# Patient Record
Sex: FEMALE | Race: WHITE | NOT HISPANIC OR LATINO | Employment: OTHER | URBAN - METROPOLITAN AREA
[De-identification: names, ages, dates, MRNs, and addresses within clinical notes are randomized per-mention and may not be internally consistent; named-entity substitution may affect disease eponyms.]

---

## 2017-03-21 ENCOUNTER — ALLSCRIPTS OFFICE VISIT (OUTPATIENT)
Dept: OTHER | Facility: OTHER | Age: 82
End: 2017-03-21

## 2017-06-21 ENCOUNTER — ALLSCRIPTS OFFICE VISIT (OUTPATIENT)
Dept: OTHER | Facility: OTHER | Age: 82
End: 2017-06-21

## 2017-09-19 ENCOUNTER — ALLSCRIPTS OFFICE VISIT (OUTPATIENT)
Dept: OTHER | Facility: OTHER | Age: 82
End: 2017-09-19

## 2018-01-14 VITALS
BODY MASS INDEX: 21.68 KG/M2 | DIASTOLIC BLOOD PRESSURE: 70 MMHG | HEIGHT: 65 IN | WEIGHT: 130.13 LBS | SYSTOLIC BLOOD PRESSURE: 128 MMHG

## 2018-01-14 VITALS
SYSTOLIC BLOOD PRESSURE: 122 MMHG | DIASTOLIC BLOOD PRESSURE: 80 MMHG | TEMPERATURE: 97.2 F | BODY MASS INDEX: 21.37 KG/M2 | HEIGHT: 65 IN | WEIGHT: 128.25 LBS

## 2018-01-14 VITALS
SYSTOLIC BLOOD PRESSURE: 124 MMHG | WEIGHT: 128 LBS | BODY MASS INDEX: 21.33 KG/M2 | DIASTOLIC BLOOD PRESSURE: 70 MMHG | HEIGHT: 65 IN | TEMPERATURE: 97 F

## 2018-01-16 NOTE — PROGRESS NOTES
Assessment    1  Benign essential hypertension (401 1) (I10)   2  History of PAF (paroxysmal atrial fibrillation) (427 31) (I48 0)    Discussion/Summary    The patient has premature atrial contractions  She doing quite well her blood pressures but been better controlled we'll see her back in 2 months  Chief Complaint  Reg chk up htn      History of Present Illness  Is an 80-year-old female being followed for high blood pressure  Active Problems    1  Benign essential hypertension (401 1) (I10)   2  Contusion of left forearm, initial encounter (923 10) (S50 12XA)   3  Status post open reduction with internal fixation of fracture (V45 89,V15 51)   (Z96 7,Z87 81)   4  Syncope (780 2) (R55)   5  Tachycardia (785 0) (R00 0)   6  Wrist fracture (814 00) (S62 109A)    Past Medical History    1  History of cataract (V12 49) (Z86 69)   2  History of hypertension (V12 59) (Z86 79)   3  History of PAF (paroxysmal atrial fibrillation) (427 31) (I48 0)   4  Urinary tract infection (599 0) (N39 0)   5  History of Wrist fracture (814 00) (S62 109A)    Surgical History    1  History of Axillary Lymphadenectomy   2  History of Cataract Surgery   3  History of Tonsillectomy   4  History of Wrist Surgery    Family History    1  No pertinent family history    Social History    · Denied: History of Alcohol use   · Denied: History of Drug use   · Never a smoker    Current Meds   1  Daily Multiple Vitamins Oral Tablet; Therapy: (Kathi Barker) to Recorded   2  Lisinopril-Hydrochlorothiazide 10-12 5 MG Oral Tablet; Take 1 tablet twice daily; Therapy: [id number] to (Last BL:[id number])  Requested for: 64TIW3209 Ordered   3  Metoprolol Succinate ER 25 MG Oral Tablet Extended Release 24 Hour; Take 1 tablet   daily; Therapy: [id number] to (Jaison Reyez)  Requested for: 69WLT0059; Last   Rx:[id number] Ordered    Allergies    1   Penicillins    Vitals  Vital Signs [Data Includes: Current Encounter]    Recorded: [id number] 01: 17WU   Systolic 319, RUE, Sitting   Diastolic 78, RUE, Sitting   Height 5 ft 5 in   Weight 116 lb    BMI Calculated 19 3   BSA Calculated 1 57     Physical Exam    Constitutional   General appearance: No acute distress, well appearing and well nourished  Eyes   Conjunctiva and lids: No swelling, erythema or discharge  Pupils and irises: Equal, round and reactive to light  Ears, Nose, Mouth, and Throat   External inspection of ears and nose: Normal     Otoscopic examination: Tympanic membranes translucent with normal light reflex  Canals patent without erythema  Nasal mucosa, septum, and turbinates: Normal without edema or erythema  Oropharynx: Normal with no erythema, edema, exudate or lesions  Pulmonary   Respiratory effort: No increased work of breathing or signs of respiratory distress  Auscultation of lungs: Clear to auscultation  Cardiovascular   Palpation of heart: Normal PMI, no thrills  Auscultation of heart: Abnormal   Regular  Examination of extremities for edema and/or varicosities: Normal     Carotid pulses: Normal     Abdomen   Abdomen: Non-tender, no masses  Liver and spleen: No hepatomegaly or splenomegaly  Lymphatic   Palpation of lymph nodes in neck: No lymphadenopathy  Musculoskeletal   Gait and station: Normal     Digits and nails: Normal without clubbing or cyanosis  Inspection/palpation of joints, bones, and muscles: Normal     Skin   Skin and subcutaneous tissue: Normal without rashes or lesions  Neurologic   Cranial nerves: Cranial nerves 2-12 intact  Reflexes: 2+ and symmetric  Sensation: No sensory loss      Psychiatric   Orientation to person, place, and time: Normal     Mood and affect: Normal          Signatures   Electronically signed by : Dar Boswell DO; Feb  3 2016  1:32PM EST                       (Author)

## 2018-03-14 RX ORDER — METOPROLOL SUCCINATE 25 MG/1
1 TABLET, EXTENDED RELEASE ORAL DAILY
COMMUNITY
Start: 2015-12-30 | End: 2018-04-04 | Stop reason: SDUPTHER

## 2018-03-14 RX ORDER — LISINOPRIL AND HYDROCHLOROTHIAZIDE 12.5; 1 MG/1; MG/1
1 TABLET ORAL 2 TIMES DAILY
COMMUNITY
Start: 2015-12-30 | End: 2018-04-17 | Stop reason: SDUPTHER

## 2018-03-19 ENCOUNTER — OFFICE VISIT (OUTPATIENT)
Dept: FAMILY MEDICINE CLINIC | Facility: CLINIC | Age: 83
End: 2018-03-19
Payer: MEDICARE

## 2018-03-19 VITALS
WEIGHT: 134 LBS | DIASTOLIC BLOOD PRESSURE: 78 MMHG | SYSTOLIC BLOOD PRESSURE: 136 MMHG | HEIGHT: 65 IN | BODY MASS INDEX: 22.33 KG/M2 | TEMPERATURE: 98.4 F

## 2018-03-19 DIAGNOSIS — I10 BENIGN ESSENTIAL HYPERTENSION: Primary | ICD-10-CM

## 2018-03-19 PROCEDURE — 99213 OFFICE O/P EST LOW 20 MIN: CPT | Performed by: FAMILY MEDICINE

## 2018-03-19 NOTE — PROGRESS NOTES
Assessment/Plan:    Patient doing very well overall  Patient will continue with current regimen as blood pressure is controlled  Medications will be sent in automatically  To consider labs in the future  No problem-specific Assessment & Plan notes found for this encounter  Diagnoses and all orders for this visit:    Benign essential hypertension    Other orders  -     DAILY MULTIPLE VITAMINS/IRON PO; Take by mouth  -     lisinopril-hydrochlorothiazide (PRINZIDE,ZESTORETIC) 10-12 5 MG per tablet; Take 1 tablet by mouth 2 (two) times a day  -     metoprolol succinate (TOPROL-XL) 25 mg 24 hr tablet; Take 1 tablet by mouth daily          Subjective:      Patient ID: Jose Kat is a 80 y o  female  Patient is here to follow-up on hypertension  Patient doing very well at this time  No chest pain or shortness of breath or headache or blurred vision  No edema  No change in urination or defecation  All other review systems negative  The following portions of the patient's history were reviewed and updated as appropriate: allergies, current medications, past family history, past medical history, past social history, past surgical history and problem list     Review of Systems   Constitutional: Negative  HENT: Negative  Eyes: Negative  Respiratory: Negative  Cardiovascular: Negative  Gastrointestinal: Negative  Endocrine: Negative  Genitourinary: Negative  Musculoskeletal: Negative  Skin: Negative  Allergic/Immunologic: Negative  Neurological: Negative  Hematological: Negative  Psychiatric/Behavioral: Negative  Objective:      /78   Temp 98 4 °F (36 9 °C)   Ht 5' 5" (1 651 m)   Wt 60 8 kg (134 lb)   Breastfeeding? No   BMI 22 30 kg/m²          Physical Exam   Constitutional: She is oriented to person, place, and time  She appears well-developed and well-nourished  No distress  HENT:   Head: Normocephalic     Right Ear: External ear normal    Left Ear: External ear normal    Mouth/Throat: Oropharynx is clear and moist  No oropharyngeal exudate  Eyes: EOM are normal  Pupils are equal, round, and reactive to light  Right eye exhibits no discharge  Left eye exhibits no discharge  No scleral icterus  Neck: Normal range of motion  Neck supple  No thyromegaly present  Cardiovascular: Normal rate, regular rhythm, normal heart sounds and intact distal pulses  Exam reveals no gallop and no friction rub  No murmur heard  Pulmonary/Chest: Effort normal and breath sounds normal  No respiratory distress  She has no wheezes  She has no rales  She exhibits no tenderness  Abdominal: Soft  Bowel sounds are normal  She exhibits no distension  There is no tenderness  There is no rebound and no guarding  Musculoskeletal: Normal range of motion  She exhibits no edema or tenderness  Lymphadenopathy:     She has no cervical adenopathy  Neurological: She is oriented to person, place, and time  No cranial nerve deficit  She exhibits normal muscle tone  Coordination normal    Skin: Skin is warm and dry  No rash noted  She is not diaphoretic  No erythema  No pallor  Psychiatric: She has a normal mood and affect  Her behavior is normal  Judgment and thought content normal    Nursing note and vitals reviewed

## 2018-04-04 DIAGNOSIS — I15.9 SECONDARY HYPERTENSION: Primary | ICD-10-CM

## 2018-04-04 RX ORDER — METOPROLOL SUCCINATE 25 MG/1
25 TABLET, EXTENDED RELEASE ORAL DAILY
Qty: 90 TABLET | Refills: 1 | OUTPATIENT
Start: 2018-04-04 | End: 2018-09-26 | Stop reason: SDUPTHER

## 2018-04-04 NOTE — TELEPHONE ENCOUNTER
[facility] # [id number] faxed today a Prescription refill Request for Cleve [place]  Refill request is for Metoprolol Succ  mg Tab

## 2018-04-17 DIAGNOSIS — I10 ESSENTIAL HYPERTENSION: Primary | ICD-10-CM

## 2018-04-17 RX ORDER — LISINOPRIL AND HYDROCHLOROTHIAZIDE 12.5; 1 MG/1; MG/1
TABLET ORAL
Qty: 180 TABLET | Refills: 0 | Status: SHIPPED | OUTPATIENT
Start: 2018-04-17 | End: 2018-07-16 | Stop reason: SDUPTHER

## 2018-04-17 NOTE — TELEPHONE ENCOUNTER
Called pharmacy to see if they received RX and they did, also called patient and left a VM that RX is at the pharmacy

## 2018-07-16 DIAGNOSIS — I10 ESSENTIAL HYPERTENSION: ICD-10-CM

## 2018-07-16 RX ORDER — LISINOPRIL AND HYDROCHLOROTHIAZIDE 12.5; 1 MG/1; MG/1
1 TABLET ORAL 2 TIMES DAILY
Qty: 180 TABLET | Refills: 0 | Status: SHIPPED | OUTPATIENT
Start: 2018-07-16 | End: 2018-10-12 | Stop reason: SDUPTHER

## 2018-09-19 ENCOUNTER — OFFICE VISIT (OUTPATIENT)
Dept: FAMILY MEDICINE CLINIC | Facility: CLINIC | Age: 83
End: 2018-09-19
Payer: MEDICARE

## 2018-09-19 VITALS
BODY MASS INDEX: 22.33 KG/M2 | SYSTOLIC BLOOD PRESSURE: 142 MMHG | WEIGHT: 134 LBS | HEIGHT: 65 IN | DIASTOLIC BLOOD PRESSURE: 58 MMHG

## 2018-09-19 DIAGNOSIS — Z00.00 MEDICARE ANNUAL WELLNESS VISIT, SUBSEQUENT: ICD-10-CM

## 2018-09-19 DIAGNOSIS — I10 BENIGN ESSENTIAL HYPERTENSION: Primary | ICD-10-CM

## 2018-09-19 PROCEDURE — G0439 PPPS, SUBSEQ VISIT: HCPCS | Performed by: FAMILY MEDICINE

## 2018-09-19 PROCEDURE — 99213 OFFICE O/P EST LOW 20 MIN: CPT | Performed by: FAMILY MEDICINE

## 2018-09-19 NOTE — PROGRESS NOTES
Assessment/Plan:   patient have  Blood pressure rechecked at follow-up visit  Continue with current regimen  Refills will be given as needed  Follow-up in 6 months  Patient does not wish that flu shot at this time  Diagnoses and all orders for this visit:    Benign essential hypertension    Medicare annual wellness visit, subsequent          Subjective:      Patient ID: Yimi Iyer is a 80 y o  female  Patient here to follow-up on hypertension  Patient doing well this time  No chest pain or shortness of breath or significant change with urination or defecation  No edema  No headaches  All review systems negative        The following portions of the patient's history were reviewed and updated as appropriate: allergies, current medications, past family history, past medical history, past social history, past surgical history and problem list     Review of Systems   Constitutional: Negative  HENT: Negative  Eyes: Negative  Respiratory: Negative  Cardiovascular: Negative  Gastrointestinal: Negative  Endocrine: Negative  Genitourinary: Negative  Musculoskeletal: Negative  Skin: Negative  Allergic/Immunologic: Negative  Neurological: Negative  Hematological: Negative  Psychiatric/Behavioral: Negative  Objective:      /58 (BP Location: Right arm, Patient Position: Sitting, Cuff Size: Adult)   Ht 5' 5" (1 651 m)   Wt 60 8 kg (134 lb)   BMI 22 30 kg/m²          Physical Exam   Constitutional: She is oriented to person, place, and time  She appears well-developed and well-nourished  No distress  HENT:   Head: Normocephalic  Right Ear: External ear normal    Left Ear: External ear normal    Mouth/Throat: Oropharynx is clear and moist  No oropharyngeal exudate  Eyes: EOM are normal  Pupils are equal, round, and reactive to light  Right eye exhibits no discharge  Left eye exhibits no discharge  No scleral icterus     Neck: Normal range of motion  Neck supple  No thyromegaly present  Cardiovascular: Normal rate, regular rhythm, normal heart sounds and intact distal pulses  Exam reveals no gallop and no friction rub  No murmur heard  Pulmonary/Chest: Effort normal and breath sounds normal  No respiratory distress  She has no wheezes  She has no rales  She exhibits no tenderness  Abdominal: Soft  Bowel sounds are normal  She exhibits no distension  There is no tenderness  There is no rebound and no guarding  Musculoskeletal: Normal range of motion  She exhibits no edema or tenderness  Lymphadenopathy:     She has no cervical adenopathy  Neurological: She is oriented to person, place, and time  No cranial nerve deficit  She exhibits normal muscle tone  Coordination normal    Skin: Skin is warm and dry  No rash noted  She is not diaphoretic  No erythema  No pallor  Psychiatric: She has a normal mood and affect  Her behavior is normal  Judgment and thought content normal    Nursing note and vitals reviewed

## 2018-09-26 DIAGNOSIS — I15.9 SECONDARY HYPERTENSION: ICD-10-CM

## 2018-09-26 RX ORDER — METOPROLOL SUCCINATE 25 MG/1
TABLET, EXTENDED RELEASE ORAL
Qty: 90 TABLET | Refills: 1 | Status: SHIPPED | OUTPATIENT
Start: 2018-09-26 | End: 2019-03-29 | Stop reason: SDUPTHER

## 2018-10-12 DIAGNOSIS — I10 ESSENTIAL HYPERTENSION: ICD-10-CM

## 2018-10-12 RX ORDER — LISINOPRIL AND HYDROCHLOROTHIAZIDE 12.5; 1 MG/1; MG/1
1 TABLET ORAL 2 TIMES DAILY
Qty: 180 TABLET | Refills: 0 | Status: SHIPPED | OUTPATIENT
Start: 2018-10-12 | End: 2019-01-08 | Stop reason: SDUPTHER

## 2019-01-08 DIAGNOSIS — I10 ESSENTIAL HYPERTENSION: ICD-10-CM

## 2019-01-08 RX ORDER — LISINOPRIL AND HYDROCHLOROTHIAZIDE 12.5; 1 MG/1; MG/1
1 TABLET ORAL 2 TIMES DAILY
Qty: 180 TABLET | Refills: 0 | Status: SHIPPED | OUTPATIENT
Start: 2019-01-08 | End: 2019-03-29 | Stop reason: SDUPTHER

## 2019-03-29 ENCOUNTER — OFFICE VISIT (OUTPATIENT)
Dept: FAMILY MEDICINE CLINIC | Facility: CLINIC | Age: 84
End: 2019-03-29
Payer: MEDICARE

## 2019-03-29 VITALS
SYSTOLIC BLOOD PRESSURE: 124 MMHG | DIASTOLIC BLOOD PRESSURE: 80 MMHG | TEMPERATURE: 98.4 F | WEIGHT: 132 LBS | HEIGHT: 65 IN | BODY MASS INDEX: 21.99 KG/M2

## 2019-03-29 DIAGNOSIS — I15.9 SECONDARY HYPERTENSION: ICD-10-CM

## 2019-03-29 DIAGNOSIS — I10 ESSENTIAL HYPERTENSION: ICD-10-CM

## 2019-03-29 DIAGNOSIS — I10 BENIGN ESSENTIAL HYPERTENSION: Primary | ICD-10-CM

## 2019-03-29 PROCEDURE — 99213 OFFICE O/P EST LOW 20 MIN: CPT | Performed by: FAMILY MEDICINE

## 2019-03-29 RX ORDER — METOPROLOL SUCCINATE 25 MG/1
25 TABLET, EXTENDED RELEASE ORAL DAILY
Qty: 90 TABLET | Refills: 1 | Status: SHIPPED | OUTPATIENT
Start: 2019-03-29 | End: 2019-10-01 | Stop reason: SDUPTHER

## 2019-03-29 RX ORDER — LISINOPRIL AND HYDROCHLOROTHIAZIDE 12.5; 1 MG/1; MG/1
1 TABLET ORAL 2 TIMES DAILY
Qty: 180 TABLET | Refills: 1 | Status: SHIPPED | OUTPATIENT
Start: 2019-03-29 | End: 2019-07-09

## 2019-03-29 NOTE — PROGRESS NOTES
Assessment/Plan:  Patient doing well overall continue with current regimen  Refills given  Follow-up in 6 months     Diagnoses and all orders for this visit:    Benign essential hypertension    Essential hypertension  -     lisinopril-hydrochlorothiazide (PRINZIDE,ZESTORETIC) 10-12 5 MG per tablet; Take 1 tablet by mouth 2 (two) times a day    Secondary hypertension  -     metoprolol succinate (TOPROL-XL) 25 mg 24 hr tablet; Take 1 tablet (25 mg total) by mouth daily          Subjective:      Patient ID: Ana Phan is a 80 y o  female  Patient follow-up on hypertension  No chest pain or shortness of breath  No headache  No problems with urination or defecation  No edema  No abdominal pain  Patient is very active  The appetite unchanged  The following portions of the patient's history were reviewed and updated as appropriate: allergies, current medications, past family history, past medical history, past social history, past surgical history and problem list     Review of Systems   Constitutional: Negative  HENT: Negative  Eyes: Negative  Respiratory: Negative  Cardiovascular: Negative  Gastrointestinal: Negative  Endocrine: Negative  Genitourinary: Negative  Musculoskeletal: Negative  Skin: Negative  Allergic/Immunologic: Negative  Neurological: Negative  Hematological: Negative  Psychiatric/Behavioral: Negative  Objective:      /80 (BP Location: Right arm, Patient Position: Sitting, Cuff Size: Adult)   Temp 98 4 °F (36 9 °C) (Tympanic)   Ht 5' 5" (1 651 m)   Wt 59 9 kg (132 lb)   BMI 21 97 kg/m²          Physical Exam   Constitutional: She is oriented to person, place, and time  She appears well-developed and well-nourished  No distress  HENT:   Head: Normocephalic  Right Ear: External ear normal    Left Ear: External ear normal    Mouth/Throat: Oropharynx is clear and moist  No oropharyngeal exudate     Eyes: Pupils are equal, round, and reactive to light  EOM are normal  Right eye exhibits no discharge  Left eye exhibits no discharge  No scleral icterus  Neck: Normal range of motion  Neck supple  No thyromegaly present  Cardiovascular: Normal rate, regular rhythm, normal heart sounds and intact distal pulses  Exam reveals no gallop and no friction rub  No murmur heard  Pulmonary/Chest: Effort normal and breath sounds normal  No respiratory distress  She has no wheezes  She has no rales  She exhibits no tenderness  Abdominal: Soft  Bowel sounds are normal  She exhibits no distension  There is no tenderness  There is no rebound and no guarding  Musculoskeletal: Normal range of motion  She exhibits no edema or tenderness  Lymphadenopathy:     She has no cervical adenopathy  Neurological: She is oriented to person, place, and time  No cranial nerve deficit  She exhibits normal muscle tone  Coordination normal    Skin: Skin is warm and dry  No rash noted  She is not diaphoretic  No erythema  No pallor  Psychiatric: She has a normal mood and affect  Her behavior is normal  Judgment and thought content normal    Nursing note and vitals reviewed

## 2019-07-09 DIAGNOSIS — I15.9 SECONDARY HYPERTENSION: Primary | ICD-10-CM

## 2019-07-09 RX ORDER — LOSARTAN POTASSIUM AND HYDROCHLOROTHIAZIDE 12.5; 5 MG/1; MG/1
1 TABLET ORAL DAILY
Qty: 90 TABLET | Refills: 3 | OUTPATIENT
Start: 2019-07-09

## 2019-07-09 RX ORDER — HYDROCHLOROTHIAZIDE 12.5 MG/1
12.5 TABLET ORAL DAILY
Qty: 90 TABLET | Refills: 1 | Status: SHIPPED | OUTPATIENT
Start: 2019-07-09 | End: 2020-01-02

## 2019-07-09 RX ORDER — LISINOPRIL 10 MG/1
10 TABLET ORAL DAILY
Qty: 90 TABLET | Refills: 1 | Status: SHIPPED | OUTPATIENT
Start: 2019-07-09 | End: 2020-01-03

## 2019-07-09 NOTE — TELEPHONE ENCOUNTER
Right for lisinopril 10 mg daily number 90 with 1 refill along with hydrochlorothiazide 12 5 mg daily number 90 with 1 refill

## 2019-07-09 NOTE — TELEPHONE ENCOUNTER
Pt was taking lisinopril hydrchlorothyazide  Was told by pharmacy that they no longer make this medication and the pharmacy called multiple other places with no luck  She was told she needs to get a new one  She was given other substitutes in past with no good results  Not sure how you want to proceed  Please address   Only has two pills left

## 2019-09-27 NOTE — PROGRESS NOTES
Assessment and Plan:     Problem List Items Addressed This Visit     None           Preventive health issues were discussed with patient, and age appropriate screening tests were ordered as noted in patient's After Visit Summary  Personalized health advice and appropriate referrals for health education or preventive services given if needed, as noted in patient's After Visit Summary  History of Present Illness:     Patient presents for Welcome to Medicare visit       Patient Care Team:  Marsha Ulloa DO as PCP - General Ermelinda Chakraborty DO     Review of Systems:     Review of Systems   Problem List:     Patient Active Problem List   Diagnosis    Benign essential hypertension      Past Medical and Surgical History:     Past Medical History:   Diagnosis Date    Cataract     LastAssessed:9/8/15    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    PAF (paroxysmal atrial fibrillation) (HCC)     Last Assessed:2/3/16    Wrist fracture     Resolved:9/8/15     Past Surgical History:   Procedure Laterality Date    CATARACT EXTRACTION      Last Assessed:9/8/15    LYMPHADENECTOMY      Last Assessed:9/8/15    TONSILLECTOMY      Last Assessed:9/8/15    WRIST SURGERY      Last Assessed:9/8/15      Family History:     Family History   Problem Relation Age of Onset    No Known Problems Family       Social History:     Social History     Socioeconomic History    Marital status: /Civil Union     Spouse name: Not on file    Number of children: Not on file    Years of education: Not on file    Highest education level: Not on file   Occupational History    Not on file   Social Needs    Financial resource strain: Not on file    Food insecurity:     Worry: Not on file     Inability: Not on file    Transportation needs:     Medical: Not on file     Non-medical: Not on file   Tobacco Use    Smoking status: Never Smoker    Smokeless tobacco: Never Used   Substance and Sexual Activity    Alcohol use: No    Drug use: No    Sexual activity: Not on file   Lifestyle    Physical activity:     Days per week: Not on file     Minutes per session: Not on file    Stress: Not on file   Relationships    Social connections:     Talks on phone: Not on file     Gets together: Not on file     Attends Sabianism service: Not on file     Active member of club or organization: Not on file     Attends meetings of clubs or organizations: Not on file     Relationship status: Not on file    Intimate partner violence:     Fear of current or ex partner: Not on file     Emotionally abused: Not on file     Physically abused: Not on file     Forced sexual activity: Not on file   Other Topics Concern    Not on file   Social History Narrative    Not on file      Medications and Allergies:     Current Outpatient Medications   Medication Sig Dispense Refill    DAILY MULTIPLE VITAMINS/IRON PO Take by mouth      hydrochlorothiazide (HYDRODIURIL) 12 5 mg tablet Take 1 tablet (12 5 mg total) by mouth daily 90 tablet 1    lisinopril (ZESTRIL) 10 mg tablet Take 1 tablet (10 mg total) by mouth daily 90 tablet 1    metoprolol succinate (TOPROL-XL) 25 mg 24 hr tablet Take 1 tablet (25 mg total) by mouth daily 90 tablet 1     No current facility-administered medications for this visit  Allergies   Allergen Reactions    Penicillins       Immunizations:     Immunization History   Administered Date(s) Administered    TD (adult) Preservative Free 01/06/2016      Health Maintenance: There are no preventive care reminders to display for this patient  Topic Date Due    Pneumococcal Vaccine: 65+ Years (1 of 2 - PCV13) 12/03/1999    DTaP,Tdap,and Td Vaccines (1 - Tdap) 01/07/2016    INFLUENZA VACCINE  07/01/2019      Medicare Screening Tests and Risk Assessments:     Yancy Zhao is here for her Subsequent Wellness visit  Last Medicare Wellness visit information reviewed, patient interviewed and updates made to the record today        Health Risk Assessment:   Patient rates overall health as good  Patient feels that their physical health rating is Same  Eyesight was rated as Same  Hearing was rated as Same  Patient feels that their emotional and mental health rating is Same  Pain experienced in the last 7 days has been None  Patient states that she has experienced weight loss or gain in last 6 months  Depression Screening:   PHQ-2 Score: 0      Fall Risk Screening: In the past year, patient has experienced: no history of falling in past year      Urinary Incontinence Screening:   Patient has not leaked urine accidently in the last six months  Home Safety:  Patient does not have trouble with stairs inside or outside of their home  Patient has working smoke alarms and has working carbon monoxide detector  Home safety hazards include: none  Nutrition:   Current diet is Regular  Medications:   Patient is currently taking over-the-counter supplements  OTC medications include: see medication list  Patient is able to manage medications  Activities of Daily Living (ADLs)/Instrumental Activities of Daily Living (IADLs):   Walk and transfer into and out of bed and chair?: Yes  Dress and groom yourself?: Yes    Bathe or shower yourself?: Yes    Do your laundry/housekeeping?: Yes  Manage your money, pay your bills and track your expenses?: Yes  Make your own meals?: Yes    Do your own shopping?: Yes    Previous Hospitalizations:   Any hospitalizations or ED visits within the last 12 months?: No      Advance Care Planning:   Living will: Yes    Durable POA for healthcare:  Yes    Advanced directive: Yes      PREVENTIVE SCREENINGS      Cardiovascular Screening:    General: Risks and Benefits Discussed and Patient Declines      Diabetes Screening:     General: Risks and Benefits Discussed and Patient Declines      Colorectal Cancer Screening:     General: Risks and Benefits Discussed, Patient Declines and Screening Not Indicated      Breast Cancer Screening:     General: Risks and Benefits Discussed, Patient Declines and Screening Not Indicated      Cervical Cancer Screening:    General: Screening Not Indicated      Osteoporosis Screening:    General: Risks and Benefits Discussed and Patient Declines      Abdominal Aortic Aneurysm (AAA) Screening:        General: Screening Not Indicated      Lung Cancer Screening:     General: Screening Not Indicated      Hepatitis C Screening:    General: Patient Declines and Risks and Benefits Discussed    Other Counseling Topics:   Calcium and Vitamin D Intake  No exam data present     Physical Exam:     There were no vitals taken for this visit      Physical Exam    Liberty Good, DO

## 2019-09-30 ENCOUNTER — OFFICE VISIT (OUTPATIENT)
Dept: FAMILY MEDICINE CLINIC | Facility: CLINIC | Age: 84
End: 2019-09-30
Payer: MEDICARE

## 2019-09-30 VITALS
DIASTOLIC BLOOD PRESSURE: 72 MMHG | BODY MASS INDEX: 21.33 KG/M2 | HEIGHT: 65 IN | WEIGHT: 128 LBS | SYSTOLIC BLOOD PRESSURE: 142 MMHG | HEART RATE: 64 BPM | OXYGEN SATURATION: 96 %

## 2019-09-30 DIAGNOSIS — Z00.00 MEDICARE ANNUAL WELLNESS VISIT, SUBSEQUENT: ICD-10-CM

## 2019-09-30 DIAGNOSIS — I10 BENIGN ESSENTIAL HYPERTENSION: Primary | ICD-10-CM

## 2019-09-30 PROCEDURE — G0439 PPPS, SUBSEQ VISIT: HCPCS | Performed by: FAMILY MEDICINE

## 2019-09-30 PROCEDURE — 99213 OFFICE O/P EST LOW 20 MIN: CPT | Performed by: FAMILY MEDICINE

## 2019-09-30 NOTE — PATIENT INSTRUCTIONS
Medicare Preventive Visit Patient Instructions  Thank you for completing your Welcome to Medicare Visit or Medicare Annual Wellness Visit today  Your next wellness visit will be due in one year (9/30/2020)  The screening/preventive services that you may require over the next 5-10 years are detailed below  Some tests may not apply to you based off risk factors and/or age  Screening tests ordered at today's visit but not completed yet may show as past due  Also, please note that scanned in results may not display below  Preventive Screenings:  Service Recommendations Previous Testing/Comments   Colorectal Cancer Screening  * Colonoscopy    * Fecal Occult Blood Test (FOBT)/Fecal Immunochemical Test (FIT)  * Fecal DNA/Cologuard Test  * Flexible Sigmoidoscopy Age: 54-65 years old   Colonoscopy: every 10 years (may be performed more frequently if at higher risk)  OR  FOBT/FIT: every 1 year  OR  Cologuard: every 3 years  OR  Sigmoidoscopy: every 5 years  Screening may be recommended earlier than age 48 if at higher risk for colorectal cancer  Also, an individualized decision between you and your healthcare provider will decide whether screening between the ages of 74-80 would be appropriate  Colonoscopy: Not on file  FOBT/FIT: Not on file  Cologuard: Not on file  Sigmoidoscopy: Not on file    Risks and Benefits Discussed  Patient Declines     Breast Cancer Screening Age: 36 years old  Frequency: every 1-2 years  Not required if history of left and right mastectomy Mammogram: Not on file    Risks and Benefits Discussed  Patient Declines   Cervical Cancer Screening Between the ages of 21-29, pap smear recommended once every 3 years  Between the ages of 33-67, can perform pap smear with HPV co-testing every 5 years     Recommendations may differ for women with a history of total hysterectomy, cervical cancer, or abnormal pap smears in past  Pap Smear: Not on file    Screening Not Indicated   Hepatitis C Screening Once for adults born between [birth year] and 1965  More frequently in patients at high risk for Hepatitis C Hep C Antibody: Not on file    Patient Declines  Risks and Benefits Discussed   Diabetes Screening 1-2 times per year if you're at risk for diabetes or have pre-diabetes Fasting glucose: No results in last 5 years   A1C: No results in last 5 years    Risks and Benefits Discussed  Patient Declines   Cholesterol Screening Once every 5 years if you don't have a lipid disorder  May order more often based on risk factors  Lipid panel: Not on file    Risks and Benefits Discussed  Patient Declines     Other Preventive Screenings Covered by Medicare:  1  Abdominal Aortic Aneurysm (AAA) Screening: covered once if your at risk  You're considered to be at risk if you have a family history of AAA  2  Lung Cancer Screening: covers low dose CT scan once per year if you meet all of the following conditions: (1) Age 50-69; (2) No signs or symptoms of lung cancer; (3) Current smoker or have quit smoking within the last 15 years; (4) You have a tobacco smoking history of at least 30 pack years (packs per day multiplied by number of years you smoked); (5) You get a written order from a healthcare provider  3  Glaucoma Screening: covered annually if you're considered high risk: (1) You have diabetes OR (2) Family history of glaucoma OR (3)  aged 48 and older OR (3)  American aged 72 and older  3  Osteoporosis Screening: covered every 2 years if you meet one of the following conditions: (1) You're estrogen deficient and at risk for osteoporosis based off medical history and other findings; (2) Have a vertebral abnormality; (3) On glucocorticoid therapy for more than 3 months; (4) Have primary hyperparathyroidism; (5) On osteoporosis medications and need to assess response to drug therapy  · Last bone density test (DXA Scan): Not on file  5  HIV Screening: covered annually if you're between the age of 12-76   Also covered annually if you are younger than 13 and older than 72 with risk factors for HIV infection  For pregnant patients, it is covered up to 3 times per pregnancy  Immunizations:  Immunization Recommendations   Influenza Vaccine Annual influenza vaccination during flu season is recommended for all persons aged >= 6 months who do not have contraindications   Pneumococcal Vaccine (Prevnar and Pneumovax)  * Prevnar = PCV13  * Pneumovax = PPSV23   Adults 25-60 years old: 1-3 doses may be recommended based on certain risk factors  Adults 72 years old: Prevnar (PCV13) vaccine recommended followed by Pneumovax (PPSV23) vaccine  If already received PPSV23 since turning 65, then PCV13 recommended at least one year after PPSV23 dose  Hepatitis B Vaccine 3 dose series if at intermediate or high risk (ex: diabetes, end stage renal disease, liver disease)   Tetanus (Td) Vaccine - COST NOT COVERED BY MEDICARE PART B Following completion of primary series, a booster dose should be given every 10 years to maintain immunity against tetanus  Td may also be given as tetanus wound prophylaxis  Tdap Vaccine - COST NOT COVERED BY MEDICARE PART B Recommended at least once for all adults  For pregnant patients, recommended with each pregnancy  Shingles Vaccine (Shingrix) - COST NOT COVERED BY MEDICARE PART B  2 shot series recommended in those aged 48 and above     Health Maintenance Due:  There are no preventive care reminders to display for this patient  Immunizations Due:      Topic Date Due    Pneumococcal Vaccine: 65+ Years (1 of 2 - PCV13) 12/03/1999    DTaP,Tdap,and Td Vaccines (1 - Tdap) 01/07/2016    INFLUENZA VACCINE  07/01/2019     Advance Directives   What are advance directives? Advance directives are legal documents that state your wishes and plans for medical care  These plans are made ahead of time in case you lose your ability to make decisions for yourself   Advance directives can apply to any medical decision, such as the treatments you want, and if you want to donate organs  What are the types of advance directives? There are many types of advance directives, and each state has rules about how to use them  You may choose a combination of any of the following:  · Living will: This is a written record of the treatment you want  You can also choose which treatments you do not want, which to limit, and which to stop at a certain time  This includes surgery, medicine, IV fluid, and tube feedings  · Durable power of  for healthcare [facility]): This is a written record that states who you want to make healthcare choices for you when you are unable to make them for yourself  This person, called a proxy, is usually a family member or a friend  You may choose more than 1 proxy  · Do not resuscitate (DNR) order:  A DNR order is used in case your heart stops beating or you stop breathing  It is a request not to have certain forms of treatment, such as CPR  A DNR order may be included in other types of advance directives  · Medical directive: This covers the care that you want if you are in a coma, near death, or unable to make decisions for yourself  You can list the treatments you want for each condition  Treatment may include pain medicine, surgery, blood transfusions, dialysis, IV or tube feedings, and a ventilator (breathing machine)  · Values history: This document has questions about your views, beliefs, and how you feel and think about life  This information can help others choose the care that you would choose  Why are advance directives important? An advance directive helps you control your care  Although spoken wishes may be used, it is better to have your wishes written down  Spoken wishes can be misunderstood, or not followed  Treatments may be given even if you do not want them  An advance directive may make it easier for your family to make difficult choices about your care        © Copyright [employer] 2018 Information is for [employer] use only and may not be sold, redistributed or otherwise used for commercial purposes   All illustrations and images included in CareNotes® are the copyrighted property of A D A M , Inc  or [facility] Wilfredo Joyner

## 2019-09-30 NOTE — PROGRESS NOTES
Assessment/Plan:  Patient refusing laboratory studies as well as vaccines  Guidance given overall  Refills will be given when needed for blood pressure       Diagnoses and all orders for this visit:    Benign essential hypertension    Medicare annual wellness visit, subsequent            Subjective:        Patient ID: Anna Hernandez is a 80 y o  female  Patient follow-up on hypertension  Patient feels well overall without any complaints  No headache or visual disturbance  No chest pain shortness of breath problems urinating or defecating  No edema or skin related issues  All other review systems negative  Patient does not wish to have any vaccines  The following portions of the patient's history were reviewed and updated as appropriate: allergies, current medications, past family history, past medical history, past social history, past surgical history and problem list       Review of Systems   Constitutional: Negative  HENT: Negative  Eyes: Negative  Respiratory: Negative  Cardiovascular: Negative  Gastrointestinal: Negative  Endocrine: Negative  Genitourinary: Negative  Musculoskeletal: Negative  Skin: Negative  Allergic/Immunologic: Negative  Neurological: Negative  Hematological: Negative  Psychiatric/Behavioral: Negative  Objective:    Depression Screening Follow-up Plan: Patient's depression screening was positive with a PHQ-2 score of 0  Their PHQ-9 score was   Clinically patient does not have depression  No treatment is required  /72 (BP Location: Right arm, Patient Position: Standing, Cuff Size: Standard)   Pulse 64   Ht 5' 5" (1 651 m)   Wt 58 1 kg (128 lb)   SpO2 96%   BMI 21 30 kg/m²          Physical Exam   Constitutional: She appears well-developed and well-nourished  No distress  HENT:   Head: Normocephalic     Right Ear: External ear normal    Left Ear: External ear normal    Mouth/Throat: Oropharynx is clear and moist  No oropharyngeal exudate  Eyes: Pupils are equal, round, and reactive to light  EOM are normal  Right eye exhibits no discharge  Left eye exhibits no discharge  No scleral icterus  Neck: Normal range of motion  Neck supple  No thyromegaly present  Cardiovascular: Normal rate, regular rhythm, normal heart sounds and intact distal pulses  Exam reveals no gallop and no friction rub  No murmur heard  Pulmonary/Chest: Effort normal and breath sounds normal  No respiratory distress  She has no wheezes  She has no rales  She exhibits no tenderness  Abdominal: Soft  Bowel sounds are normal  She exhibits no distension  There is no tenderness  There is no rebound and no guarding  Musculoskeletal: Normal range of motion  She exhibits no edema or tenderness  Lymphadenopathy:     She has no cervical adenopathy  Neurological: She is alert  No cranial nerve deficit  She exhibits normal muscle tone  Coordination normal    Skin: Skin is warm and dry  No rash noted  She is not diaphoretic  No erythema  No pallor  Psychiatric: She has a normal mood and affect  Her behavior is normal  Judgment and thought content normal    Nursing note and vitals reviewed

## 2019-10-01 DIAGNOSIS — I15.9 SECONDARY HYPERTENSION: ICD-10-CM

## 2019-10-01 RX ORDER — METOPROLOL SUCCINATE 25 MG/1
25 TABLET, EXTENDED RELEASE ORAL DAILY
Qty: 90 TABLET | Refills: 1 | Status: SHIPPED | OUTPATIENT
Start: 2019-10-01 | End: 2020-03-27

## 2020-01-01 DIAGNOSIS — I15.9 SECONDARY HYPERTENSION: ICD-10-CM

## 2020-01-02 RX ORDER — HYDROCHLOROTHIAZIDE 12.5 MG/1
TABLET ORAL
Qty: 90 TABLET | Refills: 0 | Status: SHIPPED | OUTPATIENT
Start: 2020-01-02 | End: 2020-03-31 | Stop reason: SDUPTHER

## 2020-01-03 DIAGNOSIS — I15.9 SECONDARY HYPERTENSION: ICD-10-CM

## 2020-01-03 RX ORDER — LISINOPRIL 10 MG/1
TABLET ORAL
Qty: 90 TABLET | Refills: 0 | Status: SHIPPED | OUTPATIENT
Start: 2020-01-03 | End: 2020-03-31 | Stop reason: SDUPTHER

## 2020-03-27 DIAGNOSIS — I15.9 SECONDARY HYPERTENSION: ICD-10-CM

## 2020-03-27 RX ORDER — METOPROLOL SUCCINATE 25 MG/1
TABLET, EXTENDED RELEASE ORAL
Qty: 90 TABLET | Refills: 0 | Status: SHIPPED | OUTPATIENT
Start: 2020-03-27 | End: 2020-06-26 | Stop reason: SDUPTHER

## 2020-03-31 DIAGNOSIS — I15.9 SECONDARY HYPERTENSION: ICD-10-CM

## 2020-03-31 RX ORDER — HYDROCHLOROTHIAZIDE 12.5 MG/1
12.5 TABLET ORAL DAILY
Qty: 90 TABLET | Refills: 0 | Status: SHIPPED | OUTPATIENT
Start: 2020-03-31 | End: 2020-06-29 | Stop reason: SDUPTHER

## 2020-03-31 RX ORDER — LISINOPRIL 10 MG/1
10 TABLET ORAL DAILY
Qty: 90 TABLET | Refills: 0 | Status: SHIPPED | OUTPATIENT
Start: 2020-03-31 | End: 2020-05-05

## 2020-05-05 ENCOUNTER — OFFICE VISIT (OUTPATIENT)
Dept: FAMILY MEDICINE CLINIC | Facility: CLINIC | Age: 85
End: 2020-05-05
Payer: MEDICARE

## 2020-05-05 VITALS
WEIGHT: 125 LBS | BODY MASS INDEX: 20.83 KG/M2 | SYSTOLIC BLOOD PRESSURE: 146 MMHG | TEMPERATURE: 97.3 F | HEIGHT: 65 IN | DIASTOLIC BLOOD PRESSURE: 90 MMHG

## 2020-05-05 DIAGNOSIS — I10 BENIGN ESSENTIAL HYPERTENSION: Primary | ICD-10-CM

## 2020-05-05 PROCEDURE — 3077F SYST BP >= 140 MM HG: CPT | Performed by: FAMILY MEDICINE

## 2020-05-05 PROCEDURE — 99213 OFFICE O/P EST LOW 20 MIN: CPT | Performed by: FAMILY MEDICINE

## 2020-05-05 PROCEDURE — 1160F RVW MEDS BY RX/DR IN RCRD: CPT | Performed by: FAMILY MEDICINE

## 2020-05-05 PROCEDURE — 3008F BODY MASS INDEX DOCD: CPT | Performed by: FAMILY MEDICINE

## 2020-05-05 PROCEDURE — 1036F TOBACCO NON-USER: CPT | Performed by: FAMILY MEDICINE

## 2020-05-05 PROCEDURE — 3080F DIAST BP >= 90 MM HG: CPT | Performed by: FAMILY MEDICINE

## 2020-06-26 DIAGNOSIS — I15.9 SECONDARY HYPERTENSION: ICD-10-CM

## 2020-06-26 RX ORDER — METOPROLOL SUCCINATE 25 MG/1
25 TABLET, EXTENDED RELEASE ORAL DAILY
Qty: 90 TABLET | Refills: 0 | Status: SHIPPED | OUTPATIENT
Start: 2020-06-26 | End: 2020-06-29 | Stop reason: SDUPTHER

## 2020-06-29 DIAGNOSIS — I15.9 SECONDARY HYPERTENSION: ICD-10-CM

## 2020-06-29 RX ORDER — HYDROCHLOROTHIAZIDE 12.5 MG/1
12.5 TABLET ORAL DAILY
Qty: 90 TABLET | Refills: 0 | Status: SHIPPED | OUTPATIENT
Start: 2020-06-29 | End: 2020-09-25 | Stop reason: SDUPTHER

## 2020-06-29 RX ORDER — METOPROLOL SUCCINATE 25 MG/1
25 TABLET, EXTENDED RELEASE ORAL DAILY
Qty: 90 TABLET | Refills: 0 | Status: SHIPPED | OUTPATIENT
Start: 2020-06-29 | End: 2020-11-04 | Stop reason: SDUPTHER

## 2020-06-29 RX ORDER — METOPROLOL SUCCINATE 25 MG/1
25 TABLET, EXTENDED RELEASE ORAL DAILY
Qty: 90 TABLET | Refills: 0 | Status: CANCELLED | OUTPATIENT
Start: 2020-06-29

## 2020-09-25 DIAGNOSIS — I15.9 SECONDARY HYPERTENSION: ICD-10-CM

## 2020-09-25 RX ORDER — HYDROCHLOROTHIAZIDE 12.5 MG/1
12.5 TABLET ORAL DAILY
Qty: 90 TABLET | Refills: 0 | Status: SHIPPED | OUTPATIENT
Start: 2020-09-25 | End: 2020-11-04 | Stop reason: SDUPTHER

## 2020-11-04 ENCOUNTER — OFFICE VISIT (OUTPATIENT)
Dept: FAMILY MEDICINE CLINIC | Facility: CLINIC | Age: 85
End: 2020-11-04
Payer: MEDICARE

## 2020-11-04 VITALS
DIASTOLIC BLOOD PRESSURE: 84 MMHG | HEIGHT: 65 IN | WEIGHT: 126 LBS | SYSTOLIC BLOOD PRESSURE: 136 MMHG | TEMPERATURE: 97.5 F | BODY MASS INDEX: 20.99 KG/M2

## 2020-11-04 DIAGNOSIS — Z00.00 MEDICARE ANNUAL WELLNESS VISIT, SUBSEQUENT: ICD-10-CM

## 2020-11-04 DIAGNOSIS — I15.9 SECONDARY HYPERTENSION: ICD-10-CM

## 2020-11-04 DIAGNOSIS — I10 BENIGN ESSENTIAL HYPERTENSION: Primary | ICD-10-CM

## 2020-11-04 PROCEDURE — 1123F ACP DISCUSS/DSCN MKR DOCD: CPT | Performed by: FAMILY MEDICINE

## 2020-11-04 PROCEDURE — 99213 OFFICE O/P EST LOW 20 MIN: CPT | Performed by: FAMILY MEDICINE

## 2020-11-04 PROCEDURE — G0438 PPPS, INITIAL VISIT: HCPCS | Performed by: FAMILY MEDICINE

## 2020-11-04 RX ORDER — METOPROLOL SUCCINATE 25 MG/1
25 TABLET, EXTENDED RELEASE ORAL DAILY
Qty: 90 TABLET | Refills: 1 | Status: SHIPPED | OUTPATIENT
Start: 2020-11-04 | End: 2021-05-04 | Stop reason: SDUPTHER

## 2020-11-04 RX ORDER — HYDROCHLOROTHIAZIDE 12.5 MG/1
12.5 TABLET ORAL DAILY
Qty: 90 TABLET | Refills: 1 | Status: SHIPPED | OUTPATIENT
Start: 2020-11-04 | End: 2021-05-04 | Stop reason: SDUPTHER

## 2021-05-04 ENCOUNTER — OFFICE VISIT (OUTPATIENT)
Dept: FAMILY MEDICINE CLINIC | Facility: CLINIC | Age: 86
End: 2021-05-04
Payer: MEDICARE

## 2021-05-04 VITALS
SYSTOLIC BLOOD PRESSURE: 130 MMHG | HEIGHT: 65 IN | DIASTOLIC BLOOD PRESSURE: 90 MMHG | BODY MASS INDEX: 20.83 KG/M2 | WEIGHT: 125 LBS

## 2021-05-04 DIAGNOSIS — I15.9 SECONDARY HYPERTENSION: ICD-10-CM

## 2021-05-04 DIAGNOSIS — I10 BENIGN ESSENTIAL HYPERTENSION: Primary | ICD-10-CM

## 2021-05-04 DIAGNOSIS — E44.1 MILD PROTEIN-CALORIE MALNUTRITION (HCC): ICD-10-CM

## 2021-05-04 PROCEDURE — 99213 OFFICE O/P EST LOW 20 MIN: CPT | Performed by: FAMILY MEDICINE

## 2021-05-04 RX ORDER — HYDROCHLOROTHIAZIDE 12.5 MG/1
12.5 TABLET ORAL DAILY
Qty: 90 TABLET | Refills: 1 | Status: SHIPPED | OUTPATIENT
Start: 2021-05-04 | End: 2021-12-14 | Stop reason: SDUPTHER

## 2021-05-04 RX ORDER — METOPROLOL SUCCINATE 25 MG/1
25 TABLET, EXTENDED RELEASE ORAL DAILY
Qty: 90 TABLET | Refills: 1 | Status: SHIPPED | OUTPATIENT
Start: 2021-05-04 | End: 2021-12-14 | Stop reason: SDUPTHER

## 2021-05-04 NOTE — PROGRESS NOTES
Assessment/Plan:  Patient's blood pressure stable this time continue with current regimen  Refills given  Follow-up in 6 months  Diagnoses and all orders for this visit:    Benign essential hypertension    Secondary hypertension  -     hydrochlorothiazide (HYDRODIURIL) 12 5 mg tablet; Take 1 tablet (12 5 mg total) by mouth daily  -     metoprolol succinate (TOPROL-XL) 25 mg 24 hr tablet; Take 1 tablet (25 mg total) by mouth daily    Mild protein-calorie malnutrition (HCC)            Subjective:        Patient ID: Josefina Marte is a 80 y o  female  Patient follow-up on hypertension  Patient feeling well  No problems with urination or defecation or chest pain or shortness of breath  Patient is gardening  All review systems negative  The following portions of the patient's history were reviewed and updated as appropriate: allergies, current medications, past family history, past medical history, past social history, past surgical history and problem list       Review of Systems   Constitutional: Negative  HENT: Negative  Eyes: Negative  Respiratory: Negative  Cardiovascular: Negative  Gastrointestinal: Negative  Endocrine: Negative  Genitourinary: Negative  Musculoskeletal: Negative  Skin: Negative  Allergic/Immunologic: Negative  Neurological: Negative  Hematological: Negative  Psychiatric/Behavioral: Negative  Objective:        Depression Screening and Follow-up Plan: Clincally patient does not have depression  No treatment is required  /90 (BP Location: Right arm, Patient Position: Sitting, Cuff Size: Standard)   Ht 5' 5" (1 651 m)   Wt 56 7 kg (125 lb)   BMI 20 80 kg/m²          Physical Exam  Vitals signs and nursing note reviewed  Constitutional:       General: She is not in acute distress  Appearance: Normal appearance  She is not ill-appearing, toxic-appearing or diaphoretic     HENT:      Head: Normocephalic and atraumatic  Right Ear: Tympanic membrane, ear canal and external ear normal  There is no impacted cerumen  Left Ear: Tympanic membrane, ear canal and external ear normal  There is no impacted cerumen  Nose: Nose normal  No congestion or rhinorrhea  Mouth/Throat:      Mouth: Mucous membranes are moist       Pharynx: No oropharyngeal exudate or posterior oropharyngeal erythema  Eyes:      General: No scleral icterus  Right eye: No discharge  Left eye: No discharge  Extraocular Movements: Extraocular movements intact  Conjunctiva/sclera: Conjunctivae normal       Pupils: Pupils are equal, round, and reactive to light  Neck:      Musculoskeletal: Normal range of motion and neck supple  No neck rigidity or muscular tenderness  Vascular: No carotid bruit  Cardiovascular:      Rate and Rhythm: Normal rate and regular rhythm  Pulses: Normal pulses  Heart sounds: Normal heart sounds  No murmur  No friction rub  No gallop  Pulmonary:      Effort: Pulmonary effort is normal  No respiratory distress  Breath sounds: Normal breath sounds  No stridor  No wheezing, rhonchi or rales  Chest:      Chest wall: No tenderness  Musculoskeletal: Normal range of motion  General: No swelling, tenderness, deformity or signs of injury  Right lower leg: No edema  Left lower leg: No edema  Lymphadenopathy:      Cervical: No cervical adenopathy  Skin:     General: Skin is warm and dry  Capillary Refill: Capillary refill takes less than 2 seconds  Coloration: Skin is not jaundiced  Findings: No bruising, erythema, lesion or rash  Neurological:      Mental Status: She is alert and oriented to person, place, and time  Mental status is at baseline  Cranial Nerves: No cranial nerve deficit  Sensory: No sensory deficit  Motor: No weakness        Coordination: Coordination normal       Gait: Gait normal  Psychiatric:         Mood and Affect: Mood normal          Behavior: Behavior normal          Thought Content:  Thought content normal          Judgment: Judgment normal

## 2021-10-10 ENCOUNTER — HOSPITAL ENCOUNTER (EMERGENCY)
Facility: HOSPITAL | Age: 86
Discharge: HOME | End: 2021-10-10
Attending: EMERGENCY MEDICINE
Payer: MEDICARE

## 2021-10-10 ENCOUNTER — TELEPHONE (OUTPATIENT)
Dept: OTHER | Facility: HOSPITAL | Age: 86
End: 2021-10-10

## 2021-10-10 ENCOUNTER — APPOINTMENT (EMERGENCY)
Dept: CT IMAGING | Facility: HOSPITAL | Age: 86
End: 2021-10-10
Payer: MEDICARE

## 2021-10-10 VITALS
TEMPERATURE: 98.4 F | SYSTOLIC BLOOD PRESSURE: 187 MMHG | DIASTOLIC BLOOD PRESSURE: 81 MMHG | HEART RATE: 77 BPM | OXYGEN SATURATION: 97 % | RESPIRATION RATE: 18 BRPM

## 2021-10-10 DIAGNOSIS — K44.9 HIATAL HERNIA: ICD-10-CM

## 2021-10-10 DIAGNOSIS — N20.1 URETEROLITHIASIS: Primary | ICD-10-CM

## 2021-10-10 DIAGNOSIS — N13.30 HYDRONEPHROSIS: ICD-10-CM

## 2021-10-10 DIAGNOSIS — R10.9 RIGHT FLANK PAIN: ICD-10-CM

## 2021-10-10 LAB
ALBUMIN SERPL BCP-MCNC: 4.2 G/DL (ref 3.5–5)
ALP SERPL-CCNC: 83 U/L (ref 46–116)
ALT SERPL W P-5'-P-CCNC: 33 U/L (ref 12–78)
ANION GAP SERPL CALCULATED.3IONS-SCNC: 11 MMOL/L (ref 4–13)
AST SERPL W P-5'-P-CCNC: 31 U/L (ref 5–45)
BACTERIA UR QL AUTO: ABNORMAL /HPF
BASOPHILS # BLD AUTO: 0.03 THOUSANDS/ΜL (ref 0–0.1)
BASOPHILS NFR BLD AUTO: 0 % (ref 0–1)
BILIRUB SERPL-MCNC: 0.76 MG/DL (ref 0.2–1)
BILIRUB UR QL STRIP: NEGATIVE
BILIRUB UR QL STRIP: NEGATIVE
BUN SERPL-MCNC: 23 MG/DL (ref 5–25)
CALCIUM SERPL-MCNC: 9.8 MG/DL (ref 8.3–10.1)
CHLORIDE SERPL-SCNC: 97 MMOL/L (ref 100–108)
CLARITY UR: CLEAR
CLARITY UR: CLEAR
CO2 SERPL-SCNC: 27 MMOL/L (ref 21–32)
COLOR UR: ABNORMAL
COLOR UR: YELLOW
CREAT SERPL-MCNC: 1.32 MG/DL (ref 0.6–1.3)
EOSINOPHIL # BLD AUTO: 0 THOUSAND/ΜL (ref 0–0.61)
EOSINOPHIL NFR BLD AUTO: 0 % (ref 0–6)
ERYTHROCYTE [DISTWIDTH] IN BLOOD BY AUTOMATED COUNT: 14.4 % (ref 11.6–15.1)
GFR SERPL CREATININE-BSD FRML MDRD: 37 ML/MIN/1.73SQ M
GLUCOSE SERPL-MCNC: 153 MG/DL (ref 65–140)
GLUCOSE UR STRIP-MCNC: NEGATIVE MG/DL
GLUCOSE UR STRIP-MCNC: NEGATIVE MG/DL
HCT VFR BLD AUTO: 39.8 % (ref 34.8–46.1)
HGB BLD-MCNC: 13.2 G/DL (ref 11.5–15.4)
HGB UR QL STRIP.AUTO: ABNORMAL
HGB UR QL STRIP.AUTO: ABNORMAL
IMM GRANULOCYTES # BLD AUTO: 0.08 THOUSAND/UL (ref 0–0.2)
IMM GRANULOCYTES NFR BLD AUTO: 1 % (ref 0–2)
KETONES UR STRIP-MCNC: NEGATIVE MG/DL
KETONES UR STRIP-MCNC: NEGATIVE MG/DL
LACTATE SERPL-SCNC: 1.6 MMOL/L (ref 0.5–2)
LACTATE SERPL-SCNC: 2.6 MMOL/L (ref 0.5–2)
LEUKOCYTE ESTERASE UR QL STRIP: NEGATIVE
LEUKOCYTE ESTERASE UR QL STRIP: NEGATIVE
LIPASE SERPL-CCNC: 110 U/L (ref 73–393)
LYMPHOCYTES # BLD AUTO: 0.26 THOUSANDS/ΜL (ref 0.6–4.47)
LYMPHOCYTES NFR BLD AUTO: 2 % (ref 14–44)
MCH RBC QN AUTO: 30.3 PG (ref 26.8–34.3)
MCHC RBC AUTO-ENTMCNC: 33.2 G/DL (ref 31.4–37.4)
MCV RBC AUTO: 92 FL (ref 82–98)
MONOCYTES # BLD AUTO: 0.88 THOUSAND/ΜL (ref 0.17–1.22)
MONOCYTES NFR BLD AUTO: 5 % (ref 4–12)
NEUTROPHILS # BLD AUTO: 16.29 THOUSANDS/ΜL (ref 1.85–7.62)
NEUTS SEG NFR BLD AUTO: 92 % (ref 43–75)
NITRITE UR QL STRIP: NEGATIVE
NITRITE UR QL STRIP: NEGATIVE
NON-SQ EPI CELLS URNS QL MICRO: ABNORMAL /HPF
NRBC BLD AUTO-RTO: 0 /100 WBCS
PH UR STRIP.AUTO: 6.5 [PH]
PH UR STRIP.AUTO: 7 [PH] (ref 4.5–8)
PLATELET # BLD AUTO: 167 THOUSANDS/UL (ref 149–390)
PMV BLD AUTO: 11.2 FL (ref 8.9–12.7)
POTASSIUM SERPL-SCNC: 3.6 MMOL/L (ref 3.5–5.3)
PROT SERPL-MCNC: 7.9 G/DL (ref 6.4–8.2)
PROT UR STRIP-MCNC: NEGATIVE MG/DL
PROT UR STRIP-MCNC: NEGATIVE MG/DL
RBC # BLD AUTO: 4.35 MILLION/UL (ref 3.81–5.12)
RBC #/AREA URNS AUTO: ABNORMAL /HPF
SODIUM SERPL-SCNC: 135 MMOL/L (ref 136–145)
SP GR UR STRIP.AUTO: 1.01 (ref 1–1.03)
SP GR UR STRIP.AUTO: 1.01 (ref 1–1.03)
UROBILINOGEN UR QL STRIP.AUTO: 0.2 E.U./DL
UROBILINOGEN UR QL STRIP.AUTO: 0.2 E.U./DL
WBC # BLD AUTO: 17.54 THOUSAND/UL (ref 4.31–10.16)
WBC #/AREA URNS AUTO: ABNORMAL /HPF

## 2021-10-10 PROCEDURE — 74177 CT ABD & PELVIS W/CONTRAST: CPT

## 2021-10-10 PROCEDURE — 96361 HYDRATE IV INFUSION ADD-ON: CPT

## 2021-10-10 PROCEDURE — 87077 CULTURE AEROBIC IDENTIFY: CPT | Performed by: EMERGENCY MEDICINE

## 2021-10-10 PROCEDURE — 99284 EMERGENCY DEPT VISIT MOD MDM: CPT

## 2021-10-10 PROCEDURE — 87186 SC STD MICRODIL/AGAR DIL: CPT | Performed by: EMERGENCY MEDICINE

## 2021-10-10 PROCEDURE — 83690 ASSAY OF LIPASE: CPT | Performed by: EMERGENCY MEDICINE

## 2021-10-10 PROCEDURE — 85025 COMPLETE CBC W/AUTO DIFF WBC: CPT | Performed by: EMERGENCY MEDICINE

## 2021-10-10 PROCEDURE — 36415 COLL VENOUS BLD VENIPUNCTURE: CPT | Performed by: EMERGENCY MEDICINE

## 2021-10-10 PROCEDURE — 83605 ASSAY OF LACTIC ACID: CPT | Performed by: EMERGENCY MEDICINE

## 2021-10-10 PROCEDURE — 96375 TX/PRO/DX INJ NEW DRUG ADDON: CPT

## 2021-10-10 PROCEDURE — 87040 BLOOD CULTURE FOR BACTERIA: CPT | Performed by: EMERGENCY MEDICINE

## 2021-10-10 PROCEDURE — 99285 EMERGENCY DEPT VISIT HI MDM: CPT | Performed by: EMERGENCY MEDICINE

## 2021-10-10 PROCEDURE — 81001 URINALYSIS AUTO W/SCOPE: CPT | Performed by: EMERGENCY MEDICINE

## 2021-10-10 PROCEDURE — 93005 ELECTROCARDIOGRAM TRACING: CPT

## 2021-10-10 PROCEDURE — 96365 THER/PROPH/DIAG IV INF INIT: CPT

## 2021-10-10 PROCEDURE — 80053 COMPREHEN METABOLIC PANEL: CPT | Performed by: EMERGENCY MEDICINE

## 2021-10-10 RX ORDER — FAMOTIDINE 20 MG/1
20 TABLET, FILM COATED ORAL ONCE
Status: COMPLETED | OUTPATIENT
Start: 2021-10-10 | End: 2021-10-10

## 2021-10-10 RX ORDER — KETOROLAC TROMETHAMINE 30 MG/ML
15 INJECTION, SOLUTION INTRAMUSCULAR; INTRAVENOUS ONCE
Status: COMPLETED | OUTPATIENT
Start: 2021-10-10 | End: 2021-10-10

## 2021-10-10 RX ORDER — TAMSULOSIN HYDROCHLORIDE 0.4 MG/1
0.4 CAPSULE ORAL
Qty: 21 CAPSULE | Refills: 0 | Status: SHIPPED | OUTPATIENT
Start: 2021-10-10 | End: 2021-11-18

## 2021-10-10 RX ORDER — TAMSULOSIN HYDROCHLORIDE 0.4 MG/1
0.4 CAPSULE ORAL ONCE
Status: COMPLETED | OUTPATIENT
Start: 2021-10-10 | End: 2021-10-10

## 2021-10-10 RX ORDER — SUCRALFATE 1 G/1
1 TABLET ORAL ONCE
Status: COMPLETED | OUTPATIENT
Start: 2021-10-10 | End: 2021-10-10

## 2021-10-10 RX ADMIN — TAMSULOSIN HYDROCHLORIDE 0.4 MG: 0.4 CAPSULE ORAL at 22:04

## 2021-10-10 RX ADMIN — FAMOTIDINE 20 MG: 20 TABLET ORAL at 16:40

## 2021-10-10 RX ADMIN — IOHEXOL 100 ML: 350 INJECTION, SOLUTION INTRAVENOUS at 18:10

## 2021-10-10 RX ADMIN — SODIUM CHLORIDE 500 ML: 0.9 INJECTION, SOLUTION INTRAVENOUS at 20:15

## 2021-10-10 RX ADMIN — SUCRALFATE 1 G: 1 TABLET ORAL at 16:40

## 2021-10-10 RX ADMIN — SODIUM CHLORIDE 1000 ML: 0.9 INJECTION, SOLUTION INTRAVENOUS at 17:50

## 2021-10-10 RX ADMIN — KETOROLAC TROMETHAMINE 15 MG: 30 INJECTION, SOLUTION INTRAMUSCULAR at 17:21

## 2021-10-10 RX ADMIN — CEFTRIAXONE SODIUM 1000 MG: 10 INJECTION, POWDER, FOR SOLUTION INTRAVENOUS at 18:40

## 2021-10-11 LAB
ATRIAL RATE: 93 BPM
P AXIS: 85 DEGREES
PR INTERVAL: 256 MS
QRS AXIS: 7 DEGREES
QRSD INTERVAL: 96 MS
QT INTERVAL: 340 MS
QTC INTERVAL: 422 MS
T WAVE AXIS: 73 DEGREES
VENTRICULAR RATE: 93 BPM

## 2021-10-11 PROCEDURE — 93010 ELECTROCARDIOGRAM REPORT: CPT | Performed by: INTERNAL MEDICINE

## 2021-10-13 ENCOUNTER — OFFICE VISIT (OUTPATIENT)
Dept: UROLOGY | Facility: MEDICAL CENTER | Age: 86
End: 2021-10-13
Payer: MEDICARE

## 2021-10-13 ENCOUNTER — HOSPITAL ENCOUNTER (OUTPATIENT)
Dept: ULTRASOUND IMAGING | Facility: HOSPITAL | Age: 86
Discharge: HOME | DRG: 660 | End: 2021-10-13
Payer: MEDICARE

## 2021-10-13 VITALS
BODY MASS INDEX: 20.83 KG/M2 | SYSTOLIC BLOOD PRESSURE: 142 MMHG | DIASTOLIC BLOOD PRESSURE: 68 MMHG | HEIGHT: 65 IN | WEIGHT: 125 LBS

## 2021-10-13 DIAGNOSIS — N13.30 HYDRONEPHROSIS: ICD-10-CM

## 2021-10-13 DIAGNOSIS — N20.1 URETEROLITHIASIS: ICD-10-CM

## 2021-10-13 DIAGNOSIS — N20.1 URETERAL STONE: ICD-10-CM

## 2021-10-13 DIAGNOSIS — N20.1 URETERAL STONE: Primary | ICD-10-CM

## 2021-10-13 LAB
BACTERIA BLD CULT: ABNORMAL
BACTERIA BLD CULT: ABNORMAL
GRAM STN SPEC: ABNORMAL
GRAM STN SPEC: ABNORMAL

## 2021-10-13 PROCEDURE — 99204 OFFICE O/P NEW MOD 45 MIN: CPT | Performed by: PHYSICIAN ASSISTANT

## 2021-10-13 PROCEDURE — 76770 US EXAM ABDO BACK WALL COMP: CPT

## 2021-10-15 ENCOUNTER — HOSPITAL ENCOUNTER (INPATIENT)
Facility: HOSPITAL | Age: 86
LOS: 3 days | Discharge: HOME | DRG: 660 | End: 2021-10-18
Attending: EMERGENCY MEDICINE | Admitting: INTERNAL MEDICINE
Payer: MEDICARE

## 2021-10-15 DIAGNOSIS — R78.81 BACTEREMIA: ICD-10-CM

## 2021-10-15 DIAGNOSIS — N39.0 UTI (URINARY TRACT INFECTION): Primary | ICD-10-CM

## 2021-10-15 DIAGNOSIS — R78.81 POSITIVE BLOOD CULTURES: ICD-10-CM

## 2021-10-15 DIAGNOSIS — E87.1 HYPONATREMIA: ICD-10-CM

## 2021-10-15 DIAGNOSIS — N20.1 URETERAL STONE: ICD-10-CM

## 2021-10-15 LAB
ANION GAP SERPL CALCULATED.3IONS-SCNC: 9 MMOL/L (ref 4–13)
BACTERIA UR QL AUTO: ABNORMAL /HPF
BASOPHILS # BLD AUTO: 0.03 THOUSANDS/ΜL (ref 0–0.1)
BASOPHILS NFR BLD AUTO: 0 % (ref 0–1)
BILIRUB UR QL STRIP: NEGATIVE
BUN SERPL-MCNC: 23 MG/DL (ref 5–25)
CALCIUM SERPL-MCNC: 9.4 MG/DL (ref 8.3–10.1)
CHLORIDE SERPL-SCNC: 95 MMOL/L (ref 100–108)
CLARITY UR: CLEAR
CO2 SERPL-SCNC: 28 MMOL/L (ref 21–32)
COLOR UR: YELLOW
CREAT SERPL-MCNC: 1.06 MG/DL (ref 0.6–1.3)
EOSINOPHIL # BLD AUTO: 0.18 THOUSAND/ΜL (ref 0–0.61)
EOSINOPHIL NFR BLD AUTO: 2 % (ref 0–6)
ERYTHROCYTE [DISTWIDTH] IN BLOOD BY AUTOMATED COUNT: 14.6 % (ref 11.6–15.1)
GFR SERPL CREATININE-BSD FRML MDRD: 48 ML/MIN/1.73SQ M
GLUCOSE SERPL-MCNC: 94 MG/DL (ref 65–140)
GLUCOSE UR STRIP-MCNC: NEGATIVE MG/DL
HCT VFR BLD AUTO: 32.6 % (ref 34.8–46.1)
HGB BLD-MCNC: 10.9 G/DL (ref 11.5–15.4)
HGB UR QL STRIP.AUTO: ABNORMAL
IMM GRANULOCYTES # BLD AUTO: 0.05 THOUSAND/UL (ref 0–0.2)
IMM GRANULOCYTES NFR BLD AUTO: 1 % (ref 0–2)
KETONES UR STRIP-MCNC: NEGATIVE MG/DL
LEUKOCYTE ESTERASE UR QL STRIP: ABNORMAL
LYMPHOCYTES # BLD AUTO: 1.31 THOUSANDS/ΜL (ref 0.6–4.47)
LYMPHOCYTES NFR BLD AUTO: 13 % (ref 14–44)
MCH RBC QN AUTO: 29.9 PG (ref 26.8–34.3)
MCHC RBC AUTO-ENTMCNC: 33.4 G/DL (ref 31.4–37.4)
MCV RBC AUTO: 89 FL (ref 82–98)
MONOCYTES # BLD AUTO: 1.17 THOUSAND/ΜL (ref 0.17–1.22)
MONOCYTES NFR BLD AUTO: 12 % (ref 4–12)
NEUTROPHILS # BLD AUTO: 7.26 THOUSANDS/ΜL (ref 1.85–7.62)
NEUTS SEG NFR BLD AUTO: 72 % (ref 43–75)
NITRITE UR QL STRIP: NEGATIVE
NON-SQ EPI CELLS URNS QL MICRO: ABNORMAL /HPF
NRBC BLD AUTO-RTO: 0 /100 WBCS
OTHER STN SPEC: ABNORMAL
PH UR STRIP.AUTO: 6.5 [PH] (ref 4.5–8)
PLATELET # BLD AUTO: 173 THOUSANDS/UL (ref 149–390)
PMV BLD AUTO: 11.4 FL (ref 8.9–12.7)
POTASSIUM SERPL-SCNC: 3.8 MMOL/L (ref 3.5–5.3)
PROT UR STRIP-MCNC: NEGATIVE MG/DL
RBC # BLD AUTO: 3.65 MILLION/UL (ref 3.81–5.12)
RBC #/AREA URNS AUTO: ABNORMAL /HPF
SODIUM SERPL-SCNC: 132 MMOL/L (ref 136–145)
SP GR UR STRIP.AUTO: 1.01 (ref 1–1.03)
UROBILINOGEN UR QL STRIP.AUTO: 0.2 E.U./DL
WBC # BLD AUTO: 10 THOUSAND/UL (ref 4.31–10.16)
WBC #/AREA URNS AUTO: ABNORMAL /HPF

## 2021-10-15 PROCEDURE — 85025 COMPLETE CBC W/AUTO DIFF WBC: CPT | Performed by: PHYSICIAN ASSISTANT

## 2021-10-15 PROCEDURE — 81001 URINALYSIS AUTO W/SCOPE: CPT

## 2021-10-15 PROCEDURE — 99284 EMERGENCY DEPT VISIT MOD MDM: CPT

## 2021-10-15 PROCEDURE — 87040 BLOOD CULTURE FOR BACTERIA: CPT | Performed by: PHYSICIAN ASSISTANT

## 2021-10-15 PROCEDURE — 99285 EMERGENCY DEPT VISIT HI MDM: CPT | Performed by: PHYSICIAN ASSISTANT

## 2021-10-15 PROCEDURE — 99222 1ST HOSP IP/OBS MODERATE 55: CPT | Performed by: INTERNAL MEDICINE

## 2021-10-15 PROCEDURE — 80048 BASIC METABOLIC PNL TOTAL CA: CPT | Performed by: PHYSICIAN ASSISTANT

## 2021-10-15 PROCEDURE — 87086 URINE CULTURE/COLONY COUNT: CPT

## 2021-10-15 PROCEDURE — 36415 COLL VENOUS BLD VENIPUNCTURE: CPT | Performed by: PHYSICIAN ASSISTANT

## 2021-10-15 PROCEDURE — 84145 PROCALCITONIN (PCT): CPT | Performed by: PHYSICIAN ASSISTANT

## 2021-10-15 RX ORDER — METOPROLOL SUCCINATE 25 MG/1
25 TABLET, EXTENDED RELEASE ORAL DAILY
Status: DISCONTINUED | OUTPATIENT
Start: 2021-10-16 | End: 2021-10-18 | Stop reason: HOSPADM

## 2021-10-15 RX ORDER — HYDROCHLOROTHIAZIDE 12.5 MG/1
12.5 TABLET ORAL 2 TIMES DAILY
Status: DISCONTINUED | OUTPATIENT
Start: 2021-10-15 | End: 2021-10-17

## 2021-10-15 RX ORDER — MAGNESIUM HYDROXIDE/ALUMINUM HYDROXICE/SIMETHICONE 120; 1200; 1200 MG/30ML; MG/30ML; MG/30ML
30 SUSPENSION ORAL EVERY 6 HOURS PRN
Status: DISCONTINUED | OUTPATIENT
Start: 2021-10-15 | End: 2021-10-18 | Stop reason: HOSPADM

## 2021-10-15 RX ORDER — TAMSULOSIN HYDROCHLORIDE 0.4 MG/1
0.4 CAPSULE ORAL
Status: DISCONTINUED | OUTPATIENT
Start: 2021-10-16 | End: 2021-10-18 | Stop reason: HOSPADM

## 2021-10-15 RX ORDER — ONDANSETRON 2 MG/ML
4 INJECTION INTRAMUSCULAR; INTRAVENOUS EVERY 6 HOURS PRN
Status: DISCONTINUED | OUTPATIENT
Start: 2021-10-15 | End: 2021-10-18 | Stop reason: HOSPADM

## 2021-10-15 RX ORDER — ACETAMINOPHEN 325 MG/1
650 TABLET ORAL EVERY 6 HOURS PRN
Status: DISCONTINUED | OUTPATIENT
Start: 2021-10-15 | End: 2021-10-18 | Stop reason: HOSPADM

## 2021-10-15 RX ADMIN — AZTREONAM 2000 MG: 2 INJECTION, POWDER, LYOPHILIZED, FOR SOLUTION INTRAMUSCULAR; INTRAVENOUS at 22:22

## 2021-10-15 RX ADMIN — HYDROCHLOROTHIAZIDE 12.5 MG: 12.5 TABLET ORAL at 23:49

## 2021-10-16 ENCOUNTER — APPOINTMENT (INPATIENT)
Dept: CT IMAGING | Facility: HOSPITAL | Age: 86
DRG: 660 | End: 2021-10-16
Payer: MEDICARE

## 2021-10-16 LAB
ANION GAP SERPL CALCULATED.3IONS-SCNC: 5 MMOL/L (ref 4–13)
BASOPHILS # BLD AUTO: 0.03 THOUSANDS/ΜL (ref 0–0.1)
BASOPHILS NFR BLD AUTO: 0 % (ref 0–1)
BUN SERPL-MCNC: 18 MG/DL (ref 5–25)
CALCIUM SERPL-MCNC: 9.1 MG/DL (ref 8.3–10.1)
CHLORIDE SERPL-SCNC: 102 MMOL/L (ref 100–108)
CO2 SERPL-SCNC: 29 MMOL/L (ref 21–32)
CREAT SERPL-MCNC: 1.1 MG/DL (ref 0.6–1.3)
EOSINOPHIL # BLD AUTO: 0.17 THOUSAND/ΜL (ref 0–0.61)
EOSINOPHIL NFR BLD AUTO: 2 % (ref 0–6)
ERYTHROCYTE [DISTWIDTH] IN BLOOD BY AUTOMATED COUNT: 14.6 % (ref 11.6–15.1)
GFR SERPL CREATININE-BSD FRML MDRD: 46 ML/MIN/1.73SQ M
GLUCOSE SERPL-MCNC: 103 MG/DL (ref 65–140)
HCT VFR BLD AUTO: 29 % (ref 34.8–46.1)
HGB BLD-MCNC: 9.7 G/DL (ref 11.5–15.4)
IMM GRANULOCYTES # BLD AUTO: 0.03 THOUSAND/UL (ref 0–0.2)
IMM GRANULOCYTES NFR BLD AUTO: 0 % (ref 0–2)
LYMPHOCYTES # BLD AUTO: 1.09 THOUSANDS/ΜL (ref 0.6–4.47)
LYMPHOCYTES NFR BLD AUTO: 13 % (ref 14–44)
MCH RBC QN AUTO: 29 PG (ref 26.8–34.3)
MCHC RBC AUTO-ENTMCNC: 33.4 G/DL (ref 31.4–37.4)
MCV RBC AUTO: 87 FL (ref 82–98)
MONOCYTES # BLD AUTO: 1.08 THOUSAND/ΜL (ref 0.17–1.22)
MONOCYTES NFR BLD AUTO: 12 % (ref 4–12)
NEUTROPHILS # BLD AUTO: 6.35 THOUSANDS/ΜL (ref 1.85–7.62)
NEUTS SEG NFR BLD AUTO: 73 % (ref 43–75)
NRBC BLD AUTO-RTO: 0 /100 WBCS
PLATELET # BLD AUTO: 153 THOUSANDS/UL (ref 149–390)
PMV BLD AUTO: 10.7 FL (ref 8.9–12.7)
POTASSIUM SERPL-SCNC: 3.5 MMOL/L (ref 3.5–5.3)
PROCALCITONIN SERPL-MCNC: 0.28 NG/ML
RBC # BLD AUTO: 3.34 MILLION/UL (ref 3.81–5.12)
SODIUM SERPL-SCNC: 136 MMOL/L (ref 136–145)
WBC # BLD AUTO: 8.75 THOUSAND/UL (ref 4.31–10.16)

## 2021-10-16 PROCEDURE — 85025 COMPLETE CBC W/AUTO DIFF WBC: CPT | Performed by: PHYSICIAN ASSISTANT

## 2021-10-16 PROCEDURE — G1004 CDSM NDSC: HCPCS

## 2021-10-16 PROCEDURE — 74176 CT ABD & PELVIS W/O CONTRAST: CPT

## 2021-10-16 PROCEDURE — 99222 1ST HOSP IP/OBS MODERATE 55: CPT | Performed by: NURSE PRACTITIONER

## 2021-10-16 PROCEDURE — 80048 BASIC METABOLIC PNL TOTAL CA: CPT | Performed by: PHYSICIAN ASSISTANT

## 2021-10-16 PROCEDURE — 36415 COLL VENOUS BLD VENIPUNCTURE: CPT | Performed by: PHYSICIAN ASSISTANT

## 2021-10-16 PROCEDURE — 99233 SBSQ HOSP IP/OBS HIGH 50: CPT | Performed by: INTERNAL MEDICINE

## 2021-10-16 RX ORDER — CHLORAL HYDRATE 500 MG
1000 CAPSULE ORAL DAILY
COMMUNITY

## 2021-10-16 RX ORDER — MAGNESIUM HYDROXIDE/ALUMINUM HYDROXICE/SIMETHICONE 120; 1200; 1200 MG/30ML; MG/30ML; MG/30ML
30 SUSPENSION ORAL EVERY 6 HOURS PRN
Status: CANCELLED | OUTPATIENT
Start: 2021-10-16

## 2021-10-16 RX ORDER — SODIUM CHLORIDE, SODIUM LACTATE, POTASSIUM CHLORIDE, CALCIUM CHLORIDE 600; 310; 30; 20 MG/100ML; MG/100ML; MG/100ML; MG/100ML
75 INJECTION, SOLUTION INTRAVENOUS CONTINUOUS
Status: CANCELLED | OUTPATIENT
Start: 2021-10-16

## 2021-10-16 RX ORDER — CIPROFLOXACIN 2 MG/ML
400 INJECTION, SOLUTION INTRAVENOUS EVERY 24 HOURS
Status: DISCONTINUED | OUTPATIENT
Start: 2021-10-16 | End: 2021-10-18 | Stop reason: HOSPADM

## 2021-10-16 RX ORDER — SACCHAROMYCES BOULARDII 250 MG
250 CAPSULE ORAL 2 TIMES DAILY
Status: DISCONTINUED | OUTPATIENT
Start: 2021-10-16 | End: 2021-10-18 | Stop reason: HOSPADM

## 2021-10-16 RX ORDER — ONDANSETRON 2 MG/ML
4 INJECTION INTRAMUSCULAR; INTRAVENOUS EVERY 6 HOURS PRN
Status: CANCELLED | OUTPATIENT
Start: 2021-10-16

## 2021-10-16 RX ORDER — ACETAMINOPHEN 325 MG/1
650 TABLET ORAL EVERY 6 HOURS PRN
Status: CANCELLED | OUTPATIENT
Start: 2021-10-16

## 2021-10-16 RX ORDER — HYDROCHLOROTHIAZIDE 12.5 MG/1
12.5 TABLET ORAL 2 TIMES DAILY
Status: CANCELLED | OUTPATIENT
Start: 2021-10-16

## 2021-10-16 RX ORDER — SODIUM CHLORIDE, SODIUM LACTATE, POTASSIUM CHLORIDE, CALCIUM CHLORIDE 600; 310; 30; 20 MG/100ML; MG/100ML; MG/100ML; MG/100ML
75 INJECTION, SOLUTION INTRAVENOUS CONTINUOUS
Status: DISCONTINUED | OUTPATIENT
Start: 2021-10-16 | End: 2021-10-18 | Stop reason: HOSPADM

## 2021-10-16 RX ORDER — CIPROFLOXACIN 2 MG/ML
400 INJECTION, SOLUTION INTRAVENOUS EVERY 24 HOURS
Status: CANCELLED | OUTPATIENT
Start: 2021-10-17 | End: 2021-10-26

## 2021-10-16 RX ORDER — METOPROLOL SUCCINATE 25 MG/1
25 TABLET, EXTENDED RELEASE ORAL DAILY
Status: CANCELLED | OUTPATIENT
Start: 2021-10-17

## 2021-10-16 RX ORDER — HYDRALAZINE HYDROCHLORIDE 20 MG/ML
10 INJECTION INTRAMUSCULAR; INTRAVENOUS EVERY 6 HOURS PRN
Status: DISCONTINUED | OUTPATIENT
Start: 2021-10-16 | End: 2021-10-18 | Stop reason: HOSPADM

## 2021-10-16 RX ORDER — HYDRALAZINE HYDROCHLORIDE 20 MG/ML
10 INJECTION INTRAMUSCULAR; INTRAVENOUS EVERY 6 HOURS PRN
Status: CANCELLED | OUTPATIENT
Start: 2021-10-16

## 2021-10-16 RX ORDER — TAMSULOSIN HYDROCHLORIDE 0.4 MG/1
0.4 CAPSULE ORAL
Status: CANCELLED | OUTPATIENT
Start: 2021-10-16

## 2021-10-16 RX ORDER — SACCHAROMYCES BOULARDII 250 MG
250 CAPSULE ORAL 2 TIMES DAILY
Status: CANCELLED | OUTPATIENT
Start: 2021-10-16

## 2021-10-16 RX ADMIN — Medication 250 MG: at 22:01

## 2021-10-16 RX ADMIN — HYDRALAZINE HYDROCHLORIDE 10 MG: 20 INJECTION INTRAMUSCULAR; INTRAVENOUS at 09:22

## 2021-10-16 RX ADMIN — METOPROLOL SUCCINATE 25 MG: 25 TABLET, EXTENDED RELEASE ORAL at 08:00

## 2021-10-16 RX ADMIN — SODIUM CHLORIDE, SODIUM LACTATE, POTASSIUM CHLORIDE, AND CALCIUM CHLORIDE 75 ML/HR: .6; .31; .03; .02 INJECTION, SOLUTION INTRAVENOUS at 10:34

## 2021-10-16 RX ADMIN — HYDROCHLOROTHIAZIDE 12.5 MG: 12.5 TABLET ORAL at 22:00

## 2021-10-16 RX ADMIN — CIPROFLOXACIN 400 MG: 2 INJECTION, SOLUTION INTRAVENOUS at 10:34

## 2021-10-16 RX ADMIN — AZTREONAM 2000 MG: 2 INJECTION, POWDER, LYOPHILIZED, FOR SOLUTION INTRAMUSCULAR; INTRAVENOUS at 06:30

## 2021-10-16 RX ADMIN — TAMSULOSIN HYDROCHLORIDE 0.4 MG: 0.4 CAPSULE ORAL at 16:38

## 2021-10-16 RX ADMIN — HYDROCHLOROTHIAZIDE 12.5 MG: 12.5 TABLET ORAL at 08:00

## 2021-10-17 ENCOUNTER — APPOINTMENT (INPATIENT)
Dept: RADIOLOGY | Facility: HOSPITAL | Age: 86
DRG: 660 | End: 2021-10-17
Payer: MEDICARE

## 2021-10-17 ENCOUNTER — ANESTHESIA EVENT (INPATIENT)
Dept: PERIOP | Facility: HOSPITAL | Age: 86
DRG: 660 | End: 2021-10-17
Payer: MEDICARE

## 2021-10-17 ENCOUNTER — ANESTHESIA (INPATIENT)
Dept: PERIOP | Facility: HOSPITAL | Age: 86
DRG: 660 | End: 2021-10-17
Payer: MEDICARE

## 2021-10-17 LAB
ANION GAP SERPL CALCULATED.3IONS-SCNC: 7 MMOL/L (ref 4–13)
BACTERIA UR CULT: NORMAL
BUN SERPL-MCNC: 18 MG/DL (ref 5–25)
CALCIUM SERPL-MCNC: 9.4 MG/DL (ref 8.3–10.1)
CHLORIDE SERPL-SCNC: 101 MMOL/L (ref 100–108)
CO2 SERPL-SCNC: 29 MMOL/L (ref 21–32)
CREAT SERPL-MCNC: 1.14 MG/DL (ref 0.6–1.3)
ERYTHROCYTE [DISTWIDTH] IN BLOOD BY AUTOMATED COUNT: 14.7 % (ref 11.6–15.1)
GFR SERPL CREATININE-BSD FRML MDRD: 44 ML/MIN/1.73SQ M
GLUCOSE SERPL-MCNC: 88 MG/DL (ref 65–140)
HCT VFR BLD AUTO: 30 % (ref 34.8–46.1)
HGB BLD-MCNC: 10 G/DL (ref 11.5–15.4)
MCH RBC QN AUTO: 30 PG (ref 26.8–34.3)
MCHC RBC AUTO-ENTMCNC: 33.3 G/DL (ref 31.4–37.4)
MCV RBC AUTO: 90 FL (ref 82–98)
PLATELET # BLD AUTO: 176 THOUSANDS/UL (ref 149–390)
PMV BLD AUTO: 10.6 FL (ref 8.9–12.7)
POTASSIUM SERPL-SCNC: 3.7 MMOL/L (ref 3.5–5.3)
PROCALCITONIN SERPL-MCNC: 0.08 NG/ML
RBC # BLD AUTO: 3.33 MILLION/UL (ref 3.81–5.12)
SODIUM SERPL-SCNC: 137 MMOL/L (ref 136–145)
WBC # BLD AUTO: 10.64 THOUSAND/UL (ref 4.31–10.16)

## 2021-10-17 PROCEDURE — 0T768DZ DILATION OF RIGHT URETER WITH INTRALUMINAL DEVICE, VIA NATURAL OR ARTIFICIAL OPENING ENDOSCOPIC: ICD-10-PCS | Performed by: UROLOGY

## 2021-10-17 PROCEDURE — 85027 COMPLETE CBC AUTOMATED: CPT | Performed by: INTERNAL MEDICINE

## 2021-10-17 PROCEDURE — BT1D1ZZ FLUOROSCOPY OF RIGHT KIDNEY, URETER AND BLADDER USING LOW OSMOLAR CONTRAST: ICD-10-PCS | Performed by: RADIOLOGY

## 2021-10-17 PROCEDURE — 80048 BASIC METABOLIC PNL TOTAL CA: CPT | Performed by: INTERNAL MEDICINE

## 2021-10-17 PROCEDURE — 74420 UROGRAPHY RTRGR +-KUB: CPT

## 2021-10-17 PROCEDURE — 84145 PROCALCITONIN (PCT): CPT | Performed by: PHYSICIAN ASSISTANT

## 2021-10-17 PROCEDURE — 52332 CYSTOSCOPY AND TREATMENT: CPT | Performed by: UROLOGY

## 2021-10-17 PROCEDURE — C2617 STENT, NON-COR, TEM W/O DEL: HCPCS | Performed by: UROLOGY

## 2021-10-17 PROCEDURE — 99232 SBSQ HOSP IP/OBS MODERATE 35: CPT | Performed by: INTERNAL MEDICINE

## 2021-10-17 DEVICE — VARIABLE LENGTH INJECTION STENT SET
Type: IMPLANTABLE DEVICE | Site: URETER | Status: FUNCTIONAL
Brand: CONTOUR VL™ INJECTION STENT SET

## 2021-10-17 RX ORDER — MAGNESIUM HYDROXIDE 1200 MG/15ML
LIQUID ORAL AS NEEDED
Status: DISCONTINUED | OUTPATIENT
Start: 2021-10-17 | End: 2021-10-17 | Stop reason: HOSPADM

## 2021-10-17 RX ORDER — LIDOCAINE HYDROCHLORIDE 20 MG/ML
INJECTION, SOLUTION EPIDURAL; INFILTRATION; INTRACAUDAL; PERINEURAL AS NEEDED
Status: DISCONTINUED | OUTPATIENT
Start: 2021-10-17 | End: 2021-10-17

## 2021-10-17 RX ORDER — SODIUM CHLORIDE 9 MG/ML
125 INJECTION, SOLUTION INTRAVENOUS CONTINUOUS
Status: CANCELLED | OUTPATIENT
Start: 2021-10-17

## 2021-10-17 RX ORDER — PROPOFOL 10 MG/ML
INJECTION, EMULSION INTRAVENOUS AS NEEDED
Status: DISCONTINUED | OUTPATIENT
Start: 2021-10-17 | End: 2021-10-17

## 2021-10-17 RX ORDER — ONDANSETRON 2 MG/ML
INJECTION INTRAMUSCULAR; INTRAVENOUS AS NEEDED
Status: DISCONTINUED | OUTPATIENT
Start: 2021-10-17 | End: 2021-10-17

## 2021-10-17 RX ORDER — MEPERIDINE HYDROCHLORIDE 25 MG/ML
12.5 INJECTION INTRAMUSCULAR; INTRAVENOUS; SUBCUTANEOUS ONCE AS NEEDED
Status: DISCONTINUED | OUTPATIENT
Start: 2021-10-17 | End: 2021-10-17 | Stop reason: HOSPADM

## 2021-10-17 RX ORDER — FENTANYL CITRATE 50 UG/ML
INJECTION, SOLUTION INTRAMUSCULAR; INTRAVENOUS AS NEEDED
Status: DISCONTINUED | OUTPATIENT
Start: 2021-10-17 | End: 2021-10-17

## 2021-10-17 RX ORDER — FENTANYL CITRATE/PF 50 MCG/ML
50 SYRINGE (ML) INJECTION
Status: DISCONTINUED | OUTPATIENT
Start: 2021-10-17 | End: 2021-10-17 | Stop reason: HOSPADM

## 2021-10-17 RX ORDER — HYDROMORPHONE HCL/PF 1 MG/ML
0.5 SYRINGE (ML) INJECTION
Status: DISCONTINUED | OUTPATIENT
Start: 2021-10-17 | End: 2021-10-17 | Stop reason: HOSPADM

## 2021-10-17 RX ORDER — ONDANSETRON 2 MG/ML
4 INJECTION INTRAMUSCULAR; INTRAVENOUS ONCE AS NEEDED
Status: DISCONTINUED | OUTPATIENT
Start: 2021-10-17 | End: 2021-10-17 | Stop reason: HOSPADM

## 2021-10-17 RX ORDER — CEFAZOLIN SODIUM 1 G/50ML
SOLUTION INTRAVENOUS AS NEEDED
Status: DISCONTINUED | OUTPATIENT
Start: 2021-10-17 | End: 2021-10-17

## 2021-10-17 RX ORDER — PROMETHAZINE HYDROCHLORIDE 25 MG/ML
6.25 INJECTION, SOLUTION INTRAMUSCULAR; INTRAVENOUS ONCE AS NEEDED
Status: DISCONTINUED | OUTPATIENT
Start: 2021-10-17 | End: 2021-10-17 | Stop reason: HOSPADM

## 2021-10-17 RX ORDER — DEXAMETHASONE SODIUM PHOSPHATE 4 MG/ML
INJECTION, SOLUTION INTRA-ARTICULAR; INTRALESIONAL; INTRAMUSCULAR; INTRAVENOUS; SOFT TISSUE AS NEEDED
Status: DISCONTINUED | OUTPATIENT
Start: 2021-10-17 | End: 2021-10-17

## 2021-10-17 RX ADMIN — Medication 250 MG: at 18:10

## 2021-10-17 RX ADMIN — CIPROFLOXACIN 400 MG: 2 INJECTION, SOLUTION INTRAVENOUS at 11:19

## 2021-10-17 RX ADMIN — IOHEXOL 3.5 ML: 240 INJECTION, SOLUTION INTRATHECAL; INTRAVASCULAR; INTRAVENOUS; ORAL at 16:45

## 2021-10-17 RX ADMIN — Medication 250 MG: at 08:50

## 2021-10-17 RX ADMIN — ENOXAPARIN SODIUM 40 MG: 40 INJECTION SUBCUTANEOUS at 08:50

## 2021-10-17 RX ADMIN — CEFAZOLIN SODIUM 1000 MG: 1 SOLUTION INTRAVENOUS at 16:23

## 2021-10-17 RX ADMIN — METOPROLOL SUCCINATE 25 MG: 25 TABLET, EXTENDED RELEASE ORAL at 08:49

## 2021-10-17 RX ADMIN — ONDANSETRON 4 MG: 2 INJECTION INTRAMUSCULAR; INTRAVENOUS at 16:29

## 2021-10-17 RX ADMIN — FENTANYL CITRATE 100 MCG: 50 INJECTION INTRAMUSCULAR; INTRAVENOUS at 16:23

## 2021-10-17 RX ADMIN — LIDOCAINE HYDROCHLORIDE 100 MG: 20 INJECTION, SOLUTION EPIDURAL; INFILTRATION; INTRACAUDAL; PERINEURAL at 16:29

## 2021-10-17 RX ADMIN — DEXAMETHASONE SODIUM PHOSPHATE 4 MG: 4 INJECTION INTRA-ARTICULAR; INTRALESIONAL; INTRAMUSCULAR; INTRAVENOUS; SOFT TISSUE at 16:29

## 2021-10-17 RX ADMIN — PROPOFOL 100 MG: 10 INJECTION, EMULSION INTRAVENOUS at 16:29

## 2021-10-17 RX ADMIN — HYDROCHLOROTHIAZIDE 12.5 MG: 12.5 TABLET ORAL at 08:49

## 2021-10-17 RX ADMIN — SODIUM CHLORIDE, SODIUM LACTATE, POTASSIUM CHLORIDE, AND CALCIUM CHLORIDE 75 ML/HR: .6; .31; .03; .02 INJECTION, SOLUTION INTRAVENOUS at 20:52

## 2021-10-18 ENCOUNTER — TRANSITIONAL CARE MANAGEMENT (OUTPATIENT)
Dept: FAMILY MEDICINE CLINIC | Facility: CLINIC | Age: 86
End: 2021-10-18

## 2021-10-18 ENCOUNTER — TELEPHONE (OUTPATIENT)
Dept: UROLOGY | Facility: MEDICAL CENTER | Age: 86
End: 2021-10-18

## 2021-10-18 VITALS
SYSTOLIC BLOOD PRESSURE: 136 MMHG | DIASTOLIC BLOOD PRESSURE: 52 MMHG | OXYGEN SATURATION: 97 % | HEART RATE: 83 BPM | RESPIRATION RATE: 20 BRPM | WEIGHT: 130.51 LBS | BODY MASS INDEX: 21.72 KG/M2 | TEMPERATURE: 98.1 F

## 2021-10-18 LAB
ERYTHROCYTE [DISTWIDTH] IN BLOOD BY AUTOMATED COUNT: 14.8 % (ref 11.6–15.1)
HCT VFR BLD AUTO: 34.8 % (ref 34.8–46.1)
HGB BLD-MCNC: 11.6 G/DL (ref 11.5–15.4)
MCH RBC QN AUTO: 30.4 PG (ref 26.8–34.3)
MCHC RBC AUTO-ENTMCNC: 33.3 G/DL (ref 31.4–37.4)
MCV RBC AUTO: 91 FL (ref 82–98)
PLATELET # BLD AUTO: 239 THOUSANDS/UL (ref 149–390)
PMV BLD AUTO: 10.6 FL (ref 8.9–12.7)
RBC # BLD AUTO: 3.82 MILLION/UL (ref 3.81–5.12)
WBC # BLD AUTO: 12.95 THOUSAND/UL (ref 4.31–10.16)

## 2021-10-18 PROCEDURE — 97163 PT EVAL HIGH COMPLEX 45 MIN: CPT

## 2021-10-18 PROCEDURE — 85027 COMPLETE CBC AUTOMATED: CPT | Performed by: UROLOGY

## 2021-10-18 PROCEDURE — 97166 OT EVAL MOD COMPLEX 45 MIN: CPT

## 2021-10-18 PROCEDURE — 99239 HOSP IP/OBS DSCHRG MGMT >30: CPT | Performed by: INTERNAL MEDICINE

## 2021-10-18 RX ORDER — CIPROFLOXACIN 250 MG/1
250 TABLET, FILM COATED ORAL EVERY 12 HOURS SCHEDULED
Qty: 24 TABLET | Refills: 0 | Status: SHIPPED | OUTPATIENT
Start: 2021-10-18 | End: 2021-10-30

## 2021-10-18 RX ADMIN — METOPROLOL SUCCINATE 25 MG: 25 TABLET, EXTENDED RELEASE ORAL at 08:51

## 2021-10-18 RX ADMIN — ENOXAPARIN SODIUM 40 MG: 40 INJECTION SUBCUTANEOUS at 08:51

## 2021-10-18 RX ADMIN — Medication 250 MG: at 08:51

## 2021-10-18 RX ADMIN — CIPROFLOXACIN 400 MG: 2 INJECTION, SOLUTION INTRAVENOUS at 10:15

## 2021-10-19 PROBLEM — K22.9 ESOPHAGUS DISORDER: Status: ACTIVE | Noted: 2021-10-19

## 2021-10-20 LAB
BACTERIA BLD CULT: NORMAL
BACTERIA BLD CULT: NORMAL

## 2021-10-21 ENCOUNTER — OFFICE VISIT (OUTPATIENT)
Dept: FAMILY MEDICINE CLINIC | Facility: CLINIC | Age: 86
End: 2021-10-21
Payer: MEDICARE

## 2021-10-21 VITALS
BODY MASS INDEX: 20.66 KG/M2 | SYSTOLIC BLOOD PRESSURE: 140 MMHG | WEIGHT: 124 LBS | DIASTOLIC BLOOD PRESSURE: 74 MMHG | HEIGHT: 65 IN | HEART RATE: 95 BPM | RESPIRATION RATE: 18 BRPM | OXYGEN SATURATION: 98 % | TEMPERATURE: 96.5 F

## 2021-10-21 DIAGNOSIS — N20.1 URETERAL STONE: ICD-10-CM

## 2021-10-21 DIAGNOSIS — I10 BENIGN ESSENTIAL HYPERTENSION: ICD-10-CM

## 2021-10-21 DIAGNOSIS — N10 ACUTE PYELONEPHRITIS: ICD-10-CM

## 2021-10-21 DIAGNOSIS — K22.9 ESOPHAGUS DISORDER: ICD-10-CM

## 2021-10-21 DIAGNOSIS — R78.81 BACTEREMIA: Primary | ICD-10-CM

## 2021-10-21 PROCEDURE — 99496 TRANSJ CARE MGMT HIGH F2F 7D: CPT | Performed by: FAMILY MEDICINE

## 2021-11-03 ENCOUNTER — HOSPITAL ENCOUNTER (OUTPATIENT)
Dept: ULTRASOUND IMAGING | Facility: MEDICAL CENTER | Age: 86
Discharge: HOME | End: 2021-11-03
Payer: MEDICARE

## 2021-11-03 DIAGNOSIS — N20.1 URETEROLITHIASIS: ICD-10-CM

## 2021-11-03 PROCEDURE — 76770 US EXAM ABDO BACK WALL COMP: CPT

## 2021-11-12 ENCOUNTER — OFFICE VISIT (OUTPATIENT)
Dept: UROLOGY | Facility: MEDICAL CENTER | Age: 86
End: 2021-11-12
Payer: MEDICARE

## 2021-11-12 VITALS
SYSTOLIC BLOOD PRESSURE: 120 MMHG | HEIGHT: 65 IN | BODY MASS INDEX: 19.83 KG/M2 | HEART RATE: 98 BPM | DIASTOLIC BLOOD PRESSURE: 70 MMHG | WEIGHT: 119 LBS

## 2021-11-12 DIAGNOSIS — N20.0 NEPHROLITHIASIS: Primary | ICD-10-CM

## 2021-11-12 DIAGNOSIS — N20.1 URETERAL STONE: ICD-10-CM

## 2021-11-12 PROCEDURE — 87086 URINE CULTURE/COLONY COUNT: CPT | Performed by: NURSE PRACTITIONER

## 2021-11-12 PROCEDURE — 99214 OFFICE O/P EST MOD 30 MIN: CPT | Performed by: NURSE PRACTITIONER

## 2021-11-14 LAB — BACTERIA UR CULT: NORMAL

## 2021-11-17 ENCOUNTER — APPOINTMENT (OUTPATIENT)
Dept: PREADMISSION TESTING | Facility: HOSPITAL | Age: 86
End: 2021-11-17
Payer: MEDICARE

## 2021-11-19 ENCOUNTER — TELEPHONE (OUTPATIENT)
Dept: FAMILY MEDICINE CLINIC | Facility: CLINIC | Age: 86
End: 2021-11-19

## 2021-11-22 ENCOUNTER — CONSULT (OUTPATIENT)
Dept: FAMILY MEDICINE CLINIC | Facility: CLINIC | Age: 86
End: 2021-11-22
Payer: MEDICARE

## 2021-11-22 ENCOUNTER — ANESTHESIA EVENT (OUTPATIENT)
Dept: PERIOP | Facility: HOSPITAL | Age: 86
End: 2021-11-22
Payer: MEDICARE

## 2021-11-22 VITALS
OXYGEN SATURATION: 99 % | BODY MASS INDEX: 20.16 KG/M2 | HEART RATE: 84 BPM | TEMPERATURE: 96.8 F | DIASTOLIC BLOOD PRESSURE: 80 MMHG | WEIGHT: 121 LBS | SYSTOLIC BLOOD PRESSURE: 120 MMHG | HEIGHT: 65 IN

## 2021-11-22 DIAGNOSIS — I10 BENIGN ESSENTIAL HYPERTENSION: ICD-10-CM

## 2021-11-22 DIAGNOSIS — K22.9 ESOPHAGUS DISORDER: ICD-10-CM

## 2021-11-22 DIAGNOSIS — N20.1 URETERAL STONE: ICD-10-CM

## 2021-11-22 DIAGNOSIS — Z01.818 PREOP EXAMINATION: Primary | ICD-10-CM

## 2021-11-22 PROCEDURE — NC001 PR NO CHARGE: Performed by: UROLOGY

## 2021-11-22 PROCEDURE — 99214 OFFICE O/P EST MOD 30 MIN: CPT | Performed by: FAMILY MEDICINE

## 2021-11-24 ENCOUNTER — HOSPITAL ENCOUNTER (OUTPATIENT)
Facility: HOSPITAL | Age: 86
Setting detail: OUTPATIENT SURGERY
Discharge: HOME | End: 2021-11-24
Attending: UROLOGY | Admitting: UROLOGY
Payer: MEDICARE

## 2021-11-24 ENCOUNTER — ANESTHESIA (OUTPATIENT)
Dept: PERIOP | Facility: HOSPITAL | Age: 86
End: 2021-11-24
Payer: MEDICARE

## 2021-11-24 ENCOUNTER — APPOINTMENT (OUTPATIENT)
Dept: RADIOLOGY | Facility: HOSPITAL | Age: 86
End: 2021-11-24
Payer: MEDICARE

## 2021-11-24 VITALS
BODY MASS INDEX: 19.65 KG/M2 | TEMPERATURE: 98 F | HEART RATE: 72 BPM | WEIGHT: 117.95 LBS | SYSTOLIC BLOOD PRESSURE: 185 MMHG | OXYGEN SATURATION: 97 % | HEIGHT: 65 IN | DIASTOLIC BLOOD PRESSURE: 79 MMHG | RESPIRATION RATE: 18 BRPM

## 2021-11-24 DIAGNOSIS — N20.0 RIGHT KIDNEY STONE: ICD-10-CM

## 2021-11-24 DIAGNOSIS — N20.1 RIGHT URETERAL STONE: ICD-10-CM

## 2021-11-24 PROCEDURE — C1769 GUIDE WIRE: HCPCS | Performed by: UROLOGY

## 2021-11-24 PROCEDURE — 52356 CYSTO/URETERO W/LITHOTRIPSY: CPT | Performed by: UROLOGY

## 2021-11-24 PROCEDURE — C2617 STENT, NON-COR, TEM W/O DEL: HCPCS | Performed by: UROLOGY

## 2021-11-24 PROCEDURE — 76000 FLUOROSCOPY <1 HR PHYS/QHP: CPT

## 2021-11-24 PROCEDURE — 87086 URINE CULTURE/COLONY COUNT: CPT | Performed by: UROLOGY

## 2021-11-24 PROCEDURE — 99024 POSTOP FOLLOW-UP VISIT: CPT | Performed by: UROLOGY

## 2021-11-24 PROCEDURE — C1758 CATHETER, URETERAL: HCPCS | Performed by: UROLOGY

## 2021-11-24 RX ORDER — LIDOCAINE HYDROCHLORIDE 20 MG/ML
INJECTION, SOLUTION EPIDURAL; INFILTRATION; INTRACAUDAL; PERINEURAL AS NEEDED
Status: DISCONTINUED | OUTPATIENT
Start: 2021-11-24 | End: 2021-11-24

## 2021-11-24 RX ORDER — EPHEDRINE SULFATE 50 MG/ML
INJECTION INTRAVENOUS AS NEEDED
Status: DISCONTINUED | OUTPATIENT
Start: 2021-11-24 | End: 2021-11-24

## 2021-11-24 RX ORDER — PROPOFOL 10 MG/ML
INJECTION, EMULSION INTRAVENOUS AS NEEDED
Status: DISCONTINUED | OUTPATIENT
Start: 2021-11-24 | End: 2021-11-24

## 2021-11-24 RX ORDER — ONDANSETRON 2 MG/ML
INJECTION INTRAMUSCULAR; INTRAVENOUS AS NEEDED
Status: DISCONTINUED | OUTPATIENT
Start: 2021-11-24 | End: 2021-11-24

## 2021-11-24 RX ORDER — ONDANSETRON 2 MG/ML
4 INJECTION INTRAMUSCULAR; INTRAVENOUS ONCE AS NEEDED
Status: DISCONTINUED | OUTPATIENT
Start: 2021-11-24 | End: 2021-11-24 | Stop reason: HOSPADM

## 2021-11-24 RX ORDER — FENTANYL CITRATE 50 UG/ML
INJECTION, SOLUTION INTRAMUSCULAR; INTRAVENOUS AS NEEDED
Status: DISCONTINUED | OUTPATIENT
Start: 2021-11-24 | End: 2021-11-24

## 2021-11-24 RX ORDER — FENTANYL CITRATE/PF 50 MCG/ML
25 SYRINGE (ML) INJECTION
Status: DISCONTINUED | OUTPATIENT
Start: 2021-11-24 | End: 2021-11-24 | Stop reason: HOSPADM

## 2021-11-24 RX ORDER — HYDROCODONE BITARTRATE AND ACETAMINOPHEN 5; 325 MG/1; MG/1
1-2 TABLET ORAL EVERY 6 HOURS PRN
Qty: 20 TABLET | Refills: 0 | Status: SHIPPED | OUTPATIENT
Start: 2021-11-24 | End: 2021-12-04

## 2021-11-24 RX ORDER — LEVOFLOXACIN 5 MG/ML
500 INJECTION, SOLUTION INTRAVENOUS ONCE
Status: COMPLETED | OUTPATIENT
Start: 2021-11-24 | End: 2021-11-24

## 2021-11-24 RX ORDER — SODIUM CHLORIDE 9 MG/ML
125 INJECTION, SOLUTION INTRAVENOUS CONTINUOUS
Status: DISCONTINUED | OUTPATIENT
Start: 2021-11-24 | End: 2021-11-24 | Stop reason: HOSPADM

## 2021-11-24 RX ORDER — NITROFURANTOIN 25; 75 MG/1; MG/1
100 CAPSULE ORAL DAILY
Qty: 7 CAPSULE | Refills: 0 | Status: SHIPPED | OUTPATIENT
Start: 2021-11-24 | End: 2021-12-14

## 2021-11-24 RX ADMIN — PROPOFOL 120 MG: 10 INJECTION, EMULSION INTRAVENOUS at 09:28

## 2021-11-24 RX ADMIN — FENTANYL CITRATE 25 MCG: 50 INJECTION INTRAMUSCULAR; INTRAVENOUS at 10:11

## 2021-11-24 RX ADMIN — SODIUM CHLORIDE 125 ML/HR: 0.9 INJECTION, SOLUTION INTRAVENOUS at 07:48

## 2021-11-24 RX ADMIN — SODIUM CHLORIDE 125 ML/HR: 0.9 INJECTION, SOLUTION INTRAVENOUS at 10:41

## 2021-11-24 RX ADMIN — ONDANSETRON 4 MG: 2 INJECTION INTRAMUSCULAR; INTRAVENOUS at 09:58

## 2021-11-24 RX ADMIN — IOHEXOL 10 ML: 240 INJECTION, SOLUTION INTRATHECAL; INTRAVASCULAR; INTRAVENOUS; ORAL at 09:50

## 2021-11-24 RX ADMIN — LEVOFLOXACIN 500 MG: 5 INJECTION, SOLUTION INTRAVENOUS at 08:21

## 2021-11-24 RX ADMIN — FENTANYL CITRATE 25 MCG: 50 INJECTION INTRAMUSCULAR; INTRAVENOUS at 10:10

## 2021-11-24 RX ADMIN — EPHEDRINE SULFATE 10 MG: 50 INJECTION, SOLUTION INTRAVENOUS at 09:50

## 2021-11-24 RX ADMIN — FENTANYL CITRATE 25 MCG: 50 INJECTION INTRAMUSCULAR; INTRAVENOUS at 09:41

## 2021-11-24 RX ADMIN — LIDOCAINE HYDROCHLORIDE 60 MG: 20 INJECTION, SOLUTION EPIDURAL; INFILTRATION; INTRACAUDAL; PERINEURAL at 09:28

## 2021-11-26 LAB — BACTERIA UR CULT: NORMAL

## 2021-11-30 ENCOUNTER — TELEPHONE (OUTPATIENT)
Dept: UROLOGY | Facility: MEDICAL CENTER | Age: 86
End: 2021-11-30

## 2021-11-30 DIAGNOSIS — N20.0 RIGHT KIDNEY STONE: Primary | ICD-10-CM

## 2021-12-01 ENCOUNTER — PROCEDURE VISIT (OUTPATIENT)
Dept: UROLOGY | Facility: MEDICAL CENTER | Age: 86
End: 2021-12-01

## 2021-12-01 VITALS — BODY MASS INDEX: 19.83 KG/M2 | HEIGHT: 65 IN | WEIGHT: 119 LBS

## 2021-12-01 DIAGNOSIS — N20.1 RIGHT URETERAL STONE: Primary | ICD-10-CM

## 2021-12-01 PROCEDURE — 99024 POSTOP FOLLOW-UP VISIT: CPT

## 2021-12-01 RX ORDER — DIPHENOXYLATE HYDROCHLORIDE AND ATROPINE SULFATE 2.5; .025 MG/1; MG/1
1 TABLET ORAL DAILY
COMMUNITY

## 2021-12-14 ENCOUNTER — OFFICE VISIT (OUTPATIENT)
Dept: FAMILY MEDICINE CLINIC | Facility: CLINIC | Age: 86
End: 2021-12-14
Payer: MEDICARE

## 2021-12-14 VITALS
HEIGHT: 65 IN | SYSTOLIC BLOOD PRESSURE: 160 MMHG | DIASTOLIC BLOOD PRESSURE: 70 MMHG | OXYGEN SATURATION: 99 % | BODY MASS INDEX: 20.16 KG/M2 | HEART RATE: 90 BPM | WEIGHT: 121 LBS | TEMPERATURE: 96.9 F

## 2021-12-14 DIAGNOSIS — N18.30 STAGE 3 CHRONIC KIDNEY DISEASE, UNSPECIFIED WHETHER STAGE 3A OR 3B CKD (HCC): ICD-10-CM

## 2021-12-14 DIAGNOSIS — I15.9 SECONDARY HYPERTENSION: Primary | ICD-10-CM

## 2021-12-14 DIAGNOSIS — Z00.00 MEDICARE ANNUAL WELLNESS VISIT, SUBSEQUENT: ICD-10-CM

## 2021-12-14 PROCEDURE — G0439 PPPS, SUBSEQ VISIT: HCPCS | Performed by: FAMILY MEDICINE

## 2021-12-14 PROCEDURE — 99213 OFFICE O/P EST LOW 20 MIN: CPT | Performed by: FAMILY MEDICINE

## 2021-12-14 RX ORDER — METOPROLOL SUCCINATE 25 MG/1
25 TABLET, EXTENDED RELEASE ORAL DAILY
Qty: 90 TABLET | Refills: 1 | Status: SHIPPED | OUTPATIENT
Start: 2021-12-14 | End: 2021-12-20

## 2021-12-14 RX ORDER — HYDROCHLOROTHIAZIDE 12.5 MG/1
12.5 TABLET ORAL DAILY
Qty: 90 TABLET | Refills: 1 | Status: SHIPPED | OUTPATIENT
Start: 2021-12-14 | End: 2021-12-20

## 2021-12-20 DIAGNOSIS — I15.9 SECONDARY HYPERTENSION: ICD-10-CM

## 2021-12-20 RX ORDER — HYDROCHLOROTHIAZIDE 12.5 MG/1
TABLET ORAL
Qty: 90 TABLET | Refills: 1 | Status: SHIPPED | OUTPATIENT
Start: 2021-12-20 | End: 2022-05-17 | Stop reason: SDUPTHER

## 2021-12-20 RX ORDER — METOPROLOL SUCCINATE 25 MG/1
TABLET, EXTENDED RELEASE ORAL
Qty: 90 TABLET | Refills: 1 | Status: SHIPPED | OUTPATIENT
Start: 2021-12-20 | End: 2022-01-12 | Stop reason: SDUPTHER

## 2022-01-12 ENCOUNTER — OFFICE VISIT (OUTPATIENT)
Dept: FAMILY MEDICINE CLINIC | Facility: CLINIC | Age: 87
End: 2022-01-12
Payer: MEDICARE

## 2022-01-12 DIAGNOSIS — I48.0 PAF (PAROXYSMAL ATRIAL FIBRILLATION) (HCC): ICD-10-CM

## 2022-01-12 DIAGNOSIS — I15.9 SECONDARY HYPERTENSION: Primary | ICD-10-CM

## 2022-01-12 DIAGNOSIS — E44.1 MILD PROTEIN-CALORIE MALNUTRITION (HCC): ICD-10-CM

## 2022-01-12 DIAGNOSIS — K21.00 GASTROESOPHAGEAL REFLUX DISEASE WITH ESOPHAGITIS WITHOUT HEMORRHAGE: ICD-10-CM

## 2022-01-12 DIAGNOSIS — R05.9 COUGH: ICD-10-CM

## 2022-01-12 DIAGNOSIS — N18.30 STAGE 3 CHRONIC KIDNEY DISEASE, UNSPECIFIED WHETHER STAGE 3A OR 3B CKD (HCC): ICD-10-CM

## 2022-01-12 PROCEDURE — 99214 OFFICE O/P EST MOD 30 MIN: CPT | Performed by: FAMILY MEDICINE

## 2022-01-12 PROCEDURE — U0005 INFEC AGEN DETEC AMPLI PROBE: HCPCS | Performed by: FAMILY MEDICINE

## 2022-01-12 PROCEDURE — U0003 INFECTIOUS AGENT DETECTION BY NUCLEIC ACID (DNA OR RNA); SEVERE ACUTE RESPIRATORY SYNDROME CORONAVIRUS 2 (SARS-COV-2) (CORONAVIRUS DISEASE [COVID-19]), AMPLIFIED PROBE TECHNIQUE, MAKING USE OF HIGH THROUGHPUT TECHNOLOGIES AS DESCRIBED BY CMS-2020-01-R: HCPCS | Performed by: FAMILY MEDICINE

## 2022-01-12 RX ORDER — METOPROLOL SUCCINATE 50 MG/1
50 TABLET, EXTENDED RELEASE ORAL DAILY
Qty: 90 TABLET | Refills: 1 | Status: SHIPPED | OUTPATIENT
Start: 2022-01-12 | End: 2022-02-08 | Stop reason: SDUPTHER

## 2022-01-12 RX ORDER — OMEPRAZOLE 20 MG/1
20 CAPSULE, DELAYED RELEASE ORAL
Qty: 30 CAPSULE | Refills: 5 | Status: SHIPPED | OUTPATIENT
Start: 2022-01-12 | End: 2022-05-17

## 2022-01-12 NOTE — PROGRESS NOTES
Assessment/Plan:  Patient have metoprolol increased 50 mg for elevated blood pressure  Blood pressures at home have been elevated  Patient will rest and increase fluids and will have COVID testing done at this time  Patient use omeprazole as needed for GERD  Follow-up in 2-4 weeks       Diagnoses and all orders for this visit:    Secondary hypertension  -     metoprolol succinate (TOPROL-XL) 50 mg 24 hr tablet; Take 1 tablet (50 mg total) by mouth daily    Mild protein-calorie malnutrition (HCC)    PAF (paroxysmal atrial fibrillation) (HCC)    Stage 3 chronic kidney disease, unspecified whether stage 3a or 3b CKD (HCC)    Gastroesophageal reflux disease with esophagitis without hemorrhage  -     omeprazole (PriLOSEC) 20 mg delayed release capsule; Take 1 capsule (20 mg total) by mouth daily before breakfast            Subjective:        Patient ID: Josefina Marte is a 80 y o  female  Patient is here with fatigue sore throat cough over the past week or so  No direct exposure to COVID-19  Normal smell and taste noted by the patient  No vomiting or diarrhea  Patient with some GERD symptoms  No abdominal pain  No rhinorrhea  Patient more fatigued overall  Patient use Mucinex and ibuprofen  Patient concerned about elevated blood pressure recently  The following portions of the patient's history were reviewed and updated as appropriate: allergies, current medications, past family history, past medical history, past social history, past surgical history and problem list       Review of Systems   Constitutional: Positive for fatigue  Negative for fever  HENT: Positive for sore throat  Eyes: Negative  Respiratory: Positive for cough  Cardiovascular: Negative  Gastrointestinal: Negative  Endocrine: Negative  Genitourinary: Negative  Musculoskeletal: Negative  Skin: Negative  Allergic/Immunologic: Negative  Neurological: Negative  Hematological: Negative  Psychiatric/Behavioral: Negative  Objective:        Depression Screening and Follow-up Plan: Clincally patient does not have depression  No treatment is required  There were no vitals taken for this visit  Physical Exam  Vitals and nursing note reviewed  Constitutional:       General: She is not in acute distress  Appearance: Normal appearance  She is not ill-appearing, toxic-appearing or diaphoretic  HENT:      Head: Normocephalic and atraumatic  Right Ear: Tympanic membrane, ear canal and external ear normal  There is no impacted cerumen  Left Ear: Tympanic membrane, ear canal and external ear normal  There is no impacted cerumen  Nose: Nose normal  No congestion or rhinorrhea  Mouth/Throat:      Mouth: Mucous membranes are moist       Pharynx: Oropharyngeal exudate present  No posterior oropharyngeal erythema  Eyes:      General: No scleral icterus  Right eye: No discharge  Left eye: No discharge  Extraocular Movements: Extraocular movements intact  Conjunctiva/sclera: Conjunctivae normal       Pupils: Pupils are equal, round, and reactive to light  Neck:      Vascular: No carotid bruit  Cardiovascular:      Rate and Rhythm: Normal rate and regular rhythm  Pulses: Normal pulses  Heart sounds: Normal heart sounds  No murmur heard  No friction rub  No gallop  Pulmonary:      Effort: Pulmonary effort is normal  No respiratory distress  Breath sounds: Normal breath sounds  No stridor  No wheezing, rhonchi or rales  Chest:      Chest wall: No tenderness  Musculoskeletal:         General: No swelling, tenderness, deformity or signs of injury  Normal range of motion  Cervical back: Normal range of motion and neck supple  No rigidity  No muscular tenderness  Right lower leg: No edema  Left lower leg: No edema  Lymphadenopathy:      Cervical: No cervical adenopathy     Skin:     General: Skin is warm and dry  Capillary Refill: Capillary refill takes less than 2 seconds  Coloration: Skin is not jaundiced  Findings: No bruising, erythema, lesion or rash  Neurological:      Mental Status: She is alert and oriented to person, place, and time  Mental status is at baseline  Cranial Nerves: No cranial nerve deficit  Sensory: No sensory deficit  Motor: No weakness  Coordination: Coordination normal       Gait: Gait normal    Psychiatric:         Mood and Affect: Mood normal          Behavior: Behavior normal          Thought Content:  Thought content normal          Judgment: Judgment normal

## 2022-01-13 LAB — SARS-COV-2 RNA RESP QL NAA+PROBE: POSITIVE

## 2022-01-14 ENCOUNTER — TELEMEDICINE (OUTPATIENT)
Dept: FAMILY MEDICINE CLINIC | Facility: CLINIC | Age: 87
End: 2022-01-14
Payer: MEDICARE

## 2022-01-14 DIAGNOSIS — U07.1 COVID-19: Primary | ICD-10-CM

## 2022-01-14 PROCEDURE — 99213 OFFICE O/P EST LOW 20 MIN: CPT | Performed by: FAMILY MEDICINE

## 2022-01-14 NOTE — PROGRESS NOTES
COVID-19 Outpatient Progress Note    Assessment/Plan:    Problem List Items Addressed This Visit     None      Visit Diagnoses     COVID-19    -  Primary         Disposition:     I recommended continued isolation until at least 24 hours have passed since recovery defined as resolution of fever without the use of fever-reducing medications AND improvement in COVID symptoms AND 10 days have passed since onset of symptoms (or 10 days have passed since date of first positive viral diagnostic test for asymptomatic patients)  Patient is at increased risk of progressing towards severe COVID-19 due to the following high risk criteria:   - Older age (age >= 72years old)  - Chronic kidney disease  - Cardiovascular disease    Patient meets criteria for Sotrovimab infusion  They were counseled in regards to risks, benefits, and side effects of this infusion  Sotrovimab is an investigational medicine used to treat mild-to-moderate symptoms of COVID-19 in adults and children (15years of age and older weighing at least 80 pounds (40 kg)) with positive results of direct SARS-CoV-2 viral testing, and who are at high risk of progression to severe COVID-19, including hospitalization or death  Sotrovimab is investigational because it is still being studied  There is limited information about the safety and effectiveness of using sotrovimab to treat people with mild-to-moderate COVID-19  The FDA has authorized the emergency use of sotrovimab for the treatment of COVID-19 under an Emergency Use Authorization (EUA)  How will I receive sotrovimab?    - You will receive 1 dose of sotrovimab  - Sotrovimab will be given through a vein (intravenous or IV infusion) over 30 minutes    Possible side effects of sotrovimab: Allergic reactions can happen during and after infusion with sotrovimab      Possible reactions include: fever, chills, nausea, headache, shortness of breath, low or high blood pressure, rapid or slow heart rate, chest discomfort or pain, weakness, confusion, feeling tired, wheezing, swelling of your lips, face, or throat, rash including hives, itching, muscle aches, dizziness, and sweating  These reactions may be severe or life threatening  Worsening symptoms after treatment: You may experience new or worsening symptoms after infusion, including fever, difficulty breathing, rapid or slow heart rate, tiredness, weakness or confusion  If these occur, contact your healthcare provider or seek immediate medical attention as some of these events have required hospitalization  It is unknown if these events are related to treatment or are due to the progression of COVID19  The side effects of getting any medicine by vein may include brief pain, bleeding, bruising of the skin, soreness, swelling, and possible infection at the infusion site  These are not all the possible side effects of sotrovimab  Not a lot of people have been given sotrovimab  Serious and unexpected side effects may happen  Sotrovimab are still being studied so it is possible that all of the risks are not known at this time  It is possible that sotrovimab could interfere with your body's own ability to fight off a future infection of SARS-CoV-2  Similarly, sotrovimab may reduce your bodys immune response to a vaccine for SARS-CoV-2  Specific studies have not been conducted to address these possible risks  Talk to your healthcare provider if you have any questions  Emergency Use Authorization:    The [facility] FDA has made sotrovimab available under an emergency access mechanism called an EUA  The EUA is supported by a [place] of Health and Human Service (HHS) declaration that circumstances exist to justify the emergency use of drugs and biological products during the COVID-19 pandemic  Sotrovimab have not undergone the same type of review as an FDA-approved or cleared product   The FDA may issue an EUA when certain criteria are met, which includes that there are no adequate, approved, and available alternatives  In addition, the FDA decision is based on the totality of scientific evidence available showing that it is reasonable to believe that the product meets certain criteria for safety, performance, and labeling and may be effective in treatment of patients during the COVID-19 pandemic  All of these criteria must be met to allow for the product to be used in the treatment of patients during the COVID-19 pandemic  The EUA for sotrovimab together is in effect for the duration of the COVID-19 declaration justifying emergency use of these products, unless terminated or revoked (after which the product may no longer be used)  What if I am pregnant or breastfeeding? There is no experience treating pregnant women or breastfeeding mothers with sotrovimab  For a mother and unborn baby, the benefit of receiving sotrovimab may be greater than the risk from the treatment  If you are pregnant or breastfeeding, discuss your options and specific situation with your healthcare provider  Regarding COVID-19 Vaccination:    Currently there is no data or safety or efficacy of COVID-19 vaccination in persons who received monoclonal antibodies as part of COVID-19 treatment  Treatment should be deferred for at least 90 days to avoid interference of the treatment with vaccine-induced immune responses (this is based on estimated half-life of therapies and evidence suggesting reinfection is uncommon within 90 days of initial infection)  Full fact sheet document for patients can be found at: [employer] com cy    The patient consents to proceed with sotrovimab infusion  I have spent 23 minutes directly with the patient  Greater than 50% of this time was spent in counseling/coordination of care regarding: instructions for management and patient and family education        Encounter provider Arben Cantu DO    Provider located at [id number] [facility]  [address] [facility] [address]    Recent Visits  Date Type Provider Dept   01/12/22 Office Visit Brigid Mcconnell DO [address] [facility] recent visits within past 7 days and meeting all other requirements  Today's Visits  Date Type Provider Dept   01/14/22 Telemedicine Brigid DO Jayesh [address] [facility] today's visits and meeting all other requirements  Future Appointments  No visits were found meeting these conditions  Showing future appointments within next 150 days and meeting all other requirements     This virtual check-in was done via [employer] [address] and patient was informed that this is a secure, HIPAA-compliant platform  She agrees to proceed  Patient agrees to participate in a virtual check in via telephone or video visit instead of presenting to the office to address urgent/immediate medical needs  Patient is aware this is a billable service  After connecting through [facility], the patient was identified by name and date of birth  Philippe Liriano was informed that this was a telemedicine visit and that the exam was being conducted confidentially over secure lines  My office door was closed  No one else was in the room  Harris Purvis acknowledged consent and understanding of privacy and security of the telemedicine visit  I informed the patient that I have reviewed her record in Epic and presented the opportunity for her to ask any questions regarding the visit today  The patient agreed to participate  Verification of patient location:  Patient is located in the following state in which I hold an active license: [place]    Subjective:   Philippe Liriano is a 80 y o  female who has been screened for COVID-19  Symptom change since last report: improving  Patient's symptoms include fatigue and sore throat   Patient denies fever, chills, malaise, congestion, rhinorrhea, anosmia, loss of taste, cough, shortness of breath, chest tightness, abdominal pain, nausea, vomiting, diarrhea, myalgias and headaches  - Date of symptom onset: 1/5/2022  - Date of positive COVID-19 test: 1/12/2022  Type of test: PCR  COVID-19 vaccination status: Not vaccinated    Cheyenne Concepcion has been staying home and has isolated themselves in her home  She is taking care to not share personal items and is cleaning all surfaces that are touched often, like counters, tabletops, and doorknobs using household cleaning sprays or wipes  She is wearing a mask when she leaves her room       Lab Results   Component Value Date    SARSCOV2 Positive (A) 01/12/2022     Past Medical History:   Diagnosis Date    Cataract     LastAssessed:9/8/15    GERD (gastroesophageal reflux disease)     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    Hypertension     PAF (paroxysmal atrial fibrillation) (HCC)     Last Assessed:2/3/16    Wrist fracture     Resolved:9/8/15     Past Surgical History:   Procedure Laterality Date    CATARACT EXTRACTION      Last Assessed:9/8/15    FL RETROGRADE PYELOGRAM  10/17/2021    LYMPHADENECTOMY      Last Assessed:9/8/15    OK CYSTO/URETERO W/LITHOTRIPSY &INDWELL STENT INSRT Right 11/24/2021    Procedure: CYSTOSCOPY URETEROSCOPY WITH LITHOTRIPSY HOLMIUM LASER, RETROGRADE PYELOGRAM AND INSERTION STENT URETERAL;  Surgeon: Loreto Murphy MD;  Location: [facility];  Service: Urology    OK CYSTOURETHROSCOPY,URETER CATHETER Right 10/17/2021    Procedure: CYSTOSCOPY RETROGRADE PYELOGRAM WITH INSERTION STENT URETERAL;  Surgeon: Tereza Garcia MD;  Location: [facility];  Service: Urology    TONSILLECTOMY      Last Assessed:9/8/15    WRIST SURGERY      Last Assessed:9/8/15     Current Outpatient Medications   Medication Sig Dispense Refill    DAILY MULTIPLE VITAMINS/IRON PO Take 1 tablet by mouth daily        hydrochlorothiazide (HYDRODIURIL) 12 5 mg tablet take 1 tablet by mouth once daily 90 tablet 1    metoprolol succinate (TOPROL-XL) 50 mg 24 hr tablet Take 1 tablet (50 mg total) by mouth daily 90 tablet 1    multivitamin (THERAGRAN) TABS Take 1 tablet by mouth daily      Omega-3 Fatty Acids (fish oil) 1,000 mg Take 1,000 mg by mouth daily      omeprazole (PriLOSEC) 20 mg delayed release capsule Take 1 capsule (20 mg total) by mouth daily before breakfast 30 capsule 5     No current facility-administered medications for this visit  Allergies   Allergen Reactions    Lactose - Food Allergy GI Intolerance    Penicillins Other (See Comments)     Unsure of reaction        Review of Systems   Constitutional: Positive for fatigue  Negative for chills and fever  HENT: Positive for sore throat  Negative for congestion and rhinorrhea  Eyes: Negative  Respiratory: Negative  Negative for cough, chest tightness and shortness of breath  Cardiovascular: Negative  Gastrointestinal: Negative  Negative for abdominal pain, diarrhea, nausea and vomiting  Endocrine: Negative  Genitourinary: Negative  Musculoskeletal: Negative  Negative for myalgias  Skin: Negative  Allergic/Immunologic: Negative  Neurological: Negative  Negative for headaches  Hematological: Negative  Psychiatric/Behavioral: Negative  Objective:    Vitals:       Physical Exam  Nursing note reviewed  Neurological:      Mental Status: She is alert  Psychiatric:         Mood and Affect: Mood normal          Behavior: Behavior normal          Thought Content: Thought content normal          VIRTUAL VISIT DISCLAIMER    Sandra Purvis verbally agrees to participate in [employer]  Pt is aware that [employer] could be limited without vital signs or the ability to perform a full hands-on physical exam  Sandra Purvis understands she or the provider may request at any time to terminate the video visit and request the patient to seek care or treatment in person

## 2022-02-07 ENCOUNTER — OFFICE VISIT (OUTPATIENT)
Dept: UROLOGY | Facility: MEDICAL CENTER | Age: 87
End: 2022-02-07
Payer: MEDICARE

## 2022-02-07 VITALS — BODY MASS INDEX: 20.04 KG/M2 | HEART RATE: 85 BPM | WEIGHT: 117.4 LBS | HEIGHT: 64 IN

## 2022-02-07 DIAGNOSIS — N20.1 RIGHT URETERAL STONE: Primary | ICD-10-CM

## 2022-02-07 DIAGNOSIS — N20.0 NEPHROLITHIASIS: ICD-10-CM

## 2022-02-07 LAB
SL AMB  POCT GLUCOSE, UA: ABNORMAL
SL AMB LEUKOCYTE ESTERASE,UA: ABNORMAL
SL AMB POCT BILIRUBIN,UA: ABNORMAL
SL AMB POCT BLOOD,UA: ABNORMAL
SL AMB POCT CLARITY,UA: CLEAR
SL AMB POCT COLOR,UA: YELLOW
SL AMB POCT KETONES,UA: ABNORMAL
SL AMB POCT NITRITE,UA: ABNORMAL
SL AMB POCT PH,UA: 7
SL AMB POCT SPECIFIC GRAVITY,UA: 1.01
SL AMB POCT URINE PROTEIN: ABNORMAL
SL AMB POCT UROBILINOGEN: 0.2

## 2022-02-07 PROCEDURE — 81003 URINALYSIS AUTO W/O SCOPE: CPT | Performed by: PHYSICIAN ASSISTANT

## 2022-02-07 PROCEDURE — 99214 OFFICE O/P EST MOD 30 MIN: CPT | Performed by: PHYSICIAN ASSISTANT

## 2022-02-07 NOTE — PROGRESS NOTES
2/7/2022      Chief Complaint   Patient presents with    Follow-up     nephrolithiasis/ureteral stone       Assessment and Plan    80 y o  female managed by Dr Valentin Daniels     1  Nephrolithiasis   · Urine today: trace blood  · Stone risk profile deferred  · Routine 3 month postop imaging deferred  · Dietary handouts administered to patient today  · ED parameters reviewed  · Follow up in 1 year with US  History of Present Illness  Jean Carlos Rashid is a 80 y o  female here for evaluation of nephrolithiasis  Patient first presented to the office on 10/13/21 for ED follow up of millimeter right proximal ureteral stone noted on CT from ED visit on 10/10/2021  So evidence of cast type stone occupying a large portion of the left upper pole major calycx was also evident  Patient thought she may have successfully passed her stone on her own  Ultrasound was ordered for further evaluation  She presented to the ED that on 10/15/2021 pain and UTI like symptoms  CT confirmed that the ureteral stone remained in place  She underwent cystoscopy and right ureteral stent insertion on 10/15/2021  He later underwent cystoscopy, ureteroscopy with lithotripsy holmium laser, retrograde pyelogram and right ureteral stent exchange on 11/24/2021  Valentin Daniels was unable to visualize the staghorn stone was able to fragment her ureteral stone that had migrated to the renal pelvis  She was to follow-up today to review results of stone risk profile  Patient states she did not have testing performed as it made her uncomfortable to keep a urinal of urine in her Fridge with her food  Discussing routine postop imaging of KUB and ultrasound 3 months following the procedure, patient defers at this time  She states she has been doing well  She has modified her diet and increased daily water intake  He denies any urinary symptoms  She denies any fevers or chills  She denies any gross hematuria    She verbalizes understanding and is agreeable to plan above  Review of Systems   Constitutional: Negative for chills and fever  HENT: Negative  Respiratory: Negative  Cardiovascular: Negative  Gastrointestinal: Negative for abdominal pain, nausea and vomiting  Genitourinary: Negative for difficulty urinating, dysuria, flank pain, frequency, hematuria and urgency  Musculoskeletal: Negative  Skin: Negative  Neurological: Negative  Vitals  Vitals:    02/07/22 1112   Weight: 53 3 kg (117 lb 6 4 oz)   Height: 5' 4" (1 626 m)       Physical Exam  Vitals reviewed  Constitutional:       General: She is not in acute distress  Appearance: Normal appearance  She is normal weight  She is not ill-appearing, toxic-appearing or diaphoretic  HENT:      Head: Normocephalic and atraumatic  Eyes:      Conjunctiva/sclera: Conjunctivae normal    Pulmonary:      Effort: Pulmonary effort is normal  No respiratory distress  Abdominal:      General: There is no distension  Palpations: Abdomen is soft  Tenderness: There is no abdominal tenderness  There is no right CVA tenderness, left CVA tenderness, guarding or rebound  Musculoskeletal:         General: Normal range of motion  Cervical back: Normal range of motion  Skin:     General: Skin is warm and dry  Neurological:      General: No focal deficit present  Mental Status: She is alert and oriented to person, place, and time  Psychiatric:         Mood and Affect: Mood normal          Behavior: Behavior normal          Thought Content:  Thought content normal          Judgment: Judgment normal        Past History  Past Medical History:   Diagnosis Date    Cataract     LastAssessed:9/8/15    GERD (gastroesophageal reflux disease)     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    Hypertension     PAF (paroxysmal atrial fibrillation) ([facility])     Last Assessed:2/3/16    Wrist fracture     Resolved:9/8/15     Social History     Socioeconomic History    Marital status: /Civil Union     Spouse name: Not on file    Number of children: Not on file    Years of education: Not on file    Highest education level: Not on file   Occupational History    Occupation: retired   Tobacco Use    Smoking status: Never Smoker    Smokeless tobacco: Never Used   Vaping Use    Vaping Use: Never used   Substance and Sexual Activity    Alcohol use: No    Drug use: No    Sexual activity: Not Currently     Partners: Male   Other Topics Concern    Not on file   Social History Narrative    Not on file     Social Determinants of Health     Financial Resource Strain: Not on file   Food Insecurity: Not on file   Transportation Needs: Not on file   Physical Activity: Not on file   Stress: Not on file   Social Connections: Not on file   Intimate Partner Violence: Not on file   Housing Stability: Not on file     Social History     Tobacco Use   Smoking Status Never Smoker   Smokeless Tobacco Never Used     Family History   Problem Relation Age of Onset    No Known Problems Family        The following portions of the patient's history were reviewed and updated as appropriate: allergies, current medications, past medical history, past social history, past surgical history and problem list     Results  No results found for this or any previous visit (from the past 1 hour(s))  ]  No results found for: PSA  Lab Results   Component Value Date    GLUCOSE 104 09/02/2015    CALCIUM 9 4 10/17/2021     (L) 09/02/2015    K 3 7 10/17/2021    CO2 29 10/17/2021     10/17/2021    BUN 18 10/17/2021    CREATININE 1 14 10/17/2021     Lab Results   Component Value Date    WBC 12 95 (H) 10/18/2021    HGB 11 6 10/18/2021    HCT 34 8 10/18/2021    MCV 91 10/18/2021     10/18/2021     Moises Denson PA-C

## 2022-02-08 ENCOUNTER — OFFICE VISIT (OUTPATIENT)
Dept: FAMILY MEDICINE CLINIC | Facility: CLINIC | Age: 87
End: 2022-02-08
Payer: MEDICARE

## 2022-02-08 VITALS
HEART RATE: 72 BPM | BODY MASS INDEX: 19.97 KG/M2 | OXYGEN SATURATION: 98 % | DIASTOLIC BLOOD PRESSURE: 70 MMHG | HEIGHT: 64 IN | SYSTOLIC BLOOD PRESSURE: 120 MMHG | WEIGHT: 117 LBS | TEMPERATURE: 96.6 F

## 2022-02-08 DIAGNOSIS — K21.00 GASTROESOPHAGEAL REFLUX DISEASE WITH ESOPHAGITIS WITHOUT HEMORRHAGE: ICD-10-CM

## 2022-02-08 DIAGNOSIS — I48.0 PAF (PAROXYSMAL ATRIAL FIBRILLATION) (HCC): ICD-10-CM

## 2022-02-08 DIAGNOSIS — I15.9 SECONDARY HYPERTENSION: ICD-10-CM

## 2022-02-08 DIAGNOSIS — U07.1 COVID-19: ICD-10-CM

## 2022-02-08 DIAGNOSIS — I10 BENIGN ESSENTIAL HYPERTENSION: Primary | ICD-10-CM

## 2022-02-08 DIAGNOSIS — I48.0 PAF (PAROXYSMAL ATRIAL FIBRILLATION) (HCC): Primary | ICD-10-CM

## 2022-02-08 PROCEDURE — 99214 OFFICE O/P EST MOD 30 MIN: CPT | Performed by: FAMILY MEDICINE

## 2022-02-08 RX ORDER — METOPROLOL SUCCINATE 50 MG/1
50 TABLET, EXTENDED RELEASE ORAL DAILY
Qty: 90 TABLET | Refills: 1 | Status: SHIPPED | OUTPATIENT
Start: 2022-02-08 | End: 2022-05-17 | Stop reason: SDUPTHER

## 2022-02-08 NOTE — PROGRESS NOTES
Assessment/Plan:  Glucose 88  GFR 44  sodium potassium normal   Blood pressure stable this time  Continue with metoprolol 50 mg daily  Refills given at this time  GERD stable at this time  Continue with omeprazole as directed  Refills will be given when needed  Patient improving from COVID-19 and fatigue has lifted  Had discussion with patient regarding AFib  Patient does not wish to start anticoagulation  Patient understands increased chads Vasc score which was discussed with her at this time  Patient does not wish to go for Holter monitor or be referred to Cardiology at this time  Patient will consider these options and get back to us  Follow-up in 3 months or as needed       Diagnoses and all orders for this visit:    Benign essential hypertension    Secondary hypertension  -     metoprolol succinate (TOPROL-XL) 50 mg 24 hr tablet; Take 1 tablet (50 mg total) by mouth daily    PAF (paroxysmal atrial fibrillation) (HCC)    Gastroesophageal reflux disease with esophagitis without hemorrhage    COVID-19            Subjective:        Patient ID: Ann Marie Winn is a 80 y o  female  Patient follow-up on hypertension, paroxysmal AFib, GERD, COVID-19  Patient's fatigue has improved significantly  No new chest pain shortness breath palpitations headache blurred vision abdominal pain or problems urinating or defecating  Reflux has improved with medication  Overall patient feels improved        The following portions of the patient's history were reviewed and updated as appropriate: allergies, current medications, past family history, past medical history, past social history, past surgical history and problem list       Review of Systems   Constitutional: Negative  HENT: Negative  Eyes: Negative  Respiratory: Negative  Cardiovascular: Negative  Gastrointestinal: Negative  Endocrine: Negative  Genitourinary: Negative  Musculoskeletal: Negative  Skin: Negative  Allergic/Immunologic: Negative  Neurological: Negative  Hematological: Negative  Psychiatric/Behavioral: Negative  Objective:        Depression Screening and Follow-up Plan: Clincally patient does not have depression  No treatment is required  /70   Pulse 72   Temp (!) 96 6 °F (35 9 °C)   Ht 5' 4" (1 626 m)   Wt 53 1 kg (117 lb)   SpO2 98%   BMI 20 08 kg/m²          Physical Exam  Vitals and nursing note reviewed  Constitutional:       General: She is not in acute distress  Appearance: Normal appearance  She is not ill-appearing, toxic-appearing or diaphoretic  HENT:      Head: Normocephalic and atraumatic  Right Ear: Tympanic membrane, ear canal and external ear normal  There is no impacted cerumen  Left Ear: Tympanic membrane, ear canal and external ear normal  There is no impacted cerumen  Nose: Nose normal  No congestion or rhinorrhea  Eyes:      General: No scleral icterus  Right eye: No discharge  Left eye: No discharge  Extraocular Movements: Extraocular movements intact  Conjunctiva/sclera: Conjunctivae normal       Pupils: Pupils are equal, round, and reactive to light  Neck:      Vascular: No carotid bruit  Cardiovascular:      Rate and Rhythm: Normal rate  Rhythm irregular  Pulses: Normal pulses  Heart sounds: Normal heart sounds  No murmur heard  No friction rub  No gallop  Pulmonary:      Effort: Pulmonary effort is normal  No respiratory distress  Breath sounds: Normal breath sounds  No stridor  No wheezing, rhonchi or rales  Chest:      Chest wall: No tenderness  Musculoskeletal:         General: No swelling, tenderness, deformity or signs of injury  Normal range of motion  Cervical back: Normal range of motion and neck supple  No rigidity  No muscular tenderness  Right lower leg: No edema  Left lower leg: No edema     Lymphadenopathy:      Cervical: No cervical adenopathy  Skin:     General: Skin is warm and dry  Capillary Refill: Capillary refill takes less than 2 seconds  Coloration: Skin is not jaundiced  Findings: No bruising, erythema, lesion or rash  Neurological:      Mental Status: She is alert and oriented to person, place, and time  Mental status is at baseline  Cranial Nerves: No cranial nerve deficit  Sensory: No sensory deficit  Motor: No weakness  Coordination: Coordination normal       Gait: Gait normal    Psychiatric:         Mood and Affect: Mood normal          Behavior: Behavior normal          Thought Content:  Thought content normal          Judgment: Judgment normal

## 2022-02-10 ENCOUNTER — TELEPHONE (OUTPATIENT)
Dept: FAMILY MEDICINE CLINIC | Facility: CLINIC | Age: 87
End: 2022-02-10

## 2022-02-10 NOTE — TELEPHONE ENCOUNTER
Patient called back and states the xarelto will be covered please review and advise the dose that can be sent to pharmacy

## 2022-02-10 NOTE — TELEPHONE ENCOUNTER
Call patient's     He is to call insurance to see if Xarelto will be covered at a lesser cost or similar product

## 2022-02-10 NOTE — TELEPHONE ENCOUNTER
PATIENTS  STOPPED IN HE WENT TO  THE ELIQUIS YESTERDAY AND PAID $300 FOR THE RX PATIENTS  WOULD LIKE TO KNOW IF THERE IS ANYTHING THAT IS LESS EXPENSIVE THAT HIS WIFE COULD GO ON   PLEASE ADVISE Results sent through [employer]

## 2022-02-11 NOTE — TELEPHONE ENCOUNTER
Spoke to Ron Menezes, explained that he should call 1 week before she finishes her Eliquis    We will then start the Xarelto

## 2022-02-11 NOTE — TELEPHONE ENCOUNTER
Call   Confirm whether patient is on Eliquis  If so, complete Eliquis for the month  We will then switch to Xarelto  Patient to call us a few days prior to end of prescription of Eliquis    If patient is not on Eliquis will call in Xarelto today

## 2022-04-26 ENCOUNTER — OFFICE VISIT (OUTPATIENT)
Dept: FAMILY MEDICINE CLINIC | Facility: CLINIC | Age: 87
End: 2022-04-26
Payer: MEDICARE

## 2022-04-26 VITALS
DIASTOLIC BLOOD PRESSURE: 70 MMHG | HEIGHT: 64 IN | OXYGEN SATURATION: 100 % | TEMPERATURE: 96.2 F | WEIGHT: 117.6 LBS | HEART RATE: 74 BPM | RESPIRATION RATE: 16 BRPM | BODY MASS INDEX: 20.08 KG/M2 | SYSTOLIC BLOOD PRESSURE: 160 MMHG

## 2022-04-26 DIAGNOSIS — L24.89 IRRITANT CONTACT DERMATITIS DUE TO OTHER AGENTS: Primary | ICD-10-CM

## 2022-04-26 PROCEDURE — 99213 OFFICE O/P EST LOW 20 MIN: CPT | Performed by: FAMILY MEDICINE

## 2022-04-26 RX ORDER — MOMETASONE FUROATE 1 MG/G
CREAM TOPICAL DAILY
Qty: 45 G | Refills: 0 | Status: SHIPPED | OUTPATIENT
Start: 2022-04-26 | End: 2022-05-17

## 2022-04-26 NOTE — PROGRESS NOTES
Assessment/Plan: patient use meds on cream as directed  Patient use Benadryl as directed  Other guidance given this time  Follow-up as needed  Diagnoses and all orders for this visit:    Irritant contact dermatitis due to other agents  -     mometasone (ELOCON) 0 1 % cream; Apply topically daily            Subjective:        Patient ID: Angely Campbell is a 80 y o  female  Patient is here with rash over the past 3 days to the right forearm  No new topical agents being used at this time  Patient does notice pruritus  No pain noted  No other rash noted elsewhere on the body  No new foods other than at [place] time  Patient did use cortisone cream         The following portions of the patient's history were reviewed and updated as appropriate: allergies, current medications, past family history, past medical history, past social history, past surgical history and problem list       Review of Systems   Constitutional: Negative  HENT: Negative  Eyes: Negative  Respiratory: Negative  Cardiovascular: Negative  Gastrointestinal: Negative  Endocrine: Negative  Genitourinary: Negative  Musculoskeletal: Negative  Skin: Positive for rash  Allergic/Immunologic: Negative  Neurological: Negative  Hematological: Negative  Psychiatric/Behavioral: Negative  Objective:        Depression Screening and Follow-up Plan: Clincally patient does not have depression  No treatment is required  /70   Pulse 74   Temp (!) 96 2 °F (35 7 °C) (Temporal)   Resp 16   Ht 5' 4" (1 626 m)   Wt 53 3 kg (117 lb 9 6 oz)   SpO2 100%   BMI 20 19 kg/m²          Physical Exam  Vitals and nursing note reviewed  Skin:     Findings: Rash present  No erythema  Comments: Erythematous rash on right forearm  Psychiatric:         Mood and Affect: Mood normal          Behavior: Behavior normal          Thought Content:  Thought content normal

## 2022-05-17 ENCOUNTER — OFFICE VISIT (OUTPATIENT)
Dept: FAMILY MEDICINE CLINIC | Facility: CLINIC | Age: 87
End: 2022-05-17
Payer: MEDICARE

## 2022-05-17 VITALS
WEIGHT: 116 LBS | RESPIRATION RATE: 18 BRPM | TEMPERATURE: 97.6 F | HEIGHT: 64 IN | BODY MASS INDEX: 19.81 KG/M2 | OXYGEN SATURATION: 97 % | SYSTOLIC BLOOD PRESSURE: 164 MMHG | HEART RATE: 73 BPM | DIASTOLIC BLOOD PRESSURE: 80 MMHG

## 2022-05-17 DIAGNOSIS — I48.0 PAF (PAROXYSMAL ATRIAL FIBRILLATION) (HCC): ICD-10-CM

## 2022-05-17 DIAGNOSIS — I10 BENIGN ESSENTIAL HYPERTENSION: Primary | ICD-10-CM

## 2022-05-17 DIAGNOSIS — N18.30 STAGE 3 CHRONIC KIDNEY DISEASE, UNSPECIFIED WHETHER STAGE 3A OR 3B CKD (HCC): ICD-10-CM

## 2022-05-17 DIAGNOSIS — I15.9 SECONDARY HYPERTENSION: ICD-10-CM

## 2022-05-17 PROCEDURE — 99214 OFFICE O/P EST MOD 30 MIN: CPT | Performed by: FAMILY MEDICINE

## 2022-05-17 RX ORDER — METOPROLOL SUCCINATE 50 MG/1
50 TABLET, EXTENDED RELEASE ORAL DAILY
Qty: 90 TABLET | Refills: 1 | Status: SHIPPED | OUTPATIENT
Start: 2022-05-17 | End: 2022-07-08 | Stop reason: SDUPTHER

## 2022-05-17 RX ORDER — HYDROCHLOROTHIAZIDE 12.5 MG/1
12.5 TABLET ORAL DAILY
Qty: 90 TABLET | Refills: 1 | Status: SHIPPED | OUTPATIENT
Start: 2022-05-17

## 2022-05-17 NOTE — PROGRESS NOTES
Assessment/Plan:  BMP CBC urine culture reviewed from 2021  Patient given refills for metoprolol at this time  Patient will have low-salt diet and have blood pressure rechecked at follow-up visit  Patient will continue with current regimen for Afib  Patient will continue current regimen for GERD symptoms if reoccurs  Patient chronic kidney disease stage 3 stable at the present time  Follow-up in 6 months or as needed  Diagnoses and all orders for this visit:    Benign essential hypertension    Secondary hypertension  -     metoprolol succinate (TOPROL-XL) 50 mg 24 hr tablet; Take 1 tablet (50 mg total) by mouth in the morning   -     hydrochlorothiazide (HYDRODIURIL) 12 5 mg tablet; Take 1 tablet (12 5 mg total) by mouth in the morning  PAF (paroxysmal atrial fibrillation) (HCC)    Stage 3 chronic kidney disease, unspecified whether stage 3a or 3b CKD (HCC)            Subjective:        Patient ID: Jenifer Haider is a 80 y o  female  Patient follow-up on hypertension as well as Afib  Patient also with history of chronic kidney disease stage 3  Patient also with history of GERD  Patient without GERD symptoms  Patient off omeprazole  Patient with some palpitations but no chest pain or shortness of breath  No change urination defecation  The following portions of the patient's history were reviewed and updated as appropriate: allergies, current medications, past family history, past medical history, past social history, past surgical history and problem list       Review of Systems   Constitutional: Negative  HENT: Negative  Eyes: Negative  Respiratory: Negative  Cardiovascular: Positive for palpitations  Gastrointestinal: Negative  Endocrine: Negative  Genitourinary: Negative  Musculoskeletal: Negative  Skin: Negative  Allergic/Immunologic: Negative  Neurological: Negative  Hematological: Negative      Psychiatric/Behavioral: Positive for sleep disturbance  Objective:        Depression Screening and Follow-up Plan: Clincally patient does not have depression  No treatment is required  /80 (BP Location: Right arm, Patient Position: Sitting, Cuff Size: Adult)   Pulse 73   Temp 97 6 °F (36 4 °C) (Tympanic)   Resp 18   Ht 5' 4" (1 626 m)   Wt 52 6 kg (116 lb)   SpO2 97%   BMI 19 91 kg/m²          Physical Exam  Vitals and nursing note reviewed  Constitutional:       General: She is not in acute distress  Appearance: Normal appearance  She is not ill-appearing, toxic-appearing or diaphoretic  HENT:      Head: Normocephalic and atraumatic  Right Ear: Tympanic membrane, ear canal and external ear normal  There is no impacted cerumen  Left Ear: Tympanic membrane, ear canal and external ear normal  There is no impacted cerumen  Nose: Nose normal  No congestion or rhinorrhea  Mouth/Throat:      Mouth: Mucous membranes are moist       Pharynx: No oropharyngeal exudate or posterior oropharyngeal erythema  Eyes:      General: No scleral icterus  Right eye: No discharge  Left eye: No discharge  Extraocular Movements: Extraocular movements intact  Conjunctiva/sclera: Conjunctivae normal       Pupils: Pupils are equal, round, and reactive to light  Neck:      Vascular: No carotid bruit  Cardiovascular:      Rate and Rhythm: Normal rate and regular rhythm  Pulses: Normal pulses  Heart sounds: Normal heart sounds  No murmur heard  No friction rub  No gallop  Pulmonary:      Effort: Pulmonary effort is normal  No respiratory distress  Breath sounds: Normal breath sounds  No stridor  No wheezing, rhonchi or rales  Chest:      Chest wall: No tenderness  Musculoskeletal:         General: No swelling, tenderness, deformity or signs of injury  Normal range of motion  Cervical back: Normal range of motion and neck supple  No rigidity  No muscular tenderness  Right lower leg: No edema  Left lower leg: No edema  Lymphadenopathy:      Cervical: No cervical adenopathy  Skin:     General: Skin is warm and dry  Capillary Refill: Capillary refill takes less than 2 seconds  Coloration: Skin is not jaundiced  Findings: No bruising, erythema, lesion or rash  Neurological:      Mental Status: She is alert and oriented to person, place, and time  Mental status is at baseline  Cranial Nerves: No cranial nerve deficit  Sensory: No sensory deficit  Motor: No weakness  Coordination: Coordination normal       Gait: Gait normal    Psychiatric:         Mood and Affect: Mood normal          Behavior: Behavior normal          Thought Content:  Thought content normal          Judgment: Judgment normal

## 2022-07-08 ENCOUNTER — NURSE TRIAGE (OUTPATIENT)
Dept: OTHER | Facility: OTHER | Age: 87
End: 2022-07-08

## 2022-07-08 DIAGNOSIS — I15.9 SECONDARY HYPERTENSION: ICD-10-CM

## 2022-07-08 RX ORDER — METOPROLOL SUCCINATE 50 MG/1
50 TABLET, EXTENDED RELEASE ORAL DAILY
Qty: 7 TABLET | Refills: 0 | Status: SHIPPED | OUTPATIENT
Start: 2022-07-08 | End: 2022-07-11 | Stop reason: SDUPTHER

## 2022-07-08 NOTE — TELEPHONE ENCOUNTER
Reason for Disposition   [1] Caller requesting a prescription renewal (no refills left), no triage required, AND [2] triager able to renew prescription per department policy    Answer Assessment - Initial Assessment Questions  1  NAME of MEDICATION: "What medicine are you calling about?"      metorprolol 50 mg PO daily    2  QUESTION: "What is your question?" (e g , medication refill, side effect)      All out of     3  PRESCRIBING HCP: "Who prescribed it?" Reason: if prescribed by specialist, call should be referred to that group  Dr Benson Blanco    4  SYMPTOMS: "Do you have any symptoms?"      Denies    5   SEVERITY: If symptoms are present, ask "Are they mild, moderate or severe?"     N/A    Protocols used: MEDICATION QUESTION CALL-ADULT-

## 2022-07-08 NOTE — TELEPHONE ENCOUNTER
Regarding: metoprolol refill  ----- Message from Maral Askew RN sent at 7/8/2022  5:58 PM EDT -----  "My wife will be out of her Metoprolol and we would like to know if a temporary refill can be sent for her?"

## 2022-07-11 DIAGNOSIS — I15.9 SECONDARY HYPERTENSION: ICD-10-CM

## 2022-07-11 RX ORDER — METOPROLOL SUCCINATE 50 MG/1
50 TABLET, EXTENDED RELEASE ORAL DAILY
Qty: 30 TABLET | Refills: 3 | Status: SHIPPED | OUTPATIENT
Start: 2022-07-11 | End: 2022-07-18

## 2022-10-12 PROBLEM — N39.0 UTI (URINARY TRACT INFECTION): Status: RESOLVED | Noted: 2021-10-15 | Resolved: 2022-10-12

## 2022-11-11 DIAGNOSIS — I15.9 SECONDARY HYPERTENSION: ICD-10-CM

## 2022-11-11 DIAGNOSIS — U07.1 COVID: Primary | ICD-10-CM

## 2022-11-11 RX ORDER — NIRMATRELVIR AND RITONAVIR 150-100 MG
2 KIT ORAL 2 TIMES DAILY
Qty: 20 TABLET | Refills: 0 | Status: SHIPPED | OUTPATIENT
Start: 2022-11-11 | End: 2022-11-17

## 2022-11-11 RX ORDER — METOPROLOL SUCCINATE 50 MG/1
50 TABLET, EXTENDED RELEASE ORAL DAILY
Qty: 30 TABLET | Refills: 0 | Status: SHIPPED | OUTPATIENT
Start: 2022-11-11 | End: 2022-11-17 | Stop reason: SDUPTHER

## 2022-11-17 ENCOUNTER — OFFICE VISIT (OUTPATIENT)
Dept: FAMILY MEDICINE CLINIC | Facility: CLINIC | Age: 87
End: 2022-11-17

## 2022-11-17 VITALS
DIASTOLIC BLOOD PRESSURE: 80 MMHG | WEIGHT: 118 LBS | TEMPERATURE: 96.2 F | HEIGHT: 64 IN | HEART RATE: 75 BPM | OXYGEN SATURATION: 99 % | BODY MASS INDEX: 20.14 KG/M2 | SYSTOLIC BLOOD PRESSURE: 160 MMHG

## 2022-11-17 DIAGNOSIS — I48.0 PAF (PAROXYSMAL ATRIAL FIBRILLATION) (HCC): ICD-10-CM

## 2022-11-17 DIAGNOSIS — I15.9 SECONDARY HYPERTENSION: ICD-10-CM

## 2022-11-17 DIAGNOSIS — I10 BENIGN ESSENTIAL HYPERTENSION: Primary | ICD-10-CM

## 2022-11-17 RX ORDER — METOPROLOL SUCCINATE 100 MG/1
100 TABLET, EXTENDED RELEASE ORAL DAILY
Qty: 90 TABLET | Refills: 1 | Status: SHIPPED | OUTPATIENT
Start: 2022-11-17

## 2022-11-17 RX ORDER — HYDROCHLOROTHIAZIDE 12.5 MG/1
12.5 TABLET ORAL DAILY
Qty: 90 TABLET | Refills: 1 | Status: SHIPPED | OUTPATIENT
Start: 2022-11-17

## 2022-11-17 NOTE — PROGRESS NOTES
Assessment/Plan:  Labs and records reviewed  CMP CBC urine culture reviewed from 2021  Patient have metoprolol increased 100 mg daily  Patient will continue with hydrochlorothiazide  Recheck in 5-6 months  Patient does not want flu shot  Paroxysmal AFib stable this time continue with current treatment other increasing metoprolol  Follow-up in 5-6 months       Diagnoses and all orders for this visit:    Benign essential hypertension    PAF (paroxysmal atrial fibrillation) (HCC)    Secondary hypertension  -     metoprolol succinate (TOPROL-XL) 100 mg 24 hr tablet; Take 1 tablet (100 mg total) by mouth daily  -     hydrochlorothiazide (HYDRODIURIL) 12 5 mg tablet; Take 1 tablet (12 5 mg total) by mouth daily            Subjective:        Patient ID: Jessica Iglesias is a 80 y o  female  Patient here follow-up on hypertension  Patient feeling well overall  No chest pain or shortness of breath or change urination or defecation  The following portions of the patient's history were reviewed and updated as appropriate: allergies, current medications, past family history, past medical history, past social history, past surgical history and problem list       Review of Systems   Constitutional: Negative  HENT: Negative  Eyes: Negative  Respiratory: Negative  Cardiovascular: Negative  Gastrointestinal: Negative  Endocrine: Negative  Genitourinary: Negative  Musculoskeletal: Negative  Skin: Negative  Allergic/Immunologic: Negative  Neurological: Negative  Hematological: Negative  Psychiatric/Behavioral: Negative  Objective:        Depression Screening and Follow-up Plan: Clincally patient does not have depression  No treatment is required  /80   Pulse 75   Temp (!) 96 2 °F (35 7 °C)   Ht 5' 4" (1 626 m)   Wt 53 5 kg (118 lb)   SpO2 99%   BMI 20 25 kg/m²          Physical Exam  Vitals and nursing note reviewed     Constitutional: General: She is not in acute distress  Appearance: Normal appearance  She is not ill-appearing, toxic-appearing or diaphoretic  HENT:      Head: Normocephalic and atraumatic  Right Ear: Tympanic membrane, ear canal and external ear normal  There is no impacted cerumen  Left Ear: Tympanic membrane, ear canal and external ear normal  There is no impacted cerumen  Nose: Nose normal  No congestion or rhinorrhea  Mouth/Throat:      Mouth: Mucous membranes are moist       Pharynx: No oropharyngeal exudate or posterior oropharyngeal erythema  Eyes:      General: No scleral icterus  Right eye: No discharge  Left eye: No discharge  Extraocular Movements: Extraocular movements intact  Conjunctiva/sclera: Conjunctivae normal       Pupils: Pupils are equal, round, and reactive to light  Neck:      Vascular: No carotid bruit  Cardiovascular:      Rate and Rhythm: Normal rate and regular rhythm  Pulses: Normal pulses  Heart sounds: Normal heart sounds  No murmur heard  No friction rub  No gallop  Comments: Occasional ectopy  Pulmonary:      Effort: Pulmonary effort is normal  No respiratory distress  Breath sounds: Normal breath sounds  No stridor  No wheezing, rhonchi or rales  Chest:      Chest wall: No tenderness  Musculoskeletal:         General: No swelling, tenderness, deformity or signs of injury  Cervical back: Normal range of motion and neck supple  No rigidity  No muscular tenderness  Right lower leg: No edema  Left lower leg: No edema  Lymphadenopathy:      Cervical: No cervical adenopathy  Skin:     General: Skin is warm and dry  Capillary Refill: Capillary refill takes less than 2 seconds  Coloration: Skin is not jaundiced  Findings: No bruising, erythema, lesion or rash  Neurological:      Mental Status: She is alert and oriented to person, place, and time  Mental status is at baseline  Cranial Nerves: No cranial nerve deficit  Sensory: No sensory deficit  Motor: No weakness  Coordination: Coordination normal       Gait: Gait normal    Psychiatric:         Mood and Affect: Mood normal          Behavior: Behavior normal          Thought Content:  Thought content normal          Judgment: Judgment normal

## 2023-02-28 DIAGNOSIS — I15.9 SECONDARY HYPERTENSION: ICD-10-CM

## 2023-02-28 RX ORDER — HYDROCHLOROTHIAZIDE 12.5 MG/1
12.5 TABLET ORAL DAILY
Qty: 90 TABLET | Refills: 0 | Status: SHIPPED | OUTPATIENT
Start: 2023-02-28

## 2023-05-18 ENCOUNTER — OFFICE VISIT (OUTPATIENT)
Dept: FAMILY MEDICINE CLINIC | Facility: CLINIC | Age: 88
End: 2023-05-18

## 2023-05-18 VITALS
HEART RATE: 81 BPM | TEMPERATURE: 98.4 F | SYSTOLIC BLOOD PRESSURE: 140 MMHG | HEIGHT: 64 IN | OXYGEN SATURATION: 98 % | BODY MASS INDEX: 20.76 KG/M2 | DIASTOLIC BLOOD PRESSURE: 72 MMHG | WEIGHT: 121.6 LBS

## 2023-05-18 DIAGNOSIS — Z00.00 MEDICARE ANNUAL WELLNESS VISIT, SUBSEQUENT: ICD-10-CM

## 2023-05-18 DIAGNOSIS — I48.0 PAF (PAROXYSMAL ATRIAL FIBRILLATION) (HCC): ICD-10-CM

## 2023-05-18 DIAGNOSIS — E44.1 MILD PROTEIN-CALORIE MALNUTRITION (HCC): ICD-10-CM

## 2023-05-18 DIAGNOSIS — I15.9 SECONDARY HYPERTENSION: Primary | ICD-10-CM

## 2023-05-18 DIAGNOSIS — Z11.59 NEED FOR HEPATITIS C SCREENING TEST: ICD-10-CM

## 2023-05-18 DIAGNOSIS — N18.30 STAGE 3 CHRONIC KIDNEY DISEASE, UNSPECIFIED WHETHER STAGE 3A OR 3B CKD (HCC): ICD-10-CM

## 2023-05-18 DIAGNOSIS — Z13.6 SCREENING FOR CARDIOVASCULAR CONDITION: ICD-10-CM

## 2023-05-18 RX ORDER — METOPROLOL SUCCINATE 50 MG/1
50 TABLET, EXTENDED RELEASE ORAL DAILY
Qty: 90 TABLET | Refills: 1 | Status: SHIPPED | OUTPATIENT
Start: 2023-05-18

## 2023-05-18 RX ORDER — HYDROCHLOROTHIAZIDE 12.5 MG/1
12.5 TABLET ORAL DAILY
Qty: 90 TABLET | Refills: 1 | Status: SHIPPED | OUTPATIENT
Start: 2023-05-18

## 2023-05-18 NOTE — PATIENT INSTRUCTIONS
Medicare Preventive Visit Patient Instructions  Thank you for completing your Welcome to Medicare Visit or Medicare Annual Wellness Visit today  Your next wellness visit will be due in one year (5/18/2024)  The screening/preventive services that you may require over the next 5-10 years are detailed below  Some tests may not apply to you based off risk factors and/or age  Screening tests ordered at today's visit but not completed yet may show as past due  Also, please note that scanned in results may not display below  Preventive Screenings:  Service Recommendations Previous Testing/Comments   Colorectal Cancer Screening  * Colonoscopy    * Fecal Occult Blood Test (FOBT)/Fecal Immunochemical Test (FIT)  * Fecal DNA/Cologuard Test  * Flexible Sigmoidoscopy Age: 39-70 years old   Colonoscopy: every 10 years (may be performed more frequently if at higher risk)  OR  FOBT/FIT: every 1 year  OR  Cologuard: every 3 years  OR  Sigmoidoscopy: every 5 years  Screening may be recommended earlier than age 39 if at higher risk for colorectal cancer  Also, an individualized decision between you and your healthcare provider will decide whether screening between the ages of 74-80 would be appropriate  Colonoscopy: Not on file  FOBT/FIT: Not on file  Cologuard: Not on file  Sigmoidoscopy: Not on file    Screening Not Indicated     Breast Cancer Screening Age: 36 years old  Frequency: every 1-2 years  Not required if history of left and right mastectomy Mammogram: Not on file        Cervical Cancer Screening Between the ages of 21-29, pap smear recommended once every 3 years  Between the ages of 33-67, can perform pap smear with HPV co-testing every 5 years     Recommendations may differ for women with a history of total hysterectomy, cervical cancer, or abnormal pap smears in past  Pap Smear: Not on file    Screening Not Indicated   Hepatitis C Screening Once for adults born between 1945 and 1965  More frequently in patients at high risk for Hepatitis C Hep C Antibody: Not on file        Diabetes Screening 1-2 times per year if you're at risk for diabetes or have pre-diabetes Fasting glucose: No results in last 5 years (No results in last 5 years)  A1C: No results in last 5 years (No results in last 5 years)      Cholesterol Screening Once every 5 years if you don't have a lipid disorder  May order more often based on risk factors  Lipid panel: Not on file          Other Preventive Screenings Covered by Medicare:  1  Abdominal Aortic Aneurysm (AAA) Screening: covered once if your at risk  You're considered to be at risk if you have a family history of AAA  2  Lung Cancer Screening: covers low dose CT scan once per year if you meet all of the following conditions: (1) Age 50-69; (2) No signs or symptoms of lung cancer; (3) Current smoker or have quit smoking within the last 15 years; (4) You have a tobacco smoking history of at least 20 pack years (packs per day multiplied by number of years you smoked); (5) You get a written order from a healthcare provider  3  Glaucoma Screening: covered annually if you're considered high risk: (1) You have diabetes OR (2) Family history of glaucoma OR (3)  aged 48 and older OR (3)  American aged 72 and older  3  Osteoporosis Screening: covered every 2 years if you meet one of the following conditions: (1) You're estrogen deficient and at risk for osteoporosis based off medical history and other findings; (2) Have a vertebral abnormality; (3) On glucocorticoid therapy for more than 3 months; (4) Have primary hyperparathyroidism; (5) On osteoporosis medications and need to assess response to drug therapy  · Last bone density test (DXA Scan): Not on file  5  HIV Screening: covered annually if you're between the age of 12-76  Also covered annually if you are younger than 13 and older than 72 with risk factors for HIV infection   For pregnant patients, it is covered up to 3 times per pregnancy  Immunizations:  Immunization Recommendations   Influenza Vaccine Annual influenza vaccination during flu season is recommended for all persons aged >= 6 months who do not have contraindications   Pneumococcal Vaccine   * Pneumococcal conjugate vaccine = PCV13 (Prevnar 13), PCV15 (Vaxneuvance), PCV20 (Prevnar 20)  * Pneumococcal polysaccharide vaccine = PPSV23 (Pneumovax) Adults 25-60 years old: 1-3 doses may be recommended based on certain risk factors  Adults 72 years old: 1-2 doses may be recommended based off what pneumonia vaccine you previously received   Hepatitis B Vaccine 3 dose series if at intermediate or high risk (ex: diabetes, end stage renal disease, liver disease)   Tetanus (Td) Vaccine - COST NOT COVERED BY MEDICARE PART B Following completion of primary series, a booster dose should be given every 10 years to maintain immunity against tetanus  Td may also be given as tetanus wound prophylaxis  Tdap Vaccine - COST NOT COVERED BY MEDICARE PART B Recommended at least once for all adults  For pregnant patients, recommended with each pregnancy  Shingles Vaccine (Shingrix) - COST NOT COVERED BY MEDICARE PART B  2 shot series recommended in those aged 48 and above     Health Maintenance Due:  There are no preventive care reminders to display for this patient  Immunizations Due:      Topic Date Due   • COVID-19 Vaccine (1) Never done   • Pneumococcal Vaccine: 65+ Years (1 - PCV) Never done     Advance Directives   What are advance directives? Advance directives are legal documents that state your wishes and plans for medical care  These plans are made ahead of time in case you lose your ability to make decisions for yourself  Advance directives can apply to any medical decision, such as the treatments you want, and if you want to donate organs  What are the types of advance directives? There are many types of advance directives, and each state has rules about how to use them   You may choose a combination of any of the following:  · Living will: This is a written record of the treatment you want  You can also choose which treatments you do not want, which to limit, and which to stop at a certain time  This includes surgery, medicine, IV fluid, and tube feedings  · Durable power of  for healthcare [facility]): This is a written record that states who you want to make healthcare choices for you when you are unable to make them for yourself  This person, called a proxy, is usually a family member or a friend  You may choose more than 1 proxy  · Do not resuscitate (DNR) order:  A DNR order is used in case your heart stops beating or you stop breathing  It is a request not to have certain forms of treatment, such as CPR  A DNR order may be included in other types of advance directives  · Medical directive: This covers the care that you want if you are in a coma, near death, or unable to make decisions for yourself  You can list the treatments you want for each condition  Treatment may include pain medicine, surgery, blood transfusions, dialysis, IV or tube feedings, and a ventilator (breathing machine)  · Values history: This document has questions about your views, beliefs, and how you feel and think about life  This information can help others choose the care that you would choose  Why are advance directives important? An advance directive helps you control your care  Although spoken wishes may be used, it is better to have your wishes written down  Spoken wishes can be misunderstood, or not followed  Treatments may be given even if you do not want them  An advance directive may make it easier for your family to make difficult choices about your care  © Copyright [employer] 2018 Information is for End User's use only and may not be sold, redistributed or otherwise used for commercial purposes   All illustrations and images included in CareNotes® are the copyrighted property of [employer]  or [address] [place] [address]

## 2023-05-18 NOTE — PROGRESS NOTES
Assessment and Plan:     Problem List Items Addressed This Visit        Cardiovascular and Mediastinum    PAF (paroxysmal atrial fibrillation) (HCC)    Relevant Medications    metoprolol succinate (TOPROL-XL) 50 mg 24 hr tablet    Other Relevant Orders    CBC and differential    Comprehensive metabolic panel    TSH, 3rd generation with Free T4 reflex       Genitourinary    Stage 3 chronic kidney disease, unspecified whether stage 3a or 3b CKD (HCC)    Relevant Medications    hydrochlorothiazide (HYDRODIURIL) 12 5 mg tablet    Other Relevant Orders    CBC and differential    Comprehensive metabolic panel    TSH, 3rd generation with Free T4 reflex       Other    Mild protein-calorie malnutrition ([address] )   Other Visit Diagnoses     Secondary hypertension    -  Primary    Relevant Medications    metoprolol succinate (TOPROL-XL) 50 mg 24 hr tablet    hydrochlorothiazide (HYDRODIURIL) 12 5 mg tablet    Other Relevant Orders    CBC and differential    Comprehensive metabolic panel    TSH, 3rd generation with Free T4 reflex    Medicare annual wellness visit, subsequent        Screening for cardiovascular condition        Relevant Orders    Lipid panel    Need for hepatitis C screening test        Relevant Orders    Hepatitis C antibody          Depression Screening and Follow-up Plan: Patient was screened for depression during today's encounter  They screened negative with a PHQ-2 score of 0  Preventive health issues were discussed with patient, and age appropriate screening tests were ordered as noted in patient's After Visit Summary  Personalized health advice and appropriate referrals for health education or preventive services given if needed, as noted in patient's After Visit Summary  History of Present Illness:     Patient presents for a Medicare Wellness Visit    Patient here to follow-up on hypertension and A-fib  Patient also here for Medicare wellness exam   Some heart palpitations intermittently    No chest pain  No dizziness noted  No problems with urination or defecation  No swelling on lower extremities  Patient Care Team:  Phillip Miramontes DO as PCP - Faina Link DO     Review of Systems:     Review of Systems   Constitutional: Negative  HENT: Negative  Eyes: Negative  Respiratory: Negative  Cardiovascular: Positive for palpitations  Gastrointestinal: Negative  Endocrine: Negative  Genitourinary: Negative  Musculoskeletal: Negative  Skin: Negative  Allergic/Immunologic: Negative  Neurological: Negative  Hematological: Negative  Psychiatric/Behavioral: Negative           Problem List:     Patient Active Problem List   Diagnosis   • Benign essential hypertension   • Mild protein-calorie malnutrition ([facility] [address] )   • Ureteral stone   • Bacteremia   • Esophagus disorder   • Preop examination   • PAF (paroxysmal atrial fibrillation) ([facility] [address] )   • Stage 3 chronic kidney disease, unspecified whether stage 3a or 3b CKD ([facility] [address] )   • GERD (gastroesophageal reflux disease)   • COVID-19   • Irritant contact dermatitis due to other agents      Past Medical and Surgical History:     Past Medical History:   Diagnosis Date   • Arthritis    • Cataract     LastAssessed:9/8/15   • GERD (gastroesophageal reflux disease)    • 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   • Hypertension    • PAF (paroxysmal atrial fibrillation) ([facility] [address] )     Last Assessed:2/3/16   • Wrist fracture     Resolved:9/8/15     Past Surgical History:   Procedure Laterality Date   • CATARACT EXTRACTION      Last Assessed:9/8/15   • FL RETROGRADE PYELOGRAM  10/17/2021   • LYMPHADENECTOMY      Last Assessed:9/8/15   • SC CYSTO BLADDER W/URETERAL CATHETERIZATION Right 10/17/2021    Procedure: CYSTOSCOPY RETROGRADE PYELOGRAM WITH INSERTION STENT URETERAL;  Surgeon: Ju Parra MD;  Location[facility];  Service: Urology   • SC CYSTO/URETERO W/LITHOTRIPSY &INDWELL STENT INSRT Right 11/24/2021    Procedure: CYSTOSCOPY URETEROSCOPY WITH LITHOTRIPSY HOLMIUM LASER, RETROGRADE PYELOGRAM AND INSERTION STENT URETERAL;  Surgeon: Nisa Shaw MD;  [facility];  Service: Urology   • TONSILLECTOMY      Last Assessed:9/8/15   • WRIST SURGERY      Last Assessed:9/8/15      Family History:     Family History   Problem Relation Age of Onset   • No Known Problems Family       Social History:     Social History     Socioeconomic History   • Marital status: /Civil Union     Spouse name: None   • Number of children: None   • Years of education: None   • Highest education level: None   Occupational History   • Occupation: retired   Tobacco Use   • Smoking status: Never   • Smokeless tobacco: Never   Vaping Use   • Vaping Use: Never used   Substance and Sexual Activity   • Alcohol use: No   • Drug use: Never   • Sexual activity: Not Currently     Partners: Male   Other Topics Concern   • None   Social History Narrative   • None     Social Determinants of Health     Financial Resource Strain: Not on file   Food Insecurity: Not on file   Transportation Needs: Not on file   Physical Activity: Not on file   Stress: Not on file   Social Connections: Not on file   Intimate Partner Violence: Not on file   Housing Stability: Not on file      Medications and Allergies:     Current Outpatient Medications   Medication Sig Dispense Refill   • DAILY MULTIPLE VITAMINS/IRON PO Take 1 tablet by mouth daily       • hydrochlorothiazide (HYDRODIURIL) 12 5 mg tablet Take 1 tablet (12 5 mg total) by mouth daily 90 tablet 1   • metoprolol succinate (TOPROL-XL) 50 mg 24 hr tablet Take 1 tablet (50 mg total) by mouth daily 90 tablet 1   • multivitamin (THERAGRAN) TABS Take 1 tablet by mouth daily     • apixaban (Eliquis) 2 5 mg Take 1 tablet (2 5 mg total) by mouth 2 (two) times a day 60 tablet 5     No current facility-administered medications for this visit       Allergies   Allergen Reactions   • Lactose - Food Allergy GI Intolerance   • Penicillins Other (See Comments)     Unsure of reaction       Immunizations:     Immunization History   Administered Date(s) Administered   • TD (adult) Preservative Free 01/06/2016      Health Maintenance: There are no preventive care reminders to display for this patient  Topic Date Due   • COVID-19 Vaccine (1) Never done   • Pneumococcal Vaccine: 65+ Years (1 - PCV) Never done      Medicare Screening Tests and Risk Assessments:     Theodore Guerrero is here for her Subsequent Wellness visit  Health Risk Assessment:   Patient rates overall health as good  Patient feels that their physical health rating is slightly better  Patient is satisfied with their life  Eyesight was rated as same  Hearing was rated as same  Patient feels that their emotional and mental health rating is same  Patients states they are never, rarely angry  Patient states they are sometimes unusually tired/fatigued  Pain experienced in the last 7 days has been none  Depression Screening:   PHQ-2 Score: 0      Fall Risk Screening: In the past year, patient has experienced: no history of falling in past year      Urinary Incontinence Screening:   Patient has not leaked urine accidently in the last six months  Home Safety:  Patient does not have trouble with stairs inside or outside of their home  Patient has no working smoke alarms and has no working carbon monoxide detector  Home safety hazards include: none  Nutrition:   Current diet is Regular  Medications:   Patient is currently taking over-the-counter supplements  OTC medications include: see medication list  Patient is able to manage medications  Activities of Daily Living (ADLs)/Instrumental Activities of Daily Living (IADLs):   Walk and transfer into and out of bed and chair?: Yes  Dress and groom yourself?: Yes    Bathe or shower yourself?: Yes    Feed yourself?  Yes  Do your laundry/housekeeping?: Yes  Manage your money, pay your bills and track your expenses?: Yes  Make your own meals?: Yes    Do your own shopping?: Yes    Previous Hospitalizations:   Any hospitalizations or ED visits within the last 12 months?: No      Advance Care Planning:   Living will: Yes    Durable POA for healthcare: Yes    Advanced directive: Yes      Cognitive Screening:   Provider or family/friend/caregiver concerned regarding cognition?: No    PREVENTIVE SCREENINGS      Cardiovascular Screening:    General: Risks and Benefits Discussed    Due for: Lipid Panel      Diabetes Screening:     General: Risks and Benefits Discussed    Due for: Blood Glucose      Colorectal Cancer Screening:     General: Screening Not Indicated and Risks and Benefits Discussed      Breast Cancer Screening:     General: Risks and Benefits Discussed and Screening Not Indicated      Cervical Cancer Screening:    General: Screening Not Indicated and Risks and Benefits Discussed      Osteoporosis Screening:    General: Risks and Benefits Discussed and Patient Declines      Abdominal Aortic Aneurysm (AAA) Screening:        General: Risks and Benefits Discussed and Screening Not Indicated      Lung Cancer Screening:     General: Screening Not Indicated and Risks and Benefits Discussed      Hepatitis C Screening:    General: Risks and Benefits Discussed    Hep C Screening Accepted: Yes      Screening, Brief Intervention, and Referral to Treatment (SBIRT)    Screening  Typical number of drinks in a day: 0    Single Item Drug Screening:  How often have you used an illegal drug (including marijuana) or a prescription medication for non-medical reasons in the past year? never    Single Item Drug Screen Score: 0  Interpretation: Negative screen for possible drug use disorder    Other Counseling Topics:   Regular weightbearing exercise and calcium and vitamin D intake  No results found       Physical Exam:     /72 (BP Location: Right arm, Patient Position: Sitting, Cuff Size: Standard)   Pulse 81   Temp 98 4 °F (36 9 °C) "(Temporal)   Ht 5' 4\" (1 626 m)   Wt 55 2 kg (121 lb 9 6 oz)   SpO2 98%   BMI 20 87 kg/m²     Physical Exam  Vitals and nursing note reviewed  Constitutional:       General: She is not in acute distress  Appearance: Normal appearance  She is well-developed  She is not ill-appearing, toxic-appearing or diaphoretic  HENT:      Head: Normocephalic and atraumatic  Right Ear: Tympanic membrane, ear canal and external ear normal  There is no impacted cerumen  Left Ear: Tympanic membrane, ear canal and external ear normal  There is no impacted cerumen  Nose: Nose normal  No congestion or rhinorrhea  Mouth/Throat:      Pharynx: No oropharyngeal exudate or posterior oropharyngeal erythema  Eyes:      General: No scleral icterus  Right eye: No discharge  Left eye: No discharge  Conjunctiva/sclera: Conjunctivae normal       Pupils: Pupils are equal, round, and reactive to light  Neck:      Thyroid: No thyromegaly  Vascular: No carotid bruit or JVD  Trachea: No tracheal deviation  Cardiovascular:      Rate and Rhythm: Normal rate and regular rhythm  Heart sounds: Normal heart sounds  No murmur heard  No friction rub  No gallop  Pulmonary:      Effort: Pulmonary effort is normal  No respiratory distress  Breath sounds: Normal breath sounds  No stridor  No wheezing or rales  Chest:      Chest wall: No tenderness  Musculoskeletal:         General: No tenderness or deformity  Normal range of motion  Cervical back: Normal range of motion and neck supple  Lymphadenopathy:      Cervical: No cervical adenopathy  Skin:     General: Skin is warm and dry  Capillary Refill: Capillary refill takes less than 2 seconds  Coloration: Skin is not jaundiced or pale  Findings: No bruising, erythema, lesion or rash  Neurological:      Mental Status: She is alert and oriented to person, place, and time        Cranial Nerves: No cranial " nerve deficit  Sensory: No sensory deficit  Motor: No weakness or abnormal muscle tone  Coordination: Coordination normal       Gait: Gait normal       Deep Tendon Reflexes: Reflexes normal    Psychiatric:         Behavior: Behavior normal          Thought Content:  Thought content normal          Judgment: Judgment normal           Oliver Burks DO

## 2023-07-10 ENCOUNTER — OFFICE VISIT (OUTPATIENT)
Dept: FAMILY MEDICINE CLINIC | Facility: CLINIC | Age: 88
End: 2023-07-10
Payer: MEDICARE

## 2023-07-10 VITALS
BODY MASS INDEX: 19.19 KG/M2 | HEIGHT: 66 IN | DIASTOLIC BLOOD PRESSURE: 78 MMHG | OXYGEN SATURATION: 99 % | SYSTOLIC BLOOD PRESSURE: 130 MMHG | TEMPERATURE: 98.2 F | WEIGHT: 119.4 LBS | HEART RATE: 72 BPM

## 2023-07-10 DIAGNOSIS — C44.90 SKIN CANCER: Primary | ICD-10-CM

## 2023-07-10 PROCEDURE — 99213 OFFICE O/P EST LOW 20 MIN: CPT | Performed by: FAMILY MEDICINE

## 2023-07-10 NOTE — PROGRESS NOTES
Assessment/Plan: Patient will be referred to dermatology for probable skin cancer on left scalp and left forearm/elbow. Other guidance given at this time. Patient will follow-up in November per routine. Diagnoses and all orders for this visit:    Skin cancer  -     Ambulatory Referral to Dermatology; Future            Subjective:        Patient ID: Myra Berger is a 80 y.o. female. Patient is here with mass on left elbow over the past few months. Patient also with mass on left scalp over the past few months to maybe a year        The following portions of the patient's history were reviewed and updated as appropriate: allergies, current medications, past family history, past medical history, past social history, past surgical history and problem list.      Review of Systems   Constitutional: Negative. HENT: Negative. Eyes: Negative. Respiratory: Negative. Cardiovascular: Negative. Gastrointestinal: Negative. Endocrine: Negative. Genitourinary: Negative. Skin: Positive for color change. Allergic/Immunologic: Negative. Neurological: Negative. Hematological: Negative. Psychiatric/Behavioral: Negative. Objective:               /78 (BP Location: Right arm, Patient Position: Sitting, Cuff Size: Standard)   Pulse 72   Temp 98.2 °F (36.8 °C) (Tympanic)   Ht 5' 6" (1.676 m)   Wt 54.2 kg (119 lb 6.4 oz)   SpO2 99%   BMI 19.27 kg/m²          Physical Exam  Vitals and nursing note reviewed. Skin:     Comments: Irregular dark 2 cm raised irregular bordered lesion left elbow.   Second lesion raised irregular lesion on left scalp measuring 2+ centimeters

## 2023-07-25 ENCOUNTER — DOCUMENTATION (OUTPATIENT)
Dept: HEMATOLOGY ONCOLOGY | Facility: CLINIC | Age: 88
End: 2023-07-25

## 2023-07-25 PROBLEM — L98.9 ARM SKIN LESION, LEFT: Status: ACTIVE | Noted: 2023-07-25

## 2023-07-25 PROBLEM — L98.9 SCALP LESION: Status: ACTIVE | Noted: 2023-07-25

## 2023-07-25 NOTE — PROGRESS NOTES
Intake received, chart reviewed for need of external records. Pathology requested:   From [facility] via fax [phone], phone [phone] requested STAT.     Routed to consulting providers team.

## 2023-07-26 ENCOUNTER — CONSULT (OUTPATIENT)
Dept: SURGICAL ONCOLOGY | Facility: CLINIC | Age: 88
End: 2023-07-26
Payer: MEDICARE

## 2023-07-26 VITALS
RESPIRATION RATE: 16 BRPM | TEMPERATURE: 98.9 F | HEART RATE: 83 BPM | OXYGEN SATURATION: 99 % | WEIGHT: 117 LBS | BODY MASS INDEX: 18.8 KG/M2 | HEIGHT: 66 IN | SYSTOLIC BLOOD PRESSURE: 150 MMHG | DIASTOLIC BLOOD PRESSURE: 81 MMHG

## 2023-07-26 DIAGNOSIS — L98.9 SCALP LESION: ICD-10-CM

## 2023-07-26 DIAGNOSIS — L98.9 ARM SKIN LESION, LEFT: Primary | ICD-10-CM

## 2023-07-26 PROCEDURE — 99203 OFFICE O/P NEW LOW 30 MIN: CPT | Performed by: STUDENT IN AN ORGANIZED HEALTH CARE EDUCATION/TRAINING PROGRAM

## 2023-07-26 NOTE — PROGRESS NOTES
Surgical Oncology Consultation    [address] [facility] SURGICAL ONCOLOGY Tyree Laguna  [address] [facility],[address] [place] [id number] [phone]    Patient:  Tianna Robbins  1934  [id number]    Primary Care provider:  Adelso Seaman DO  [id number] [facility]    Referring provider:  No referring provider defined for this encounter. Diagnoses and all orders for this visit:    Arm skin lesion, left    Scalp lesion        Chief Complaint   Patient presents with   • New Patient Visit       No follow-ups on file. Oncology History    No history exists. History of Present Illness  : This is an 15-year-old female seen here today after dermatologic biopsy with referral to surgical oncology. The patient states that she has a left arm lesion that has been present for a couple months and has rapidly grown in size, as well as a left scalp lesion which has been present for about a year and has grown much more slowly. Both have a similar appearance with significant raised tissue and a very dark, almost black appearance. The patient states she has no history of melanoma or nonmelanoma to skin cancer. She has no significant history of sun exposure and and has not seen dermatology in the past for any skin issues. She feels quite well and is an ECOG of 0. She has no systemic symptoms of weight loss, headache, bone pain, lumps or bumps of any other region. Unfortunately, the dermatology notes and pathology are not yet available to me. Review of Systems  Complete ROS Surg Onc:   Constitutional: The patient denies new or recent history of general fatigue, no recent weight loss, no change in appetite. Eyes: No complaints of visual problems, no scleral icterus. ENT: No complaints of ear pain, no hoarseness, no difficulty swallowing,  no tinnitus and no new masses in head, oral cavity, or neck.    Cardiovascular: No complaints of chest pain, no palpitations, no ankle edema. Respiratory: No complaints of shortness of breath, no cough. Gastrointestinal: No complaints of jaundice, no bloody stools, no pale stools. Genitourinary: No complaints of dysuria, no hematuria, no nocturia, no frequent urination, no urethral discharge. Musculoskeletal: No complaints of weakness, paralysis, joint stiffness or arthralgias. Integumentary: No complaints of rash, no new lesions. Neurological: No complaints of convulsions, no seizures, no dizziness. Hematologic/Lymphatic: No complaints of easy bruising. Endocrine:  No hot or cold intolerance. No polydipsia, polyphagia, or polyuria. Allergy/immunology:  No environmental allergies. No food allergies. Not immunocompromised.       Patient Active Problem List   Diagnosis   • Benign essential hypertension   • Mild protein-calorie malnutrition (HCC)   • Ureteral stone   • Bacteremia   • Esophagus disorder   • Preop examination   • PAF (paroxysmal atrial fibrillation) (HCC)   • Stage 3 chronic kidney disease, unspecified whether stage 3a or 3b CKD (HCC)   • GERD (gastroesophageal reflux disease)   • COVID-19   • Irritant contact dermatitis due to other agents   • Skin cancer   • Scalp lesion   • Arm skin lesion, left     Past Medical History:   Diagnosis Date   • Arthritis    • Cataract     LastAssessed:9/8/15   • GERD (gastroesophageal reflux disease)    • 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   • Hypertension    • PAF (paroxysmal atrial fibrillation) (HCC)     Last Assessed:2/3/16   • Wrist fracture     Resolved:9/8/15     Past Surgical History:   Procedure Laterality Date   • CATARACT EXTRACTION      Last Assessed:9/8/15   • FL RETROGRADE PYELOGRAM  10/17/2021   • LYMPHADENECTOMY      Last Assessed:9/8/15   • PA CYSTO BLADDER W/URETERAL CATHETERIZATION Right 10/17/2021    Procedure: CYSTOSCOPY RETROGRADE PYELOGRAM WITH INSERTION STENT URETERAL;  Surgeon: Bridget Crawford MD;  [facility];  Service: Urology   • PA CYSTO/URETERO W/LITHOTRIPSY &INDWELL STENT INSRT Right 11/24/2021    Procedure: CYSTOSCOPY URETEROSCOPY WITH LITHOTRIPSY HOLMIUM LASER, RETROGRADE PYELOGRAM AND INSERTION STENT URETERAL;  Surgeon: Latrice Gross MD;  Location: [facility] OR;  Service: Urology   • TONSILLECTOMY      Last Assessed:9/8/15   • WRIST SURGERY      Last Assessed:9/8/15     Family History   Problem Relation Age of Onset   • No Known Problems Family      Social History     Socioeconomic History   • Marital status: /Civil Union     Spouse name: Not on file   • Number of children: Not on file   • Years of education: Not on file   • Highest education level: Not on file   Occupational History   • Occupation: retired   Tobacco Use   • Smoking status: Never   • Smokeless tobacco: Never   Vaping Use   • Vaping Use: Never used   Substance and Sexual Activity   • Alcohol use: No   • Drug use: Never   • Sexual activity: Not Currently     Partners: Male   Other Topics Concern   • Not on file   Social History Narrative   • Not on file     Social Determinants of Health     Financial Resource Strain: Not on file   Food Insecurity: Not on file   Transportation Needs: Not on file   Physical Activity: Not on file   Stress: Not on file   Social Connections: Not on file   Intimate Partner Violence: Not on file   Housing Stability: Not on file       Current Outpatient Medications:   •  apixaban (Eliquis) 2.5 mg, Take 1 tablet (2.5 mg total) by mouth 2 (two) times a day, Disp: 60 tablet, Rfl: 5  •  DAILY MULTIPLE VITAMINS/IRON PO, Take 1 tablet by mouth daily  , Disp: , Rfl:   •  hydrochlorothiazide (HYDRODIURIL) 12.5 mg tablet, Take 1 tablet (12.5 mg total) by mouth daily, Disp: 90 tablet, Rfl: 1  •  metoprolol succinate (TOPROL-XL) 50 mg 24 hr tablet, Take 1 tablet (50 mg total) by mouth daily, Disp: 90 tablet, Rfl: 1  •  multivitamin (THERAGRAN) TABS, Take 1 tablet by mouth daily, Disp: , Rfl:   Allergies   Allergen Reactions   • Lactose - Food Allergy GI Intolerance   • Penicillins Other (See Comments)     Unsure of reaction        Vitals:    07/26/23 1401   BP: 150/81   Pulse: 83   Resp: 16   Temp: 98.9 °F (37.2 °C)   SpO2: 99%       Physical Exam   General: Appears well, appears stated age  Skin: Warm, anicteric. LFT ant forearm with 2 cm large pigmented lesion; similar appearance of LFT parietal lesion  HEENT: Normocephalic, atraumatic; sclera aniceteric, mucous membranes moist; cervical nodes without adenopathy  Cardiopulmonary: RRR, Easy WOB, no BLE edema  Abd: Flat and soft, nontender, no masses appreciated, no hepatosplenomegaly  MSK: Symmetric, no cyanosis, no overt weakness  Lymphatic: No cervical, axillary or inguinal lymphadenopathy  Neuro: Affect appropriate, no gross motor abnormalities      Pathology:  unavailable    Labs: Reviewed in EPIC    Imaging  No results found. Discussion/Summary: This is an 45-year-old female with a left forearm and left parietal scalp lesion of unknown etiology. Unfortunately, the dermatology notes and pathology is not available to me at this time. The patient and her  are uncertain of the messaging from their dermatology office as they are uncertain of what her diagnosis is at this time. Based on the appearance of the lesions, I suspect a clinically node-negative skin cancer in both regions. We briefly discussed wide local excision with sentinel lymph node biopsy. The patient will return we briefly discussed wide local excision with sentinel lymph node biopsy. The patient will return in 1 to 2 weeks time once records have been received. I will call them if additional work-up is needed before their next visit. All questions were answered today.

## 2023-07-28 ENCOUNTER — TELEPHONE (OUTPATIENT)
Dept: HEMATOLOGY ONCOLOGY | Facility: CLINIC | Age: 88
End: 2023-07-28

## 2023-07-28 ENCOUNTER — DOCUMENTATION (OUTPATIENT)
Dept: HEMATOLOGY ONCOLOGY | Facility: CLINIC | Age: 88
End: 2023-07-28

## 2023-07-28 NOTE — TELEPHONE ENCOUNTER
Patient Call    Who are you speaking with? Child    If it is not the patient, are they listed on an active communication consent form? Yes   What is the reason for this call? Dari White wanted to make sure she is on the medical communication consent form. Confirmed she is   Does this require a call back? N/A   If a call back is required, please list best call back number n/a   If a call back is required, advise that a message will be forwarded to their care team and someone will return their call as soon as possible. Did you relay this information to the patient?  N/A

## 2023-07-28 NOTE — PROGRESS NOTES
Outside Pathology/Images records received from: [facility]     Records sent to pathology attention Sandra Elliott and Charlee Queen.  Note routed to team.

## 2023-07-31 ENCOUNTER — TELEPHONE (OUTPATIENT)
Dept: HEMATOLOGY ONCOLOGY | Facility: CLINIC | Age: 88
End: 2023-07-31

## 2023-07-31 NOTE — TELEPHONE ENCOUNTER
Patient Call    Who are you speaking with? Child    If it is not the patient, are they listed on an active communication consent form? Yes   What is the reason for this call? Called to confirm receipt of pathology slides from [facility]. Slide were received 7/28/23   Does this require a call back? NA   If a call back is required, please list best call back number NA   If a call back is required, advise that a message will be forwarded to their care team and someone will return their call as soon as possible. Did you relay this information to the patient?  NA

## 2023-08-01 ENCOUNTER — LAB REQUISITION (OUTPATIENT)
Dept: LAB | Facility: HOSPITAL | Age: 88
End: 2023-08-01
Payer: MEDICARE

## 2023-08-01 DIAGNOSIS — C44.41 BASAL CELL CARCINOMA OF SKIN OF SCALP AND NECK: ICD-10-CM

## 2023-08-01 DIAGNOSIS — C43.62 MALIGNANT MELANOMA OF LEFT UPPER LIMB, INCLUDING SHOULDER (HCC): ICD-10-CM

## 2023-08-01 PROBLEM — C44.90 SKIN CANCER: Status: RESOLVED | Noted: 2023-07-10 | Resolved: 2023-08-01

## 2023-08-01 PROCEDURE — 88321 CONSLTJ&REPRT SLD PREP ELSWR: CPT | Performed by: STUDENT IN AN ORGANIZED HEALTH CARE EDUCATION/TRAINING PROGRAM

## 2023-08-02 ENCOUNTER — OFFICE VISIT (OUTPATIENT)
Dept: SURGICAL ONCOLOGY | Facility: CLINIC | Age: 88
End: 2023-08-02
Payer: MEDICARE

## 2023-08-02 VITALS
WEIGHT: 116 LBS | BODY MASS INDEX: 18.64 KG/M2 | HEIGHT: 66 IN | DIASTOLIC BLOOD PRESSURE: 80 MMHG | HEART RATE: 84 BPM | TEMPERATURE: 97.8 F | SYSTOLIC BLOOD PRESSURE: 158 MMHG | OXYGEN SATURATION: 99 %

## 2023-08-02 DIAGNOSIS — C43.62 MALIGNANT MELANOMA OF ARM, LEFT (HCC): ICD-10-CM

## 2023-08-02 DIAGNOSIS — C44.41 BASAL CELL CARCINOMA (BCC) OF SCALP: Primary | ICD-10-CM

## 2023-08-02 PROCEDURE — 99215 OFFICE O/P EST HI 40 MIN: CPT | Performed by: STUDENT IN AN ORGANIZED HEALTH CARE EDUCATION/TRAINING PROGRAM

## 2023-08-02 RX ORDER — CEFAZOLIN SODIUM 1 G/50ML
1000 SOLUTION INTRAVENOUS ONCE
OUTPATIENT
Start: 2023-08-02 | End: 2023-08-02

## 2023-08-02 NOTE — LETTER
August 2, 2023     Luz Theodore MD  [address] [facility] [address]    Patient: Deborah Oglesby   YOB: 1934   Date of Visit: 8/2/2023       Dear Dr. Sara Lewis: Thank you for referring Jessica Geiger to me for evaluation. Below are my notes for this consultation. If you have questions, please do not hesitate to call me. I look forward to following your patient along with you. Sincerely,        Arnold Baires MD        CC: No Recipients    Arnold Baires MD  8/2/2023  2:32 PM  Sign when Signing Visit  Surgical Oncology Consultation F/U    [facility] Marlowe Dakins [address] [place] [address] [phone]    Patient:  Deborah Oglesby  1934  [id number]    Primary Care provider:  Mary Campbell DO  [address]    Referring provider:  No referring provider defined for this encounter. Diagnoses and all orders for this visit:    Basal cell carcinoma (BCC) of scalp  -     Ambulatory referral to Plastic Surgery; Future    Malignant melanoma of arm, left ([address])  -     NM pet ct tumor imaging whole body; Future  -     MRI brain w wo contrast; Future  -     Ambulatory referral to Plastic Surgery; Future  -     Ambulatory referral to Hematology / Oncology; Future        Chief Complaint   Patient presents with   • Follow-up       No follow-ups on file. Oncology History   Basal cell carcinoma (BCC) of scalp   7/17/2023 Biopsy    A. Skin, scalp, biopsy:     BASAL CELL CARCINOMA (NODULAR TYPE); transected     8/1/2023 Initial Diagnosis    Basal cell carcinoma (BCC) of scalp     Malignant melanoma of arm, left ([address])   7/17/2023 Biopsy    B. Skin, left elbow, biopsy:     MELANOMA (thickness: at least 4.3 mm); transected   Ulceration cannot be determined   3 Mitoses      8/1/2023 Initial Diagnosis    Malignant melanoma of arm, left (HCC)         History of Present Illness  :    This is an 45-year-old female seen here today after dermatologic biopsy with referral to surgical oncology. The patient states that she has a left arm lesion that has been present for a couple months and has rapidly grown in size, as well as a left scalp lesion which has been present for about a year and has grown much more slowly. Both have a similar appearance with significant raised tissue and a very dark, almost black appearance. The patient states she has no history of melanoma or nonmelanoma to skin cancer. She has no significant history of sun exposure and and has not seen dermatology in the past for any skin issues. She feels quite well and is an ECOG of 0. She has no systemic symptoms of weight loss, headache, bone pain, lumps or bumps of any other region. Unfortunately, the dermatology notes and pathology are not yet available to me. 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 Nodular BCC of left scalp. No clinically evident nodes. No new sx    Review of Systems  Complete ROS Surg Onc:   Constitutional: The patient denies new or recent history of general fatigue, no recent weight loss, no change in appetite. Eyes: No complaints of visual problems, no scleral icterus. ENT: No complaints of ear pain, no hoarseness, no difficulty swallowing,  no tinnitus and no new masses in head, oral cavity, or neck. Cardiovascular: No complaints of chest pain, no palpitations, no ankle edema. Respiratory: No complaints of shortness of breath, no cough. Gastrointestinal: No complaints of jaundice, no bloody stools, no pale stools. Genitourinary: No complaints of dysuria, no hematuria, no nocturia, no frequent urination, no urethral discharge. Musculoskeletal: No complaints of weakness, paralysis, joint stiffness or arthralgias. Integumentary: No complaints of rash, no new lesions. Neurological: No complaints of convulsions, no seizures, no dizziness.    Hematologic/Lymphatic: No complaints of easy bruising. Endocrine:  No hot or cold intolerance. No polydipsia, polyphagia, or polyuria. Allergy/immunology:  No environmental allergies. No food allergies. Not immunocompromised.       Patient Active Problem List   Diagnosis   • Benign essential hypertension   • Mild protein-calorie malnutrition (HCC)   • Ureteral stone   • Bacteremia   • Esophagus disorder   • Preop examination   • PAF (paroxysmal atrial fibrillation) (HCC)   • Stage 3 chronic kidney disease, unspecified whether stage 3a or 3b CKD (HCC)   • GERD (gastroesophageal reflux disease)   • COVID-19   • Irritant contact dermatitis due to other agents   • Scalp lesion   • Arm skin lesion, left   • Basal cell carcinoma (BCC) of scalp   • Malignant melanoma of arm, left (HCC)     Past Medical History:   Diagnosis Date   • Arthritis    • Cataract     LastAssessed:9/8/15   • GERD (gastroesophageal reflux disease)    • 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   • Hypertension    • PAF (paroxysmal atrial fibrillation) (HCC)     Last Assessed:2/3/16   • Wrist fracture     Resolved:9/8/15     Past Surgical History:   Procedure Laterality Date   • CATARACT EXTRACTION      Last Assessed:9/8/15   • FL RETROGRADE PYELOGRAM  10/17/2021   • LYMPHADENECTOMY      Last Assessed:9/8/15   • [place] CYSTO BLADDER W/URETERAL CATHETERIZATION Right 10/17/2021    Procedure: CYSTOSCOPY RETROGRADE PYELOGRAM WITH INSERTION STENT URETERAL;  Surgeon: Charity Bonner MD;  [facility];  Service: Urology   • WV CYSTO/URETERO W/LITHOTRIPSY &INDWELL STENT INSRT Right 11/24/2021    Procedure: CYSTOSCOPY URETEROSCOPY WITH LITHOTRIPSY HOLMIUM LASER, RETROGRADE PYELOGRAM AND INSERTION STENT URETERAL;  Surgeon: Alexx Monet MD;  [facility];  Service: Urology   • TONSILLECTOMY      Last Assessed:9/8/15   • WRIST SURGERY      Last Assessed:9/8/15     Family History   Problem Relation Age of Onset   • No Known Problems Family      Social History     Socioeconomic History   • Marital status: /Civil Union     Spouse name: Not on file   • Number of children: Not on file   • Years of education: Not on file   • Highest education level: Not on file   Occupational History   • Occupation: retired   Tobacco Use   • Smoking status: Never   • Smokeless tobacco: Never   Vaping Use   • Vaping Use: Never used   Substance and Sexual Activity   • Alcohol use: No   • Drug use: Never   • Sexual activity: Not Currently     Partners: Male   Other Topics Concern   • Not on file   Social History Narrative   • Not on file     Social Determinants of Health     Financial Resource Strain: Not on file   Food Insecurity: Not on file   Transportation Needs: Not on file   Physical Activity: Not on file   Stress: Not on file   Social Connections: Not on file   Intimate Partner Violence: Not on file   Housing Stability: Not on file       Current Outpatient Medications:   •  apixaban (Eliquis) 2.5 mg, Take 1 tablet (2.5 mg total) by mouth 2 (two) times a day, Disp: 60 tablet, Rfl: 5  •  DAILY MULTIPLE VITAMINS/IRON PO, Take 1 tablet by mouth daily  , Disp: , Rfl:   •  hydrochlorothiazide (HYDRODIURIL) 12.5 mg tablet, Take 1 tablet (12.5 mg total) by mouth daily, Disp: 90 tablet, Rfl: 1  •  metoprolol succinate (TOPROL-XL) 50 mg 24 hr tablet, Take 1 tablet (50 mg total) by mouth daily, Disp: 90 tablet, Rfl: 1  •  multivitamin (THERAGRAN) TABS, Take 1 tablet by mouth daily, Disp: , Rfl:   Allergies   Allergen Reactions   • Lactose - Food Allergy GI Intolerance   • Penicillins Other (See Comments)     Unsure of reaction        Vitals:    08/02/23 1333   BP: 158/80   Pulse: 84   Temp: 97.8 °F (36.6 °C)   SpO2: 99%       Physical Exam   General: Appears well, appears stated age  Skin: Warm, anicteric.  LFT ant forearm with 2 cm large pigmented lesion; similar appearance of LFT parietal lesion  HEENT: Normocephalic, atraumatic; sclera aniceteric, mucous membranes moist; cervical nodes without adenopathy  Cardiopulmonary: RRR, Easy WOB, no BLE edema  Abd: Flat and soft, nontender, no masses appreciated, no hepatosplenomegaly  MSK: Symmetric, no cyanosis, no overt weakness  Lymphatic: No cervical, axillary or inguinal lymphadenopathy  Neuro: Affect appropriate, no gross motor abnormalities      Pathology:  T4 with positive deep margin of lft arm  BCC lft scalp    Labs: Reviewed in EPIC    Imaging  No results found. I personally reviewed and interpreted the dermatology consultation, pathology, past history. Discussion/Summary: This is an 60-year-old female with T4a LUE dorothea (positive deep margin) and BCC of left scalp. Discussed diagnosis of BCC and discussed that she will need a WLE for this with negative margins and plastics closure. Also discussed diagnosis of melanoma and explained that the patient has a thick melanoma for which a wide local excision with plastics closure and sentinel lymph node biopsy is recommended. However, given the depth, she will need PET and MR staging. If these are negative, will proceed with WLE and SLN; if positive will obtain tissue confirmation of amilcar or distant met and then proceed with WLE alone for palliation (lesion is large and erosive). Will refer to med on today as well given that she already meets criteria for systemic therapy. Risks, benefits, alternatives and prognosis discussed in full to include bleeding, infection, wound healing complications, need for re-excision, seroma, and pt expresses understanding and endorsement. We also discussed the more global risks of MI, PE, DVT, stroke, death. The patient is at above average risk for these complications given her age. We will obtain PCP clearance as well as an [facility] evaluation. Will proceed with WLE + SLN bx.

## 2023-08-02 NOTE — PROGRESS NOTES
Surgical Oncology Consultation F/U    [facility] CANCER [facility] SURGICAL ONCOLOGY [address]  [address] [facility],[address] [place] [address]    Patient:  Mandy Rosenberg  1934  [id number]    Primary Care provider:  Liza Kay DO  [address]    Referring provider:  No referring provider defined for this encounter. Diagnoses and all orders for this visit:    Basal cell carcinoma (BCC) of scalp  -     Ambulatory referral to Plastic Surgery; Future    Malignant melanoma of arm, left ([address])  -     NM pet ct tumor imaging whole body; Future  -     MRI brain w wo contrast; Future  -     Ambulatory referral to Plastic Surgery; Future  -     Ambulatory referral to Hematology / Oncology; Future        Chief Complaint   Patient presents with   • Follow-up       No follow-ups on file. Oncology History   Basal cell carcinoma (BCC) of scalp   7/17/2023 Biopsy    A. Skin, scalp, biopsy:     BASAL CELL CARCINOMA (NODULAR TYPE); transected     8/1/2023 Initial Diagnosis    Basal cell carcinoma (BCC) of scalp     Malignant melanoma of arm, left ([address])   7/17/2023 Biopsy    B. Skin, left elbow, biopsy:     MELANOMA (thickness: at least 4.3 mm); transected   Ulceration cannot be determined   3 Mitoses      8/1/2023 Initial Diagnosis    Malignant melanoma of arm, left (HCC)         History of Present Illness  : This is an 66-year-old female seen here today after dermatologic biopsy with referral to surgical oncology. The patient states that she has a left arm lesion that has been present for a couple months and has rapidly grown in size, as well as a left scalp lesion which has been present for about a year and has grown much more slowly. Both have a similar appearance with significant raised tissue and a very dark, almost black appearance. The patient states she has no history of melanoma or nonmelanoma to skin cancer.   She has no significant history of sun exposure and and has not seen dermatology in the past for any skin issues. She feels quite well and is an ECOG of 0. She has no systemic symptoms of weight loss, headache, bone pain, lumps or bumps of any other region. Unfortunately, the dermatology notes and pathology are not yet available to me. 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 Nodular BCC of left scalp. No clinically evident nodes. No new sx    Review of Systems  Complete ROS Surg Onc:   Constitutional: The patient denies new or recent history of general fatigue, no recent weight loss, no change in appetite. Eyes: No complaints of visual problems, no scleral icterus. ENT: No complaints of ear pain, no hoarseness, no difficulty swallowing,  no tinnitus and no new masses in head, oral cavity, or neck. Cardiovascular: No complaints of chest pain, no palpitations, no ankle edema. Respiratory: No complaints of shortness of breath, no cough. Gastrointestinal: No complaints of jaundice, no bloody stools, no pale stools. Genitourinary: No complaints of dysuria, no hematuria, no nocturia, no frequent urination, no urethral discharge. Musculoskeletal: No complaints of weakness, paralysis, joint stiffness or arthralgias. Integumentary: No complaints of rash, no new lesions. Neurological: No complaints of convulsions, no seizures, no dizziness. Hematologic/Lymphatic: No complaints of easy bruising. Endocrine:  No hot or cold intolerance. No polydipsia, polyphagia, or polyuria. Allergy/immunology:  No environmental allergies. No food allergies. Not immunocompromised.       Patient Active Problem List   Diagnosis   • Benign essential hypertension   • Mild protein-calorie malnutrition (HCC)   • Ureteral stone   • Bacteremia   • Esophagus disorder   • Preop examination   • PAF (paroxysmal atrial fibrillation) (HCC)   • Stage 3 chronic kidney disease, unspecified whether stage 3a or 3b CKD ([address]   • GERD (gastroesophageal reflux disease)   • COVID-19   • Irritant contact dermatitis due to other agents   • Scalp lesion   • Arm skin lesion, left   • Basal cell carcinoma (BCC) of scalp   • Malignant melanoma of arm, left (HCC)     Past Medical History:   Diagnosis Date   • Arthritis    • Cataract     LastAssessed:9/8/15   • GERD (gastroesophageal reflux disease)    • 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   • Hypertension    • PAF (paroxysmal atrial fibrillation) (HCC)     Last Assessed:2/3/16   • Wrist fracture     Resolved:9/8/15     Past Surgical History:   Procedure Laterality Date   • CATARACT EXTRACTION      Last Assessed:9/8/15   • FL RETROGRADE PYELOGRAM  10/17/2021   • LYMPHADENECTOMY      Last Assessed:9/8/15   • [place] CYSTO BLADDER W/URETERAL CATHETERIZATION Right 10/17/2021    Procedure: CYSTOSCOPY RETROGRADE PYELOGRAM WITH INSERTION STENT URETERAL;  Surgeon: Karl Opitz, MD;  [facility];  Service: Urology   • LA CYSTO/URETERO W/LITHOTRIPSY &INDWELL STENT INSRT Right 11/24/2021    Procedure: CYSTOSCOPY URETEROSCOPY WITH LITHOTRIPSY HOLMIUM LASER, RETROGRADE PYELOGRAM AND INSERTION STENT URETERAL;  Surgeon: Duncan Dyson MD;  [facility];  Service: Urology   • TONSILLECTOMY      Last Assessed:9/8/15   • WRIST SURGERY      Last Assessed:9/8/15     Family History   Problem Relation Age of Onset   • No Known Problems Family      Social History     Socioeconomic History   • Marital status: /Civil Union     Spouse name: Not on file   • Number of children: Not on file   • Years of education: Not on file   • Highest education level: Not on file   Occupational History   • Occupation: retired   Tobacco Use   • Smoking status: Never   • Smokeless tobacco: Never   Vaping Use   • Vaping Use: Never used   Substance and Sexual Activity   • Alcohol use: No   • Drug use: Never   • Sexual activity: Not Currently     Partners: Male   Other Topics Concern   • Not on file   Social History Narrative   • Not on file Social Determinants of Health     Financial Resource Strain: Not on file   Food Insecurity: Not on file   Transportation Needs: Not on file   Physical Activity: Not on file   Stress: Not on file   Social Connections: Not on file   Intimate Partner Violence: Not on file   Housing Stability: Not on file       Current Outpatient Medications:   •  apixaban (Eliquis) 2.5 mg, Take 1 tablet (2.5 mg total) by mouth 2 (two) times a day, Disp: 60 tablet, Rfl: 5  •  DAILY MULTIPLE VITAMINS/IRON PO, Take 1 tablet by mouth daily  , Disp: , Rfl:   •  hydrochlorothiazide (HYDRODIURIL) 12.5 mg tablet, Take 1 tablet (12.5 mg total) by mouth daily, Disp: 90 tablet, Rfl: 1  •  metoprolol succinate (TOPROL-XL) 50 mg 24 hr tablet, Take 1 tablet (50 mg total) by mouth daily, Disp: 90 tablet, Rfl: 1  •  multivitamin (THERAGRAN) TABS, Take 1 tablet by mouth daily, Disp: , Rfl:   Allergies   Allergen Reactions   • Lactose - Food Allergy GI Intolerance   • Penicillins Other (See Comments)     Unsure of reaction        Vitals:    08/02/23 1333   BP: 158/80   Pulse: 84   Temp: 97.8 °F (36.6 °C)   SpO2: 99%       Physical Exam   General: Appears well, appears stated age  Skin: Warm, anicteric. LFT ant forearm with 2 cm large pigmented lesion; similar appearance of LFT parietal lesion  HEENT: Normocephalic, atraumatic; sclera aniceteric, mucous membranes moist; cervical nodes without adenopathy  Cardiopulmonary: RRR, Easy WOB, no BLE edema  Abd: Flat and soft, nontender, no masses appreciated, no hepatosplenomegaly  MSK: Symmetric, no cyanosis, no overt weakness  Lymphatic: No cervical, axillary or inguinal lymphadenopathy  Neuro: Affect appropriate, no gross motor abnormalities      Pathology:  T4 with positive deep margin of lft arm  BCC lft scalp    Labs: Reviewed in EPIC    Imaging  No results found. I personally reviewed and interpreted the dermatology consultation, pathology, past history. Discussion/Summary:    This is an 80-year-old female with T4a LUE dorothea (positive deep margin) and BCC of left scalp. Discussed diagnosis of BCC and discussed that she will need a WLE for this with negative margins and plastics closure. Also discussed diagnosis of melanoma and explained that the patient has a thick melanoma for which a wide local excision with plastics closure and sentinel lymph node biopsy is recommended. However, given the depth, she will need PET and MR staging. If these are negative, will proceed with WLE and SLN; if positive will obtain tissue confirmation of amilcar or distant met and then proceed with WLE alone for palliation (lesion is large and erosive). Will refer to med on today as well given that she already meets criteria for systemic therapy. Risks, benefits, alternatives and prognosis discussed in full to include bleeding, infection, wound healing complications, need for re-excision, seroma, and pt expresses understanding and endorsement. We also discussed the more global risks of MI, PE, DVT, stroke, death. The patient is at above average risk for these complications given her age. We will obtain PCP clearance as well as an [facility] evaluation. Will proceed with WLE + SLN bx.

## 2023-08-03 ENCOUNTER — TELEPHONE (OUTPATIENT)
Dept: HEMATOLOGY ONCOLOGY | Facility: CLINIC | Age: 88
End: 2023-08-03

## 2023-08-03 NOTE — TELEPHONE ENCOUNTER
Patient Call    Who are you speaking with? Patient and Spouse    If it is not the patient, are they listed on an active communication consent form? Yes   What is the reason for this call? Patient has decided she would like to cancel everything, cancel the surgery and all the tests. Please contact the patient to discuss this further. Does this require a call back? Yes   If a call back is required, please list best call back number [phone]   If a call back is required, advise that a message will be forwarded to their care team and someone will return their call as soon as possible. Did you relay this information to the patient?  Yes

## 2023-08-03 NOTE — TELEPHONE ENCOUNTER
Called patient and spoke to patients spouse - Abdiel Batista. He stated he wants to cancel all tests and surgeries that were scheduled on behalf of [address] [place] [address]. They think it "will be too much for her". I asked if they were going to be followed somewhere different or continue with observation and Abdiel Batista stated no not at this time. I then proceeded to ask if it would be beneficial to have another appointment with the provider to discuss and again Abdiel Batista stated "not at this time'. Abdiel Batista was very thankful and appreciative of all the care received but wanted to cancel everything further. Appointments and surgery were already cancelled prior to the call. All questions answered at this time. No further needs.

## 2023-08-03 NOTE — TELEPHONE ENCOUNTER
Patient Call    Who are you speaking with? Patient and Spouse    If it is not the patient, are they listed on an active communication consent form? N/A   What is the reason for this call? Zayra Nolan would like to cancel all tests and the surgery for the patient. Zayra Nolan is still waiting for a call back from Dr. Verónica West nurse about this. Zayra Nolan was transferred to Central scheduling to cancel the patient's tests. Does this require a call back? N/A   If a call back is required, please list best call back number N/A   If a call back is required, advise that a message will be forwarded to their care team and someone will return their call as soon as possible. Did you relay this information to the patient?  N/A

## 2023-08-04 ENCOUNTER — PATIENT OUTREACH (OUTPATIENT)
Dept: HEMATOLOGY ONCOLOGY | Facility: CLINIC | Age: 88
End: 2023-08-04

## 2023-08-04 NOTE — PROGRESS NOTES
MSW received referral for pt from Dr. Sandra Hernandez. Outreach was made this day and the pt was open and receptive of this call. The pt shared that has decided to forgo the surgery option that was presented to her by Dr. Sandra Hernandez. Pt expressed feeling content with her decision and having no further follow up. MSW asked about any other needs at this time and the pt reports that she and her  are fine. MSW encouraged her to call should their needs change. Pt expressed appreciation for the call and support.

## 2023-08-08 ENCOUNTER — TELEPHONE (OUTPATIENT)
Dept: HEMATOLOGY ONCOLOGY | Facility: CLINIC | Age: 88
End: 2023-08-08

## 2023-08-08 NOTE — TELEPHONE ENCOUNTER
Patient Call    Who are you speaking with? Child    If it is not the patient, are they listed on an active communication consent form? Yes   What is the reason for this call? Dorethia Landau calling about her mother advising her mother wants nothing to do with any treatment when it comes to her diagnosis. If there are any questions to please call her. Does this require a call back? N/A   If a call back is required, please list best call back number [phone]   If a call back is required, advise that a message will be forwarded to their care team and someone will return their call as soon as possible. Did you relay this information to the patient?  Yes

## 2023-08-08 NOTE — TELEPHONE ENCOUNTER
I called Yris Bingham in response to a referral that was received for patient to establish care with Medical Oncology. Outreach was made to schedule a consultation. .    Patient declined scheduling. The referral has been closed.

## 2023-11-13 ENCOUNTER — RA CDI HCC (OUTPATIENT)
Dept: OTHER | Facility: HOSPITAL | Age: 88
End: 2023-11-13

## 2023-11-15 ENCOUNTER — VBI (OUTPATIENT)
Dept: ADMINISTRATIVE | Facility: OTHER | Age: 88
End: 2023-11-15

## 2023-11-15 NOTE — TELEPHONE ENCOUNTER
11/15/23 2:52 PM    Patient contacted (spoke with patient) to bring Advance Directive, POLST, or Living Will document to next scheduled pcp visit. Thank you.   Jessica Horner  [facility]

## 2023-11-15 NOTE — TELEPHONE ENCOUNTER
11/15/23 2:40 PM    Patient contacted (spoke with patient) to bring Advance Directive, POLST, or Living Will document to next scheduled pcp visit. Thank you.   Jessica Horner  [facility]

## 2023-11-20 ENCOUNTER — OFFICE VISIT (OUTPATIENT)
Dept: FAMILY MEDICINE CLINIC | Facility: CLINIC | Age: 88
End: 2023-11-20
Payer: MEDICARE

## 2023-11-20 VITALS
OXYGEN SATURATION: 98 % | HEIGHT: 66 IN | BODY MASS INDEX: 18.35 KG/M2 | TEMPERATURE: 98 F | WEIGHT: 114.2 LBS | DIASTOLIC BLOOD PRESSURE: 80 MMHG | HEART RATE: 70 BPM | SYSTOLIC BLOOD PRESSURE: 178 MMHG

## 2023-11-20 DIAGNOSIS — C43.62 MALIGNANT MELANOMA OF ARM, LEFT (HCC): ICD-10-CM

## 2023-11-20 DIAGNOSIS — I10 BENIGN ESSENTIAL HYPERTENSION: Primary | ICD-10-CM

## 2023-11-20 DIAGNOSIS — I48.0 PAF (PAROXYSMAL ATRIAL FIBRILLATION) (HCC): ICD-10-CM

## 2023-11-20 DIAGNOSIS — I15.9 SECONDARY HYPERTENSION: ICD-10-CM

## 2023-11-20 PROCEDURE — 99214 OFFICE O/P EST MOD 30 MIN: CPT | Performed by: FAMILY MEDICINE

## 2023-11-20 RX ORDER — HYDROCHLOROTHIAZIDE 12.5 MG/1
12.5 TABLET ORAL DAILY
Qty: 90 TABLET | Refills: 1 | Status: SHIPPED | OUTPATIENT
Start: 2023-11-20

## 2023-11-20 RX ORDER — METOPROLOL SUCCINATE 50 MG/1
50 TABLET, EXTENDED RELEASE ORAL DAILY
Qty: 90 TABLET | Refills: 1 | Status: SHIPPED | OUTPATIENT
Start: 2023-11-20

## 2023-11-20 NOTE — PROGRESS NOTES
Assessment/Plan: Patient states having very busy morning which she believes has affected her blood pressure. Patient wishes to keep medications the same in this regard. Low-salt diet recommended. Patient will continue to exercise and walk. Patient with A-fib in normal sinus rhythm at this time. Melanoma left arm which is increasing in size. Patient understands the risks of not doing surgery which will include spread of cancer and possible death related to this. Patient does not wish to pursue surgery at this time. Patient will follow-up in 6 months per routine. Patient does not wish to have flu shot at this time. Diagnoses and all orders for this visit:    Benign essential hypertension    PAF (paroxysmal atrial fibrillation) (HCC)    Malignant melanoma of arm, left (HCC)    Secondary hypertension  -     hydrochlorothiazide (HYDRODIURIL) 12.5 mg tablet; Take 1 tablet (12.5 mg total) by mouth daily  -     metoprolol succinate (TOPROL-XL) 50 mg 24 hr tablet; Take 1 tablet (50 mg total) by mouth daily            Subjective:        Patient ID: Fabian Lunsford is a 80 y.o. female. Patient is here to follow-up on hypertension as well as A-fib and melanoma left arm. Patient did not have surgery on melanoma left arm. Patient without any chest pain or shortness of breath or problems urinating or defecating. No significant dizziness or lightheadedness. The following portions of the patient's history were reviewed and updated as appropriate: allergies, current medications, past family history, past medical history, past social history, past surgical history and problem list.      Review of Systems   Constitutional: Negative. HENT: Negative. Eyes: Negative. Respiratory: Negative. Cardiovascular: Negative. Gastrointestinal: Negative. Endocrine: Negative. Genitourinary: Negative. Musculoskeletal: Negative. Skin:  Positive for color change.    Allergic/Immunologic: Negative. Neurological: Negative. Hematological: Negative. Psychiatric/Behavioral: Negative. Objective:      BMI Counseling: Body mass index is 18.43 kg/m². The BMI is below normal. Patient advised to gain weight. Rationale for BMI follow-up plan is due to patient being underweight. Depression Screening and Follow-up Plan: Clincally patient does not have depression. No treatment is required. BP (!) 178/80 (BP Location: Right arm, Patient Position: Sitting, Cuff Size: Standard)   Pulse 70   Temp 98 °F (36.7 °C) (Tympanic)   Ht 5' 6" (1.676 m)   Wt 51.8 kg (114 lb 3.2 oz)   SpO2 98%   BMI 18.43 kg/m²          Physical Exam  Vitals and nursing note reviewed. Constitutional:       General: She is not in acute distress. Appearance: Normal appearance. She is not ill-appearing, toxic-appearing or diaphoretic. HENT:      Head: Normocephalic and atraumatic. Right Ear: Tympanic membrane, ear canal and external ear normal. There is no impacted cerumen. Left Ear: Tympanic membrane, ear canal and external ear normal. There is no impacted cerumen. Nose: Nose normal. No congestion or rhinorrhea. Mouth/Throat:      Mouth: Mucous membranes are moist.      Pharynx: No oropharyngeal exudate or posterior oropharyngeal erythema. Eyes:      General: No scleral icterus. Right eye: No discharge. Left eye: No discharge. Neck:      Vascular: No carotid bruit. Cardiovascular:      Rate and Rhythm: Normal rate and regular rhythm. Pulses: Normal pulses. Heart sounds: Normal heart sounds. No murmur heard. No friction rub. No gallop. Pulmonary:      Effort: Pulmonary effort is normal. No respiratory distress. Breath sounds: Normal breath sounds. No stridor. No wheezing, rhonchi or rales. Chest:      Chest wall: No tenderness. Musculoskeletal:         General: No swelling, tenderness, deformity or signs of injury.  Normal range of motion. Cervical back: Normal range of motion and neck supple. No rigidity. No muscular tenderness. Right lower leg: No edema. Left lower leg: No edema. Lymphadenopathy:      Cervical: No cervical adenopathy. Skin:     General: Skin is warm and dry. Capillary Refill: Capillary refill takes less than 2 seconds. Coloration: Skin is not jaundiced. Findings: Lesion present. No bruising, erythema or rash. Neurological:      Mental Status: She is alert and oriented to person, place, and time. Mental status is at baseline. Cranial Nerves: No cranial nerve deficit. Sensory: No sensory deficit. Motor: No weakness. Coordination: Coordination normal.      Gait: Gait normal.   Psychiatric:         Mood and Affect: Mood normal.         Behavior: Behavior normal.         Thought Content:  Thought content normal.         Judgment: Judgment normal.

## 2024-05-15 ENCOUNTER — VBI (OUTPATIENT)
Dept: ADMINISTRATIVE | Facility: OTHER | Age: 89
End: 2024-05-15

## 2024-05-15 NOTE — TELEPHONE ENCOUNTER
05/15/24 9:48 AM    Patient contacted (left message) to bring Advance Directive, POLST, or Living Will document to next scheduled pcp visit.    Thank you.  Valeria Dyson  [facility]

## 2024-05-17 ENCOUNTER — RA CDI HCC (OUTPATIENT)
Dept: OTHER | Facility: HOSPITAL | Age: 89
End: 2024-05-17

## 2024-05-20 ENCOUNTER — OFFICE VISIT (OUTPATIENT)
Dept: FAMILY MEDICINE CLINIC | Facility: CLINIC | Age: 89
End: 2024-05-20
Payer: MEDICARE

## 2024-05-20 VITALS
HEART RATE: 72 BPM | BODY MASS INDEX: 18.06 KG/M2 | SYSTOLIC BLOOD PRESSURE: 128 MMHG | HEIGHT: 66 IN | TEMPERATURE: 98.5 F | DIASTOLIC BLOOD PRESSURE: 74 MMHG | WEIGHT: 112.4 LBS | OXYGEN SATURATION: 94 %

## 2024-05-20 DIAGNOSIS — I48.0 PAF (PAROXYSMAL ATRIAL FIBRILLATION) (HCC): ICD-10-CM

## 2024-05-20 DIAGNOSIS — N18.30 STAGE 3 CHRONIC KIDNEY DISEASE, UNSPECIFIED WHETHER STAGE 3A OR 3B CKD (HCC): ICD-10-CM

## 2024-05-20 DIAGNOSIS — I15.9 SECONDARY HYPERTENSION: ICD-10-CM

## 2024-05-20 DIAGNOSIS — Z00.00 MEDICARE ANNUAL WELLNESS VISIT, SUBSEQUENT: ICD-10-CM

## 2024-05-20 DIAGNOSIS — C43.62 MALIGNANT MELANOMA OF ARM, LEFT (HCC): ICD-10-CM

## 2024-05-20 DIAGNOSIS — E44.1 MILD PROTEIN-CALORIE MALNUTRITION (HCC): ICD-10-CM

## 2024-05-20 DIAGNOSIS — I10 BENIGN ESSENTIAL HYPERTENSION: Primary | ICD-10-CM

## 2024-05-20 PROCEDURE — 99213 OFFICE O/P EST LOW 20 MIN: CPT | Performed by: FAMILY MEDICINE

## 2024-05-20 PROCEDURE — G0439 PPPS, SUBSEQ VISIT: HCPCS | Performed by: FAMILY MEDICINE

## 2024-05-20 RX ORDER — HYDROCHLOROTHIAZIDE 12.5 MG/1
12.5 TABLET ORAL DAILY
Qty: 90 TABLET | Refills: 1 | Status: SHIPPED | OUTPATIENT
Start: 2024-05-20

## 2024-05-20 RX ORDER — METOPROLOL SUCCINATE 50 MG/1
50 TABLET, EXTENDED RELEASE ORAL DAILY
Qty: 90 TABLET | Refills: 1 | Status: SHIPPED | OUTPATIENT
Start: 2024-05-20

## 2024-05-20 NOTE — PATIENT INSTRUCTIONS
Medicare Preventive Visit Patient Instructions  Thank you for completing your Welcome to Medicare Visit or Medicare Annual Wellness Visit today. Your next wellness visit will be due in one year (5/21/2025).  The screening/preventive services that you may require over the next 5-10 years are detailed below. Some tests may not apply to you based off risk factors and/or age. Screening tests ordered at today's visit but not completed yet may show as past due. Also, please note that scanned in results may not display below.  Preventive Screenings:  Service Recommendations Previous Testing/Comments   Colorectal Cancer Screening  * Colonoscopy    * Fecal Occult Blood Test (FOBT)/Fecal Immunochemical Test (FIT)  * Fecal DNA/Cologuard Test  * Flexible Sigmoidoscopy Age: 45-75 years old   Colonoscopy: every 10 years (may be performed more frequently if at higher risk)  OR  FOBT/FIT: every 1 year  OR  Cologuard: every 3 years  OR  Sigmoidoscopy: every 5 years  Screening may be recommended earlier than age 45 if at higher risk for colorectal cancer. Also, an individualized decision between you and your healthcare provider will decide whether screening between the ages of 76-85 would be appropriate. Colonoscopy: Not on file  FOBT/FIT: Not on file  Cologuard: Not on file  Sigmoidoscopy: Not on file    Screening Not Indicated     Breast Cancer Screening Age: 40+ years old  Frequency: every 1-2 years  Not required if history of left and right mastectomy Mammogram: Not on file        Cervical Cancer Screening Between the ages of 21-29, pap smear recommended once every 3 years.   Between the ages of 30-65, can perform pap smear with HPV co-testing every 5 years.   Recommendations may differ for women with a history of total hysterectomy, cervical cancer, or abnormal pap smears in past. Pap Smear: Not on file    Screening Not Indicated   Hepatitis C Screening Once for adults born between 1945 and 1965  More frequently in patients at  high risk for Hepatitis C Hep C Antibody: Not on file        Diabetes Screening 1-2 times per year if you're at risk for diabetes or have pre-diabetes Fasting glucose: No results in last 5 years (No results in last 5 years)  A1C: No results in last 5 years (No results in last 5 years)      Cholesterol Screening Once every 5 years if you don't have a lipid disorder. May order more often based on risk factors. Lipid panel: Not on file          Other Preventive Screenings Covered by Medicare:  Abdominal Aortic Aneurysm (AAA) Screening: covered once if your at risk. You're considered to be at risk if you have a family history of AAA.  Lung Cancer Screening: covers low dose CT scan once per year if you meet all of the following conditions: (1) Age 55-77; (2) No signs or symptoms of lung cancer; (3) Current smoker or have quit smoking within the last 15 years; (4) You have a tobacco smoking history of at least 20 pack years (packs per day multiplied by number of years you smoked); (5) You get a written order from a healthcare provider.  Glaucoma Screening: covered annually if you're considered high risk: (1) You have diabetes OR (2) Family history of glaucoma OR (3)  aged 50 and older OR (4)  American aged 65 and older  Osteoporosis Screening: covered every 2 years if you meet one of the following conditions: (1) You're estrogen deficient and at risk for osteoporosis based off medical history and other findings; (2) Have a vertebral abnormality; (3) On glucocorticoid therapy for more than 3 months; (4) Have primary hyperparathyroidism; (5) On osteoporosis medications and need to assess response to drug therapy.   Last bone density test (DXA Scan): Not on file.  HIV Screening: covered annually if you're between the age of 15-65. Also covered annually if you are younger than 15 and older than 65 with risk factors for HIV infection. For pregnant patients, it is covered up to 3 times per  pregnancy.    Immunizations:  Immunization Recommendations   Influenza Vaccine Annual influenza vaccination during flu season is recommended for all persons aged >= 6 months who do not have contraindications   Pneumococcal Vaccine   * Pneumococcal conjugate vaccine = PCV13 (Prevnar 13), PCV15 (Vaxneuvance), PCV20 (Prevnar 20)  * Pneumococcal polysaccharide vaccine = PPSV23 (Pneumovax) Adults 19-65 yo with certain risk factors or if 65+ yo  If never received any pneumonia vaccine: recommend Prevnar 20 (PCV20)  Give PCV20 if previously received 1 dose of PCV13 or PPSV23   Hepatitis B Vaccine 3 dose series if at intermediate or high risk (ex: diabetes, end stage renal disease, liver disease)   Respiratory syncytial virus (RSV) Vaccine - COVERED BY MEDICARE PART D  * RSVPreF3 (Arexvy) CDC recommends that adults 60 years of age and older may receive a single dose of RSV vaccine using shared clinical decision-making (SCDM)   Tetanus (Td) Vaccine - COST NOT COVERED BY MEDICARE PART B Following completion of primary series, a booster dose should be given every 10 years to maintain immunity against tetanus. Td may also be given as tetanus wound prophylaxis.   Tdap Vaccine - COST NOT COVERED BY MEDICARE PART B Recommended at least once for all adults. For pregnant patients, recommended with each pregnancy.   Shingles Vaccine (Shingrix) - COST NOT COVERED BY MEDICARE PART B  2 shot series recommended in those 19 years and older who have or will have weakened immune systems or those 50 years and older     Health Maintenance Due:  There are no preventive care reminders to display for this patient.  Immunizations Due:      Topic Date Due   • Pneumococcal Vaccine: 65+ Years (1 of 1 - PCV) Never done   • COVID-19 Vaccine (1 - 2023-24 season) Never done     Advance Directives   What are advance directives?  Advance directives are legal documents that state your wishes and plans for medical care. These plans are made ahead of time in  case you lose your ability to make decisions for yourself. Advance directives can apply to any medical decision, such as the treatments you want, and if you want to donate organs.   What are the types of advance directives?  There are many types of advance directives, and each state has rules about how to use them. You may choose a combination of any of the following:  Living will:  This is a written record of the treatment you want. You can also choose which treatments you do not want, which to limit, and which to stop at a certain time. This includes surgery, medicine, IV fluid, and tube feedings.   Durable power of  for healthcare (DPAHC):  This is a written record that states who you want to make healthcare choices for you when you are unable to make them for yourself. This person, called a proxy, is usually a family member or a friend. You may choose more than 1 proxy.  Do not resuscitate (DNR) order:  A DNR order is used in case your heart stops beating or you stop breathing. It is a request not to have certain forms of treatment, such as CPR. A DNR order may be included in other types of advance directives.  Medical directive:  This covers the care that you want if you are in a coma, near death, or unable to make decisions for yourself. You can list the treatments you want for each condition. Treatment may include pain medicine, surgery, blood transfusions, dialysis, IV or tube feedings, and a ventilator (breathing machine).  Values history:  This document has questions about your views, beliefs, and how you feel and think about life. This information can help others choose the care that you would choose.  Why are advance directives important?  An advance directive helps you control your care. Although spoken wishes may be used, it is better to have your wishes written down. Spoken wishes can be misunderstood, or not followed. Treatments may be given even if you do not want them. An advance directive  may make it easier for your family to make difficult choices about your care.   Underweight  Underweight is defined as having a body mass index (BMI) of less than 18.5 kg/m2   Anorexia  is a loss of appetite, decreased food intake, or both. Your appetite naturally decreases as you get older. You also get full faster than you used to. This occurs because your body needs less energy. Other body changes can also lead to a decreased appetite. Even though some appetite loss is normal, you still need to get enough calories and nutrients to keep you healthy. You can start to lose too much weight if you do not eat as much food as your body needs. Unwanted weight loss can cause health problems, or worsen health problems you already have. You can also become dehydrated if you do not drink enough liquid.  How to eat healthy and get enough nutrients:   Choose healthy foods.  Eat a variety of fruits, vegetables, whole grains, low-fat dairy foods, lean meats, and other protein foods. Limit foods high in fat, sugar, and salt. Limit or avoid alcohol as directed. Work with a dietitian to help you plan your meals if you need to follow a special diet. A dietitian can also teach you how to modify foods if you have trouble chewing or swallowing.   Snack on healthy foods between meals  if you only eat a small amount during meals. Snacks provide extra healthy nutrients and calories between meals. Examples include fruit, cheese, and whole grain crackers.   Drink liquids as directed  to avoid dehydration. Drink liquids between meals if they cause you to get full too quickly during meals. Ask how much liquid to drink each day and which liquids are best for you.   Use herbs, spices, and flavor enhancers to add flavor to foods.  Avoid using herbs and spice blends that also contain sodium. Ask your healthcare provider or dietitian about flavor enhancers. Flavor enhancers with ham, natural law, and roast beef flavors can also be sprinkled on  food to add flavor.   Share meals with others as often as you can.  Eating with others may help you to eat better during meal time. Ask family members, neighbors, or friends to join you for lunch. There are also senior centers where you can meet people, and share meals with them.   Ask family and friends for help  with shopping or preparing foods. Ask for a ride to the grocery store, if needed.       © Copyright [employer] 2018 Information is for End User's use only and may not be sold, redistributed or otherwise used for commercial purposes. All illustrations and images included in CareNotes® are the copyrighted property of [employer] or [employer]

## 2024-05-20 NOTE — PROGRESS NOTES
Ambulatory Visit  Name: Sandra Purvis      : 12/3/1934      MRN: [id number]  Encounter Provider: Berhane Page DO  Encounter Date: 2024   Encounter department: [facility]    Assessment & Plan   1. Benign essential hypertension  2. Secondary hypertension  -     hydroCHLOROthiazide 12.5 mg tablet; Take 1 tablet (12.5 mg total) by mouth daily  -     metoprolol succinate (TOPROL-XL) 50 mg 24 hr tablet; Take 1 tablet (50 mg total) by mouth daily  3. Medicare annual wellness visit, subsequent  4. Mild protein-calorie malnutrition (HCC)  5. PAF (paroxysmal atrial fibrillation) (HCC)  6. Malignant melanoma of arm, left (HCC)  7. Stage 3 chronic kidney disease, unspecified whether stage 3a or 3b CKD (HCC)      Depression Screening and Follow-up Plan: Patient was screened for depression during today's encounter. They screened negative with a PHQ-2 score of 0.      Preventive health issues were discussed with patient, and age appropriate screening tests were ordered as noted in patient's After Visit Summary. Personalized health advice and appropriate referrals for health education or preventive services given if needed, as noted in patient's After Visit Summary.    History of Present Illness     Patient is here for wellness exam and hypertension.  Patient in normal state of health.       Patient Care Team:  Cj Page DO as PCP - General  DO Elena Moise MD (Surgical Oncology)    Review of Systems   Constitutional: Negative.    HENT: Negative.     Eyes: Negative.    Respiratory: Negative.     Cardiovascular: Negative.    Gastrointestinal: Negative.    Endocrine: Negative.    Genitourinary: Negative.    Musculoskeletal: Negative.    Skin: Negative.    Allergic/Immunologic: Negative.    Neurological: Negative.    Hematological: Negative.    Psychiatric/Behavioral: Negative.       Medical History Reviewed by provider this encounter:  Tobacco  Allergies   Meds  Problems  Med Hx  Surg Hx  Fam Hx       Annual Wellness Visit Questionnaire   Sandra is here for her Subsequent Wellness visit.     Health Risk Assessment:   Patient rates overall health as good. Patient feels that their physical health rating is slightly better. Patient is satisfied with their life. Eyesight was rated as same. Hearing was rated as same. Patient feels that their emotional and mental health rating is much better. Patients states they are never, rarely angry. Patient states they are sometimes unusually tired/fatigued. Pain experienced in the last 7 days has been none. Patient states that she has experienced no weight loss or gain in last 6 months.     Depression Screening:   PHQ-2 Score: 0      Fall Risk Screening:   In the past year, patient has experienced: no history of falling in past year      Urinary Incontinence Screening:   Patient has not leaked urine accidently in the last six months.     Home Safety:  Patient does not have trouble with stairs inside or outside of their home. Patient has no working smoke alarms and has no working carbon monoxide detector. Home safety hazards include: none.     Nutrition:   Current diet is Regular.     Medications:   Patient is currently taking over-the-counter supplements. OTC medications include: see medication list. Patient is able to manage medications.     Activities of Daily Living (ADLs)/Instrumental Activities of Daily Living (IADLs):   Walk and transfer into and out of bed and chair?: Yes  Dress and groom yourself?: Yes    Bathe or shower yourself?: Yes    Feed yourself? Yes  Do your laundry/housekeeping?: Yes  Manage your money, pay your bills and track your expenses?: No  Make your own meals?: Yes    Do your own shopping?: Yes    Previous Hospitalizations:   Any hospitalizations or ED visits within the last 12 months?: No      Advance Care Planning:   Living will: Yes    Durable POA for healthcare: Yes    Advanced directive: Yes       Cognitive Screening:   Provider or family/friend/caregiver concerned regarding cognition?: No    PREVENTIVE SCREENINGS      Cardiovascular Screening:    General: Risks and Benefits Discussed and Screening Current      Diabetes Screening:     General: Risks and Benefits Discussed and Screening Current      Colorectal Cancer Screening:     General: Screening Not Indicated and Risks and Benefits Discussed      Breast Cancer Screening:     General: Risks and Benefits Discussed and Screening Not Indicated      Cervical Cancer Screening:    General: Screening Not Indicated and Risks and Benefits Discussed      Osteoporosis Screening:    General: Risks and Benefits Discussed and Patient Declines      Abdominal Aortic Aneurysm (AAA) Screening:        General: Screening Not Indicated and Risks and Benefits Discussed      Lung Cancer Screening:     General: Screening Not Indicated and Risks and Benefits Discussed      Hepatitis C Screening:    General: Risks and Benefits Discussed and Patient Declines    Other Counseling Topics:   Regular weightbearing exercise and calcium and vitamin D intake.     Social Determinants of Health     Food Insecurity: No Food Insecurity (5/20/2024)    Hunger Vital Sign     Worried About Running Out of Food in the Last Year: Never true     Ran Out of Food in the Last Year: Never true   Transportation Needs: No Transportation Needs (5/20/2024)    PRAPARE - Transportation     Lack of Transportation (Medical): No     Lack of Transportation (Non-Medical): No   Housing Stability: Low Risk  (5/20/2024)    Housing Stability Vital Sign     Unable to Pay for Housing in the Last Year: No     Number of Times Moved in the Last Year: 1     Homeless in the Last Year: No   Utilities: Not At Risk (5/20/2024)    [facility] Utilities     Threatened with loss of utilities: No     No results found.    Objective     /74 (BP Location: Left arm, Patient Position: Sitting, Cuff Size: Standard)   Pulse 72   Temp  "98.5 °F (36.9 °C) (Temporal)   Ht 5' 6\" (1.676 m)   Wt 51 kg (112 lb 6.4 oz)   SpO2 94%   BMI 18.14 kg/m²     Physical Exam  Vitals and nursing note reviewed.   Constitutional:       General: She is not in acute distress.     Appearance: She is well-developed. She is not diaphoretic.   HENT:      Head: Normocephalic and atraumatic.      Right Ear: Tympanic membrane, ear canal and external ear normal.      Left Ear: Tympanic membrane, ear canal and external ear normal.      Nose: Nose normal.      Mouth/Throat:      Pharynx: No oropharyngeal exudate.   Eyes:      General:         Right eye: No discharge.         Left eye: No discharge.   Neck:      Thyroid: No thyromegaly.      Vascular: No carotid bruit.      Trachea: No tracheal deviation.   Cardiovascular:      Rate and Rhythm: Normal rate and regular rhythm.      Heart sounds: Normal heart sounds. No murmur heard.     No gallop.   Pulmonary:      Effort: Pulmonary effort is normal. No respiratory distress.      Breath sounds: Normal breath sounds. No stridor. No wheezing or rales.   Chest:      Chest wall: No tenderness.   Musculoskeletal:         General: No tenderness or deformity. Normal range of motion.      Cervical back: Normal range of motion and neck supple.   Lymphadenopathy:      Cervical: No cervical adenopathy.   Skin:     General: Skin is warm and dry.      Capillary Refill: Capillary refill takes less than 2 seconds.   Neurological:      Mental Status: She is alert and oriented to person, place, and time. Mental status is at baseline.      Cranial Nerves: No cranial nerve deficit.      Motor: No abnormal muscle tone.      Coordination: Coordination normal.      Gait: Gait normal.      Deep Tendon Reflexes: Reflexes normal.   Psychiatric:         Mood and Affect: Mood normal.         Behavior: Behavior normal.         Thought Content: Thought content normal.         Judgment: Judgment normal.       Administrative Statements           "

## 2024-09-04 ENCOUNTER — TELEPHONE (OUTPATIENT)
Age: 89
End: 2024-09-04

## 2024-09-04 NOTE — TELEPHONE ENCOUNTER
Pts daughter wanted to know if she can mail out the HIPAA and POA form to our office, called office and was advised that they have to bring the papers into the office. FYI

## 2024-11-13 DIAGNOSIS — I15.9 SECONDARY HYPERTENSION: ICD-10-CM

## 2024-11-13 RX ORDER — METOPROLOL SUCCINATE 50 MG/1
50 TABLET, EXTENDED RELEASE ORAL DAILY
Qty: 30 TABLET | Refills: 0 | Status: SHIPPED | OUTPATIENT
Start: 2024-11-13 | End: 2024-11-21 | Stop reason: SDUPTHER

## 2024-11-13 RX ORDER — HYDROCHLOROTHIAZIDE 12.5 MG/1
12.5 TABLET ORAL DAILY
Qty: 30 TABLET | Refills: 0 | Status: SHIPPED | OUTPATIENT
Start: 2024-11-13 | End: 2024-11-21 | Stop reason: SDUPTHER

## 2024-11-14 NOTE — PROGRESS NOTES
Cryotherapy    What is it?  Use of a very cold liquid, such as liquid nitrogen, to freeze and destroy abnormal skin cells that need to be removed    What should I expect?  Tenderness and redness  A small blister that might grow and fill with dark purple blood. There may be crusting.  More than one treatment may be needed if the lesions do not go away.    How do I care for the treated area?  Gently wash the area with your hands when bathing.  Use a thin layer of Vaseline to help with healing. You may use a Band-Aid.   The area should heal within 7-10 days and may leave behind a pink or lighter color.   Do not use an antibiotic or Neosporin ointment.   You may take acetaminophen (Tylenol) for pain.     Call your Doctor if you have:  Severe pain  Signs of infection (warmth, redness, cloudy yellow drainage, and or a bad smell)  Questions or concerns    Who should I call with questions?      [facility] [phone]      For urgent needs outside of business hours call the [facility] at [phone] and ask for the dermatology resident on call     Wound Care After a Biopsy    What is a skin biopsy?  A skin biopsy allows the doctor to examine a very small piece of tissue under the microscope to determine the diagnosis and the best treatment for the skin condition. A local anesthetic (numbing medicine) is injected with a very small needle into the skin area to be tested. A small piece of skin is taken from the area. Sometimes a suture (stitch) is used.     What are the risks of a skin biopsy?  I will experience scar, bleeding, swelling, pain, crusting and redness. I may experience incomplete removal or recurrence. Risks of this procedure are excessive bleeding, bruising, infection, nerve damage, numbness, thick (hypertrophic or keloidal) scar and non-diagnostic biopsy.    How should I care for my wound for the first 24 hours?  Keep the wound dry and covered for 24 hours  If it bleeds,  Assessment and Plan:  Problem List Items Addressed This Visit     None        Health Maintenance Due   Topic Date Due    Depression Screening PHQ  1934    Fall Risk  12/03/1999    Urinary Incontinence Screening  12/03/1999    INFLUENZA VACCINE  09/01/2018         HPI:  Patient Active Problem List   Diagnosis    Benign essential hypertension     Past Medical History:   Diagnosis Date    Cataract     LastAssessed:9/8/15    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    PAF (paroxysmal atrial fibrillation) (HCC)     Last Assessed:2/3/16    Wrist fracture     Resolved:9/8/15     Past Surgical History:   Procedure Laterality Date    CATARACT EXTRACTION      Last Assessed:9/8/15    LYMPHADENECTOMY      Last Assessed:9/8/15    TONSILLECTOMY      Last Assessed:9/8/15    WRIST SURGERY      Last Assessed:9/8/15     Family History   Problem Relation Age of Onset    No Known Problems Family      History   Smoking Status    Never Smoker   Smokeless Tobacco    Never Used     History   Alcohol Use No      History   Drug Use No         Current Outpatient Prescriptions   Medication Sig Dispense Refill    DAILY MULTIPLE VITAMINS/IRON PO Take by mouth      lisinopril-hydrochlorothiazide (PRINZIDE,ZESTORETIC) 10-12 5 MG per tablet Take 1 tablet by mouth 2 (two) times a day 180 tablet 0    metoprolol succinate (TOPROL-XL) 25 mg 24 hr tablet Take 1 tablet (25 mg total) by mouth daily 90 tablet 1     No current facility-administered medications for this visit  Allergies   Allergen Reactions    Penicillins      Immunization History   Administered Date(s) Administered    TD (adult) Preservative Free 01/06/2016       Patient Care Team:  Darrell Branham DO as PCP - General Dania Moreno DO    Medicare Screening Tests and Risk Assessments:  Yancy Zhao is here for her Subsequent Wellness visit  Last Medicare Wellness visit information reviewed, patient interviewed and updates made to the record today       Health Risk Assessment:  Patient rates overall health as good  Patient feels that their physical health rating is Same  Eyesight was rated as Same  Hearing was rated as Same  Patient feels that their emotional and mental health rating is Same  Pain experienced by patient in the last 7 days has been None  Patient states that she has experienced weight loss or gain in last 6 months  Emotional/Mental Health:  Patient has been feeling nervous/anxious  PHQ-9 Depression Screening:    Frequency of the following problems over the past two weeks:      1  Little interest or pleasure in doing things: 0 - not at all      2  Feeling down, depressed, or hopeless: 0 - not at all  PHQ-2 Score: 0          Broken Bones/Falls: Fall Risk Assessment:    In the past year, patient has experienced: No history of falling in past year          Bladder/Bowel:  Patient has not leaked urine accidently in the last six months  Patient reports no loss of bowel control  Immunizations:  Patient has not had a flu vaccination within the last year  Patient has received a pneumonia shot  Patient has not received a shingles shot  Patient has received tetanus/diphtheria shot  Home Safety:  Patient does not have trouble with stairs inside or outside of their home  Patient currently reports that there are no safety hazards present in home, working smoke alarms, no working carbon monoxide detectors  Preventative Screenings:   No breast cancer screening performed, no colon cancer screen completed, no cholesterol screen completed, no glaucoma eye exam completed    Nutrition:  Current diet: Regular with servings of the following:    Medications:  Patient is currently taking over-the-counter supplements  Patient is able to manage medications  Lifestyle Choices:  Patient reports no tobacco use  Patient has not smoked or used tobacco in the past   Patient reports no alcohol use  Patient drives a vehicle  Patient wears seat belt  hold direct pressure on the area for 15 minutes. If bleeding does not stop, call us or go to the emergency room  Avoid strenuous exercise the first 1-2 days or as your doctor instructs you    How should I care for the wound after 24 hours?  After 24 hours, remove the bandage  You may bathe or shower as normal  If you had a scalp biopsy, you can shampoo as usual and can use shower water to clean the biopsy site daily  Clean the wound once a day with gentle soap and water  Do not scrub, be gentle  Apply white petroleum/Vaseline after cleaning the wound with a cotton swab or a clean finger, and keep the site covered with a Bandaid /bandage. Bandages are not necessary with a scalp biopsy  If you are unable to cover the site with a Bandaid /bandage, re-apply ointment 2-3 times a day to keep the site moist. Moisture will help with healing  Avoid strenuous activity for first 1-2 days  Avoid lakes, rivers, pools, and oceans until the stitches are removed or the site is healed    How do I clean my wound?  Wash hands thoroughly with soap or use hand  before all wound care  Clean the wound with gentle soap and water  Apply white petroleum/Vaseline  to wound after it is clean  Replace the Bandaid /bandage to keep the wound covered for the first few days or as instructed by your doctor  If you had a scalp biopsy, warm shower water to the area on a daily basis should suffice    What should I use to clean my wound?   Cotton-tipped applicators (Qtips )  White petroleum jelly (Vaseline or Aquaphor ). Use a clean new container and use Q-tips to apply.  Bandaids  as needed  Gentle soap     How should I care for my wound long term?  Do not get your wound dirty  Keep up with wound care for one week or until the area is healed.  A small scab will form and fall off by itself when the area is completely healed. The area will be red and will become pink in color as it heals. Sun protection is very important for how your scar will  Current level of exercise of physical activity described by patient as: walk , cardio   Activities of Daily Living:  Can get out of bed by his or her self, able to dress self, able to make own meals, able to do own shopping, able to bathe self, can do own laundry/housekeeping, can manage own money, pay bills and track expenses    Previous Hospitalizations:  No hospitalization or ED visit in past 12 months        Advanced Directives:  Patient has decided on a power of   Patient has spoken to designated power of   Patient has completed advanced directive  Preventative Screening/Counseling:      Cardiovascular:      General: Risks and Benefits Discussed and Patient Declines      Counseling: Healthy Diet          Diabetes:      General: Risks and Benefits Discussed and Patient Declines      Counseling: Healthy Diet          Colorectal Cancer:      General: Risks and Benefits Discussed and Patient Declines          Breast Cancer:      General: Risks and Benefits Discussed and Patient Declines          Cervical Cancer:      General: Risks and Benefits Discussed and Patient Declines          Osteoporosis:      General: Risks and Benefits Discussed      Counseling: Calcium and Vitamin D Intake          AAA:      General: Screening Not Indicated          Glaucoma:      General: Risks and Benefits Discussed and Screening Current          HIV:      General: Risks and Benefits Discussed and Screening Not Indicated          Hepatitis C:      General: Patient Declines and Risks and Benefits Discussed        Advanced Directives:   Patient has living will for healthcare, has durable POA for healthcare, patient has an advanced directive       Immunizations:      Influenza: Risks & Benefits Discussed and Patient Declines      Pneumococcal: Risks & Benefits Discussed and Patient Declines      Shingrix: Risks & Benefits Discussed and Patient Declines      Zostavax: Risks & Benefits Discussed and Patient turn out. Sunscreen with an SPF 30 or greater is recommended once the area is healed.  You should have some soreness but it should be mild and slowly go away over several days. Talk to your doctor about using tylenol for pain,    When should I call my doctor?  If you have increased:   Pain or swelling  Pus or drainage (clear or slightly yellow drainage is ok)  Temperature over 100F  Spreading redness or warmth around wound    When will I hear about my results?  The biopsy results can take up to 2 weeks to come back.  Your results will automatically release to [employer] before your provider has even reviewed them.  The clinic will call you with the results, send you a [employer] message, or have you schedule a follow-up clinic or phone time to discuss the results.  Contact our clinics if you do not hear from us in 2 weeks.    Who should I call with questions?  [facility]: [phone]  For urgent needs outside of business hours call the [facility] at [phone] and ask for the dermatology resident on call     Proper skin care from [facility]:    -Eliminate harsh soaps as they strip the natural oils from the skin, often resulting in dry itchy skin ( i.e. Dial, Zest, Swiss [place])  -Use mild soaps such as Cetaphil or Dove Sensitive Skin in the shower. You do not need to use soap on arms, legs, and trunk every time you shower unless visibly soiled.   -Avoid hot or cold showers.  -After showering, lightly dry off and apply moisturizing within 2-3 minutes. This will help trap moisture in the skin.   -Aggressive use of a moisturizer at least 1-2 times a day to the entire body (including -Vanicream, Cetaphil, Aquaphor or Cerave) and moisturize hands after every washing.  -We recommend using moisturizers that come in a tub that needs to be scooped out, not a pump. This has more of an oil base. It will hold moisture in your skin much better than a water base moisturizer. The above  Declines      TD: Td Vaccine UTD and Risks & Benefits Discussed      Other Preventative Counseling (Non-Medicare): Fall Prevention and Nutrition Counseling          No exam data present    Physical Exam:  Review of Systems   Gastrointestinal: Negative for bowel incontinence  Psychiatric/Behavioral: The patient is not nervous/anxious  There were no vitals filed for this visit  There is no height or weight on file to calculate BMI  Physical Exam          Assessment and Plan:    Problem List Items Addressed This Visit     Benign essential hypertension - Primary      Other Visit Diagnoses     Medicare annual wellness visit, subsequent            There are no preventive care reminders to display for this patient  HPI:  Nicol Brown is a 80 y o  female here for her Subsequent Wellness Visit      Patient Active Problem List   Diagnosis    Benign essential hypertension     Past Medical History:   Diagnosis Date    Cataract     LastAssessed:9/8/15    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    PAF (paroxysmal atrial fibrillation) (HCC)     Last Assessed:2/3/16    Wrist fracture     Resolved:9/8/15     Past Surgical History:   Procedure Laterality Date    CATARACT EXTRACTION      Last Assessed:9/8/15    LYMPHADENECTOMY      Last Assessed:9/8/15    TONSILLECTOMY      Last Assessed:9/8/15    WRIST SURGERY      Last Assessed:9/8/15     Family History   Problem Relation Age of Onset    No Known Problems Family      History   Smoking Status    Never Smoker   Smokeless Tobacco    Never Used     History   Alcohol Use No      History   Drug Use No       Current Outpatient Prescriptions   Medication Sig Dispense Refill    DAILY MULTIPLE VITAMINS/IRON PO Take by mouth      lisinopril-hydrochlorothiazide (PRINZIDE,ZESTORETIC) 10-12 5 MG per tablet Take 1 tablet by mouth 2 (two) times a day 180 tablet 0    metoprolol succinate (TOPROL-XL) 25 mg 24 hr tablet Take 1 tablet (25 mg total) by mouth recommended are non-pore clogging.      Wear a sunscreen with at least SPF 30 on your face, ears, neck and V of the chest daily. Wear sunscreen on other areas of the body if those areas are exposed to the sun throughout the day. Sunscreens can contain physical and/or chemical blockers. Physical blockers are less likely to clog pores, these include zinc oxide and titanium dioxide. Reapply every two hour and after swimming.     Sunscreen examples: https://www.ewg.org/sunscreen/    UV radiation  UVA radiation remains constant throughout the day and throughout the year. It is a longer wavelength than UVB and therefore penetrates deeper into the skin leading to immediate and delayed tanning, photoaging, and skin cancer. 70-80% of UVA and UVB radiation occurs between the hours of 10am-2pm.  UVB radiation  UVB radiation causes the most harmful effects and is more significant during the summer months. However, snow and ice can reflect UVB radiation leading to skin damage during the winter months as well. UVB radiation is responsible for tanning, burning, inflammation, delayed erythema (pinkness), pigmentation (brown spots), and skin cancer.     I recommend self monthly full body exams and yearly full body exams with a dermatology provider. If you develop a new or changing lesion please follow up for examination. Most skin cancers are pink and scaly or pink and pearly. However, we do see blue/brown/black skin cancers.  Consider the ABCDEs of melanoma when giving yourself your monthly full body exam ( don't forget the groin, buttocks, feet, toes, etc). A-asymmetry, B-borders, C-color, D-diameter, E-elevation or evolving. If you see any of these changes please follow up in clinic. If you cannot see your back I recommend purchasing a hand held mirror to use with a larger wall mirror.       Checking for Skin Cancer  You can find cancer early by checking your skin each month. There are 3 kinds of skin cancer. They are melanoma,  basal cell carcinoma, and squamous cell carcinoma. Doing monthly skin checks is the best way to find new marks or skin changes. Follow the instructions below for checking your skin.   The ABCDEs of checking moles for melanoma   Check your moles or growths for signs of melanoma using ABCDE:   Asymmetry: the sides of the mole or growth don t match  Border: the edges are ragged, notched, or blurred  Color: the color within the mole or growth varies  Diameter: the mole or growth is larger than 6 mm (size of a pencil eraser)  Evolving: the size, shape, or color of the mole or growth is changing (evolving is not shown in the images below)    Checking for other types of skin cancer  Basal cell carcinoma or squamous cell carcinoma have symptoms such as:     A spot or mole that looks different from all other marks on your skin  Changes in how an area feels, such as itching, tenderness, or pain  Changes in the skin's surface, such as oozing, bleeding, or scaliness  A sore that does not heal  New swelling or redness beyond the border of a mole    Who s at risk?  Anyone can get skin cancer. But you are at greater risk if you have:   Fair skin, light-colored hair, or light-colored eyes  Many moles or abnormal moles on your skin  A history of sunburns from sunlight or tanning beds  A family history of skin cancer  A history of exposure to radiation or chemicals  A weakened immune system  If you have had skin cancer in the past, you are at risk for recurring skin cancer.   How to check your skin  Do your monthly skin checkups in front of a full-length mirror. Check all parts of your body, including your:   Head (ears, face, neck, and scalp)  Torso (front, back, and sides)  Arms (tops, undersides, upper, and lower armpits)  Hands (palms, backs, and fingers, including under the nails)  Buttocks and genitals  Legs (front, back, and sides)  Feet (tops, soles, toes, including under the nails, and between toes)  If you have a lot of  daily 90 tablet 1     No current facility-administered medications for this visit        Allergies   Allergen Reactions    Penicillins      Immunization History   Administered Date(s) Administered    TD (adult) Preservative Free 01/06/2016       Patient Care Team:  Lilly Reed DO as PCP - General Elicia Show, DO Medicare Screening Tests and Risk Assessments:  Medicare Annual Wellness Visit moles, take digital photos of them each month. Make sure to take photos both up close and from a distance. These can help you see if any moles change over time.   Most skin changes are not cancer. But if you see any changes in your skin, call your doctor right away. Only he or she can diagnose a problem. If you have skin cancer, seeing your doctor can be the first step toward getting the treatment that could save your life.   [employer] last reviewed this educational content on 4/1/2019 2000-2020 [employer]. [address] [place] [id number]. All rights reserved. This information is not intended as a substitute for professional medical care. Always follow your healthcare professional's instructions.       When should I call my doctor?  If you are worsening or not improving, please, contact us or seek urgent care as noted below.     Who should I call with questions (adults)?    [facility] [phone]  For urgent needs outside of business hours call the [facility] at [phone] and ask for the dermatology resident on call to be paged  If this is a medical emergency and you are unable to reach an ER, Call [phone]      If you need a prescription refill, please contact your pharmacy. Refills are approved or denied by our Physicians during normal business hours, Monday through Fridays  Per office policy, refills will not be granted if you have not been seen within the past year (or sooner depending on your child's condition)    Wound Care After a Biopsy    What is a skin biopsy?  A skin biopsy allows the doctor to examine a very small piece of tissue under the microscope to determine the diagnosis and the best treatment for the skin condition. A local anesthetic (numbing medicine) is injected with a very small needle into the skin area to be tested. A small piece of skin is taken from the area. Sometimes a suture (stitch) is used.     What are the risks of a  skin biopsy?  I will experience scar, bleeding, swelling, pain, crusting and redness. I may experience incomplete removal or recurrence. Risks of this procedure are excessive bleeding, bruising, infection, nerve damage, numbness, thick (hypertrophic or keloidal) scar and non-diagnostic biopsy.    How should I care for my wound for the first 24 hours?  Keep the wound dry and covered for 24 hours  If it bleeds, hold direct pressure on the area for 15 minutes. If bleeding does not stop, call us or go to the emergency room  Avoid strenuous exercise the first 1-2 days or as your doctor instructs you    How should I care for the wound after 24 hours?  After 24 hours, remove the bandage  You may bathe or shower as normal  If you had a scalp biopsy, you can shampoo as usual and can use shower water to clean the biopsy site daily  Clean the wound once a day with gentle soap and water  Do not scrub, be gentle  Apply white petroleum/Vaseline after cleaning the wound with a cotton swab or a clean finger, and keep the site covered with a Bandaid /bandage. Bandages are not necessary with a scalp biopsy  If you are unable to cover the site with a Bandaid /bandage, re-apply ointment 2-3 times a day to keep the site moist. Moisture will help with healing  Avoid strenuous activity for first 1-2 days  Avoid lakes, rivers, pools, and oceans until the stitches are removed or the site is healed    How do I clean my wound?  Wash hands thoroughly with soap or use hand  before all wound care  Clean the wound with gentle soap and water  Apply white petroleum/Vaseline  to wound after it is clean  Replace the Bandaid /bandage to keep the wound covered for the first few days or as instructed by your doctor  If you had a scalp biopsy, warm shower water to the area on a daily basis should suffice    What should I use to clean my wound?   Cotton-tipped applicators (Qtips )  White petroleum jelly (Vaseline ). Use a clean new container and  use Q-tips to apply.  Bandaids  as needed  Gentle soap     How should I care for my wound long term?  Do not get your wound dirty  Keep up with wound care for one week or until the area is healed.  A small scab will form and fall off by itself when the area is completely healed. The area will be red and will become pink in color as it heals. Sun protection is very important for how your scar will turn out. Sunscreen with an SPF 30 or greater is recommended once the area is healed.  You should have some soreness but it should be mild and slowly go away over several days. Talk to your doctor about using tylenol for pain,    When should I call my doctor?  If you have increased:   Pain or swelling  Pus or drainage (clear or slightly yellow drainage is ok)  Temperature over 100F  Spreading redness or warmth around wound    When will I hear about my results?  The biopsy results can take 2 weeks to come back.  Your results will automatically release to [employer] before your provider has even reviewed them.  The clinic will call you with the results, send you a [employer] message, or have you schedule a follow-up clinic or phone time to discuss the results.  Contact our clinics if you do not hear from us in 2 weeks.    Who should I call with questions?  [facility]: [phone]  [facility]: [phone]  For urgent needs outside of business hours call the [facility] at [phone] and ask for the dermatology resident on call

## 2024-11-21 ENCOUNTER — OFFICE VISIT (OUTPATIENT)
Age: 89
End: 2024-11-21
Payer: MEDICARE

## 2024-11-21 VITALS
HEIGHT: 66 IN | BODY MASS INDEX: 18.64 KG/M2 | SYSTOLIC BLOOD PRESSURE: 134 MMHG | HEART RATE: 79 BPM | WEIGHT: 116 LBS | TEMPERATURE: 98.1 F | DIASTOLIC BLOOD PRESSURE: 82 MMHG | OXYGEN SATURATION: 97 %

## 2024-11-21 DIAGNOSIS — N18.30 STAGE 3 CHRONIC KIDNEY DISEASE, UNSPECIFIED WHETHER STAGE 3A OR 3B CKD (HCC): ICD-10-CM

## 2024-11-21 DIAGNOSIS — I48.0 PAF (PAROXYSMAL ATRIAL FIBRILLATION) (HCC): ICD-10-CM

## 2024-11-21 DIAGNOSIS — I15.9 SECONDARY HYPERTENSION: ICD-10-CM

## 2024-11-21 DIAGNOSIS — F51.01 PRIMARY INSOMNIA: ICD-10-CM

## 2024-11-21 DIAGNOSIS — C43.62 MALIGNANT MELANOMA OF ARM, LEFT (HCC): ICD-10-CM

## 2024-11-21 DIAGNOSIS — I10 BENIGN ESSENTIAL HYPERTENSION: Primary | ICD-10-CM

## 2024-11-21 PROCEDURE — 99214 OFFICE O/P EST MOD 30 MIN: CPT | Performed by: FAMILY MEDICINE

## 2024-11-21 PROCEDURE — G2211 COMPLEX E/M VISIT ADD ON: HCPCS | Performed by: FAMILY MEDICINE

## 2024-11-21 RX ORDER — HYDROCHLOROTHIAZIDE 12.5 MG/1
12.5 TABLET ORAL DAILY
Qty: 90 TABLET | Refills: 1 | Status: SHIPPED | OUTPATIENT
Start: 2024-11-21

## 2024-11-21 RX ORDER — METOPROLOL SUCCINATE 50 MG/1
50 TABLET, EXTENDED RELEASE ORAL DAILY
Qty: 90 TABLET | Refills: 1 | Status: SHIPPED | OUTPATIENT
Start: 2024-11-21

## 2024-11-21 NOTE — PROGRESS NOTES
Assessment/Plan: Laboratory studies reviewed.  Patient will continue with current regimen for hypertension.  Patient will be referred to dermatology regarding malignant melanoma left arm.  Patient will slowly increase melatonin for insomnia.       Diagnoses and all orders for this visit:    Benign essential hypertension    PAF (paroxysmal atrial fibrillation) (HCC)    Malignant melanoma of arm, left (HCC)  -     Ambulatory Referral to Dermatology; Future    Stage 3 chronic kidney disease, unspecified whether stage 3a or 3b CKD (HCC)    Primary insomnia    Secondary hypertension  -     metoprolol succinate (TOPROL-XL) 50 mg 24 hr tablet; Take 1 tablet (50 mg total) by mouth daily  -     hydroCHLOROthiazide 12.5 mg tablet; Take 1 tablet (12.5 mg total) by mouth daily            Subjective:        Patient ID: Sandra Purvis is a 89 y.o. female.      Patient is here to follow-up on hypertension as well as insomnia with history of melanoma left arm.  Patient with some constipation but does use Metamucil as needed.  Urination unchanged.  No chest pain or shortness of breath.    Hypertension          The following portions of the patient's history were reviewed and updated as appropriate: allergies, current medications, past family history, past medical history, past social history, past surgical history and problem list.      Review of Systems   Constitutional: Negative.    HENT: Negative.     Eyes: Negative.    Respiratory: Negative.     Cardiovascular: Negative.    Gastrointestinal:  Positive for constipation.   Endocrine: Negative.    Genitourinary: Negative.    Musculoskeletal: Negative.    Skin:  Positive for color change.   Allergic/Immunologic: Negative.    Neurological: Negative.    Hematological: Negative.    Psychiatric/Behavioral: Negative.             Objective:               /82 (BP Location: Right arm, Patient Position: Sitting, Cuff Size: Standard)   Pulse 79   Temp 98.1 °F (36.7 °C) (Temporal)   " Ht 5' 6\" (1.676 m)   Wt 52.6 kg (116 lb)   SpO2 97%   BMI 18.72 kg/m²          Physical Exam  Vitals and nursing note reviewed.   Constitutional:       General: She is not in acute distress.     Appearance: Normal appearance. She is not ill-appearing, toxic-appearing or diaphoretic.   HENT:      Head: Normocephalic and atraumatic.      Right Ear: Tympanic membrane, ear canal and external ear normal. There is no impacted cerumen.      Left Ear: Tympanic membrane, ear canal and external ear normal. There is no impacted cerumen.      Nose: Nose normal. No congestion or rhinorrhea.      Mouth/Throat:      Mouth: Mucous membranes are moist.      Pharynx: No oropharyngeal exudate or posterior oropharyngeal erythema.   Eyes:      General: No scleral icterus.        Right eye: No discharge.         Left eye: No discharge.   Neck:      Vascular: No carotid bruit.   Cardiovascular:      Rate and Rhythm: Normal rate and regular rhythm.      Pulses: Normal pulses.      Heart sounds: Murmur heard.      No friction rub. No gallop.   Pulmonary:      Effort: Pulmonary effort is normal. No respiratory distress.      Breath sounds: Normal breath sounds. No stridor. No wheezing, rhonchi or rales.   Chest:      Chest wall: No tenderness.   Musculoskeletal:         General: No swelling, tenderness, deformity or signs of injury. Normal range of motion.      Cervical back: Normal range of motion and neck supple. No rigidity. No muscular tenderness.      Right lower leg: No edema.      Left lower leg: No edema.   Lymphadenopathy:      Cervical: No cervical adenopathy.   Skin:     General: Skin is warm and dry.      Capillary Refill: Capillary refill takes less than 2 seconds.      Coloration: Skin is not jaundiced.      Findings: Lesion present. No bruising, erythema or rash.      Comments: Large malignant melanoma left arm elbow region   Neurological:      Mental Status: She is alert and oriented to person, place, and time. Mental " status is at baseline.      Cranial Nerves: No cranial nerve deficit.      Sensory: No sensory deficit.      Motor: No weakness.      Coordination: Coordination normal.      Gait: Gait normal.   Psychiatric:         Mood and Affect: Mood normal.         Behavior: Behavior normal.         Thought Content: Thought content normal.         Judgment: Judgment normal.

## 2024-11-22 ENCOUNTER — TELEPHONE (OUTPATIENT)
Age: 89
End: 2024-11-22

## 2024-11-22 NOTE — TELEPHONE ENCOUNTER
Pt's daughter Courtney calling to schedule her mother for appt with derm    Pt has had a malig dorothea on her left elbow area for a few years but is afraid of doctors and would not schedule with derm until now. Courtney says it is black in color, looks like a mushroom and is growing consistently. Lesion opens up and bleeds. Pt is willing to see derm now and would like it removed.    Pt has been seen by hem oncology and her PCP Dr Page for this lesion. Dr Page has placed routine referral to derm. No media/image that I could find in chart. There is path report from [facility] in media 7/31    Per Theodora PA to schedule in [facility] 12/12

## 2024-12-12 ENCOUNTER — OFFICE VISIT (OUTPATIENT)
Dept: DERMATOLOGY | Facility: CLINIC | Age: 89
End: 2024-12-12
Payer: MEDICARE

## 2024-12-12 VITALS — WEIGHT: 115.6 LBS | TEMPERATURE: 98.3 F | BODY MASS INDEX: 18.58 KG/M2 | HEIGHT: 66 IN

## 2024-12-12 DIAGNOSIS — C44.41 BASAL CELL CARCINOMA (BCC) OF SCALP: Primary | ICD-10-CM

## 2024-12-12 DIAGNOSIS — C43.62 MALIGNANT MELANOMA OF ARM, LEFT (HCC): ICD-10-CM

## 2024-12-12 PROCEDURE — 99204 OFFICE O/P NEW MOD 45 MIN: CPT | Performed by: DERMATOLOGY

## 2024-12-12 NOTE — Clinical Note
Aimee Briseno, we saw patient for her melanoma and she would like palliative treatment since it is large and bleeding. We encouraged her to think about and pursue further imaging and treatment however as of now her primary goal is simply removal. Thank you!

## 2024-12-12 NOTE — LETTER
"December 12, 2024     Elena Briseno MD  [address] [address]    Patient: Sandra Purvis   YOB: 1934   Date of Visit: 12/12/2024       Dear Dr. Briseno:     Sandra Purvis to me for evaluation. Below are my notes for this consultation. Patient is know to you from over a year ago.    The patient and her daughter consulted with us and were most interested in palliative removal of the large arm melanoma.  They were hesitant re extensive surgery.  I was hopefull that you could do a limited excision of arm.  I asked her to consider options with Dr. Mcelroy .  Thank you.         Sincerely,        Larry Amaya MD        CC: No Recipients    Courtney Pritchard MD  12/12/2024 10:52 AM  Attested  [facility] Note     Patient Name: Sandra Purvis  Encounter Date: 12/12/2024     Have you been cared for by a [facility]ologist in the last 3 years and, if so, which description applies to you?    NO.   I am considered a \"new\" patient and must complete all patient intake questions. I am FEMALE/of child-bearing potential.    REVIEW OF SYSTEMS:  Have you recently had or currently have any of the following? Recent fever or chills? No  Any non-healing wound? YES, Melanoma on the left elbow   Are you pregnant or planning to become pregnant? No  Are you currently or planning to be nursing or breast feeding? No   PAST MEDICAL HISTORY:  Have you personally ever had or currently have any of the following?  If \"YES,\" then please provide more detail. Skin cancer (such as Melanoma, Basal Cell Carcinoma, Squamous Cell Carcinoma?  No  Tuberculosis, HIV/AIDS, Hepatitis B or C: No  Radiation Treatment No   HISTORY OF IMMUNOSUPPRESSION:   Do you have a history of any of the following:  Systemic Immunosuppression such as Diabetes, Biologic or Immunotherapy, Chemotherapy, Organ Transplantation, Bone Marrow Transplantation or Prednsione?  No    Answering \"YES\" requires the addition of " "the dotphrase \"IMMUNOSUPPRESSED\" as the first diagnosis of the patient's visit.   FAMILY HISTORY:  Any \"first degree relatives\" (parent, brother, sister, or child) with the following?    Skin Cancer, Pancreatic or Other Cancer? No   PATIENT EXPERIENCE:    Do you want the Dermatologist to perform a COMPLETE skin exam today including a clinical examination under the \"bra and underwear\" areas?  NO  If necessary, do we have your permission to call and leave a detailed message on your Preferred Phone number that includes your specific medical information?  Yes      Allergies   Allergen Reactions   • Lactose - Food Allergy GI Intolerance   • Penicillins Other (See Comments)     Unsure of reaction       Current Outpatient Medications:   •  DAILY MULTIPLE VITAMINS/IRON PO, Take 1 tablet by mouth daily  , Disp: , Rfl:   •  hydroCHLOROthiazide 12.5 mg tablet, Take 1 tablet (12.5 mg total) by mouth daily, Disp: 90 tablet, Rfl: 1  •  metoprolol succinate (TOPROL-XL) 50 mg 24 hr tablet, Take 1 tablet (50 mg total) by mouth daily, Disp: 90 tablet, Rfl: 1  •  multivitamin (THERAGRAN) TABS, Take 1 tablet by mouth daily, Disp: , Rfl:           Whom besides the patient is providing clinical information about today's encounter?   NO ADDITIONAL HISTORIAN (patient alone provided history)    Physical Exam and Assessment/Plan by Diagnosis:      MELANOMA    Physical Exam:  Anatomic Location Affected:  left elbow [id number]   Morphological Description:  ulcerated exophytic black tumor  Pertinent Positives:  Pertinent Negatives:    Additional History of Present Condition:  patient had biopsy performed on 7/17/2023, he was seen by Elena Briseno MD. Patient refused removal of the area because she was scared of the treatment options. Patient is now in office because she wants the melanoma removed from her left elbow as it bleeds and is bumped often due to size and location    Assessment and Plan:  Based on a thorough discussion of this " condition and the management approach to it (including a comprehensive discussion of the known risks, side effects and potential benefits of treatment), the patient (family) agrees to implement the following specific plan:  Discussed that surgical excision of area without further imaging, workup, staging, would be for palliative intent only, patient understands   Asked to consider speaking with Dr. Annabelle guy [facility] onc for further treatment options for curative intent such as immunotherapy  Encouraged to reconsider imaging  Message sent to Elena Briseno MD for palliative excision of that area.            Melanoma is a potentially serious type of skin cancer, in which there is uncontrolled growth of melanocytes (pigment cells). Melanoma is sometimes called malignant melanoma.    Normal melanocytes are found in the basal layer of the epidermis (the outer layer of skin). Melanocytes produce a protein called melanin, which protects skin cells by absorbing ultraviolet (UV) radiation. Melanocytes are found in equal numbers in black and white skin, but melanocytes in black skin produce much more melanin. People with dark brown or black skin are very much less likely to be damaged by UV radiation than those with white skin.    Non-cancerous growth of melanocytes results in moles (properly called benign melanocytic naevi) and freckles (ephelides and lentigines). The cancerous growth of melanocytes results in melanoma. Melanoma is described as:  In situ, if a tumor is confined to the epidermis  Invasive, if a tumor has spread into the dermis  Metastatic, if a tumor has spread to other tissues.      BASAL CELL CARCINOMA    Physical Exam:  Anatomic Location Affected:  scalp  Morphological Description: ulcerated exophytic crusted tumor  Pertinent Positives:  Pertinent Negatives:  Prior biopsy: yes;  If YES, prior accession or case #: [id number]     Assessment and Plan:  Based on a thorough discussion of this condition  and the management approach to it (including a comprehensive discussion of the known risks, side effects and potential benefits of treatment), the patient (family) agrees to implement the following specific plan:    Disscussed treatment options for area, patient does not wish to have any treatment on the area at this time  Patient is not seeking treatment of this lesion at this time as there are many other life events going on    What is basal cell carcinoma?  Basal cell carcinoma (BCC) is a common, locally invasive, keratinocytic, or non-melanoma, skin cancer. It is also known as rodent ulcer and basalioma. Patients with BCC often develop multiple primary tumours over time.    Who gets basal cell carcinoma?  Risk factors for BCC include:  Age and sex: BCCs are particularly prevalent in elderly males. However, they also affect females and younger adults   Previous BCC or other form of skin cancer (squamous cell carcinoma, melanoma)   Sun damage (photoaging, actinic keratoses)   Repeated prior episodes of sunburn   Fair skin, blue eyes and blond or red hair--note; BCC can also affect darker skin types   Previous cutaneous injury, thermal burn, disease (eg cutaneous lupus, sebaceous naevus)   Inherited syndromes: BCC is a particular problem for families with basal cell naevus syndrome (Gorlin syndrome), Muomi-Fcxqé-Vnvblbue syndrome, Rombo syndrome, Oley syndrome and xeroderma pigmentosum   Other risk factors include ionising radiation, exposure to arsenic, and immune suppression due to disease or medicines    What causes basal cell carcinoma?  The cause of BCC is multifactorial.  Most often, there are DNA mutations in the patched (PTCH) tumour suppressor gene, part of hedgehog signalling pathway   These may be triggered by exposure to ultraviolet radiation   Various spontaneous and inherited gene defects predispose to BCC    What are the clinical features of basal cell carcinoma?  BCC is a locally invasive skin  tumour. The main characteristics are:  Slowly growing plaque or nodule   Skin coloured, pink or pigmented   Varies in size from a few millimetres to several centimetres in diameter   Spontaneous bleeding or ulceration  BCC is very rarely a threat to life. A tiny proportion of BCCs grow rapidly, invade deeply, and/or metastasise to local lymph nodes.    Types of basal cell carcinoma  There are several distinct clinical types of BCC, and over 20 histological growth patterns of BCC.  Nodular BCC  Most common type of facial BCC   Shiny or pearly nodule with a smooth surface   May have central depression or ulceration, so its edges appear rolled   Blood vessels cross its surface   Cystic variant is soft, with jelly-like contents   Micronodular, microcystic and infiltrative types are potentially aggressive subtypes   Also known as nodulocystic carcinoma  Superficial BCC  Most common type in younger adults   Most common type on upper trunk and shoulders   Slightly scaly, irregular plaque   Thin, translucent rolled border   Multiple microerosions  Morphoeaform BCC  Usually found in mid-facial sites   Waxy, scar-like plaque with indistinct borders   Wide and deep subclinical extension   May infiltrate cutaneous nerves (perineural spread)   Also known as morpheic, morphoeiform or sclerosing BCC  Basosquamous carcinoma  Mixed basal cell carcinoma (BCC) and squamous cell carcinoma (SCC)   Infiltrative growth pattern   Potentially more aggressive than other forms of BCC   Also known as basisquamous carcinoma and mixed basal-squamous cell carcinoma       Complications of basal cell carcinoma    Recurrent BCC  Recurrence of BCC after initial treatment is not uncommon. Characteristics of recurrent BCC often include:  Incomplete excision or narrow margins at primary excision   Morphoeic, micronodular, and infiltrative subtypes   Location on head and neck    Advanced BCC  Advanced BCCs are large, often neglected tumours.  They may be  several centimetres in diameter   They may be deeply infiltrating into tissues below the skin   They are difficult or impossible to treat surgically    Metastatic BCC  Very rare   Primary tumour is often large, neglected or recurrent, located on head and neck, with aggressive subtype   May have had multiple prior treatments   May arise in site exposed to ionising radiation   Can be fatal    How is basal cell carcinoma diagnosed?  BCC is diagnosed clinically by the presence of a slowly enlarging skin lesion with typical appearance. The diagnosis and  by a diagnostic biopsy or following excision.  Some typical superficial BCCs on trunk and limbs are clinically diagnosed and have non-surgical treatment without histology.    What is the treatment for primary basal cell carcinoma?  The treatment for a BCC depends on its type, size and location, the number to be treated, patient factors, and the preference or expertise of the doctor. Most BCCs are treated surgically. Long-term follow-up is recommended to check for new lesions and recurrence; the latter may be unnecessary if histology has reported wide clear margins.    Excision biopsy  Excision means the lesion is cut out and the skin stitched up.  Most appropriate treatment for nodular, infiltrative and morphoeic BCCs   Should include 3 to 5 mm margin of normal skin around the tumour   Very large lesions may require flap or skin graft to repair the defect   Pathologist will report deep and lateral margins   Further surgery is recommended for lesions that are incompletely excised    Mohs micrographically controlled excision  Mohs micrographically controlled surgery involves examining carefully marked excised tissue under the microscope, layer by layer, to ensure complete excision.  Very high cure rates achieved by trained Mohs surgeons   Used in high-risk areas of the face around eyes, lips and nose   Suitable for ill-defined, morphoeic, infiltrative and recurrent  subtypes   Large defects are repaired by flap or skin graft    Superficial skin surgery  Superficial skin surgery comprises shave, curettage, and electrocautery. It is a rapid technique using local anaesthesia and does not require sutures.  Suitable for small, well-defined nodular or superficial BCCs   Lesions are usually located on trunk or limbs   Wound is left open to heal by secondary intention   Moist wound dressings lead to healing within a few weeks   Eventual scar quality variable    Cryotherapy  Cryotherapy is the treatment of a superficial skin lesion by freezing it, usually with liquid nitrogen.  Suitable for small superficial BCCs on covered areas of trunk and limbs   Best avoided for BCCs on head and neck, and distal to knees   Double freeze-thaw technique   Results in a blister that crusts over and heals within several weeks.   Leaves permanent white joce    Photodynamic therapy  Photodynamic therapy (PDT) refers to a technique in which BCC is treated with a photosensitising chemical, and exposed to light several hours later.  Topical photosensitisers include aminolevulinic acid lotion and methyl aminolevulinate cream   Suitable for low-risk small, superficial BCCs   Best avoided if tumour in site at high risk of recurrence   Results in inflammatory reaction, maximal 3-4 days after procedure   Treatment repeated 7 days after initial treatment   Excellent cosmetic results    Imiquimod cream  Imiquimod is an immune response modifier.  Best used for superficial BCCs less than 2 cm diameter   Applied three to five times each week, for 6-16 weeks   Results in a variable inflammatory reaction, maximal at three weeks   Minimal scarring is usual    Fluorouracil cream  5-Fluorouracil cream is a topical cytotoxic agent.  Used to treat small superficial basal cell carcinomas   Requires prolonged course, eg twice daily for 6-12 weeks   Causes inflammatory reaction   Has high recurrence  rates    Radiotherapy  Radiotherapy or X-ray treatment can be used to treat primary BCCs or as adjunctive treatment if margins are incomplete.  Mainly used if surgery is not suitable   Best avoided in young patients and in genetic conditions predisposing to skin cancer   Best cosmetic results achieved using multiple fractions   Typically, patient attends once-weekly for several weeks   Causes inflammatory reaction followed by scar   Risk of radiodermatitis, late recurrence, and new tumours    What is the treatment for advanced or metastatic basal cell carcinoma?  Locally advanced primary, recurrent or metastatic BCC requires multidisciplinary consultation. Often a combination of treatments is used.  Surgery   Radiotherapy   Targeted therapy  Targeted therapy refers to the hedgehog signalling pathway inhibitors, vismodegib and sonidegib. These drugs have some important risks and side effects.    How can basal cell carcinoma be prevented?  The most important way to prevent BCC is to avoid sunburn. This is especially important in childhood and early life. Fair skinned individuals and those with a personal or family history of BCC should protect their skin from sun exposure daily, year-round and lifelong.  Stay indoors or under the shade in the middle of the day   Wear covering clothing   Apply high protection factor SPF50+ broad-spectrum sunscreens generously to exposed skin if outdoors   Avoid indoor tanning (sun beds, solaria)  Oral nicotinamide (vitamin B3) in a dose of 500 mg twice daily may reduce the number and severity of BCCs.    What is the outlook for basal cell carcinoma?  Most BCCs are cured by treatment. Cure is most likely if treatment is undertaken when the lesion is small.  About 50% of people with BCC develop a second one within 3 years of the first. They are also at increased risk of other skin cancers, especially melanoma. Regular self-skin examinations and long-term annual skin checks by an  experienced health professional are recommended.    Treatment options for this lesion were reviewed and discussed.   It was elected to proceed with    Treatment was scheduled  no           Scribe Attestation      I,:  Marybel Welsh MA am acting as a scribe while in the presence of the attending physician.:       I,:  Courtney Pritchard MD personally performed the services described in this documentation    as scribed in my presence.:            Attestation signed by Larry Amaya MD at 12/12/2024  4:12 PM:  Attending Attestation:    Attestation for DIRECT Supervision on Date of Visit:  12/12/24    I interviewed, took the history and examined the patient.  I discussed the case with the Resident and reviewed the Resident’s note , prescribed medications, and orders placed.  I supervised the Resident, and I agree with the Resident management plan as it was presented to me.  I was present in the clinic and examined the patient and supervised any indicated procedures or examinations.    We had an extensive discussion of treatment options.  She notes that her major concern is palliation of arm lesion in preventing bleeding.  She expressed that she did not pursue treatment as recommended in past due to concerns re extent of surgery and side effects of treatment.  I did express that immunotherapy has been used extensively in individuals with advanced age often with good tolerance and benefit.  She was not bothered by the large scalp lesion.     Larry Amaya MD 12/12/24

## 2024-12-12 NOTE — PROGRESS NOTES
"[facility] Dermatology Clinic Note     Patient Name: Sandra Purvis  Encounter Date: 12/12/2024     Have you been cared for by a [facility] Dermatologist in the last 3 years and, if so, which description applies to you?    NO.   I am considered a \"new\" patient and must complete all patient intake questions. I am FEMALE/of child-bearing potential.    REVIEW OF SYSTEMS:  Have you recently had or currently have any of the following? Recent fever or chills? No  Any non-healing wound? YES, Melanoma on the left elbow   Are you pregnant or planning to become pregnant? No  Are you currently or planning to be nursing or breast feeding? No   PAST MEDICAL HISTORY:  Have you personally ever had or currently have any of the following?  If \"YES,\" then please provide more detail. Skin cancer (such as Melanoma, Basal Cell Carcinoma, Squamous Cell Carcinoma?  No  Tuberculosis, HIV/AIDS, Hepatitis B or C: No  Radiation Treatment No   HISTORY OF IMMUNOSUPPRESSION:   Do you have a history of any of the following:  Systemic Immunosuppression such as Diabetes, Biologic or Immunotherapy, Chemotherapy, Organ Transplantation, Bone Marrow Transplantation or Prednsione?  No    Answering \"YES\" requires the addition of the dotphrase \"IMMUNOSUPPRESSED\" as the first diagnosis of the patient's visit.   FAMILY HISTORY:  Any \"first degree relatives\" (parent, brother, sister, or child) with the following?    Skin Cancer, Pancreatic or Other Cancer? No   PATIENT EXPERIENCE:    Do you want the Dermatologist to perform a COMPLETE skin exam today including a clinical examination under the \"bra and underwear\" areas?  NO  If necessary, do we have your permission to call and leave a detailed message on your Preferred Phone number that includes your specific medical information?  Yes      Allergies   Allergen Reactions    Lactose - Food Allergy GI Intolerance    Penicillins Other (See Comments)     Unsure of reaction       Current Outpatient Medications: "     DAILY MULTIPLE VITAMINS/IRON PO, Take 1 tablet by mouth daily  , Disp: , Rfl:     hydroCHLOROthiazide 12.5 mg tablet, Take 1 tablet (12.5 mg total) by mouth daily, Disp: 90 tablet, Rfl: 1    metoprolol succinate (TOPROL-XL) 50 mg 24 hr tablet, Take 1 tablet (50 mg total) by mouth daily, Disp: 90 tablet, Rfl: 1    multivitamin (THERAGRAN) TABS, Take 1 tablet by mouth daily, Disp: , Rfl:           Whom besides the patient is providing clinical information about today's encounter?   NO ADDITIONAL HISTORIAN (patient alone provided history)    Physical Exam and Assessment/Plan by Diagnosis:      MELANOMA    Physical Exam:  Anatomic Location Affected:  left elbow [id number]   Morphological Description:  ulcerated exophytic black tumor  Pertinent Positives:  Pertinent Negatives:    Additional History of Present Condition:  patient had biopsy performed on 7/17/2023, he was seen by Elena Briseno MD. Patient refused removal of the area because she was scared of the treatment options. Patient is now in office because she wants the melanoma removed from her left elbow as it bleeds and is bumped often due to size and location    Assessment and Plan:  Based on a thorough discussion of this condition and the management approach to it (including a comprehensive discussion of the known risks, side effects and potential benefits of treatment), the patient (family) agrees to implement the following specific plan:  Discussed that surgical excision of area without further imaging, workup, staging, would be for palliative intent only, patient understands   Asked to consider speaking with Dr. Annabelle Mcelroy w med onc for further treatment options for curative intent such as immunotherapy  Encouraged to reconsider imaging  Message sent to Elena Briseno MD for palliative excision of that area.            Melanoma is a potentially serious type of skin cancer, in which there is uncontrolled growth of melanocytes (pigment cells).  Melanoma is sometimes called malignant melanoma.    Normal melanocytes are found in the basal layer of the epidermis (the outer layer of skin). Melanocytes produce a protein called melanin, which protects skin cells by absorbing ultraviolet (UV) radiation. Melanocytes are found in equal numbers in black and white skin, but melanocytes in black skin produce much more melanin. People with dark brown or black skin are very much less likely to be damaged by UV radiation than those with white skin.    Non-cancerous growth of melanocytes results in moles (properly called benign melanocytic naevi) and freckles (ephelides and lentigines). The cancerous growth of melanocytes results in melanoma. Melanoma is described as:  In situ, if a tumor is confined to the epidermis  Invasive, if a tumor has spread into the dermis  Metastatic, if a tumor has spread to other tissues.      BASAL CELL CARCINOMA    Physical Exam:  Anatomic Location Affected:  scalp  Morphological Description: ulcerated exophytic crusted tumor  Pertinent Positives:  Pertinent Negatives:  Prior biopsy: yes;  If YES, prior accession or case #: [id number]     Assessment and Plan:  Based on a thorough discussion of this condition and the management approach to it (including a comprehensive discussion of the known risks, side effects and potential benefits of treatment), the patient (family) agrees to implement the following specific plan:    Disscussed treatment options for area, patient does not wish to have any treatment on the area at this time  Patient is not seeking treatment of this lesion at this time as there are many other life events going on    What is basal cell carcinoma?  Basal cell carcinoma (BCC) is a common, locally invasive, keratinocytic, or non-melanoma, skin cancer. It is also known as rodent ulcer and basalioma. Patients with BCC often develop multiple primary tumours over time.    Who gets basal cell carcinoma?  Risk factors for BCC  include:  Age and sex: BCCs are particularly prevalent in elderly males. However, they also affect females and younger adults   Previous BCC or other form of skin cancer (squamous cell carcinoma, melanoma)   Sun damage (photoaging, actinic keratoses)   Repeated prior episodes of sunburn   Fair skin, blue eyes and blond or red hair--note; BCC can also affect darker skin types   Previous cutaneous injury, thermal burn, disease (eg cutaneous lupus, sebaceous naevus)   Inherited syndromes: BCC is a particular problem for families with basal cell naevus syndrome (Gorlin syndrome), Maoio-Rugdé-Jdnpvxoj syndrome, Rombo syndrome, Oley syndrome and xeroderma pigmentosum   Other risk factors include ionising radiation, exposure to arsenic, and immune suppression due to disease or medicines    What causes basal cell carcinoma?  The cause of BCC is multifactorial.  Most often, there are DNA mutations in the patched (PTCH) tumour suppressor gene, part of hedgehog signalling pathway   These may be triggered by exposure to ultraviolet radiation   Various spontaneous and inherited gene defects predispose to BCC    What are the clinical features of basal cell carcinoma?  BCC is a locally invasive skin tumour. The main characteristics are:  Slowly growing plaque or nodule   Skin coloured, pink or pigmented   Varies in size from a few millimetres to several centimetres in diameter   Spontaneous bleeding or ulceration  BCC is very rarely a threat to life. A tiny proportion of BCCs grow rapidly, invade deeply, and/or metastasise to local lymph nodes.    Types of basal cell carcinoma  There are several distinct clinical types of BCC, and over 20 histological growth patterns of BCC.  Nodular BCC  Most common type of facial BCC   Shiny or pearly nodule with a smooth surface   May have central depression or ulceration, so its edges appear rolled   Blood vessels cross its surface   Cystic variant is soft, with jelly-like contents    Micronodular, microcystic and infiltrative types are potentially aggressive subtypes   Also known as nodulocystic carcinoma  Superficial BCC  Most common type in younger adults   Most common type on upper trunk and shoulders   Slightly scaly, irregular plaque   Thin, translucent rolled border   Multiple microerosions  Morphoeaform BCC  Usually found in mid-facial sites   Waxy, scar-like plaque with indistinct borders   Wide and deep subclinical extension   May infiltrate cutaneous nerves (perineural spread)   Also known as morpheic, morphoeiform or sclerosing BCC  Basosquamous carcinoma  Mixed basal cell carcinoma (BCC) and squamous cell carcinoma (SCC)   Infiltrative growth pattern   Potentially more aggressive than other forms of BCC   Also known as basisquamous carcinoma and mixed basal-squamous cell carcinoma       Complications of basal cell carcinoma    Recurrent BCC  Recurrence of BCC after initial treatment is not uncommon. Characteristics of recurrent BCC often include:  Incomplete excision or narrow margins at primary excision   Morphoeic, micronodular, and infiltrative subtypes   Location on head and neck    Advanced BCC  Advanced BCCs are large, often neglected tumours.  They may be several centimetres in diameter   They may be deeply infiltrating into tissues below the skin   They are difficult or impossible to treat surgically    Metastatic BCC  Very rare   Primary tumour is often large, neglected or recurrent, located on head and neck, with aggressive subtype   May have had multiple prior treatments   May arise in site exposed to ionising radiation   Can be fatal    How is basal cell carcinoma diagnosed?  BCC is diagnosed clinically by the presence of a slowly enlarging skin lesion with typical appearance. The diagnosis and  by a diagnostic biopsy or following excision.  Some typical superficial BCCs on trunk and limbs are clinically diagnosed and have non-surgical treatment without  histology.    What is the treatment for primary basal cell carcinoma?  The treatment for a BCC depends on its type, size and location, the number to be treated, patient factors, and the preference or expertise of the doctor. Most BCCs are treated surgically. Long-term follow-up is recommended to check for new lesions and recurrence; the latter may be unnecessary if histology has reported wide clear margins.    Excision biopsy  Excision means the lesion is cut out and the skin stitched up.  Most appropriate treatment for nodular, infiltrative and morphoeic BCCs   Should include 3 to 5 mm margin of normal skin around the tumour   Very large lesions may require flap or skin graft to repair the defect   Pathologist will report deep and lateral margins   Further surgery is recommended for lesions that are incompletely excised    Mohs micrographically controlled excision  Mohs micrographically controlled surgery involves examining carefully marked excised tissue under the microscope, layer by layer, to ensure complete excision.  Very high cure rates achieved by trained Mohs surgeons   Used in high-risk areas of the face around eyes, lips and nose   Suitable for ill-defined, morphoeic, infiltrative and recurrent subtypes   Large defects are repaired by flap or skin graft    Superficial skin surgery  Superficial skin surgery comprises shave, curettage, and electrocautery. It is a rapid technique using local anaesthesia and does not require sutures.  Suitable for small, well-defined nodular or superficial BCCs   Lesions are usually located on trunk or limbs   Wound is left open to heal by secondary intention   Moist wound dressings lead to healing within a few weeks   Eventual scar quality variable    Cryotherapy  Cryotherapy is the treatment of a superficial skin lesion by freezing it, usually with liquid nitrogen.  Suitable for small superficial BCCs on covered areas of trunk and limbs   Best avoided for BCCs on head and  neck, and distal to knees   Double freeze-thaw technique   Results in a blister that crusts over and heals within several weeks.   Leaves permanent white joce    Photodynamic therapy  Photodynamic therapy (PDT) refers to a technique in which BCC is treated with a photosensitising chemical, and exposed to light several hours later.  Topical photosensitisers include aminolevulinic acid lotion and methyl aminolevulinate cream   Suitable for low-risk small, superficial BCCs   Best avoided if tumour in site at high risk of recurrence   Results in inflammatory reaction, maximal 3-4 days after procedure   Treatment repeated 7 days after initial treatment   Excellent cosmetic results    Imiquimod cream  Imiquimod is an immune response modifier.  Best used for superficial BCCs less than 2 cm diameter   Applied three to five times each week, for 6-16 weeks   Results in a variable inflammatory reaction, maximal at three weeks   Minimal scarring is usual    Fluorouracil cream  5-Fluorouracil cream is a topical cytotoxic agent.  Used to treat small superficial basal cell carcinomas   Requires prolonged course, eg twice daily for 6-12 weeks   Causes inflammatory reaction   Has high recurrence rates    Radiotherapy  Radiotherapy or X-ray treatment can be used to treat primary BCCs or as adjunctive treatment if margins are incomplete.  Mainly used if surgery is not suitable   Best avoided in young patients and in genetic conditions predisposing to skin cancer   Best cosmetic results achieved using multiple fractions   Typically, patient attends once-weekly for several weeks   Causes inflammatory reaction followed by scar   Risk of radiodermatitis, late recurrence, and new tumours    What is the treatment for advanced or metastatic basal cell carcinoma?  Locally advanced primary, recurrent or metastatic BCC requires multidisciplinary consultation. Often a combination of treatments is used.  Surgery   Radiotherapy   Targeted  therapy  Targeted therapy refers to the hedgehog signalling pathway inhibitors, vismodegib and sonidegib. These drugs have some important risks and side effects.    How can basal cell carcinoma be prevented?  The most important way to prevent BCC is to avoid sunburn. This is especially important in childhood and early life. Fair skinned individuals and those with a personal or family history of BCC should protect their skin from sun exposure daily, year-round and lifelong.  Stay indoors or under the shade in the middle of the day   Wear covering clothing   Apply high protection factor SPF50+ broad-spectrum sunscreens generously to exposed skin if outdoors   Avoid indoor tanning (sun beds, solaria)  Oral nicotinamide (vitamin B3) in a dose of 500 mg twice daily may reduce the number and severity of BCCs.    What is the outlook for basal cell carcinoma?  Most BCCs are cured by treatment. Cure is most likely if treatment is undertaken when the lesion is small.  About 50% of people with BCC develop a second one within 3 years of the first. They are also at increased risk of other skin cancers, especially melanoma. Regular self-skin examinations and long-term annual skin checks by an experienced health professional are recommended.    Treatment options for this lesion were reviewed and discussed.   It was elected to proceed with    Treatment was scheduled  no           Scribe Attestation      I,:  Marybel Welsh MA am acting as a scribe while in the presence of the attending physician.:       I,:  Courtney Pritchard MD personally performed the services described in this documentation    as scribed in my presence.:

## 2024-12-12 NOTE — PATIENT INSTRUCTIONS
MELANOMA    Assessment and Plan:  Based on a thorough discussion of this condition and the management approach to it (including a comprehensive discussion of the known risks, side effects and potential benefits of treatment), the patient (family) agrees to implement the following specific plan:  Discussed treatment options   Asked to consider speaking with   Will be seen again by Elena Briseno MD for excision of that area.      Melanoma is a potentially serious type of skin cancer, in which there is uncontrolled growth of melanocytes (pigment cells). Melanoma is sometimes called malignant melanoma.    Normal melanocytes are found in the basal layer of the epidermis (the outer layer of skin). Melanocytes produce a protein called melanin, which protects skin cells by absorbing ultraviolet (UV) radiation. Melanocytes are found in equal numbers in black and white skin, but melanocytes in black skin produce much more melanin. People with dark brown or black skin are very much less likely to be damaged by UV radiation than those with white skin.    Non-cancerous growth of melanocytes results in moles (properly called benign melanocytic naevi) and freckles (ephelides and lentigines). The cancerous growth of melanocytes results in melanoma. Melanoma is described as:  In situ, if a tumor is confined to the epidermis  Invasive, if a tumor has spread into the dermis  Metastatic, if a tumor has spread to other tissues.    What causes melanoma?  Melanoma is thought to begin as an uncontrolled proliferation of melanocytic stem cells that have undergone a genetic transformation.  Superficial forms of melanoma spread out within the epidermis (in situ). A pathologist may report this as the radial or horizontal growth phase.    Further genetic changes promote a tumour to invade through the basement membrane into the surrounding dermis when it becomes an invasive melanoma.  Nodular melanoma has a vertical growth phase, which is  potentially more dangerous than the horizontal growth phase. It may arise within a previously healthy dermis or within the invasive portion of a pre-existing more superficial kind of melanoma.

## 2024-12-13 ENCOUNTER — TELEPHONE (OUTPATIENT)
Dept: SURGICAL ONCOLOGY | Facility: CLINIC | Age: 89
End: 2024-12-13

## 2024-12-13 NOTE — TELEPHONE ENCOUNTER
Called patient, spoke to daughter and scheduled a 30 minute appointment for a 2 year follow up with Surgical Oncology. Patient is scheduled on 1/27/25 with Dr. Briseno.

## 2024-12-16 ENCOUNTER — TELEPHONE (OUTPATIENT)
Dept: SURGICAL ONCOLOGY | Facility: CLINIC | Age: 89
End: 2024-12-16

## 2024-12-16 NOTE — TELEPHONE ENCOUNTER
Called patient spoke to daughter and confirmed an earlier appointment for Sandra to be seen with Dr. Briseno, appointment will be scheduled for Thursday 12/19/24 in [place] at 2:15PM.

## 2024-12-18 ENCOUNTER — DOCUMENTATION (OUTPATIENT)
Dept: HEMATOLOGY ONCOLOGY | Facility: CLINIC | Age: 89
End: 2024-12-18

## 2024-12-18 ENCOUNTER — PATIENT OUTREACH (OUTPATIENT)
Dept: HEMATOLOGY ONCOLOGY | Facility: CLINIC | Age: 89
End: 2024-12-18

## 2024-12-18 NOTE — PROGRESS NOTES
All records needed are in patients chart. No records retrieval needed at this time.     Referral received/ Chart reviewed for work up completed for referral to surgical oncology for palliative treatment of melanoma of the left elbow/arm    Imaging completed:    [] PET/CT   [] MRI   [] CT   [] US   [] Mammo   [] Bone scan   [x] N/A    Pathology completed:    Date:7/17/2023   Location:[facility]-> Lab Path Referral at [facility] completed 8/1/2023   []N/A    Additional records needed:    [] Genomic report   [] Genetic testing results   [] Office Note   [] Procedure/ Operative note   [] Lab results   [x] N/A      [] Radiation Oncology records retrieval needed (PN to route to rad/onc clerical pool once scheduled)  Date:  Location:

## 2024-12-18 NOTE — PROGRESS NOTES
"Initial outreach. Spoke to patient's daughter, Courtney. Introduced myself and explained the role of an Oncology Nurse Navigator to assist with coordination of cancer care, preparation for upcoming appointment, be a point of contact prior to oncology consult, provide support and connect them with available resources. Discussed surgical oncology referral placed by Dr Pritchard for palliative treatment for melanoma of the left elbow. Explained I will be their main contact until they are established with their team and a treatment plan is established.     Courtney lives in [place] and has been staying with her parents intermittently for the last few months. She states her father is currently receiving palliative radiation therapy for bleeding related to neuroendocrine pancreatic cancer. Courtney states she noticed a \"black spot\" on her mother's left arm approximately 3 1/2 years ago. She states the patient doesn't like going to doctors and would say that the spot comes and goes. She has a brother who also helps out when needed.       Introduced Sally, and explained she will be their patient navigator, who will reach out after the surgical oncology visit to introduce themselves. The PN will provide support, make sure they have a good understanding of the plan and schedule and to connect with additional resources if/when needed.     Reviewed upcoming appointments including date, time and location.  Assessed for barriers of care. My direct contact information provided. Courtney is aware that she can call me should she need any further assistance. Patient was appreciative of call.      Cutaneous Symptoms:   Wounds Yes, describe: scalp - oozing, L elbow - intermittent bleeding, using gauze/ace bandage  Fatigue Yes    Prior history of skin cancer: yes  BCC scalp  Melanoma L elbow      Dermatology Provider: [facility]  PCP: Cj Page,   Insurance:  Medicare [employer], [employer]  "

## 2024-12-19 ENCOUNTER — OFFICE VISIT (OUTPATIENT)
Dept: SURGICAL ONCOLOGY | Facility: CLINIC | Age: 89
End: 2024-12-19

## 2024-12-19 VITALS
HEIGHT: 66 IN | WEIGHT: 116 LBS | TEMPERATURE: 97.9 F | SYSTOLIC BLOOD PRESSURE: 112 MMHG | RESPIRATION RATE: 16 BRPM | DIASTOLIC BLOOD PRESSURE: 74 MMHG | BODY MASS INDEX: 18.64 KG/M2 | OXYGEN SATURATION: 98 % | HEART RATE: 68 BPM

## 2024-12-19 DIAGNOSIS — C43.62 MALIGNANT MELANOMA OF ARM, LEFT (HCC): Primary | ICD-10-CM

## 2024-12-19 PROCEDURE — 88305 TISSUE EXAM BY PATHOLOGIST: CPT | Performed by: PATHOLOGY

## 2024-12-19 NOTE — PROGRESS NOTES
Skin excision    Date/Time: 12/19/2024 2:30 PM    Performed by: Elena Briseno MD  Authorized by: Elena Briseno MD  Universal Protocol:  Consent: Verbal consent obtained. Written consent obtained.  Risks and benefits: risks, benefits and alternatives were discussed  Consent given by: patient    Procedure Details - Skin Excision:     Number of Lesions:  1  Lesion 1:     Body area:  Upper extremity    Upper extremity location:  L lower arm    Malignancy: malignant lesion      Skin cancer type: melanoma   Curative intent: this procedure was not performed with curative intent   Original Breslow Thickness: 4.3 mm  Clinical margin width: other     The clinical margin width was impacted by cosmetic or anatomic concerns   Clinical margin width: 5 mm   Depth of excision: full-thickness skin/subcutaneous tissue down to fascia (melanoma)          Final defect size (mm):  75    Repair type:  Linear closure    Closure complexity: complex    Lesion 6:      Patient had a growing and bleeding melanoma of the left forearm.  She desired removal for palliation.  The skin was prepped.  Local was infused.  A 5 mm margin surrounding the base of the melanoma was marked.  The skin was incised sharply and with electrocautery was removed to the premuscular fascia.  Skin flaps were raised medially and laterally.  Interrupted 4-0 Vicryl was placed.  4-0 Monocryl was used to close the epidermis.  Skin glue was then placed.  The patient tolerated the procedure well.

## 2024-12-24 PROCEDURE — 88305 TISSUE EXAM BY PATHOLOGIST: CPT | Performed by: PATHOLOGY

## 2024-12-26 ENCOUNTER — TELEPHONE (OUTPATIENT)
Age: 89
End: 2024-12-26

## 2024-12-26 ENCOUNTER — OFFICE VISIT (OUTPATIENT)
Age: 89
End: 2024-12-26
Payer: MEDICARE

## 2024-12-26 VITALS
BODY MASS INDEX: 18.92 KG/M2 | WEIGHT: 117.2 LBS | HEART RATE: 81 BPM | TEMPERATURE: 99.6 F | DIASTOLIC BLOOD PRESSURE: 80 MMHG | SYSTOLIC BLOOD PRESSURE: 144 MMHG | OXYGEN SATURATION: 98 %

## 2024-12-26 DIAGNOSIS — L03.114 CELLULITIS OF LEFT UPPER EXTREMITY: Primary | ICD-10-CM

## 2024-12-26 PROCEDURE — G2211 COMPLEX E/M VISIT ADD ON: HCPCS | Performed by: FAMILY MEDICINE

## 2024-12-26 PROCEDURE — 99214 OFFICE O/P EST MOD 30 MIN: CPT | Performed by: FAMILY MEDICINE

## 2024-12-26 RX ORDER — SULFAMETHOXAZOLE AND TRIMETHOPRIM 800; 160 MG/1; MG/1
1 TABLET ORAL 2 TIMES DAILY
Qty: 14 TABLET | Refills: 0 | Status: SHIPPED | OUTPATIENT
Start: 2024-12-26 | End: 2025-01-02

## 2024-12-26 NOTE — TELEPHONE ENCOUNTER
Received a message with attached pictures from patient's daughter, Courtney...    Dr. Briseno,  This is Sandra's daughter, Courtney.  You removed a melanoma growth from my mother's arm on Dec 19.  She saw a primary care doctor, as the arm was swollen, red, and the incision was oozing.  Dr. Soni said that it might be MRCA and put her on Bactrum.  He said that I should make an appointment for her to see you next week.  Neither of us can find how to do that on [facility].  I will try to call for an appointment, but wanted to send a message as well.  Thank you,  Courtney BRONSON     Received a phone call from patient's daughter, Courtney.  Unable to find an available slot with Dr. Briseno.  Please call Courtney to schedule an appointment.  Courtney can be reached at # [phone].

## 2024-12-26 NOTE — PROGRESS NOTES
Name: Sandra Purvis      : 12/3/1934      MRN: [id number]  Encounter Provider: Ashok Soni DO  Encounter Date: 2024   Encounter department: [facility] PRIMARY CARE  :  Assessment & Plan  Cellulitis of left upper extremity    Orders:  •  sulfamethoxazole-trimethoprim (BACTRIM DS) 800-160 mg per tablet; Take 1 tablet by mouth 2 (two) times a day for 7 days  I will try to send a picture to her surgeon and try to get her seen in the near future.  I am afraid this might be MRSA.    We did dress the room with Neosporin and a sterile dressing.  Home instructions given.         History of Present Illness     Patient presents with:  surgery: L arm recent surgery to remove a tumor, elbow area.  Elbow swollen, red and leaking fluid    Surgery was preformed by Elena Briseno MD  Surgical Oncology 2024.        Review of Systems   Constitutional: Negative.    Respiratory: Negative.     Cardiovascular: Negative.    Skin:  Positive for wound.        Warm tender wound as described in HPI.       Objective   /80 (BP Location: Left arm, Patient Position: Sitting, Cuff Size: Standard)   Pulse 81   Temp 99.6 °F (37.6 °C)   Wt 53.2 kg (117 lb 3.2 oz)   SpO2 98%   BMI 18.92 kg/m²      Physical Exam  Vitals and nursing note reviewed.   Constitutional:       Appearance: She is well-developed.   HENT:      Head: Normocephalic and atraumatic.   Eyes:      Pupils: Pupils are equal, round, and reactive to light.   Cardiovascular:      Rate and Rhythm: Normal rate.   Pulmonary:      Effort: Pulmonary effort is normal.   Abdominal:      Palpations: Abdomen is soft.   Musculoskeletal:         General: Normal range of motion.      Cervical back: Normal range of motion and neck supple.   Skin:     General: Skin is warm.      Comments: Tender erythematous left elbow around incision.    Neurological:      Mental Status: She is alert and oriented to person, place, and time.   Psychiatric:          Behavior: Behavior normal.

## 2024-12-30 ENCOUNTER — OFFICE VISIT (OUTPATIENT)
Dept: SURGICAL ONCOLOGY | Facility: CLINIC | Age: 89
End: 2024-12-30
Payer: MEDICARE

## 2024-12-30 VITALS
HEIGHT: 66 IN | TEMPERATURE: 97.9 F | SYSTOLIC BLOOD PRESSURE: 126 MMHG | HEART RATE: 76 BPM | DIASTOLIC BLOOD PRESSURE: 80 MMHG | OXYGEN SATURATION: 98 % | WEIGHT: 116 LBS | BODY MASS INDEX: 18.64 KG/M2

## 2024-12-30 DIAGNOSIS — T81.41XD INFECTION OF SUPERFICIAL INCISIONAL SURGICAL SITE AFTER PROCEDURE, SUBSEQUENT ENCOUNTER: Primary | ICD-10-CM

## 2024-12-30 DIAGNOSIS — C43.62 MALIGNANT MELANOMA OF ARM, LEFT (HCC): ICD-10-CM

## 2024-12-30 PROCEDURE — 87106 FUNGI IDENTIFICATION YEAST: CPT

## 2024-12-30 PROCEDURE — 87077 CULTURE AEROBIC IDENTIFY: CPT

## 2024-12-30 PROCEDURE — 87070 CULTURE OTHR SPECIMN AEROBIC: CPT

## 2024-12-30 PROCEDURE — 87205 SMEAR GRAM STAIN: CPT

## 2024-12-30 PROCEDURE — 99212 OFFICE O/P EST SF 10 MIN: CPT

## 2024-12-30 PROCEDURE — 87186 SC STD MICRODIL/AGAR DIL: CPT

## 2024-12-30 RX ORDER — DOXYCYCLINE 100 MG/1
100 TABLET ORAL 2 TIMES DAILY
Qty: 14 TABLET | Refills: 0 | Status: SHIPPED | OUTPATIENT
Start: 2024-12-30 | End: 2025-01-12

## 2024-12-30 NOTE — ASSESSMENT & PLAN NOTE
There appears to be infection present at the surgical site, with mild incisional dehiscence.  I have reinforced the skin with steri-strips.  Culture was obtained today.  She has been instructed to discontinue bactrim and drink plenty of water secondary to her history of kidney disease and current urinary symptoms. Will start her on doxycycline instead. Advised patient to change dressing daily after showering.  F/u in 4 days with Dr Edwards.

## 2024-12-30 NOTE — LETTER
2024     Elena Briseno MD  [address]  [address]  [place] [address]    Patient: Sandra Purvis   YOB: 1934   Date of Visit: 2024       Dear Dr. Briseno:    Thank you for referring Sandra Purvis to me for evaluation. Below are my notes for this consultation.    If you have questions, please do not hesitate to call me. I look forward to following your patient along with you.         Sincerely,        LUIS Ayon        CC: No Recipients    LUIS Ayon  2024  2:58 PM  Sign when Signing Visit  Name: Sandra Purvis      : 12/3/1934      MRN: [id number]  Encounter Provider: LUIS Ayon  Encounter Date: 2024   Encounter department: [facility] [place]  :  Assessment & Plan  Infection of superficial incisional surgical site after procedure, subsequent encounter    Orders:  •  doxycycline (ADOXA) 100 MG tablet; Take 1 tablet (100 mg total) by mouth 2 (two) times a day for 7 days  •  Wound culture and Gram stain    Malignant melanoma of arm, left (HCC)  There appears to be infection present at the surgical site, with mild incisional dehiscence.  I have reinforced the skin with steri-strips.  Culture was obtained today.  She has been instructed to discontinue bactrim and drink plenty of water secondary to her history of kidney disease and current urinary symptoms. Will start her on doxycycline instead. Advised patient to change dressing daily after showering.  F/u in 4 days with Dr Edwards.              History of Present Illness  Chief Complaint   Patient presents with   • Follow-up     No follow-ups on file.    Pertinent Medical History  This is a 89 y/o female who presents to the office today with concern for post-op wound infection.  She underwent palliative excision of a large melanoma 11 days ago.  She reports that it seemed to be healing well at first, but then she developing  swelling, redness and oozing. Four days ago she presented to her PCP for evaluation, at which time she was started on Bactrim.  She reports that the redness seems to be better but her arm is still very swollen.  She also reports that the steri-strips came off and the incision is still oozing.  The patient reports that she has noticed she is urinating very frequently all of a sudden as well.  She does have a history of kidney disease and is on HCTZ, but her current renal status is unknown.         Oncology History  Oncology History   Basal cell carcinoma (BCC) of scalp   7/17/2023 Biopsy    A. Skin, scalp, biopsy:     BASAL CELL CARCINOMA (NODULAR TYPE); transected     8/1/2023 Initial Diagnosis    Basal cell carcinoma (BCC) of scalp     Malignant melanoma of arm, left (HCC)   7/17/2023 Biopsy    B. Skin, left elbow, biopsy:     MELANOMA (thickness: at least 4.3 mm); transected   Ulceration cannot be determined   3 Mitoses      8/1/2023 Initial Diagnosis    Malignant melanoma of arm, left (HCC)     11/29/2023 -  Cancer Staged    Staging form: Melanoma of the Skin, AJCC 8th Edition  - Pathologic: Stage Unknown (pT4, pNX, cM0) - Signed by Elena Briseno MD on 11/29/2023            Review of Systems A complete review of systems is negative other than that noted above in the HPI.         Current Outpatient Medications   Medication Sig Dispense Refill   • doxycycline (ADOXA) 100 MG tablet Take 1 tablet (100 mg total) by mouth 2 (two) times a day for 7 days 14 tablet 0   • hydroCHLOROthiazide 12.5 mg tablet Take 1 tablet (12.5 mg total) by mouth daily 90 tablet 1   • metoprolol succinate (TOPROL-XL) 50 mg 24 hr tablet Take 1 tablet (50 mg total) by mouth daily 90 tablet 1   • DAILY MULTIPLE VITAMINS/IRON PO Take 1 tablet by mouth daily   (Patient not taking: Reported on 12/30/2024)     • multivitamin (THERAGRAN) TABS Take 1 tablet by mouth daily (Patient not taking: Reported on 12/26/2024)       No current  "facility-administered medications for this visit.      Objective  /80   Pulse 76   Temp 97.9 °F (36.6 °C)   Ht 5' 6\" (1.676 m)   Wt 52.6 kg (116 lb)   SpO2 98%   BMI 18.72 kg/m²     ECOG    Physical Exam  Vitals reviewed.   Constitutional:       General: She is not in acute distress.     Appearance: Normal appearance. She is not ill-appearing or toxic-appearing.   HENT:      Head: Normocephalic and atraumatic.   Eyes:      General: No scleral icterus.  Cardiovascular:      Rate and Rhythm: Normal rate.   Pulmonary:      Effort: Pulmonary effort is normal.   Musculoskeletal:      Cervical back: Normal range of motion and neck supple.   Skin:     General: Skin is warm and dry.      Findings: Erythema present.      Comments: Left arm is edematous with mild erythema.  Surgical incision is mildly dehisced with drainage and foul odor present.  Culture obtained. Steri-strips and DSD applied.   Neurological:      Mental Status: She is alert and oriented to person, place, and time.   Psychiatric:         Mood and Affect: Mood normal.         Behavior: Behavior normal.         Thought Content: Thought content normal.         Judgment: Judgment normal.           Media Information        "

## 2024-12-30 NOTE — PROGRESS NOTES
Name: Sandra Purvis      : 12/3/1934      MRN: [id number]  Encounter Provider: LUIS Ayon  Encounter Date: 2024   Encounter department: [facility] [place]  :  Assessment & Plan  Infection of superficial incisional surgical site after procedure, subsequent encounter    Orders:  •  doxycycline (ADOXA) 100 MG tablet; Take 1 tablet (100 mg total) by mouth 2 (two) times a day for 7 days  •  Wound culture and Gram stain    Malignant melanoma of arm, left (HCC)  There appears to be infection present at the surgical site, with mild incisional dehiscence.  I have reinforced the skin with steri-strips.  Culture was obtained today.  She has been instructed to discontinue bactrim and drink plenty of water secondary to her history of kidney disease and current urinary symptoms. Will start her on doxycycline instead. Advised patient to change dressing daily after showering.  F/u in 4 days with Dr Edwards.              History of Present Illness   Chief Complaint   Patient presents with   • Follow-up     No follow-ups on file.    Pertinent Medical History   This is a 89 y/o female who presents to the office today with concern for post-op wound infection.  She underwent palliative excision of a large melanoma 11 days ago.  She reports that it seemed to be healing well at first, but then she developing swelling, redness and oozing. Four days ago she presented to her PCP for evaluation, at which time she was started on Bactrim.  She reports that the redness seems to be better but her arm is still very swollen.  She also reports that the steri-strips came off and the incision is still oozing.  The patient reports that she has noticed she is urinating very frequently all of a sudden as well.  She does have a history of kidney disease and is on HCTZ, but her current renal status is unknown.         Oncology History   Oncology History   Basal cell carcinoma (BCC) of  "scalp   7/17/2023 Biopsy    A. Skin, scalp, biopsy:     BASAL CELL CARCINOMA (NODULAR TYPE); transected     8/1/2023 Initial Diagnosis    Basal cell carcinoma (BCC) of scalp     Malignant melanoma of arm, left (HCC)   7/17/2023 Biopsy    B. Skin, left elbow, biopsy:     MELANOMA (thickness: at least 4.3 mm); transected   Ulceration cannot be determined   3 Mitoses      8/1/2023 Initial Diagnosis    Malignant melanoma of arm, left (HCC)     11/29/2023 -  Cancer Staged    Staging form: Melanoma of the Skin, AJCC 8th Edition  - Pathologic: Stage Unknown (pT4, pNX, cM0) - Signed by Elena Briseno MD on 11/29/2023            Review of Systems A complete review of systems is negative other than that noted above in the HPI.         Current Outpatient Medications   Medication Sig Dispense Refill   • doxycycline (ADOXA) 100 MG tablet Take 1 tablet (100 mg total) by mouth 2 (two) times a day for 7 days 14 tablet 0   • hydroCHLOROthiazide 12.5 mg tablet Take 1 tablet (12.5 mg total) by mouth daily 90 tablet 1   • metoprolol succinate (TOPROL-XL) 50 mg 24 hr tablet Take 1 tablet (50 mg total) by mouth daily 90 tablet 1   • DAILY MULTIPLE VITAMINS/IRON PO Take 1 tablet by mouth daily   (Patient not taking: Reported on 12/30/2024)     • multivitamin (THERAGRAN) TABS Take 1 tablet by mouth daily (Patient not taking: Reported on 12/26/2024)       No current facility-administered medications for this visit.      Objective   /80   Pulse 76   Temp 97.9 °F (36.6 °C)   Ht 5' 6\" (1.676 m)   Wt 52.6 kg (116 lb)   SpO2 98%   BMI 18.72 kg/m²     ECOG    Physical Exam  Vitals reviewed.   Constitutional:       General: She is not in acute distress.     Appearance: Normal appearance. She is not ill-appearing or toxic-appearing.   HENT:      Head: Normocephalic and atraumatic.   Eyes:      General: No scleral icterus.  Cardiovascular:      Rate and Rhythm: Normal rate.   Pulmonary:      Effort: Pulmonary effort is normal. "   Musculoskeletal:      Cervical back: Normal range of motion and neck supple.   Skin:     General: Skin is warm and dry.      Findings: Erythema present.      Comments: Left arm is edematous with mild erythema.  Surgical incision is mildly dehisced with drainage and foul odor present.  Culture obtained. Steri-strips and DSD applied.   Neurological:      Mental Status: She is alert and oriented to person, place, and time.   Psychiatric:         Mood and Affect: Mood normal.         Behavior: Behavior normal.         Thought Content: Thought content normal.         Judgment: Judgment normal.           Media Information

## 2025-01-01 LAB
BACTERIA WND AEROBE CULT: ABNORMAL
GRAM STN SPEC: ABNORMAL

## 2025-01-03 ENCOUNTER — PATIENT OUTREACH (OUTPATIENT)
Dept: HEMATOLOGY ONCOLOGY | Facility: CLINIC | Age: OVER 89
End: 2025-01-03

## 2025-01-03 ENCOUNTER — TELEPHONE (OUTPATIENT)
Age: OVER 89
End: 2025-01-03

## 2025-01-03 NOTE — TELEPHONE ENCOUNTER
LISA for patient's daughter, letting her know that the appt was cancelled and asking for an update on patient's condition. [facility] number given. Patient scheduled to see Dr. Briseno on 1/8.

## 2025-01-03 NOTE — TELEPHONE ENCOUNTER
calling.    Would like to CANCEL wife's appmt today and consolidate with Wednedsay appmt.    Difficulty travelling.   has UTI

## 2025-01-03 NOTE — PROGRESS NOTES
I reached out and spoke with JASPAL now that consults have been completed with the oncology teams to review for any barriers to care and offer supportive services as needed. Distress Thermometer completed at this time. Patient scored 2/10. Based on responses to DT, no indication for referral to SW needed at this time. . I reviewed and updated the members assigned to the care team in [facility].   She knows the members of the care team as well as how and when to contact them with any needs.   She verbalizes managing the schedules well.   She is currently unable to drive but is supported by family or friends and denies transportation needs.    She denies any uncontrolled symptoms. Discussed role of Palliative Care in symptom and side effect management. Declined referral at this time.  She states that she is eating and drinking as per usual with no unintentional weight loss.     Patient does not smoke.   She states she is well supported by family and friends.  Community support groups discussed including the [facility]. Patient declined information at this time.   She feels she has adequate insurance coverage and denies any financial concerns at this time.     Based on individual needs I will follow up in about 4 weeks. I have provided my direct contact information and welcome them to contact me if needs as discussed above change. She was appreciative for the call.

## 2025-01-04 ENCOUNTER — HOSPITAL ENCOUNTER (EMERGENCY)
Facility: HOSPITAL | Age: OVER 89
DRG: 157 | End: 2025-01-04
Attending: EMERGENCY MEDICINE | Admitting: EMERGENCY MEDICINE
Payer: MEDICARE

## 2025-01-04 ENCOUNTER — APPOINTMENT (EMERGENCY)
Dept: CT IMAGING | Facility: HOSPITAL | Age: OVER 89
DRG: 157 | End: 2025-01-04
Payer: MEDICARE

## 2025-01-04 ENCOUNTER — APPOINTMENT (EMERGENCY)
Dept: RADIOLOGY | Facility: HOSPITAL | Age: OVER 89
DRG: 157 | End: 2025-01-04
Payer: MEDICARE

## 2025-01-04 ENCOUNTER — HOSPITAL ENCOUNTER (INPATIENT)
Facility: HOSPITAL | Age: OVER 89
LOS: 8 days | DRG: 157 | End: 2025-01-12
Attending: SURGERY | Admitting: SURGERY
Payer: MEDICARE

## 2025-01-04 VITALS
RESPIRATION RATE: 24 BRPM | DIASTOLIC BLOOD PRESSURE: 86 MMHG | HEART RATE: 94 BPM | SYSTOLIC BLOOD PRESSURE: 201 MMHG | WEIGHT: 117.73 LBS | TEMPERATURE: 97.7 F | OXYGEN SATURATION: 97 % | BODY MASS INDEX: 19 KG/M2

## 2025-01-04 DIAGNOSIS — I95.1 ORTHOSTATIC HYPOTENSION: ICD-10-CM

## 2025-01-04 DIAGNOSIS — S06.5XAA SDH (SUBDURAL HEMATOMA) (HCC): ICD-10-CM

## 2025-01-04 DIAGNOSIS — S02.92XA EXTENSIVE FACIAL FRACTURES (HCC): ICD-10-CM

## 2025-01-04 DIAGNOSIS — I60.9 SAH (SUBARACHNOID HEMORRHAGE) (HCC): ICD-10-CM

## 2025-01-04 DIAGNOSIS — R07.89 OTHER CHEST PAIN: ICD-10-CM

## 2025-01-04 DIAGNOSIS — S02.92XA: Primary | ICD-10-CM
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
## 2025-01-04 DIAGNOSIS — R55 SYNCOPE: Primary | ICD-10-CM
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
## 2025-01-04 LAB
ALBUMIN SERPL BCG-MCNC: 3.9 G/DL (ref 3.5–5)
ALP SERPL-CCNC: 63 U/L (ref 34–104)
ALT SERPL W P-5'-P-CCNC: 68 U/L (ref 7–52)
ANION GAP SERPL CALCULATED.3IONS-SCNC: 7 MMOL/L (ref 4–13)
APTT PPP: 25 SECONDS (ref 23–34)
AST SERPL W P-5'-P-CCNC: 94 U/L (ref 13–39)
ATRIAL RATE: 92 BPM
BASOPHILS # BLD AUTO: 0.04 THOUSANDS/ΜL (ref 0–0.1)
BASOPHILS NFR BLD AUTO: 0 % (ref 0–1)
BILIRUB SERPL-MCNC: 0.5 MG/DL (ref 0.2–1)
BUN SERPL-MCNC: 30 MG/DL (ref 5–25)
CALCIUM SERPL-MCNC: 9.4 MG/DL (ref 8.4–10.2)
CARDIAC TROPONIN I PNL SERPL HS: 29 NG/L (ref ?–50)
CHLORIDE SERPL-SCNC: 101 MMOL/L (ref 96–108)
CO2 SERPL-SCNC: 27 MMOL/L (ref 21–32)
CREAT SERPL-MCNC: 1.31 MG/DL (ref 0.6–1.3)
EOSINOPHIL # BLD AUTO: 0.01 THOUSAND/ΜL (ref 0–0.61)
EOSINOPHIL NFR BLD AUTO: 0 % (ref 0–6)
ERYTHROCYTE [DISTWIDTH] IN BLOOD BY AUTOMATED COUNT: 15.1 % (ref 11.6–15.1)
GFR SERPL CREATININE-BSD FRML MDRD: 35 ML/MIN/1.73SQ M
GLUCOSE SERPL-MCNC: 125 MG/DL (ref 65–140)
GLUCOSE SERPL-MCNC: 150 MG/DL (ref 65–140)
HCT VFR BLD AUTO: 33 % (ref 34.8–46.1)
HGB BLD-MCNC: 10.6 G/DL (ref 11.5–15.4)
IMM GRANULOCYTES # BLD AUTO: 0.07 THOUSAND/UL (ref 0–0.2)
IMM GRANULOCYTES NFR BLD AUTO: 1 % (ref 0–2)
INR PPP: 1.12 (ref 0.85–1.19)
LYMPHOCYTES # BLD AUTO: 0.76 THOUSANDS/ΜL (ref 0.6–4.47)
LYMPHOCYTES NFR BLD AUTO: 5 % (ref 14–44)
MAGNESIUM SERPL-MCNC: 2 MG/DL (ref 1.9–2.7)
MCH RBC QN AUTO: 28.5 PG (ref 26.8–34.3)
MCHC RBC AUTO-ENTMCNC: 32.1 G/DL (ref 31.4–37.4)
MCV RBC AUTO: 89 FL (ref 82–98)
MONOCYTES # BLD AUTO: 0.67 THOUSAND/ΜL (ref 0.17–1.22)
MONOCYTES NFR BLD AUTO: 5 % (ref 4–12)
NEUTROPHILS # BLD AUTO: 12.83 THOUSANDS/ΜL (ref 1.85–7.62)
NEUTS SEG NFR BLD AUTO: 89 % (ref 43–75)
NRBC BLD AUTO-RTO: 0 /100 WBCS
P AXIS: 248 DEGREES
PLATELET # BLD AUTO: 135 THOUSANDS/UL (ref 149–390)
PMV BLD AUTO: 11.6 FL (ref 8.9–12.7)
POTASSIUM SERPL-SCNC: 3.9 MMOL/L (ref 3.5–5.3)
PR INTERVAL: 264 MS
PROT SERPL-MCNC: 6.7 G/DL (ref 6.4–8.4)
PROTHROMBIN TIME: 14.6 SECONDS (ref 12.3–15)
QRS AXIS: -79 DEGREES
QRSD INTERVAL: 88 MS
QT INTERVAL: 326 MS
QTC INTERVAL: 403 MS
RBC # BLD AUTO: 3.72 MILLION/UL (ref 3.81–5.12)
SODIUM SERPL-SCNC: 135 MMOL/L (ref 135–147)
T WAVE AXIS: 89 DEGREES
VENTRICULAR RATE: 92 BPM
WBC # BLD AUTO: 14.38 THOUSAND/UL (ref 4.31–10.16)

## 2025-01-04 PROCEDURE — 85730 THROMBOPLASTIN TIME PARTIAL: CPT | Performed by: EMERGENCY MEDICINE

## 2025-01-04 PROCEDURE — 36415 COLL VENOUS BLD VENIPUNCTURE: CPT | Performed by: EMERGENCY MEDICINE

## 2025-01-04 PROCEDURE — 85025 COMPLETE CBC W/AUTO DIFF WBC: CPT | Performed by: EMERGENCY MEDICINE

## 2025-01-04 PROCEDURE — 83735 ASSAY OF MAGNESIUM: CPT | Performed by: EMERGENCY MEDICINE

## 2025-01-04 PROCEDURE — NC001 PR NO CHARGE: Performed by: SURGERY

## 2025-01-04 PROCEDURE — 82948 REAGENT STRIP/BLOOD GLUCOSE: CPT

## 2025-01-04 PROCEDURE — 99291 CRITICAL CARE FIRST HOUR: CPT | Performed by: SURGERY

## 2025-01-04 PROCEDURE — 99291 CRITICAL CARE FIRST HOUR: CPT | Performed by: EMERGENCY MEDICINE

## 2025-01-04 PROCEDURE — 93005 ELECTROCARDIOGRAM TRACING: CPT

## 2025-01-04 PROCEDURE — 72125 CT NECK SPINE W/O DYE: CPT

## 2025-01-04 PROCEDURE — 85610 PROTHROMBIN TIME: CPT | Performed by: EMERGENCY MEDICINE

## 2025-01-04 PROCEDURE — 80053 COMPREHEN METABOLIC PANEL: CPT | Performed by: EMERGENCY MEDICINE

## 2025-01-04 PROCEDURE — 70450 CT HEAD/BRAIN W/O DYE: CPT

## 2025-01-04 PROCEDURE — 71045 X-RAY EXAM CHEST 1 VIEW: CPT

## 2025-01-04 PROCEDURE — 99285 EMERGENCY DEPT VISIT HI MDM: CPT

## 2025-01-04 PROCEDURE — 84484 ASSAY OF TROPONIN QUANT: CPT | Performed by: EMERGENCY MEDICINE

## 2025-01-04 PROCEDURE — 70486 CT MAXILLOFACIAL W/O DYE: CPT

## 2025-01-04 RX ORDER — ACETAMINOPHEN 325 MG/1
975 TABLET ORAL EVERY 8 HOURS SCHEDULED
Status: DISCONTINUED | OUTPATIENT
Start: 2025-01-04 | End: 2025-01-12 | Stop reason: HOSPADM

## 2025-01-04 RX ORDER — CHLORHEXIDINE GLUCONATE ORAL RINSE 1.2 MG/ML
15 SOLUTION DENTAL EVERY 12 HOURS SCHEDULED
Status: DISCONTINUED | OUTPATIENT
Start: 2025-01-04 | End: 2025-01-12 | Stop reason: HOSPADM

## 2025-01-04 RX ORDER — OXYCODONE HYDROCHLORIDE 5 MG/1
5 TABLET ORAL EVERY 4 HOURS PRN
Refills: 0 | Status: DISCONTINUED | OUTPATIENT
Start: 2025-01-04 | End: 2025-01-12 | Stop reason: HOSPADM

## 2025-01-04 RX ORDER — ONDANSETRON 2 MG/ML
4 INJECTION INTRAMUSCULAR; INTRAVENOUS EVERY 4 HOURS PRN
Status: DISCONTINUED | OUTPATIENT
Start: 2025-01-04 | End: 2025-01-12 | Stop reason: HOSPADM

## 2025-01-04 RX ORDER — HYDROMORPHONE HCL IN WATER/PF 6 MG/30 ML
0.2 PATIENT CONTROLLED ANALGESIA SYRINGE INTRAVENOUS EVERY 2 HOUR PRN
Status: DISCONTINUED | OUTPATIENT
Start: 2025-01-04 | End: 2025-01-06

## 2025-01-04 RX ORDER — AMOXICILLIN 250 MG
1 CAPSULE ORAL
Status: DISCONTINUED | OUTPATIENT
Start: 2025-01-04 | End: 2025-01-12 | Stop reason: HOSPADM

## 2025-01-04 RX ORDER — NICARDIPINE HYDROCHLORIDE 2.5 MG/ML
INJECTION INTRAVENOUS
Status: COMPLETED
Start: 2025-01-04 | End: 2025-01-04

## 2025-01-04 RX ADMIN — HYDROMORPHONE HYDROCHLORIDE 0.2 MG: 0.2 INJECTION, SOLUTION INTRAMUSCULAR; INTRAVENOUS; SUBCUTANEOUS at 20:36

## 2025-01-04 RX ADMIN — SENNOSIDES AND DOCUSATE SODIUM 1 TABLET: 50; 8.6 TABLET ORAL at 23:13

## 2025-01-04 RX ADMIN — CHLORHEXIDINE GLUCONATE 0.12% ORAL RINSE 15 ML: 1.2 LIQUID ORAL at 20:32

## 2025-01-04 RX ADMIN — ACETAMINOPHEN 975 MG: 325 TABLET, FILM COATED ORAL at 23:13

## 2025-01-04 RX ADMIN — SODIUM CHLORIDE 5 MG/HR: 0.9 INJECTION, SOLUTION INTRAVENOUS at 19:45

## 2025-01-04 NOTE — EMTALA/ACUTE CARE TRANSFER
[facility] EMERGENCY DEPARTMENT  [address] [address]  Dept: [phone]      EMTALA TRANSFER CONSENT    NAME Sandra Purvis                                         12/3/1934                              MRN [id number]    I have been informed of my rights regarding examination, treatment, and transfer   by Dr. Kika Goldstein DO    Benefits: Specialized equipment and/or services available at the receiving facility (Include comment)________________________ (trauma, neurosurgery)    Risks: Potential for delay in receiving treatment, Potential deterioration of medical condition, Loss of IV, Increased discomfort during transfer, Possible worsening of condition or death during transfer      Consent for Transfer:  I acknowledge that my medical condition has been evaluated and explained to me by the emergency department physician or other qualified medical person and/or my attending physician, who has recommended that I be transferred to the service of  Accepting Physician: Dr. Silva at Accepting Facility Name, City & State : [facility]. The above potential benefits of such transfer, the potential risks associated with such transfer, and the probable risks of not being transferred have been explained to me, and I fully understand them.  The doctor has explained that, in my case, the benefits of transfer outweigh the risks.  I agree to be transferred.    I authorize the performance of emergency medical procedures and treatments upon me in both transit and upon arrival at the receiving facility.  Additionally, I authorize the release of any and all medical records to the receiving facility and request they be transported with me, if possible.  I understand that the safest mode of transportation during a medical emergency is an ambulance and that the Hospital advocates the use of this mode of transport. Risks of traveling to the receiving facility by car, including absence of medical control,  life sustaining equipment, such as oxygen, and medical personnel has been explained to me and I fully understand them.    (CORI CORRECT BOX BELOW)  [  ]  I consent to the stated transfer and to be transported by ambulance/helicopter.  [  ]  I consent to the stated transfer, but refuse transportation by ambulance and accept full responsibility for my transportation by car.  I understand the risks of non-ambulance transfers and I exonerate the Hospital and its staff from any deterioration in my condition that results from this refusal.    X___________________________________________    DATE  25  TIME________  Signature of patient or legally responsible individual signing on patient behalf           RELATIONSHIP TO PATIENT_________________________          Provider Certification    NAME Sandra Purvis                                        [facility] 12/3/1934                              MRN [id number]    A medical screening exam was performed on the above named patient.  Based on the examination:    Condition Necessitating Transfer The primary encounter diagnosis was Syncope. Diagnoses of Fall, initial encounter, SDH (subdural hematoma) (HCC), and Extensive facial fractures (HCC) were also pertinent to this visit.    Patient Condition: The patient has been stabilized such that within reasonable medical probability, no material deterioration of the patient condition or the condition of the unborn child(ketty) is likely to result from the transfer    Reason for Transfer: Level of Care needed not available at this facility (trauma, neurosurgery)    Transfer Requirements: Facility [facility]   Space available and qualified personnel available for treatment as acknowledged by    Agreed to accept transfer and to provide appropriate medical treatment as acknowledged by       Dr. Silva  Appropriate medical records of the examination and treatment of the patient are provided at the time of transfer   STAFF INITIAL WHEN COMPLETED  _______  Transfer will be performed by qualified personnel from    and appropriate transfer equipment as required, including the use of necessary and appropriate life support measures.    Provider Certification: I have examined the patient and explained the following risks and benefits of being transferred/refusing transfer to the patient/family:  General risk, such as traffic hazards, adverse weather conditions, rough terrain or turbulence, possible failure of equipment (including vehicle or aircraft), or consequences of actions of persons outside the control of the transport personnel      Based on these reasonable risks and benefits to the patient and/or the unborn child(ketty), and based upon the information available at the time of the patient’s examination, I certify that the medical benefits reasonably to be expected from the provision of appropriate medical treatments at another medical facility outweigh the increasing risks, if any, to the individual’s medical condition, and in the case of labor to the unborn child, from effecting the transfer.    X____________________________________________ DATE 01/04/25        TIME_______      ORIGINAL - SEND TO MEDICAL RECORDS   COPY - SEND WITH PATIENT DURING TRANSFER

## 2025-01-04 NOTE — ED NOTES
Dr. Goldstein aware unable to obtain type and screen at this type despite 3 attempts. Per Dr. Goldstein not necessary to collect at this time.        Mei Crowley RN  01/04/25 [id number]

## 2025-01-04 NOTE — ED PROVIDER NOTES
Time reflects when diagnosis was documented in both MDM as applicable and the Disposition within this note       Time User Action Codes Description Comment    1/4/2025  6:08 PM Angelita Kika Add [R55] Syncope     1/4/2025  6:08 PM Angelita Kika Add [W19.XXXA] Fall, initial encounter     1/4/2025  6:08 PM Angelita Kika Add [S06.5XAA] SDH (subdural hematoma) (HCC)     1/4/2025  6:09 PM Prakash Goldsteinyssa Add [S02.92XA] Extensive facial fractures (HCC)           ED Disposition       ED Disposition   Transfer to Another Facility-In Network    Condition   --    Date/Time   Sat Jan 4, 2025  6:08 PM    Comment   Sandra Purvis should be transferred out to [facility] - Dr. Silva, trauma.               Assessment & Plan       Medical Decision Making  A 89 yo female presents following a syncopal episode with subsequent head/facial trauma.  Will proceed with labs to evaluate for electrolyte abnormality, MAHOGANY and anemia.  Will check EKG for arrhythmia.  Will obtain imaging to rule out traumatic injury.    Amount and/or Complexity of Data Reviewed  Labs: ordered.  Radiology: ordered and independent interpretation performed. Decision-making details documented in ED Course.        ED Course as of 01/04/25 2040   Sat Jan 04, 2025   1748 CT head without contrast  Image independently interpreted by myself - CTH with left SDH and SAH.  CT facial bones with comminuted right sided orbital fractures.    Pt remains neuro intact.  She denies blood thinners.  Will discuss with trauma.   1802 Spoke with Dr. Silva, trauma, reviewing pt's history, presentation and work up.  Will accept in transfer to [facility]       Medications - No data to display    ED Risk Strat Scores                                              History of Present Illness       Chief Complaint   Patient presents with    Head Injury     Patient brought by EMS from home. Patient reports she was seated, stood and began to walk when she developed sudden onset dizziness, syncope with fall  "and head strike. Hematoma to R eye, dried blood to b/l nare, upper lip swelling. Patient complains of facial pain. \"Feeling unwell\". Denies headache, dizziness, paresthesia at present. No thinners       Past Medical History:   Diagnosis Date    Arthritis     Cataract     LastAssessed:9/8/15    GERD (gastroesophageal reflux disease)     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    Hypertension     PAF (paroxysmal atrial fibrillation) (HCC)     Last Assessed:2/3/16    Wrist fracture     Resolved:9/8/15      Past Surgical History:   Procedure Laterality Date    CATARACT EXTRACTION      Last Assessed:9/8/15    FL RETROGRADE PYELOGRAM  10/17/2021    LYMPHADENECTOMY      Last Assessed:9/8/15    DC CYSTO/URETERO W/LITHOTRIPSY &INDWELL STENT INSRT Right 11/24/2021    Procedure: CYSTOSCOPY URETEROSCOPY WITH LITHOTRIPSY HOLMIUM LASER, RETROGRADE PYELOGRAM AND INSERTION STENT URETERAL;  Surgeon: David Sr MD;  [facility]: [facility];  Service: Urology    DC CYSTOURETHROSCOPY W/URETERAL CATHETERIZATION Right 10/17/2021    Procedure: CYSTOSCOPY RETROGRADE PYELOGRAM WITH INSERTION STENT URETERAL;  Surgeon: Benjie Tinoco MD;  Location: [facility];  Service: Urology    TONSILLECTOMY      Last Assessed:9/8/15    WRIST SURGERY      Last Assessed:9/8/15      Family History   Problem Relation Age of Onset    No Known Problems Family       Social History     Tobacco Use    Smoking status: Never    Smokeless tobacco: Never   Vaping Use    Vaping status: Never Used   Substance Use Topics    Alcohol use: No    Drug use: Never      E-Cigarette/Vaping    E-Cigarette Use Never User       E-Cigarette/Vaping Substances    Nicotine No     THC No     CBD No     Flavoring No     Other No     Unknown No       I have reviewed and agree with the history as documented.     A 89 yo female with pmhx of HTN, paroxysmal afib, CKD and malignant melanoma s/p resection; presents following a syncopal episode.   reports pt had just ate lunch when " "she stood from the chair and walked into the next room, when he heard a \"thud\" and found her on the ground unconscious.  Pt was unconsciousness for less than two minutes before regaining consciousness.  Pt does recall the episode, stating she felt lightheaded before losing consciousness and falling.  Currently patient complains of right sided facial pain.  She denies headache, dizziness, chest pain, shortness of breath, abdominal pain, nausea, vomiting, diarrhea, peripheral edema, rashes, paresthesias and focal weakness.      History provided by:  Patient, medical records and spouse      Review of Systems   Neurological:  Positive for syncope and light-headedness.   All other systems reviewed and are negative.      Objective       ED Triage Vitals [01/04/25 1604]   Temperature Pulse Blood Pressure Respirations SpO2 Patient Position - Orthostatic VS   97.7 °F (36.5 °C) 92 (!) 209/83 16 97 % Lying      Temp Source Heart Rate Source BP Location FiO2 (%) Pain Score    Oral Monitor Right arm -- 6      Vitals      Date and Time Temp Pulse SpO2 Resp BP Pain Score FACES Pain Rating User   01/04/25 1830 -- 94 97 % 24 201/86 -- -- GG   01/04/25 1815 -- 96 97 % 20 203/93 -- -- GG   01/04/25 1800 -- 84 -- 18 210/85 -- -- HI   01/04/25 1759 -- -- -- -- 210/85 -- -- GG   01/04/25 1604 97.7 °F (36.5 °C) 92 97 % 16 209/83 6 facial pain -- TMS            Physical Exam  General Appearance: alert and oriented, nad, non toxic appearing  Skin:  Warm, dry, intact.  No cyanosis  HEENT: Normocephalic.  No eye drainage.  Normal hearing.  Moist mucous membranes.  Right periorbital ecchymosis with swelling extending toward the right maxilla.  PERRLA, EOMI.  Dried blood noted in the nares bilaterally, no septal hematoma.  Teeth intact.  Neck: Supple, trachea midline  Cardiac: RRR; no murmurs, rub, gallops.  No pedal edema, 2+ pulses  Pulmonary: lungs CTAB; no wheezes, rales, rhonchi  Gastrointestinal: abdomen soft, nontender, nondistended; no " guarding or rebound tenderness; good bowel sounds, no mass or bruits  Extremities:  Extremities nontender with full ROM.  No deformities.  No calf tenderness, no clubbing  Neuro:  no focal motor or sensory deficits, CN 2-12 grossly intact  Psych:  Normal mood and affect, normal judgement and insight       Results Reviewed       Procedure Component Value Units Date/Time    HS Troponin I 4hr [[id number]]     Lab Status: No result Specimen: Blood     Comprehensive metabolic panel [[id number]]  (Abnormal) Collected: 01/04/25 1743    Lab Status: Final result Specimen: Blood from Arm, Right Updated: 01/04/25 1906     Sodium 135 mmol/L      Potassium 3.9 mmol/L      Chloride 101 mmol/L      CO2 27 mmol/L      ANION GAP 7 mmol/L      BUN 30 mg/dL      Creatinine 1.31 mg/dL      Glucose 125 mg/dL      Calcium 9.4 mg/dL      AST 94 U/L      ALT 68 U/L      Alkaline Phosphatase 63 U/L      Total Protein 6.7 g/dL      Albumin 3.9 g/dL      Total Bilirubin 0.50 mg/dL      eGFR 35 ml/min/1.73sq m     Narrative:      National Kid[employer] Foundation guidelines for Chronic Kidney Disease (CKD):     Stage 1 with normal or high GFR (GFR > 90 mL/min/1.73 square meters)    Stage 2 Mild CKD (GFR = 60-89 mL/min/1.73 square meters)    Stage 3A Moderate CKD (GFR = 45-59 mL/min/1.73 square meters)    Stage 3B Moderate CKD (GFR = 30-44 mL/min/1.73 square meters)    Stage 4 Severe CKD (GFR = 15-29 mL/min/1.73 square meters)    Stage 5 End Stage CKD (GFR <15 mL/min/1.73 square meters)  Note: GFR calculation is accurate only with a steady state creatinine    HS Troponin 0hr (reflex protocol) [[id number]]  (Normal) Collected: 01/04/25 1743    Lab Status: Final result Specimen: Blood from Arm, Right Updated: 01/04/25 1831     hs TnI 0hr 29 ng/L     HS Troponin I 2hr [[id number]]     Lab Status: No result Specimen: Blood     Protime-INR [[id number]]  (Normal) Collected: 01/04/25 1743    Lab Status: Final result Specimen: Blood from Arm, Right  Updated: 01/04/25 1827     Protime 14.6 seconds      INR 1.12    Narrative:      INR Therapeutic Range    Indication                                             INR Range      Atrial Fibrillation                                               2.0-3.0  Hypercoagulable State                                    2.0.2.3  Left Ventricular Asist Device                            2.0-3.0  Mechanical Heart Valve                                  -    Aortic(with afib, MI, embolism, HF, LA enlargement,    and/or coagulopathy)                                     2.0-3.0 (2.5-3.5)     Mitral                                                             2.5-3.5  Prosthetic/Bioprosthetic Heart Valve               2.0-3.0  Venous thromboembolism (VTE: VT, PE        2.0-3.0    APTT [[id number]]  (Normal) Collected: 01/04/25 1743    Lab Status: Final result Specimen: Blood from Arm, Right Updated: 01/04/25 1827     PTT 25 seconds     Magnesium [[id number]]  (Normal) Collected: 01/04/25 1743    Lab Status: Final result Specimen: Blood from Arm, Right Updated: 01/04/25 1823     Magnesium 2.0 mg/dL     CBC and differential [[id number]]  (Abnormal) Collected: 01/04/25 1743    Lab Status: Final result Specimen: Blood from Arm, Right Updated: 01/04/25 1812     WBC 14.38 Thousand/uL      RBC 3.72 Million/uL      Hemoglobin 10.6 g/dL      Hematocrit 33.0 %      MCV 89 fL      MCH 28.5 pg      MCHC 32.1 g/dL      RDW 15.1 %      MPV 11.6 fL      Platelets 135 Thousands/uL      nRBC 0 /100 WBCs      Segmented % 89 %      Immature Grans % 1 %      Lymphocytes % 5 %      Monocytes % 5 %      Eosinophils Relative 0 %      Basophils Relative 0 %      Absolute Neutrophils 12.83 Thousands/µL      Absolute Immature Grans 0.07 Thousand/uL      Absolute Lymphocytes 0.76 Thousands/µL      Absolute Monocytes 0.67 Thousand/µL      Eosinophils Absolute 0.01 Thousand/µL      Basophils Absolute 0.04 Thousands/µL     UA (URINE) with reflex to Scope [[id number]]      Lab Status: No result Specimen: Urine     Fingerstick Glucose (POCT) [[id number]]  (Abnormal) Collected: 01/04/25 1604    Lab Status: Final result Specimen: Blood Updated: 01/04/25 1605     POC Glucose 150 mg/dl             CT head without contrast   Final Interpretation by Jose Tate MD (01/04 1856)      1. Acute subdural hemorrhage along the left cerebral convexity with maximal thickness of 1.9 cm. No midline shift.      2. Acute subarachnoid hemorrhage in the left frontoparietal sulci, left sylvian fissure and suprasellar cistern.      3. Please refer to the separately dictated CT facial bones report for traumatic findings in the face.            I personally discussed this study with YUMI LAL on 1/4/2025 6:56 PM.                        Workstation performed: [id number]         CT cervical spine without contrast   Final Interpretation by Jose Tate MD (01/04 1858)      1. No cervical spine fracture or traumatic malalignment.      2. Partially imaged diffuse gaseous distention of the esophagus. Further evaluation with chest CT is recommended to evaluate the distal esophagus.      I personally discussed this study with YUMI LAL on 1/4/2025 6:56 PM.      Workstation performed: [id number]         CT facial bones without contrast   Final Interpretation by Jose Tate MD (01/04 1857)      1. Comminuted and mildly displaced fractures of the lateral and anterior walls of right maxilla, lateral and inferior walls of right orbit.      2. Right inferior orbital wall fracture appears to involve the inferior orbital foramen with associated small extraconal soft tissue edema and focus of air. No herniation of extraocular muscles.      3. Nondisplaced fractures of zygomatic arch and the temporal bone at the TMJ.      4. Diffuse opacification of right maxillary sinus and right nasal cavity with blood products and air.            I personally discussed this study with YUMI LAL on  1/4/2025 6:56 PM.      Workstation performed: [id number]         XR chest 1 view portable   ED Interpretation by Kika Goldstein DO (01/04 2208)   No acute disease    Image independently interpreted by myself        Final Interpretation by Leana Fuentes MD (01/04 3369)      No acute disease.      Distended air-filled esophagus.      Moderate cardiomegaly.            Workstation performed: [id number]             CriticalCare Time    Date/Time: 1/4/2025 6:10 PM    Performed by: Kika Goldstein DO  Authorized by: Kika Goldstein DO    Critical care provider statement:     Critical care time (minutes):  30    Critical care time was exclusive of:  Separately billable procedures and treating other patients and teaching time    Critical care was necessary to treat or prevent imminent or life-threatening deterioration of the following conditions:  Trauma    Critical care was time spent personally by me on the following activities:  Obtaining history from patient or surrogate, development of treatment plan with patient or surrogate, examination of patient, evaluation of patient's response to treatment, re-evaluation of patient's condition, ordering and review of radiographic studies, ordering and performing treatments and interventions, discussions with consultants, review of old charts and ordering and review of laboratory studies    I assumed direction of critical care for this patient from another provider in my specialty: no        ED Medication and Procedure Management   Prior to Admission Medications   Prescriptions Last Dose Informant Patient Reported? Taking?   DAILY MULTIPLE VITAMINS/IRON PO  Self Yes No   Sig: Take 1 tablet by mouth daily     Patient not taking: Reported on 12/30/2024   doxycycline (ADOXA) 100 MG tablet   No No   Sig: Take 1 tablet (100 mg total) by mouth 2 (two) times a day for 7 days   hydroCHLOROthiazide 12.5 mg tablet   No No   Sig: Take 1 tablet (12.5 mg total) by mouth daily    metoprolol succinate (TOPROL-XL) 50 mg 24 hr tablet   No No   Sig: Take 1 tablet (50 mg total) by mouth daily   multivitamin (THERAGRAN) TABS  Self Yes No   Sig: Take 1 tablet by mouth daily   Patient not taking: Reported on 12/26/2024      Facility-Administered Medications: None     Discharge Medication List as of 1/4/2025  6:59 PM        CONTINUE these medications which have NOT CHANGED    Details   DAILY MULTIPLE VITAMINS/IRON PO Take 1 tablet by mouth daily  , Historical Med      doxycycline (ADOXA) 100 MG tablet Take 1 tablet (100 mg total) by mouth 2 (two) times a day for 7 days, Starting Mon 12/30/2024, Until Mon 1/6/2025, Normal      hydroCHLOROthiazide 12.5 mg tablet Take 1 tablet (12.5 mg total) by mouth daily, Starting u 11/21/2024, Normal      metoprolol succinate (TOPROL-XL) 50 mg 24 hr tablet Take 1 tablet (50 mg total) by mouth daily, Starting u 11/21/2024, Normal      multivitamin (THERAGRAN) TABS Take 1 tablet by mouth daily, Historical Med           No discharge procedures on file.  ED SEPSIS DOCUMENTATION   Time reflects when diagnosis was documented in both MDM as applicable and the Disposition within this note       Time User Action Codes Description Comment    1/4/2025  6:08 PM Kika Goldstein Add [R55] Syncope     1/4/2025  6:08 PM Kika Goldstein Add [W19.XXXA] Fall, initial encounter     1/4/2025  6:08 PM Kika Goldstein Add [S06.5XAA] SDH (subdural hematoma) (HCC)     1/4/2025  6:09 PM Kika Goldstein Add [S02.92XA] Extensive facial fractures (HCC)                  Kika Goldstein DO  01/04/25 2040

## 2025-01-05 ENCOUNTER — APPOINTMENT (INPATIENT)
Dept: RADIOLOGY | Facility: HOSPITAL | Age: OVER 89
DRG: 157 | End: 2025-01-05
Payer: MEDICARE

## 2025-01-05 LAB
ALBUMIN SERPL BCG-MCNC: 3.5 G/DL (ref 3.5–5)
ALP SERPL-CCNC: 58 U/L (ref 34–104)
ALT SERPL W P-5'-P-CCNC: 49 U/L (ref 7–52)
ANION GAP SERPL CALCULATED.3IONS-SCNC: 6 MMOL/L (ref 4–13)
AST SERPL W P-5'-P-CCNC: 42 U/L (ref 13–39)
BASOPHILS # BLD AUTO: 0.04 THOUSANDS/ΜL (ref 0–0.1)
BASOPHILS NFR BLD AUTO: 0 % (ref 0–1)
BILIRUB SERPL-MCNC: 0.77 MG/DL (ref 0.2–1)
BUN SERPL-MCNC: 22 MG/DL (ref 5–25)
CA-I BLD-SCNC: 1.16 MMOL/L (ref 1.12–1.32)
CALCIUM SERPL-MCNC: 9.4 MG/DL (ref 8.4–10.2)
CHLORIDE SERPL-SCNC: 102 MMOL/L (ref 96–108)
CO2 SERPL-SCNC: 29 MMOL/L (ref 21–32)
CREAT SERPL-MCNC: 0.94 MG/DL (ref 0.6–1.3)
EOSINOPHIL # BLD AUTO: 0.01 THOUSAND/ΜL (ref 0–0.61)
EOSINOPHIL NFR BLD AUTO: 0 % (ref 0–6)
ERYTHROCYTE [DISTWIDTH] IN BLOOD BY AUTOMATED COUNT: 14.9 % (ref 11.6–15.1)
GFR SERPL CREATININE-BSD FRML MDRD: 53 ML/MIN/1.73SQ M
GLUCOSE SERPL-MCNC: 101 MG/DL (ref 65–140)
HCT VFR BLD AUTO: 34.3 % (ref 34.8–46.1)
HGB BLD-MCNC: 11.1 G/DL (ref 11.5–15.4)
IMM GRANULOCYTES # BLD AUTO: 0.08 THOUSAND/UL (ref 0–0.2)
IMM GRANULOCYTES NFR BLD AUTO: 1 % (ref 0–2)
LYMPHOCYTES # BLD AUTO: 1.09 THOUSANDS/ΜL (ref 0.6–4.47)
LYMPHOCYTES NFR BLD AUTO: 6 % (ref 14–44)
MAGNESIUM SERPL-MCNC: 1.8 MG/DL (ref 1.9–2.7)
MCH RBC QN AUTO: 28.2 PG (ref 26.8–34.3)
MCHC RBC AUTO-ENTMCNC: 32.4 G/DL (ref 31.4–37.4)
MCV RBC AUTO: 87 FL (ref 82–98)
MONOCYTES # BLD AUTO: 0.79 THOUSAND/ΜL (ref 0.17–1.22)
MONOCYTES NFR BLD AUTO: 5 % (ref 4–12)
NEUTROPHILS # BLD AUTO: 15.49 THOUSANDS/ΜL (ref 1.85–7.62)
NEUTS SEG NFR BLD AUTO: 88 % (ref 43–75)
NRBC BLD AUTO-RTO: 0 /100 WBCS
PHOSPHATE SERPL-MCNC: 3 MG/DL (ref 2.3–4.1)
PLATELET # BLD AUTO: 212 THOUSANDS/UL (ref 149–390)
PMV BLD AUTO: 11.1 FL (ref 8.9–12.7)
POTASSIUM SERPL-SCNC: 3.9 MMOL/L (ref 3.5–5.3)
PROT SERPL-MCNC: 6.2 G/DL (ref 6.4–8.4)
RBC # BLD AUTO: 3.93 MILLION/UL (ref 3.81–5.12)
SODIUM SERPL-SCNC: 137 MMOL/L (ref 135–147)
WBC # BLD AUTO: 17.5 THOUSAND/UL (ref 4.31–10.16)

## 2025-01-05 PROCEDURE — 83735 ASSAY OF MAGNESIUM: CPT | Performed by: NURSE PRACTITIONER

## 2025-01-05 PROCEDURE — 84100 ASSAY OF PHOSPHORUS: CPT | Performed by: NURSE PRACTITIONER

## 2025-01-05 PROCEDURE — NC001 PR NO CHARGE: Performed by: STUDENT IN AN ORGANIZED HEALTH CARE EDUCATION/TRAINING PROGRAM

## 2025-01-05 PROCEDURE — 85025 COMPLETE CBC W/AUTO DIFF WBC: CPT | Performed by: NURSE PRACTITIONER

## 2025-01-05 PROCEDURE — 99222 1ST HOSP IP/OBS MODERATE 55: CPT | Performed by: STUDENT IN AN ORGANIZED HEALTH CARE EDUCATION/TRAINING PROGRAM

## 2025-01-05 PROCEDURE — 99232 SBSQ HOSP IP/OBS MODERATE 35: CPT | Performed by: STUDENT IN AN ORGANIZED HEALTH CARE EDUCATION/TRAINING PROGRAM

## 2025-01-05 PROCEDURE — 80053 COMPREHEN METABOLIC PANEL: CPT | Performed by: NURSE PRACTITIONER

## 2025-01-05 PROCEDURE — 82330 ASSAY OF CALCIUM: CPT | Performed by: NURSE PRACTITIONER

## 2025-01-05 PROCEDURE — 70450 CT HEAD/BRAIN W/O DYE: CPT

## 2025-01-05 RX ORDER — METOPROLOL TARTRATE 25 MG/1
25 TABLET, FILM COATED ORAL EVERY 12 HOURS SCHEDULED
Status: DISCONTINUED | OUTPATIENT
Start: 2025-01-05 | End: 2025-01-07

## 2025-01-05 RX ORDER — POLYETHYLENE GLYCOL 3350 17 G/17G
17 POWDER, FOR SOLUTION ORAL DAILY
Status: DISCONTINUED | OUTPATIENT
Start: 2025-01-05 | End: 2025-01-12 | Stop reason: HOSPADM

## 2025-01-05 RX ORDER — PANTOPRAZOLE SODIUM 40 MG/10ML
40 INJECTION, POWDER, LYOPHILIZED, FOR SOLUTION INTRAVENOUS
Status: DISCONTINUED | OUTPATIENT
Start: 2025-01-05 | End: 2025-01-06

## 2025-01-05 RX ORDER — ENOXAPARIN SODIUM 100 MG/ML
30 INJECTION SUBCUTANEOUS EVERY 12 HOURS SCHEDULED
Status: DISCONTINUED | OUTPATIENT
Start: 2025-01-06 | End: 2025-01-09

## 2025-01-05 RX ORDER — LEVETIRACETAM 500 MG/5ML
500 INJECTION, SOLUTION, CONCENTRATE INTRAVENOUS EVERY 12 HOURS SCHEDULED
Status: DISCONTINUED | OUTPATIENT
Start: 2025-01-05 | End: 2025-01-07

## 2025-01-05 RX ADMIN — SODIUM CHLORIDE 3 G: 9 INJECTION, SOLUTION INTRAVENOUS at 15:13

## 2025-01-05 RX ADMIN — PANTOPRAZOLE SODIUM 40 MG: 40 INJECTION, POWDER, FOR SOLUTION INTRAVENOUS at 10:05

## 2025-01-05 RX ADMIN — LEVETIRACETAM 500 MG: 100 INJECTION, SOLUTION INTRAVENOUS at 22:45

## 2025-01-05 RX ADMIN — LEVETIRACETAM 500 MG: 100 INJECTION, SOLUTION INTRAVENOUS at 10:13

## 2025-01-05 RX ADMIN — POLYETHYLENE GLYCOL 3350 17 G: 17 POWDER, FOR SOLUTION ORAL at 22:46

## 2025-01-05 RX ADMIN — SENNOSIDES AND DOCUSATE SODIUM 1 TABLET: 50; 8.6 TABLET ORAL at 22:46

## 2025-01-05 RX ADMIN — CHLORHEXIDINE GLUCONATE 0.12% ORAL RINSE 15 ML: 1.2 LIQUID ORAL at 09:45

## 2025-01-05 RX ADMIN — SODIUM CHLORIDE 3 G: 9 INJECTION, SOLUTION INTRAVENOUS at 23:14

## 2025-01-05 RX ADMIN — ACETAMINOPHEN 975 MG: 325 TABLET, FILM COATED ORAL at 05:43

## 2025-01-05 RX ADMIN — SODIUM CHLORIDE 3 G: 9 INJECTION, SOLUTION INTRAVENOUS at 10:12

## 2025-01-05 RX ADMIN — ACETAMINOPHEN 975 MG: 325 TABLET, FILM COATED ORAL at 13:12

## 2025-01-05 RX ADMIN — ACETAMINOPHEN 975 MG: 325 TABLET, FILM COATED ORAL at 22:47

## 2025-01-05 RX ADMIN — METOPROLOL TARTRATE 25 MG: 25 TABLET, FILM COATED ORAL at 22:45

## 2025-01-05 RX ADMIN — CHLORHEXIDINE GLUCONATE 0.12% ORAL RINSE 15 ML: 1.2 LIQUID ORAL at 22:45

## 2025-01-05 NOTE — H&P
"ICU Acceptance Note- Critical Care/ICU   Name: Sandra Purvis 89 y.o. female I MRN: [id number]  Unit/Bed#: ICU 09 I Date of Admission: 1/4/2025   Date of Service: 1/5/2025 I Hospital Day: 1       Assessment & Plan   Neuro/Psych:  Diagnoses: Acute left-sided subdural hemorrhage, acute left-sided subarachnoid hemorrhage  1/4 CT head: Acute subdural hemorrhage along the left cerebral convexity with maximal thickness of 1.9 cm, Acute subarachnoid hemorrhage in the left frontoparietal sulci   Upon discussion with patient, patient stating to \"just let me go\" and that she is not interested in any surgeries or procedures  Patient wished to be DNR/DNI   Plan:  Neuro checks Q1H  Analgesia: Multimodal.  Tylenol, Dilaudid, oxy 2.5/5 mg every 4 PRN  Neurosurgery consult currently deferred due to patient wishes for no interventions.  Establish further goals of care with patient/family in AM with possible reinitiation of NSGY consult.    CV:  Diagnoses: Hypertension, paroxysmal atrial fibrillation  Plan:  Patient not on any blood thinners for paroxysmal A fib  Continue nicardipine as needed to achieve target SBP of < 160 mmHg  Restart home hydrochlorothiazide and metoprolol when appropriate  Continuous cardiopulmonary monitoring. Maintain MAP >65.    Pulm:  Diagnoses: No active issues  On room air saturating at 97%  Plan:  Continuous pulse oximetry. Maintain O2 sat >92%.     GI:  Diagnoses: GERD  Last BM: PTA  1/4 CT C-spine: Partially imaged diffuse gaseous distention of the esophagus.   Further imaging currently deferred  Plan:  Bowel regimen: Senokot  GI prophylaxis: Protonix 40 Mg QD    :  Diagnoses: CKD  Creatinine trend: 1.31  Baseline creatinine: 1.1  Plan:  Monitor I/Os.    F/E/N:  Plan:   F: Fluid resuscitation prn.  E: Monitor and replete electrolytes for Mg >2, Phos >3, K >4.  N: NPO    Heme/Onc:  Diagnoses: No active issues  Plan:  VTE chemoprophylaxis held 2/2 brain bleeds, continue to hold    Endo:  Diagnoses: " No active issues  Plan:   Monitor blood glucose.    ID:  Diagnoses: No active issues  Plan:  Monitor fever curve and WBC.    MSK/Skin:  Diagnoses: Right maxilla fractures and right inferior orbital wall fractures  1/4 CT face: Comminuted and displaced fractures of lateral/anterior walls right maxilla, lateral/inferior walls right inferior orbital wall fracture with associated soft tissue edema and focus of air  Plan:  OMFS consult currently deferred due to patient wishes for no interventions.  Establish further goals of care with patient/family in AM with possible reinitiation of NSGY consult.  PT/OT when appropriate. Encourage OOB and ambulation when appropriate. Local wound care prn.    Tox:  Diagnoses: No active issues    LDAs:  Lines - Preipheral IV  Drains - R ureteral drain/stent  Airways - NA    Disposition: Critical Care      History of Present Illness   Sandra Purvis is a 89 y.o. female  with PMHx HTN, paroxysmal afib, CKD, malignant melanoma s/p resection, who presents as a trauma transfer from [facility] for extensive facial fractures and SDH/SAH s/p syncope with collapse. Extensive conversation with patient at bedside regarding results, prognosis, and patient's wishes. Patient does not desire surgery at this time. Confirmed level 3 DNR/DNI status.     Called and spoke with patient's  and daughter on the phone regarding patient's wishes and plan moving forward. All questions answered.    History obtained from the patient.  Review of Systems: See HPI for Review of Systems    Historical Information   Past Medical History:  No date: Arthritis  No date: Cataract      Comment:  LastAssessed:9/8/15  No date: GERD (gastroesophageal reflux disease)  No date: Hypertaurodontism      Comment:  Last Assessed: 9/8/15  No date: Hypertension  No date: PAF (paroxysmal atrial fibrillation) (HCC)      Comment:  Last Assessed:2/3/16  No date: Wrist fracture      Comment:  Resolved:9/8/15 Past Surgical History:  No  date: CATARACT EXTRACTION      Comment:  Last Assessed:9/8/15  10/17/2021: FL RETROGRADE PYELOGRAM  No date: LYMPHADENECTOMY      Comment:  Last Assessed:9/8/15  11/24/2021: GA CYSTO/URETERO W/LITHOTRIPSY &INDWELL STENT INSRT; Right      Comment:  Procedure: CYSTOSCOPY URETEROSCOPY WITH LITHOTRIPSY                HOLMIUM LASER, RETROGRADE PYELOGRAM AND INSERTION STENT                URETERAL;  Surgeon: David Sr MD;  Location[facility];  Service: Urology  10/17/2021: GA CYSTOURETHROSCOPY W/URETERAL CATHETERIZATION; Right      Comment:  Procedure: CYSTOSCOPY RETROGRADE PYELOGRAM WITH                INSERTION STENT URETERAL;  Surgeon: Benjie Tinoco MD;                Location[facility];  Service: Urology  No date: TONSILLECTOMY      Comment:  Last Assessed:9/8/15  No date: WRIST SURGERY      Comment:  Last Assessed:9/8/15   Current Outpatient Medications   Medication Instructions    DAILY MULTIPLE VITAMINS/IRON PO 1 tablet, Daily    doxycycline (ADOXA) 100 mg, Oral, 2 times daily    hydroCHLOROthiazide 12.5 mg, Oral, Daily    metoprolol succinate (TOPROL-XL) 50 mg, Oral, Daily    multivitamin (THERAGRAN) TABS 1 tablet, Daily    Allergies   Allergen Reactions    Lactose - Food Allergy GI Intolerance    Penicillins Other (See Comments)     Unsure of reaction       Social History     Tobacco Use    Smoking status: Never    Smokeless tobacco: Never   Vaping Use    Vaping status: Never Used   Substance Use Topics    Alcohol use: No    Drug use: Never    Family History   Problem Relation Age of Onset    No Known Problems Family           Objective :                   Vitals I/O      Most Recent Min/Max in 24hrs   Temp 98 °F (36.7 °C) Temp  Min: 97.7 °F (36.5 °C)  Max: 98 °F (36.7 °C)   Pulse 88 Pulse  Min: 72  Max: 96   Resp 22 Resp  Min: 13  Max: 24   /54 BP  Min: 115/44  Max: 210/85   O2 Sat 97 % SpO2  Min: 96 %  Max: 98 %      Intake/Output Summary (Last 24 hours) at 1/5/2025 0325  Last  data filed at 1/5/2025 0000  Gross per 24 hour   Intake 135.41 ml   Output 515 ml   Net -379.59 ml       Diet NPO    Invasive Monitoring           Physical Exam   Physical Exam  Eyes:      Extraocular Movements: Extraocular movements intact.      Pupils: Pupils are equal, round, and reactive to light.   Skin:     General: Skin is warm.   HENT:      Head: Normocephalic.      Comments: Right-sided facial swelling with ecchymosis to right eye and eyelid.  Eye is swollen shut but can be opened with effort/assistance.  TTP diffuse right face     Right Ear: Hearing and tympanic membrane normal.      Left Ear: Hearing and tympanic membrane normal.      Mouth/Throat:      Mouth: Mucous membranes are moist.      Comments: Limited ROM with opening mouth  Cardiovascular:      Rate and Rhythm: Normal rate and regular rhythm.      Heart sounds: Normal heart sounds.   Musculoskeletal:         General: Tenderness (cervical spine) present. Normal range of motion.   Abdominal:      Palpations: Abdomen is soft.   Pulmonary:      Effort: Pulmonary effort is normal.   Neurological:      General: No focal deficit present.      Mental Status: She is alert and oriented to person, place and time.      Motor: No motor deficit.          Diagnostic Studies        Lab Results: I have reviewed the following results:     Medications:  Scheduled PRN   acetaminophen, 975 mg, Q8H ELIZABETH  chlorhexidine, 15 mL, Q12H ELIZABETH  pantoprazole, 40 mg, Q24H ELIZABETH  senna-docusate sodium, 1 tablet, HS      HYDROmorphone, 0.2 mg, Q2H PRN  naloxone, 0.04 mg, Q1MIN PRN  ondansetron, 4 mg, Q4H PRN  oxyCODONE, 2.5 mg, Q4H PRN   Or  oxyCODONE, 5 mg, Q4H PRN       Continuous    niCARdipine, 1-15 mg/hr, Last Rate: 2.5 mg/hr (01/04/25 2304)         Labs:   CBC    Recent Labs     01/04/25  1743   WBC 14.38*   HGB 10.6*   HCT 33.0*   *     BMP    Recent Labs     01/04/25  1743   SODIUM 135   K 3.9      CO2 27   AGAP 7   BUN 30*   CREATININE 1.31*   CALCIUM 9.4        Coags    Recent Labs     01/04/25  1743   INR 1.12   PTT 25        Additional Electrolytes  Recent Labs     01/04/25  1743   MG 2.0          Blood Gas    No recent results  No recent results LFTs  Recent Labs     01/04/25  1743   ALT 68*   AST 94*   ALKPHOS 63   ALB 3.9   TBILI 0.50       Infectious  No recent results  Glucose  Recent Labs     01/04/25  1743   GLUC 125

## 2025-01-05 NOTE — PROGRESS NOTES
Progress Note - Trauma   Name: Sandra Purvis 89 y.o. female I MRN: [id number]  Unit/Bed#: ICU 09 I Date of Admission: 1/4/2025   Date of Service: 1/5/2025 I Hospital Day: 1       TRAUMA TRANSFER FROM ICU AND TERTIARY SURVEY NOTE    VTE Prophylaxis:Reason for no pharmacologic prophylaxis plan to start tomorrow in the setting of stable subdural/subarachnoid hemorrhage      Disposition: MedSurg    Code status:  Level 3 - DNAR and DNI    Consultants: IP CONSULT TO ORAL AND MAXILLOFACIAL SURGERY  IP CONSULT TO NEUROSURGERY    History of Present Illness   Mechanism of Injury:Fall  Summary of Diagnosed Injuries: Multiple displaced facial fractures, subarachnoid hemorrhage, subdural hemorrhage    HPI/Last 24 hour events: Patient elected for no neurosurgical intervention, repeat CT head with stable subarachnoid and subdural hemorrhage    Reason for ICU admission: Multicompartmental intracranial hemorrhage    Summary of ICU clinical course: Patient remained stable overnight with no focal neural deficits or change in GCS.  Repeat head CT was stable and patient was transferred to the floor.    Recent or scheduled procedures: Pending OMFS evaluation    Outstanding/pending diagnostics: None    Objective :  Temp:  [97.6 °F (36.4 °C)-98 °F (36.7 °C)] 97.6 °F (36.4 °C)  HR:  [64-96] 74  BP: (102-210)/(39-93) 166/70  Resp:  [13-24] 16  SpO2:  [96 %-99 %] 99 %  O2 Device: None (Room air)    Physical Exam    Rationale for remaining devices: N/A    1. Before the illness or injury that brought you to the Emergency, did you need someone to help you on a regular basis? 0=No   2. Since the illness or injury that brought you to the Emergency, have you needed more help than usual to take care of yourself? 0=No   3. Have you been hospitalized for one or more nights during the past 6 months (excluding a stay in the Emergency Department)? 0=No   4. In general, do you see well? 0=Yes   5. In general, do you have serious problems with your  memory? 0=No   6. Do you take more than three different medications everyday? 0=No   TOTAL   0     Did you order a geriatric consult if the score was 2 or greater?: n/a       Lab Results: I have reviewed the following results:  Imaging Results Review: I personally reviewed the following image studies in PACS and associated radiology reports: CT head. My interpretation of the radiology images/reports is: Repeat head CT with stable subdural hematoma and subarachnoid hemorrhage..  Other Study Results Review: EKG was reviewed.   Chest Xray(s): negative for acute findings   FAST exam(s): N/A   CT Scan(s): positive for acute findings:    1/4 CT Head: Acute subdural hemorrhage along the left cerebral convexity with maximal thickness of 1.9 cm, Acute subarachnoid hemorrhage in the left frontoparietal sulci   1/4 CT C-spine: Partially imaged diffuse gaseous distention of the esophagus.   1/4 CT face: Comminuted and displaced fractures of lateral/anterior walls right maxilla, lateral/inferior walls right inferior orbital wall fracture with associated soft tissue edema and focus of air   Additional Xray(s): N/A     Patient seen and evaluated by Critical Care today and deemed to be appropriate for transfer to Med Surg. Spoke to Theodora Cooper MD from Trauma service regarding transfer. Critical Care can be contacted via SecureChat with any questions or concerns.

## 2025-01-05 NOTE — PROGRESS NOTES
Pastoral Care Progress Note          Chaplaincy Interventions Utilized:     Exploration: Facilitated life review and Facilitated story telling    Relationship Building: Listened empathically    Ritual: Provided prayer      Chaplaincy Outcomes Achieved:  Expressed gratitude and Expressed ultimate hope      Spiritual Coping Strategies Utilized:   Spiritual comfort and Spiritual gratitude

## 2025-01-05 NOTE — ASSESSMENT & PLAN NOTE
Large left sided mixed density subdural collection with associated traumatic subarachnoid and small contusion. Fortunately, secondary to general atrophy there is no significant mass effect. The size of the collection is roughly stable on repeat scan 1/5. The patient remains intact, and is adamant that she would not want further invasive interventions.    Currently given the non compressive nature of the collection and her reassuring exam, no surgical intervention is indicated. There is a chance the collection could expand or the contusions could blossom. Should this result in significant mass effect or exam change, surgical intervention would likely entail craniectomy, possibly necessitating delayed cranioplasty. It does not seem like repeated invasive interventions are in line with her goals of care.    Would be reasonable to continue conservative measures with SBP<160, goal normonatremia. Given the stability of the collection will sign off for now, but will be available as needed.

## 2025-01-05 NOTE — SEPSIS NOTE
Sepsis Note   Sandra Purvis 89 y.o. female MRN: [id number]  Unit/Bed#: ICU 09 Encounter: [id number]       Initial Sepsis Screening       Row Name 01/05/25 0345                Is the patient's history suggestive of a new or worsening infection? No  -RS                  User Key  (r) = Recorded By, (t) = Taken By, (c) = Cosigned By      Initials Name Provider Type    RS LUIS Banerjee Nurse Practitioner                        There is no height or weight on file to calculate BMI.  Wt Readings from Last 1 Encounters:   01/04/25 53.4 kg (117 lb 11.6 oz)        Ideal body weight: 59.3 kg (130 lb 11.7 oz)

## 2025-01-05 NOTE — PROGRESS NOTES
"Progress Note - Trauma   Name: Sandra Purvis 89 y.o. female I MRN: [id number]  Unit/Bed#: ICU 09 I Date of Admission: 1/4/2025   Date of Service: 1/5/2025 I Hospital Day: 1      Assessment & Plan   Neuro/Psych:  Diagnoses: Acute left-sided subdural hemorrhage, acute left-sided subarachnoid hemorrhage  1/4 CT head: Acute subdural hemorrhage along the left cerebral convexity with maximal thickness of 1.9 cm, Acute subarachnoid hemorrhage in the left frontoparietal sulci   Upon discussion with patient, patient stating to \"just let me go\" and that she is not interested in any surgeries or procedures  Patient wished to be DNR/DNI   Plan:  Neuro checks Q1H  Analgesia: Multimodal.  Tylenol, Dilaudid, oxy 2.5/5 mg every 4 PRN  Neurosurgery consult currently deferred due to patient wishes for no interventions.  Establish further goals of care with patient/family in AM with possible reinitiation of NSGY consult.     CV:  Diagnoses: Hypertension, paroxysmal atrial fibrillation  Plan:  Patient not on any blood thinners for paroxysmal A fib  Continue nicardipine as needed to achieve target SBP of < 160 mmHg  Restart home hydrochlorothiazide and metoprolol when appropriate  Continuous cardiopulmonary monitoring. Maintain MAP >65.     Pulm:  Diagnoses: No active issues  On room air saturating at 97%  Plan:  Continuous pulse oximetry. Maintain O2 sat >92%.      GI:  Diagnoses: GERD  Last BM: PTA  1/4 CT C-spine: Partially imaged diffuse gaseous distention of the esophagus.   Further imaging currently deferred  Plan:  Bowel regimen: Senokot  GI prophylaxis: Protonix 40 Mg QD     :  Diagnoses: CKD  Creatinine trend: 1.31  Baseline creatinine: 1.1  Plan:  Monitor I/Os.     F/E/N:  Plan:   F: Fluid resuscitation prn.  E: Monitor and replete electrolytes for Mg >2, Phos >3, K >4.  N: NPO     Heme/Onc:  Diagnoses: No active issues  Plan:  VTE chemoprophylaxis held 2/2 brain bleeds, continue to hold     Endo:  Diagnoses: No active " HPI:       Patient is seen for annual examination evaluation and examination    Chief complaint reason for the visit patient has nocturia and frequency of urination    Also has bilateral knee pain has seen Dr. Clayton Soria, orthopedic surgeon he was injected him. We'll follow-up with him    Patient had colonoscopy with Dr. Nicky Weaver 2/11/13, advised follow-up 10 year. Patient has no family history of colon cancer or polyps    Has urinary frequency, we'll arrange for this patient is see urologist Dr. Marc Wills    Patient continues to take Elavil 10 mg at h.s. This is something that is helped him with sleep in the past.    Review of Systems  Review of Systems   Constitutional: Negative for chills and fever. HENT: Positive for tinnitus ( previously had tinnitus which is now improved). Negative for congestion, ear discharge and ear pain. Eyes: Negative for blurred vision, double vision, pain and redness. Respiratory: Negative for hemoptysis, shortness of breath and stridor. Cardiovascular: Negative for chest pain, claudication, leg swelling and PND. Gastrointestinal: Negative for blood in stool, diarrhea and nausea. Genitourinary: Positive for frequency ( urinary frequency). Negative for dysuria, hematuria and urgency. Musculoskeletal: Negative for falls and joint pain. Skin: Negative for rash. Neurological: Positive for headaches ( history of cluster headaches, now completely gone improved). Negative for dizziness, focal weakness, seizures and loss of consciousness. Psychiatric/Behavioral: Negative for depression, hallucinations, memory loss and suicidal ideas. PE:  VS:  /72 (Site: Left Upper Arm, Position: Sitting, Cuff Size: Large Adult)   Pulse 73   Temp 97.8 °F (36.6 °C) (Tympanic)   Resp 18   Ht 6' 1\" (1.854 m)   Wt 184 lb (83.5 kg)   SpO2 98%   BMI 24.28 kg/m²   Physical Exam   Constitutional: He is oriented to person, place, and time.  He appears well-developed and issues  Plan:   Monitor blood glucose.     ID:  Diagnoses: No active issues  Plan:  Monitor fever curve and WBC.     MSK/Skin:  Diagnoses: Right maxilla fractures and right inferior orbital wall fractures  1/4 CT face: Comminuted and displaced fractures of lateral/anterior walls right maxilla, lateral/inferior walls right inferior orbital wall fracture with associated soft tissue edema and focus of air  Plan:  OMFS consult currently deferred due to patient wishes for no interventions.  Establish further goals of care with patient/family in AM with possible reinitiation of NSGY consult.  PT/OT when appropriate. Encourage OOB and ambulation when appropriate. Local wound care prn.     Tox:  Diagnoses: No active issues     LDAs:  Lines - Preipheral IV  Drains - R ureteral drain/stent  Airways - NA     Disposition: Critical Care    ICU Core Measures     A: Assess, Prevent, and Manage Pain Has pain been assessed? Yes  Need for changes to pain regimen? No   B: Both SAT/SAT  N/A   C: Choice of Sedation RASS Goal: N/A patient not on sedation  Need for changes to sedation or analgesia regimen? No   D: Delirium CAM-ICU: Negative   E: Early Mobility  Plan for early mobility? NA   F: Family Engagement Plan for family engagement today? Yes         Prophylaxis:  VTE Contraindicated secondary to: Brain bleeds   Stress Ulcer  covered bypantoprazole (PROTONIX) injection 40 mg [[id number]]         24 Hour Events :  Extensive conversation with patient at bedside regarding results, prognosis, and patient's wishes. Patient does not desire surgery at this time. Confirmed level 3 DNR/DNI status.    Subjective   Review of Systems: See HPI for Review of Systems    Objective :                   Vitals I/O      Most Recent Min/Max in 24hrs   Temp 98 °F (36.7 °C) Temp  Min: 97.7 °F (36.5 °C)  Max: 98 °F (36.7 °C)   Pulse 86 Pulse  Min: 64  Max: 96   Resp 20 Resp  Min: 13  Max: 24   /58 BP  Min: 106/39  Max: 210/85   O2 Sat 98 % SpO2  Min:  96 %  Max: 98 %      Intake/Output Summary (Last 24 hours) at 1/5/2025 0616  Last data filed at 1/5/2025 0404  Gross per 24 hour   Intake 237.08 ml   Output 1115 ml   Net -877.92 ml       Diet NPO    Invasive Monitoring           Physical Exam   Physical Exam  Eyes:      Extraocular Movements: Extraocular movements intact.      Pupils: Pupils are equal, round, and reactive to light.   HENT:      Head: Normocephalic.      Comments: Right-sided facial swelling with ecchymosis to right eye and eyelid.  Eye is swollen shut but can be opened with effort/assistance.  TTP diffuse right      Right Ear: Hearing normal.      Left Ear: Hearing normal.      Mouth/Throat:      Comments: Limited ROM with opening mouth  Cardiovascular:      Rate and Rhythm: Normal rate and regular rhythm.      Heart sounds: Normal heart sounds.   Musculoskeletal:         General: Tenderness (Cervical spine) present.   Abdominal:      Palpations: Abdomen is soft.   Pulmonary:      Effort: Pulmonary effort is normal.   Neurological:      Mental Status: She is alert and oriented to person, place and time.      Motor: Strength full and intact in all extremities.          Diagnostic Studies        Lab Results: I have reviewed the following results:     Medications:  Scheduled PRN   acetaminophen, 975 mg, Q8H ELIZABETH  chlorhexidine, 15 mL, Q12H ELIZABETH  pantoprazole, 40 mg, Q24H ELIZABETH  senna-docusate sodium, 1 tablet, HS      HYDROmorphone, 0.2 mg, Q2H PRN  naloxone, 0.04 mg, Q1MIN PRN  ondansetron, 4 mg, Q4H PRN  oxyCODONE, 2.5 mg, Q4H PRN   Or  oxyCODONE, 5 mg, Q4H PRN       Continuous    niCARdipine, 1-15 mg/hr, Last Rate: Stopped (01/05/25 0404)         Labs:   CBC    Recent Labs     01/04/25  1743   WBC 14.38*   HGB 10.6*   HCT 33.0*   *     BMP    Recent Labs     01/04/25  1743   SODIUM 135   K 3.9      CO2 27   AGAP 7   BUN 30*   CREATININE 1.31*   CALCIUM 9.4       Coags    Recent Labs     01/04/25  1743   INR 1.12   PTT 25        Additional  Electrolytes  Recent Labs     01/04/25  1743   MG 2.0          Blood Gas    No recent results  No recent results LFTs  Recent Labs     01/04/25  1743   ALT 68*   AST 94*   ALKPHOS 63   ALB 3.9   TBILI 0.50       Infectious  No recent results  Glucose  Recent Labs     01/04/25  1743   GLUC 125

## 2025-01-05 NOTE — PLAN OF CARE
Problem: PAIN - ADULT  Goal: Verbalizes/displays adequate comfort level or baseline comfort level  Description: Interventions:  - Encourage patient to monitor pain and request assistance  - Assess pain using appropriate pain scale  - Administer analgesics based on type and severity of pain and evaluate response  - Implement non-pharmacological measures as appropriate and evaluate response  - Consider cultural and social influences on pain and pain management  - Notify physician/advanced practitioner if interventions unsuccessful or patient reports new pain  Outcome: Progressing     Problem: INFECTION - ADULT  Goal: Absence or prevention of progression during hospitalization  Description: INTERVENTIONS:  - Assess and monitor for signs and symptoms of infection  - Monitor lab/diagnostic results  - Monitor all insertion sites, i.e. indwelling lines, tubes, and drains  - Monitor endotracheal if appropriate and nasal secretions for changes in amount and color  - [place] appropriate cooling/warming therapies per order  - Administer medications as ordered  - Instruct and encourage patient and family to use good hand hygiene technique  - Identify and instruct in appropriate isolation precautions for identified infection/condition  Outcome: Progressing  Goal: Absence of fever/infection during neutropenic period  Description: INTERVENTIONS:  - Monitor WBC    Outcome: Progressing     Problem: SAFETY ADULT  Goal: Patient will remain free of falls  Description: INTERVENTIONS:  - Educate patient/family on patient safety including physical limitations  - Instruct patient to call for assistance with activity   - Consult OT/PT to assist with strengthening/mobility   - Keep Call bell within reach  - Keep bed low and locked with side rails adjusted as appropriate  - Keep care items and personal belongings within reach  - Initiate and maintain comfort rounds  - Make Fall Risk Sign visible to staff  - Offer Toileting every  Hours,  in advance of need  - Initiate/Maintain alarm  - Obtain necessary fall risk management equipment:   - Apply yellow socks and bracelet for high fall risk patients  - Consider moving patient to room near nurses station  Outcome: Progressing  Goal: Maintain or return to baseline ADL function  Description: INTERVENTIONS:  -  Assess patient's ability to carry out ADLs; assess patient's baseline for ADL function and identify physical deficits which impact ability to perform ADLs (bathing, care of mouth/teeth, toileting, grooming, dressing, etc.)  - Assess/evaluate cause of self-care deficits   - Assess range of motion  - Assess patient's mobility; develop plan if impaired  - Assess patient's need for assistive devices and provide as appropriate  - Encourage maximum independence but intervene and supervise when necessary  - Involve family in performance of ADLs  - Assess for home care needs following discharge   - Consider OT consult to assist with ADL evaluation and planning for discharge  - Provide patient education as appropriate  Outcome: Progressing  Goal: Maintains/Returns to pre admission functional level  Description: INTERVENTIONS:  - Perform AM-PAC 6 Click Basic Mobility/ Daily Activity assessment daily.  - Set and communicate daily mobility goal to care team and patient/family/caregiver.   - Collaborate with rehabilitation services on mobility goals if consulted  - Perform Range of Motion 3 times a day.  - Reposition patient every 2 hours.  - Dangle patient 3 times a day  - Stand patient 3 times a day  - Ambulate patient 3 times a day  - Out of bed to chair 3 times a day   - Out of bed for meals 3 times a day  - Out of bed for toileting  - Record patient progress and toleration of activity level   Outcome: Progressing     Problem: DISCHARGE PLANNING  Goal: Discharge to home or other facility with appropriate resources  Description: INTERVENTIONS:  - Identify barriers to discharge w/patient and caregiver  -  Arrange for needed discharge resources and transportation as appropriate  - Identify discharge learning needs (meds, wound care, etc.)  - Arrange for interpretive services to assist at discharge as needed  - Refer to Case Management Department for coordinating discharge planning if the patient needs post-hospital services based on physician/advanced practitioner order or complex needs related to functional status, cognitive ability, or social support system  Outcome: Progressing     Problem: Knowledge Deficit  Goal: Patient/family/caregiver demonstrates understanding of disease process, treatment plan, medications, and discharge instructions  Description: Complete learning assessment and assess knowledge base.  Interventions:  - Provide teaching at level of understanding  - Provide teaching via preferred learning methods  Outcome: Progressing     Problem: Nutrition/Hydration-ADULT  Goal: Nutrient/Hydration intake appropriate for improving, restoring or maintaining nutritional needs  Description: Monitor and assess patient's nutrition/hydration status for malnutrition. Collaborate with interdisciplinary team and initiate plan and interventions as ordered.  Monitor patient's weight and dietary intake as ordered or per policy. Utilize nutrition screening tool and intervene as necessary. Determine patient's food preferences and provide high-protein, high-caloric foods as appropriate.     INTERVENTIONS:  - Monitor oral intake, urinary output, labs, and treatment plans  - Assess nutrition and hydration status and recommend course of action  - Evaluate amount of meals eaten  - Assist patient with eating if necessary   - Allow adequate time for meals  - Recommend/ encourage appropriate diets, oral nutritional supplements, and vitamin/mineral supplements  - Order, calculate, and assess calorie counts as needed  - Recommend, monitor, and adjust tube feedings and TPN/PPN based on assessed needs  - Assess need for intravenous  fluids  - Provide specific nutrition/hydration education as appropriate  - Include patient/family/caregiver in decisions related to nutrition  Outcome: Progressing     Problem: Potential for Falls  Goal: Patient will remain free of falls  Description: INTERVENTIONS:  - Educate patient/family on patient safety including physical limitations  - Instruct patient to call for assistance with activity   - Consult OT/PT to assist with strengthening/mobility   - Keep Call bell within reach  - Keep bed low and locked with side rails adjusted as appropriate  - Keep care items and personal belongings within reach  - Initiate and maintain comfort rounds  - Make Fall Risk Sign visible to staff  - Offer Toileting every  Hours, in advance of need  - Initiate/Maintain alarm  - Obtain necessary fall risk management equipment:   - Apply yellow socks and bracelet for high fall risk patients  - Consider moving patient to room near nurses station  Outcome: Progressing

## 2025-01-05 NOTE — CONSULTS
Consultation - Oral and Maxillofacial Surgery   Name: Sandra Purvis 89 y.o. female I MRN: [id number]  [facility] Date of Admission: 1/4/2025   Date of Service: 1/5/2025 I Hospital Day: 1  Consults  Physician Requesting Evaluation: David Silva MD   Reason for Evaluation / Principal Problem: The patient is a 19-year-old female who sustained a fall with face strike.  Patient sustained head injury with subdural hematoma and facial injury with right ZMC fracture and right orbital floor fracture.    Assessment & Plan  SDH (subdural hematoma) (HCC)    SAH (subarachnoid hemorrhage) (HCC)    Closed extensive facial fractures (HCC)    I have discussed the above management plan in detail with the primary service.     History of Present Illness   Sandra Purvis is a 89 y.o. female who presents after fall injury.  Patient struck her face.  She sustained facial trauma and head injury with subdural hematoma..    Review of Systems   Constitutional:  Positive for fatigue.   HENT:  Positive for facial swelling.    Eyes:  Positive for pain.   Respiratory: Negative.     Cardiovascular: Negative.    Gastrointestinal: Negative.    Endocrine: Negative.    Allergic/Immunologic: Negative.    Psychiatric/Behavioral:  Positive for decreased concentration.        Objective :  Temp:  [97.2 °F (36.2 °C)-98 °F (36.7 °C)] 97.2 °F (36.2 °C)  HR:  [64-96] 82  BP: (102-210)/(39-93) 144/54  Resp:  [13-24] 18  SpO2:  [96 %-99 %] 98 %  O2 Device: None (Room air)    Physical Exam  Vitals reviewed.   Constitutional:       Appearance: Normal appearance.   HENT:      Head:      Comments: Right periorbital ecchymoses and edema     Nose: Nose normal.      Mouth/Throat:      Pharynx: Oropharynx is clear.   Eyes:      Comments: Right orbital subconjunctival heme  Restriction upward gaze OD   Neck:      Trachea: Trachea normal.   Cardiovascular:      Rate and Rhythm: Normal rate.   Pulmonary:      Breath sounds: Normal breath  sounds.   Abdominal:      Palpations: Abdomen is soft.   Musculoskeletal:      Cervical back: Neck supple.   Skin:     Findings: No abrasion.   Neurological:      Mental Status: She is lethargic.      Comments: Patient inconsistent with Cranial Nerve exam        Assessment/plan: The patient is in bed and lethargic.  Patient has some restriction of upward gaze OD but hard to tell at this point whether this is from entrapment or posttraumatic swelling.  I spoke to the patient and the patient's daughter, Courtney.  They both request no surgery at this time.  OMS to follow.  Recommend sinus precautions and antibiotics.    Lab Results: I have reviewed the following results:    Imaging Results Review: I personally reviewed the following image studies in PACS and associated radiology reports: CT facial bones. My interpretation of the radiology images/reports is: Mildly displaced right orbital floor fracture and mildly displaced right ZMC fracture.      Administrative Statements   I have spent a total time of 30 minutes in caring for this patient on the day of the visit/encounter including Diagnostic results, Risks and benefits of tx options, Patient and family education, and spoke to patient's daughter Courtney.

## 2025-01-05 NOTE — PLAN OF CARE
Problem: PAIN - ADULT  Goal: Verbalizes/displays adequate comfort level or baseline comfort level  Description: Interventions:  - Encourage patient to monitor pain and request assistance  - Assess pain using appropriate pain scale  - Administer analgesics based on type and severity of pain and evaluate response  - Implement non-pharmacological measures as appropriate and evaluate response  - Consider cultural and social influences on pain and pain management  - Notify physician/advanced practitioner if interventions unsuccessful or patient reports new pain  Outcome: Progressing     Problem: INFECTION - ADULT  Goal: Absence or prevention of progression during hospitalization  Description: INTERVENTIONS:  - Assess and monitor for signs and symptoms of infection  - Monitor lab/diagnostic results  - Monitor all insertion sites, i.e. indwelling lines, tubes, and drains  - Monitor endotracheal if appropriate and nasal secretions for changes in amount and color  - Alsey appropriate cooling/warming therapies per order  - Administer medications as ordered  - Instruct and encourage patient and family to use good hand hygiene technique  - Identify and instruct in appropriate isolation precautions for identified infection/condition  Outcome: Progressing  Goal: Absence of fever/infection during neutropenic period  Description: INTERVENTIONS:  - Monitor WBC    Outcome: Progressing     Problem: SAFETY ADULT  Goal: Patient will remain free of falls  Description: INTERVENTIONS:  - Educate patient/family on patient safety including physical limitations  - Instruct patient to call for assistance with activity   - Consult OT/PT to assist with strengthening/mobility   - Keep Call bell within reach  - Keep bed low and locked with side rails adjusted as appropriate  - Keep care items and personal belongings within reach  - Initiate and maintain comfort rounds  - Make Fall Risk Sign visible to staff  - Offer Toileting every  Hours, in  advance of need  - Initiate/Maintain alarm  - Obtain necessary fall risk management equipment:   - Apply yellow socks and bracelet for high fall risk patients  - Consider moving patient to room near nurses station  Outcome: Progressing  Goal: Maintain or return to baseline ADL function  Description: INTERVENTIONS:  -  Assess patient's ability to carry out ADLs; assess patient's baseline for ADL function and identify physical deficits which impact ability to perform ADLs (bathing, care of mouth/teeth, toileting, grooming, dressing, etc.)  - Assess/evaluate cause of self-care deficits   - Assess range of motion  - Assess patient's mobility; develop plan if impaired  - Assess patient's need for assistive devices and provide as appropriate  - Encourage maximum independence but intervene and supervise when necessary  - Involve family in performance of ADLs  - Assess for home care needs following discharge   - Consider OT consult to assist with ADL evaluation and planning for discharge  - Provide patient education as appropriate  Outcome: Progressing  Goal: Maintains/Returns to pre admission functional level  Description: INTERVENTIONS:  - Perform AM-PAC 6 Click Basic Mobility/ Daily Activity assessment daily.  - Set and communicate daily mobility goal to care team and patient/family/caregiver.   - Collaborate with rehabilitation services on mobility goals if consulted  - Perform Range of Motion  times a day.  - Reposition patient every  hours.  - Dangle patient  times a day  - Stand patient  times a day  - Ambulate patient  times a day  - Out of bed to chair  times a day   - Out of bed for meals  times a day  - Out of bed for toileting  - Record patient progress and toleration of activity level   Outcome: Progressing     Problem: DISCHARGE PLANNING  Goal: Discharge to home or other facility with appropriate resources  Description: INTERVENTIONS:  - Identify barriers to discharge w/patient and caregiver  - Arrange for  needed discharge resources and transportation as appropriate  - Identify discharge learning needs (meds, wound care, etc.)  - Arrange for interpretive services to assist at discharge as needed  - Refer to Case Management Department for coordinating discharge planning if the patient needs post-hospital services based on physician/advanced practitioner order or complex needs related to functional status, cognitive ability, or social support system  Outcome: Progressing     Problem: Knowledge Deficit  Goal: Patient/family/caregiver demonstrates understanding of disease process, treatment plan, medications, and discharge instructions  Description: Complete learning assessment and assess knowledge base.  Interventions:  - Provide teaching at level of understanding  - Provide teaching via preferred learning methods  Outcome: Progressing     Problem: Nutrition/Hydration-ADULT  Goal: Nutrient/Hydration intake appropriate for improving, restoring or maintaining nutritional needs  Description: Monitor and assess patient's nutrition/hydration status for malnutrition. Collaborate with interdisciplinary team and initiate plan and interventions as ordered.  Monitor patient's weight and dietary intake as ordered or per policy. Utilize nutrition screening tool and intervene as necessary. Determine patient's food preferences and provide high-protein, high-caloric foods as appropriate.     INTERVENTIONS:  - Monitor oral intake, urinary output, labs, and treatment plans  - Assess nutrition and hydration status and recommend course of action  - Evaluate amount of meals eaten  - Assist patient with eating if necessary   - Allow adequate time for meals  - Recommend/ encourage appropriate diets, oral nutritional supplements, and vitamin/mineral supplements  - Order, calculate, and assess calorie counts as needed  - Recommend, monitor, and adjust tube feedings and TPN/PPN based on assessed needs  - Assess need for intravenous fluids  -  Provide specific nutrition/hydration education as appropriate  - Include patient/family/caregiver in decisions related to nutrition  Outcome: Progressing     Problem: Potential for Falls  Goal: Patient will remain free of falls  Description: INTERVENTIONS:  - Educate patient/family on patient safety including physical limitations  - Instruct patient to call for assistance with activity   - Consult OT/PT to assist with strengthening/mobility   - Keep Call bell within reach  - Keep bed low and locked with side rails adjusted as appropriate  - Keep care items and personal belongings within reach  - Initiate and maintain comfort rounds  - Make Fall Risk Sign visible to staff  - Offer Toileting every  Hours, in advance of need  - Initiate/Maintain alarm  - Obtain necessary fall risk management equipment  - Apply yellow socks and bracelet for high fall risk patients  - Consider moving patient to room near nurses station  Outcome: Progressing     Problem: Prexisting or High Potential for Compromised Skin Integrity  Goal: Skin integrity is maintained or improved  Description: INTERVENTIONS:  - Identify patients at risk for skin breakdown  - Assess and monitor skin integrity  - Assess and monitor nutrition and hydration status  - Monitor labs   - Assess for incontinence   - Turn and reposition patient  - Assist with mobility/ambulation  - Relieve pressure over bony prominences  - Avoid friction and shearing  - Provide appropriate hygiene as needed including keeping skin clean and dry  - Evaluate need for skin moisturizer/barrier cream  - Collaborate with interdisciplinary team   - Patient/family teaching  - Consider wound care consult   Outcome: Progressing

## 2025-01-05 NOTE — CONSULTS
Consultation - Neurosurgery   Name: Sandra Purvis 89 y.o. female I MRN: [id number]  Unit/Bed#: ICU 09 I Date of Admission: 1/4/2025   Date of Service: 1/5/2025 I Hospital Day: 1   Consults  Physician Requesting Evaluation: David Silva MD     Assessment & Plan  SDH (subdural hematoma) (HCC)  Large left sided mixed density subdural collection with associated traumatic subarachnoid and small contusion. Fortunately, secondary to general atrophy there is no significant mass effect. The size of the collection is roughly stable on repeat scan 1/5. The patient remains intact, and is adamant that she would not want further invasive interventions.    Currently given the non compressive nature of the collection and her reassuring exam, no surgical intervention is indicated. There is a chance the collection could expand or the contusions could blossom. Should this result in significant mass effect or exam change, surgical intervention would likely entail craniectomy, possibly necessitating delayed cranioplasty. It does not seem like repeated invasive interventions are in line with her goals of care.    Would be reasonable to continue conservative measures with SBP<160, goal normonatremia. Given the stability of the collection will sign off for now, but will be available as needed.   SAH (subarachnoid hemorrhage) (HCC)  As above - no significant mass effect. No surgical intervention currently indicated.   Closed extensive facial fractures (HCC)  OMFS consulted      History of Present Illness   HPI: Sandra Purvis is a 89 y.o. year old female who presents with a left sided mixed density subdural hematoma after a fall with a head strike. She had a syncopal episode yesterday and initially presented to [facility] where a large subdural collection was noted on head CT. In initial conversations with the patient she did express that she did not desire further surgical interventions, and was made DNR/DNI. She was transferred  for further conservative measures and has fortunately remained neurologically intact. She did re-iterate her desire for no further invasive interventions in my conversation with her this morning.     Review of Systems  I have reviewed the patient's PMH, PSH, Social History, Family History, Meds, and Allergies    Objective :  Temp:  [97.6 °F (36.4 °C)-98 °F (36.7 °C)] 97.6 °F (36.4 °C)  HR:  [64-96] 70  BP: (102-210)/(39-93) 108/52  Resp:  [13-24] 18  SpO2:  [96 %-99 %] 98 %  O2 Device: None (Room air)    Physical Exam Neurological Exam  Awakens to voice  Facial swelling  Oriented, appropriate  5/5 throughout    Lab Results: I have reviewed the following results:  Recent Labs     01/04/25  1743 01/05/25  0542 01/05/25  0749   WBC 14.38* 17.50*  --    HGB 10.6* 11.1*  --    HCT 33.0* 34.3*  --    * 212  --    SODIUM 135  --  137   K 3.9  --  3.9     --  102   CO2 27  --  29   BUN 30*  --  22   CREATININE 1.31*  --  0.94   GLUC 125  --  101   CAIONIZED  --  1.16  --    MG 2.0  --  1.8*   PHOS  --   --  3.0   AST 94*  --  42*   ALT 68*  --  49   ALB 3.9  --  3.5   TBILI 0.50  --  0.77   ALKPHOS 63  --  58   PTT 25  --   --    INR 1.12  --   --    HSTNI0 29  --   --        Imaging Results Review: I personally reviewed the following image studies/reports in PACS and discussed pertinent findings with Radiology: CT head. My interpretation of the radiology images/reports is: See assessment and plan.  Other Study Results Review: Other studies reviewed include: CBC, BMP, PT/INR    VTE Pharmacologic Prophylaxis: Reason for no pharmacologic prophylaxis hold given hemorrhage

## 2025-01-05 NOTE — H&P
H&P - Trauma   Name: Sandra Purvis 89 y.o. female I MRN: [id number]  Unit/Bed#: ICU 09 I Date of Admission: 1/4/2025   Date of Service: 1/4/2025 I Hospital Day: 0       Trauma Alert: Evaluation; trauma team notified at 1947 via text   Model of Arrival: Ambulance    Trauma Team: Attending Dr. Silva and Residents Dr. Sue; [facility] team  Consultants:     Neurosurgery: routine consult; Epic consult order placed;      Oral Maxillofacial: routine consult; Epic consult order placed;     History of Present Illness   Chief Complaint: Facial Pain, Headache  Mechanism: Fall (Syncope)    Sandra Purvis is a 89 y.o. female  with PMHx HTN, paroxysmal afib, CKD, malignant melanoma s/p resection, who presents as a trauma transfer from [facility] for extensive facial fractures and SDH/SAH s/p syncope with collapse. Extensive conversation with patient at bedside regarding results, prognosis, and patient's wishes. Patient does not desire surgery at this time. Confirmed level 3 DNR/DNI status.    Called and spoke with patient's  and daughter on the phone regarding patient's wishes and plan moving forward. All questions answered.    Review of Systems   Constitutional: Negative.    HENT: Negative.     Respiratory: Negative.     Cardiovascular: Negative.    Gastrointestinal: Negative.    Genitourinary: Negative.    Musculoskeletal:  Positive for back pain and neck pain.   Skin:  Positive for color change and wound.   Neurological:  Positive for syncope and headaches.     I have reviewed the patient's PMH, PSH, Social History, Family History, Meds, and Allergies  Immunization History   Administered Date(s) Administered    TD (adult) Preservative Free 01/06/2016     Last Tetanus: Deferred    1. Before the illness or injury that brought you to the Emergency, did you need someone to help you on a regular basis? 0=No   2. Since the illness or injury that brought you to the Emergency, have you needed more help than usual to take care of  yourself? 1=Yes   3. Have you been hospitalized for one or more nights during the past 6 months (excluding a stay in the Emergency Department)? 0=No   4. In general, do you see well? 0=Yes   5. In general, do you have serious problems with your memory? 0=No   6. Do you take more than three different medications everyday? 1=Yes   TOTAL   2     Did you order a geriatric consult if the score was 2 or greater?: Per Crit Care team.     Objective :  Temp:  [97.7 °F (36.5 °C)-98 °F (36.7 °C)] 98 °F (36.7 °C)  HR:  [84-96] 92  BP: (122-210)/(52-93) 138/56  Resp:  [16-24] 20  SpO2:  [97 %-98 %] 98 %  O2 Device: None (Room air)    Initial Vitals:   Temperature: 98 °F (36.7 °C) (01/04/25 1919)  Pulse: 94 (01/04/25 1919)  Respirations: 19 (01/04/25 1930)  Blood Pressure: 160/52 (01/04/25 1919)    Primary Survey:   Airway:        Status: patent;        Pre-hospital Interventions: none        Hospital Interventions: none  Breathing:        Pre-hospital Interventions: none       Effort: normal       Right breath sounds: normal       Left breath sounds: normal  Circulation:        Rhythm: regular       Rate: regular   Right Pulses Left Pulses    R radial: 2+  R femoral: 2+  R pedal: 2+  R carotid: 2+   L radial: 2+  L femoral: 2+  L pedal: 2+  L carotid: 2+     Disability:        GCS: Eye: 4; Verbal: 5 Motor: 6 Total: 15       Right Pupil: round;  reactive         Left Pupil:  round;  reactive      R Motor Strength L Motor Strength    R : 5/5  R dorsiflex: 5/5  R plantarflex: 5/5 L : 5/5  L dorsiflex: 5/5  L plantarflex: 5/5        Sensory:  No sensory deficit  Exposure:       Completed: Yes      Secondary Survey:  Physical Exam  Constitutional:       General: She is in acute distress.   HENT:      Head: Normocephalic.      Comments: Right sided facial swelling with ecchymosis to right eye and eyelid. Eye is swollen shut but can be opened with effort/assistance. +TTP diffuse right face.     Right Ear: Tympanic membrane, ear  canal and external ear normal.      Left Ear: Tympanic membrane, ear canal and external ear normal.      Mouth/Throat:      Mouth: Mucous membranes are moist.      Pharynx: Oropharynx is clear.      Comments: Limited ROM with opening mouth  Eyes:      Extraocular Movements: Extraocular movements intact.      Pupils: Pupils are equal, round, and reactive to light.   Cardiovascular:      Rate and Rhythm: Normal rate and regular rhythm.      Pulses: Normal pulses.   Pulmonary:      Effort: Pulmonary effort is normal. No respiratory distress.   Abdominal:      General: Abdomen is flat.      Palpations: Abdomen is soft.      Tenderness: There is no abdominal tenderness.   Musculoskeletal:         General: No deformity. Normal range of motion.      Cervical back: Tenderness present.   Skin:     General: Skin is warm and dry.      Capillary Refill: Capillary refill takes less than 2 seconds.   Neurological:      General: No focal deficit present.      Mental Status: She is alert and oriented to person, place, and time. Mental status is at baseline.      Sensory: No sensory deficit.      Motor: No weakness.             Lab Results: I have reviewed the following results:  Recent Labs     01/04/25  1743   WBC 14.38*   HGB 10.6*   HCT 33.0*   *   SODIUM 135   K 3.9      CO2 27   BUN 30*   CREATININE 1.31*   GLUC 125   MG 2.0   AST 94*   ALT 68*   ALB 3.9   TBILI 0.50   ALKPHOS 63   PTT 25   INR 1.12   HSTNI0 29       Imaging Results: I have personally reviewed pertinent images saved in PACS. CT scan findings (and other pertinent positive findings on images) were discussed with radiology. My interpretation of the images/reports are as follows:  Chest Xray(s): negative for acute findings   FAST exam(s): N/A   CT Scan(s): positive for acute findings: Large left SDH, Left SAH ; Extensive right sided facial fractures   Additional Xray(s): N/A     Other Studies: Other Study Results Review: No additional pertinent  studies reviewed.

## 2025-01-06 ENCOUNTER — APPOINTMENT (INPATIENT)
Dept: NON INVASIVE DIAGNOSTICS | Facility: HOSPITAL | Age: OVER 89
DRG: 157 | End: 2025-01-06
Payer: MEDICARE
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
## 2025-01-06 LAB
ANION GAP SERPL CALCULATED.3IONS-SCNC: 8 MMOL/L (ref 4–13)
AORTIC ROOT: 2.5 CM
AORTIC VALVE MEAN VELOCITY: 13.3 M/S
ASCENDING AORTA: 2.9 CM
ATRIAL RATE: 92 BPM
AV AREA BY CONTINUOUS VTI: 1.5 CM2
AV AREA PEAK VELOCITY: 1.5 CM2
AV LVOT MEAN GRADIENT: 2 MMHG
AV LVOT PEAK GRADIENT: 3 MMHG
AV MEAN PRESS GRAD SYS DOP V1V2: 8 MMHG
AV ORIFICE AREA US: 1.52 CM2
AV PEAK GRADIENT: 14 MMHG
AV VELOCITY RATIO: 0.46
AV VMAX SYS DOP: 1.9 M/S
BASOPHILS # BLD AUTO: 0.05 THOUSANDS/ΜL (ref 0–0.1)
BASOPHILS NFR BLD AUTO: 0 % (ref 0–1)
BSA FOR ECHO PROCEDURE: 1.47 M2
BUN SERPL-MCNC: 34 MG/DL (ref 5–25)
CALCIUM SERPL-MCNC: 9.2 MG/DL (ref 8.4–10.2)
CHLORIDE SERPL-SCNC: 104 MMOL/L (ref 96–108)
CO2 SERPL-SCNC: 27 MMOL/L (ref 21–32)
CREAT SERPL-MCNC: 1.11 MG/DL (ref 0.6–1.3)
DOP CALC AO VTI: 44.63 CM
DOP CALC LVOT AREA: 3.14 CM2
DOP CALC LVOT CARDIAC INDEX: 2.88 L/MIN/M2
DOP CALC LVOT CARDIAC OUTPUT: 4.23 L/MIN
DOP CALC LVOT DIAMETER: 2 CM
DOP CALC LVOT PEAK VEL VTI: 21.67 CM
DOP CALC LVOT PEAK VEL: 0.88 M/S
DOP CALC LVOT STROKE INDEX: 46.3 ML/M2
DOP CALC LVOT STROKE VOLUME: 68.04
E WAVE DECELERATION TIME: 233 MS
E/A RATIO: 1.41
EOSINOPHIL # BLD AUTO: 0.02 THOUSAND/ΜL (ref 0–0.61)
EOSINOPHIL NFR BLD AUTO: 0 % (ref 0–6)
ERYTHROCYTE [DISTWIDTH] IN BLOOD BY AUTOMATED COUNT: 15.2 % (ref 11.6–15.1)
FRACTIONAL SHORTENING: 37 (ref 28–44)
GFR SERPL CREATININE-BSD FRML MDRD: 43 ML/MIN/1.73SQ M
GLUCOSE SERPL-MCNC: 105 MG/DL (ref 65–140)
HCT VFR BLD AUTO: 30.9 % (ref 34.8–46.1)
HGB BLD-MCNC: 10 G/DL (ref 11.5–15.4)
IMM GRANULOCYTES # BLD AUTO: 0.05 THOUSAND/UL (ref 0–0.2)
IMM GRANULOCYTES NFR BLD AUTO: 0 % (ref 0–2)
INTERVENTRICULAR SEPTUM IN DIASTOLE (PARASTERNAL SHORT AXIS VIEW): 1.1 CM
INTERVENTRICULAR SEPTUM: 1.1 CM (ref 0.6–1.1)
IVC: 2 MM
LAAS-AP2: 16.3 CM2
LAAS-AP4: 16.7 CM2
LEFT ATRIUM SIZE: 2.9 CM
LEFT ATRIUM VOLUME (MOD BIPLANE): 34 ML
LEFT ATRIUM VOLUME INDEX (MOD BIPLANE): 23.1 ML/M2
LEFT INTERNAL DIMENSION IN SYSTOLE: 2.4 CM (ref 2.1–4)
LEFT VENTRICULAR INTERNAL DIMENSION IN DIASTOLE: 3.8 CM (ref 3.5–6)
LEFT VENTRICULAR POSTERIOR WALL IN END DIASTOLE: 1.1 CM
LEFT VENTRICULAR STROKE VOLUME: 44 ML
LV EF US.2D.A4C+ESTIMATED: 65 %
LVSV (TEICH): 44 ML
LYMPHOCYTES # BLD AUTO: 1 THOUSANDS/ΜL (ref 0.6–4.47)
LYMPHOCYTES NFR BLD AUTO: 6 % (ref 14–44)
MAGNESIUM SERPL-MCNC: 2 MG/DL (ref 1.9–2.7)
MCH RBC QN AUTO: 28.8 PG (ref 26.8–34.3)
MCHC RBC AUTO-ENTMCNC: 32.4 G/DL (ref 31.4–37.4)
MCV RBC AUTO: 89 FL (ref 82–98)
MONOCYTES # BLD AUTO: 0.92 THOUSAND/ΜL (ref 0.17–1.22)
MONOCYTES NFR BLD AUTO: 6 % (ref 4–12)
MV E'TISSUE VEL-SEP: 6 CM/S
MV PEAK A VEL: 0.73 M/S
MV PEAK E VEL: 103 CM/S
MV STENOSIS PRESSURE HALF TIME: 68 MS
MV VALVE AREA P 1/2 METHOD: 3.24
NEUTROPHILS # BLD AUTO: 13.82 THOUSANDS/ΜL (ref 1.85–7.62)
NEUTS SEG NFR BLD AUTO: 88 % (ref 43–75)
NRBC BLD AUTO-RTO: 0 /100 WBCS
P AXIS: 248 DEGREES
PHOSPHATE SERPL-MCNC: 2.7 MG/DL (ref 2.3–4.1)
PLATELET # BLD AUTO: 191 THOUSANDS/UL (ref 149–390)
PMV BLD AUTO: 11.1 FL (ref 8.9–12.7)
POTASSIUM SERPL-SCNC: 3.5 MMOL/L (ref 3.5–5.3)
PR INTERVAL: 264 MS
QRS AXIS: -79 DEGREES
QRSD INTERVAL: 88 MS
QT INTERVAL: 326 MS
QTC INTERVAL: 403 MS
RA PRESSURE ESTIMATED: 10 MMHG
RBC # BLD AUTO: 3.47 MILLION/UL (ref 3.81–5.12)
RIGHT ATRIAL 2D VOLUME: 29 ML
RIGHT ATRIUM AREA SYSTOLE A4C: 15 CM2
RIGHT VENTRICLE ID DIMENSION: 2.4 CM
RV PSP: 50 MMHG
SL CV LEFT ATRIUM LENGTH A2C: 6.1 CM
SL CV LV EF: 70
SL CV PED ECHO LEFT VENTRICLE DIASTOLIC VOLUME (MOD BIPLANE) 2D: 63 ML
SL CV PED ECHO LEFT VENTRICLE SYSTOLIC VOLUME (MOD BIPLANE) 2D: 19 ML
SODIUM SERPL-SCNC: 139 MMOL/L (ref 135–147)
T WAVE AXIS: 89 DEGREES
TR MAX PG: 40 MMHG
TR PEAK VELOCITY: 3.2 M/S
TRICUSPID ANNULAR PLANE SYSTOLIC EXCURSION: 1.3 CM
TRICUSPID VALVE PEAK REGURGITATION VELOCITY: 3.16 M/S
VENTRICULAR RATE: 92 BPM
WBC # BLD AUTO: 15.86 THOUSAND/UL (ref 4.31–10.16)

## 2025-01-06 PROCEDURE — 99232 SBSQ HOSP IP/OBS MODERATE 35: CPT | Performed by: STUDENT IN AN ORGANIZED HEALTH CARE EDUCATION/TRAINING PROGRAM

## 2025-01-06 PROCEDURE — 93306 TTE W/DOPPLER COMPLETE: CPT | Performed by: INTERNAL MEDICINE

## 2025-01-06 PROCEDURE — 93306 TTE W/DOPPLER COMPLETE: CPT

## 2025-01-06 PROCEDURE — 85025 COMPLETE CBC W/AUTO DIFF WBC: CPT

## 2025-01-06 PROCEDURE — 97163 PT EVAL HIGH COMPLEX 45 MIN: CPT

## 2025-01-06 PROCEDURE — 97167 OT EVAL HIGH COMPLEX 60 MIN: CPT

## 2025-01-06 PROCEDURE — 83735 ASSAY OF MAGNESIUM: CPT

## 2025-01-06 PROCEDURE — 93010 ELECTROCARDIOGRAM REPORT: CPT | Performed by: INTERNAL MEDICINE

## 2025-01-06 PROCEDURE — 92610 EVALUATE SWALLOWING FUNCTION: CPT

## 2025-01-06 PROCEDURE — 80048 BASIC METABOLIC PNL TOTAL CA: CPT

## 2025-01-06 PROCEDURE — 84100 ASSAY OF PHOSPHORUS: CPT

## 2025-01-06 RX ORDER — HYDROMORPHONE HCL IN WATER/PF 6 MG/30 ML
0.2 PATIENT CONTROLLED ANALGESIA SYRINGE INTRAVENOUS EVERY 2 HOUR PRN
Status: DISCONTINUED | OUTPATIENT
Start: 2025-01-06 | End: 2025-01-08

## 2025-01-06 RX ORDER — PANTOPRAZOLE SODIUM 40 MG/1
40 TABLET, DELAYED RELEASE ORAL
Status: DISCONTINUED | OUTPATIENT
Start: 2025-01-07 | End: 2025-01-07

## 2025-01-06 RX ADMIN — SODIUM CHLORIDE 3 G: 9 INJECTION, SOLUTION INTRAVENOUS at 22:28

## 2025-01-06 RX ADMIN — LEVETIRACETAM 500 MG: 100 INJECTION, SOLUTION INTRAVENOUS at 22:28

## 2025-01-06 RX ADMIN — METOPROLOL TARTRATE 25 MG: 25 TABLET, FILM COATED ORAL at 22:28

## 2025-01-06 RX ADMIN — ACETAMINOPHEN 975 MG: 325 TABLET, FILM COATED ORAL at 05:43

## 2025-01-06 RX ADMIN — ENOXAPARIN SODIUM 30 MG: 30 INJECTION SUBCUTANEOUS at 09:19

## 2025-01-06 RX ADMIN — SODIUM CHLORIDE 3 G: 9 INJECTION, SOLUTION INTRAVENOUS at 04:19

## 2025-01-06 RX ADMIN — SENNOSIDES AND DOCUSATE SODIUM 1 TABLET: 50; 8.6 TABLET ORAL at 22:31

## 2025-01-06 RX ADMIN — PANTOPRAZOLE SODIUM 40 MG: 40 INJECTION, POWDER, FOR SOLUTION INTRAVENOUS at 09:40

## 2025-01-06 RX ADMIN — METOPROLOL TARTRATE 25 MG: 25 TABLET, FILM COATED ORAL at 09:12

## 2025-01-06 RX ADMIN — ACETAMINOPHEN 975 MG: 325 TABLET, FILM COATED ORAL at 15:33

## 2025-01-06 RX ADMIN — ENOXAPARIN SODIUM 30 MG: 30 INJECTION SUBCUTANEOUS at 22:28

## 2025-01-06 RX ADMIN — LEVETIRACETAM 500 MG: 100 INJECTION, SOLUTION INTRAVENOUS at 09:11

## 2025-01-06 RX ADMIN — SODIUM CHLORIDE 3 G: 9 INJECTION, SOLUTION INTRAVENOUS at 15:25

## 2025-01-06 RX ADMIN — CHLORHEXIDINE GLUCONATE 0.12% ORAL RINSE 15 ML: 1.2 LIQUID ORAL at 09:12

## 2025-01-06 RX ADMIN — ACETAMINOPHEN 975 MG: 325 TABLET, FILM COATED ORAL at 22:31

## 2025-01-06 RX ADMIN — SODIUM CHLORIDE 3 G: 9 INJECTION, SOLUTION INTRAVENOUS at 09:12

## 2025-01-06 RX ADMIN — POLYETHYLENE GLYCOL 3350 17 G: 17 POWDER, FOR SOLUTION ORAL at 09:11

## 2025-01-06 NOTE — ASSESSMENT & PLAN NOTE
"Described as \"fainting\"--no history or associated symptoms  Troponin 29  EKG showed SR with left axis deviation and anterior/inferior infarcts  Orthostatics pending  Echocardiogram pending  Telemetry pending  "

## 2025-01-06 NOTE — SPEECH THERAPY NOTE
Speech-Language Pathology Bedside Swallow Evaluation      Patient Name: Sandra Purvis    Today's Date: 1/6/2025     Problem List  Principal Problem:    SDH (subdural hematoma) ([facility])  Active Problems:    SAH (subarachnoid hemorrhage) ([facility])    Closed extensive facial fractures ([facility])    Fall      Past Medical History  Past Medical History:   Diagnosis Date    Arthritis     Cataract     LastAssessed:9/8/15    GERD (gastroesophageal reflux disease)     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    Hypertension     PAF (paroxysmal atrial fibrillation) ([facility])     Last Assessed:2/3/16    Wrist fracture     Resolved:9/8/15       Past Surgical History  Past Surgical History:   Procedure Laterality Date    CATARACT EXTRACTION      Last Assessed:9/8/15    FL RETROGRADE PYELOGRAM  10/17/2021    LYMPHADENECTOMY      Last Assessed:9/8/15    IA CYSTO/URETERO W/LITHOTRIPSY &INDWELL STENT INSRT Right 11/24/2021    Procedure: CYSTOSCOPY URETEROSCOPY WITH LITHOTRIPSY HOLMIUM LASER, RETROGRADE PYELOGRAM AND INSERTION STENT URETERAL;  Surgeon: David Sr MD;  Location: [facility];  Service: Urology    IA CYSTOURETHROSCOPY W/URETERAL CATHETERIZATION Right 10/17/2021    Procedure: CYSTOSCOPY RETROGRADE PYELOGRAM WITH INSERTION STENT URETERAL;  Surgeon: Benjie Tinoco MD;  Location: [facility];  Service: Urology    TONSILLECTOMY      Last Assessed:9/8/15    WRIST SURGERY      Last Assessed:9/8/15       Summary   Pt presented with functional appearing oral and pharyngeal stage swallowing skills with materials administered today. She is assessed with puree and regular solids, with thin liquids. She does report some prolonged mastication time with regular solids, but otherwise tolerates all trials well.     Risk/s for Aspiration: low     Recommended Diet: soft/level 3 diet and thin liquids   Recommended Form of Meds: whole with liquid   Aspiration precautions and swallowing strategies: upright posture and small bites/sips  Other  Recommendations: Continue frequent oral care    Current Medical Status  Sandra Purivs is a 89 y.o. female  with PMHx HTN, paroxysmal afib, CKD, malignant melanoma s/p resection, who presents as a trauma transfer from [facility] for extensive facial fractures and SDH/SAH s/p syncope with collapse. Extensive conversation with patient at bedside regarding results, prognosis, and patient's wishes. Patient does not desire surgery at this time. Confirmed level 3 DNR/DNI status.   Called and spoke with patient's  and daughter on the phone regarding patient's wishes and plan moving forward. All questions answered.    Current Precautions:  Fall    Allergies:  lactose  Past medical history:  Please see H&P for details    Special Studies:  Chest xray 1/4/25:   No acute disease.   Distended air-filled esophagus.   Moderate cardiomegaly.    Social/Education/Vocational Hx:  Pt lives with family    Swallow Information   Current Risks for Dysphagia & Aspiration:  recent facial trauma   Current Symptoms/Concerns:  none  Current Diet: soft/level 3 diet and thin liquids   Baseline Diet: regular diet and thin liquids    Baseline Assessment   Behavior/Cognition: alert  Speech/Language Status: able to participate in conversation and able to follow commands  Patient Positioning: upright in recliner  Pain Status/Interventions/Response to Interventions: No report of or nonverbal indications of pain.     Swallow Mechanism Exam  Facial: symmetrical  Labial: WFL  Lingual: WFL  Velum: unable to visualize  Mandible: adequate ROM  Dentition: adequate  Vocal quality:clear/adequate   Respiratory Status: on RA       Consistencies Assessed and Performance   Consistencies Administered: thin liquids, puree, and hard solids  Materials administered included applesauce and toast with thin liquids    Oral Stage: WFL  Mastication was min prolonged with regular solids.  Bolus formation and transfer were functional with no significant oral residue noted.  " No overt s/s reduced oral control.    Pharyngeal Stage: WFL  Swallow Mechanics:  Swallowing initiation appeared prompt.  Laryngeal rise was palpated and judged to be within functional limits.  No coughing, throat clearing, change in vocal quality or respiratory status noted today.     Esophageal Concerns: none reported    Summary and Recommendations (see above)    Results Reviewed with: patient     Treatment Recommended: dysphagia therapy      Frequency of treatment: 2-3x week, as able     Patient Stated Goal: \"I'm going to be positive and get better\"     Dysphagia LTG  -Patient will demonstrate safe and effective oral intake (without overt s/s significant oral/pharyngeal dysphagia including s/s penetration or aspiration) for the highest appropriate diet level.     Short Term Goals:  -Pt will tolerate regular solids and thin liquid with no significant s/s oral or pharyngeal dysphagia across 1-3 diagnostic session/s    -Pt will tolerate Dysphagia 3/advanced (dental soft) diet and thin liquid with no significant s/s oral or pharyngeal dysphagia across 1-3 diagnostic session/s.    Speech Therapy Prognosis   Prognosis: good    Prognosis Considerations: age and medical status        "

## 2025-01-06 NOTE — PLAN OF CARE
Problem: PHYSICAL THERAPY ADULT  Goal: Performs mobility at highest level of function for planned discharge setting.  See evaluation for individualized goals.  Description: Treatment/Interventions: Functional transfer training, LE strengthening/ROM, Elevations, Therapeutic exercise, Endurance training, Patient/family training, Equipment eval/education, Bed mobility, Gait training, Compensatory technique education, Spoke to nursing, Spoke to case management, OT  Equipment Recommended: Walker       See flowsheet documentation for full assessment, interventions and recommendations.  Note: Prognosis: Good  Problem List: Decreased strength, Decreased endurance, Impaired balance, Decreased mobility, Decreased skin integrity  Assessment: Pt is 89 y.o. female seen for a PT evaluation s/p admit to [facility] on 1/4/2025. Pt presenting s/p fall related to syncopal episode w/ a SDH. Please see above for other active problem list / PMH. PT now consulted to assess functional mobility and needs for safe d/c planning. Prior to admission, pt was I w/ ambulation w/o AD, lives w/ spouse in a 1SH w/ 2 ROSA. Currently pt requires [place] for bed skills; ModA for functional transfers; ModA for limited ambulation w/ HHA. Pt presents functioning below baseline and w/ overall mobility deficits 2* to: orthostatic hypotension; decreased LE strength; decreased endurance; impaired balance; gait deviations; fatigue; bed/chair alarms; multiple lines. These impairments place pt at risk for falls. Pt will continue to benefit from skilled PT interventions to address stated impairments; to maximize functional potential; for ongoing pt/ family training; and DME needs. PT is currently recommending rehab.  Barriers to Discharge: Decreased caregiver support, Inaccessible home environment     Rehab Resource Intensity Level, PT: II (Moderate Resource Intensity)    See flowsheet documentation for full assessment.

## 2025-01-06 NOTE — PLAN OF CARE
Problem: PAIN - ADULT  Goal: Verbalizes/displays adequate comfort level or baseline comfort level  Description: Interventions:  - Encourage patient to monitor pain and request assistance  - Assess pain using appropriate pain scale  - Administer analgesics based on type and severity of pain and evaluate response  - Implement non-pharmacological measures as appropriate and evaluate response  - Consider cultural and social influences on pain and pain management  - Notify physician/advanced practitioner if interventions unsuccessful or patient reports new pain  Outcome: Progressing     Problem: INFECTION - ADULT  Goal: Absence or prevention of progression during hospitalization  Description: INTERVENTIONS:  - Assess and monitor for signs and symptoms of infection  - Monitor lab/diagnostic results  - Monitor all insertion sites, i.e. indwelling lines, tubes, and drains  - Monitor endotracheal if appropriate and nasal secretions for changes in amount and color  - [place] appropriate cooling/warming therapies per order  - Administer medications as ordered  - Instruct and encourage patient and family to use good hand hygiene technique  - Identify and instruct in appropriate isolation precautions for identified infection/condition  Outcome: Progressing  Goal: Absence of fever/infection during neutropenic period  Description: INTERVENTIONS:  - Monitor WBC    Outcome: Progressing     Problem: SAFETY ADULT  Goal: Patient will remain free of falls  Description: INTERVENTIONS:  - Educate patient/family on patient safety including physical limitations  - Instruct patient to call for assistance with activity   - Consult OT/PT to assist with strengthening/mobility   - Keep Call bell within reach  - Keep bed low and locked with side rails adjusted as appropriate  - Keep care items and personal belongings within reach  - Initiate and maintain comfort rounds  - Make Fall Risk Sign visible to staff  - Offer Toileting every 2 Hours,  in advance of need  - Initiate/Maintain bed/chair alarm  - Obtain necessary fall risk management equipment: bracelets/socks/alarms  - Apply yellow socks and bracelet for high fall risk patients  - Consider moving patient to room near nurses station  Outcome: Progressing  Goal: Maintain or return to baseline ADL function  Description: INTERVENTIONS:  -  Assess patient's ability to carry out ADLs; assess patient's baseline for ADL function and identify physical deficits which impact ability to perform ADLs (bathing, care of mouth/teeth, toileting, grooming, dressing, etc.)  - Assess/evaluate cause of self-care deficits   - Assess range of motion  - Assess patient's mobility; develop plan if impaired  - Assess patient's need for assistive devices and provide as appropriate  - Encourage maximum independence but intervene and supervise when necessary  - Involve family in performance of ADLs  - Assess for home care needs following discharge   - Consider OT consult to assist with ADL evaluation and planning for discharge  - Provide patient education as appropriate  Outcome: Progressing  Goal: Maintains/Returns to pre admission functional level  Description: INTERVENTIONS:  - Perform AM-PAC 6 Click Basic Mobility/ Daily Activity assessment daily.  - Set and communicate daily mobility goal to care team and patient/family/caregiver.   - Collaborate with rehabilitation services on mobility goals if consulted  - Perform Range of Motion 3 times a day.  - Reposition patient every 2 hours.  - Dangle patient 3 times a day  - Stand patient 3 times a day  - Ambulate patient 3 times a day  - Out of bed to chair 3 times a day   - Out of bed for meals 3 times a day  - Out of bed for toileting  - Record patient progress and toleration of activity level   Outcome: Progressing     Problem: DISCHARGE PLANNING  Goal: Discharge to home or other facility with appropriate resources  Description: INTERVENTIONS:  - Identify barriers to discharge  w/patient and caregiver  - Arrange for needed discharge resources and transportation as appropriate  - Identify discharge learning needs (meds, wound care, etc.)  - Arrange for interpretive services to assist at discharge as needed  - Refer to Case Management Department for coordinating discharge planning if the patient needs post-hospital services based on physician/advanced practitioner order or complex needs related to functional status, cognitive ability, or social support system  Outcome: Progressing     Problem: Knowledge Deficit  Goal: Patient/family/caregiver demonstrates understanding of disease process, treatment plan, medications, and discharge instructions  Description: Complete learning assessment and assess knowledge base.  Interventions:  - Provide teaching at level of understanding  - Provide teaching via preferred learning methods  Outcome: Progressing     Problem: Nutrition/Hydration-ADULT  Goal: Nutrient/Hydration intake appropriate for improving, restoring or maintaining nutritional needs  Description: Monitor and assess patient's nutrition/hydration status for malnutrition. Collaborate with interdisciplinary team and initiate plan and interventions as ordered.  Monitor patient's weight and dietary intake as ordered or per policy. Utilize nutrition screening tool and intervene as necessary. Determine patient's food preferences and provide high-protein, high-caloric foods as appropriate.     INTERVENTIONS:  - Monitor oral intake, urinary output, labs, and treatment plans  - Assess nutrition and hydration status and recommend course of action  - Evaluate amount of meals eaten  - Assist patient with eating if necessary   - Allow adequate time for meals  - Recommend/ encourage appropriate diets, oral nutritional supplements, and vitamin/mineral supplements  - Order, calculate, and assess calorie counts as needed  - Recommend, monitor, and adjust tube feedings and TPN/PPN based on assessed needs  -  Assess need for intravenous fluids  - Provide specific nutrition/hydration education as appropriate  - Include patient/family/caregiver in decisions related to nutrition  Outcome: Progressing     Problem: Potential for Falls  Goal: Patient will remain free of falls  Description: INTERVENTIONS:  - Educate patient/family on patient safety including physical limitations  - Instruct patient to call for assistance with activity   - Consult OT/PT to assist with strengthening/mobility   - Keep Call bell within reach  - Keep bed low and locked with side rails adjusted as appropriate  - Keep care items and personal belongings within reach  - Initiate and maintain comfort rounds  - Make Fall Risk Sign visible to staff  - Offer Toileting every 2 Hours, in advance of need  - Initiate/Maintain bed alarm  - Obtain necessary fall risk management equipment: alarms/socks/bracelets  - Apply yellow socks and bracelet for high fall risk patients  - Consider moving patient to room near nurses station  Outcome: Progressing     Problem: Prexisting or High Potential for Compromised Skin Integrity  Goal: Skin integrity is maintained or improved  Description: INTERVENTIONS:  - Identify patients at risk for skin breakdown  - Assess and monitor skin integrity  - Assess and monitor nutrition and hydration status  - Monitor labs   - Assess for incontinence   - Turn and reposition patient  - Assist with mobility/ambulation  - Relieve pressure over bony prominences  - Avoid friction and shearing  - Provide appropriate hygiene as needed including keeping skin clean and dry  - Evaluate need for skin moisturizer/barrier cream  - Collaborate with interdisciplinary team   - Patient/family teaching  - Consider wound care consult   Outcome: Progressing

## 2025-01-06 NOTE — OCCUPATIONAL THERAPY NOTE
Occupational Therapy Evaluation     Patient Name: Sandra Purvis  Today's Date: 1/6/2025  Problem List  Principal Problem:    SDH (subdural hematoma) (HCC)  Active Problems:    Hypertension    Paroxysmal atrial fibrillation (HCC)    GERD (gastroesophageal reflux disease)    SAH (subarachnoid hemorrhage) (HCC)    Closed extensive facial fractures (HCC)    Fall    Past Medical History  Past Medical History:   Diagnosis Date    Arthritis     Cataract     LastAssessed:9/8/15    GERD (gastroesophageal reflux disease)     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    Hypertension     PAF (paroxysmal atrial fibrillation) ([facility])     Last Assessed:2/3/16    Wrist fracture     Resolved:9/8/15     Past Surgical History  Past Surgical History:   Procedure Laterality Date    CATARACT EXTRACTION      Last Assessed:9/8/15    FL RETROGRADE PYELOGRAM  10/17/2021    LYMPHADENECTOMY      Last Assessed:9/8/15    SD CYSTO/URETERO W/LITHOTRIPSY &INDWELL STENT INSRT Right 11/24/2021    Procedure: CYSTOSCOPY URETEROSCOPY WITH LITHOTRIPSY HOLMIUM LASER, RETROGRADE PYELOGRAM AND INSERTION STENT URETERAL;  Surgeon: David Sr MD;  Location: [facility];  Service: Urology    SD CYSTOURETHROSCOPY W/URETERAL CATHETERIZATION Right 10/17/2021    Procedure: CYSTOSCOPY RETROGRADE PYELOGRAM WITH INSERTION STENT URETERAL;  Surgeon: Benjie Tinoco MD;  Location: [facility];  Service: Urology    TONSILLECTOMY      Last Assessed:9/8/15    WRIST SURGERY      Last Assessed:9/8/15         01/06/25 1020   OT Last Visit   OT Visit Date 01/06/25   Note Type   Note type Evaluation   Pain Assessment   Pain Assessment Tool 0-10   Pain Score No Pain   Patient's Stated Pain Goal No pain   Hospital Pain Intervention(s) Repositioned;Ambulation/increased activity;Emotional support   Restrictions/Precautions   Weight Bearing Precautions Per Order No   Other Precautions Cognitive;Chair Alarm;Bed Alarm;Multiple lines;Fall Risk;Pain   Home Living   Type of  "Home House   Home Layout One level;Laundry in basement;Stairs to enter with rails  (2 ROSA)   Bathroom Shower/Tub Tub/shower unit   Bathroom Toilet Standard   Bathroom Equipment Grab bars in shower   Bathroom Accessibility Accessible   Additional Comments NO USE OF DME AT BASELINE   Prior Function   Level of [place] Independent with ADLs;Independent with functional mobility;Independent with IADLS   Lives With Spouse   Receives Help From Family;Friend(s)   IADLs Independent with meal prep;Independent with medication management;Family/Friend/Other provides transportation   Falls in the last 6 months 1 to 4  (1)   Vocational Retired   Lifestyle   Autonomy PT REPORTS BEING I WITH ADLS/IADLS PTA. -   Reciprocal Relationships LIVES WITH SUPPORTIVE SPOUSE.   Service to Others RETIRED   Intrinsic Gratification ENJOYS SPENDING TIME WITH FAMILY.   Subjective   Subjective \"I'M DIZZY\"   ADL   Eating Assistance 5  Supervision/Setup   Grooming Assistance 5  Supervision/Setup   UB Bathing Assistance 4  Minimal Assistance   LB Bathing Assistance 3  Moderate Assistance   UB Dressing Assistance 4  Minimal Assistance   LB Dressing Assistance 3  Moderate Assistance   Toileting Assistance  3  Moderate Assistance   Functional Assistance 3  Moderate Assistance   Bed Mobility   Supine to Sit 4  Minimal assistance   Additional items Assist x 1;Increased time required;Verbal cues;LE management   Sit to Supine Unable to assess   Additional Comments PT LEFT OOB WITH ALL NEEDS IN REACH + CHAIR ALARM ACTIVATED.   Transfers   Sit to Stand 3  Moderate assistance   Additional items Assist x 1;Increased time required;Verbal cues   Stand to Sit 3  Moderate assistance   Additional items Assist x 1;Increased time required;Verbal cues   Additional Comments PT WITH REPORTED DIZZINESS WITH CHANGE OF POSITION- BP OBTAINED SITTING EOB; 157/65. STANDING; 101/46. FOLLOWING ACTIVITY SITTING IN CHAIR; 154/61. RN PRESENT AND AWARE   Functional Mobility "   Functional Mobility 3  Moderate assistance   Additional items Hand hold assistance   Balance   Static Sitting Fair   Static Standing Poor +   Ambulatory Poor   Activity Tolerance   Activity Tolerance Patient limited by fatigue;Treatment limited secondary to medical complications (Comment)   Medical Staff Made Aware PT SEEN FOR CO-EVAL WITH SKILLED PHYSICAL THERAPIST 2' POLY-TRAUAMTIC INJURIES, NEW PRECAUTIONS/LIMITATIONS, AND LIMITED ACTIVITY TOLERANCE WHICH IMPACT PERFORMANCE AND ARE A REGRESSION FROM PT'S BASELINE.   Nurse Made Aware APPROPRIATE TO SEE PER RN.   RUE Assessment   RUE Assessment WFL   LUE Assessment   LUE Assessment WFL  (SKIN TEAR NOTED ON LUE- RN AWARE)   Hand Function   Gross Motor Coordination Functional   Fine Motor Coordination Functional   Psychosocial   Psychosocial (WDL) WDL   Cognition   Arousal/Participation Alert;Cooperative   Attention Attends with cues to redirect   Orientation Level Oriented X4   Memory Decreased recall of recent events;Decreased short term memory   Following Commands Follows multistep commands without difficulty   Comments PT IS PLEASANT AND COOPERATIVE. +HS. MILDLY SLOW TO PROCESS AT TIMES. ALARM ON FOR SAFETY.   Assessment   Limitation Decreased ADL status;Decreased Safe judgement during ADL;Decreased cognition;Decreased endurance;Decreased self-care trans;Decreased high-level ADLs   Prognosis Good   Assessment 89 YO Female SEEN FOR INITIAL OCCUPATIONAL THERAPY EVALUATION FOLLOWING TXF FROM SLA->SLB S/P SYNCOPE WITH COLLAPSE RESULTING IN EXTENSIVE FACIAL FXS, SDH, AND SAH. PT REQUESTING NO SURGICAL INTERVENTION. PROBLEMS LIST/PMH INCLUDES Arthritis, Cataract, GERD (gastroesophageal reflux disease), Hypertaurodontism, Hypertension, PAF (paroxysmal atrial fibrillation) ([facility]), and Wrist fracture. PT IS FROM HOME WITH FAMILY WHERE SHE REPORTS BEING INDEPENDENT WITH ADLS/IADLS PTA. PT CURRENTLY REQUIRES OVERALL MOD A WITH ADLS, TRANSFERS AND FUNCTIONAL MOBILITY WITH  HHA. PT IS LIMITED 2' PAIN, FATIGUE, IMPAIRED BALANCE, FALL RISK , OVERALL WEAKNESS/DECONDITIONING , DIZZINESS WITH CHANGE OF POSITIONING , ORTHOSTATIC HYPOTENSION, and OVERALL LIMITED ACTIVITY TOLERANCE. PT EDUCATED ON DEEP BREATHING TECHNIQUES T/O ACTIVITY, SLOWING OF PACE, ENERGY CONSERVATION TECHNIQUES FOR CARRY OVER UPON D/C, INCREASED FAMILY SUPPORT, and CONTINUE PARTICIPATION IN SELF-CARE/MOBILITY WITH STAFF WHILE IN THE HOSPITAL . The patient's raw score on the AM-PAC Daily Activity Inpatient Short Form is 15. A raw score of less than 19 suggests the patient may benefit from discharge to post-acute rehabilitation services. Please refer to the recommendation of the Occupational Therapist for safe discharge planning. FROM AN OCCUPATIONAL THERAPY PERSPECTIVE, RECOMMEND LEVEL II RESOURCES UPON D/C. WILL CONT TO FOLLOW TO ADDRESS THE BELOW DESCRIBED GOALS.   Goals   Patient Goals TO GET BETTER   LTG Time Frame 10-14   Long Term Goal #1 SEE BELOW   Plan   Treatment Interventions ADL retraining;Functional transfer training;Endurance training;Cognitive reorientation;Patient/family training;Equipment evaluation/education;Compensatory technique education;Energy conservation;Activityengagement   Goal Expiration Date 01/20/25   OT Frequency 2-3x/wk   Discharge Recommendation   Rehab Resource Intensity Level, OT II (Moderate Resource Intensity)   AM-PAC Daily Activity Inpatient   Lower Body Dressing 2   Bathing 2   Toileting 2   Upper Body Dressing 3   Grooming 3   Eating 3   Daily Activity Raw Score 15   Daily Activity Standardized Score (Calc for Raw Score >=11) 34.69   AM-PAC Applied Cognition Inpatient   Following a Speech/Presentation 3   Understanding Ordinary Conversation 4   Taking Medications 3   Remembering Where Things Are Placed or Put Away 3   Remembering List of 4-5 Errands 2   Taking Care of Complicated Tasks 2   Applied Cognition Raw Score 17   Applied Cognition Standardized Score 36.52     OCCUPATIONAL  THERAPY GOALS TO BE MET WITHIN 14 DAYS:    -Pt will increase bed mobility to MOD I to participate in functional activities with G tolerance and balance.  -Pt will improve functional mobility and transfers to MOD I on/off all surfaces w/ G balance/safety including toileting.  -Pt will participate in lt grooming task with MOD I after set-up standing at sink ~3-5 minutes with G safety and balance.   -Pt will increase independence in all ADLS to MOD I with G balance sitting upright in chair.  -Pt will improve activity tolerance to G for 30 min txment sessions w/ G carry over of learned energy conservation techniques.  -Pt will improve independence in lt homemaking activities to MOD I without requiring cues for safety.  -Pt will demonstrate G carryover of learned safety techniques and proper body mechanics in functional and leisure activities with use of DME.  -Pt will complete additional cognitive assessment with 100% attention to task in order to assist with safe d/c plan.   -Pt will follow 100% simple 2-step commands and be A&O x4 consistently with environmental cues to increase participation in functional activities.     Documentation completed by NAVA Thorpe, OTR/L  MOCA Certified ID# [id number]

## 2025-01-06 NOTE — PHYSICAL THERAPY NOTE
Physical Therapy Evaluation    Patient's Name: Sandra Purvis    Admitting Diagnosis  Subdural bleeding (HCC) [I62.00]    Problem List  Patient Active Problem List   Diagnosis    Hypertension    Mild protein-calorie malnutrition (HCC)    Ureteral stone    Bacteremia    Esophagus disorder    Preop examination    Paroxysmal atrial fibrillation (HCC)    Stage 3 chronic kidney disease, unspecified whether stage 3a or 3b CKD (HCC)    GERD (gastroesophageal reflux disease)    COVID-19    Irritant contact dermatitis due to other agents    Scalp lesion    Arm skin lesion, left    Basal cell carcinoma (BCC) of scalp    Malignant melanoma of arm, left (HCC)    Primary insomnia    SDH (subdural hematoma) (HCC)    SAH (subarachnoid hemorrhage) (HCC)    Closed extensive facial fractures (HCC)    Fall       Past Medical History  Past Medical History:   Diagnosis Date    Arthritis     Cataract     LastAssessed:9/8/15    GERD (gastroesophageal reflux disease)     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    Hypertension     PAF (paroxysmal atrial fibrillation) (HCC)     Last Assessed:2/3/16    Wrist fracture     Resolved:9/8/15       Past Surgical History  Past Surgical History:   Procedure Laterality Date    CATARACT EXTRACTION      Last Assessed:9/8/15    FL RETROGRADE PYELOGRAM  10/17/2021    LYMPHADENECTOMY      Last Assessed:9/8/15    SD CYSTO/URETERO W/LITHOTRIPSY &INDWELL STENT INSRT Right 11/24/2021    Procedure: CYSTOSCOPY URETEROSCOPY WITH LITHOTRIPSY HOLMIUM LASER, RETROGRADE PYELOGRAM AND INSERTION STENT URETERAL;  Surgeon: David Sr MD;  [facility]: [facility];  Service: Urology    SD CYSTOURETHROSCOPY W/URETERAL CATHETERIZATION Right 10/17/2021    Procedure: CYSTOSCOPY RETROGRADE PYELOGRAM WITH INSERTION STENT URETERAL;  Surgeon: Benjie Tinoco MD;  Location: [facility];  Service: Urology    TONSILLECTOMY      Last Assessed:9/8/15    WRIST SURGERY      Last Assessed:9/8/15        01/06/25 1019   PT Last  Visit   PT Visit Date 01/06/25   Note Type   Note type Evaluation   Pain Assessment   Pain Assessment Tool 0-10   Pain Score No Pain   Restrictions/Precautions   Weight Bearing Precautions Per Order No   Other Precautions Cognitive;Chair Alarm;Bed Alarm;Fall Risk;Multiple lines  (orthostatic hypotension)   Home Living   Type of Home House   Home Layout One level;Laundry in basement  (2 ROSA)   Bathroom Shower/Tub Tub/shower unit   Bathroom Toilet Standard   Bathroom Equipment Grab bars in shower   Prior Function   Level of [place] Independent with ADLs;Independent with functional mobility  (I ambulation w/o AD)   Lives With Spouse  (spouse sometimes uses RW)   Receives Help From Family;Friend(s)   IADLs Family/Friend/Other provides transportation;Independent with meal prep;Independent with medication management  (I w/ laundry)   Falls in the last 6 months 1 to 4  (1 related to syncope leading to admission)   Vocational Retired  (worked in a factory, 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   Family/Caregiver Present No   Cognition   Arousal/Participation Alert   Orientation Level Oriented X4   Comments pleasant + cooperative   Subjective   Subjective Agreeable to mobilize.   RLE Assessment   RLE Assessment   (grossly 3+/5)   LLE Assessment   LLE Assessment   (grossly 3+/5)   Coordination   Movements are Fluid and Coordinated 1   Bed Mobility   Supine to Sit 4  Minimal assistance   Additional items Assist x 1;HOB elevated;Increased time required;Verbal cues   Additional Comments Pt greeted in supine.   Transfers   Sit to Stand 3  Moderate assistance   Additional items Increased time required;Verbal cues   Stand to Sit 3  Moderate assistance   Additional items Increased time required;Verbal cues    Additional Comments HHA   Ambulation/Elevation   Gait pattern Excessively slow;Short stride;Decreased foot clearance;Improper Weight shift;Knees flexed   Gait Assistance 3  Moderate assist   Additional items Verbal cues;Tactile cues   Assistive Device Other (Comment)  (HHA)   Distance 4' bed>chair   Stair Management Assistance Not tested   Balance   Static Sitting Fair   Dynamic Sitting Fair -   Static Standing Poor +   Dynamic Standing Poor   Ambulatory Poor  (HHA)   Endurance Deficit   Endurance Deficit Yes   Endurance Deficit Description orthostatic hypotension, weakness, fatigue   Activity Tolerance   Activity Tolerance Patient limited by fatigue;Treatment limited secondary to medical complications (Comment)  (orthostatic hypotension)   Medical Staff Made Aware VJ Chowdary CM   Nurse Made Aware yes - cleared + updated   Assessment   Prognosis Good   Problem List Decreased strength;Decreased endurance;Impaired balance;Decreased mobility;Decreased skin integrity   Assessment Pt is 89 y.o. female seen for a PT evaluation s/p admit to [facility] on 1/4/2025. Pt presenting s/p fall related to syncopal episode w/ a SDH. Please see above for other active problem list / PMH.     PT now consulted to assess functional mobility and needs for safe d/c planning. Prior to admission, pt was I w/ ambulation w/o AD, lives w/ spouse in a 1SH w/ 2 ROSA.     Currently pt requires Maribell for bed skills; ModA for functional transfers; ModA for limited ambulation w/ HHA. Pt presents functioning below baseline and w/ overall mobility deficits 2* to: orthostatic hypotension; decreased LE strength; decreased endurance; impaired balance; gait deviations; fatigue; bed/chair alarms; multiple lines. These impairments place pt at risk for falls.     Pt will continue to benefit from skilled PT interventions to address stated impairments; to maximize functional potential; for ongoing pt/ family training; and DME needs. PT is currently  recommending rehab.   Barriers to Discharge Decreased caregiver support;Inaccessible home environment   Goals   Patient Goals feel better   STG Expiration Date 01/20/25   Short Term Goal #1 In 14 days pt will complete: 1) Bed mobility skills with Roseanne to facilitate safe return to previous living environment. 2) Functional transfers with Roseanne to facilitate safe return to previous living environment. 3) Ambulation with least restrictive ' w/ Roseanne without LOB for safe ambulation in home/community environment. 4) Improve balance scores by 1 grade to decrease fall risk. 5) Improve LE strength grades by 1 to increase independence w/ all functional mobility, transfers and gait. 6) PT for ongoing pt and family education; DME needs and D/C planning to promote highest level of function in least restrictive environment. 7) Stair training up/ down 12 steps with most appropriate technique and Roseanne for safe access to previous living environment and to increase community access.   Plan   Treatment/Interventions Functional transfer training;LE strengthening/ROM;Elevations;Therapeutic exercise;Endurance training;Patient/family training;Equipment eval/education;Bed mobility;Gait training;Compensatory technique education;Spoke to nursing;Spoke to case management;OT   PT Frequency 3-5x/wk   Discharge Recommendation   Rehab Resource Intensity Level, PT II (Moderate Resource Intensity)   Equipment Recommended Walker   Walker Package Recommended Wheeled walker   Change/add to Walker Package? No   AM-PAC Basic Mobility Inpatient   Turning in Flat Bed Without Bedrails 3   Lying on Back to Sitting on Edge of Flat Bed Without Bedrails 3   Moving Bed to Chair 2   Standing Up From Chair Using Arms 2   Walk in Room 1   Climb 3-5 Stairs With Railing 1   Basic Mobility Inpatient Raw Score 12   Basic Mobility Standardized Score 32.23   [facility] Highest Level Of Mobility   -[facility] Goal 4: Move to chair/commode   -[facility] Achieved 4: Move to  chair/commode   End of Consult   Patient Position at End of Consult Bedside chair;Bed/Chair alarm activated;All needs within reach  (on waffle cushion, BLE elevated, all lines in tact, RN at bedside)     Elvi Mart, PT, DPT

## 2025-01-06 NOTE — CASE MANAGEMENT
Case Management Assessment & Discharge Planning Note    Patient name Sandra Purvis  Location [facility] 625/[facility] 625-01 MRN [id number]  : 12/3/1934 Date 2025       Current Admission Date: 2025  Current Admission Diagnosis:SDH (subdural hematoma) ([facility])   Patient Active Problem List    Diagnosis Date Noted Date Diagnosed    Fall 2025     SDH (subdural hematoma) (HCC) 2025     SAH (subarachnoid hemorrhage) ([facility]) 2025     Closed extensive facial fractures ([facility]) 2025     Primary insomnia 2024     Basal cell carcinoma (BCC) of scalp 2023     Malignant melanoma of arm, left (HCC) 2023     Scalp lesion 2023     Arm skin lesion, left 2023     Irritant contact dermatitis due to other agents 2022     COVID-19 2022     GERD (gastroesophageal reflux disease)      Stage 3 chronic kidney disease, unspecified whether stage 3a or 3b CKD ([facility]) 2021     Paroxysmal atrial fibrillation ([facility])      Preop examination 2021     Esophagus disorder 10/19/2021     Ureteral stone 10/15/2021     Bacteremia 10/15/2021     Mild protein-calorie malnutrition (HCC) 2021     Hypertension 2015       LOS (days): 2  Geometric Mean LOS (GMLOS) (days): 1.9  Days to GMLOS:0.1     OBJECTIVE:    Risk of Unplanned Readmission Score: 14.51         Current admission status: Inpatient       Preferred Pharmacy:   [facility]/pharmacy #[id number] - [address] [address]  Phone: [phone] Fax: [phone]    Primary Care Provider: Berhane Page DO    Primary Insurance: MEDICARE  Secondary Insurance: BLUE CROSS    ASSESSMENT:  Active Health Care Proxies       HyunDarryl Health Care Representative - Spouse   Primary Phone: [phone] (Home)                 Advance Directives  Primary Contact: Courtney Berger (Daughter) [phone]         Readmission Root Cause  30 Day Readmission: No    Patient Information  Admitted from::  Home  Mental Status: Alert  During Assessment patient was accompanied by: Not accompanied during assessment  Assessment information provided by:: Patient  Primary Caregiver: Self  Support Systems: Spouse/significant other, Son, Daughter, Family members, Self  County of Residence: [place]  What city do you live in?: [place]  Living Arrangements: Lives w/ Spouse/significant other  Is patient a ?: No    Activities of Daily Living Prior to Admission  Functional Status: Independent  Does patient have a history of Outpatient Therapy (PT/OT)?: Yes  Does the patient have a history of Short-Term Rehab?: Yes  Does patient have a history of HHC?: Yes  Does patient currently have HHC?: No    Patient Information Continued  Income Source: Pension/jail  Does patient have prescription coverage?: Yes  Does patient receive dialysis treatments?: No    Means of Transportation  Means of Transport to Appts:: Family transport    DISCHARGE DETAILS:    Discharge planning discussed with:: patient  Freedom of Choice: Yes     CM contacted family/caregiver?: Yes  Were Treatment Team discharge recommendations reviewed with patient/caregiver?: Yes  Did patient/caregiver verbalize understanding of patient care needs?: N/A- going to facility  Were patient/caregiver advised of the risks associated with not following Treatment Team discharge recommendations?: Yes    Contacts  Patient Contacts: Courtney Berger (Daughter) [phone]  Relationship to Patient:: Family  Contact Method: Phone  Phone Number: [phone]  Reason/Outcome: Discharge Planning, Emergency Contact, Continuity of Care    Requested Home Health Care         Is the patient interested in HHC at discharge?: No    DME Referral Provided  Referral made for DME?: No    Other Referral/Resources/Interventions Provided:  Interventions: Short Term Rehab    Treatment Team Recommendation: Short Term Rehab  Discharge Destination Plan:: Short Term Rehab      CM met with pt to discuss  the role of Cm, as well as d/c planning.   Pt lives with her spouse, in a 1 story home which has 2STE. Pt has a tub/shower with grab bars in the shower.   Pt's had 2 falls. Pt has access to a walker.   Pt was evaluated by OT/PT and recommended for IP rehab  CM discuss multiple SNF options with the pt and she wasn't keen on those locations. She's willing to entertain a referral to [facility], if they would accept  CM will submit and then follow up    CM reviewed d/c planning process including the following: identifying help at home, patient preference for d/c planning needs, Discharge Lounge, [facility] Meds to Bed program, availability of treatment team to discuss questions or concerns patient and/or family may have regarding understanding medications and recognizing signs and symptoms once discharged.  CM also encouraged patient to follow up with all recommended appointments after discharge. Patient advised of importance for patient and family to participate in managing patient’s medical well being.

## 2025-01-06 NOTE — ASSESSMENT & PLAN NOTE
Secondary to fall  1/4 Initial CT imaging of face showed Comminuted and mildly displaced fractures of the lateral and anterior walls of right maxilla, lateral and inferior walls of right orbit. Right inferior orbital wall fracture appears to involve the inferior orbital foramen with associated small extraconal soft tissue edema and focus of air. No herniation of extraocular muscles. Nondisplaced fractures of zygomatic arch and the temporal bone at the TMJ. Diffuse opacification of right maxillary sinus and right nasal cavity with blood products and air.  OMFS consulted and note appreciated  Surgical intervention discussed with patient by OMFS team, but refused by patient  Continue with 7-day course of Unasyn/Augmentin  Sinus precautions  Follow-up with OMFS as an outpatient for continued monitoring and management of fractures.

## 2025-01-06 NOTE — ASSESSMENT & PLAN NOTE
Secondary to fall  Initial CT imaging on 1/4 showed: Acute subdural hemorrhage along the left cerebral convexity with maximal thickness of 1.9 cm. No midline shift. Acute subarachnoid hemorrhage in the left frontoparietal sulci, left sylvian fissure and suprasellar cistern  Repeat imaging showed stability on 1/5  Neurosurgery consulted and note appreciated  No reversal needed due to patient not taking any AP/AC  Continue to closely monitor neurological status every 4-currently GCS 15  Maintain normotension, SBP less than 160  Continue on 7 day course of Keppra for seizure prophylaxis  Hold all anticoagulant or antiplatelet medications until patient follows up with neurosurgery in 2 weeks with repeat CT head.

## 2025-01-06 NOTE — PLAN OF CARE
Problem: OCCUPATIONAL THERAPY ADULT  Goal: Performs self-care activities at highest level of function for planned discharge setting.  See evaluation for individualized goals.  Description: Treatment Interventions: ADL retraining, Functional transfer training, Endurance training, Cognitive reorientation, Patient/family training, Equipment evaluation/education, Compensatory technique education, Energy conservation, Activityengagement          See flowsheet documentation for full assessment, interventions and recommendations.   Note: Limitation: Decreased ADL status, Decreased Safe judgement during ADL, Decreased cognition, Decreased endurance, Decreased self-care trans, Decreased high-level ADLs  Prognosis: Good  Assessment: 89 YO Female SEEN FOR INITIAL OCCUPATIONAL THERAPY EVALUATION FOLLOWING TXF FROM SLA->SLB S/P SYNCOPE WITH COLLAPSE RESULTING IN EXTENSIVE FACIAL FXS, SDH, AND SAH. PT REQUESTING NO SURGICAL INTERVENTION. PROBLEMS LIST/PMH INCLUDES Arthritis, Cataract, GERD (gastroesophageal reflux disease), Hypertaurodontism, Hypertension, PAF (paroxysmal atrial fibrillation) ([facility]), and Wrist fracture. PT IS FROM HOME WITH FAMILY WHERE SHE REPORTS BEING INDEPENDENT WITH ADLS/IADLS PTA. PT CURRENTLY REQUIRES OVERALL MOD A WITH ADLS, TRANSFERS AND FUNCTIONAL MOBILITY WITH HHA. PT IS LIMITED 2' PAIN, FATIGUE, IMPAIRED BALANCE, FALL RISK , OVERALL WEAKNESS/DECONDITIONING , DIZZINESS WITH CHANGE OF POSITIONING , ORTHOSTATIC HYPOTENSION, and OVERALL LIMITED ACTIVITY TOLERANCE. PT EDUCATED ON DEEP BREATHING TECHNIQUES T/O ACTIVITY, SLOWING OF PACE, ENERGY CONSERVATION TECHNIQUES FOR CARRY OVER UPON D/C, INCREASED FAMILY SUPPORT, and CONTINUE PARTICIPATION IN SELF-CARE/MOBILITY WITH STAFF WHILE IN THE HOSPITAL . The patient's raw score on the AM-PAC Daily Activity Inpatient Short Form is 15. A raw score of less than 19 suggests the patient may benefit from discharge to post-acute rehabilitation services. Please refer  to the recommendation of the Occupational Therapist for safe discharge planning. FROM AN OCCUPATIONAL THERAPY PERSPECTIVE, RECOMMEND LEVEL II RESOURCES UPON D/C. WILL CONT TO FOLLOW TO ADDRESS THE BELOW DESCRIBED GOALS.     Rehab Resource Intensity Level, OT: II (Moderate Resource Intensity)

## 2025-01-06 NOTE — PROGRESS NOTES
"Secondary to fall  Progress Note - Trauma   Name: Sandra Purvis 89 y.o. female I MRN: [id number]  Unit/Bed#: [facility] 625-01 I Date of Admission: 1/4/2025   Date of Service: 1/6/2025 I Hospital Day: 2    Assessment & Plan  Fall  Described as \"fainting\"--no history or associated symptoms  Troponin 29  EKG showed SR with left axis deviation and anterior/inferior infarcts  Orthostatics pending  Echocardiogram pending  Telemetry pending  SDH (subdural hematoma) (HCC)  Secondary to fall  Initial CT imaging on 1/4 showed: Acute subdural hemorrhage along the left cerebral convexity with maximal thickness of 1.9 cm. No midline shift. Acute subarachnoid hemorrhage in the left frontoparietal sulci, left sylvian fissure and suprasellar cistern  Repeat imaging showed stability on 1/5  Neurosurgery consulted and note appreciated  No reversal needed due to patient not taking any AP/AC  Continue to closely monitor neurological status every 4-currently GCS 15  Maintain normotension, SBP less than 160  Continue on 7 day course of Keppra for seizure prophylaxis  Hold all anticoagulant or antiplatelet medications until patient follows up with neurosurgery in 2 weeks with repeat CT head.  SAH (subarachnoid hemorrhage) (HCC)  See above  Closed extensive facial fractures (HCC)  Secondary to fall  1/4 Initial CT imaging of face showed Comminuted and mildly displaced fractures of the lateral and anterior walls of right maxilla, lateral and inferior walls of right orbit. Right inferior orbital wall fracture appears to involve the inferior orbital foramen with associated small extraconal soft tissue edema and focus of air. No herniation of extraocular muscles. Nondisplaced fractures of zygomatic arch and the temporal bone at the TMJ. Diffuse opacification of right maxillary sinus and right nasal cavity with blood products and air.  OMFS consulted and note appreciated  Surgical intervention discussed with patient by OMFS team, but refused by " "patient  Continue with 7-day course of Unasyn/Augmentin  Sinus precautions  Follow-up with OMFS as an outpatient for continued monitoring and management of fractures.  Paroxysmal atrial fibrillation (HCC)  Continue home metoprolol  Currently not on AC  Follow-up with cardiology as an outpatient  Hypertension  Continue home metoprolol    Bowel Regimen: Miralax  VTE Prophylaxis:VTE covered by:  enoxaparin, Subcutaneous, 30 mg at 01/06/25 0919        Disposition: Pending PT/OT eval   Family communication: Called daughter, Courtney--updated, did not have any questions or concerns at the completion of her conversation.  24 Hour Events : No events reported overnight  Subjective : \"I fainted\"  Patient describes not having any pain or discomfort associated with her facial fractures or ICH.  She confirms that she has been able to eat without any difficulty.  She denies any visual deficits.  She does express concern regarding fainting-she notes never having an event like this before.  She denies any presyncopal or associated symptoms.  She denies any other complaints.    Objective :  Temp:  [97.2 °F (36.2 °C)-98.1 °F (36.7 °C)] 97.9 °F (36.6 °C)  HR:  [63-88] 63  BP: (101-166)/(46-80) 135/50  Resp:  [15-20] 15  SpO2:  [97 %-99 %] 99 %  O2 Device: None (Room air)    I/O         01/04 0701  01/05 0700 01/05 0701  01/06 0700 01/06 0701  01/07 0700    P.O.  480     I.V. (mL/kg) 237.1 (4.9) 60 (1.2)     IV Piggyback  200     Total Intake(mL/kg) 237.1 (4.9) 740 (15.1)     Urine (mL/kg/hr) 1275 850 (0.7)     Total Output 1275 850     Net -1037.9 -110                  Lines/Drains/Airways       Active Status       Name Placement date Placement time Site Days    External Urinary Catheter 01/05/25  0100  -- 1                  Physical Exam:   GENERAL APPEARANCE: No acute distress  NEURO: GCS 15, no lateralizing weakness  HEENT: Right side of face is swollen.  Ecchymosis noted along right nare fold and right periorbital region.  EOMI.  " PERRL.  CV: Regular rate and rhythm.  +2 radial and dorsalis pedis pulse, bilaterally.  LUNGS: Clear to auscultation, bilaterally.  Chest wall is nontender.  GI: Abdomen is soft nontender.  : Pelvis is stable.  MSK: Moving all extremities.  No deformities.  SKIN: Warm, dry.           Lab Results: I have reviewed the following results:  Recent Labs     01/04/25  1743 01/04/25  1743 01/05/25  0542 01/05/25  0749 01/06/25  0452   WBC 14.38*  --  17.50*  --  15.86*   HGB 10.6*  --  11.1*  --  10.0*   HCT 33.0*  --  34.3*  --  30.9*   *  --  212  --  191   SODIUM 135  --   --  137 139   K 3.9  --   --  3.9 3.5     --   --  102 104   CO2 27  --   --  29 27   BUN 30*  --   --  22 34*   CREATININE 1.31*  --   --  0.94 1.11   GLUC 125  --   --  101 105   CAIONIZED  --   --  1.16  --   --    MG 2.0  --   --  1.8* 2.0   PHOS  --    < >  --  3.0 2.7   AST 94*  --   --  42*  --    ALT 68*  --   --  49  --    ALB 3.9  --   --  3.5  --    TBILI 0.50  --   --  0.77  --    ALKPHOS 63  --   --  58  --    PTT 25  --   --   --   --    INR 1.12  --   --   --   --    HSTNI0 29  --   --   --   --     < > = values in this interval not displayed.       Imaging Results Review: No pertinent imaging studies reviewed.  Other Study Results Review: No additional pertinent studies reviewed.

## 2025-01-07 ENCOUNTER — APPOINTMENT (INPATIENT)
Dept: RADIOLOGY | Facility: HOSPITAL | Age: OVER 89
DRG: 157 | End: 2025-01-07
Payer: MEDICARE

## 2025-01-07 PROBLEM — R07.9 CHEST PAIN: Status: ACTIVE | Noted: 2025-01-07

## 2025-01-07 PROBLEM — I95.1 ORTHOSTATIC HYPOTENSION: Status: ACTIVE | Noted: 2025-01-07

## 2025-01-07 LAB
ALBUMIN SERPL BCG-MCNC: 3.5 G/DL (ref 3.5–5)
ALP SERPL-CCNC: 52 U/L (ref 34–104)
ALT SERPL W P-5'-P-CCNC: 32 U/L (ref 7–52)
ANION GAP SERPL CALCULATED.3IONS-SCNC: 8 MMOL/L (ref 4–13)
ANION GAP SERPL CALCULATED.3IONS-SCNC: 9 MMOL/L (ref 4–13)
AST SERPL W P-5'-P-CCNC: 26 U/L (ref 13–39)
BASE EX.OXY STD BLDV CALC-SCNC: 88 % (ref 60–80)
BASE EXCESS BLDV CALC-SCNC: -0.5 MMOL/L
BASOPHILS # BLD AUTO: 0.06 THOUSANDS/ΜL (ref 0–0.1)
BASOPHILS NFR BLD AUTO: 1 % (ref 0–1)
BILIRUB SERPL-MCNC: 0.65 MG/DL (ref 0.2–1)
BUN SERPL-MCNC: 25 MG/DL (ref 5–25)
BUN SERPL-MCNC: 28 MG/DL (ref 5–25)
CALCIUM SERPL-MCNC: 9 MG/DL (ref 8.4–10.2)
CALCIUM SERPL-MCNC: 9.1 MG/DL (ref 8.4–10.2)
CARDIAC TROPONIN I PNL SERPL HS: 190 NG/L (ref 8–18)
CARDIAC TROPONIN I PNL SERPL HS: 3866 NG/L (ref 8–18)
CHLORIDE SERPL-SCNC: 100 MMOL/L (ref 96–108)
CHLORIDE SERPL-SCNC: 103 MMOL/L (ref 96–108)
CO2 SERPL-SCNC: 27 MMOL/L (ref 21–32)
CO2 SERPL-SCNC: 28 MMOL/L (ref 21–32)
CREAT SERPL-MCNC: 0.98 MG/DL (ref 0.6–1.3)
CREAT SERPL-MCNC: 0.99 MG/DL (ref 0.6–1.3)
EOSINOPHIL # BLD AUTO: 0.03 THOUSAND/ΜL (ref 0–0.61)
EOSINOPHIL NFR BLD AUTO: 0 % (ref 0–6)
ERYTHROCYTE [DISTWIDTH] IN BLOOD BY AUTOMATED COUNT: 15.3 % (ref 11.6–15.1)
GFR SERPL CREATININE-BSD FRML MDRD: 50 ML/MIN/1.73SQ M
GFR SERPL CREATININE-BSD FRML MDRD: 50 ML/MIN/1.73SQ M
GLUCOSE SERPL-MCNC: 115 MG/DL (ref 65–140)
GLUCOSE SERPL-MCNC: 165 MG/DL (ref 65–140)
HCO3 BLDV-SCNC: 25.1 MMOL/L (ref 24–30)
HCT VFR BLD AUTO: 35.8 % (ref 34.8–46.1)
HGB BLD-MCNC: 11.5 G/DL (ref 11.5–15.4)
IMM GRANULOCYTES # BLD AUTO: 0.04 THOUSAND/UL (ref 0–0.2)
IMM GRANULOCYTES NFR BLD AUTO: 0 % (ref 0–2)
LIPASE SERPL-CCNC: 40 U/L (ref 11–82)
LYMPHOCYTES # BLD AUTO: 1.15 THOUSANDS/ΜL (ref 0.6–4.47)
LYMPHOCYTES NFR BLD AUTO: 10 % (ref 14–44)
MAGNESIUM SERPL-MCNC: 2.1 MG/DL (ref 1.9–2.7)
MCH RBC QN AUTO: 28.4 PG (ref 26.8–34.3)
MCHC RBC AUTO-ENTMCNC: 32.1 G/DL (ref 31.4–37.4)
MCV RBC AUTO: 88 FL (ref 82–98)
MONOCYTES # BLD AUTO: 0.66 THOUSAND/ΜL (ref 0.17–1.22)
MONOCYTES NFR BLD AUTO: 6 % (ref 4–12)
NEUTROPHILS # BLD AUTO: 10.1 THOUSANDS/ΜL (ref 1.85–7.62)
NEUTS SEG NFR BLD AUTO: 83 % (ref 43–75)
NRBC BLD AUTO-RTO: 0 /100 WBCS
O2 CT BLDV-SCNC: 14.5 ML/DL
PCO2 BLDV: 44.8 MM HG (ref 42–50)
PH BLDV: 7.37 [PH] (ref 7.3–7.4)
PHOSPHATE SERPL-MCNC: 2.9 MG/DL (ref 2.3–4.1)
PLATELET # BLD AUTO: 195 THOUSANDS/UL (ref 149–390)
PMV BLD AUTO: 11.2 FL (ref 8.9–12.7)
PO2 BLDV: 58.9 MM HG (ref 35–45)
POTASSIUM SERPL-SCNC: 3.6 MMOL/L (ref 3.5–5.3)
POTASSIUM SERPL-SCNC: 3.8 MMOL/L (ref 3.5–5.3)
PROT SERPL-MCNC: 6.1 G/DL (ref 6.4–8.4)
QRS AXIS: -30 DEGREES
QRSD INTERVAL: 78 MS
QT INTERVAL: 376 MS
QTC INTERVAL: 431 MS
RBC # BLD AUTO: 4.05 MILLION/UL (ref 3.81–5.12)
SODIUM SERPL-SCNC: 136 MMOL/L (ref 135–147)
SODIUM SERPL-SCNC: 139 MMOL/L (ref 135–147)
T WAVE AXIS: 112 DEGREES
VENTRICULAR RATE: 79 BPM
WBC # BLD AUTO: 12.04 THOUSAND/UL (ref 4.31–10.16)

## 2025-01-07 PROCEDURE — 70450 CT HEAD/BRAIN W/O DYE: CPT

## 2025-01-07 PROCEDURE — 93010 ELECTROCARDIOGRAM REPORT: CPT | Performed by: INTERNAL MEDICINE

## 2025-01-07 PROCEDURE — 84100 ASSAY OF PHOSPHORUS: CPT | Performed by: NURSE PRACTITIONER

## 2025-01-07 PROCEDURE — 80053 COMPREHEN METABOLIC PANEL: CPT

## 2025-01-07 PROCEDURE — 85027 COMPLETE CBC AUTOMATED: CPT

## 2025-01-07 PROCEDURE — 99222 1ST HOSP IP/OBS MODERATE 55: CPT | Performed by: INTERNAL MEDICINE

## 2025-01-07 PROCEDURE — 84484 ASSAY OF TROPONIN QUANT: CPT | Performed by: NURSE PRACTITIONER

## 2025-01-07 PROCEDURE — 97530 THERAPEUTIC ACTIVITIES: CPT

## 2025-01-07 PROCEDURE — 82805 BLOOD GASES W/O2 SATURATION: CPT

## 2025-01-07 PROCEDURE — 83690 ASSAY OF LIPASE: CPT

## 2025-01-07 PROCEDURE — 84484 ASSAY OF TROPONIN QUANT: CPT

## 2025-01-07 PROCEDURE — 93005 ELECTROCARDIOGRAM TRACING: CPT

## 2025-01-07 PROCEDURE — 80048 BASIC METABOLIC PNL TOTAL CA: CPT

## 2025-01-07 PROCEDURE — 99232 SBSQ HOSP IP/OBS MODERATE 35: CPT | Performed by: STUDENT IN AN ORGANIZED HEALTH CARE EDUCATION/TRAINING PROGRAM

## 2025-01-07 PROCEDURE — 85025 COMPLETE CBC W/AUTO DIFF WBC: CPT

## 2025-01-07 PROCEDURE — 97535 SELF CARE MNGMENT TRAINING: CPT

## 2025-01-07 PROCEDURE — 97116 GAIT TRAINING THERAPY: CPT

## 2025-01-07 PROCEDURE — 83735 ASSAY OF MAGNESIUM: CPT | Performed by: NURSE PRACTITIONER

## 2025-01-07 PROCEDURE — 85007 BL SMEAR W/DIFF WBC COUNT: CPT

## 2025-01-07 RX ORDER — METOPROLOL TARTRATE 25 MG/1
25 TABLET, FILM COATED ORAL EVERY 12 HOURS SCHEDULED
Status: DISCONTINUED | OUTPATIENT
Start: 2025-01-07 | End: 2025-01-09

## 2025-01-07 RX ORDER — SUCRALFATE 1 G/1
1 TABLET ORAL ONCE
Status: COMPLETED | OUTPATIENT
Start: 2025-01-07 | End: 2025-01-07

## 2025-01-07 RX ORDER — PANTOPRAZOLE SODIUM 40 MG/1
40 TABLET, DELAYED RELEASE ORAL 2 TIMES DAILY
Status: DISCONTINUED | OUTPATIENT
Start: 2025-01-07 | End: 2025-01-12 | Stop reason: HOSPADM

## 2025-01-07 RX ORDER — BISACODYL 10 MG
10 SUPPOSITORY, RECTAL RECTAL DAILY PRN
Status: DISCONTINUED | OUTPATIENT
Start: 2025-01-08 | End: 2025-01-12 | Stop reason: HOSPADM

## 2025-01-07 RX ORDER — METOPROLOL TARTRATE 50 MG
50 TABLET ORAL EVERY 12 HOURS SCHEDULED
Status: DISCONTINUED | OUTPATIENT
Start: 2025-01-07 | End: 2025-01-07

## 2025-01-07 RX ORDER — CALCIUM CARBONATE 500 MG/1
500 TABLET, CHEWABLE ORAL DAILY PRN
Status: DISCONTINUED | OUTPATIENT
Start: 2025-01-07 | End: 2025-01-12 | Stop reason: HOSPADM

## 2025-01-07 RX ORDER — METOPROLOL TARTRATE 1 MG/ML
5 INJECTION, SOLUTION INTRAVENOUS ONCE
Status: COMPLETED | OUTPATIENT
Start: 2025-01-07 | End: 2025-01-07

## 2025-01-07 RX ORDER — LEVETIRACETAM 500 MG/1
500 TABLET ORAL EVERY 12 HOURS SCHEDULED
Status: COMPLETED | OUTPATIENT
Start: 2025-01-07 | End: 2025-01-12

## 2025-01-07 RX ORDER — BISACODYL 10 MG
10 SUPPOSITORY, RECTAL RECTAL ONCE
Status: COMPLETED | OUTPATIENT
Start: 2025-01-07 | End: 2025-01-07

## 2025-01-07 RX ORDER — MAGNESIUM HYDROXIDE/ALUMINUM HYDROXICE/SIMETHICONE 120; 1200; 1200 MG/30ML; MG/30ML; MG/30ML
30 SUSPENSION ORAL ONCE
Status: COMPLETED | OUTPATIENT
Start: 2025-01-07 | End: 2025-01-07

## 2025-01-07 RX ORDER — FAMOTIDINE 10 MG/ML
20 INJECTION, SOLUTION INTRAVENOUS ONCE
Status: COMPLETED | OUTPATIENT
Start: 2025-01-07 | End: 2025-01-07

## 2025-01-07 RX ORDER — SODIUM CHLORIDE, SODIUM GLUCONATE, SODIUM ACETATE, POTASSIUM CHLORIDE, MAGNESIUM CHLORIDE, SODIUM PHOSPHATE, DIBASIC, AND POTASSIUM PHOSPHATE .53; .5; .37; .037; .03; .012; .00082 G/100ML; G/100ML; G/100ML; G/100ML; G/100ML; G/100ML; G/100ML
50 INJECTION, SOLUTION INTRAVENOUS CONTINUOUS
Status: DISCONTINUED | OUTPATIENT
Start: 2025-01-07 | End: 2025-01-08

## 2025-01-07 RX ADMIN — SODIUM CHLORIDE 3 G: 9 INJECTION, SOLUTION INTRAVENOUS at 13:58

## 2025-01-07 RX ADMIN — LEVETIRACETAM 500 MG: 500 TABLET, FILM COATED ORAL at 22:07

## 2025-01-07 RX ADMIN — SUCRALFATE 1 G: 1 TABLET ORAL at 01:47

## 2025-01-07 RX ADMIN — Medication 2.5 MG: at 05:07

## 2025-01-07 RX ADMIN — SODIUM CHLORIDE, SODIUM GLUCONATE, SODIUM ACETATE, POTASSIUM CHLORIDE, MAGNESIUM CHLORIDE, SODIUM PHOSPHATE, DIBASIC, AND POTASSIUM PHOSPHATE 50 ML/HR: .53; .5; .37; .037; .03; .012; .00082 INJECTION, SOLUTION INTRAVENOUS at 15:11

## 2025-01-07 RX ADMIN — METOPROLOL TARTRATE 25 MG: 25 TABLET, FILM COATED ORAL at 22:09

## 2025-01-07 RX ADMIN — PANTOPRAZOLE SODIUM 40 MG: 40 TABLET, DELAYED RELEASE ORAL at 08:43

## 2025-01-07 RX ADMIN — POLYETHYLENE GLYCOL 3350 17 G: 17 POWDER, FOR SOLUTION ORAL at 08:43

## 2025-01-07 RX ADMIN — ALUMINUM HYDROXIDE, MAGNESIUM HYDROXIDE, AND DIMETHICONE 30 ML: 200; 20; 200 SUSPENSION ORAL at 05:06

## 2025-01-07 RX ADMIN — FAMOTIDINE 20 MG: 10 INJECTION, SOLUTION INTRAVENOUS at 05:07

## 2025-01-07 RX ADMIN — SODIUM CHLORIDE 3 G: 9 INJECTION, SOLUTION INTRAVENOUS at 04:40

## 2025-01-07 RX ADMIN — SENNOSIDES AND DOCUSATE SODIUM 1 TABLET: 50; 8.6 TABLET ORAL at 22:10

## 2025-01-07 RX ADMIN — PANTOPRAZOLE SODIUM 40 MG: 40 TABLET, DELAYED RELEASE ORAL at 01:47

## 2025-01-07 RX ADMIN — BISACODYL 10 MG: 10 SUPPOSITORY RECTAL at 13:57

## 2025-01-07 RX ADMIN — SODIUM CHLORIDE 3 G: 9 INJECTION, SOLUTION INTRAVENOUS at 23:00

## 2025-01-07 RX ADMIN — CHLORHEXIDINE GLUCONATE 0.12% ORAL RINSE 15 ML: 1.2 LIQUID ORAL at 08:43

## 2025-01-07 RX ADMIN — ACETAMINOPHEN 975 MG: 325 TABLET, FILM COATED ORAL at 05:06

## 2025-01-07 RX ADMIN — SODIUM CHLORIDE 3 G: 9 INJECTION, SOLUTION INTRAVENOUS at 08:46

## 2025-01-07 RX ADMIN — ENOXAPARIN SODIUM 30 MG: 30 INJECTION SUBCUTANEOUS at 08:43

## 2025-01-07 RX ADMIN — PANTOPRAZOLE SODIUM 40 MG: 40 TABLET, DELAYED RELEASE ORAL at 16:53

## 2025-01-07 RX ADMIN — METOPROLOL TARTRATE 25 MG: 25 TABLET, FILM COATED ORAL at 08:43

## 2025-01-07 RX ADMIN — LEVETIRACETAM 500 MG: 100 INJECTION, SOLUTION INTRAVENOUS at 08:43

## 2025-01-07 RX ADMIN — METOPROLOL TARTRATE 5 MG: 1 INJECTION, SOLUTION INTRAVENOUS at 23:01

## 2025-01-07 NOTE — WOUND OSTOMY CARE
Consult Note - Wound   Sandra Purvis 89 y.o. female MRN: [id number]  Unit/Bed#: [facility] 625-01 Encounter: [id number]        History and Present Illness:  Patient is 89 yo female admitted to hospitals with SDH. Patient has history of HTN, CKD, a-fib, and malignant melanoma. Patient is incontinent of bowel and bladder. Patient is max assist with turning from side to side for assessment. Patient is independent with meals. Wound care consulted for left arm wound.    Assessment Findings:   Sacral/buttocks and B/L heels intact and blanching, preventative skin care orders placed.     Left arm wound- likely traumatic, patient unable to give history, full thickness wound with mostly yellow slough tissue, periwound skin is fragile, small serosanguineous drainage.    No induration, fluctuance, odor, warmth/temperature differences, redness, or purulence noted to the above noted wounds and skin areas assessed. New dressings applied per orders listed below. Patient tolerated well- no s/s of non-verbal pain or discomfort observed during the encounter. Bedside nurse aware of plan of care. See flow sheets for more detailed assessment findings.  Wound care will continue to follow, call or Secure Chat Message with questions.     Skin care plans:  1-Hydraguard to bilateral sacrum, buttock and heels BID and PRN  2-Elevate heels to offload pressure.  3-Ehob cushion in chair when out of bed.  4-Moisturize skin daily with skin nourishing cream.  5-Turn/reposition q2h or when medically stable for pressure re-distribution on skin.   6-Cleanse left arm wound with normal saline. Apply melgisorb ag to wound bed and cover with Silicone bordered foam, joce T for treatment, and change every other day and PRN soilage/displacement    Wounds:  Wound 11/24/21 Vagina Right (Active)       Wound 01/05/25 Incision Arm Anterior;Left;Proximal (Active)   Wound Description Pink;Fragile;Intact 01/07/25 0800   Alix-wound Assessment Fragile 01/07/25 0800   Drainage  Amount None 01/06/25 1940   Drainage Description Serous 01/05/25 1945   Treatments Cleansed 01/05/25 1600   Dressing Open to air 01/07/25 0800       Wound 01/06/25 Arm Anterior;Left;Proximal (Active)   Wound Image   01/07/25 1005   Wound Description Slough;Pink;Fragile 01/07/25 1005   Alix-wound Assessment Fragile 01/07/25 1005   Wound Length (cm) 0.8 cm 01/07/25 1005   Wound Width (cm) 0.5 cm 01/07/25 1005   Wound Depth (cm) 0.5 cm 01/07/25 1005   Wound Surface Area (cm^2) 0.4 cm^2 01/07/25 1005   Wound Volume (cm^3) 0.2 cm^3 01/07/25 1005   Calculated Wound Volume (cm^3) 0.2 cm^3 01/07/25 1005   Wound Site Closure SHAQ 01/07/25 1005   Drainage Amount Small 01/07/25 1005   Drainage Description Serosanguineous 01/07/25 1005   Non-staged Wound Description Full thickness 01/07/25 1005   Treatments Cleansed 01/07/25 1005   Dressing Foam, Silicon (eg. Allevyn, etc);Calcium Alginate with Silver 01/07/25 1005   Wound packed? No 01/07/25 1005   Packing- # removed 0 01/07/25 1005   Packing- # inserted 0 01/07/25 1005   Dressing Changed New 01/07/25 1005   Patient Tolerance Tolerated well 01/07/25 1005   Dressing Status Clean;Dry;Intact 01/07/25 1005         Jagruti Stark BSN, RN, CWOCN

## 2025-01-07 NOTE — PLAN OF CARE
Problem: PHYSICAL THERAPY ADULT  Goal: Performs mobility at highest level of function for planned discharge setting.  See evaluation for individualized goals.  Description: Treatment/Interventions: Functional transfer training, LE strengthening/ROM, Elevations, Therapeutic exercise, Endurance training, Patient/family training, Equipment eval/education, Bed mobility, Gait training, Compensatory technique education, Spoke to nursing, Spoke to case management, OT  Equipment Recommended: Walker       See flowsheet documentation for full assessment, interventions and recommendations.  Outcome: Progressing  Note: Prognosis: Good  Problem List: Decreased strength, Decreased endurance, Impaired balance, Decreased mobility, Decreased safety awareness  Assessment: Patient resting in bed at time of PT treatment session.  Patient reports feeling better today and is willing and motivated to participate with PT.  Patient is able perform all bed mobility with min a x 1 was approximately same level assistance required compared to previous session.  Patient was able to tolerate sitting edge of bed with supervision/min assist required to maintain sitting balance while patient performed ADL tasks with OT.  Prior to standing patient's BP was taken in seated position edge of bed: 136/67 patient performed all transfers with min a x 1 which is improved compared to previous session, but cueing still needed for proper hand placement during transfers.  Patient was able to tolerate ambulation into bathroom utilizing rolling walker with min a x 1.  Patient performed all toilet transfers with min a x 1.  Upon standing in the bathroom patient reported increased lightheadedness and dizziness in standing and patient was noted to have decreased verbal response to commands.  Patient was seated into recliner chair and bilateral lower extremities were elevated.  Patient's BP was taken in seated position: 113/63.  Patient reported feeling better in  seated position and standing was attempted once more with patient to get standing BP: 70/46.  Patient was then seated back in recliner chair with bilateral lower extremities elevated and BP was taken again in seated position: 130/65.  Nursing was present and aware of events.  At conclusion of PT treatment session patient was assisted back into chair with all needs within reach and chair alarm engaged.  PT will continue to follow, D/C recommendation when medically cleared is level I resource intensity:  Barriers to Discharge: Decreased caregiver support, Inaccessible home environment     Rehab Resource Intensity Level, PT: I (Maximum Resource Intensity)    See flowsheet documentation for full assessment.

## 2025-01-07 NOTE — DISCHARGE INSTR - OTHER ORDERS
Skin care plans:  1-Hydraguard to bilateral sacrum, buttock and heels BID and PRN  2-Elevate heels to offload pressure.  3-Ehob cushion in chair when out of bed.  4-Moisturize skin daily with skin nourishing cream.  5-Turn/reposition q2h or when medically stable for pressure re-distribution on skin.   6-Cleanse left arm wound with normal saline. Apply melgisorb ag to wound bed and cover with Silicone bordered foam, joce T for treatment, and change every other day and PRN soilage/displacement

## 2025-01-07 NOTE — OCCUPATIONAL THERAPY NOTE
Occupational Therapy Progress Note     Patient Name: Sandra Purvis  Today's Date: 1/7/2025  Problem List  Principal Problem:    SDH (subdural hematoma) (HCC)  Active Problems:    Hypertension    Paroxysmal atrial fibrillation (HCC)    GERD (gastroesophageal reflux disease)    SAH (subarachnoid hemorrhage) (HCC)    Closed extensive facial fractures (HCC)    Fall    Chest pain    Orthostatic hypotension        01/07/25 1425   OT Last Visit   OT Visit Date 01/07/25   Note Type   Note Type Treatment   Pain Assessment   Pain Assessment Tool 0-10   Pain Score No Pain   Hospital Pain Intervention(s) Repositioned;Ambulation/increased activity;Emotional support   Restrictions/Precautions   Weight Bearing Precautions Per Order No   Other Precautions Cognitive;Chair Alarm;Bed Alarm;Multiple lines;Fall Risk;Pain   Lifestyle   Autonomy PT REPORTS BEING I WITH ADLS/IADLS PTA. -   Reciprocal Relationships LIVES WITH SUPPORTIVE SPOUSE.   Service to Others RETIRED   Intrinsic Gratification ENJOYS SPENDING TIME WITH FAMILY.   ADL   Grooming Assistance 4  Minimal Assistance   Grooming Deficit Setup;Steadying;Supervision/safety;Increased time to complete;Wash/dry hands   Grooming Comments MIN A FOR STEADYIGN DURING STANDING AT SINK FOR ~2 MINUTES FOR HAND WASHING   UB Dressing Assistance 5  Supervision/Setup   UB Dressing Deficit Setup;Thread RUE;Thread LUE;Pull around back   UB Dressing Comments S FOR DOFFING/DONNING HOSPITAL GOWN WHILE SEATED EOB. ASSIST FOR FASTENERS   LB Dressing Assistance 3  Moderate Assistance   LB Dressing Deficit Setup;Steadying;Thread RLE into pants;Thread LLE into pants;Don/doff R sock;Don/doff L sock;Supervision/safety;Increased time to complete   LB Dressing Comments PT ABLE TO BRING FOOT TO OPPOSITE KNEE TO TO DOFF/DON B/L SOCKS AT S LEVEL.  MOD A REQUIRED FOR DONNING [facility]OSPITAL PANTS INCLUDING STEADYING DURING DYNAMIC STANDING, ASSIST FOR PULL UP OVER B/L HIPS AND FASTENERS   Toileting  Assistance  3  Moderate Assistance   Toileting Deficit Clothing management up;Clothing management down;Setup;Increased time to complete   Bed Mobility   Supine to Sit 4  Minimal assistance   Additional items Assist x 1;Increased time required;Verbal cues;LE management   Sit to Supine Unable to assess  (PT LEFT OOB WITH ALL NEEDS IN REACH + ALARM ON. CARDIOLOGY MD AND RN PRESENT AND AWARE OF ORTHOSTATIC HYPOTENSION)   Transfers   Sit to Stand 4  Minimal assistance   Additional items Assist x 1;Increased time required;Verbal cues   Stand to Sit 4  Minimal assistance   Additional items Assist x 1;Increased time required;Verbal cues   Toilet transfer 4  Minimal assistance   Additional items Assist x 1;Increased time required;Verbal cues;Commode  (OVER TOILET)   Functional Mobility   Functional Mobility 4  Minimal assistance   Additional items Rolling walker   Toilet Transfers   Toilet Transfer From Rolling walker   Toilet Transfer Type To and from   Toilet Transfer to Standard bedside commode  (OVER TOIELT)   Toilet Transfer Technique Ambulating   Toilet Transfers Minimal assistance   Cognition   Arousal/Participation Alert;Cooperative   Attention Attends with cues to redirect   Orientation Level Oriented X4   Memory Decreased short term memory;Decreased recall of recent events   Following Commands Follows one step commands without difficulty   Activity Tolerance   Activity Tolerance Treatment limited secondary to medical complications (Comment)   Medical Staff Made Aware PT SEEN FOR PARTIAL CO-SESSION WITH SKILLED PHYSICAL THERAPIST WITH DIFFERENT AREAS OF FOCUS 2' CLINICALLY UNSTABLE PRESENTATION, POLY-TRAUMATIC INJURIES, NEW PRECAUTIONS/LIMITATIONS, LIMITED ACTIVITY TOLERANCE AND PRESENT IMPAIRMENTS WHICH ARE A REGRESSION FROM THE PT'S BASELINE AND IMPACTING OVERALL OCCUPATIONAL PERFORMANCE. RN   Assessment   Assessment Pt seen for OT tx session with focus on ADL retraining, functional txfs/mobility, and overall  activity tolerance. Pt agreeable and motivated to participate. Reports feeling better today compared to yesterday.  Improvement noted in UB ADLS, sit<->stand txfs/mobility and activity tolerance compared to previous session. Pt conts to require MOD A for lb dressing and toileting. Pt remains limited 2' orthostatic hypotension- with reports of increased dizziness while grooming at sink with less verbally responsiveness, requiring seated rest break. Bp obtained once seated with BLE elevated; 113/63. Symptoms resolved after short rest break- attempted sit->stand txf again. Bp obtained standing; 70/46. Rn and cardiology MD aware. Pt left seated in bedside chair with alarm on and bp; 130/65. Pt remains limited 2' pain, fatigue, impaired balance, cog deficits and decreased endurance. Pt conts to be functioning below baseline. Therefore, pt would cont to benefit from LEVEL II resources upon d/c. Will cont to follow to address the initially established OT goals.   Plan   Treatment Interventions ADL retraining;Functional transfer training;Endurance training;Cognitive reorientation;Patient/family training;Equipment evaluation/education;Compensatory technique education;Energy conservation;Activityengagement   Goal Expiration Date 01/20/25   OT Treatment Day 1   OT Frequency 2-3x/wk   Discharge Recommendation   Rehab Resource Intensity Level, OT II (Moderate Resource Intensity)   Additional Comments  The patient's raw score on the AM-PAC Daily Activity Inpatient Short Form is 15. A raw score of less than 19 suggests the patient may benefit from discharge to post-acute rehabilitation services. Please refer to the recommendation of the Occupational Therapist for safe discharge planning.   AM-PAC Daily Activity Inpatient   Lower Body Dressing 2   Bathing 2   Toileting 2   Upper Body Dressing 3   Grooming 3   Eating 3   Daily Activity Raw Score 15   Daily Activity Standardized Score (Calc for Raw Score >=11) 34.69   AM-PAC Applied  Cognition Inpatient   Following a Speech/Presentation 3   Understanding Ordinary Conversation 4   Taking Medications 3   Remembering Where Things Are Placed or Put Away 3   Remembering List of 4-5 Errands 2   Taking Care of Complicated Tasks 2   Applied Cognition Raw Score 17   Applied Cognition Standardized Score 36.52       Documentation completed by NAVA Thorpe, OTR/L  MOCA Certified ID# [id number]

## 2025-01-07 NOTE — PLAN OF CARE
Problem: OCCUPATIONAL THERAPY ADULT  Goal: Performs self-care activities at highest level of function for planned discharge setting.  See evaluation for individualized goals.  Description: Treatment Interventions: ADL retraining, Functional transfer training, Endurance training, Cognitive reorientation, Patient/family training, Equipment evaluation/education, Compensatory technique education, Energy conservation, Activityengagement          See flowsheet documentation for full assessment, interventions and recommendations.   Outcome: Progressing  Note: Limitation: Decreased ADL status, Decreased Safe judgement during ADL, Decreased cognition, Decreased endurance, Decreased self-care trans, Decreased high-level ADLs  Prognosis: Good  Assessment: Pt seen for OT tx session with focus on ADL retraining, functional txfs/mobility, and overall activity tolerance. Pt agreeable and motivated to participate. Reports feeling better today compared to yesterday.  Improvement noted in UB ADLS, sit<->stand txfs/mobility and activity tolerance compared to previous session. Pt conts to require MOD A for lb dressing and toileting. Pt remains limited 2' orthostatic hypotension- with reports of increased dizziness while grooming at sink with less verbally responsiveness, requiring seated rest break. Bp obtained once seated with BLE elevated; 113/63. Symptoms resolved after short rest break- attempted sit->stand txf again. Bp obtained standing; 70/46. Rn and cardiology MD aware. Pt left seated in bedside chair with alarm on and bp; 130/65. Pt remains limited 2' pain, fatigue, impaired balance, cog deficits and decreased endurance. Pt conts to be functioning below baseline. Therefore, pt would cont to benefit from LEVEL II resources upon d/c. Will cont to follow to address the initially established OT goals.     Rehab Resource Intensity Level, OT: II (Moderate Resource Intensity)

## 2025-01-07 NOTE — ASSESSMENT & PLAN NOTE
Brendon BP today 70/40 during physical therapy. Patient was symptomatic of lightheadedness and presyncope  Give gentle IVF and reassess orthostatics tomorrow

## 2025-01-07 NOTE — PROGRESS NOTES
Patient:    MRN:  [id number]    Analia Request ID:  [id number]    Level of care reserved:  Inpatient Rehab Facility    Partner Reserved:  [facility] ([place]), [place] [phone]    Clinical needs requested:    Geography searched:  10 miles around [id number]    Start of Service:    Request sent:  3:35pm EST on 1/6/2025 by Trevor Dougherty    Partner reserved:  11:34am EST on 1/7/2025 by Trevor Dougherty    Choice list shared:  11:34am EST on 1/7/2025 by Trevor Dougherty

## 2025-01-07 NOTE — PHYSICAL THERAPY NOTE
PHYSICAL THERAPY NOTE          Patient Name: Sandra Purvis  Today's Date: 1/7/2025 01/07/25 1424   Note Type   Note Type Treatment   Pain Assessment   Pain Assessment Tool 0-10   Pain Score No Pain   Restrictions/Precautions   Weight Bearing Precautions Per Order No   Other Precautions Chair Alarm;Bed Alarm;Multiple lines;Fall Risk   General   Chart Reviewed Yes   Response to Previous Treatment Patient with no complaints from previous session.   Family/Caregiver Present No   Cognition   Arousal/Participation Alert   Attention Within functional limits   Orientation Level Oriented to person;Oriented to place;Oriented to time;Oriented to situation   Memory Decreased recall of precautions   Following Commands Follows one step commands without difficulty   Subjective   Subjective Pt reports feeling better and is willing and motivated to participate in PT treatment session   Bed Mobility   Supine to Sit 4  Minimal assistance   Additional items Assist x 1;Increased time required;Verbal cues   Transfers   Sit to Stand 4  Minimal assistance   Additional items Assist x 1;Increased time required;Verbal cues   Stand to Sit 4  Minimal assistance   Additional items Assist x 1;Increased time required;Verbal cues   Toilet transfer 4  Minimal assistance   Additional items Assist x 1;Increased time required;Verbal cues   Additional Comments cues needed for proper hand placement during transfers   Ambulation/Elevation   Gait pattern Excessively slow;Short stride;Foward flexed;Inconsistent martine   Gait Assistance 4  Minimal assist   Additional items Assist x 1   Assistive Device Rolling walker   Distance 12ft  (limited by fatigue and lightheadedness)   Balance   Static Sitting Fair   Static Standing Fair -   Ambulatory Poor +   Endurance Deficit   Endurance Deficit Yes   Endurance Deficit Description fatigue, orthostatic hypotension    Activity Tolerance   Activity Tolerance Patient limited by fatigue   Medical Staff Made Aware Epi OT; OT present for co treatment session due to pts current medical presentation   Nurse Made Aware Pt appropriate to be seen and mobilize per nsg   Assessment   Prognosis Good   Problem List Decreased strength;Decreased endurance;Impaired balance;Decreased mobility;Decreased safety awareness   Assessment Patient resting in bed at time of PT treatment session.  Patient reports feeling better today and is willing and motivated to participate with PT.  Patient is able perform all bed mobility with min a x 1 was approximately same level assistance required compared to previous session.  Patient was able to tolerate sitting edge of bed with supervision/min assist required to maintain sitting balance while patient performed ADL tasks with OT.  Prior to standing patient's BP was taken in seated position edge of bed: 136/67 patient performed all transfers with min a x 1 which is improved compared to previous session, but cueing still needed for proper hand placement during transfers.  Patient was able to tolerate ambulation into bathroom utilizing rolling walker with min a x 1.  Patient performed all toilet transfers with min a x 1.  Upon standing in the bathroom patient reported increased lightheadedness and dizziness in standing and patient was noted to have decreased verbal response to commands.  Patient was seated into recliner chair and bilateral lower extremities were elevated.  Patient's BP was taken in seated position: 113/63.  Patient reported feeling better in seated position and standing was attempted once more with patient to get standing BP: 70/46.  Patient was then seated back in recliner chair with bilateral lower extremities elevated and BP was taken again in seated position: 130/65.  Nursing was present and aware of events.  At conclusion of PT treatment session patient was assisted back into chair with  "all needs within reach and chair alarm engaged.  PT will continue to follow, D/C recommendation when medically cleared is level I resource intensity:   Goals   Patient Goals \" to get stronger\"   STG Expiration Date 01/20/25   PT Treatment Day 1   Plan   Treatment/Interventions Functional transfer training;LE strengthening/ROM;Elevations;Therapeutic exercise;Endurance training;Patient/family training;Equipment eval/education;Bed mobility;Gait training;Spoke to nursing;OT   Progress Progressing toward goals   PT Frequency 3-5x/wk   Discharge Recommendation   Rehab Resource Intensity Level, PT I (Maximum Resource Intensity)   Equipment Recommended Walker   Walker Package Recommended Wheeled walker   AM-PAC Basic Mobility Inpatient   Turning in Flat Bed Without Bedrails 3   Lying on Back to Sitting on Edge of Flat Bed Without Bedrails 3   Moving Bed to Chair 3   Standing Up From Chair Using Arms 3   Walk in Room 2   Climb 3-5 Stairs With Railing 2   Basic Mobility Inpatient Raw Score 16   Basic Mobility Standardized Score 38.32   [facility] Highest Level Of Mobility   -HL Goal 5: Stand one or more mins   -HL Achieved 6: Walk 10 steps or more     Portions of the documentation may have been created using voice recognition software.Occasional wrong word or sound alike substitutions may have occurred due to the inherent limitations of the voice recognition software. Read the chart carefully and recognize, using context, where substitutions have occurred.    Leodan Key, PT, DPT      "

## 2025-01-07 NOTE — ASSESSMENT & PLAN NOTE
Current TTE LVEF 70%.  Systolic function vigorous.  Grade 2 diastolic dysfunction.  RV mildly reduced.  Left atrial dilatation mild.  Mild AI.  Mild TR.  Trivial pericardial effusion.  On Lopressor 25 mg every 12 hours.   Patient presented with sinus bradycardia.  Currently in controlled atrial fibrillation.  Not on AC PTA, do not recommend initiation of DOAC at this time

## 2025-01-07 NOTE — CASE MANAGEMENT
Case Management Discharge Planning Note    Patient name Sandra Purvis  Location [facility] 625/[facility] 625-01 MRN [id number]  : 12/3/1934 Date 2025       Current Admission Date: 2025  Current Admission Diagnosis:SDH (subdural hematoma) ([facility])   Patient Active Problem List    Diagnosis Date Noted Date Diagnosed    Fall 2025     SDH (subdural hematoma) ([facility]) 2025     SAH (subarachnoid hemorrhage) ([facility]) 2025     Closed extensive facial fractures ([facility]) 2025     Primary insomnia 2024     Basal cell carcinoma (BCC) of scalp 2023     Malignant melanoma of arm, left ([facility]) 2023     Scalp lesion 2023     Arm skin lesion, left 2023     Irritant contact dermatitis due to other agents 2022     COVID-19 2022     GERD (gastroesophageal reflux disease)      Stage 3 chronic kidney disease, unspecified whether stage 3a or 3b CKD ([facility]) 2021     Paroxysmal atrial fibrillation ([facility])      Preop examination 2021     Esophagus disorder 10/19/2021     Ureteral stone 10/15/2021     Bacteremia 10/15/2021     Mild protein-calorie malnutrition (HCC) 2021     Hypertension 2015       LOS (days): 3  Geometric Mean LOS (GMLOS) (days): 1.9  Days to GMLOS:-0.8     OBJECTIVE:  Risk of Unplanned Readmission Score: 12.34         Current admission status: Inpatient   Preferred Pharmacy:   [facility]/pharmacy #[id number] - [place] - [facility] [address] [address]  Phone: [phone] Fax: [phone]    Primary Care Provider: Berhane Page DO    Primary Insurance: MEDICARE  Secondary Insurance: BLUE CROSS    DISCHARGE DETAILS:    Pt clinically accepted to [facility]

## 2025-01-07 NOTE — ASSESSMENT & PLAN NOTE
0-hour troponin last evening.  There was a random troponin this morning 190 followed by 3866  EKG has been nonischemic  She denies chest pain  Ddx includes nonischemic troponin elevation in s/o trauma and brain bleed vs demand ischemia due to /AF.   She is not a candidate for ischemic evaluation at this time due to advanced age, frailty, and presently with intracranial hemorrhage which would contraindicate any intervention  No new RWMA on echo 1/6/25  Start aspirin 81 mg daily when able from bleeding perspective

## 2025-01-07 NOTE — PROGRESS NOTES
"Progress Note - Trauma   Name: Sandra Purvis 89 y.o. female I MRN: [id number]  Unit/Bed#: [facility] 625-01 I Date of Admission: 1/4/2025   Date of Service: 1/7/2025 I Hospital Day: 3    Assessment & Plan  Fall  Described as \"fainting\"--no history or associated symptoms  Troponin 29  EKG showed SR with left axis deviation and anterior/inferior infarcts  Orthostatics pending  Echocardiogram pending  Telemetry pending  SDH (subdural hematoma) (HCC)  Secondary to fall  Initial CT imaging on 1/4 showed: Acute subdural hemorrhage along the left cerebral convexity with maximal thickness of 1.9 cm. No midline shift. Acute subarachnoid hemorrhage in the left frontoparietal sulci, left sylvian fissure and suprasellar cistern  Repeat imaging showed stability on 1/5  Neurosurgery consulted and note appreciated  No reversal needed due to patient not taking any AP/AC  Continue to closely monitor neurological status every 4-currently GCS 15  Maintain normotension, SBP less than 160  Continue on 7 day course of Keppra for seizure prophylaxis  Hold all anticoagulant or antiplatelet medications until patient follows up with neurosurgery in 2 weeks with repeat CT head.  SAH (subarachnoid hemorrhage) (HCC)  See above  Closed extensive facial fractures (HCC)  Secondary to fall  1/4 Initial CT imaging of face showed Comminuted and mildly displaced fractures of the lateral and anterior walls of right maxilla, lateral and inferior walls of right orbit. Right inferior orbital wall fracture appears to involve the inferior orbital foramen with associated small extraconal soft tissue edema and focus of air. No herniation of extraocular muscles. Nondisplaced fractures of zygomatic arch and the temporal bone at the TMJ. Diffuse opacification of right maxillary sinus and right nasal cavity with blood products and air.  OMFS consulted and note appreciated  Surgical intervention discussed with patient by OMFS team, but refused by patient  Continue " "with 7-day course of Unasyn/Augmentin  Sinus precautions  Follow-up with OMFS as an outpatient for continued monitoring and management of fractures.  Paroxysmal atrial fibrillation (HCC)  Continue home metoprolol  Currently not on AC  Follow-up with cardiology as an outpatient  Hypertension  Continue home metoprolol  GERD (gastroesophageal reflux disease)      Bowel Regimen: senna colace  VTE Prophylaxis:Enoxaparin (Lovenox)     Disposition: [facility] will follow.    24 Hour Events : Patient complained of Epigastric pain overnight. Troponin and EKG obtained. Troponin 190 from 29. Repeat Troponin today up to 3866. EKG repeated shows Atrial fibrillation normal rate. No T wave elevation. Cardiology consulted. Syncope workup yesterday with ECHO showing EF 70%, Nl wall motion, some LV dilation, Grade II diastolic dysfunction.  Cardiology consulted for Troponin elevation.     Subjective : Patient complained this am of epigastric pain, can point to area. Denies palpitations, chest pain that radiates to left arm, up neck. States the epigastric pain begun last nigh and that it did improve earlier she felt a \"release\" and was able to sleep overnight.     Objective :  Temp:  [97.2 °F (36.2 °C)-99 °F (37.2 °C)] 97.2 °F (36.2 °C)  HR:  [63-97] 97  BP: (101-167)/(45-68) 124/68  Resp:  [16-18] 16  SpO2:  [97 %-99 %] 98 %  O2 Device: None (Room air)    I/O         01/05 0701 01/06 0700 01/06 0701  01/07 0700 01/07 0701  01/08 0700    P.O. 480      I.V. (mL/kg) 60 (1.2)      IV Piggyback 200      Total Intake(mL/kg) 740 (15.1)      Urine (mL/kg/hr) 850 (0.7) 500 (0.4)     Total Output 850 500     Net -110 -500                  Lines/Drains/Airways       Active Status       Name Placement date Placement time Site Days    External Urinary Catheter 01/05/25  0100  -- 2                  Physical Exam  Constitutional:       General: She is not in acute distress.     Appearance: Normal appearance. She is not toxic-appearing. "   HENT:      Head:      Comments: Facial swelling R>L  Bruising and ecchymosis to face down neck on Rt  Eyes:      Pupils: Pupils are equal, round, and reactive to light.   Cardiovascular:      Rate and Rhythm: Rhythm irregular.      Pulses: Normal pulses.      Heart sounds: No murmur heard.     No gallop.   Pulmonary:      Effort: Pulmonary effort is normal. No respiratory distress.      Breath sounds: Normal breath sounds. No wheezing or rales.   Abdominal:      General: Abdomen is flat. Bowel sounds are normal. There is no distension.      Palpations: Abdomen is soft.      Tenderness: There is no abdominal tenderness. There is no guarding.   Musculoskeletal:         General: Normal range of motion.   Skin:     General: Skin is warm and dry.      Capillary Refill: Capillary refill takes less than 2 seconds.   Neurological:      Mental Status: She is alert and oriented to person, place, and time.   Psychiatric:         Behavior: Behavior normal.               Lab Results: I have reviewed the following results:  Recent Labs     01/04/25  1743 01/05/25  0542 01/05/25  0749 01/06/25  0452 01/07/25  0532   WBC 14.38* 17.50*  --  15.86* 12.04*   HGB 10.6* 11.1*  --  10.0* 11.5   HCT 33.0* 34.3*  --  30.9* 35.8   * 212  --  191 195   SODIUM 135  --    < > 139 139   K 3.9  --    < > 3.5 3.8     --    < > 104 103   CO2 27  --    < > 27 27   BUN 30*  --    < > 34* 25   CREATININE 1.31*  --    < > 1.11 0.99   GLUC 125  --    < > 105 115   CAIONIZED  --  1.16  --   --   --    MG 2.0  --    < > 2.0  --    PHOS  --   --    < > 2.7  --    AST 94*  --    < >  --  26   ALT 68*  --    < >  --  32   ALB 3.9  --    < >  --  3.5   TBILI 0.50  --    < >  --  0.65   ALKPHOS 63  --    < >  --  52   PTT 25  --   --   --   --    INR 1.12  --   --   --   --    HSTNI0 29  --   --   --   --     < > = values in this interval not displayed.       Imaging Results Review: No pertinent imaging studies reviewed.  Other Study Results  Review: No additional pertinent studies reviewed.

## 2025-01-07 NOTE — PLAN OF CARE
Problem: PAIN - ADULT  Goal: Verbalizes/displays adequate comfort level or baseline comfort level  Description: Interventions:  - Encourage patient to monitor pain and request assistance  - Assess pain using appropriate pain scale  - Administer analgesics based on type and severity of pain and evaluate response  - Implement non-pharmacological measures as appropriate and evaluate response  - Consider cultural and social influences on pain and pain management  - Notify physician/advanced practitioner if interventions unsuccessful or patient reports new pain  Outcome: Progressing     Problem: INFECTION - ADULT  Goal: Absence or prevention of progression during hospitalization  Description: INTERVENTIONS:  - Assess and monitor for signs and symptoms of infection  - Monitor lab/diagnostic results  - Monitor all insertion sites, i.e. indwelling lines, tubes, and drains  - Monitor endotracheal if appropriate and nasal secretions for changes in amount and color  - [place] appropriate cooling/warming therapies per order  - Administer medications as ordered  - Instruct and encourage patient and family to use good hand hygiene technique  - Identify and instruct in appropriate isolation precautions for identified infection/condition  Outcome: Progressing  Goal: Absence of fever/infection during neutropenic period  Description: INTERVENTIONS:  - Monitor WBC    Outcome: Progressing     Problem: SAFETY ADULT  Goal: Patient will remain free of falls  Description: INTERVENTIONS:  - Educate patient/family on patient safety including physical limitations  - Instruct patient to call for assistance with activity   - Consult OT/PT to assist with strengthening/mobility   - Keep Call bell within reach  - Keep bed low and locked with side rails adjusted as appropriate  - Keep care items and personal belongings within reach  - Initiate and maintain comfort rounds  - Make Fall Risk Sign visible to staff  - Offer Toileting every 2 Hours,  in advance of need  - Initiate/Maintain bed alarm  - Obtain necessary fall risk management equipment:   - Apply yellow socks and bracelet for high fall risk patients  - Consider moving patient to room near nurses station  Outcome: Progressing  Goal: Maintain or return to baseline ADL function  Description: INTERVENTIONS:  -  Assess patient's ability to carry out ADLs; assess patient's baseline for ADL function and identify physical deficits which impact ability to perform ADLs (bathing, care of mouth/teeth, toileting, grooming, dressing, etc.)  - Assess/evaluate cause of self-care deficits   - Assess range of motion  - Assess patient's mobility; develop plan if impaired  - Assess patient's need for assistive devices and provide as appropriate  - Encourage maximum independence but intervene and supervise when necessary  - Involve family in performance of ADLs  - Assess for home care needs following discharge   - Consider OT consult to assist with ADL evaluation and planning for discharge  - Provide patient education as appropriate  Outcome: Progressing       Problem: DISCHARGE PLANNING  Goal: Discharge to home or other facility with appropriate resources  Description: INTERVENTIONS:  - Identify barriers to discharge w/patient and caregiver  - Arrange for needed discharge resources and transportation as appropriate  - Identify discharge learning needs (meds, wound care, etc.)  - Arrange for interpretive services to assist at discharge as needed  - Refer to Case Management Department for coordinating discharge planning if the patient needs post-hospital services based on physician/advanced practitioner order or complex needs related to functional status, cognitive ability, or social support system  Outcome: Progressing     Problem: Knowledge Deficit  Goal: Patient/family/caregiver demonstrates understanding of disease process, treatment plan, medications, and discharge instructions  Description: Complete learning  assessment and assess knowledge base.  Interventions:  - Provide teaching at level of understanding  - Provide teaching via preferred learning methods  Outcome: Progressing     Problem: Nutrition/Hydration-ADULT  Goal: Nutrient/Hydration intake appropriate for improving, restoring or maintaining nutritional needs  Description: Monitor and assess patient's nutrition/hydration status for malnutrition. Collaborate with interdisciplinary team and initiate plan and interventions as ordered.  Monitor patient's weight and dietary intake as ordered or per policy. Utilize nutrition screening tool and intervene as necessary. Determine patient's food preferences and provide high-protein, high-caloric foods as appropriate.     INTERVENTIONS:  - Monitor oral intake, urinary output, labs, and treatment plans  - Assess nutrition and hydration status and recommend course of action  - Evaluate amount of meals eaten  - Assist patient with eating if necessary   - Allow adequate time for meals  - Recommend/ encourage appropriate diets, oral nutritional supplements, and vitamin/mineral supplements  - Order, calculate, and assess calorie counts as needed  - Recommend, monitor, and adjust tube feedings and TPN/PPN based on assessed needs  - Assess need for intravenous fluids  - Provide specific nutrition/hydration education as appropriate  - Include patient/family/caregiver in decisions related to nutrition  Outcome: Progressing     Problem: Potential for Falls  Goal: Patient will remain free of falls  Description: INTERVENTIONS:  - Educate patient/family on patient safety including physical limitations  - Instruct patient to call for assistance with activity   - Consult OT/PT to assist with strengthening/mobility   - Keep Call bell within reach  - Keep bed low and locked with side rails adjusted as appropriate  - Keep care items and personal belongings within reach  - Initiate and maintain comfort rounds  - Make Fall Risk Sign visible to  staff  - Offer Toileting every 2 Hours, in advance of need  - Initiate/Maintain bed alarm  - Obtain necessary fall risk management equipment:   - Apply yellow socks and bracelet for high fall risk patients  - Consider moving patient to room near nurses station  Outcome: Progressing     Problem: Prexisting or High Potential for Compromised Skin Integrity  Goal: Skin integrity is maintained or improved  Description: INTERVENTIONS:  - Identify patients at risk for skin breakdown  - Assess and monitor skin integrity  - Assess and monitor nutrition and hydration status  - Monitor labs   - Assess for incontinence   - Turn and reposition patient  - Assist with mobility/ambulation  - Relieve pressure over bony prominences  - Avoid friction and shearing  - Provide appropriate hygiene as needed including keeping skin clean and dry  - Evaluate need for skin moisturizer/barrier cream  - Collaborate with interdisciplinary team   - Patient/family teaching  - Consider wound care consult   Outcome: Progressing

## 2025-01-07 NOTE — CONSULTS
Consultation - Cardiology Team 1  Sandra Purvis 89 y.o. female MRN: [id number]  Unit/Bed#: [facility] 625-01 Encounter: [id number]    Assessment & Plan  Orthostatic hypotension  Brendon BP today 70/40 during physical therapy. Patient was symptomatic of lightheadedness and presyncope  Give gentle IVF and reassess orthostatics tomorrow  Chest pain  0-hour troponin last evening.  There was a random troponin this morning 190 followed by 3866  EKG has been nonischemic  She denies chest pain  Ddx includes nonischemic troponin elevation in s/o trauma and brain bleed vs demand ischemia due to /AF.   She is not a candidate for ischemic evaluation at this time due to advanced age, frailty, and presently with intracranial hemorrhage which would contraindicate any intervention  No new RWMA on echo 1/6/25  Start aspirin 81 mg daily when able from bleeding perspective  Paroxysmal atrial fibrillation (HCC)  Current TTE LVEF 70%.  Systolic function vigorous.  Grade 2 diastolic dysfunction.  RV mildly reduced.  Left atrial dilatation mild.  Mild AI.  Mild TR.  Trivial pericardial effusion.  On Lopressor 25 mg every 12 hours.   Patient presented with sinus bradycardia.  Currently in controlled atrial fibrillation.  Not on AC PTA, do not recommend initiation of DOAC at this time  SDH (subdural hematoma) (HCC)  Patient with SDH/SAH.  No surgical intervention planned at this time.  Hypertension  Continue to monitor on current therapy     History of Present Illness   Physician Requesting Consult: Ramon Stern,*  Reason for Consult / Principal Problem: chest pain    HPI: Sandra Purvis is a 89 y.o. year old female  with HTN, PAF, CKD , malignant melanoma s/p resection who presents with fall vs syncope with extensive facial fractures and SDH/SAH.  Patient presented with normal troponin.  Creatinine above baseline however no recent labs.  Leukocytosis.  Mild anemia.  Imaging reveals acute left-sided subdural hematoma and acute  left-sided subarachnoid hemorrhage.  No surgical intervention planned at this time.    Patient complained of abdominal pain  and troponin was checked which was elevated. She has been in atrial fibrillation overall rate controlled.     Inpatient consult to Cardiology  Consult performed by: LUIS Garcia  Consult ordered by: LUIS Fair          Review of Systems    Historical Information   Past Medical History:   Diagnosis Date    Arthritis     Cataract     LastAssessed:9/8/15    GERD (gastroesophageal reflux disease)     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    Hypertension     PAF (paroxysmal atrial fibrillation) ([facility])     Last Assessed:2/3/16    Wrist fracture     Resolved:9/8/15     Past Surgical History:   Procedure Laterality Date    CATARACT EXTRACTION      Last Assessed:9/8/15    FL RETROGRADE PYELOGRAM  10/17/2021    LYMPHADENECTOMY      Last Assessed:9/8/15    MI CYSTO/URETERO W/LITHOTRIPSY &INDWELL STENT INSRT Right 11/24/2021    Procedure: CYSTOSCOPY URETEROSCOPY WITH LITHOTRIPSY HOLMIUM LASER, RETROGRADE PYELOGRAM AND INSERTION STENT URETERAL;  Surgeon: David Sr MD;  [facility]: [facility];  Service: Urology    MI CYSTOURETHROSCOPY W/URETERAL CATHETERIZATION Right 10/17/2021    Procedure: CYSTOSCOPY RETROGRADE PYELOGRAM WITH INSERTION STENT URETERAL;  Surgeon: Benjie Tinoco MD;  Location[facility];  Service: Urology    TONSILLECTOMY      Last Assessed:9/8/15    WRIST SURGERY      Last Assessed:9/8/15     Social History     Substance and Sexual Activity   Alcohol Use No     Social History     Substance and Sexual Activity   Drug Use Never     Social History     Tobacco Use   Smoking Status Never   Smokeless Tobacco Never     Family History:   Family History   Problem Relation Age of Onset    No Known Problems Family        Meds/Allergies   current meds:   Current Facility-Administered Medications:     acetaminophen (TYLENOL) tablet 975 mg, Q8H ELIZABETH     "ampicillin-sulbactam (UNASYN) 3 g in sodium chloride 0.9 % 100 mL IVPB, Q6H, Last Rate: 3 g (25 0846)    bisacodyl (DULCOLAX) rectal suppository 10 mg, Once    calcium carbonate (TUMS) chewable tablet 500 mg, Daily PRN    chlorhexidine (PERIDEX) 0.12 % oral rinse 15 mL, Q12H ELIZABETH    enoxaparin (LOVENOX) subcutaneous injection 30 mg, Q12H ELIZABETH    HYDROmorphone HCl (DILAUDID) injection 0.2 mg, Q2H PRN    levETIRAcetam (KEPPRA) tablet 500 mg, Q12H ELIZABETH    metoprolol tartrate (LOPRESSOR) tablet 25 mg, Q12H ELIZABETH    naloxone (NARCAN) 0.04 mg/mL syringe 0.04 mg, Q1MIN PRN    ondansetron (ZOFRAN) injection 4 mg, Q4H PRN    oxyCODONE (ROXICODONE) split tablet 2.5 mg, Q4H PRN **OR** oxyCODONE (ROXICODONE) IR tablet 5 mg, Q4H PRN    pantoprazole (PROTONIX) EC tablet 40 mg, BID    polyethylene glycol (MIRALAX) packet 17 g, Daily    senna-docusate sodium (SENOKOT S) 8.6-50 mg per tablet 1 tablet, HS and PTA meds:    Medications Prior to Admission:     DAILY MULTIPLE VITAMINS/IRON PO    [] doxycycline (ADOXA) 100 MG tablet    hydroCHLOROthiazide 12.5 mg tablet    metoprolol succinate (TOPROL-XL) 50 mg 24 hr tablet    multivitamin (THERAGRAN) TABS  Allergies   Allergen Reactions    Lactose - Food Allergy GI Intolerance    Penicillins Other (See Comments)     Unsure of reaction        Objective   Vitals: Blood pressure 124/68, pulse 97, temperature (!) 97.2 °F (36.2 °C), temperature source Oral, resp. rate 16, height 5' 2\" (1.575 m), weight 49 kg (108 lb), SpO2 98%, not currently breastfeeding.  Orthostatic Blood Pressures      Flowsheet Row Most Recent Value   Blood Pressure 124/68 filed at 2025 0742   Patient Position - Orthostatic VS Lying filed at 2025 0742              Intake/Output Summary (Last 24 hours) at 2025 1133  Last data filed at 2025 0942  Gross per 24 hour   Intake 480 ml   Output 500 ml   Net -20 ml       Invasive Devices       Peripheral Intravenous Line  Duration             " "Peripheral IV 01/04/25 Distal;Right;Ventral (anterior) Forearm 2 days              Drain  Duration             Ureteral Drain/Stent Right ureter 6 Fr. 1177 days    External Urinary Catheter 2 days                    Physical Exam: /68 (BP Location: Left arm)   Pulse 97   Temp (!) 97.2 °F (36.2 °C) (Oral)   Resp 16   Ht 5' 2\" (1.575 m)   Wt 49 kg (108 lb)   SpO2 98%   BMI 19.75 kg/m²   General Appearance:    Alert, cooperative, no distress, appears stated age   Head:    Normocephalic, no scleral icterus   Eyes:    PERRL   Nose:   Nares normal, septum midline, mucosa normal, no drainage    Throat:   Lips, mucosa, and tongue normal   Neck:   Supple, no JVD   Back:     Symmetric   Lungs:     Clear to auscultation bilaterally, respirations unlabored   Chest Wall:    No tenderness or deformity    Heart:    Irregularly irregular rhythm, normal rate, S1S2   Abdomen:     Soft, non-tender,    Extremities:   Extremities normal, no cyanosis or edema           Neurologic:   Alert and oriented to person place and time. No focal deficits       Lab Results:   Recent Results (from the past 72 hours)   ECG 12 lead    Collection Time: 01/04/25  4:01 PM   Result Value Ref Range    Ventricular Rate 92 BPM    Atrial Rate 92 BPM    WY Interval 264 ms    QRSD Interval 88 ms    QT Interval 326 ms    QTC Interval 403 ms    P [place] 248 degrees    QRS Axis -79 degrees    T Wave Axis 89 degrees   Fingerstick Glucose (POCT)    Collection Time: 01/04/25  4:04 PM   Result Value Ref Range    POC Glucose 150 (H) 65 - 140 mg/dl   CBC and differential    Collection Time: 01/04/25  5:43 PM   Result Value Ref Range    WBC 14.38 (H) 4.31 - 10.16 Thousand/uL    RBC 3.72 (L) 3.81 - 5.12 Million/uL    Hemoglobin 10.6 (L) 11.5 - 15.4 g/dL    Hematocrit 33.0 (L) 34.8 - 46.1 %    MCV 89 82 - 98 fL    MCH 28.5 26.8 - 34.3 pg    MCHC 32.1 31.4 - 37.4 g/dL    RDW 15.1 11.6 - 15.1 %    MPV 11.6 8.9 - 12.7 fL    Platelets 135 (L) 149 - 390 Thousands/uL " "   nRBC 0 /100 WBCs    Segmented % 89 (H) 43 - 75 %    Immature Grans % 1 0 - 2 %    Lymphocytes % 5 (L) 14 - 44 %    Monocytes % 5 4 - 12 %    Eosinophils Relative 0 0 - 6 %    Basophils Relative 0 0 - 1 %    Absolute Neutrophils 12.83 (H) 1.85 - 7.62 Thousands/µL    Absolute Immature Grans 0.07 0.00 - 0.20 Thousand/uL    Absolute Lymphocytes 0.76 0.60 - 4.47 Thousands/µL    Absolute Monocytes 0.67 0.17 - 1.22 Thousand/µL    Eosinophils Absolute 0.01 0.00 - 0.61 Thousand/µL    Basophils Absolute 0.04 0.00 - 0.10 Thousands/µL   Comprehensive metabolic panel    Collection Time: 01/04/25  5:43 PM   Result Value Ref Range    Sodium 135 135 - 147 mmol/L    Potassium 3.9 3.5 - 5.3 mmol/L    Chloride 101 96 - 108 mmol/L    CO2 27 21 - 32 mmol/L    ANION GAP 7 4 - 13 mmol/L    BUN 30 (H) 5 - 25 mg/dL    Creatinine 1.31 (H) 0.60 - 1.30 mg/dL    Glucose 125 65 - 140 mg/dL    Calcium 9.4 8.4 - 10.2 mg/dL    AST 94 (H) 13 - 39 U/L    ALT 68 (H) 7 - 52 U/L    Alkaline Phosphatase 63 34 - 104 U/L    Total Protein 6.7 6.4 - 8.4 g/dL    Albumin 3.9 3.5 - 5.0 g/dL    Total Bilirubin 0.50 0.20 - 1.00 mg/dL    eGFR 35 ml/min/1.73sq m   HS Troponin 0hr (reflex protocol)    Collection Time: 01/04/25  5:43 PM   Result Value Ref Range    hs TnI 0hr 29 \"Refer to ACS Flowchart\"- see link ng/L   Magnesium    Collection Time: 01/04/25  5:43 PM   Result Value Ref Range    Magnesium 2.0 1.9 - 2.7 mg/dL   Protime-INR    Collection Time: 01/04/25  5:43 PM   Result Value Ref Range    Protime 14.6 12.3 - 15.0 seconds    INR 1.12 0.85 - 1.19   APTT    Collection Time: 01/04/25  5:43 PM   Result Value Ref Range    PTT 25 23 - 34 seconds   CBC and differential    Collection Time: 01/05/25  5:42 AM   Result Value Ref Range    WBC 17.50 (H) 4.31 - 10.16 Thousand/uL    RBC 3.93 3.81 - 5.12 Million/uL    Hemoglobin 11.1 (L) 11.5 - 15.4 g/dL    Hematocrit 34.3 (L) 34.8 - 46.1 %    MCV 87 82 - 98 fL    MCH 28.2 26.8 - 34.3 pg    MCHC 32.4 31.4 - 37.4 g/dL "    RDW 14.9 11.6 - 15.1 %    MPV 11.1 8.9 - 12.7 fL    Platelets 212 149 - 390 Thousands/uL    nRBC 0 /100 WBCs    Segmented % 88 (H) 43 - 75 %    Immature Grans % 1 0 - 2 %    Lymphocytes % 6 (L) 14 - 44 %    Monocytes % 5 4 - 12 %    Eosinophils Relative 0 0 - 6 %    Basophils Relative 0 0 - 1 %    Absolute Neutrophils 15.49 (H) 1.85 - 7.62 Thousands/µL    Absolute Immature Grans 0.08 0.00 - 0.20 Thousand/uL    Absolute Lymphocytes 1.09 0.60 - 4.47 Thousands/µL    Absolute Monocytes 0.79 0.17 - 1.22 Thousand/µL    Eosinophils Absolute 0.01 0.00 - 0.61 Thousand/µL    Basophils Absolute 0.04 0.00 - 0.10 Thousands/µL   Calcium, ionized    Collection Time: 01/05/25  5:42 AM   Result Value Ref Range    Calcium, Ionized 1.16 1.12 - 1.32 mmol/L   Comprehensive metabolic panel    Collection Time: 01/05/25  7:49 AM   Result Value Ref Range    Sodium 137 135 - 147 mmol/L    Potassium 3.9 3.5 - 5.3 mmol/L    Chloride 102 96 - 108 mmol/L    CO2 29 21 - 32 mmol/L    ANION GAP 6 4 - 13 mmol/L    BUN 22 5 - 25 mg/dL    Creatinine 0.94 0.60 - 1.30 mg/dL    Glucose 101 65 - 140 mg/dL    Calcium 9.4 8.4 - 10.2 mg/dL    AST 42 (H) 13 - 39 U/L    ALT 49 7 - 52 U/L    Alkaline Phosphatase 58 34 - 104 U/L    Total Protein 6.2 (L) 6.4 - 8.4 g/dL    Albumin 3.5 3.5 - 5.0 g/dL    Total Bilirubin 0.77 0.20 - 1.00 mg/dL    eGFR 53 ml/min/1.73sq m   Magnesium    Collection Time: 01/05/25  7:49 AM   Result Value Ref Range    Magnesium 1.8 (L) 1.9 - 2.7 mg/dL   Phosphorus    Collection Time: 01/05/25  7:49 AM   Result Value Ref Range    Phosphorus 3.0 2.3 - 4.1 mg/dL   Phosphorus    Collection Time: 01/06/25  4:52 AM   Result Value Ref Range    Phosphorus 2.7 2.3 - 4.1 mg/dL   Magnesium    Collection Time: 01/06/25  4:52 AM   Result Value Ref Range    Magnesium 2.0 1.9 - 2.7 mg/dL   Basic metabolic panel    Collection Time: 01/06/25  4:52 AM   Result Value Ref Range    Sodium 139 135 - 147 mmol/L    Potassium 3.5 3.5 - 5.3 mmol/L    Chloride  104 96 - 108 mmol/L    CO2 27 21 - 32 mmol/L    ANION GAP 8 4 - 13 mmol/L    BUN 34 (H) 5 - 25 mg/dL    Creatinine 1.11 0.60 - 1.30 mg/dL    Glucose 105 65 - 140 mg/dL    Calcium 9.2 8.4 - 10.2 mg/dL    eGFR 43 ml/min/1.73sq m   CBC and differential    Collection Time: 01/06/25  4:52 AM   Result Value Ref Range    WBC 15.86 (H) 4.31 - 10.16 Thousand/uL    RBC 3.47 (L) 3.81 - 5.12 Million/uL    Hemoglobin 10.0 (L) 11.5 - 15.4 g/dL    Hematocrit 30.9 (L) 34.8 - 46.1 %    MCV 89 82 - 98 fL    MCH 28.8 26.8 - 34.3 pg    MCHC 32.4 31.4 - 37.4 g/dL    RDW 15.2 (H) 11.6 - 15.1 %    MPV 11.1 8.9 - 12.7 fL    Platelets 191 149 - 390 Thousands/uL    nRBC 0 /100 WBCs    Segmented % 88 (H) 43 - 75 %    Immature Grans % 0 0 - 2 %    Lymphocytes % 6 (L) 14 - 44 %    Monocytes % 6 4 - 12 %    Eosinophils Relative 0 0 - 6 %    Basophils Relative 0 0 - 1 %    Absolute Neutrophils 13.82 (H) 1.85 - 7.62 Thousands/µL    Absolute Immature Grans 0.05 0.00 - 0.20 Thousand/uL    Absolute Lymphocytes 1.00 0.60 - 4.47 Thousands/µL    Absolute Monocytes 0.92 0.17 - 1.22 Thousand/µL    Eosinophils Absolute 0.02 0.00 - 0.61 Thousand/µL    Basophils Absolute 0.05 0.00 - 0.10 Thousands/µL   Echo complete w/ contrast if indicated    Collection Time: 01/06/25  4:10 PM   Result Value Ref Range    Triscuspid Valve Regurgitation Peak Gradient 40.0 mmHg    RAA A4C 15 cm2    LA Volume Index (BP) 23.1 mL/m2    MV Peak A Narciso 0.73 m/s    MV stenosis pressure 1/2 time 68 ms    MV Peak E Narciso 103 cm/s    AV peak gradient 14 mmHg    LVOT stroke volume 68.04     Ao VTI 44.63 cm    Aortic valve peak velocity 1.9 m/s    LVOT peak VTI 21.67 cm    LVOT peak narciso 0.88 m/s    LVOT diameter 2.0 cm    E wave deceleration time 233 ms    E/A ratio 1.41     MV valve area p 1/2 method 3.24     AV LVOT peak gradient 3 mmHg    AV mean gradient 8 mmHg    RA 2D Volume 29.0 mL    TR Peak Narciso 3.2 m/s    AV area peak narciso 1.5 cm2    AV area by cont VTI 1.5 cm2    LVOT mn grad 2.0  mmHg    RVID d 2.4 cm    A4C EF 65 %    Aortic valve mean velocity 13.30 m/s    Tricuspid valve peak regurgitation velocity 3.16 m/s    Left ventricular stroke volume (2D) 44.00 mL    IVSd 1.10 cm    Tricuspid annular plane systolic excursion 1.30 cm    Ao root 2.50 cm    LVPWd 1.10 cm    LA size 2.9 cm    Asc Ao 2.9 cm    LA volume (BP) 34 mL    FS 37 28 - 44    LVIDS 2.40 cm    IVS 1.1 cm    LVIDd 3.80 cm    LA length (A2C) 6.10 cm    LEFT VENTRICLE SYSTOLIC VOLUME (MOD BIPLANE) 2D 19 mL    LV DIASTOLIC VOLUME (MOD BIPLANE) 2D 63 mL    LVOT Cardiac Index 2.88 l/min/m2    LVOT stroke volume index 46.30 ml/m2    LVOT Cardiac Output 4.23 l/min    Left Atrium Area-systolic Four Chamber 16.7 cm2    Left Atrium Area-systolic Apical Two Chamber 16.3 cm2    MV E' Tissue Velocity Septal 6 cm/s    LVSV, 2D 44 mL    LVOT area 3.14 cm2    DVI 0.46     AV valve area 1.52 cm2    BSA 1.47 m2    LV EF 70     Est. RA pres 10.0 mmHg    Right Ventricular Peak Systolic Pressure 50.00 mmHg    IVC 2.0 mm   CBC and differential    Collection Time: 01/07/25  5:32 AM   Result Value Ref Range    WBC 12.04 (H) 4.31 - 10.16 Thousand/uL    RBC 4.05 3.81 - 5.12 Million/uL    Hemoglobin 11.5 11.5 - 15.4 g/dL    Hematocrit 35.8 34.8 - 46.1 %    MCV 88 82 - 98 fL    MCH 28.4 26.8 - 34.3 pg    MCHC 32.1 31.4 - 37.4 g/dL    RDW 15.3 (H) 11.6 - 15.1 %    MPV 11.2 8.9 - 12.7 fL    Platelets 195 149 - 390 Thousands/uL    nRBC 0 /100 WBCs    Segmented % 83 (H) 43 - 75 %    Immature Grans % 0 0 - 2 %    Lymphocytes % 10 (L) 14 - 44 %    Monocytes % 6 4 - 12 %    Eosinophils Relative 0 0 - 6 %    Basophils Relative 1 0 - 1 %    Absolute Neutrophils 10.10 (H) 1.85 - 7.62 Thousands/µL    Absolute Immature Grans 0.04 0.00 - 0.20 Thousand/uL    Absolute Lymphocytes 1.15 0.60 - 4.47 Thousands/µL    Absolute Monocytes 0.66 0.17 - 1.22 Thousand/µL    Eosinophils Absolute 0.03 0.00 - 0.61 Thousand/µL    Basophils Absolute 0.06 0.00 - 0.10 Thousands/µL    Comprehensive metabolic panel    Collection Time: 01/07/25  5:32 AM   Result Value Ref Range    Sodium 139 135 - 147 mmol/L    Potassium 3.8 3.5 - 5.3 mmol/L    Chloride 103 96 - 108 mmol/L    CO2 27 21 - 32 mmol/L    ANION GAP 9 4 - 13 mmol/L    BUN 25 5 - 25 mg/dL    Creatinine 0.99 0.60 - 1.30 mg/dL    Glucose 115 65 - 140 mg/dL    Calcium 9.1 8.4 - 10.2 mg/dL    AST 26 13 - 39 U/L    ALT 32 7 - 52 U/L    Alkaline Phosphatase 52 34 - 104 U/L    Total Protein 6.1 (L) 6.4 - 8.4 g/dL    Albumin 3.5 3.5 - 5.0 g/dL    Total Bilirubin 0.65 0.20 - 1.00 mg/dL    eGFR 50 ml/min/1.73sq m   Lipase    Collection Time: 01/07/25  5:32 AM   Result Value Ref Range    Lipase 40 11 - 82 u/L   High Sensitivity Troponin I Random    Collection Time: 01/07/25  5:32 AM   Result Value Ref Range    HS TnI random 190 (H) 8 - 18 ng/L   Magnesium    Collection Time: 01/07/25  5:32 AM   Result Value Ref Range    Magnesium 2.1 1.9 - 2.7 mg/dL   Phosphorus    Collection Time: 01/07/25  5:32 AM   Result Value Ref Range    Phosphorus 2.9 2.3 - 4.1 mg/dL   High Sensitivity Troponin I Random    Collection Time: 01/07/25 10:18 AM   Result Value Ref Range    HS TnI random 3,866 (H) 8 - 18 ng/L     Imaging: I have personally reviewed pertinent reports.      Echo 1/7/25  Interpretation Summary  Show Result Comparison     Left Ventricle: Left ventricular cavity size is normal. Wall thickness is mildly increased. The left ventricular ejection fraction is 70%. Systolic function is vigorous. Wall motion is normal. Diastolic function is moderately abnormal, consistent with grade II (pseudonormal) relaxation.    Right Ventricle: Systolic function is mildly reduced.    Left Atrium: The atrium is mildly dilated.    Aortic Valve: There is mild regurgitation.    Mitral Valve: There is mild annular calcification.    Tricuspid Valve: There is mild regurgitation. The right ventricular systolic pressure is moderately elevated. The estimated right ventricular  systolic pressure is 50.00 mmHg.    Pericardium: There is a trivial pericardial effusion posterior to the heart.          EK25 - atrial fibrillation 79 bpm, no acute ST/T changes    Code Status: Level 3 - DNAR and DNI  Advance Directive and Living Will:      Power of :    POLST:

## 2025-01-08 ENCOUNTER — TELEPHONE (OUTPATIENT)
Dept: NEUROSURGERY | Facility: CLINIC | Age: OVER 89
End: 2025-01-08

## 2025-01-08 PROBLEM — R55 SYNCOPE: Status: ACTIVE | Noted: 2025-01-08

## 2025-01-08 LAB
ACANTHOCYTES BLD QL SMEAR: PRESENT
ANION GAP SERPL CALCULATED.3IONS-SCNC: 5 MMOL/L (ref 4–13)
ANISOCYTOSIS BLD QL SMEAR: PRESENT
BASOPHILS # BLD AUTO: 0.04 THOUSANDS/ΜL (ref 0–0.1)
BASOPHILS # BLD MANUAL: 0 THOUSAND/UL (ref 0–0.1)
BASOPHILS NFR BLD AUTO: 0 % (ref 0–1)
BASOPHILS NFR MAR MANUAL: 0 % (ref 0–1)
BUN SERPL-MCNC: 24 MG/DL (ref 5–25)
CALCIUM SERPL-MCNC: 8.8 MG/DL (ref 8.4–10.2)
CARDIAC TROPONIN I PNL SERPL HS: 8384 NG/L (ref 8–18)
CHLORIDE SERPL-SCNC: 103 MMOL/L (ref 96–108)
CO2 SERPL-SCNC: 30 MMOL/L (ref 21–32)
CREAT SERPL-MCNC: 0.99 MG/DL (ref 0.6–1.3)
EOSINOPHIL # BLD AUTO: 0.01 THOUSAND/ΜL (ref 0–0.61)
EOSINOPHIL # BLD MANUAL: 0 THOUSAND/UL (ref 0–0.4)
EOSINOPHIL NFR BLD AUTO: 0 % (ref 0–6)
EOSINOPHIL NFR BLD MANUAL: 0 % (ref 0–6)
ERYTHROCYTE [DISTWIDTH] IN BLOOD BY AUTOMATED COUNT: 15.2 % (ref 11.6–15.1)
ERYTHROCYTE [DISTWIDTH] IN BLOOD BY AUTOMATED COUNT: 15.3 % (ref 11.6–15.1)
GFR SERPL CREATININE-BSD FRML MDRD: 50 ML/MIN/1.73SQ M
GLUCOSE SERPL-MCNC: 105 MG/DL (ref 65–140)
HCT VFR BLD AUTO: 29.8 % (ref 34.8–46.1)
HCT VFR BLD AUTO: 34 % (ref 34.8–46.1)
HGB BLD-MCNC: 10.7 G/DL (ref 11.5–15.4)
HGB BLD-MCNC: 9.6 G/DL (ref 11.5–15.4)
IMM GRANULOCYTES # BLD AUTO: 0.09 THOUSAND/UL (ref 0–0.2)
IMM GRANULOCYTES NFR BLD AUTO: 1 % (ref 0–2)
LYMPHOCYTES # BLD AUTO: 0.7 THOUSAND/UL (ref 0.6–4.47)
LYMPHOCYTES # BLD AUTO: 1.25 THOUSANDS/ΜL (ref 0.6–4.47)
LYMPHOCYTES # BLD AUTO: 2 % (ref 14–44)
LYMPHOCYTES NFR BLD AUTO: 8 % (ref 14–44)
MACROCYTES BLD QL AUTO: PRESENT
MCH RBC QN AUTO: 28.5 PG (ref 26.8–34.3)
MCH RBC QN AUTO: 28.6 PG (ref 26.8–34.3)
MCHC RBC AUTO-ENTMCNC: 31.5 G/DL (ref 31.4–37.4)
MCHC RBC AUTO-ENTMCNC: 32.2 G/DL (ref 31.4–37.4)
MCV RBC AUTO: 89 FL (ref 82–98)
MCV RBC AUTO: 90 FL (ref 82–98)
MONOCYTES # BLD AUTO: 0.35 THOUSAND/UL (ref 0–1.22)
MONOCYTES # BLD AUTO: 1.01 THOUSAND/ΜL (ref 0.17–1.22)
MONOCYTES NFR BLD AUTO: 6 % (ref 4–12)
MONOCYTES NFR BLD: 2 % (ref 4–12)
NEUTROPHILS # BLD AUTO: 13.87 THOUSANDS/ΜL (ref 1.85–7.62)
NEUTROPHILS # BLD MANUAL: 16.53 THOUSAND/UL (ref 1.85–7.62)
NEUTS SEG NFR BLD AUTO: 85 % (ref 43–75)
NEUTS SEG NFR BLD AUTO: 94 % (ref 43–75)
NRBC BLD AUTO-RTO: 0 /100 WBCS
OVALOCYTES BLD QL SMEAR: PRESENT
PLATELET # BLD AUTO: 188 THOUSANDS/UL (ref 149–390)
PLATELET # BLD AUTO: 217 THOUSANDS/UL (ref 149–390)
PLATELET BLD QL SMEAR: ADEQUATE
PMV BLD AUTO: 10.3 FL (ref 8.9–12.7)
PMV BLD AUTO: 11 FL (ref 8.9–12.7)
POIKILOCYTOSIS BLD QL SMEAR: PRESENT
POTASSIUM SERPL-SCNC: 3.7 MMOL/L (ref 3.5–5.3)
QRS AXIS: -39 DEGREES
QRSD INTERVAL: 78 MS
QT INTERVAL: 360 MS
QTC INTERVAL: 481 MS
RBC # BLD AUTO: 3.36 MILLION/UL (ref 3.81–5.12)
RBC # BLD AUTO: 3.76 MILLION/UL (ref 3.81–5.12)
RBC MORPH BLD: PRESENT
SODIUM SERPL-SCNC: 138 MMOL/L (ref 135–147)
T WAVE AXIS: 114 DEGREES
VARIANT LYMPHS # BLD AUTO: 2 %
VENTRICULAR RATE: 107 BPM
WBC # BLD AUTO: 16.27 THOUSAND/UL (ref 4.31–10.16)
WBC # BLD AUTO: 17.59 THOUSAND/UL (ref 4.31–10.16)

## 2025-01-08 PROCEDURE — 80048 BASIC METABOLIC PNL TOTAL CA: CPT | Performed by: NURSE PRACTITIONER

## 2025-01-08 PROCEDURE — 99232 SBSQ HOSP IP/OBS MODERATE 35: CPT | Performed by: INTERNAL MEDICINE

## 2025-01-08 PROCEDURE — 92526 ORAL FUNCTION THERAPY: CPT

## 2025-01-08 PROCEDURE — 85025 COMPLETE CBC W/AUTO DIFF WBC: CPT | Performed by: NURSE PRACTITIONER

## 2025-01-08 PROCEDURE — 99232 SBSQ HOSP IP/OBS MODERATE 35: CPT | Performed by: STUDENT IN AN ORGANIZED HEALTH CARE EDUCATION/TRAINING PROGRAM

## 2025-01-08 PROCEDURE — 93010 ELECTROCARDIOGRAM REPORT: CPT | Performed by: INTERNAL MEDICINE

## 2025-01-08 PROCEDURE — 84484 ASSAY OF TROPONIN QUANT: CPT | Performed by: NURSE PRACTITIONER

## 2025-01-08 RX ORDER — SODIUM CHLORIDE, SODIUM GLUCONATE, SODIUM ACETATE, POTASSIUM CHLORIDE, MAGNESIUM CHLORIDE, SODIUM PHOSPHATE, DIBASIC, AND POTASSIUM PHOSPHATE .53; .5; .37; .037; .03; .012; .00082 G/100ML; G/100ML; G/100ML; G/100ML; G/100ML; G/100ML; G/100ML
125 INJECTION, SOLUTION INTRAVENOUS CONTINUOUS
Status: DISCONTINUED | OUTPATIENT
Start: 2025-01-08 | End: 2025-01-08

## 2025-01-08 RX ORDER — LABETALOL HYDROCHLORIDE 5 MG/ML
10 INJECTION, SOLUTION INTRAVENOUS EVERY 8 HOURS PRN
Status: DISCONTINUED | OUTPATIENT
Start: 2025-01-08 | End: 2025-01-12 | Stop reason: HOSPADM

## 2025-01-08 RX ORDER — METOPROLOL TARTRATE 1 MG/ML
5 INJECTION, SOLUTION INTRAVENOUS ONCE
Status: COMPLETED | OUTPATIENT
Start: 2025-01-08 | End: 2025-01-08

## 2025-01-08 RX ADMIN — LABETALOL HYDROCHLORIDE 10 MG: 5 INJECTION, SOLUTION INTRAVENOUS at 22:28

## 2025-01-08 RX ADMIN — ENOXAPARIN SODIUM 30 MG: 30 INJECTION SUBCUTANEOUS at 21:51

## 2025-01-08 RX ADMIN — ACETAMINOPHEN 975 MG: 325 TABLET, FILM COATED ORAL at 21:50

## 2025-01-08 RX ADMIN — CHLORHEXIDINE GLUCONATE 0.12% ORAL RINSE 15 ML: 1.2 LIQUID ORAL at 08:32

## 2025-01-08 RX ADMIN — METOPROLOL TARTRATE 25 MG: 25 TABLET, FILM COATED ORAL at 21:55

## 2025-01-08 RX ADMIN — METOPROLOL TARTRATE 25 MG: 25 TABLET, FILM COATED ORAL at 08:32

## 2025-01-08 RX ADMIN — LEVETIRACETAM 500 MG: 500 TABLET, FILM COATED ORAL at 21:50

## 2025-01-08 RX ADMIN — POLYETHYLENE GLYCOL 3350 17 G: 17 POWDER, FOR SOLUTION ORAL at 08:32

## 2025-01-08 RX ADMIN — SODIUM CHLORIDE 3 G: 9 INJECTION, SOLUTION INTRAVENOUS at 17:25

## 2025-01-08 RX ADMIN — METOROPROLOL TARTRATE 5 MG: 5 INJECTION, SOLUTION INTRAVENOUS at 01:47

## 2025-01-08 RX ADMIN — SODIUM CHLORIDE 3 G: 9 INJECTION, SOLUTION INTRAVENOUS at 05:56

## 2025-01-08 RX ADMIN — CHLORHEXIDINE GLUCONATE 0.12% ORAL RINSE 15 ML: 1.2 LIQUID ORAL at 21:51

## 2025-01-08 RX ADMIN — ENOXAPARIN SODIUM 30 MG: 30 INJECTION SUBCUTANEOUS at 08:32

## 2025-01-08 RX ADMIN — SODIUM CHLORIDE 3 G: 9 INJECTION, SOLUTION INTRAVENOUS at 10:00

## 2025-01-08 RX ADMIN — LEVETIRACETAM 500 MG: 500 TABLET, FILM COATED ORAL at 08:32

## 2025-01-08 RX ADMIN — PANTOPRAZOLE SODIUM 40 MG: 40 TABLET, DELAYED RELEASE ORAL at 08:32

## 2025-01-08 RX ADMIN — SODIUM CHLORIDE 3 G: 9 INJECTION, SOLUTION INTRAVENOUS at 23:23

## 2025-01-08 RX ADMIN — PANTOPRAZOLE SODIUM 40 MG: 40 TABLET, DELAYED RELEASE ORAL at 17:25

## 2025-01-08 NOTE — ASSESSMENT & PLAN NOTE
Brendon BP  70/40 during physical therapy. Yesterday. Patient was symptomatic of lightheadedness and presyncope  Now with ongoing Plasma-Lyte at 50 cc an hour.  Repeat orthostatics today lying 145/75, sitting 149/75 and standing 113/58.  Asymptomatic today.  Patient has recently been get lightheaded upon position changes at home.  She believes this is what caused her syncope.  Would recommend ongoing fluids.  Recommend adequate hydration at home at least 60 ounces of fluid a day.  For now we will apply at least knee-high stockings on in the morning off at in the evening.  With more adequate hydration if she remains orthostatic would probably need to start on low-dose midodrine.

## 2025-01-08 NOTE — SPEECH THERAPY NOTE
"Speech Language/Pathology    Speech/Language Pathology Progress Note    Patient Name: Sandra Purvis  Today's Date: 1/8/2025     Problem List  Principal Problem:    SDH (subdural hematoma) ([facility])  Active Problems:    Hypertension    Paroxysmal atrial fibrillation (HCC)    GERD (gastroesophageal reflux disease)    SAH (subarachnoid hemorrhage) ([facility])    Closed extensive facial fractures ([facility])    Fall    Chest pain    Orthostatic hypotension    Syncope       Past Medical History  Past Medical History:   Diagnosis Date    Arthritis     Cataract     LastAssessed:9/8/15    GERD (gastroesophageal reflux disease)     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    Hypertension     PAF (paroxysmal atrial fibrillation) ([facility])     Last Assessed:2/3/16    Wrist fracture     Resolved:9/8/15        Past Surgical History  Past Surgical History:   Procedure Laterality Date    CATARACT EXTRACTION      Last Assessed:9/8/15    FL RETROGRADE PYELOGRAM  10/17/2021    LYMPHADENECTOMY      Last Assessed:9/8/15    WI CYSTO/URETERO W/LITHOTRIPSY &INDWELL STENT INSRT Right 11/24/2021    Procedure: CYSTOSCOPY URETEROSCOPY WITH LITHOTRIPSY HOLMIUM LASER, RETROGRADE PYELOGRAM AND INSERTION STENT URETERAL;  Surgeon: David Sr MD;  [facility];  Service: Urology    WI CYSTOURETHROSCOPY W/URETERAL CATHETERIZATION Right 10/17/2021    Procedure: CYSTOSCOPY RETROGRADE PYELOGRAM WITH INSERTION STENT URETERAL;  Surgeon: Benjie Tinoco MD;  [facility];  Service: Urology    TONSILLECTOMY      Last Assessed:9/8/15    WRIST SURGERY      Last Assessed:9/8/15         Subjective:  \"This is the kind of food I like\" Patient asleep, but wakes to verbal cues.     Objective:  The patient is seen for dysphagia therapy at lunch meal. She is trialed with upgraded tray of regular solids and thin liquids. The patient is assessed with turkey, potatoes and vegetables. SLP assists with tray set up. The patient feeds herself. Rate and bite size is " adequate. Mastication time is min prolonged, but efficient. The patient has some oral residue in between bites, but eventually clears. She takes sips of thin liquids via straw with no overt s/s aspiration. Patient reports some jaw discomfort as session progresses, but reports enjoyment of upgraded diet.     Assessment:  The patient tolerated upgraded solids well.     Plan/Recommendations:  Recommend diet change to regular with thin liquids. Continue ST to further assess tolerance as able.

## 2025-01-08 NOTE — CASE MANAGEMENT
Case Management Discharge Planning Note    Patient name Sandra Purvis  Location [facility] 625/[facility] 625-01 MRN [id number]  : 12/3/1934 Date 2025       Current Admission Date: 2025  Current Admission Diagnosis:SDH (subdural hematoma) ([facility])   Patient Active Problem List    Diagnosis Date Noted Date Diagnosed    Syncope 2025     Chest pain 2025     Orthostatic hypotension 2025     Fall 2025     SDH (subdural hematoma) ([facility]) 2025     SAH (subarachnoid hemorrhage) ([facility]) 2025     Closed extensive facial fractures ([facility]) 2025     Primary insomnia 2024     Basal cell carcinoma (BCC) of scalp 2023     Malignant melanoma of arm, left ([facility]) 2023     Scalp lesion 2023     Arm skin lesion, left 2023     Irritant contact dermatitis due to other agents 2022     COVID-19 2022     GERD (gastroesophageal reflux disease)      Stage 3 chronic kidney disease, unspecified whether stage 3a or 3b CKD ([facility]) 2021     Paroxysmal atrial fibrillation ([facility])      Preop examination 2021     Esophagus disorder 10/19/2021     Ureteral stone 10/15/2021     Bacteremia 10/15/2021     Mild protein-calorie malnutrition ([facility]) 2021     Hypertension 2015       LOS (days): 4  Geometric Mean LOS (GMLOS) (days): 1.9  Days to GMLOS:-2     OBJECTIVE:  Risk of Unplanned Readmission Score: 13.84         Current admission status: Inpatient   Preferred Pharmacy:   [facility] #[id number] - [place] - [address] [address]  Phone: [phone] Fax: [phone]    Primary Care Provider: Berhane Page DO    Primary Insurance: MEDICARE  Secondary Insurance: BLUE CROSS    DISCHARGE DETAILS:       Pt not medically stable at this time.   Plan remains for d/c to [facility] when stable

## 2025-01-08 NOTE — DISCHARGE INSTR - AVS FIRST PAGE
Discharge Instructions - Neurosurgery    Do not take any blood thinning medications (ie. No Advil. No motrin. No ibuprofen. No Aleve. No Aspirin. No fishoil. No heparin. No antiplatelet / no anticoagulation medication).  Refrain from activity that increases chance of trauma to head or falls. Recommend you take fall precaution.  No strenuous activity or sports.  Return to hospital Emergency Room if you experience worsening / new headache, nausea/vomiting, speech/vision change, seizure, confusion / mental status change, weakness, or other neurological changes.      Please follow up in 1 weeks with the Neurosurgical group with repeat CT head without contrast 2-3 days prior to your appointment.  You may go to any [facility] facility to get your imaging done.  Please call [phone] to schedule your imaging appointment.

## 2025-01-08 NOTE — ASSESSMENT & PLAN NOTE
Patient presented with syncope preceded by lightheadedness  Found to be significantly orthostatic  Remains on telemetry no evidence of bradycardia or significant pauses  Suspect due to orthostatic hypotension.  Recently has been getting lightheaded upon standing.  She does not drink fluids consistently at home.

## 2025-01-08 NOTE — PROGRESS NOTES
"Progress Note - Trauma   Name: Sandra Purvis 89 y.o. female I MRN: [id number]  Unit/Bed#: [facility] 625-01 I Date of Admission: 1/4/2025   Date of Service: 1/8/2025 I Hospital Day: 4    Assessment & Plan  Fall  Described as \"fainting\"--no history or associated symptoms  Troponin was initially negative and then marques to over 8000.  EKG is nonischemic  Orthostatics positive this morning 1/8  Echocardiogram showed LVEF 70%.  Systolic function vigorous.  Grade 2 diastolic dysfunction.  RV mildly reduced.  Left atrial dilatation mild.  Mild AI.    Telemetry--showed A-fib with RVR overnight received 2 doses of metoprolol which has controlled her A-fib  Cardiology was consulted and note appreciated  Recommended continued hydration orally and with IVF  TEDS  Continue to monitor orthostatics  May consider midodrine if she continues to be symptomatic.  SDH (subdural hematoma) (HCC)  Secondary to fall  Initial CT imaging on 1/4 showed: Acute subdural hemorrhage along the left cerebral convexity with maximal thickness of 1.9 cm. No midline shift. Acute subarachnoid hemorrhage in the left frontoparietal sulci, left sylvian fissure and suprasellar cistern  Repeat imaging showed stability on 1/5  Neurosurgery consulted and note appreciated  No reversal needed due to patient not taking any AP/AC  Continue to closely monitor neurological status every 4-currently GCS 15  Maintain normotension, SBP less than 160  Continue on 7 day course of Keppra for seizure prophylaxis  Hold all anticoagulant or antiplatelet medications until patient follows up with neurosurgery in 2 weeks with repeat CT head.  SAH (subarachnoid hemorrhage) (HCC)  See above  Closed extensive facial fractures (HCC)  Secondary to fall  1/4 Initial CT imaging of face showed Comminuted and mildly displaced fractures of the lateral and anterior walls of right maxilla, lateral and inferior walls of right orbit. Right inferior orbital wall fracture appears to involve the " inferior orbital foramen with associated small extraconal soft tissue edema and focus of air. No herniation of extraocular muscles. Nondisplaced fractures of zygomatic arch and the temporal bone at the TMJ. Diffuse opacification of right maxillary sinus and right nasal cavity with blood products and air.  OMFS consulted and note appreciated  Surgical intervention discussed with patient by OMFS team, but refused by patient  Continue with 7-day course of Unasyn/Augmentin  Sinus precautions  Follow-up with OMFS as an outpatient for continued monitoring and management of fractures.  Paroxysmal atrial fibrillation (HCC)  Continue home metoprolol  Currently not on AC  Follow-up with cardiology as an outpatient  Hypertension  Continue home metoprolol  GERD (gastroesophageal reflux disease)  Protonix initiated--symptoms improved  Chest pain  Patient was noted to be experiencing chest pain/epigastric pain yesterday 1/7  EKG was nonischemic but troponins elevated to greater than 8000.  Cardiology was consulted and note appreciated  Not a candidate for ischemic evaluation  Recommend starting ASA 81 mg daily  Syncope  See Fall  Orthostatic hypotension  See Fall    Bowel Regimen: Senna S  VTE Prophylaxis:VTE covered by:  enoxaparin, Subcutaneous, 30 mg at 01/08/25 0832        Disposition: Pending placement     24 Hour Events : Overnight patient had transient confusion and repeat CT head was completed that was negative.  Mental status has improved but she was noted to go into A-fib with RVR and received 2 dose of 5 mg metoprolol which her symptoms improved.  Subjective : Patient describes feeling well this morning.  She denies any chest pain, palpitations, heartburn.  She denies any pain, discomfort, complaints at all.  States she has been doing well and tolerating her diet.  She confirms she was able to get sleep last night.    Objective :  Temp:  [97 °F (36.1 °C)-98.3 °F (36.8 °C)] 98 °F (36.7 °C)  HR:  [] 84  BP:  ()/(46-75) 126/51  Resp:  [17-18] 17  SpO2:  [96 %-99 %] 99 %  O2 Device: None (Room air)    I/O         01/06 0701 01/07 0700 01/07 0701 01/08 0700 01/08 0701 01/09 0700    P.O.  480 180    I.V. (mL/kg)       IV Piggyback       Total Intake(mL/kg)  480 (8.9) 180 (3.3)    Urine (mL/kg/hr) 500 (0.4) 400 (0.3)     Total Output 500 400     Net -500 +80 +180           Unmeasured Stool Occurrence   1 x          Lines/Drains/Airways       Active Status       Name Placement date Placement time Site Days    External Urinary Catheter 01/05/25  0100  -- 3                  Physical Exam:   GENERAL APPEARANCE: No acute distress  NEURO: GCS 15  HEENT: Normocephalic, ecchymosis found right periorbital.  Irregular right pupil.  Both pupils are reactive.  Neck supple.  CV: Controlled rate, irregular rhythm.  +2 radial and dorsalis pedis pulse, bilaterally.  LUNGS: Clear to auscultation, bilaterally.  Chest wall is nontender.  GI: Abdomen is soft nontender.  : Pelvis is stable.  MSK: Moving all extremities.  No deformities.  SKIN: Warm, dry.           Lab Results: I have reviewed the following results:  Recent Labs     01/07/25  0532 01/07/25  2313 01/08/25  0828   WBC 12.04*   < > 16.27*   HGB 11.5   < > 9.6*   HCT 35.8   < > 29.8*      < > 188   SODIUM 139   < > 138   K 3.8   < > 3.7      < > 103   CO2 27   < > 30   BUN 25   < > 24   CREATININE 0.99   < > 0.99   GLUC 115   < > 105   MG 2.1  --   --    PHOS 2.9  --   --    AST 26  --   --    ALT 32  --   --    ALB 3.5  --   --    TBILI 0.65  --   --    ALKPHOS 52  --   --     < > = values in this interval not displayed.       Imaging Results Review: No pertinent imaging studies reviewed.  Other Study Results Review: No additional pertinent studies reviewed.

## 2025-01-08 NOTE — TELEPHONE ENCOUNTER
1/8/25 - PT IN [facility]  1/21/25 1 WK HFU W/CT HEAD    [facility]  1 week follow up with AP and [facility] for SDH / SAH in need of asa therapy

## 2025-01-08 NOTE — ASSESSMENT & PLAN NOTE
"Described as \"fainting\"--no history or associated symptoms  Troponin was initially negative and then marques to over 8000.  EKG is nonischemic  Orthostatics positive this morning 1/8  Echocardiogram showed LVEF 70%.  Systolic function vigorous.  Grade 2 diastolic dysfunction.  RV mildly reduced.  Left atrial dilatation mild.  Mild AI.    Telemetry--showed A-fib with RVR overnight received 2 doses of metoprolol which has controlled her A-fib  Cardiology was consulted and note appreciated  Recommended continued hydration orally and with IVF  TEDS  Continue to monitor orthostatics  May consider midodrine if she continues to be symptomatic.  "

## 2025-01-08 NOTE — PLAN OF CARE
Problem: PAIN - ADULT  Goal: Verbalizes/displays adequate comfort level or baseline comfort level  Description: Interventions:  - Encourage patient to monitor pain and request assistance  - Assess pain using appropriate pain scale  - Administer analgesics based on type and severity of pain and evaluate response  - Implement non-pharmacological measures as appropriate and evaluate response  - Consider cultural and social influences on pain and pain management  - Notify physician/advanced practitioner if interventions unsuccessful or patient reports new pain  Outcome: Progressing     Problem: INFECTION - ADULT  Goal: Absence or prevention of progression during hospitalization  Description: INTERVENTIONS:  - Assess and monitor for signs and symptoms of infection  - Monitor lab/diagnostic results  - Monitor all insertion sites, i.e. indwelling lines, tubes, and drains  - Monitor endotracheal if appropriate and nasal secretions for changes in amount and color  - [place] appropriate cooling/warming therapies per order  - Administer medications as ordered  - Instruct and encourage patient and family to use good hand hygiene technique  - Identify and instruct in appropriate isolation precautions for identified infection/condition  Outcome: Progressing  Goal: Absence of fever/infection during neutropenic period  Description: INTERVENTIONS:  - Monitor WBC    Outcome: Progressing     Problem: SAFETY ADULT  Goal: Patient will remain free of falls  Description: INTERVENTIONS:  - Educate patient/family on patient safety including physical limitations  - Instruct patient to call for assistance with activity   - Consult OT/PT to assist with strengthening/mobility   - Keep Call bell within reach  - Keep bed low and locked with side rails adjusted as appropriate  - Keep care items and personal belongings within reach  - Initiate and maintain comfort rounds  - Make Fall Risk Sign visible to staff  - Offer Toileting every 2 Hours,  in advance of need  - Initiate/Maintain bed alarm  - Obtain necessary fall risk management equipment: bed alarm   - Apply yellow socks and bracelet for high fall risk patients  - Consider moving patient to room near nurses station  Outcome: Progressing  Goal: Maintain or return to baseline ADL function  Description: INTERVENTIONS:  -  Assess patient's ability to carry out ADLs; assess patient's baseline for ADL function and identify physical deficits which impact ability to perform ADLs (bathing, care of mouth/teeth, toileting, grooming, dressing, etc.)  - Assess/evaluate cause of self-care deficits   - Assess range of motion  - Assess patient's mobility; develop plan if impaired  - Assess patient's need for assistive devices and provide as appropriate  - Encourage maximum independence but intervene and supervise when necessary  - Involve family in performance of ADLs  - Assess for home care needs following discharge   - Consider OT consult to assist with ADL evaluation and planning for discharge  - Provide patient education as appropriate  Outcome: Progressing  Goal: Maintains/Returns to pre admission functional level  Description: INTERVENTIONS:  - Perform AM-PAC 6 Click Basic Mobility/ Daily Activity assessment daily.  - Set and communicate daily mobility goal to care team and patient/family/caregiver.   - Collaborate with rehabilitation services on mobility goals if consulted  - Perform Range of Motion 3 times a day.  - Reposition patient every 2 hours.  - Dangle patient 3 times a day  - Stand patient 3 times a day  - Ambulate patient 3 times a day  - Out of bed to chair 3 times a day   - Out of bed for meals 3 times a day  - Out of bed for toileting  - Record patient progress and toleration of activity level   Outcome: Progressing     Problem: DISCHARGE PLANNING  Goal: Discharge to home or other facility with appropriate resources  Description: INTERVENTIONS:  - Identify barriers to discharge w/patient and  caregiver  - Arrange for needed discharge resources and transportation as appropriate  - Identify discharge learning needs (meds, wound care, etc.)  - Arrange for interpretive services to assist at discharge as needed  - Refer to Case Management Department for coordinating discharge planning if the patient needs post-hospital services based on physician/advanced practitioner order or complex needs related to functional status, cognitive ability, or social support system  Outcome: Progressing     Problem: Knowledge Deficit  Goal: Patient/family/caregiver demonstrates understanding of disease process, treatment plan, medications, and discharge instructions  Description: Complete learning assessment and assess knowledge base.  Interventions:  - Provide teaching at level of understanding  - Provide teaching via preferred learning methods  Outcome: Progressing     Problem: Nutrition/Hydration-ADULT  Goal: Nutrient/Hydration intake appropriate for improving, restoring or maintaining nutritional needs  Description: Monitor and assess patient's nutrition/hydration status for malnutrition. Collaborate with interdisciplinary team and initiate plan and interventions as ordered.  Monitor patient's weight and dietary intake as ordered or per policy. Utilize nutrition screening tool and intervene as necessary. Determine patient's food preferences and provide high-protein, high-caloric foods as appropriate.     INTERVENTIONS:  - Monitor oral intake, urinary output, labs, and treatment plans  - Assess nutrition and hydration status and recommend course of action  - Evaluate amount of meals eaten  - Assist patient with eating if necessary   - Allow adequate time for meals  - Recommend/ encourage appropriate diets, oral nutritional supplements, and vitamin/mineral supplements  - Order, calculate, and assess calorie counts as needed  - Recommend, monitor, and adjust tube feedings and TPN/PPN based on assessed needs  - Assess need for  intravenous fluids  - Provide specific nutrition/hydration education as appropriate  - Include patient/family/caregiver in decisions related to nutrition  Outcome: Progressing     Problem: Potential for Falls  Goal: Patient will remain free of falls  Description: INTERVENTIONS:  - Educate patient/family on patient safety including physical limitations  - Instruct patient to call for assistance with activity   - Consult OT/PT to assist with strengthening/mobility   - Keep Call bell within reach  - Keep bed low and locked with side rails adjusted as appropriate  - Keep care items and personal belongings within reach  - Initiate and maintain comfort rounds  - Make Fall Risk Sign visible to staff  - Offer Toileting every 2 Hours, in advance of need  - Initiate/Maintain bed alarm  - Obtain necessary fall risk management equipment: bed alarm  - Apply yellow socks and bracelet for high fall risk patients  - Consider moving patient to room near nurses station  Outcome: Progressing     Problem: Prexisting or High Potential for Compromised Skin Integrity  Goal: Skin integrity is maintained or improved  Description: INTERVENTIONS:  - Identify patients at risk for skin breakdown  - Assess and monitor skin integrity  - Assess and monitor nutrition and hydration status  - Monitor labs   - Assess for incontinence   - Turn and reposition patient  - Assist with mobility/ambulation  - Relieve pressure over bony prominences  - Avoid friction and shearing  - Provide appropriate hygiene as needed including keeping skin clean and dry  - Evaluate need for skin moisturizer/barrier cream  - Collaborate with interdisciplinary team   - Patient/family teaching  - Consider wound care consult   Outcome: Progressing

## 2025-01-08 NOTE — PLAN OF CARE
Problem: PAIN - ADULT  Goal: Verbalizes/displays adequate comfort level or baseline comfort level  Description: Interventions:  - Encourage patient to monitor pain and request assistance  - Assess pain using appropriate pain scale  - Administer analgesics based on type and severity of pain and evaluate response  - Implement non-pharmacological measures as appropriate and evaluate response  - Consider cultural and social influences on pain and pain management  - Notify physician/advanced practitioner if interventions unsuccessful or patient reports new pain  Outcome: Progressing     Problem: INFECTION - ADULT  Goal: Absence or prevention of progression during hospitalization  Description: INTERVENTIONS:  - Assess and monitor for signs and symptoms of infection  - Monitor lab/diagnostic results  - Monitor all insertion sites, i.e. indwelling lines, tubes, and drains  - Monitor endotracheal if appropriate and nasal secretions for changes in amount and color  - [place] appropriate cooling/warming therapies per order  - Administer medications as ordered  - Instruct and encourage patient and family to use good hand hygiene technique  - Identify and instruct in appropriate isolation precautions for identified infection/condition  Outcome: Progressing  Goal: Absence of fever/infection during neutropenic period  Description: INTERVENTIONS:  - Monitor WBC    Outcome: Progressing     Problem: SAFETY ADULT  Goal: Patient will remain free of falls  Description: INTERVENTIONS:  - Educate patient/family on patient safety including physical limitations  - Instruct patient to call for assistance with activity   - Consult OT/PT to assist with strengthening/mobility   - Keep Call bell within reach  - Keep bed low and locked with side rails adjusted as appropriate  - Keep care items and personal belongings within reach  - Initiate and maintain comfort rounds  - Make Fall Risk Sign visible to staff  - Offer Toileting every 2 Hours,  in advance of need  - Initiate/Maintain bed alarm  - Obtain necessary fall risk management equipment:   - Apply yellow socks and bracelet for high fall risk patients  - Consider moving patient to room near nurses station  Outcome: Progressing  Goal: Maintain or return to baseline ADL function  Description: INTERVENTIONS:  -  Assess patient's ability to carry out ADLs; assess patient's baseline for ADL function and identify physical deficits which impact ability to perform ADLs (bathing, care of mouth/teeth, toileting, grooming, dressing, etc.)  - Assess/evaluate cause of self-care deficits   - Assess range of motion  - Assess patient's mobility; develop plan if impaired  - Assess patient's need for assistive devices and provide as appropriate  - Encourage maximum independence but intervene and supervise when necessary  - Involve family in performance of ADLs  - Assess for home care needs following discharge   - Consider OT consult to assist with ADL evaluation and planning for discharge  - Provide patient education as appropriate  Outcome: Progressing     Problem: DISCHARGE PLANNING  Goal: Discharge to home or other facility with appropriate resources  Description: INTERVENTIONS:  - Identify barriers to discharge w/patient and caregiver  - Arrange for needed discharge resources and transportation as appropriate  - Identify discharge learning needs (meds, wound care, etc.)  - Arrange for interpretive services to assist at discharge as needed  - Refer to Case Management Department for coordinating discharge planning if the patient needs post-hospital services based on physician/advanced practitioner order or complex needs related to functional status, cognitive ability, or social support system  Outcome: Progressing     Problem: Knowledge Deficit  Goal: Patient/family/caregiver demonstrates understanding of disease process, treatment plan, medications, and discharge instructions  Description: Complete learning  assessment and assess knowledge base.  Interventions:  - Provide teaching at level of understanding  - Provide teaching via preferred learning methods  Outcome: Progressing     Problem: Nutrition/Hydration-ADULT  Goal: Nutrient/Hydration intake appropriate for improving, restoring or maintaining nutritional needs  Description: Monitor and assess patient's nutrition/hydration status for malnutrition. Collaborate with interdisciplinary team and initiate plan and interventions as ordered.  Monitor patient's weight and dietary intake as ordered or per policy. Utilize nutrition screening tool and intervene as necessary. Determine patient's food preferences and provide high-protein, high-caloric foods as appropriate.     INTERVENTIONS:  - Monitor oral intake, urinary output, labs, and treatment plans  - Assess nutrition and hydration status and recommend course of action  - Evaluate amount of meals eaten  - Assist patient with eating if necessary   - Allow adequate time for meals  - Recommend/ encourage appropriate diets, oral nutritional supplements, and vitamin/mineral supplements  - Order, calculate, and assess calorie counts as needed  - Recommend, monitor, and adjust tube feedings and TPN/PPN based on assessed needs  - Assess need for intravenous fluids  - Provide specific nutrition/hydration education as appropriate  - Include patient/family/caregiver in decisions related to nutrition  Outcome: Progressing     Problem: Potential for Falls  Goal: Patient will remain free of falls  Description: INTERVENTIONS:  - Educate patient/family on patient safety including physical limitations  - Instruct patient to call for assistance with activity   - Consult OT/PT to assist with strengthening/mobility   - Keep Call bell within reach  - Keep bed low and locked with side rails adjusted as appropriate  - Keep care items and personal belongings within reach  - Initiate and maintain comfort rounds  - Make Fall Risk Sign visible to  staff  - Offer Toileting every 2 Hours, in advance of need  - Initiate/Maintain bed alarm  - Obtain necessary fall risk management equipment:   - Apply yellow socks and bracelet for high fall risk patients  - Consider moving patient to room near nurses station  Outcome: Progressing     Problem: Prexisting or High Potential for Compromised Skin Integrity  Goal: Skin integrity is maintained or improved  Description: INTERVENTIONS:  - Identify patients at risk for skin breakdown  - Assess and monitor skin integrity  - Assess and monitor nutrition and hydration status  - Monitor labs   - Assess for incontinence   - Turn and reposition patient  - Assist with mobility/ambulation  - Relieve pressure over bony prominences  - Avoid friction and shearing  - Provide appropriate hygiene as needed including keeping skin clean and dry  - Evaluate need for skin moisturizer/barrier cream  - Collaborate with interdisciplinary team   - Patient/family teaching  - Consider wound care consult   Outcome: Progressing

## 2025-01-08 NOTE — QUICK NOTE
Called to patient's room again because her heart rate elevated to 150s. Tele showing afib RVR.  Upon my arrival to patient's room, HR is irregularly irregular on palpation-ranging from 105-130. . Afib RVR confirmed on 12 lead EKG. Patient does have a history of afib RVR and is on metoprolol at home which she has been getting here. Will give IV dose of metoprolol 5mg.    Patient mentation has improved. Alert and conversant, oriented to person, time, month and year, situation. She knows she is in the hospital and completely recalls the events of her syncopal episode and what injuries she sustained but believes she is in [place]. Pupil exam remains the same as prior quick note.

## 2025-01-08 NOTE — ASSESSMENT & PLAN NOTE
Current TTE LVEF 70%.  Systolic function vigorous.  Grade 2 diastolic dysfunction.  RV mildly reduced.  Left atrial dilatation mild.  Mild AI.  Mild TR.  Trivial pericardial effusion.  On Lopressor 25 mg every 12 hours.   Patient presented with sinus bradycardia.  Currently in controlled atrial fibrillation.  No bradycardia.  Not on AC PTA, do not recommend initiation of DOAC at this time

## 2025-01-08 NOTE — QUICK NOTE
Called to patient bedside by RN for change in mental status compared to prior neuro checks.   Patient oriented to person  Not oriented to time  Not oriented to place  Not oriented to situation.  No focal neurological physical exam findings.   Left pupil 4mm, round, reactive to light.  Right pupil is ovoid which is not noted on prior documented examination. Similar in size to left pupil but minimally reactive.  EOMI.     Given change in mental status and pupil exam compared to prior. Will obtain stat head CT.

## 2025-01-08 NOTE — PROGRESS NOTES
Progress Note - Cardiology   Name: Sandra Purvis 89 y.o. female I MRN: [id number]  Unit/Bed#: [facility] 625-01 I Date of Admission: 1/4/2025   Date of Service: 1/8/2025 I Hospital Day: 4     Assessment & Plan  Orthostatic hypotension  Brendon BP  70/40 during physical therapy. Yesterday. Patient was symptomatic of lightheadedness and presyncope  Now with ongoing Plasma-Lyte at 50 cc an hour.  Repeat orthostatics today lying 145/75, sitting 149/75 and standing 113/58.  Asymptomatic today.  Patient has recently been get lightheaded upon position changes at home.  She believes this is what caused her syncope.  Would recommend ongoing fluids.  Recommend adequate hydration at home at least 60 ounces of fluid a day.  For now we will apply at least knee-high stockings on in the morning off at in the evening.  With more adequate hydration if she remains orthostatic would probably need to start on low-dose midodrine.  Chest pain  0-hour troponin 1/6 .There was a random troponin yesterday morning 190 followed by 3866.  Repeat troponin checked this morning 8384.  Remains without any chest pain  EKG has been nonischemic  Ddx includes nonischemic troponin elevation in s/o trauma and brain bleed vs demand ischemia due to PAF and syncope.  She is not a candidate for ischemic evaluation at this time due to advanced age, frailty, and presently with intracranial hemorrhage which would contraindicate any intervention  No new RWMA on echo 1/6/25  Start aspirin 81 mg daily when able from bleeding perspective  Syncope  Patient presented with syncope preceded by lightheadedness  Found to be significantly orthostatic  Remains on telemetry no evidence of bradycardia or significant pauses  Suspect due to orthostatic hypotension.  Recently has been getting lightheaded upon standing.  She does not drink fluids consistently at home.  Paroxysmal atrial fibrillation (HCC)  Current TTE LVEF 70%.  Systolic function vigorous.  Grade 2 diastolic  "dysfunction.  RV mildly reduced.  Left atrial dilatation mild.  Mild AI.  Mild TR.  Trivial pericardial effusion.  On Lopressor 25 mg every 12 hours.   Patient presented with sinus bradycardia.  Currently in controlled atrial fibrillation.  No bradycardia.  Not on AC PTA, do not recommend initiation of DOAC at this time  SDH (subdural hematoma) (HCC)  Patient with SDH/SAH.  No surgical intervention planned at this time.  Hypertension  Continue to monitor on current therapy  SAH (subarachnoid hemorrhage) (HCC)          Subjective/Objective   Chief Complaint/subjective  No new events overnight.  Patient remains orthostatic this morning.  No complaints of chest pain.  No shortness of breath.  Not lightheaded today.      Vitals: /58   Pulse 98   Temp 98.3 °F (36.8 °C)   Resp 17   Ht 5' 2\" (1.575 m)   Wt 54 kg (119 lb 0.8 oz)   SpO2 96%   BMI 21.77 kg/m²     Vitals:    01/06/25 1600 01/08/25 0541   Weight: 49 kg (108 lb) 54 kg (119 lb 0.8 oz)     Orthostatic Blood Pressures      Flowsheet Row Most Recent Value   Blood Pressure 113/58 filed at 01/08/2025 0809   Patient Position - Orthostatic VS Standing - Orthostatic VS  [Provider updated on orthostatics] filed at 01/08/2025 0808              Intake/Output Summary (Last 24 hours) at 1/8/2025 1034  Last data filed at 1/8/2025 0850  Gross per 24 hour   Intake 180 ml   Output 400 ml   Net -220 ml       Invasive Devices       Peripheral Intravenous Line  Duration             Peripheral IV 01/04/25 Distal;Right;Ventral (anterior) Forearm 3 days              Drain  Duration             Ureteral Drain/Stent Right ureter 6 Fr. 1178 days    External Urinary Catheter 3 days                      Current Facility-Administered Medications:     acetaminophen (TYLENOL) tablet 975 mg, Q8H ELIZABETH    ampicillin-sulbactam (UNASYN) 3 g in sodium chloride 0.9 % 100 mL IVPB, Q6H, Last Rate: 3 g (01/08/25 1000)    bisacodyl (DULCOLAX) rectal suppository 10 mg, Daily PRN    calcium " "carbonate (TUMS) chewable tablet 500 mg, Daily PRN    chlorhexidine (PERIDEX) 0.12 % oral rinse 15 mL, Q12H ELIZABETH    enoxaparin (LOVENOX) subcutaneous injection 30 mg, Q12H ELIZABETH    levETIRAcetam (KEPPRA) tablet 500 mg, Q12H ELIZABETH    metoprolol tartrate (LOPRESSOR) tablet 25 mg, Q12H ELIZABETH    multi-electrolyte (PLASMALYTE-A/ISOLYTE-S PH 7.4) IV solution, Continuous, Last Rate: 125 mL/hr (01/08/25 0954)    naloxone (NARCAN) 0.04 mg/mL syringe 0.04 mg, Q1MIN PRN    ondansetron (ZOFRAN) injection 4 mg, Q4H PRN    oxyCODONE (ROXICODONE) split tablet 2.5 mg, Q4H PRN **OR** oxyCODONE (ROXICODONE) IR tablet 5 mg, Q4H PRN    pantoprazole (PROTONIX) EC tablet 40 mg, BID    polyethylene glycol (MIRALAX) packet 17 g, Daily    senna-docusate sodium (SENOKOT S) 8.6-50 mg per tablet 1 tablet, HS      Physical Exam: /58   Pulse 98   Temp 98.3 °F (36.8 °C)   Resp 17   Ht 5' 2\" (1.575 m)   Wt 54 kg (119 lb 0.8 oz)   SpO2 96%   BMI 21.77 kg/m²     General Appearance:    Alert, cooperative, no distress, appears stated age   Head:    Normocephalic, no scleral icterus   Eyes:    PERRL   Nose:   Nares normal, septum midline, no drainage    Throat:   Lips, mucosa, and tongue normal   Neck:   Supple, symmetrical, trachea midline,              Lungs:     Clear to auscultation bilaterally, respirations unlabored   Kenan Wall:    No tenderness or deformity    Heart:    Irregular rate and rhythm, S1 and S2 normal, no murmur, rub   or gallop   Abdomen:     Soft, non-tender, bowel sounds active all four quadrants,     no masses, no organomegaly   Extremities:   Extremities normal, atraumatic, no cyanosis or edema   Pulses:   2+ and symmetric all extremities   Skin:   Skin color, texture, turgor normal, no rashes or lesions   Neurologic:   Alert and oriented to person place and time                 Lab Results:   Recent Results (from the past 72 hours)   Phosphorus    Collection Time: 01/06/25  4:52 AM   Result Value Ref Range    Phosphorus " 2.7 2.3 - 4.1 mg/dL   Magnesium    Collection Time: 01/06/25  4:52 AM   Result Value Ref Range    Magnesium 2.0 1.9 - 2.7 mg/dL   Basic metabolic panel    Collection Time: 01/06/25  4:52 AM   Result Value Ref Range    Sodium 139 135 - 147 mmol/L    Potassium 3.5 3.5 - 5.3 mmol/L    Chloride 104 96 - 108 mmol/L    CO2 27 21 - 32 mmol/L    ANION GAP 8 4 - 13 mmol/L    BUN 34 (H) 5 - 25 mg/dL    Creatinine 1.11 0.60 - 1.30 mg/dL    Glucose 105 65 - 140 mg/dL    Calcium 9.2 8.4 - 10.2 mg/dL    eGFR 43 ml/min/1.73sq m   CBC and differential    Collection Time: 01/06/25  4:52 AM   Result Value Ref Range    WBC 15.86 (H) 4.31 - 10.16 Thousand/uL    RBC 3.47 (L) 3.81 - 5.12 Million/uL    Hemoglobin 10.0 (L) 11.5 - 15.4 g/dL    Hematocrit 30.9 (L) 34.8 - 46.1 %    MCV 89 82 - 98 fL    MCH 28.8 26.8 - 34.3 pg    MCHC 32.4 31.4 - 37.4 g/dL    RDW 15.2 (H) 11.6 - 15.1 %    MPV 11.1 8.9 - 12.7 fL    Platelets 191 149 - 390 Thousands/uL    nRBC 0 /100 WBCs    Segmented % 88 (H) 43 - 75 %    Immature Grans % 0 0 - 2 %    Lymphocytes % 6 (L) 14 - 44 %    Monocytes % 6 4 - 12 %    Eosinophils Relative 0 0 - 6 %    Basophils Relative 0 0 - 1 %    Absolute Neutrophils 13.82 (H) 1.85 - 7.62 Thousands/µL    Absolute Immature Grans 0.05 0.00 - 0.20 Thousand/uL    Absolute Lymphocytes 1.00 0.60 - 4.47 Thousands/µL    Absolute Monocytes 0.92 0.17 - 1.22 Thousand/µL    Eosinophils Absolute 0.02 0.00 - 0.61 Thousand/µL    Basophils Absolute 0.05 0.00 - 0.10 Thousands/µL   Echo complete w/ contrast if indicated    Collection Time: 01/06/25  4:10 PM   Result Value Ref Range    Triscuspid Valve Regurgitation Peak Gradient 40.0 mmHg    RAA A4C 15 cm2    LA Volume Index (BP) 23.1 mL/m2    MV Peak A Narciso 0.73 m/s    MV stenosis pressure 1/2 time 68 ms    MV Peak E Narciso 103 cm/s    AV peak gradient 14 mmHg    LVOT stroke volume 68.04     Ao VTI 44.63 cm    Aortic valve peak velocity 1.9 m/s    LVOT peak VTI 21.67 cm    LVOT peak narciso 0.88 m/s    LVOT  diameter 2.0 cm    E wave deceleration time 233 ms    E/A ratio 1.41     MV valve area p 1/2 method 3.24     AV LVOT peak gradient 3 mmHg    AV mean gradient 8 mmHg    RA 2D Volume 29.0 mL    TR Peak Narciso 3.2 m/s    AV area peak narciso 1.5 cm2    AV area by cont VTI 1.5 cm2    LVOT mn grad 2.0 mmHg    RVID d 2.4 cm    A4C EF 65 %    Aortic valve mean velocity 13.30 m/s    Tricuspid valve peak regurgitation velocity 3.16 m/s    Left ventricular stroke volume (2D) 44.00 mL    IVSd 1.10 cm    Tricuspid annular plane systolic excursion 1.30 cm    Ao root 2.50 cm    LVPWd 1.10 cm    LA size 2.9 cm    Asc Ao 2.9 cm    LA volume (BP) 34 mL    FS 37 28 - 44    LVIDS 2.40 cm    IVS 1.1 cm    LVIDd 3.80 cm    LA length (A2C) 6.10 cm    LEFT VENTRICLE SYSTOLIC VOLUME (MOD BIPLANE) 2D 19 mL    LV DIASTOLIC VOLUME (MOD BIPLANE) 2D 63 mL    LVOT Cardiac Index 2.88 l/min/m2    LVOT stroke volume index 46.30 ml/m2    LVOT Cardiac Output 4.23 l/min    Left Atrium Area-systolic Four Chamber 16.7 cm2    Left Atrium Area-systolic Apical Two Chamber 16.3 cm2    MV E' Tissue Velocity Septal 6 cm/s    LVSV, 2D 44 mL    LVOT area 3.14 cm2    DVI 0.46     AV valve area 1.52 cm2    BSA 1.47 m2    LV EF 70     Est. RA pres 10.0 mmHg    Right Ventricular Peak Systolic Pressure 50.00 mmHg    IVC 2.0 mm   CBC and differential    Collection Time: 01/07/25  5:32 AM   Result Value Ref Range    WBC 12.04 (H) 4.31 - 10.16 Thousand/uL    RBC 4.05 3.81 - 5.12 Million/uL    Hemoglobin 11.5 11.5 - 15.4 g/dL    Hematocrit 35.8 34.8 - 46.1 %    MCV 88 82 - 98 fL    MCH 28.4 26.8 - 34.3 pg    MCHC 32.1 31.4 - 37.4 g/dL    RDW 15.3 (H) 11.6 - 15.1 %    MPV 11.2 8.9 - 12.7 fL    Platelets 195 149 - 390 Thousands/uL    nRBC 0 /100 WBCs    Segmented % 83 (H) 43 - 75 %    Immature Grans % 0 0 - 2 %    Lymphocytes % 10 (L) 14 - 44 %    Monocytes % 6 4 - 12 %    Eosinophils Relative 0 0 - 6 %    Basophils Relative 1 0 - 1 %    Absolute Neutrophils 10.10 (H) 1.85 -  7.62 Thousands/µL    Absolute Immature Grans 0.04 0.00 - 0.20 Thousand/uL    Absolute Lymphocytes 1.15 0.60 - 4.47 Thousands/µL    Absolute Monocytes 0.66 0.17 - 1.22 Thousand/µL    Eosinophils Absolute 0.03 0.00 - 0.61 Thousand/µL    Basophils Absolute 0.06 0.00 - 0.10 Thousands/µL   Comprehensive metabolic panel    Collection Time: 01/07/25  5:32 AM   Result Value Ref Range    Sodium 139 135 - 147 mmol/L    Potassium 3.8 3.5 - 5.3 mmol/L    Chloride 103 96 - 108 mmol/L    CO2 27 21 - 32 mmol/L    ANION GAP 9 4 - 13 mmol/L    BUN 25 5 - 25 mg/dL    Creatinine 0.99 0.60 - 1.30 mg/dL    Glucose 115 65 - 140 mg/dL    Calcium 9.1 8.4 - 10.2 mg/dL    AST 26 13 - 39 U/L    ALT 32 7 - 52 U/L    Alkaline Phosphatase 52 34 - 104 U/L    Total Protein 6.1 (L) 6.4 - 8.4 g/dL    Albumin 3.5 3.5 - 5.0 g/dL    Total Bilirubin 0.65 0.20 - 1.00 mg/dL    eGFR 50 ml/min/1.73sq m   Lipase    Collection Time: 01/07/25  5:32 AM   Result Value Ref Range    Lipase 40 11 - 82 u/L   High Sensitivity Troponin I Random    Collection Time: 01/07/25  5:32 AM   Result Value Ref Range    HS TnI random 190 (H) 8 - 18 ng/L   Magnesium    Collection Time: 01/07/25  5:32 AM   Result Value Ref Range    Magnesium 2.1 1.9 - 2.7 mg/dL   Phosphorus    Collection Time: 01/07/25  5:32 AM   Result Value Ref Range    Phosphorus 2.9 2.3 - 4.1 mg/dL   ECG 12 lead    Collection Time: 01/07/25 10:10 AM   Result Value Ref Range    Ventricular Rate 79 BPM    Atrial Rate  BPM    NH Interval  ms    QRSD Interval 78 ms    QT Interval 376 ms    QTC Interval 431 ms    P Axis  degrees    QRS Axis -30 degrees    T Wave Axis 112 degrees   High Sensitivity Troponin I Random    Collection Time: 01/07/25 10:18 AM   Result Value Ref Range    HS TnI random 3,866 (H) 8 - 18 ng/L   CBC and differential    Collection Time: 01/07/25 11:13 PM   Result Value Ref Range    WBC 17.59 (H) 4.31 - 10.16 Thousand/uL    RBC 3.76 (L) 3.81 - 5.12 Million/uL    Hemoglobin 10.7 (L) 11.5 - 15.4  g/dL    Hematocrit 34.0 (L) 34.8 - 46.1 %    MCV 90 82 - 98 fL    MCH 28.5 26.8 - 34.3 pg    MCHC 31.5 31.4 - 37.4 g/dL    RDW 15.3 (H) 11.6 - 15.1 %    MPV 11.0 8.9 - 12.7 fL    Platelets 217 149 - 390 Thousands/uL   Basic metabolic panel    Collection Time: 01/07/25 11:13 PM   Result Value Ref Range    Sodium 136 135 - 147 mmol/L    Potassium 3.6 3.5 - 5.3 mmol/L    Chloride 100 96 - 108 mmol/L    CO2 28 21 - 32 mmol/L    ANION GAP 8 4 - 13 mmol/L    BUN 28 (H) 5 - 25 mg/dL    Creatinine 0.98 0.60 - 1.30 mg/dL    Glucose 165 (H) 65 - 140 mg/dL    Calcium 9.0 8.4 - 10.2 mg/dL    eGFR 50 ml/min/1.73sq m   Blood gas, venous    Collection Time: 01/07/25 11:13 PM   Result Value Ref Range    pH, Antonio 7.366 7.300 - 7.400    pCO2, Antonio 44.8 42.0 - 50.0 mm Hg    pO2, Antonio 58.9 (H) 35.0 - 45.0 mm Hg    HCO3, Antonio 25.1 24 - 30 mmol/L    Base Excess, Antonio -0.5 mmol/L    O2 Content, Antonio 14.5 ml/dL    O2 HGB, VENOUS 88.0 (H) 60.0 - 80.0 %   Manual Differential(PHLEBS Do Not Order)    Collection Time: 01/07/25 11:13 PM   Result Value Ref Range    Segmented % 94 (H) 43 - 75 %    Lymphocytes % 2 (L) 14 - 44 %    Monocytes % 2 (L) 4 - 12 %    Eosinophils % 0 0 - 6 %    Basophils % 0 0 - 1 %    Atypical Lymphocytes % 2 (H) <=0 %    Absolute Neutrophils 16.53 (H) 1.85 - 7.62 Thousand/uL    Absolute Lymphocytes 0.70 0.60 - 4.47 Thousand/uL    Absolute Monocytes 0.35 0.00 - 1.22 Thousand/uL    Absolute Eosinophils 0.00 0.00 - 0.40 Thousand/uL    Absolute Basophils 0.00 0.00 - 0.10 Thousand/uL    Total Counted      RBC Morphology Present     Platelet Estimate Adequate Adequate    Acanthocytes Present     Anisocytosis Present     Macrocytes Present     Ovalocytes Present     Poikilocytes Present    CBC and differential    Collection Time: 01/08/25  8:28 AM   Result Value Ref Range    WBC 16.27 (H) 4.31 - 10.16 Thousand/uL    RBC 3.36 (L) 3.81 - 5.12 Million/uL    Hemoglobin 9.6 (L) 11.5 - 15.4 g/dL    Hematocrit 29.8 (L) 34.8 - 46.1 %    MCV  89 82 - 98 fL    MCH 28.6 26.8 - 34.3 pg    MCHC 32.2 31.4 - 37.4 g/dL    RDW 15.2 (H) 11.6 - 15.1 %    MPV 10.3 8.9 - 12.7 fL    Platelets 188 149 - 390 Thousands/uL    nRBC 0 /100 WBCs    Segmented % 85 (H) 43 - 75 %    Immature Grans % 1 0 - 2 %    Lymphocytes % 8 (L) 14 - 44 %    Monocytes % 6 4 - 12 %    Eosinophils Relative 0 0 - 6 %    Basophils Relative 0 0 - 1 %    Absolute Neutrophils 13.87 (H) 1.85 - 7.62 Thousands/µL    Absolute Immature Grans 0.09 0.00 - 0.20 Thousand/uL    Absolute Lymphocytes 1.25 0.60 - 4.47 Thousands/µL    Absolute Monocytes 1.01 0.17 - 1.22 Thousand/µL    Eosinophils Absolute 0.01 0.00 - 0.61 Thousand/µL    Basophils Absolute 0.04 0.00 - 0.10 Thousands/µL   Basic metabolic panel    Collection Time: 01/08/25  8:28 AM   Result Value Ref Range    Sodium 138 135 - 147 mmol/L    Potassium 3.7 3.5 - 5.3 mmol/L    Chloride 103 96 - 108 mmol/L    CO2 30 21 - 32 mmol/L    ANION GAP 5 4 - 13 mmol/L    BUN 24 5 - 25 mg/dL    Creatinine 0.99 0.60 - 1.30 mg/dL    Glucose 105 65 - 140 mg/dL    Calcium 8.8 8.4 - 10.2 mg/dL    eGFR 50 ml/min/1.73sq m   High Sensitivity Troponin I Random    Collection Time: 01/08/25  8:28 AM   Result Value Ref Range    HS TnI random 8,384 (H) 8 - 18 ng/L     Imaging: I have personally reviewed pertinent reports.    Tele- nsr    Counseling / Coordination of Care  Total time spent today 30 minutes. Greater than 50% of total time was spent with the patient and / or family counseling and / or coordination of care.

## 2025-01-08 NOTE — [facility] ADMISSION
[facility] community Liaison called  family- introduced self, explained role, reviewed [facility] program, services offered, acute rehab criteria, review of referral by [facility] Medical Director, medicare benefits/days, the three [facility] locations and anticipated rehab length of stay.; all questions answered. family first choice is [facility] and second is [facility]. family made aware [facility] reviewer will communicate with the  who will keep patient updated on referral status.

## 2025-01-08 NOTE — ASSESSMENT & PLAN NOTE
Patient was noted to be experiencing chest pain/epigastric pain yesterday 1/7  EKG was nonischemic but troponins elevated to greater than 8000.  Cardiology was consulted and note appreciated  Not a candidate for ischemic evaluation  Recommend starting ASA 81 mg daily

## 2025-01-08 NOTE — TREATMENT PLAN
Assessment:  SDH / SAH  Status post fall  Extensive facial fractures  Elevated troponins --> cardiology now requesting patient start aspirin when cleared from an intracranial hemorrhage standpoint    Imaging:  CT head without contrast 1/7/2025: Evolving blood products in the left convexity subdural hematoma without evidence of new hemorrhage.  Subdural and subarachnoid hemorrhage also stable when compared to the most recent prior exam.    Plan:  Imaging reviewed, while stable hemorrhages are noted, there is still quite a bit of blood products present.  I would be wary to start any anticoagulation or antiplatelet therapy at this time as this may exacerbate hemorrhage and cause neurological decline.  Patient is currently a DNR level 3 and per prior notes it does not seem like invasive procedures are in line with her goals of care.  Per cardiology she is not a candidate for for ischemic evaluation due to age, frailty and hemorrhage which would preclude her from any intervention.  Consider repeat CAT scan in 1 weeks time, if stable, can consider starting aspirin at that time if still warranted --> trauma team updated on this plan  SBP < 160  Repeat CT head STAT if GCS declines more than 2 points in 1 hour  AED management per primary team  Rest of care per primary team  Neurosurgery to sign off at that time, reach out with questions or concerns

## 2025-01-08 NOTE — ASSESSMENT & PLAN NOTE
0-hour troponin 1/6 .There was a random troponin yesterday morning 190 followed by 3866.  Repeat troponin checked this morning 8384.  Remains without any chest pain  EKG has been nonischemic  Ddx includes nonischemic troponin elevation in s/o trauma and brain bleed vs demand ischemia due to PAF and syncope.  She is not a candidate for ischemic evaluation at this time due to advanced age, frailty, and presently with intracranial hemorrhage which would contraindicate any intervention  No new RWMA on echo 1/6/25  Start aspirin 81 mg daily when able from bleeding perspective

## 2025-01-09 LAB
ERYTHROCYTE [DISTWIDTH] IN BLOOD BY AUTOMATED COUNT: 15.6 % (ref 11.6–15.1)
HCT VFR BLD AUTO: 30.8 % (ref 34.8–46.1)
HGB BLD-MCNC: 9.7 G/DL (ref 11.5–15.4)
MCH RBC QN AUTO: 28.6 PG (ref 26.8–34.3)
MCHC RBC AUTO-ENTMCNC: 31.5 G/DL (ref 31.4–37.4)
MCV RBC AUTO: 91 FL (ref 82–98)
PLATELET # BLD AUTO: 187 THOUSANDS/UL (ref 149–390)
PMV BLD AUTO: 10.5 FL (ref 8.9–12.7)
RBC # BLD AUTO: 3.39 MILLION/UL (ref 3.81–5.12)
WBC # BLD AUTO: 13.75 THOUSAND/UL (ref 4.31–10.16)

## 2025-01-09 PROCEDURE — 99232 SBSQ HOSP IP/OBS MODERATE 35: CPT | Performed by: INTERNAL MEDICINE

## 2025-01-09 PROCEDURE — 99232 SBSQ HOSP IP/OBS MODERATE 35: CPT | Performed by: STUDENT IN AN ORGANIZED HEALTH CARE EDUCATION/TRAINING PROGRAM

## 2025-01-09 PROCEDURE — 92526 ORAL FUNCTION THERAPY: CPT

## 2025-01-09 PROCEDURE — 85027 COMPLETE CBC AUTOMATED: CPT | Performed by: SURGERY

## 2025-01-09 RX ORDER — METOPROLOL TARTRATE 25 MG/1
25 TABLET, FILM COATED ORAL EVERY 8 HOURS
Status: DISCONTINUED | OUTPATIENT
Start: 2025-01-09 | End: 2025-01-10

## 2025-01-09 RX ORDER — ENOXAPARIN SODIUM 100 MG/ML
30 INJECTION SUBCUTANEOUS EVERY 24 HOURS
Status: DISCONTINUED | OUTPATIENT
Start: 2025-01-09 | End: 2025-01-12 | Stop reason: HOSPADM

## 2025-01-09 RX ORDER — HEPARIN SODIUM 5000 [USP'U]/ML
5000 INJECTION, SOLUTION INTRAVENOUS; SUBCUTANEOUS EVERY 8 HOURS SCHEDULED
Status: CANCELLED | OUTPATIENT
Start: 2025-01-10

## 2025-01-09 RX ADMIN — PANTOPRAZOLE SODIUM 40 MG: 40 TABLET, DELAYED RELEASE ORAL at 18:15

## 2025-01-09 RX ADMIN — CHLORHEXIDINE GLUCONATE 0.12% ORAL RINSE 15 ML: 1.2 LIQUID ORAL at 11:09

## 2025-01-09 RX ADMIN — SODIUM CHLORIDE 3 G: 9 INJECTION, SOLUTION INTRAVENOUS at 22:23

## 2025-01-09 RX ADMIN — ACETAMINOPHEN 975 MG: 325 TABLET, FILM COATED ORAL at 21:57

## 2025-01-09 RX ADMIN — ACETAMINOPHEN 975 MG: 325 TABLET, FILM COATED ORAL at 16:04

## 2025-01-09 RX ADMIN — SENNOSIDES AND DOCUSATE SODIUM 1 TABLET: 50; 8.6 TABLET ORAL at 21:58

## 2025-01-09 RX ADMIN — Medication 2.5 MG: at 11:25

## 2025-01-09 RX ADMIN — SODIUM CHLORIDE 3 G: 9 INJECTION, SOLUTION INTRAVENOUS at 05:32

## 2025-01-09 RX ADMIN — SODIUM CHLORIDE 3 G: 9 INJECTION, SOLUTION INTRAVENOUS at 18:15

## 2025-01-09 RX ADMIN — ENOXAPARIN SODIUM 30 MG: 60 INJECTION SUBCUTANEOUS at 21:57

## 2025-01-09 RX ADMIN — CHLORHEXIDINE GLUCONATE 0.12% ORAL RINSE 15 ML: 1.2 LIQUID ORAL at 21:58

## 2025-01-09 RX ADMIN — LEVETIRACETAM 500 MG: 500 TABLET, FILM COATED ORAL at 21:58

## 2025-01-09 RX ADMIN — LEVETIRACETAM 500 MG: 500 TABLET, FILM COATED ORAL at 11:09

## 2025-01-09 RX ADMIN — Medication 2.5 MG: at 18:15

## 2025-01-09 RX ADMIN — ACETAMINOPHEN 975 MG: 325 TABLET, FILM COATED ORAL at 05:29

## 2025-01-09 RX ADMIN — PANTOPRAZOLE SODIUM 40 MG: 40 TABLET, DELAYED RELEASE ORAL at 11:09

## 2025-01-09 RX ADMIN — METOPROLOL TARTRATE 25 MG: 25 TABLET, FILM COATED ORAL at 18:14

## 2025-01-09 RX ADMIN — SODIUM CHLORIDE 3 G: 9 INJECTION, SOLUTION INTRAVENOUS at 11:25

## 2025-01-09 RX ADMIN — METOPROLOL TARTRATE 25 MG: 25 TABLET, FILM COATED ORAL at 11:09

## 2025-01-09 NOTE — SPEECH THERAPY NOTE
"Speech Language/Pathology    Speech/Language Pathology Progress Note    Patient Name: Sandra Purvis  Today's Date: 1/9/2025     Problem List  Principal Problem:    SDH (subdural hematoma) ([facility])  Active Problems:    Hypertension    Paroxysmal atrial fibrillation (HCC)    GERD (gastroesophageal reflux disease)    SAH (subarachnoid hemorrhage) ([facility])    Closed extensive facial fractures ([facility])    Fall    Chest pain    Orthostatic hypotension    Syncope       Past Medical History  Past Medical History:   Diagnosis Date    Arthritis     Cataract     LastAssessed:9/8/15    GERD (gastroesophageal reflux disease)     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    Hypertension     PAF (paroxysmal atrial fibrillation) ([facility])     Last Assessed:2/3/16    Wrist fracture     Resolved:9/8/15        Past Surgical History  Past Surgical History:   Procedure Laterality Date    CATARACT EXTRACTION      Last Assessed:9/8/15    FL RETROGRADE PYELOGRAM  10/17/2021    LYMPHADENECTOMY      Last Assessed:9/8/15    NM CYSTO/URETERO W/LITHOTRIPSY &INDWELL STENT INSRT Right 11/24/2021    Procedure: CYSTOSCOPY URETEROSCOPY WITH LITHOTRIPSY HOLMIUM LASER, RETROGRADE PYELOGRAM AND INSERTION STENT URETERAL;  Surgeon: David Sr MD;  Location: [facility];  Service: Urology    NM CYSTOURETHROSCOPY W/URETERAL CATHETERIZATION Right 10/17/2021    Procedure: CYSTOSCOPY RETROGRADE PYELOGRAM WITH INSERTION STENT URETERAL;  Surgeon: Benjie Tinoco MD;  Location: [facility];  Service: Urology    TONSILLECTOMY      Last Assessed:9/8/15    WRIST SURGERY      Last Assessed:9/8/15         Subjective:  \"The food is good\" Patient is awake and alert.     Objective:  The patient is seen for dysphagia therapy at lunch meal. She is assessed with regular solids, including crab cakes and carrots. Bite size and rate is adequate. Mastication is timely and efficient. No oral residue observed. The patient takes sips of thin liquids via straw. No overt s/s aspiration " observed. Patient denies complaints or concerns with tolerance of regular diet.     Assessment:  The patient is tolerating regular solids and thin liquids well.     Plan/Recommendations:  Continue regular diet. No further ST warranted. Please re-consult with concerns.

## 2025-01-09 NOTE — ASSESSMENT & PLAN NOTE
"Described as \"fainting\"--no history or associated symptoms  Troponin was initially negative and then marques to over 8000.  EKG is nonischemic  Orthostatics positive this morning 1/8  Echocardiogram showed LVEF 70%.  Systolic function vigorous.  Grade 2 diastolic dysfunction.  RV mildly reduced.  Left atrial dilatation mild.  Mild AI.    Telemetry--showed A-fib with RVR  Cardiology was consulted and note appreciated  Recommended continued hydration orally and with IVF  TEDS  Continue to monitor orthostatics  May consider midodrine if she continues to be symptomatic.  Increasing metoprolol to 3 a day dosing  "

## 2025-01-09 NOTE — QUICK NOTE
Attempted to contact daughter at number provided.  No answer.  Also attempted to contact spouse at number provided; no answer.

## 2025-01-09 NOTE — PROGRESS NOTES
Progress Note - Cardiology   Name: Sandra Purvis 89 y.o. female I MRN: [id number]  Unit/Bed#: [facility] 625-01 I Date of Admission: 1/4/2025   Date of Service: 1/9/2025 I Hospital Day: 5     Assessment & Plan  Orthostatic hypotension  Brendon BP  70/40 during physical therapy at time of cardiology consult. Patient was symptomatic of lightheadedness and presyncope.  Had gentle fluids repeat orthostatics positive but not as profound.  Patient asymptomatic.  Given additional IV fluids.  We added BALJIT hose stockings.  Recommended at least 60 ounces of fluid at home on a day.  Repeat orthostatics yesterday lying 164/74, sitting 161/81 and standing 159/79.  Interestingly now becoming more hypertensive and tachycardic.  Patient has recently been get lightheaded upon position changes at home.  She believes this is what caused her syncope.    Chest pain  0-hour troponin 1/6 .There was a random troponin yesterday morning 190 followed by 3866.  Repeat troponin checked this morning 8384.  On cardiology evaluation she had complained of abdominal pain.  Patient remains free of chest pain.   EKG has been nonischemic  Ddx includes nonischemic troponin elevation in s/o trauma and brain bleed vs demand ischemia due to PAF and syncope.  She is not a candidate for ischemic evaluation at this time due to advanced age, frailty, and presently with intracranial hemorrhage which would contraindicate any intervention  No new RWMA on echo 1/6/25  Start aspirin 81 mg daily when able from bleeding perspective.  Neurosurgery plans to repeat CT in 1 weeks time  Syncope  Patient presented with syncope preceded by lightheadedness  Found to be significantly orthostatic  Remains on telemetry no evidence of bradycardia or significant pauses  Suspect due to orthostatic hypotension.  Recently has been getting lightheaded upon standing.  She does not drink fluids consistently at home.  HCTZ on hold, will not continue    Paroxysmal atrial fibrillation  "([facility])  Current TTE LVEF 70%.  Systolic function vigorous.  Grade 2 diastolic dysfunction.  RV mildly reduced.  Left atrial dilatation mild.  Mild AI.  Mild TR.  Trivial pericardial effusion.  On Lopressor 25 mg every 12 hours.   Patient presented with sinus bradycardia.  Cardiology evaluation she was in controlled atrial fibrillation.  No bradycardia.  Not on AC PTA, anticoagulation contraindicated at this time due to SDH and SAH.  [id number] equals 4 after repeat head imaging she would need re evaluation for candidacy.   Does not appear she currently follows cardiology.  Patient with PAF per PCP notes.  Takes Toprol 50 mg daily at home.  Today she is a bit more tachycardic.  She is also more hypertensive.  Will be increasing her Lopressor 25 mg every 8 hours  SDH (subdural hematoma) ([facility])  Patient with SDH/SAH.  No surgical intervention planned at this time.  Hypertension  Takes Toprol 50 mg daily at home.  Currently on Lopressor 25 mg every 12 hours.  HCTZ on hold.  Will not resume due to orthostasis.  Goal SBP per neurosurgery due to head bleed SBP less than 160.  Blood pressures are somewhat elevated today interestingly.  She is a bit more tachycardic, will increase her Lopressor to 50 mg every 8 hours for now  SAH (subarachnoid hemorrhage) ([facility])        Subjective/Objective   Chief Complaint/subjective  Patient is not been out of bed today.  Not complain of chest pain shortness of breath or lightheadedness.  No palpitations.  Feels \"emotional\"        Vitals: /87   Pulse (!) 109   Temp 98.9 °F (37.2 °C)   Resp 17   Ht 5' 2\" (1.575 m)   Wt 54.1 kg (119 lb 4.3 oz)   SpO2 97%   BMI 21.81 kg/m²     Vitals:    01/08/25 0541 01/09/25 0542   Weight: 54 kg (119 lb 0.8 oz) 54.1 kg (119 lb 4.3 oz)     Orthostatic Blood Pressures      Flowsheet Row Most Recent Value   Blood Pressure 168/87 filed at 01/09/2025 0802   Patient Position - Orthostatic VS Standing - Orthostatic VS filed at 01/08/2025 1738      " "        Intake/Output Summary (Last 24 hours) at 1/9/2025 1032  Last data filed at 1/9/2025 0802  Gross per 24 hour   Intake 420 ml   Output 650 ml   Net -230 ml       Invasive Devices       Peripheral Intravenous Line  Duration             Peripheral IV 01/08/25 Dorsal (posterior);Left Forearm <1 day              Drain  Duration             Ureteral Drain/Stent Right ureter 6 Fr. 1179 days    External Urinary Catheter 4 days                      Current Facility-Administered Medications:     acetaminophen (TYLENOL) tablet 975 mg, Q8H ELIZABETH    ampicillin-sulbactam (UNASYN) 3 g in sodium chloride 0.9 % 100 mL IVPB, Q6H, Last Rate: 3 g (01/09/25 0532)    bisacodyl (DULCOLAX) rectal suppository 10 mg, Daily PRN    calcium carbonate (TUMS) chewable tablet 500 mg, Daily PRN    chlorhexidine (PERIDEX) 0.12 % oral rinse 15 mL, Q12H ELIZABETH    enoxaparin (LOVENOX) subcutaneous injection 30 mg, Q24H    labetalol (NORMODYNE) injection 10 mg, Q8H PRN    levETIRAcetam (KEPPRA) tablet 500 mg, Q12H ELIZABETH    metoprolol tartrate (LOPRESSOR) tablet 25 mg, Q12H ELIZABETH    naloxone (NARCAN) 0.04 mg/mL syringe 0.04 mg, Q1MIN PRN    ondansetron (ZOFRAN) injection 4 mg, Q4H PRN    oxyCODONE (ROXICODONE) split tablet 2.5 mg, Q4H PRN **OR** oxyCODONE (ROXICODONE) IR tablet 5 mg, Q4H PRN    pantoprazole (PROTONIX) EC tablet 40 mg, BID    polyethylene glycol (MIRALAX) packet 17 g, Daily    senna-docusate sodium (SENOKOT S) 8.6-50 mg per tablet 1 tablet, HS      Physical Exam: /87   Pulse (!) 109   Temp 98.9 °F (37.2 °C)   Resp 17   Ht 5' 2\" (1.575 m)   Wt 54.1 kg (119 lb 4.3 oz)   SpO2 97%   BMI 21.81 kg/m²     General Appearance:    Alert, cooperative, no distress, appears stated age.   Head:    Normocephalic, no scleral icterus.  Facial bruising   Eyes:    PERRL   Nose:   Nares normal, septum midline, no drainage    Throat:   Lips, mucosa, and tongue normal   Neck:   Supple, symmetrical, trachea midline,              Lungs:     Clear to " auscultation bilaterally, respirations unlabored        Heart:    Irregular rate and rhythm, S1 and S2 normal, no murmur, rub   or gallop       Extremities:   Extremities normal, atraumatic, no cyanosis or edema       Skin:   Skin warm   Neurologic:   Alert and oriented to person place and time, no focal deficits                 Lab Results:   Recent Results (from the past 72 hours)   Echo complete w/ contrast if indicated    Collection Time: 01/06/25  4:10 PM   Result Value Ref Range    Triscuspid Valve Regurgitation Peak Gradient 40.0 mmHg    RAA A4C 15 cm2    LA Volume Index (BP) 23.1 mL/m2    MV Peak A Narciso 0.73 m/s    MV stenosis pressure 1/2 time 68 ms    MV Peak E Narciso 103 cm/s    AV peak gradient 14 mmHg    LVOT stroke volume 68.04     Ao VTI 44.63 cm    Aortic valve peak velocity 1.9 m/s    LVOT peak VTI 21.67 cm    LVOT peak narciso 0.88 m/s    LVOT diameter 2.0 cm    E wave deceleration time 233 ms    E/A ratio 1.41     MV valve area p 1/2 method 3.24     AV LVOT peak gradient 3 mmHg    AV mean gradient 8 mmHg    RA 2D Volume 29.0 mL    TR Peak Narciso 3.2 m/s    AV area peak narciso 1.5 cm2    AV area by cont VTI 1.5 cm2    LVOT mn grad 2.0 mmHg    RVID d 2.4 cm    A4C EF 65 %    Aortic valve mean velocity 13.30 m/s    Tricuspid valve peak regurgitation velocity 3.16 m/s    Left ventricular stroke volume (2D) 44.00 mL    IVSd 1.10 cm    Tricuspid annular plane systolic excursion 1.30 cm    Ao root 2.50 cm    LVPWd 1.10 cm    LA size 2.9 cm    Asc Ao 2.9 cm    LA volume (BP) 34 mL    FS 37 28 - 44    LVIDS 2.40 cm    IVS 1.1 cm    LVIDd 3.80 cm    LA length (A2C) 6.10 cm    LEFT VENTRICLE SYSTOLIC VOLUME (MOD BIPLANE) 2D 19 mL    LV DIASTOLIC VOLUME (MOD BIPLANE) 2D 63 mL    LVOT Cardiac Index 2.88 l/min/m2    LVOT stroke volume index 46.30 ml/m2    LVOT Cardiac Output 4.23 l/min    Left Atrium Area-systolic Four Chamber 16.7 cm2    Left Atrium Area-systolic Apical Two Chamber 16.3 cm2    MV E' Tissue Velocity Septal 6  cm/s    LVSV, 2D 44 mL    LVOT area 3.14 cm2    DVI 0.46     AV valve area 1.52 cm2    BSA 1.47 m2    LV EF 70     Est. RA pres 10.0 mmHg    Right Ventricular Peak Systolic Pressure 50.00 mmHg    IVC 2.0 mm   CBC and differential    Collection Time: 01/07/25  5:32 AM   Result Value Ref Range    WBC 12.04 (H) 4.31 - 10.16 Thousand/uL    RBC 4.05 3.81 - 5.12 Million/uL    Hemoglobin 11.5 11.5 - 15.4 g/dL    Hematocrit 35.8 34.8 - 46.1 %    MCV 88 82 - 98 fL    MCH 28.4 26.8 - 34.3 pg    MCHC 32.1 31.4 - 37.4 g/dL    RDW 15.3 (H) 11.6 - 15.1 %    MPV 11.2 8.9 - 12.7 fL    Platelets 195 149 - 390 Thousands/uL    nRBC 0 /100 WBCs    Segmented % 83 (H) 43 - 75 %    Immature Grans % 0 0 - 2 %    Lymphocytes % 10 (L) 14 - 44 %    Monocytes % 6 4 - 12 %    Eosinophils Relative 0 0 - 6 %    Basophils Relative 1 0 - 1 %    Absolute Neutrophils 10.10 (H) 1.85 - 7.62 Thousands/µL    Absolute Immature Grans 0.04 0.00 - 0.20 Thousand/uL    Absolute Lymphocytes 1.15 0.60 - 4.47 Thousands/µL    Absolute Monocytes 0.66 0.17 - 1.22 Thousand/µL    Eosinophils Absolute 0.03 0.00 - 0.61 Thousand/µL    Basophils Absolute 0.06 0.00 - 0.10 Thousands/µL   Comprehensive metabolic panel    Collection Time: 01/07/25  5:32 AM   Result Value Ref Range    Sodium 139 135 - 147 mmol/L    Potassium 3.8 3.5 - 5.3 mmol/L    Chloride 103 96 - 108 mmol/L    CO2 27 21 - 32 mmol/L    ANION GAP 9 4 - 13 mmol/L    BUN 25 5 - 25 mg/dL    Creatinine 0.99 0.60 - 1.30 mg/dL    Glucose 115 65 - 140 mg/dL    Calcium 9.1 8.4 - 10.2 mg/dL    AST 26 13 - 39 U/L    ALT 32 7 - 52 U/L    Alkaline Phosphatase 52 34 - 104 U/L    Total Protein 6.1 (L) 6.4 - 8.4 g/dL    Albumin 3.5 3.5 - 5.0 g/dL    Total Bilirubin 0.65 0.20 - 1.00 mg/dL    eGFR 50 ml/min/1.73sq m   Lipase    Collection Time: 01/07/25  5:32 AM   Result Value Ref Range    Lipase 40 11 - 82 u/L   High Sensitivity Troponin I Random    Collection Time: 01/07/25  5:32 AM   Result Value Ref Range    HS TnI random  190 (H) 8 - 18 ng/L   Magnesium    Collection Time: 01/07/25  5:32 AM   Result Value Ref Range    Magnesium 2.1 1.9 - 2.7 mg/dL   Phosphorus    Collection Time: 01/07/25  5:32 AM   Result Value Ref Range    Phosphorus 2.9 2.3 - 4.1 mg/dL   ECG 12 lead    Collection Time: 01/07/25 10:10 AM   Result Value Ref Range    Ventricular Rate 79 BPM    Atrial Rate  BPM    SD Interval  ms    QRSD Interval 78 ms    QT Interval 376 ms    QTC Interval 431 ms    P Axis  degrees    QRS Axis -30 degrees    T Wave Axis 112 degrees   High Sensitivity Troponin I Random    Collection Time: 01/07/25 10:18 AM   Result Value Ref Range    HS TnI random 3,866 (H) 8 - 18 ng/L   ECG 12 lead    Collection Time: 01/07/25 10:47 PM   Result Value Ref Range    Ventricular Rate 107 BPM    Atrial Rate  BPM    SD Interval  ms    QRSD Interval 78 ms    QT Interval 360 ms    QTC Interval 481 ms    P Axis  degrees    QRS Axis -39 degrees    T Wave Axis 114 degrees   CBC and differential    Collection Time: 01/07/25 11:13 PM   Result Value Ref Range    WBC 17.59 (H) 4.31 - 10.16 Thousand/uL    RBC 3.76 (L) 3.81 - 5.12 Million/uL    Hemoglobin 10.7 (L) 11.5 - 15.4 g/dL    Hematocrit 34.0 (L) 34.8 - 46.1 %    MCV 90 82 - 98 fL    MCH 28.5 26.8 - 34.3 pg    MCHC 31.5 31.4 - 37.4 g/dL    RDW 15.3 (H) 11.6 - 15.1 %    MPV 11.0 8.9 - 12.7 fL    Platelets 217 149 - 390 Thousands/uL   Basic metabolic panel    Collection Time: 01/07/25 11:13 PM   Result Value Ref Range    Sodium 136 135 - 147 mmol/L    Potassium 3.6 3.5 - 5.3 mmol/L    Chloride 100 96 - 108 mmol/L    CO2 28 21 - 32 mmol/L    ANION GAP 8 4 - 13 mmol/L    BUN 28 (H) 5 - 25 mg/dL    Creatinine 0.98 0.60 - 1.30 mg/dL    Glucose 165 (H) 65 - 140 mg/dL    Calcium 9.0 8.4 - 10.2 mg/dL    eGFR 50 ml/min/1.73sq m   Blood gas, venous    Collection Time: 01/07/25 11:13 PM   Result Value Ref Range    pH, Antonio 7.366 7.300 - 7.400    pCO2, Antonio 44.8 42.0 - 50.0 mm Hg    pO2, Antonio 58.9 (H) 35.0 - 45.0 mm Hg     HCO3, Antoino 25.1 24 - 30 mmol/L    Base Excess, Antonio -0.5 mmol/L    O2 Content, Antonio 14.5 ml/dL    O2 HGB, VENOUS 88.0 (H) 60.0 - 80.0 %   Manual Differential(PHLEBS Do Not Order)    Collection Time: 01/07/25 11:13 PM   Result Value Ref Range    Segmented % 94 (H) 43 - 75 %    Lymphocytes % 2 (L) 14 - 44 %    Monocytes % 2 (L) 4 - 12 %    Eosinophils % 0 0 - 6 %    Basophils % 0 0 - 1 %    Atypical Lymphocytes % 2 (H) <=0 %    Absolute Neutrophils 16.53 (H) 1.85 - 7.62 Thousand/uL    Absolute Lymphocytes 0.70 0.60 - 4.47 Thousand/uL    Absolute Monocytes 0.35 0.00 - 1.22 Thousand/uL    Absolute Eosinophils 0.00 0.00 - 0.40 Thousand/uL    Absolute Basophils 0.00 0.00 - 0.10 Thousand/uL    Total Counted      RBC Morphology Present     Platelet Estimate Adequate Adequate    Acanthocytes Present     Anisocytosis Present     Macrocytes Present     Ovalocytes Present     Poikilocytes Present    CBC and differential    Collection Time: 01/08/25  8:28 AM   Result Value Ref Range    WBC 16.27 (H) 4.31 - 10.16 Thousand/uL    RBC 3.36 (L) 3.81 - 5.12 Million/uL    Hemoglobin 9.6 (L) 11.5 - 15.4 g/dL    Hematocrit 29.8 (L) 34.8 - 46.1 %    MCV 89 82 - 98 fL    MCH 28.6 26.8 - 34.3 pg    MCHC 32.2 31.4 - 37.4 g/dL    RDW 15.2 (H) 11.6 - 15.1 %    MPV 10.3 8.9 - 12.7 fL    Platelets 188 149 - 390 Thousands/uL    nRBC 0 /100 WBCs    Segmented % 85 (H) 43 - 75 %    Immature Grans % 1 0 - 2 %    Lymphocytes % 8 (L) 14 - 44 %    Monocytes % 6 4 - 12 %    Eosinophils Relative 0 0 - 6 %    Basophils Relative 0 0 - 1 %    Absolute Neutrophils 13.87 (H) 1.85 - 7.62 Thousands/µL    Absolute Immature Grans 0.09 0.00 - 0.20 Thousand/uL    Absolute Lymphocytes 1.25 0.60 - 4.47 Thousands/µL    Absolute Monocytes 1.01 0.17 - 1.22 Thousand/µL    Eosinophils Absolute 0.01 0.00 - 0.61 Thousand/µL    Basophils Absolute 0.04 0.00 - 0.10 Thousands/µL   Basic metabolic panel    Collection Time: 01/08/25  8:28 AM   Result Value Ref Range    Sodium 138 135  - 147 mmol/L    Potassium 3.7 3.5 - 5.3 mmol/L    Chloride 103 96 - 108 mmol/L    CO2 30 21 - 32 mmol/L    ANION GAP 5 4 - 13 mmol/L    BUN 24 5 - 25 mg/dL    Creatinine 0.99 0.60 - 1.30 mg/dL    Glucose 105 65 - 140 mg/dL    Calcium 8.8 8.4 - 10.2 mg/dL    eGFR 50 ml/min/1.73sq m   High Sensitivity Troponin I Random    Collection Time: 01/08/25  8:28 AM   Result Value Ref Range    HS TnI random 8,384 (H) 8 - 18 ng/L     Imaging: I have personally reviewed pertinent reports.    Tele- afib    Counseling / Coordination of Care  Total time spent today 25 minutes. Greater than 50% of total time was spent with the patient and / or family counseling and / or coordination of care.

## 2025-01-09 NOTE — ASSESSMENT & PLAN NOTE
Brendon BP  70/40 during physical therapy at time of cardiology consult. Patient was symptomatic of lightheadedness and presyncope.  Had gentle fluids repeat orthostatics positive but not as profound.  Patient asymptomatic.  Given additional IV fluids.  We added BALJIT hose stockings.  Recommended at least 60 ounces of fluid at home on a day.  Repeat orthostatics yesterday lying 164/74, sitting 161/81 and standing 159/79.  Interestingly now becoming more hypertensive and tachycardic.  Patient has recently been get lightheaded upon position changes at home.  She believes this is what caused her syncope.

## 2025-01-09 NOTE — ASSESSMENT & PLAN NOTE
0-hour troponin 1/6 .There was a random troponin yesterday morning 190 followed by 3866.  Repeat troponin checked this morning 8384.  On cardiology evaluation she had complained of abdominal pain.  Patient remains free of chest pain.   EKG has been nonischemic  Ddx includes nonischemic troponin elevation in s/o trauma and brain bleed vs demand ischemia due to PAF and syncope.  She is not a candidate for ischemic evaluation at this time due to advanced age, frailty, and presently with intracranial hemorrhage which would contraindicate any intervention  No new RWMA on echo 1/6/25  Start aspirin 81 mg daily when able from bleeding perspective.  Neurosurgery plans to repeat CT in 1 weeks time

## 2025-01-09 NOTE — ASSESSMENT & PLAN NOTE
Takes Toprol 50 mg daily at home.  Currently on Lopressor 25 mg every 12 hours.  HCTZ on hold.  Will not resume due to orthostasis.  Goal SBP per neurosurgery due to head bleed SBP less than 160.  Blood pressures are somewhat elevated today interestingly.  She is a bit more tachycardic, will increase her Lopressor to 50 mg every 8 hours for now

## 2025-01-09 NOTE — CASE MANAGEMENT
Case Management Discharge Planning Note    Patient name Sandra Purvis  Location [facility] 625/[facility] 625-01 MRN [id number]  : 12/3/1934 Date 2025       Current Admission Date: 2025  Current Admission Diagnosis:SDH (subdural hematoma) ([facility])   Patient Active Problem List    Diagnosis Date Noted Date Diagnosed    Syncope 2025     Chest pain 2025     Orthostatic hypotension 2025     Fall 2025     SDH (subdural hematoma) ([facility]) 2025     SAH (subarachnoid hemorrhage) ([facility]) 2025     Closed extensive facial fractures ([facility]) 2025     Primary insomnia 2024     Basal cell carcinoma (BCC) of scalp 2023     Malignant melanoma of arm, left ([facility]) 2023     Scalp lesion 2023     Arm skin lesion, left 2023     Irritant contact dermatitis due to other agents 2022     COVID-19 2022     GERD (gastroesophageal reflux disease)      Stage 3 chronic kidney disease, unspecified whether stage 3a or 3b CKD ([facility]) 2021     Paroxysmal atrial fibrillation ([facility])      Preop examination 2021     Esophagus disorder 10/19/2021     Ureteral stone 10/15/2021     Bacteremia 10/15/2021     Mild protein-calorie malnutrition ([facility]) 2021     Hypertension 2015       LOS (days): 5  Geometric Mean LOS (GMLOS) (days): 1.9  Days to GMLOS:-2.9     OBJECTIVE:  Risk of Unplanned Readmission Score: 13.71         Current admission status: Inpatient   Preferred Pharmacy:   [facility]/pharmacy #[id number] - [place] - [address] [address] [facility] [address]  Phone: [phone] Fax: [phone]    Primary Care Provider: Berhane Page DO    Primary Insurance: MEDICARE  Secondary Insurance: BLUE CROSS    DISCHARGE DETAILS:    Pt continues to await a bed at [facility]

## 2025-01-09 NOTE — PROGRESS NOTES
Pharmacy-Driven Renal Dosing Protocol    Per [employer] approved Pharmacy-Driven Renal Dosing Protocol, enoxaparin (Lovenox) 30 mg SQ q12h has been reduced to q24h for a CrCl < 30 mL/min.    Barbara Roberson, PharmD  Pharmacist

## 2025-01-09 NOTE — ASSESSMENT & PLAN NOTE
Patient presented with syncope preceded by lightheadedness  Found to be significantly orthostatic  Remains on telemetry no evidence of bradycardia or significant pauses  Suspect due to orthostatic hypotension.  Recently has been getting lightheaded upon standing.  She does not drink fluids consistently at home.  HCTZ on hold, will not continue

## 2025-01-09 NOTE — ASSESSMENT & PLAN NOTE
Current TTE LVEF 70%.  Systolic function vigorous.  Grade 2 diastolic dysfunction.  RV mildly reduced.  Left atrial dilatation mild.  Mild AI.  Mild TR.  Trivial pericardial effusion.  On Lopressor 25 mg every 12 hours.   Patient presented with sinus bradycardia.  Cardiology evaluation she was in controlled atrial fibrillation.  No bradycardia.  Not on AC PTA, anticoagulation contraindicated at this time due to SDH and SAH.  [id number] equals 4 after repeat head imaging she would need re evaluation for candidacy.   Does not appear she currently follows cardiology.  Patient with PAF per PCP notes.  Takes Toprol 50 mg daily at home.  Today she is a bit more tachycardic.  She is also more hypertensive.  Will be increasing her Lopressor 25 mg every 8 hours

## 2025-01-09 NOTE — PROGRESS NOTES
"Progress Note - Trauma   Name: Sandra Purvis 89 y.o. female I MRN: [id number]  Unit/Bed#: [facility] 625-01 I Date of Admission: 1/4/2025   Date of Service: 1/9/2025 I Hospital Day: 5    Assessment & Plan  Fall  Described as \"fainting\"--no history or associated symptoms  Troponin was initially negative and then marques to over 8000.  EKG is nonischemic  Orthostatics positive this morning 1/8  Echocardiogram showed LVEF 70%.  Systolic function vigorous.  Grade 2 diastolic dysfunction.  RV mildly reduced.  Left atrial dilatation mild.  Mild AI.    Telemetry--showed A-fib with RVR  Cardiology was consulted and note appreciated  Recommended continued hydration orally and with IVF  TEDS  Continue to monitor orthostatics  May consider midodrine if she continues to be symptomatic.  Increasing metoprolol to 3 a day dosing  SDH (subdural hematoma) (HCC)  Secondary to fall  Initial CT imaging on 1/4 showed: Acute subdural hemorrhage along the left cerebral convexity with maximal thickness of 1.9 cm. No midline shift. Acute subarachnoid hemorrhage in the left frontoparietal sulci, left sylvian fissure and suprasellar cistern  Repeat imaging showed stability on 1/5  Neurosurgery consulted and note appreciated  No reversal needed due to patient not taking any AP/AC  Continue to closely monitor neurological status every 4-currently GCS 15  Maintain normotension, SBP less than 160  Continue on 7 day course of Keppra for seizure prophylaxis  Hold all anticoagulant or antiplatelet medications until patient follows up with neurosurgery in 2 weeks with repeat CT head.  SAH (subarachnoid hemorrhage) (HCC)  See above  Closed extensive facial fractures (HCC)  Secondary to fall  1/4 Initial CT imaging of face showed Comminuted and mildly displaced fractures of the lateral and anterior walls of right maxilla, lateral and inferior walls of right orbit. Right inferior orbital wall fracture appears to involve the inferior orbital foramen with " associated small extraconal soft tissue edema and focus of air. No herniation of extraocular muscles. Nondisplaced fractures of zygomatic arch and the temporal bone at the TMJ. Diffuse opacification of right maxillary sinus and right nasal cavity with blood products and air.  OMFS consulted and note appreciated  Surgical intervention discussed with patient by OMFS team, but refused by patient  Continue with 7-day course of Unasyn/Augmentin  Sinus precautions  Follow-up with OMFS as an outpatient for continued monitoring and management of fractures.  Paroxysmal atrial fibrillation (HCC)  Increase metoprolol to 3 times daily dosing  Currently not on AC  Follow-up with cardiology as an outpatient  Hypertension  Continue home metoprolol  GERD (gastroesophageal reflux disease)  Protonix initiated--symptoms improved  Chest pain  Patient was noted to be experiencing chest pain/epigastric pain yesterday 1/7  EKG was nonischemic but troponins elevated to greater than 8000.  Cardiology was consulted and note appreciated  Not a candidate for ischemic evaluation  Recommend starting ASA 81 mg daily; will hold for now in the setting of TBI  Syncope  See Fall  Orthostatic hypotension  See Fall    DVT prophylaxis: SCDs and Lovenox  PT and OT: eval and treat    Disposition: DC planning.  Continue current level of care.  No further workup at this time.  Cardiology continues to follow.  Continue Unasyn at this time.  Complete course of Keppra.      24 Hour Events : Patient noted to be tachycardic overnight; cardiology saw this morning and increase metoprolol to 3 times a day dosing  Subjective : Patient offering no other complaints at this time.  Currently resting in bed.  States that she is otherwise feeling well.    Objective :  Temp:  [98 °F (36.7 °C)-99.3 °F (37.4 °C)] 99.3 °F (37.4 °C)  HR:  [] 100  BP: (113-197)/(51-98) 168/71  Resp:  [17-20] 20  SpO2:  [94 %-99 %] 98 %  O2 Device: None (Room air)    I/O         01/07  0701  01/08 0700 01/08 0701 01/09 0700 01/09 0701  01/10 0700    P.O. 480 480     Total Intake(mL/kg) 480 (8.9) 480 (8.9)     Urine (mL/kg/hr) 400 (0.3) 650 (0.5)     Stool  0     Total Output 400 650     Net +80 -170            Unmeasured Urine Occurrence  2 x     Unmeasured Stool Occurrence  3 x           Lines/Drains/Airways       Active Status       Name Placement date Placement time Site Days    External Urinary Catheter 01/05/25  0100  -- 4                  Physical Exam  Vitals reviewed.   Constitutional:       Appearance: Normal appearance.   HENT:      Head: Normocephalic.      Nose: Nose normal.      Mouth/Throat:      Pharynx: Oropharynx is clear.   Eyes:      Conjunctiva/sclera: Conjunctivae normal.   Cardiovascular:      Rate and Rhythm: Normal rate and regular rhythm.      Pulses: Normal pulses.      Heart sounds: Normal heart sounds.   Pulmonary:      Effort: Pulmonary effort is normal. No respiratory distress.      Breath sounds: Normal breath sounds.   Chest:      Chest wall: No tenderness.   Abdominal:      General: There is no distension.      Palpations: Abdomen is soft.      Tenderness: There is no abdominal tenderness. There is no guarding.   Musculoskeletal:         General: Normal range of motion.      Cervical back: Normal range of motion. No tenderness.   Skin:     General: Skin is warm and dry.   Neurological:      General: No focal deficit present.      Mental Status: She is alert. Mental status is at baseline.               Lab Results: I have reviewed the following results:  Recent Labs     01/07/25  0532 01/07/25  2313 01/08/25  0828   WBC 12.04*   < > 16.27*   HGB 11.5   < > 9.6*   HCT 35.8   < > 29.8*      < > 188   SODIUM 139   < > 138   K 3.8   < > 3.7      < > 103   CO2 27   < > 30   BUN 25   < > 24   CREATININE 0.99   < > 0.99   GLUC 115   < > 105   MG 2.1  --   --    PHOS 2.9  --   --    AST 26  --   --    ALT 32  --   --    ALB 3.5  --   --    TBILI 0.65  --   --     ALKPHOS 52  --   --     < > = values in this interval not displayed.       Imaging Results Review: No pertinent imaging studies reviewed.  Other Study Results Review: No additional pertinent studies reviewed.

## 2025-01-09 NOTE — ASSESSMENT & PLAN NOTE
Increase metoprolol to 3 times daily dosing  Currently not on AC  Follow-up with cardiology as an outpatient

## 2025-01-09 NOTE — PLAN OF CARE
Problem: PAIN - ADULT  Goal: Verbalizes/displays adequate comfort level or baseline comfort level  Description: Interventions:  - Encourage patient to monitor pain and request assistance  - Assess pain using appropriate pain scale  - Administer analgesics based on type and severity of pain and evaluate response  - Implement non-pharmacological measures as appropriate and evaluate response  - Consider cultural and social influences on pain and pain management  - Notify physician/advanced practitioner if interventions unsuccessful or patient reports new pain  Outcome: Progressing     Problem: INFECTION - ADULT  Goal: Absence or prevention of progression during hospitalization  Description: INTERVENTIONS:  - Assess and monitor for signs and symptoms of infection  - Monitor lab/diagnostic results  - Monitor all insertion sites, i.e. indwelling lines, tubes, and drains  - Monitor endotracheal if appropriate and nasal secretions for changes in amount and color  - [place] appropriate cooling/warming therapies per order  - Administer medications as ordered  - Instruct and encourage patient and family to use good hand hygiene technique  - Identify and instruct in appropriate isolation precautions for identified infection/condition  Outcome: Progressing  Goal: Absence of fever/infection during neutropenic period  Description: INTERVENTIONS:  - Monitor WBC    Outcome: Progressing     Problem: SAFETY ADULT  Goal: Patient will remain free of falls  Description: INTERVENTIONS:  - Educate patient/family on patient safety including physical limitations  - Instruct patient to call for assistance with activity   - Consult OT/PT to assist with strengthening/mobility   - Keep Call bell within reach  - Keep bed low and locked with side rails adjusted as appropriate  - Keep care items and personal belongings within reach  - Initiate and maintain comfort rounds  - Make Fall Risk Sign visible to staff  -- Apply yellow socks and  bracelet for high fall risk patients  - Consider moving patient to room near nurses station  Outcome: Progressing  Goal: Maintain or return to baseline ADL function  Description: INTERVENTIONS:  -  Assess patient's ability to carry out ADLs; assess patient's baseline for ADL function and identify physical deficits which impact ability to perform ADLs (bathing, care of mouth/teeth, toileting, grooming, dressing, etc.)  - Assess/evaluate cause of self-care deficits   - Assess range of motion  - Assess patient's mobility; develop plan if impaired  - Assess patient's need for assistive devices and provide as appropriate  - Encourage maximum independence but intervene and supervise when necessary  - Involve family in performance of ADLs  - Assess for home care needs following discharge   - Consider OT consult to assist with ADL evaluation and planning for discharge  - Provide patient education as appropriate  Outcome: Progressing  Goal: Maintains/Returns to pre admission functional level  Description: INTERVENTIONS:  - Perform AM-PAC 6 Click Basic Mobility/ Daily Activity assessment daily.  - Set and communicate daily mobility goal to care team and patient/family/caregiver.   - Collaborate with rehabilitation services on mobility goals if consulted  -- Out of bed for toileting  - Record patient progress and toleration of activity level   Outcome: Progressing     Problem: DISCHARGE PLANNING  Goal: Discharge to home or other facility with appropriate resources  Description: INTERVENTIONS:  - Identify barriers to discharge w/patient and caregiver  - Arrange for needed discharge resources and transportation as appropriate  - Identify discharge learning needs (meds, wound care, etc.)  - Arrange for interpretive services to assist at discharge as needed  - Refer to Case Management Department for coordinating discharge planning if the patient needs post-hospital services based on physician/advanced practitioner order or complex  needs related to functional status, cognitive ability, or social support system  Outcome: Progressing     Problem: Knowledge Deficit  Goal: Patient/family/caregiver demonstrates understanding of disease process, treatment plan, medications, and discharge instructions  Description: Complete learning assessment and assess knowledge base.  Interventions:  - Provide teaching at level of understanding  - Provide teaching via preferred learning methods  Outcome: Progressing     Problem: Nutrition/Hydration-ADULT  Goal: Nutrient/Hydration intake appropriate for improving, restoring or maintaining nutritional needs  Description: Monitor and assess patient's nutrition/hydration status for malnutrition. Collaborate with interdisciplinary team and initiate plan and interventions as ordered.  Monitor patient's weight and dietary intake as ordered or per policy. Utilize nutrition screening tool and intervene as necessary. Determine patient's food preferences and provide high-protein, high-caloric foods as appropriate.     INTERVENTIONS:  - Monitor oral intake, urinary output, labs, and treatment plans  - Assess nutrition and hydration status and recommend course of action  - Evaluate amount of meals eaten  - Assist patient with eating if necessary   - Allow adequate time for meals  - Recommend/ encourage appropriate diets, oral nutritional supplements, and vitamin/mineral supplements  - Order, calculate, and assess calorie counts as needed  - Recommend, monitor, and adjust tube feedings and TPN/PPN based on assessed needs  - Assess need for intravenous fluids  - Provide specific nutrition/hydration education as appropriate  - Include patient/family/caregiver in decisions related to nutrition  Outcome: Progressing     Problem: Potential for Falls  Goal: Patient will remain free of falls  Description: INTERVENTIONS:  - Educate patient/family on patient safety including physical limitations  - Instruct patient to call for  assistance with activity   - Consult OT/PT to assist with strengthening/mobility   - Keep Call bell within reach  - Keep bed low and locked with side rails adjusted as appropriate  - Keep care items and personal belongings within reach  - Initiate and maintain comfort rounds  - Make Fall Risk Sign visible to staff  - - Apply yellow socks and bracelet for high fall risk patients  - Consider moving patient to room near nurses station  Outcome: Progressing     Problem: Prexisting or High Potential for Compromised Skin Integrity  Goal: Skin integrity is maintained or improved  Description: INTERVENTIONS:  - Identify patients at risk for skin breakdown  - Assess and monitor skin integrity  - Assess and monitor nutrition and hydration status  - Monitor labs   - Assess for incontinence   - Turn and reposition patient  - Assist with mobility/ambulation  - Relieve pressure over bony prominences  - Avoid friction and shearing  - Provide appropriate hygiene as needed including keeping skin clean and dry  - Evaluate need for skin moisturizer/barrier cream  - Collaborate with interdisciplinary team   - Patient/family teaching  - Consider wound care consult   Outcome: Progressing

## 2025-01-09 NOTE — ASSESSMENT & PLAN NOTE
Patient was noted to be experiencing chest pain/epigastric pain yesterday 1/7  EKG was nonischemic but troponins elevated to greater than 8000.  Cardiology was consulted and note appreciated  Not a candidate for ischemic evaluation  Recommend starting ASA 81 mg daily; will hold for now in the setting of TBI   negative...

## 2025-01-10 LAB
ANION GAP SERPL CALCULATED.3IONS-SCNC: 8 MMOL/L (ref 4–13)
BASOPHILS # BLD AUTO: 0.04 THOUSANDS/ΜL (ref 0–0.1)
BASOPHILS NFR BLD AUTO: 0 % (ref 0–1)
BUN SERPL-MCNC: 17 MG/DL (ref 5–25)
CALCIUM SERPL-MCNC: 8.6 MG/DL (ref 8.4–10.2)
CHLORIDE SERPL-SCNC: 100 MMOL/L (ref 96–108)
CO2 SERPL-SCNC: 29 MMOL/L (ref 21–32)
CREAT SERPL-MCNC: 0.81 MG/DL (ref 0.6–1.3)
EOSINOPHIL # BLD AUTO: 0.06 THOUSAND/ΜL (ref 0–0.61)
EOSINOPHIL NFR BLD AUTO: 1 % (ref 0–6)
ERYTHROCYTE [DISTWIDTH] IN BLOOD BY AUTOMATED COUNT: 15.6 % (ref 11.6–15.1)
GFR SERPL CREATININE-BSD FRML MDRD: 64 ML/MIN/1.73SQ M
GLUCOSE SERPL-MCNC: 102 MG/DL (ref 65–140)
HCT VFR BLD AUTO: 31.2 % (ref 34.8–46.1)
HGB BLD-MCNC: 9.9 G/DL (ref 11.5–15.4)
IMM GRANULOCYTES # BLD AUTO: 0.05 THOUSAND/UL (ref 0–0.2)
IMM GRANULOCYTES NFR BLD AUTO: 1 % (ref 0–2)
LYMPHOCYTES # BLD AUTO: 1 THOUSANDS/ΜL (ref 0.6–4.47)
LYMPHOCYTES NFR BLD AUTO: 9 % (ref 14–44)
MAGNESIUM SERPL-MCNC: 2.1 MG/DL (ref 1.9–2.7)
MCH RBC QN AUTO: 28.6 PG (ref 26.8–34.3)
MCHC RBC AUTO-ENTMCNC: 31.7 G/DL (ref 31.4–37.4)
MCV RBC AUTO: 90 FL (ref 82–98)
MONOCYTES # BLD AUTO: 0.81 THOUSAND/ΜL (ref 0.17–1.22)
MONOCYTES NFR BLD AUTO: 7 % (ref 4–12)
NEUTROPHILS # BLD AUTO: 9.08 THOUSANDS/ΜL (ref 1.85–7.62)
NEUTS SEG NFR BLD AUTO: 82 % (ref 43–75)
NRBC BLD AUTO-RTO: 0 /100 WBCS
PHOSPHATE SERPL-MCNC: 3.3 MG/DL (ref 2.3–4.1)
PLATELET # BLD AUTO: 183 THOUSANDS/UL (ref 149–390)
PMV BLD AUTO: 11.3 FL (ref 8.9–12.7)
POTASSIUM SERPL-SCNC: 3.7 MMOL/L (ref 3.5–5.3)
RBC # BLD AUTO: 3.46 MILLION/UL (ref 3.81–5.12)
SODIUM SERPL-SCNC: 137 MMOL/L (ref 135–147)
WBC # BLD AUTO: 11.04 THOUSAND/UL (ref 4.31–10.16)

## 2025-01-10 PROCEDURE — 84100 ASSAY OF PHOSPHORUS: CPT | Performed by: PHYSICIAN ASSISTANT

## 2025-01-10 PROCEDURE — 80048 BASIC METABOLIC PNL TOTAL CA: CPT | Performed by: PHYSICIAN ASSISTANT

## 2025-01-10 PROCEDURE — 97112 NEUROMUSCULAR REEDUCATION: CPT

## 2025-01-10 PROCEDURE — 97530 THERAPEUTIC ACTIVITIES: CPT

## 2025-01-10 PROCEDURE — 99232 SBSQ HOSP IP/OBS MODERATE 35: CPT | Performed by: INTERNAL MEDICINE

## 2025-01-10 PROCEDURE — 97535 SELF CARE MNGMENT TRAINING: CPT

## 2025-01-10 PROCEDURE — 85025 COMPLETE CBC W/AUTO DIFF WBC: CPT | Performed by: PHYSICIAN ASSISTANT

## 2025-01-10 PROCEDURE — 83735 ASSAY OF MAGNESIUM: CPT | Performed by: PHYSICIAN ASSISTANT

## 2025-01-10 PROCEDURE — 99232 SBSQ HOSP IP/OBS MODERATE 35: CPT | Performed by: STUDENT IN AN ORGANIZED HEALTH CARE EDUCATION/TRAINING PROGRAM

## 2025-01-10 RX ORDER — MIDODRINE HYDROCHLORIDE 2.5 MG/1
2.5 TABLET ORAL
Status: DISCONTINUED | OUTPATIENT
Start: 2025-01-10 | End: 2025-01-12 | Stop reason: HOSPADM

## 2025-01-10 RX ORDER — METOPROLOL SUCCINATE 50 MG/1
50 TABLET, EXTENDED RELEASE ORAL 2 TIMES DAILY
Status: DISCONTINUED | OUTPATIENT
Start: 2025-01-10 | End: 2025-01-12 | Stop reason: HOSPADM

## 2025-01-10 RX ADMIN — PANTOPRAZOLE SODIUM 40 MG: 40 TABLET, DELAYED RELEASE ORAL at 10:22

## 2025-01-10 RX ADMIN — ENOXAPARIN SODIUM 30 MG: 60 INJECTION SUBCUTANEOUS at 22:07

## 2025-01-10 RX ADMIN — SODIUM CHLORIDE 3 G: 9 INJECTION, SOLUTION INTRAVENOUS at 05:26

## 2025-01-10 RX ADMIN — CHLORHEXIDINE GLUCONATE 0.12% ORAL RINSE 15 ML: 1.2 LIQUID ORAL at 22:07

## 2025-01-10 RX ADMIN — SENNOSIDES AND DOCUSATE SODIUM 1 TABLET: 50; 8.6 TABLET ORAL at 22:07

## 2025-01-10 RX ADMIN — PANTOPRAZOLE SODIUM 40 MG: 40 TABLET, DELAYED RELEASE ORAL at 17:38

## 2025-01-10 RX ADMIN — LEVETIRACETAM 500 MG: 500 TABLET, FILM COATED ORAL at 22:07

## 2025-01-10 RX ADMIN — SODIUM CHLORIDE 3 G: 9 INJECTION, SOLUTION INTRAVENOUS at 17:38

## 2025-01-10 RX ADMIN — SODIUM CHLORIDE 3 G: 9 INJECTION, SOLUTION INTRAVENOUS at 22:34

## 2025-01-10 RX ADMIN — METOPROLOL SUCCINATE 50 MG: 50 TABLET, EXTENDED RELEASE ORAL at 22:07

## 2025-01-10 RX ADMIN — LEVETIRACETAM 500 MG: 500 TABLET, FILM COATED ORAL at 10:22

## 2025-01-10 RX ADMIN — METOPROLOL TARTRATE 25 MG: 25 TABLET, FILM COATED ORAL at 02:22

## 2025-01-10 RX ADMIN — ACETAMINOPHEN 975 MG: 325 TABLET, FILM COATED ORAL at 22:07

## 2025-01-10 RX ADMIN — MIDODRINE HYDROCHLORIDE 2.5 MG: 2.5 TABLET ORAL at 17:38

## 2025-01-10 RX ADMIN — METOPROLOL SUCCINATE 50 MG: 50 TABLET, EXTENDED RELEASE ORAL at 10:22

## 2025-01-10 RX ADMIN — ACETAMINOPHEN 975 MG: 325 TABLET, FILM COATED ORAL at 14:41

## 2025-01-10 RX ADMIN — MIDODRINE HYDROCHLORIDE 2.5 MG: 2.5 TABLET ORAL at 11:21

## 2025-01-10 RX ADMIN — CHLORHEXIDINE GLUCONATE 0.12% ORAL RINSE 15 ML: 1.2 LIQUID ORAL at 10:22

## 2025-01-10 RX ADMIN — SODIUM CHLORIDE 3 G: 9 INJECTION, SOLUTION INTRAVENOUS at 11:21

## 2025-01-10 RX ADMIN — LABETALOL HYDROCHLORIDE 10 MG: 5 INJECTION, SOLUTION INTRAVENOUS at 22:36

## 2025-01-10 RX ADMIN — ACETAMINOPHEN 975 MG: 325 TABLET, FILM COATED ORAL at 05:26

## 2025-01-10 RX ADMIN — POLYETHYLENE GLYCOL 3350 17 G: 17 POWDER, FOR SOLUTION ORAL at 10:22

## 2025-01-10 NOTE — CASE MANAGEMENT
Case Management Discharge Planning Note    Patient name Sandra Purvis  Location [facility] 625/[facility] 625-01 MRN [id number]  : 12/3/1934 Date 1/10/2025       Current Admission Date: 2025  Current Admission Diagnosis:SDH (subdural hematoma) ([facility])   Patient Active Problem List    Diagnosis Date Noted Date Diagnosed    Syncope 2025     Chest pain 2025     Orthostatic hypotension 2025     Fall 2025     SDH (subdural hematoma) ([facility]) 2025     SAH (subarachnoid hemorrhage) ([facility]) 2025     Closed extensive facial fractures ([facility]) 2025     Primary insomnia 2024     Basal cell carcinoma (BCC) of scalp 2023     Malignant melanoma of arm, left ([facility]) 2023     Scalp lesion 2023     Arm skin lesion, left 2023     Irritant contact dermatitis due to other agents 2022     COVID-19 2022     GERD (gastroesophageal reflux disease)      Stage 3 chronic kidney disease, unspecified whether stage 3a or 3b CKD ([facility]) 2021     Paroxysmal atrial fibrillation ([facility])      Preop examination 2021     Esophagus disorder 10/19/2021     Ureteral stone 10/15/2021     Bacteremia 10/15/2021     Mild protein-calorie malnutrition ([facility]) 2021     Hypertension 2015       LOS (days): 6  Geometric Mean LOS (GMLOS) (days): 1.9  Days to GMLOS:-3.7     OBJECTIVE:  Risk of Unplanned Readmission Score: 13.89         Current admission status: Inpatient   Preferred Pharmacy:   [facility][id number] - [place] - [address] [address] [facility] [address]  Phone: [phone] Fax: [phone]    Primary Care Provider: Berhane Page DO    Primary Insurance: MEDICARE  Secondary Insurance: BLUE CROSS    DISCHARGE DETAILS:    Pt medically stable for d/c to  [facility]  Awaiting if [facility] will have a bed available today

## 2025-01-10 NOTE — PROGRESS NOTES
Cardiology Progress Note - Snadra Purvis 89 y.o. female MRN: [id number]    Unit/Bed#: [facility] 625-01 Encounter: [id number]        Assessment & Plan  Orthostatic hypotension  Brendon BP 70/40 during physical therapy at time of cardiology consult. Patient was symptomatic of lightheadedness and presyncope.  Had gentle fluids repeat orthostatics positive but not as profound.  Patient asymptomatic.  Given additional IV fluids.  We added BALJIT hose stockings.  Recommended at least 60 ounces of fluid at home on a day.  Repeat orthostatics lying 164/74, sitting 161/81 and standing 159/79.  Interestingly now becoming more hypertensive   Patient has recently been get lightheaded upon position changes at home.  She believes this is what caused her syncope.  Continue to encourage p.o. hydration  With orthostasis noted this morning, will start on low-dose midodrine therapy at 2.5 milligrams twice daily  Chest pain  0-hour troponin 1/6 .There was a random troponin yesterday morning 190 followed by 3866.  Repeat troponin checked this morning 8384.  On cardiology evaluation she had complained of abdominal pain.  Patient remains free of chest pain.   EKG has been nonischemic  Ddx includes nonischemic troponin elevation in s/o trauma and brain bleed vs demand ischemia due to PAF and syncope.  She is not a candidate for ischemic evaluation at this time due to advanced age, frailty, and presently with intracranial hemorrhage which would contraindicate any intervention  No new RWMA on echo 1/6/25  Start aspirin 81 mg daily when able from bleeding perspective.  Neurosurgery plans to repeat CT in 1 weeks time  Syncope  Patient presented with syncope preceded by lightheadedness  Found to be significantly orthostatic  Remains on telemetry no evidence of bradycardia or significant pauses  Suspect due to orthostatic hypotension.  Recently has been getting lightheaded upon standing.  She does not drink fluids consistently at home.    HCTZ on  "hold, will not continue on discharge  Paroxysmal atrial fibrillation (HCC)  Current TTE LVEF 70%.  Systolic function vigorous.  Grade 2 diastolic dysfunction.  RV mildly reduced.  Left atrial dilatation mild.  Mild AI.  Mild TR.  Trivial pericardial effusion.  On Lopressor 25 mg every 12 hours.   Patient presented with sinus bradycardia.  Cardiology evaluation she was in controlled atrial fibrillation.  No bradycardia.  Not on AC PTA, anticoagulation contraindicated at this time due to SDH and SAH.  [id number] equals 4 after repeat head imaging she would need re evaluation for candidacy.   Does not appear she currently follows cardiology.  Patient with PAF per PCP notes.  Takes Toprol 50 mg daily at home.  She was mildly tachycardic yesterday, and therefore we increased her metoprolol dosing  SDH (subdural hematoma) ([facility])  Patient with SDH/SAH.    No surgical intervention planned at this time.  Hypertension  Takes Toprol 50 mg daily at home.  Currently on Lopressor 25 mg every 12 hours.  HCTZ on hold.  Will not resume due to orthostasis.  Goal SBP per neurosurgery due to head bleed SBP less than 160.  Blood pressures remain somewhat elevated today.  She is a bit more tachycardic, therefore we increased her Lopressor to 50 mg every 8 hours for now  Will transition metoprolol to Toprol-XL 50 mg twice daily for improved heart rate and blood pressure control  SAH (subarachnoid hemorrhage) ([facility])  No surgical intervention planned at this time  Repeat CT head as per neurosurgical recommendations    Subjective:   Patient seen and examined.  No significant events overnight.  ; pertinent negatives - chest pain, chest pressure/discomfort, dyspnea, irregular heart beat, lower extremity edema, and palpitations.    Objective:     Vitals: Blood pressure 151/80, pulse 95, temperature 98.8 °F (37.1 °C), resp. rate 17, height 5' 2\" (1.575 m), weight 51.1 kg (112 lb 9.6 oz), SpO2 95%, not currently breastfeeding., Body mass index is " 20.59 kg/m².,   Orthostatic Blood Pressures      Flowsheet Row Most Recent Value   Blood Pressure 151/80 filed at 01/10/2025 0751   Patient Position - Orthostatic VS Standing - Orthostatic VS filed at 01/08/2025 1738              Intake/Output Summary (Last 24 hours) at 1/10/2025 0931  Last data filed at 1/10/2025 0601  Gross per 24 hour   Intake --   Output 1300 ml   Net -1300 ml       TELE: No significant arrhythmias seen.    Physical Exam:    GEN: Sandra Purvis appears well, alert and oriented x 3, pleasant and cooperative   HEENT: pupils equal, round, and reactive to light; extraocular muscles intact  NECK: supple, no carotid bruits   HEART: regular rhythm, normal S1 and S2, no murmurs, clicks, gallops or rubs   LUNGS: clear to auscultation bilaterally; no wheezes, rales, or rhonchi   ABDOMEN: normal bowel sounds, soft, no tenderness, no distention  EXTREMITIES: peripheral pulses normal; no clubbing, cyanosis, or edema  NEURO: no focal findings   SKIN: Significant bruising    Medications:      Current Facility-Administered Medications:     acetaminophen (TYLENOL) tablet 975 mg, 975 mg, Oral, Q8H Theodora JOHNSON MD, 975 mg at 01/10/25 0526    ampicillin-sulbactam (UNASYN) 3 g in sodium chloride 0.9 % 100 mL IVPB, 3 g, Intravenous, Q6H, LUIS Hicks, Last Rate: 200 mL/hr at 01/10/25 0526, 3 g at 01/10/25 0526    bisacodyl (DULCOLAX) rectal suppository 10 mg, 10 mg, Rectal, Daily PRN, Theodora Cooper MD    calcium carbonate (TUMS) chewable tablet 500 mg, 500 mg, Oral, Daily PRN, Barbara Mcgill DO    chlorhexidine (PERIDEX) 0.12 % oral rinse 15 mL, 15 mL, Mouth/Throat, Q12H Theodora JOHNSON MD, 15 mL at 01/09/25 2158    enoxaparin (LOVENOX) subcutaneous injection 30 mg, 30 mg, Subcutaneous, Q24H, Ramon Stern DO, 30 mg at 01/09/25 2157    labetalol (NORMODYNE) injection 10 mg, 10 mg, Intravenous, Q8H PRN, Barbara Mcgill DO, 10 mg at 01/08/25 2228    levETIRAcetam  (KEPPRA) tablet 500 mg, 500 mg, Oral, Q12H ELIZABETH, Mira Beaulieu, CRNP, 500 mg at 01/09/25 2158    metoprolol tartrate (LOPRESSOR) tablet 25 mg, 25 mg, Oral, Q8H, Linda Hickman, CRNP, 25 mg at 01/10/25 0222    naloxone (NARCAN) 0.04 mg/mL syringe 0.04 mg, 0.04 mg, Intravenous, Q1MIN PRN, Theodora Cooper MD    ondansetron (ZOFRAN) injection 4 mg, 4 mg, Intravenous, Q4H PRN, Theodora Cooper MD    oxyCODONE (ROXICODONE) split tablet 2.5 mg, 2.5 mg, Oral, Q4H PRN, 2.5 mg at 01/09/25 1815 **OR** oxyCODONE (ROXICODONE) IR tablet 5 mg, 5 mg, Oral, Q4H PRN, Theodora Cooper MD    pantoprazole (PROTONIX) EC tablet 40 mg, 40 mg, Oral, BID, Barbara Mcgill, DO, 40 mg at 01/09/25 1815    polyethylene glycol (MIRALAX) packet 17 g, 17 g, Oral, Daily, Theodora Cooper MD, 17 g at 01/08/25 0832    senna-docusate sodium (SENOKOT S) 8.6-50 mg per tablet 1 tablet, 1 tablet, Oral, HS, Theodora Cooper MD, 1 tablet at 01/09/25 2158     Labs & Results:        Results from last 7 days   Lab Units 01/10/25  0541 01/09/25  1244 01/08/25  0828   WBC Thousand/uL 11.04* 13.75* 16.27*   HEMOGLOBIN g/dL 9.9* 9.7* 9.6*   HEMATOCRIT % 31.2* 30.8* 29.8*   PLATELETS Thousands/uL 183 187 188         Results from last 7 days   Lab Units 01/10/25  0541 01/08/25  0828 01/07/25  2313 01/07/25  0532 01/06/25  0452 01/05/25  0749 01/04/25  1743   POTASSIUM mmol/L 3.7 3.7 3.6 3.8   < > 3.9 3.9   CHLORIDE mmol/L 100 103 100 103   < > 102 101   CO2 mmol/L 29 30 28 27   < > 29 27   BUN mg/dL 17 24 28* 25   < > 22 30*   CREATININE mg/dL 0.81 0.99 0.98 0.99   < > 0.94 1.31*   CALCIUM mg/dL 8.6 8.8 9.0 9.1   < > 9.4 9.4   ALK PHOS U/L  --   --   --  52  --  58 63   ALT U/L  --   --   --  32  --  49 68*   AST U/L  --   --   --  26  --  42* 94*    < > = values in this interval not displayed.     Results from last 7 days   Lab Units 01/04/25  1743   INR  1.12   PTT seconds 25     Results from last 7 days   Lab Units 01/10/25  0541 01/07/25  0532  01/06/25  0452   MAGNESIUM mg/dL 2.1 2.1 2.0       Echo: personally reviewed -normal LV size with vigorous LV function.  Grade 2 diastolic dysfunction.  Mildly reduced RV function.  Left atrial enlargement.  Mild regurgitation of the aortic valve and tricuspid valve with moderate pulmonary hypertension    EKG personally reviewed by Rica Che MD.

## 2025-01-10 NOTE — CASE MANAGEMENT
Case Management Discharge Planning Note    Patient name Sandra Purvis  Location [facility] 625/[facility] 625-01 MRN [id number]  : 12/3/1934 Date 1/10/2025       Current Admission Date: 2025  Current Admission Diagnosis:SDH (subdural hematoma) ([facility])   Patient Active Problem List    Diagnosis Date Noted Date Diagnosed    Syncope 2025     Chest pain 2025     Orthostatic hypotension 2025     Fall 2025     SDH (subdural hematoma) ([facility]) 2025     SAH (subarachnoid hemorrhage) ([facility]) 2025     Closed extensive facial fractures ([facility]) 2025     Primary insomnia 2024     Basal cell carcinoma (BCC) of scalp 2023     Malignant melanoma of arm, left ([facility]) 2023     Scalp lesion 2023     Arm skin lesion, left 2023     Irritant contact dermatitis due to other agents 2022     COVID-19 2022     GERD (gastroesophageal reflux disease)      Stage 3 chronic kidney disease, unspecified whether stage 3a or 3b CKD ([facility]) 2021     Paroxysmal atrial fibrillation ([facility])      Preop examination 2021     Esophagus disorder 10/19/2021     Ureteral stone 10/15/2021     Bacteremia 10/15/2021     Mild protein-calorie malnutrition ([facility]) 2021     Hypertension 2015       LOS (days): 6  Geometric Mean LOS (GMLOS) (days): 1.9  Days to GMLOS:-3.8     OBJECTIVE:  Risk of Unplanned Readmission Score: 14.07         Current admission status: Inpatient   Preferred Pharmacy:   [facility]/pharmacy #[id number] - [place] - [address] [id number] [facility] [address]  Phone: [phone] Fax: [phone]    Primary Care Provider: Berhane Page DO    Primary Insurance: MEDICARE  Secondary Insurance: BLUE CROSS    DISCHARGE DETAILS:    Pt has a bed today at [facility] but Cardiology doesn't want the pt to d/c today  Plan to possibly d/c tomorrow if Cardiology clears. CM will need to follow up with Trauma and Cardiology in the am       Katharine Piña was seen and treated in our emergency department on 12/6/2023.         Sincerely,     [facility] Emergency Department

## 2025-01-10 NOTE — PLAN OF CARE
Problem: PHYSICAL THERAPY ADULT  Goal: Performs mobility at highest level of function for planned discharge setting.  See evaluation for individualized goals.  Description: Treatment/Interventions: Functional transfer training, LE strengthening/ROM, Elevations, Therapeutic exercise, Endurance training, Patient/family training, Equipment eval/education, Bed mobility, Gait training, Compensatory technique education, Spoke to nursing, Spoke to case management, OT  Equipment Recommended: Walker       See flowsheet documentation for full assessment, interventions and recommendations.  Outcome: Progressing  Note: Prognosis: Good  Problem List: Decreased strength, Decreased endurance, Impaired balance, Decreased mobility, Decreased safety awareness  Assessment: Pt participated in PT sessino consisting of funcitonal transfers & short distance giat trial as noted above.  She performs tasks largely with mod A x1 today, primarily due to impaired balance, motor planning & anxiousness regarding mobility despite aforementioned motivation.  She is retropulsive with positional change, especially in standing, which improved slightly during gait trial when she was required to be more reliant on UEs for support.  BP taken during session, and although it still drops signficiantly from sitting to standing, it remained WFL despite pt's subjective complaints.  Seated rests taken to recover betwen tasks as precautionn.  Pt ambulates with gait deviations as noted above, and requires excessive work to ambualte such short distances due to tiny stride length & guarded posture that results in rapid fatigue & worsensing symptoms as noted above.  Anticipate this will improve with ongoing healing, daily mobiilty with staff as tolerated & PT interventions to progress to highest level of funcitonal independence.  Barriers to Discharge: Decreased caregiver support, Inaccessible home environment     Rehab Resource Intensity Level, PT: I (Maximum  Resource Intensity)    See flowsheet documentation for full assessment.

## 2025-01-10 NOTE — ASSESSMENT & PLAN NOTE
Brendon BP 70/40 during physical therapy at time of cardiology consult. Patient was symptomatic of lightheadedness and presyncope.  Had gentle fluids repeat orthostatics positive but not as profound.  Patient asymptomatic.  Given additional IV fluids.  We added BALJIT hose stockings.  Recommended at least 60 ounces of fluid at home on a day.  Repeat orthostatics lying 164/74, sitting 161/81 and standing 159/79.  Interestingly now becoming more hypertensive   Patient has recently been get lightheaded upon position changes at home.  She believes this is what caused her syncope.  Continue to encourage p.o. hydration  With orthostasis noted this morning, will start on low-dose midodrine therapy at 2.5 milligrams twice daily

## 2025-01-10 NOTE — ASSESSMENT & PLAN NOTE
Current TTE LVEF 70%.  Systolic function vigorous.  Grade 2 diastolic dysfunction.  RV mildly reduced.  Left atrial dilatation mild.  Mild AI.  Mild TR.  Trivial pericardial effusion.  On Lopressor 25 mg every 12 hours.   Patient presented with sinus bradycardia.  Cardiology evaluation she was in controlled atrial fibrillation.  No bradycardia.  Not on AC PTA, anticoagulation contraindicated at this time due to SDH and SAH.  [id number] equals 4 after repeat head imaging she would need re evaluation for candidacy.   Does not appear she currently follows cardiology.  Patient with PAF per PCP notes.  Takes Toprol 50 mg daily at home.  She was mildly tachycardic yesterday, and therefore we increased her metoprolol dosing

## 2025-01-10 NOTE — ASSESSMENT & PLAN NOTE
Patient was noted to be experiencing chest pain/epigastric pain yesterday 1/7  EKG was nonischemic but troponins elevated to greater than 8000.  Cardiology was consulted and note appreciated  Not a candidate for ischemic evaluation  Recommend starting ASA 81 mg daily; will hold for now in the setting of TBI

## 2025-01-10 NOTE — ASSESSMENT & PLAN NOTE
Takes Toprol 50 mg daily at home.  Currently on Lopressor 25 mg every 12 hours.  HCTZ on hold.  Will not resume due to orthostasis.  Goal SBP per neurosurgery due to head bleed SBP less than 160.  Blood pressures remain somewhat elevated today.  She is a bit more tachycardic, therefore we increased her Lopressor to 50 mg every 8 hours for now  Will transition metoprolol to Toprol-XL 50 mg twice daily for improved heart rate and blood pressure control

## 2025-01-10 NOTE — ASSESSMENT & PLAN NOTE
No surgical intervention planned at this time  Repeat CT head as per neurosurgical recommendations

## 2025-01-10 NOTE — ASSESSMENT & PLAN NOTE
Patient presented with syncope preceded by lightheadedness  Found to be significantly orthostatic  Remains on telemetry no evidence of bradycardia or significant pauses  Suspect due to orthostatic hypotension.  Recently has been getting lightheaded upon standing.  She does not drink fluids consistently at home.    HCTZ on hold, will not continue on discharge

## 2025-01-10 NOTE — PLAN OF CARE
Problem: PAIN - ADULT  Goal: Verbalizes/displays adequate comfort level or baseline comfort level  Description: Interventions:  - Encourage patient to monitor pain and request assistance  - Assess pain using appropriate pain scale  - Administer analgesics based on type and severity of pain and evaluate response  - Implement non-pharmacological measures as appropriate and evaluate response  - Consider cultural and social influences on pain and pain management  - Notify physician/advanced practitioner if interventions unsuccessful or patient reports new pain  Outcome: Progressing     Problem: INFECTION - ADULT  Goal: Absence or prevention of progression during hospitalization  Description: INTERVENTIONS:  - Assess and monitor for signs and symptoms of infection  - Monitor lab/diagnostic results  - Monitor all insertion sites, i.e. indwelling lines, tubes, and drains  - Monitor endotracheal if appropriate and nasal secretions for changes in amount and color  - Byron appropriate cooling/warming therapies per order  - Administer medications as ordered  - Instruct and encourage patient and family to use good hand hygiene technique  - Identify and instruct in appropriate isolation precautions for identified infection/condition  Outcome: Progressing  Goal: Absence of fever/infection during neutropenic period  Description: INTERVENTIONS:  - Monitor WBC    Outcome: Progressing     Problem: SAFETY ADULT  Goal: Patient will remain free of falls  Description: INTERVENTIONS:  - Educate patient/family on patient safety including physical limitations  - Instruct patient to call for assistance with activity   - Consult OT/PT to assist with strengthening/mobility   - Keep Call bell within reach  - Keep bed low and locked with side rails adjusted as appropriate  - Keep care items and personal belongings within reach  - Initiate and maintain comfort rounds  - Make Fall Risk Sign visible to staff  - - Apply yellow socks and  bracelet for high fall risk patients  - Consider moving patient to room near nurses station  Outcome: Progressing  Goal: Maintain or return to baseline ADL function  Description: INTERVENTIONS:  -  Assess patient's ability to carry out ADLs; assess patient's baseline for ADL function and identify physical deficits which impact ability to perform ADLs (bathing, care of mouth/teeth, toileting, grooming, dressing, etc.)  - Assess/evaluate cause of self-care deficits   - Assess range of motion  - Assess patient's mobility; develop plan if impaired  - Assess patient's need for assistive devices and provide as appropriate  - Encourage maximum independence but intervene and supervise when necessary  - Involve family in performance of ADLs  - Assess for home care needs following discharge   - Consider OT consult to assist with ADL evaluation and planning for discharge  - Provide patient education as appropriate  Outcome: Progressing  Goal: Maintains/Returns to pre admission functional level  Description: INTERVENTIONS:  - Perform AM-PAC 6 Click Basic Mobility/ Daily Activity assessment daily.  - Set and communicate daily mobility goal to care team and patient/family/caregiver.   - Collaborate with rehabilitation services on mobility goals if consulted  -- Out of bed for toileting  - Record patient progress and toleration of activity level   Outcome: Progressing     Problem: DISCHARGE PLANNING  Goal: Discharge to home or other facility with appropriate resources  Description: INTERVENTIONS:  - Identify barriers to discharge w/patient and caregiver  - Arrange for needed discharge resources and transportation as appropriate  - Identify discharge learning needs (meds, wound care, etc.)  - Arrange for interpretive services to assist at discharge as needed  - Refer to Case Management Department for coordinating discharge planning if the patient needs post-hospital services based on physician/advanced practitioner order or complex  needs related to functional status, cognitive ability, or social support system  Outcome: Progressing     Problem: Knowledge Deficit  Goal: Patient/family/caregiver demonstrates understanding of disease process, treatment plan, medications, and discharge instructions  Description: Complete learning assessment and assess knowledge base.  Interventions:  - Provide teaching at level of understanding  - Provide teaching via preferred learning methods  Outcome: Progressing     Problem: Nutrition/Hydration-ADULT  Goal: Nutrient/Hydration intake appropriate for improving, restoring or maintaining nutritional needs  Description: Monitor and assess patient's nutrition/hydration status for malnutrition. Collaborate with interdisciplinary team and initiate plan and interventions as ordered.  Monitor patient's weight and dietary intake as ordered or per policy. Utilize nutrition screening tool and intervene as necessary. Determine patient's food preferences and provide high-protein, high-caloric foods as appropriate.     INTERVENTIONS:  - Monitor oral intake, urinary output, labs, and treatment plans  - Assess nutrition and hydration status and recommend course of action  - Evaluate amount of meals eaten  - Assist patient with eating if necessary   - Allow adequate time for meals  - Recommend/ encourage appropriate diets, oral nutritional supplements, and vitamin/mineral supplements  - Order, calculate, and assess calorie counts as needed  - Recommend, monitor, and adjust tube feedings and TPN/PPN based on assessed needs  - Assess need for intravenous fluids  - Provide specific nutrition/hydration education as appropriate  - Include patient/family/caregiver in decisions related to nutrition  Outcome: Progressing     Problem: Potential for Falls  Goal: Patient will remain free of falls  Description: INTERVENTIONS:  - Educate patient/family on patient safety including physical limitations  - Instruct patient to call for  assistance with activity   - Consult OT/PT to assist with strengthening/mobility   - Keep Call bell within reach  - Keep bed low and locked with side rails adjusted as appropriate  - Keep care items and personal belongings within reach  - Initiate and maintain comfort rounds  - Make Fall Risk Sign visible to staff  -- Apply yellow socks and bracelet for high fall risk patients  - Consider moving patient to room near nurses station  Outcome: Progressing     Problem: Prexisting or High Potential for Compromised Skin Integrity  Goal: Skin integrity is maintained or improved  Description: INTERVENTIONS:  - Identify patients at risk for skin breakdown  - Assess and monitor skin integrity  - Assess and monitor nutrition and hydration status  - Monitor labs   - Assess for incontinence   - Turn and reposition patient  - Assist with mobility/ambulation  - Relieve pressure over bony prominences  - Avoid friction and shearing  - Provide appropriate hygiene as needed including keeping skin clean and dry  - Evaluate need for skin moisturizer/barrier cream  - Collaborate with interdisciplinary team   - Patient/family teaching  - Consider wound care consult   Outcome: Progressing

## 2025-01-10 NOTE — PHYSICAL THERAPY NOTE
Physical Therapy Progress Note     01/10/25 1040   PT Last Visit   PT Visit Date 01/10/25   Note Type   Note Type Treatment for insurance authorization   Pain Assessment   Pain Score No Pain   Restrictions/Precautions   Other Precautions Cognitive;Chair Alarm;Bed Alarm;Pain;Fall Risk   Subjective   Subjective Pt encountered supine in bed, pleasant and motivated to participate in PT.  Reports no complaints at rest, but does report wooziness with sitting & standing, made worse with ambulation, then residing with seated rest.   Bed Mobility   Supine to Sit 3  Moderate assistance   Additional items Assist x 1   Transfers   Sit to Stand 3  Moderate assistance   Additional items Assist x 1   Stand to Sit 3  Moderate assistance   Additional items Assist x 1   Stand pivot 3  Moderate assistance   Additional items Assist x 2   Ambulation/Elevation   Gait pattern Short stride;Inconsistent martine;Shuffling;Decreased foot clearance;Narrow DANIEL;Improper Weight shift;Poor UE support  (tiny, shuffling steps, instructions for correction)   Gait Assistance 3  Moderate assist   Additional items Assist x 1  (+ chair follow)   Assistive Device Rolling walker   Distance ~5'   Balance   Static Sitting Fair -   Dynamic Sitting Fair -   Static Standing Poor   Dynamic Standing Poor   Ambulatory Poor   Activity Tolerance   Activity Tolerance Patient tolerated treatment well;Patient limited by fatigue   Nurse Made Aware CHACHA Cummins   Assessment   Prognosis Good   Problem List Decreased strength;Decreased endurance;Impaired balance;Decreased mobility;Decreased safety awareness   Assessment Pt participated in PT sessino consisting of funcitonal transfers & short distance giat trial as noted above.  She performs tasks largely with mod A x1 today, primarily due to impaired balance, motor planning & anxiousness regarding mobility despite aforementioned motivation.  She is retropulsive with positional change, especially in standing, which improved  slightly during gait trial when she was required to be more reliant on UEs for support.  BP taken during session, and although it still drops signficiantly from sitting to standing, it remained WFL despite pt's subjective complaints.  Seated rests taken to recover betwen tasks as precautionn.  Pt ambulates with gait deviations as noted above, and requires excessive work to ambualte such short distances due to tiny stride length & guarded posture that results in rapid fatigue & worsensing symptoms as noted above.  Anticipate this will improve with ongoing healing, daily mobiilty with staff as tolerated & PT interventions to progress to highest level of funcitonal independence.   Goals   Patient Goals to get better   STG Expiration Date 01/20/25   PT Treatment Day 2   Plan   Treatment/Interventions Functional transfer training;LE strengthening/ROM;Endurance training;Therapeutic exercise;Patient/family training;Equipment eval/education;Gait training;Bed mobility   Progress Slow progress, decreased activity tolerance   PT Frequency 3-5x/wk   Discharge Recommendation   Rehab Resource Intensity Level, PT I (Maximum Resource Intensity)   AM-PAC Basic Mobility Inpatient   Turning in Flat Bed Without Bedrails 3   Lying on Back to Sitting on Edge of Flat Bed Without Bedrails 2   Moving Bed to Chair 2   Standing Up From Chair Using Arms 2   Walk in Room 2   Climb 3-5 Stairs With Railing 1   Basic Mobility Inpatient Raw Score 12   Basic Mobility Standardized Score 32.23   [facility] Highest Level Of Mobility   -HLM Goal 4: Move to chair/commode   -HLM Achieved 6: Walk 10 steps or more       Gera Nam PTA    An [facility] Basic Mobility Raw Score less than 17 suggests pt would benefit from post acute rehab.  Please also refer to the recommendation of the Physical Therapist for safe discharge planning.

## 2025-01-10 NOTE — PLAN OF CARE
Problem: OCCUPATIONAL THERAPY ADULT  Goal: Performs self-care activities at highest level of function for planned discharge setting.  See evaluation for individualized goals.  Description: Treatment Interventions: ADL retraining, Functional transfer training, Endurance training, Cognitive reorientation, Patient/family training, Equipment evaluation/education, Compensatory technique education, Energy conservation, Activityengagement          See flowsheet documentation for full assessment, interventions and recommendations.   Outcome: Progressing  Note: Limitation: Decreased ADL status, Decreased Safe judgement during ADL, Decreased cognition, Decreased endurance, Decreased self-care trans, Decreased high-level ADLs  Prognosis: Good  Assessment: pt participated in am ot session and was seen focusing on adl tasks, bed mobility, functional mobility and transfers as well as orthostatic assessment in supine / sit /stance / sit and piost walk.  pt requried total asst to jono KNEE HIGH teds which were in her room. pt reports some dizziness with position changes wayne in stance. pt requires mod asst lb adls min asst ub and is very slow moving, needing asst to complete tasks at times. pt is very retropulsive in stance and walks only short distance very slowly using small steps. pt is at great risk for falls. pt was noted to tell storries from her life / past. she requested asst to call her  was was completed this session. pts balance is poor and activity tolerence is p+ this session.  pt was positioned in chair with alarm engaged and nsg aware. will follow focusing on goals from ie.     Rehab Resource Intensity Level, OT: I (Maximum Resource Intensity)   April A Storm

## 2025-01-10 NOTE — PROGRESS NOTES
"Progress Note - Trauma   Name: Sandra Purvis 89 y.o. female I MRN: [id number]  Unit/Bed#: [facility] 625-01 I Date of Admission: 1/4/2025   Date of Service: 1/10/2025 I Hospital Day: 6    Assessment & Plan  Fall  Described as \"fainting\"--no history or associated symptoms  Troponin was initially negative and then marques to over 8000.  EKG is nonischemic  Orthostatics positive this morning 1/8  Echocardiogram showed LVEF 70%.  Systolic function vigorous.  Grade 2 diastolic dysfunction.  RV mildly reduced.  Left atrial dilatation mild.  Mild AI.    Telemetry--showed A-fib with RVR  Cardiology was consulted and note appreciated  Recommended continued hydration orally and with IVF  TEDS  Continue to monitor orthostatics--improved with IVF  May consider midodrine if she continues to be symptomatic--defer for now  Encourage oral hydration and compliance with teds.  Increasing metoprolol to 3 a day dosing  SDH (subdural hematoma) (HCC)  Secondary to fall  Initial CT imaging on 1/4 showed: Acute subdural hemorrhage along the left cerebral convexity with maximal thickness of 1.9 cm. No midline shift. Acute subarachnoid hemorrhage in the left frontoparietal sulci, left sylvian fissure and suprasellar cistern  Repeat imaging showed stability on 1/5  Neurosurgery consulted and note appreciated  No reversal needed due to patient not taking any AP/AC  Continue to closely monitor neurological status every 4-currently GCS 15  Maintain normotension, SBP less than 160  Continue on 7 day course of Keppra for seizure prophylaxis  Hold all anticoagulant or antiplatelet medications until patient follows up with neurosurgery in 2 weeks with repeat CT head.  SAH (subarachnoid hemorrhage) (HCC)  See above  Closed extensive facial fractures (HCC)  Secondary to fall  1/4 Initial CT imaging of face showed Comminuted and mildly displaced fractures of the lateral and anterior walls of right maxilla, lateral and inferior walls of right orbit. Right " inferior orbital wall fracture appears to involve the inferior orbital foramen with associated small extraconal soft tissue edema and focus of air. No herniation of extraocular muscles. Nondisplaced fractures of zygomatic arch and the temporal bone at the TMJ. Diffuse opacification of right maxillary sinus and right nasal cavity with blood products and air.  OMFS consulted and note appreciated  Surgical intervention discussed with patient by OMFS team, but refused by patient  Continue with 7-day course of Unasyn/Augmentin  Sinus precautions  Follow-up with OMFS as an outpatient for continued monitoring and management of fractures.  Paroxysmal atrial fibrillation (HCC)  Increase metoprolol to 3 times daily dosing  Currently not on AC  Follow-up with cardiology as an outpatient  Hypertension  Increased home metoprolol to 3 times daily  GERD (gastroesophageal reflux disease)  Protonix initiated--symptoms improved  Chest pain  Patient was noted to be experiencing chest pain/epigastric pain yesterday 1/7  EKG was nonischemic but troponins elevated to greater than 8000.  Cardiology was consulted and note appreciated  Not a candidate for ischemic evaluation  Recommend starting ASA 81 mg daily; will hold for now in the setting of TBI  Syncope  See Fall  Orthostatic hypotension  See Fall    Bowel Regimen: Senna S  VTE Prophylaxis:VTE covered by:  enoxaparin, Subcutaneous, 30 mg at 01/09/25 2000      Family Communication: Called daughter Courtney.  She was updated on patient's status.  She confirmed all of her questons and concerns were answered at the completion of her conversation.  Disposition: Pending placement.  Patient medically stable.     24 Hour Events :  No events overnight.  Subjective : Patient notes some right-sided facial pain particularly with chewing.  She denies any new or worsening pain.  She denies any other complaints.  She does not endorse any episodes of dizziness but states she has not been up and  ambulatory yet this morning.    Objective :  Temp:  [96.5 °F (35.8 °C)-99.2 °F (37.3 °C)] 98.9 °F (37.2 °C)  HR:  [] 94  BP: (145-187)/(66-87) 146/70  Resp:  [16-20] 20  SpO2:  [97 %-98 %] 97 %    I/O         01/08 0701 01/09 0700 01/09 0701  01/10 0700 01/10 0701 01/11 0700    P.O. 480 120     Total Intake(mL/kg) 480 (8.9) 120 (2.3)     Urine (mL/kg/hr) 650 (0.5) 1300 (1.1)     Stool 0 0     Total Output 650 1300     Net -170 -1180            Unmeasured Urine Occurrence 2 x      Unmeasured Stool Occurrence 3 x 1 x           Lines/Drains/Airways       Active Status       Name Placement date Placement time Site Days    External Urinary Catheter 01/05/25  0100  -- 5                  Physical Exam:   GENERAL APPEARANCE: No acute distress  NEURO: GCS 15  HEENT: Normocephalic, right side of face swollen.  Neck supple.  CV: Regular rate and rhythm.  +2 radial and dorsalis pedis pulse, bilaterally.  LUNGS: Clear to auscultation, bilaterally.  Chest wall is nontender.  GI: Abdomen is soft nontender.  : Pelvis is stable.  MSK: Moving all extremities.  No deformities.  SKIN: Warm, dry.  Ecchymosis noted on right periorbital region extending into maxilla and down right side of neck.           Lab Results: I have reviewed the following results:  Recent Labs     01/10/25  0541   WBC 11.04*   HGB 9.9*   HCT 31.2*      SODIUM 137   K 3.7      CO2 29   BUN 17   CREATININE 0.81   GLUC 102   MG 2.1   PHOS 3.3       Imaging Results Review: No pertinent imaging studies reviewed.  Other Study Results Review: No additional pertinent studies reviewed.

## 2025-01-10 NOTE — OCCUPATIONAL THERAPY NOTE
Occupational Therapy Treatment Note:      01/10/25 1041   OT Last Visit   OT Visit Date 01/10/25   Note Type   Note Type Treatment   Pain Assessment   Pain Assessment Tool 0-10   Pain Score No Pain   Restrictions/Precautions   Other Precautions Cognitive;Chair Alarm;Bed Alarm   ADL   Grooming Assistance 5  Supervision/Setup   Activity Tolerance   Activity Tolerance Patient tolerated treatment well   Assessment   Assessment pt participated in am ot session and was seen focusing on adl tasks, bed mobility, functional mobility and transfers as well as orthostatic assessment in supine / sit /stance / sit and piost walk.  pt requried total asst to jono KNEE HIGH teds which were in her room. pt reports some dizziness with position changes wayne in stance. pt requires mod asst lb adls min asst ub and is very slow moving, needing asst to complete tasks at times. pt is very retropulsive in stance and walks only short distance very slowly using small steps. pt is at great risk for falls. pt was noted to tell storries from her life / past. she requested asst to call her  was was completed this session. pts balance is poor and activity tolerence is p+ this session.  pt was positioned in chair with alarm engaged and nsg aware. will follow focusing on goals from ie.   Plan   Treatment Interventions ADL retraining;Functional transfer training;Activityengagement   Goal Expiration Date 01/20/25   OT Treatment Day 2   OT Frequency 2-3x/wk   Discharge Recommendation   Rehab Resource Intensity Level, OT I (Maximum Resource Intensity)   AM-PAC Daily Activity Inpatient   Lower Body Dressing 2   Bathing 2   Toileting 2   Upper Body Dressing 2   Grooming 3   Eating 3   Daily Activity Raw Score 14   Daily Activity Standardized Score (Calc for Raw Score >=11) 33.39   AM-PAC Applied Cognition Inpatient   Following a Speech/Presentation 3   Understanding Ordinary Conversation 4   Taking Medications 3   Remembering Where Things Are  Placed or Put Away 2   Remembering List of 4-5 Errands 2   Taking Care of Complicated Tasks 2   Applied Cognition Raw Score 16   Applied Cognition Standardized Score 35.03     April A Storm

## 2025-01-10 NOTE — ASSESSMENT & PLAN NOTE
"Described as \"fainting\"--no history or associated symptoms  Troponin was initially negative and then marques to over 8000.  EKG is nonischemic  Orthostatics positive this morning 1/8  Echocardiogram showed LVEF 70%.  Systolic function vigorous.  Grade 2 diastolic dysfunction.  RV mildly reduced.  Left atrial dilatation mild.  Mild AI.    Telemetry--showed A-fib with RVR  Cardiology was consulted and note appreciated  Recommended continued hydration orally and with IVF  TEDS  Continue to monitor orthostatics--improved with IVF  May consider midodrine if she continues to be symptomatic--defer for now  Encourage oral hydration and compliance with teds.  Increasing metoprolol to 3 a day dosing  "

## 2025-01-10 NOTE — PROGRESS NOTES
"PHYSICAL MEDICINE AND REHABILITATION   PREADMISSION ASSESSMENT     Projected IGC and Rehabilitation Diagnoses:  Impairment of mobility, safety and Activities of Daily Living (ADLs) due to Brain Dysfunction:  02.22  Traumatic, Closed Injury  Etiologic: Acute subdural hemorrhage along the left cerebral convexity;Acute subarachnoid hemorrhage in the left frontoparietal sulci, left sylvian fissure and suprasellar cistern   Date of Onset: 1/4/25   Date of surgery: n/a    PATIENT INFORMATION  Name: Sandra Purvis Phone #: [phone] (home)   Address: [address] [place] [id number]  YOB: 1934 Age: 89 y.o. #   Marital Status: /Civil Union  Ethnicity: Not  or  or Lao  Employment Status: retired  Extended Emergency Contact Information  Primary Emergency Contact: zay ramírez  Mobile Phone: [phone]  Relation: Daughter  Secondary Emergency Contact: Darryl Purvis  Home Phone: [phone]  Relation: Spouse  Advance Directive: DNAR and DNI (no ACP docs)    INSURANCE/COVERAGE:     Primary Payor: MEDICARE / Plan: MEDICARE A AND B / Product Type: Medicare A & B Fee for Service /   Secondary Payer: [employer] #[id number]   Payer Contact:  Payer Contact:   Contact Phone:  Contact Phone:     Authorization #: n/a  Coverage Dates: n/a  LCD: n/a  MEDICARE #: [id number]  Medicare Days: 60/30/60  Medical Record #: [id number]    REFERRAL SOURCE:   Referring provider: Ramon Stern,*  Referring facility: [facility]  Room: [facility] 625/[facility] 625-01  PCP: Berhane Page DO PCP phone number: [phone]    MEDICAL INFORMATION  HPI:   This is a 89-year-old female who presented to [facility] ER on 1/4/25 as a trauma transfer from the [facility] s/p syncope with collapse.  Imaging on 1/4 showed \"comminuted and mildly displaced fractures of the lateral and anterior walls of right maxilla, lateral and inferior walls of " "right orbit. Right inferior orbital wall fracture appears to involve the inferior orbital foramen with associated small extraconal soft tissue edema and focus of air.  No herniation of extraocular muscles.  Nondisplaced fractures of zygomatic arch and the temporal bone at the TMJ.  Diffuse opacification of right maxillary sinus and right nasal cavity with blood products and air\".  OMFS consulted and surgical intervention was discussed with patient by OMFS team but refused by patient.  Patient is to continue with 7-day course of Unasyn/Augmentin and was initiated on sinus precautions.  She is to follow-up with OMFS as an outpatient for continued monitoring and management of fractures.  Neurosurgery was consulted for the SDH/SAH and no surgical intervention was recommended.  Recommended SBP <160.  Neurosurgery signed off.  Patient with c/o abdominal pain and troponin was checked.  Troponin was initially negative and then marques to over 8000. EKG is nonischemic.  Orthostatics positive.  Echocardiogram showed LVEF 70%.  Systolic function vigorous.  Grade 2 diastolic dysfunction.  RV mildly reduced.  Left atrial dilatation mild.  Mild AI.  Telemetry--showed A-fib with RVR.  Cardiology recommended continued hydration orally and with IVF, TEDS, continuing to monitor orthostatics (improved with IVF).  Midodrine added.  On 1/10, her blood pressures remained somewhat elevated.  She was also a bit more tachycardic, therefore her Lopressor was increased to 50 mg every 8 hours initially.  Will transition metoprolol to Toprol-XL 50 mg twice daily for improved heart rate and blood pressure control.  Cardiology recommended starting ASA 81 mg daily when able from bleeding perspective.  Neurosurgery plans to repeat CT in 1 weeks time.  Leukocytosis downtrending and WBC 11.04 on 1/10.  Patient received Unasyn through 1/12.  Patient worked with therapy and was recommended for rehab following her hospitalization.  She has demonstrated " that she can tolerate three hours of therapy, five days a week and is now medically cleared for discharge to the [facility].      Past Medical History:   Past Surgical History:   Allergies:     Past Medical History:   Diagnosis Date    Arthritis     Cataract     LastAssessed:9/8/15    GERD (gastroesophageal reflux disease)     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    Hypertension     PAF (paroxysmal atrial fibrillation) (HCC)     Last Assessed:2/3/16    Wrist fracture     Resolved:9/8/15    Past Surgical History:   Procedure Laterality Date    CATARACT EXTRACTION      Last Assessed:9/8/15    FL RETROGRADE PYELOGRAM  10/17/2021    LYMPHADENECTOMY      Last Assessed:9/8/15    TN CYSTO/URETERO W/LITHOTRIPSY &INDWELL STENT INSRT Right 11/24/2021    Procedure: CYSTOSCOPY URETEROSCOPY WITH LITHOTRIPSY HOLMIUM LASER, RETROGRADE PYELOGRAM AND INSERTION STENT URETERAL;  Surgeon: David Sr MD;  [facility]: [facility];  Service: Urology    TN CYSTOURETHROSCOPY W/URETERAL CATHETERIZATION Right 10/17/2021    Procedure: CYSTOSCOPY RETROGRADE PYELOGRAM WITH INSERTION STENT URETERAL;  Surgeon: Benjie Tinoco MD;  [facility];  Service: Urology    TONSILLECTOMY      Last Assessed:9/8/15    WRIST SURGERY      Last Assessed:9/8/15     Allergies   Allergen Reactions    Lactose - Food Allergy GI Intolerance    Penicillins Other (See Comments)     Unsure of reaction          Medical/functional conditions requiring inpatient rehabilitation:   S/p fall secondary to syncope and collapse resulting in acute subdural hemorrhage along the left cerebral convexity;Acute subarachnoid hemorrhage in the left frontoparietal sulci, left sylvian fissure and suprasellar cistern    Closed extensive facial fractures   Chest pain/epigastric pain  Paroxysmal a-fib  HTN  GERD  Orthostatic hypotension  Acute pain due to trauma  Impaired self care  Impaired mobility    Risk for medical/clinical complications: risk for falls, risk for infection,  risk for aspiration, risk for skin breakdown, risk for DVT/PE, risk for worsening bleed, risk for hospital delirium, risk for hypo/hypertensive episodes     Comorbidities/Surgeries in the last 100 days:   HTN  Paroxysmal afib  CKD  Malignant melanoma s/p resection    CURRENT VITAL SIGNS:   Temp:  [97.7 °F (36.5 °C)-98.5 °F (36.9 °C)] 97.7 °F (36.5 °C)  HR:  [] 94  BP: (136-171)/(55-79) 171/78  Resp:  [16-17] 17  SpO2:  [93 %-98 %] 96 %   Intake/Output Summary (Last 24 hours) at 1/12/2025 1031  Last data filed at 1/12/2025 1022  Gross per 24 hour   Intake 200 ml   Output 1200 ml   Net -1000 ml        LABORATORY RESULTS:      Lab Results   Component Value Date    HGB 9.9 (L) 01/10/2025    HGB 10.1 (L) 09/02/2015    HCT 31.2 (L) 01/10/2025    HCT 28.9 (L) 09/02/2015    WBC 11.04 (H) 01/10/2025    WBC 19.28 (H) 09/02/2015     Lab Results   Component Value Date    BUN 17 01/10/2025    BUN 42 (H) 09/02/2015     (L) 09/02/2015    K 3.7 01/10/2025    K 4.3 09/02/2015     01/10/2025     09/02/2015    GLUCOSE 104 09/02/2015    CREATININE 0.81 01/10/2025    CREATININE 1.85 (H) 09/02/2015     Lab Results   Component Value Date    PROTIME 14.6 01/04/2025    PROTIME 16.5 (H) 09/01/2015    INR 1.12 01/04/2025    INR 1.35 (H) 09/01/2015        DIAGNOSTIC STUDIES:  CT head wo contrast  Result Date: 1/7/2025  Impression: Evolving blood products in the left convexity subdural hematoma without evidence of new hemorrhage. Subdural and subarachnoid hemorrhage also stable when compared to the most recent prior exam. Workstation performed: [id number]     CT head wo contrast  Result Date: 1/5/2025  Impression: Acute subdural and subarachnoid hemorrhage with some redistribution compared to the prior examination. Subdural hemorrhages largest in the left frontal lobe with mild mass effect upon the adjacent brain parenchyma. No midline shift. No new hemorrhage. Multiple right facial fractures are identified with  hemorrhage filling the right maxillary sinus and a small amount of hemorrhage layering within the left maxillary sinus. There is an extracranial ulcerated soft tissue mass in the left parietal soft tissues. Workstation performed: [id number]     CT cervical spine without contrast  Result Date: 1/4/2025  Impression: 1. No cervical spine fracture or traumatic malalignment. 2. Partially imaged diffuse gaseous distention of the esophagus. Further evaluation with chest CT is recommended to evaluate the distal esophagus. I personally discussed this study with YUMI LAL on 1/4/2025 6:56 PM. Workstation performed: [id number]     CT facial bones without contrast  Result Date: 1/4/2025  Impression: 1. Comminuted and mildly displaced fractures of the lateral and anterior walls of right maxilla, lateral and inferior walls of right orbit. 2. Right inferior orbital wall fracture appears to involve the inferior orbital foramen with associated small extraconal soft tissue edema and focus of air. No herniation of extraocular muscles. 3. Nondisplaced fractures of zygomatic arch and the temporal bone at the TMJ. 4. Diffuse opacification of right maxillary sinus and right nasal cavity with blood products and air. I personally discussed this study with YUMI LAL on 1/4/2025 6:56 PM. Workstation performed: [id number]     CT head without contrast  Result Date: 1/4/2025  Impression: 1. Acute subdural hemorrhage along the left cerebral convexity with maximal thickness of 1.9 cm. No midline shift. 2. Acute subarachnoid hemorrhage in the left frontoparietal sulci, left sylvian fissure and suprasellar cistern. 3. Please refer to the separately dictated CT facial bones report for traumatic findings in the face. I personally discussed this study with YUMI LAL on 1/4/2025 6:56 PM. Workstation performed: [id number]     XR chest 1 view portable  Result Date: 1/4/2025  Impression: No acute disease. Distended air-filled esophagus.  Moderate cardiomegaly. Workstation performed: [id number]       PRECAUTIONS/SPECIAL NEEDS:  Pain Management, Bladder Incontinence: # of accidents 5, Bowel Incontinence: # of accidents 7, Dietary Restrictions: regular diet with nutrition supplements, Language Preference: English, and fall precautions, seizure precautions    MEDICATIONS:     Current Facility-Administered Medications:     acetaminophen (TYLENOL) tablet 975 mg, 975 mg, Oral, Q8H [facility], Theodora Cooper MD, 975 mg at 01/12/25 0517    bisacodyl (DULCOLAX) rectal suppository 10 mg, 10 mg, Rectal, Daily PRN, Theodora Cooper MD    calcium carbonate (TUMS) chewable tablet 500 mg, 500 mg, Oral, Daily PRN, Barbara Mcgill DO    chlorhexidine (PERIDEX) 0.12 % oral rinse 15 mL, 15 mL, Mouth/Throat, Q12H ELIZABETH, Theodora Cooper MD, 15 mL at 01/12/25 0832    enoxaparin (LOVENOX) subcutaneous injection 30 mg, 30 mg, Subcutaneous, Q24H, Ramon Stern DO, 30 mg at 01/11/25 2129    labetalol (NORMODYNE) injection 10 mg, 10 mg, Intravenous, Q8H PRN, Barbara Mcgill DO, 10 mg at 01/10/25 2236    melatonin tablet 3 mg, 3 mg, Oral, HS, Theodora Cooper MD, 3 mg at 01/12/25 0424    metoprolol succinate (TOPROL-XL) 24 hr tablet 50 mg, 50 mg, Oral, BID, Rica Che MD, 50 mg at 01/12/25 0833    midodrine (PROAMATINE) tablet 2.5 mg, 2.5 mg, Oral, BID AC, LUIS Hicks, 2.5 mg at 01/12/25 0833    naloxone (NARCAN) 0.04 mg/mL syringe 0.04 mg, 0.04 mg, Intravenous, Q1MIN PRN, Theodora Cooper MD    ondansetron (ZOFRAN) injection 4 mg, 4 mg, Intravenous, Q4H PRN, Theodora Cooper MD    oxyCODONE (ROXICODONE) split tablet 2.5 mg, 2.5 mg, Oral, Q4H PRN, 2.5 mg at 01/12/25 0257 **OR** oxyCODONE (ROXICODONE) IR tablet 5 mg, 5 mg, Oral, Q4H PRN, Theodora Cooper MD    pantoprazole (PROTONIX) EC tablet 40 mg, 40 mg, Oral, BID, Barbara Mcgill DO, 40 mg at 01/12/25 0832    polyethylene glycol (MIRALAX) packet 17 g, 17 g, Oral, Daily, Theodora Cooper MD,  17 g at 01/12/25 0833    senna-docusate sodium (SENOKOT S) 8.6-50 mg per tablet 1 tablet, 1 tablet, Oral, HS, Theodora Cooper MD, 1 tablet at 01/11/25 2286    SKIN INTEGRITY:   Left arm wound    PRIOR LEVEL OF FUNCTION:  She lives in a(n) single family home  Sandra Purvis is  and lives with their spouse.  Self Care: Independent, Indoor Mobility: Independent, Stairs (in/outdoor): Independent, and Cognition: Independent    FALLS IN THE LAST 6 MONTHS: 1    HOME ENVIRONMENT:  The living area:  patient lives in a 1 story home  There are 3 steps to enter the home.    The patient will have 24 hour supervision available upon discharge.    PREVIOUS DME:  Equipment in home (previous DME): Shower Chair, Grab Bars, and Rolling Walker    FUNCTIONAL STATUS:  Physical Therapy Occupational Therapy Speech Therapy   01/10/25 1040    PT Last Visit   PT Visit Date 01/10/25   Note Type   Note Type Treatment for insurance authorization   Pain Assessment   Pain Score No Pain   Restrictions/Precautions   Other Precautions Cognitive;Chair Alarm;Bed Alarm;Pain;Fall Risk   Subjective   Subjective Pt encountered supine in bed, pleasant and motivated to participate in PT.  Reports no complaints at rest, but does report wooziness with sitting & standing, made worse with ambulation, then residing with seated rest.   Bed Mobility   Supine to Sit 3  Moderate assistance   Additional items Assist x 1   Transfers   Sit to Stand 3  Moderate assistance   Additional items Assist x 1   Stand to Sit 3  Moderate assistance   Additional items Assist x 1   Stand pivot 3  Moderate assistance   Additional items Assist x 2   Ambulation/Elevation   Gait pattern Short stride;Inconsistent martine;Shuffling;Decreased foot clearance;Narrow DANIEL;Improper Weight shift;Poor UE support  (tiny, shuffling steps, instructions for correction)   Gait Assistance 3  Moderate assist   Additional items Assist x 1  (+ chair follow)   Assistive Device Rolling walker    Distance ~5'   Balance   Static Sitting Fair -   Dynamic Sitting Fair -   Static Standing Poor   Dynamic Standing Poor   Ambulatory Poor   Activity Tolerance   Activity Tolerance Patient tolerated treatment well;Patient limited by fatigue   Nurse Made Aware CHACHA Cummins   Assessment   Prognosis Good   Problem List Decreased strength;Decreased endurance;Impaired balance;Decreased mobility;Decreased safety awareness   Assessment Pt participated in PT sessino consisting of funcitonal transfers & short distance giat trial as noted above.  She performs tasks largely with mod A x1 today, primarily due to impaired balance, motor planning & anxiousness regarding mobility despite aforementioned motivation.  She is retropulsive with positional change, especially in standing, which improved slightly during gait trial when she was required to be more reliant on UEs for support.  BP taken during session, and although it still drops signficiantly from sitting to standing, it remained WFL despite pt's subjective complaints.  Seated rests taken to recover betwen tasks as precautionn.  Pt ambulates with gait deviations as noted above, and requires excessive work to ambualte such short distances due to tiny stride length & guarded posture that results in rapid fatigue & worsensing symptoms as noted above.  Anticipate this will improve with ongoing healing, daily mobiilty with staff as tolerated & PT interventions to progress to highest level of funcitonal independence.    01/10/25 1041    OT Last Visit   OT Visit Date 01/10/25   Note Type   Note Type Treatment   Pain Assessment   Pain Assessment Tool 0-10   Pain Score No Pain   Restrictions/Precautions   Other Precautions Cognitive;Chair Alarm;Bed Alarm   ADL   Grooming Assistance 5  Supervision/Setup   Activity Tolerance   Activity Tolerance Patient tolerated treatment well   Assessment   Assessment pt participated in am ot session and was seen focusing on adl tasks, bed mobility,  functional mobility and transfers as well as orthostatic assessment in supine / sit /stance / sit and piost walk.  pt requried total asst to jono KNEE HIGH teds which were in her room. pt reports some dizziness with position changes wayne in stance. pt requires mod asst lb adls min asst ub and is very slow moving, needing asst to complete tasks at times. pt is very retropulsive in stance and walks only short distance very slowly using small steps. pt is at great risk for falls. pt was noted to tell storries from her life / past. she requested asst to call her  was was completed this session. pts balance is poor and activity tolerence is p+ this session.  pt was positioned in chair with alarm engaged and nsg aware. will follow focusing on goals from ie.    [facility] Language/Pathology     Speech/Language Pathology Progress Note     Patient Name: Sandra Purvis  Today's Date: 1/9/2025     Problem List  Principal Problem:    SDH (subdural hematoma) (HCC)  Active Problems:    Hypertension    Paroxysmal atrial fibrillation (HCC)    GERD (gastroesophageal reflux disease)    SAH (subarachnoid hemorrhage) (HCC)    Closed extensive facial fractures (HCC)    Fall    Chest pain    Orthostatic hypotension    Syncope        Past Medical History  Medical History        Past Medical History:   Diagnosis Date    Arthritis      Cataract       LastAssessed:9/8/15    GERD (gastroesophageal reflux disease)      Hypertaurodontism       Last Assessed: 9/8/15    Hypertension      PAF (paroxysmal atrial fibrillation) ([facility])       Last Assessed:2/3/16    Wrist fracture       Resolved:9/8/15            Past Surgical History  Surgical History         Past Surgical History:   Procedure Laterality Date    CATARACT EXTRACTION         Last Assessed:9/8/15    FL RETROGRADE PYELOGRAM   10/17/2021    LYMPHADENECTOMY         Last Assessed:9/8/15    [place] CYSTO/URETERO W/LITHOTRIPSY &INDWELL STENT INSRT Right 11/24/2021     Procedure: CYSTOSCOPY  "URETEROSCOPY WITH LITHOTRIPSY HOLMIUM LASER, RETROGRADE PYELOGRAM AND INSERTION STENT URETERAL;  Surgeon: David Sr MD;  [facility];  Service: Urology    [place] CYSTOURETHROSCOPY W/URETERAL CATHETERIZATION Right 10/17/2021     Procedure: CYSTOSCOPY RETROGRADE PYELOGRAM WITH INSERTION STENT URETERAL;  Surgeon: Benjie Tinoco MD;  [facility];  Service: Urology    TONSILLECTOMY         Last Assessed:9/8/15    WRIST SURGERY         Last Assessed:9/8/15               Subjective:  \"The food is good\" Patient is awake and alert.      Objective:  The patient is seen for dysphagia therapy at lunch meal. She is assessed with regular solids, including crab cakes and carrots. Bite size and rate is adequate. Mastication is timely and efficient. No oral residue observed. The patient takes sips of thin liquids via straw. No overt s/s aspiration observed. Patient denies complaints or concerns with tolerance of regular diet.      Assessment:  The patient is tolerating regular solids and thin liquids well.      Plan/Recommendations:  Continue regular diet. No further ST warranted. Please re-consult with concerns.      CARE SCORES:  Self Care:  Eatin: Setup or clean-up assistance  Oral hygiene: 04: Supervision or touching  assistance  Toilet hygiene: 09: Not applicable  Shower/bathing self: 09: Not applicable  Upper body dressin: Partial/moderate assistance  Lower body dressin: Partial/moderate assistance  Putting on/taking off footwear: 03: Partial/moderate assistance  Transfers:  Roll left and right: 09: Not applicable  Sit to lyin: Not applicable  Lying to sitting on side of bed: 03: Partial/moderate assistance  Sit to stand: 03: Partial/moderate assistance  Chair/bed to chair transfer: 03: Partial/moderate assistance  Toilet transfer: 09: Not applicable  Mobility:  Walk 10 ft: 88: Not attempted due to medical conditions or safety concerns  Walk 50 ft with two turns: 88: Not attempted due to " medical conditions or safety concerns  Walk 150ft: 88: Not attempted due to medical conditions or safety concerns    CURRENT GAP IN FUNCTION  Prior to Admission: Functional Status: Patient was not independent with mobility/ambulation, transfers, ADL's, IADL's.    Expected functional outcomes: It is expected that with skilled acute rehabilitation services the patient will progress to Supervision for self care and Supervision for mobility     Estimated length of stay: 10 to 14 days    Anticipated Post-Discharge Disposition/Treatment  Disposition: Return to previous home/apartment.  Outpatient Services: Physical Therapy (PT) and Occupational Therapy (OT)    BARRIERS TO DISCHARGE  Home Accessibility and Caregiver Accessibility;Decreased strength;Decreased endurance;Impaired balance;Decreased mobility;Decreased safety awareness;Decreased ADL status;Decreased Safe judgement during ADL;Decreased cognition;Decreased self-care trans;Decreased high-level ADLs     INTERVENTIONS FOR DISCHARGE  Functional transfer training;LE strengthening/ROM;Endurance training;Therapeutic exercise;Patient/family training;Equipment eval/education;Gait training;Bed mobility;ADL retraining;Cognitive reorientation;Compensatory technique education;Energy conservation;Activity engagement     REQUIRED THERAPY:  Patient will require PT and OT 90 minutes each per day, five days per week to achieve rehab goals.     REQUIRED FUNCTIONAL AND MEDICAL MANAGEMENT FOR INPATIENT REHABILITATION:  Skin:  There are no pressure sores currently, patient will need close monitoring of her skin for any breakdown secondary to decreased mobility.  She will also need close monitoring of wounds on her arm for any complications, Pain Management: Overall pain is moderately controlled, Deep Vein Thrombosis (DVT) Prophylaxis:  low molecular weight heparin and SCD's while in bed, PT and OT interventions, any labs, consults, imaging and medication adjustments patient may need  "while on ARC    RECOMMENDED LEVEL OF CARE:  This is a 89-year-old female who presented to [facility] ER on 1/4/25 as a trauma transfer from the [facility] s/p syncope with collapse.  Imaging on 1/4 showed \"comminuted and mildly displaced fractures of the lateral and anterior walls of right maxilla, lateral and inferior walls of right orbit. Right inferior orbital wall fracture appears to involve the inferior orbital foramen with associated small extraconal soft tissue edema and focus of air.  No herniation of extraocular muscles.  Nondisplaced fractures of zygomatic arch and the temporal bone at the TMJ.  Diffuse opacification of right maxillary sinus and right nasal cavity with blood products and air\".  OMFS consulted and surgical intervention was discussed with patient by OMFS team but refused by patient.  Patient is to continue with 7-day course of Unasyn/Augmentin and was initiated on sinus precautions.  She is to follow-up with OMFS as an outpatient for continued monitoring and management of fractures.  Neurosurgery was consulted for the SDH/SAH and no surgical intervention was recommended.  Recommended SBP <160.  Neurosurgery signed off.  Patient with c/o abdominal pain and troponin was checked.  Troponin was initially negative and then marques to over 8000. EKG is nonischemic.  Orthostatics positive.  Echocardiogram showed LVEF 70%.  Systolic function vigorous.  Grade 2 diastolic dysfunction.  RV mildly reduced.  Left atrial dilatation mild.  Mild AI.  Telemetry--showed A-fib with RVR.  Cardiology recommended continued hydration orally and with IVF, TEDS, continuing to monitor orthostatics (improved with IVF).  Midodrine added.  On 1/10, her blood pressures remained somewhat elevated.  She was also a bit more tachycardic, therefore her Lopressor was increased to 50 mg every 8 hours initially.  Will transition metoprolol to Toprol-XL 50 mg twice daily for improved heart rate and blood pressure " control.  Cardiology recommended starting ASA 81 mg daily when able from bleeding perspective.  Neurosurgery plans to repeat CT in 1 weeks time.  Leukocytosis downtrending and WBC 11.04 on 1/10.  Patient received Unasyn through 1/12.  Patient worked with therapy and was recommended for rehab following her hospitalization.  She has demonstrated that she can tolerate three hours of therapy, five days a week and is now medically cleared for discharge to the [facility].      Prior to admission, patient was independent with her ADLs and ambulated without an AD.  Her daughter assisted with heavy IADLs.  She did not drive.  She is now functioning below her baseline requiring supervision to mod assistance to complete her ADLs.  With PT, she is requiring mod x 1 assistance for bed mobility and mod x 1-mod x 2 assistance for transfers.  She ambulated about 5 feet with a RW and mod x 1 plus a chair follow assistance.  Gait pattern: short stride, inconsistent martine, shuffling, decreased foot clearance, narrow DANIEL, improper weight shift and poor UE support.    Patient requires close oversight by a physician, PM&R management, 24/7 rehabilitation nursing care and specialized interdisciplinary therapy services in preparation for discharge which can only be provided in the inpatient acute rehabilitation setting.  While on [facility], this patient will need a bowel and bladder program, routine neuro checks and close monitoring of her VS, I/Os, skin, pain level and her overall condition.  Inpatient acute rehabilitation is recommended for this patient to maximize her overall strength, endurance, self-care and mobility upon discharge upon discharge back to her home with the support of her family.

## 2025-01-11 PROCEDURE — 99232 SBSQ HOSP IP/OBS MODERATE 35: CPT | Performed by: SURGERY

## 2025-01-11 PROCEDURE — NC001 PR NO CHARGE: Performed by: STUDENT IN AN ORGANIZED HEALTH CARE EDUCATION/TRAINING PROGRAM

## 2025-01-11 PROCEDURE — 99232 SBSQ HOSP IP/OBS MODERATE 35: CPT | Performed by: INTERNAL MEDICINE

## 2025-01-11 RX ADMIN — ACETAMINOPHEN 975 MG: 325 TABLET, FILM COATED ORAL at 21:30

## 2025-01-11 RX ADMIN — MIDODRINE HYDROCHLORIDE 2.5 MG: 2.5 TABLET ORAL at 17:34

## 2025-01-11 RX ADMIN — ENOXAPARIN SODIUM 30 MG: 60 INJECTION SUBCUTANEOUS at 21:29

## 2025-01-11 RX ADMIN — PANTOPRAZOLE SODIUM 40 MG: 40 TABLET, DELAYED RELEASE ORAL at 08:55

## 2025-01-11 RX ADMIN — SENNOSIDES AND DOCUSATE SODIUM 1 TABLET: 50; 8.6 TABLET ORAL at 21:39

## 2025-01-11 RX ADMIN — LEVETIRACETAM 500 MG: 500 TABLET, FILM COATED ORAL at 21:30

## 2025-01-11 RX ADMIN — SODIUM CHLORIDE 3 G: 9 INJECTION, SOLUTION INTRAVENOUS at 23:01

## 2025-01-11 RX ADMIN — POLYETHYLENE GLYCOL 3350 17 G: 17 POWDER, FOR SOLUTION ORAL at 08:55

## 2025-01-11 RX ADMIN — ACETAMINOPHEN 975 MG: 325 TABLET, FILM COATED ORAL at 14:19

## 2025-01-11 RX ADMIN — METOPROLOL SUCCINATE 50 MG: 50 TABLET, EXTENDED RELEASE ORAL at 21:30

## 2025-01-11 RX ADMIN — MIDODRINE HYDROCHLORIDE 2.5 MG: 2.5 TABLET ORAL at 06:08

## 2025-01-11 RX ADMIN — SODIUM CHLORIDE 3 G: 9 INJECTION, SOLUTION INTRAVENOUS at 11:32

## 2025-01-11 RX ADMIN — PANTOPRAZOLE SODIUM 40 MG: 40 TABLET, DELAYED RELEASE ORAL at 17:34

## 2025-01-11 RX ADMIN — CHLORHEXIDINE GLUCONATE 0.12% ORAL RINSE 15 ML: 1.2 LIQUID ORAL at 08:55

## 2025-01-11 RX ADMIN — METOPROLOL SUCCINATE 50 MG: 50 TABLET, EXTENDED RELEASE ORAL at 08:55

## 2025-01-11 RX ADMIN — LEVETIRACETAM 500 MG: 500 TABLET, FILM COATED ORAL at 08:55

## 2025-01-11 RX ADMIN — CHLORHEXIDINE GLUCONATE 0.12% ORAL RINSE 15 ML: 1.2 LIQUID ORAL at 21:29

## 2025-01-11 RX ADMIN — SODIUM CHLORIDE 3 G: 9 INJECTION, SOLUTION INTRAVENOUS at 17:42

## 2025-01-11 RX ADMIN — ACETAMINOPHEN 975 MG: 325 TABLET, FILM COATED ORAL at 06:08

## 2025-01-11 RX ADMIN — SODIUM CHLORIDE 3 G: 9 INJECTION, SOLUTION INTRAVENOUS at 06:08

## 2025-01-11 NOTE — ASSESSMENT & PLAN NOTE
Current TTE LVEF 70%.  Systolic function vigorous.  Grade 2 diastolic dysfunction.  RV mildly reduced.  Left atrial dilatation mild.  Mild AI.  Mild TR.  Trivial pericardial effusion.  On Lopressor 25 mg every 12 hours.   Patient presented with sinus bradycardia.  Cardiology evaluation she was in controlled atrial fibrillation.  No bradycardia.  Not on AC PTA, anticoagulation contraindicated at this time due to SDH and SAH.  [id number] equals 4 after repeat head imaging she would need re evaluation for candidacy.   Does not appear she currently follows cardiology.  Patient with PAF per PCP notes.  Takes Toprol 50 mg daily at home.  She was mildly tachycardic over the past 2 days, and therefore we increased her metoprolol dosing -would not further uptitrate considering orthostasis and need for midodrine

## 2025-01-11 NOTE — ASSESSMENT & PLAN NOTE
Secondary to fall  Initial CTH on 1/4: Acute subdural hemorrhage along the left cerebral convexity with maximal thickness of 1.9 cm. No midline shift. Acute subarachnoid hemorrhage in the left frontoparietal sulci, left sylvian fissure and suprasellar cistern  Repeat CTH on 1/5: stable  Neurosurgery consulted, appreciate recs  No reversal needed due to patient not taking any AP/AC  Maintain normotension, goal SBP less than 160  Continue on 7 day course of Keppra for seizure prophylaxis  Hold all anticoagulant or antiplatelet medications until patient follows up with neurosurgery in 2 weeks with repeat CT head.

## 2025-01-11 NOTE — PLAN OF CARE
Problem: PAIN - ADULT  Goal: Verbalizes/displays adequate comfort level or baseline comfort level  Description: Interventions:  - Encourage patient to monitor pain and request assistance  - Assess pain using appropriate pain scale  - Administer analgesics based on type and severity of pain and evaluate response  - Implement non-pharmacological measures as appropriate and evaluate response  - Consider cultural and social influences on pain and pain management  - Notify physician/advanced practitioner if interventions unsuccessful or patient reports new pain  Outcome: Progressing     Problem: INFECTION - ADULT  Goal: Absence or prevention of progression during hospitalization  Description: INTERVENTIONS:  - Assess and monitor for signs and symptoms of infection  - Monitor lab/diagnostic results  - Monitor all insertion sites, i.e. indwelling lines, tubes, and drains  - Monitor endotracheal if appropriate and nasal secretions for changes in amount and color  - [place] appropriate cooling/warming therapies per order  - Administer medications as ordered  - Instruct and encourage patient and family to use good hand hygiene technique  - Identify and instruct in appropriate isolation precautions for identified infection/condition  Outcome: Progressing  Goal: Absence of fever/infection during neutropenic period  Description: INTERVENTIONS:  - Monitor WBC    Outcome: Progressing     Problem: SAFETY ADULT  Goal: Patient will remain free of falls  Description: INTERVENTIONS:  - Educate patient/family on patient safety including physical limitations  - Instruct patient to call for assistance with activity   - Consult OT/PT to assist with strengthening/mobility   - Keep Call bell within reach  - Keep bed low and locked with side rails adjusted as appropriate  - Keep care items and personal belongings within reach  - Initiate and maintain comfort rounds  - Make Fall Risk Sign visible to staff  - Offer Toileting every  Hours,  in advance of need  - Initiate/Maintain alarm  - Obtain necessary fall risk management equipment:   - Apply yellow socks and bracelet for high fall risk patients  - Consider moving patient to room near nurses station  Outcome: Progressing  Goal: Maintain or return to baseline ADL function  Description: INTERVENTIONS:  -  Assess patient's ability to carry out ADLs; assess patient's baseline for ADL function and identify physical deficits which impact ability to perform ADLs (bathing, care of mouth/teeth, toileting, grooming, dressing, etc.)  - Assess/evaluate cause of self-care deficits   - Assess range of motion  - Assess patient's mobility; develop plan if impaired  - Assess patient's need for assistive devices and provide as appropriate  - Encourage maximum independence but intervene and supervise when necessary  - Involve family in performance of ADLs  - Assess for home care needs following discharge   - Consider OT consult to assist with ADL evaluation and planning for discharge  - Provide patient education as appropriate  Outcome: Progressing  Goal: Maintains/Returns to pre admission functional level  Description: INTERVENTIONS:  - Perform AM-PAC 6 Click Basic Mobility/ Daily Activity assessment daily.  - Set and communicate daily mobility goal to care team and patient/family/caregiver.   - Collaborate with rehabilitation services on mobility goals if consulted  - Perform Range of Motion  times a day.  - Reposition patient every  hours.  - Dangle patient  times a day  - Stand patient  times a day  - Ambulate patient  times a day  - Out of bed to chair  times a day   - Out of bed for meals  times a day  - Out of bed for toileting  - Record patient progress and toleration of activity level   Outcome: Progressing     Problem: DISCHARGE PLANNING  Goal: Discharge to home or other facility with appropriate resources  Description: INTERVENTIONS:  - Identify barriers to discharge w/patient and caregiver  - Arrange for  needed discharge resources and transportation as appropriate  - Identify discharge learning needs (meds, wound care, etc.)  - Arrange for interpretive services to assist at discharge as needed  - Refer to Case Management Department for coordinating discharge planning if the patient needs post-hospital services based on physician/advanced practitioner order or complex needs related to functional status, cognitive ability, or social support system  Outcome: Progressing     Problem: Knowledge Deficit  Goal: Patient/family/caregiver demonstrates understanding of disease process, treatment plan, medications, and discharge instructions  Description: Complete learning assessment and assess knowledge base.  Interventions:  - Provide teaching at level of understanding  - Provide teaching via preferred learning methods  Outcome: Progressing     Problem: Nutrition/Hydration-ADULT  Goal: Nutrient/Hydration intake appropriate for improving, restoring or maintaining nutritional needs  Description: Monitor and assess patient's nutrition/hydration status for malnutrition. Collaborate with interdisciplinary team and initiate plan and interventions as ordered.  Monitor patient's weight and dietary intake as ordered or per policy. Utilize nutrition screening tool and intervene as necessary. Determine patient's food preferences and provide high-protein, high-caloric foods as appropriate.     INTERVENTIONS:  - Monitor oral intake, urinary output, labs, and treatment plans  - Assess nutrition and hydration status and recommend course of action  - Evaluate amount of meals eaten  - Assist patient with eating if necessary   - Allow adequate time for meals  - Recommend/ encourage appropriate diets, oral nutritional supplements, and vitamin/mineral supplements  - Order, calculate, and assess calorie counts as needed  - Recommend, monitor, and adjust tube feedings and TPN/PPN based on assessed needs  - Assess need for intravenous fluids  -  Provide specific nutrition/hydration education as appropriate  - Include patient/family/caregiver in decisions related to nutrition  Outcome: Progressing     Problem: Potential for Falls  Goal: Patient will remain free of falls  Description: INTERVENTIONS:  - Educate patient/family on patient safety including physical limitations  - Instruct patient to call for assistance with activity   - Consult OT/PT to assist with strengthening/mobility   - Keep Call bell within reach  - Keep bed low and locked with side rails adjusted as appropriate  - Keep care items and personal belongings within reach  - Initiate and maintain comfort rounds  - Make Fall Risk Sign visible to staff  - Offer Toileting every  Hours, in advance of need  - Initiate/Maintain alarm  - Obtain necessary fall risk management equipment  - Apply yellow socks and bracelet for high fall risk patients  - Consider moving patient to room near nurses station  Outcome: Progressing     Problem: Prexisting or High Potential for Compromised Skin Integrity  Goal: Skin integrity is maintained or improved  Description: INTERVENTIONS:  - Identify patients at risk for skin breakdown  - Assess and monitor skin integrity  - Assess and monitor nutrition and hydration status  - Monitor labs   - Assess for incontinence   - Turn and reposition patient  - Assist with mobility/ambulation  - Relieve pressure over bony prominences  - Avoid friction and shearing  - Provide appropriate hygiene as needed including keeping skin clean and dry  - Evaluate need for skin moisturizer/barrier cream  - Collaborate with interdisciplinary team   - Patient/family teaching  - Consider wound care consult   Outcome: Progressing

## 2025-01-11 NOTE — ASSESSMENT & PLAN NOTE
"Described as \"fainting\"--no history or associated symptoms  Troponin was initially negative and then marques to over 8000.  EKG is nonischemic  Orthostatics positive this morning 1/8  Echocardiogram showed LVEF 70%.  Systolic function vigorous.  Grade 2 diastolic dysfunction.  RV mildly reduced.  Left atrial dilatation mild.  Mild AI.    Telemetry--showed A-fib with RVR  Cardiology was consulted and note appreciated  Recommended continued hydration orally and with IVF  TEDS  Continue to monitor orthostatics  Midodrine restarted at 2.5mg BID  Increasing metoprolol to 3 a day dosing  "

## 2025-01-11 NOTE — ASSESSMENT & PLAN NOTE
Takes Toprol 50 mg daily at home.  Currently on Lopressor 25 mg every 12 hours.  HCTZ on hold.  Will not resume due to orthostasis.  Goal SBP per neurosurgery due to head bleed SBP less than 160.  Blood pressures remain somewhat elevated today.  She is a bit more tachycardic, therefore we increased her Lopressor to 50 mg every 8 hours initially  Will transition metoprolol to Toprol-XL 50 mg twice daily for improved heart rate and blood pressure control  Will likely need to except a slightly higher blood pressures at baseline due to significant orthostasis

## 2025-01-11 NOTE — ASSESSMENT & PLAN NOTE
Patient presented with syncope preceded by lightheadedness  Found to be significantly orthostatic  Remains on telemetry no evidence of bradycardia or significant pauses  Suspect due to orthostatic hypotension.  Recently has been getting lightheaded upon standing.  She does not drink fluids consistently at home.    HCTZ on hold, will not continue on discharge  Compression stockings, p.o. hydration, and midodrine started

## 2025-01-11 NOTE — ASSESSMENT & PLAN NOTE
Brendon BP 70/40 during physical therapy at time of cardiology consult. Patient was symptomatic of lightheadedness and presyncope.  Had gentle fluids repeat orthostatics positive but not as profound.  Patient asymptomatic.  Given additional IV fluids.  We added BALJIT hose stockings.  Recommended at least 60 ounces of fluid at home on a day.  Repeat orthostatics lying 164/74, sitting 161/81 and standing 159/79.  Interestingly now becoming more hypertensive   Patient has recently been get lightheaded upon position changes at home.  She believes this is what caused her syncope.  Continue to encourage p.o. hydration  With orthostasis noted yesterday morning, we started on low-dose midodrine therapy at 2.5 milligrams twice daily  Repeat orthostatics today to assess for continued symptoms

## 2025-01-11 NOTE — PROGRESS NOTES
"Progress Note - Trauma   Name: Sandra Purvis 89 y.o. female I MRN: [id number]  Unit/Bed#: [facility] 625-01 I Date of Admission: 1/4/2025   Date of Service: 1/11/2025 I Hospital Day: 7    Assessment & Plan  Fall  Described as \"fainting\"--no history or associated symptoms  Troponin was initially negative and then marques to over 8000.  EKG is nonischemic  Orthostatics positive this morning 1/8  Echocardiogram showed LVEF 70%.  Systolic function vigorous.  Grade 2 diastolic dysfunction.  RV mildly reduced.  Left atrial dilatation mild.  Mild AI.    Telemetry--showed A-fib with RVR  Cardiology was consulted and note appreciated  Recommended continued hydration orally and with IVF  TEDS  Continue to monitor orthostatics  Midodrine restarted at 2.5mg BID  Increasing metoprolol to 3 a day dosing  SDH (subdural hematoma) (HCC)  Secondary to fall  Initial CTH on 1/4: Acute subdural hemorrhage along the left cerebral convexity with maximal thickness of 1.9 cm. No midline shift. Acute subarachnoid hemorrhage in the left frontoparietal sulci, left sylvian fissure and suprasellar cistern  Repeat CTH on 1/5: stable  Neurosurgery consulted, appreciate recs  No reversal needed due to patient not taking any AP/AC  Maintain normotension, goal SBP less than 160  Continue on 7 day course of Keppra for seizure prophylaxis  Hold all anticoagulant or antiplatelet medications until patient follows up with neurosurgery in 2 weeks with repeat CT head.  SAH (subarachnoid hemorrhage) (HCC)  See above  Closed extensive facial fractures (HCC)  Secondary to fall  1/4 Initial CT imaging of face showed Comminuted and mildly displaced fractures of the lateral and anterior walls of right maxilla, lateral and inferior walls of right orbit. Right inferior orbital wall fracture appears to involve the inferior orbital foramen with associated small extraconal soft tissue edema and focus of air. No herniation of extraocular muscles. Nondisplaced fractures of " zygomatic arch and the temporal bone at the TMJ. Diffuse opacification of right maxillary sinus and right nasal cavity with blood products and air.  OMFS consulted and note appreciated  Surgical intervention discussed with patient by OMFS team, but refused by patient  Continue with 7-day course of Unasyn/Augmentin  Sinus precautions  Follow-up with OMFS as an outpatient for continued monitoring and management of fractures.  Paroxysmal atrial fibrillation (HCC)  Increase metoprolol to 3 times daily dosing  Currently not on AC  Follow-up with cardiology as an outpatient  Hypertension  Increased home metoprolol to 3 times daily  GERD (gastroesophageal reflux disease)  Protonix initiated--symptoms improved  Chest pain  Patient was noted to be experiencing chest pain/epigastric pain on 1/7  EKG was nonischemic but troponins elevated to greater than 8000.  Cardiology was consulted and note appreciated  Not a candidate for ischemic evaluation  Recommend starting ASA 81 mg daily; will hold for now in the setting of TBI  Syncope  See Fall  Orthostatic hypotension  See Fall    Bowel Regimen: Ordered  VTE Prophylaxis:VTE covered by:  enoxaparin, Subcutaneous, 30 mg at 01/10/25 2209      Disposition: Pending placement, accepted to [facility] pending Cardiology clearance for discharge    24 Hour Events : No acute overnight events  Subjective : Slept well, pain is well controlled. Tolerating PO intake. Voiding spontaneously and having bowel function. Denies any new or worsening complaints or concerns.    Objective :  Temp:  [98.2 °F (36.8 °C)-98.8 °F (37.1 °C)] 98.2 °F (36.8 °C)  HR:  [] 98  BP: (114-190)/(54-94) 160/82  Resp:  [17-18] 18  SpO2:  [95 %-98 %] 96 %  O2 Device: None (Room air)    I/O         01/09 0701  01/10 0700 01/10 0701 01/11 0700 01/11 0701 01/12 0700    P.O. 120 480     Total Intake(mL/kg) 120 (2.3) 480 (9.4)     Urine (mL/kg/hr) 1300 (1.1) 450 (0.4)     Stool 0      Total Output 1300 450     Net -1180  +30            Unmeasured Stool Occurrence 1 x            Lines/Drains/Airways       Active Status       Name Placement date Placement time Site Days    External Urinary Catheter 01/05/25  0100  -- 6                  Physical Exam    GENERAL: VS reviewed. NAD, awake, alert.  NEURO: GCS 15, no focal deficits.  HEAD: NC, +ecchymosis to right face, +right periorbital hematoma  EENT: EOMI, MMM.  CV: No pallor. Pulses intact.  LUNGS: No tachypnea or accessory muscle use.  GI: Soft, NT/ND   MSK: Moving all extremities.  SKIN: Warm, dry.  : Voiding.      Lab Results: I have reviewed the following results:  Recent Labs     01/10/25  0541   WBC 11.04*   HGB 9.9*   HCT 31.2*      SODIUM 137   K 3.7      CO2 29   BUN 17   CREATININE 0.81   GLUC 102   MG 2.1   PHOS 3.3       Imaging Results Review: No pertinent imaging studies reviewed.  Other Study Results Review: No additional pertinent studies reviewed.

## 2025-01-11 NOTE — ASSESSMENT & PLAN NOTE
Patient was noted to be experiencing chest pain/epigastric pain on 1/7  EKG was nonischemic but troponins elevated to greater than 8000.  Cardiology was consulted and note appreciated  Not a candidate for ischemic evaluation  Recommend starting ASA 81 mg daily; will hold for now in the setting of TBI

## 2025-01-11 NOTE — PROGRESS NOTES
Cardiology Progress Note - Sandra Purvis 89 y.o. female MRN: [id number]    Unit/Bed#: [facility] 625-01 Encounter: [id number]        Assessment & Plan  Orthostatic hypotension  Brendon BP 70/40 during physical therapy at time of cardiology consult. Patient was symptomatic of lightheadedness and presyncope.  Had gentle fluids repeat orthostatics positive but not as profound.  Patient asymptomatic.  Given additional IV fluids.  We added BALJIT hose stockings.  Recommended at least 60 ounces of fluid at home on a day.  Repeat orthostatics lying 164/74, sitting 161/81 and standing 159/79.  Interestingly now becoming more hypertensive   Patient has recently been get lightheaded upon position changes at home.  She believes this is what caused her syncope.  Continue to encourage p.o. hydration  With orthostasis noted yesterday morning, we started on low-dose midodrine therapy at 2.5 milligrams twice daily  Repeat orthostatics today to assess for continued symptoms  Chest pain  0-hour troponin 1/6 .There was a random troponin yesterday morning 190 followed by 3866.  Repeat troponin checked this morning 8384.  On cardiology evaluation she had complained of abdominal pain.  Patient remains free of chest pain.   EKG has been nonischemic  Ddx includes nonischemic troponin elevation in s/o trauma and brain bleed vs demand ischemia due to PAF and syncope.  She is not a candidate for ischemic evaluation at this time due to advanced age, frailty, and presently with intracranial hemorrhage which would contraindicate any intervention  No new RWMA on echo 1/6/25  Start aspirin 81 mg daily when able from bleeding perspective.  Neurosurgery plans to repeat CT in 1 weeks time  Syncope  Patient presented with syncope preceded by lightheadedness  Found to be significantly orthostatic  Remains on telemetry no evidence of bradycardia or significant pauses  Suspect due to orthostatic hypotension.  Recently has been getting lightheaded upon  standing.  She does not drink fluids consistently at home.    HCTZ on hold, will not continue on discharge  Compression stockings, p.o. hydration, and midodrine started  Paroxysmal atrial fibrillation (HCC)  Current TTE LVEF 70%.  Systolic function vigorous.  Grade 2 diastolic dysfunction.  RV mildly reduced.  Left atrial dilatation mild.  Mild AI.  Mild TR.  Trivial pericardial effusion.  On Lopressor 25 mg every 12 hours.   Patient presented with sinus bradycardia.  Cardiology evaluation she was in controlled atrial fibrillation.  No bradycardia.  Not on AC PTA, anticoagulation contraindicated at this time due to SDH and SAH.  LDP4OA6-DOCe equals 4 after repeat head imaging she would need re evaluation for candidacy.   Does not appear she currently follows cardiology.  Patient with PAF per PCP notes.  Takes Toprol 50 mg daily at home.  She was mildly tachycardic over the past 2 days, and therefore we increased her metoprolol dosing -would not further uptitrate considering orthostasis and need for midodrine  SDH (subdural hematoma) ([facility])  Patient with SDH/SAH.    No surgical intervention planned at this time.  Hypertension  Takes Toprol 50 mg daily at home.  Currently on Lopressor 25 mg every 12 hours.  HCTZ on hold.  Will not resume due to orthostasis.  Goal SBP per neurosurgery due to head bleed SBP less than 160.  Blood pressures remain somewhat elevated today.  She is a bit more tachycardic, therefore we increased her Lopressor to 50 mg every 8 hours initially  Will transition metoprolol to Toprol-XL 50 mg twice daily for improved heart rate and blood pressure control  Will likely need to except a slightly higher blood pressures at baseline due to significant orthostasis  SAH (subarachnoid hemorrhage) ([facility])  No surgical intervention planned at this time  Repeat CT head as per neurosurgical recommendations    Subjective:   Patient seen and examined.  No significant events overnight.  ; pertinent negatives -  "chest pain, chest pressure/discomfort, dyspnea, irregular heart beat, lower extremity edema, and palpitations.    Objective:     Vitals: Blood pressure (!) 171/80, pulse 90, temperature 97.9 °F (36.6 °C), resp. rate 16, height 5' 2\" (1.575 m), weight 51.1 kg (112 lb 9.6 oz), SpO2 97%, not currently breastfeeding., Body mass index is 20.59 kg/m².,   Orthostatic Blood Pressures      Flowsheet Row Most Recent Value   Blood Pressure 171/80 filed at 01/11/2025 0804   Patient Position - Orthostatic VS Standing - Orthostatic VS filed at 01/08/2025 1738              Intake/Output Summary (Last 24 hours) at 1/11/2025 0916  Last data filed at 1/11/2025 0608  Gross per 24 hour   Intake 480 ml   Output 450 ml   Net 30 ml         Physical Exam:    GEN: Sandra Purvis appears well, alert and oriented x 3, pleasant and cooperative   HEENT: pupils equal, round, and reactive to light; extraocular muscles intact  NECK: supple, no carotid bruits   HEART: regular rhythm, normal S1 and S2, no murmurs, clicks, gallops or rubs   LUNGS: clear to auscultation bilaterally; no wheezes, rales, or rhonchi   ABDOMEN: normal bowel sounds, soft, no tenderness, no distention  EXTREMITIES: peripheral pulses normal; no clubbing, cyanosis, or edema  NEURO: no focal findings   SKIN: Significant bruising    Medications:      Current Facility-Administered Medications:     acetaminophen (TYLENOL) tablet 975 mg, 975 mg, Oral, Q8H ELIZABETH, Theodora Cooper MD, 975 mg at 01/11/25 0608    ampicillin-sulbactam (UNASYN) 3 g in sodium chloride 0.9 % 100 mL IVPB, 3 g, Intravenous, Q6H, LUIS Hicks, Last Rate: 200 mL/hr at 01/11/25 0608, 3 g at 01/11/25 0608    bisacodyl (DULCOLAX) rectal suppository 10 mg, 10 mg, Rectal, Daily PRN, Theodora Cooper MD    calcium carbonate (TUMS) chewable tablet 500 mg, 500 mg, Oral, Daily PRN, Barbara Mcgill DO    chlorhexidine (PERIDEX) 0.12 % oral rinse 15 mL, 15 mL, Mouth/Throat, Q12H ELIZABETH, Theodora Cooper MD, 15 " mL at 01/11/25 0855    enoxaparin (LOVENOX) subcutaneous injection 30 mg, 30 mg, Subcutaneous, Q24H, Ramon Stern, DO, 30 mg at 01/10/25 2207    labetalol (NORMODYNE) injection 10 mg, 10 mg, Intravenous, Q8H PRN, Barbara Mcgill DO, 10 mg at 01/10/25 2236    levETIRAcetam (KEPPRA) tablet 500 mg, 500 mg, Oral, Q12H ELIZABETH, LUIS Fair, 500 mg at 01/11/25 0855    metoprolol succinate (TOPROL-XL) 24 hr tablet 50 mg, 50 mg, Oral, BID, Rica Che MD, 50 mg at 01/11/25 0855    midodrine (PROAMATINE) tablet 2.5 mg, 2.5 mg, Oral, BID AC, LUIS Hicks, 2.5 mg at 01/11/25 0608    naloxone (NARCAN) 0.04 mg/mL syringe 0.04 mg, 0.04 mg, Intravenous, Q1MIN PRN, Theodora Cooper MD    ondansetron (ZOFRAN) injection 4 mg, 4 mg, Intravenous, Q4H PRN, Theodora Cooper MD    oxyCODONE (ROXICODONE) split tablet 2.5 mg, 2.5 mg, Oral, Q4H PRN, 2.5 mg at 01/09/25 1815 **OR** oxyCODONE (ROXICODONE) IR tablet 5 mg, 5 mg, Oral, Q4H PRN, Theodora Cooper MD    pantoprazole (PROTONIX) EC tablet 40 mg, 40 mg, Oral, BID, Barbara Mcgill DO, 40 mg at 01/11/25 0855    polyethylene glycol (MIRALAX) packet 17 g, 17 g, Oral, Daily, Theodora Cooper MD, 17 g at 01/11/25 0855    senna-docusate sodium (SENOKOT S) 8.6-50 mg per tablet 1 tablet, 1 tablet, Oral, HS, Theodora Cooper MD, 1 tablet at 01/10/25 2207     Labs & Results:        Results from last 7 days   Lab Units 01/10/25  0541 01/09/25  1244 01/08/25  0828   WBC Thousand/uL 11.04* 13.75* 16.27*   HEMOGLOBIN g/dL 9.9* 9.7* 9.6*   HEMATOCRIT % 31.2* 30.8* 29.8*   PLATELETS Thousands/uL 183 187 188         Results from last 7 days   Lab Units 01/10/25  0541 01/08/25  0828 01/07/25  2313 01/07/25  0532 01/06/25  0452 01/05/25  0749 01/04/25  1743   POTASSIUM mmol/L 3.7 3.7 3.6 3.8   < > 3.9 3.9   CHLORIDE mmol/L 100 103 100 103   < > 102 101   CO2 mmol/L 29 30 28 27   < > 29 27   BUN mg/dL 17 24 28* 25   < > 22 30*   CREATININE mg/dL 0.81 0.99 0.98 0.99    < > 0.94 1.31*   CALCIUM mg/dL 8.6 8.8 9.0 9.1   < > 9.4 9.4   ALK PHOS U/L  --   --   --  52  --  58 63   ALT U/L  --   --   --  32  --  49 68*   AST U/L  --   --   --  26  --  42* 94*    < > = values in this interval not displayed.     Results from last 7 days   Lab Units 01/04/25  1743   INR  1.12   PTT seconds 25     Results from last 7 days   Lab Units 01/10/25  0541 01/07/25  0532 01/06/25  0452   MAGNESIUM mg/dL 2.1 2.1 2.0       Echo: personally reviewed - normal LV size with vigorous LV function. Grade 2 diastolic dysfunction. Mildly reduced RV function. Left atrial enlargement. Mild regurgitation of the aortic valve and tricuspid valve with moderate pulmonary hypertension     EKG personally reviewed by Rica Che MD.

## 2025-01-12 ENCOUNTER — HOSPITAL ENCOUNTER (INPATIENT)
Facility: HOSPITAL | Age: OVER 89
LOS: 2 days | DRG: 950 | End: 2025-01-14
Attending: INTERNAL MEDICINE | Admitting: STUDENT IN AN ORGANIZED HEALTH CARE EDUCATION/TRAINING PROGRAM
Payer: MEDICARE

## 2025-01-12 VITALS
BODY MASS INDEX: 20.72 KG/M2 | RESPIRATION RATE: 17 BRPM | DIASTOLIC BLOOD PRESSURE: 78 MMHG | SYSTOLIC BLOOD PRESSURE: 171 MMHG | OXYGEN SATURATION: 96 % | TEMPERATURE: 97.7 F | HEART RATE: 94 BPM | WEIGHT: 112.6 LBS | HEIGHT: 62 IN

## 2025-01-12 DIAGNOSIS — I10 HYPERTENSION: ICD-10-CM

## 2025-01-12 DIAGNOSIS — I15.9 SECONDARY HYPERTENSION: ICD-10-CM

## 2025-01-12 DIAGNOSIS — K21.00 GASTROESOPHAGEAL REFLUX DISEASE WITH ESOPHAGITIS WITHOUT HEMORRHAGE: ICD-10-CM

## 2025-01-12 DIAGNOSIS — I10 PRIMARY HYPERTENSION: ICD-10-CM

## 2025-01-12 DIAGNOSIS — I48.0 PAROXYSMAL ATRIAL FIBRILLATION (HCC): ICD-10-CM

## 2025-01-12 DIAGNOSIS — I60.9 SAH (SUBARACHNOID HEMORRHAGE) (HCC): ICD-10-CM

## 2025-01-12 DIAGNOSIS — I95.1 ORTHOSTATIC HYPOTENSION: ICD-10-CM

## 2025-01-12 DIAGNOSIS — N18.30 STAGE 3 CHRONIC KIDNEY DISEASE, UNSPECIFIED WHETHER STAGE 3A OR 3B CKD (HCC): Primary | ICD-10-CM

## 2025-01-12 DIAGNOSIS — S06.5XAA SDH (SUBDURAL HEMATOMA) (HCC): ICD-10-CM

## 2025-01-12 PROBLEM — D64.9 ANEMIA: Status: ACTIVE | Noted: 2025-01-12

## 2025-01-12 PROBLEM — I51.89 DIASTOLIC DYSFUNCTION: Status: ACTIVE | Noted: 2025-01-12

## 2025-01-12 PROBLEM — D72.829 LEUKOCYTOSIS: Status: ACTIVE | Noted: 2025-01-12

## 2025-01-12 PROCEDURE — 99223 1ST HOSP IP/OBS HIGH 75: CPT | Performed by: STUDENT IN AN ORGANIZED HEALTH CARE EDUCATION/TRAINING PROGRAM

## 2025-01-12 PROCEDURE — NC001 PR NO CHARGE: Performed by: SURGERY

## 2025-01-12 PROCEDURE — 99232 SBSQ HOSP IP/OBS MODERATE 35: CPT | Performed by: INTERNAL MEDICINE

## 2025-01-12 RX ORDER — ACETAMINOPHEN 325 MG/1
975 TABLET ORAL EVERY 8 HOURS SCHEDULED
Status: DISCONTINUED | OUTPATIENT
Start: 2025-01-12 | End: 2025-01-14 | Stop reason: HOSPADM

## 2025-01-12 RX ORDER — METOPROLOL SUCCINATE 50 MG/1
50 TABLET, EXTENDED RELEASE ORAL 2 TIMES DAILY
Start: 2025-01-12 | End: 2025-01-14

## 2025-01-12 RX ORDER — PANTOPRAZOLE SODIUM 40 MG/1
40 TABLET, DELAYED RELEASE ORAL 2 TIMES DAILY
Status: DISCONTINUED | OUTPATIENT
Start: 2025-01-12 | End: 2025-01-13

## 2025-01-12 RX ORDER — ENOXAPARIN SODIUM 100 MG/ML
30 INJECTION SUBCUTANEOUS EVERY 24 HOURS
Status: DISCONTINUED | OUTPATIENT
Start: 2025-01-12 | End: 2025-01-14 | Stop reason: HOSPADM

## 2025-01-12 RX ORDER — ONDANSETRON 4 MG/1
4 TABLET, ORALLY DISINTEGRATING ORAL EVERY 6 HOURS PRN
Status: DISCONTINUED | OUTPATIENT
Start: 2025-01-12 | End: 2025-01-14 | Stop reason: HOSPADM

## 2025-01-12 RX ORDER — AMOXICILLIN 250 MG
1 CAPSULE ORAL
Status: DISCONTINUED | OUTPATIENT
Start: 2025-01-12 | End: 2025-01-14 | Stop reason: HOSPADM

## 2025-01-12 RX ORDER — POLYETHYLENE GLYCOL 3350 17 G/17G
17 POWDER, FOR SOLUTION ORAL DAILY
Status: DISCONTINUED | OUTPATIENT
Start: 2025-01-13 | End: 2025-01-14 | Stop reason: HOSPADM

## 2025-01-12 RX ORDER — PANTOPRAZOLE SODIUM 40 MG/1
40 TABLET, DELAYED RELEASE ORAL 2 TIMES DAILY
Start: 2025-01-12 | End: 2025-01-14

## 2025-01-12 RX ORDER — OXYCODONE HYDROCHLORIDE 5 MG/1
5 TABLET ORAL EVERY 6 HOURS PRN
Refills: 0 | Status: DISCONTINUED | OUTPATIENT
Start: 2025-01-12 | End: 2025-01-14 | Stop reason: HOSPADM

## 2025-01-12 RX ORDER — BISACODYL 10 MG
10 SUPPOSITORY, RECTAL RECTAL DAILY PRN
Status: DISCONTINUED | OUTPATIENT
Start: 2025-01-12 | End: 2025-01-14 | Stop reason: HOSPADM

## 2025-01-12 RX ORDER — METOPROLOL SUCCINATE 50 MG/1
50 TABLET, EXTENDED RELEASE ORAL 2 TIMES DAILY
Status: DISCONTINUED | OUTPATIENT
Start: 2025-01-12 | End: 2025-01-14

## 2025-01-12 RX ORDER — MIDODRINE HYDROCHLORIDE 2.5 MG/1
2.5 TABLET ORAL
Status: DISCONTINUED | OUTPATIENT
Start: 2025-01-12 | End: 2025-01-14

## 2025-01-12 RX ORDER — MIDODRINE HYDROCHLORIDE 2.5 MG/1
2.5 TABLET ORAL
Start: 2025-01-12 | End: 2025-01-14

## 2025-01-12 RX ORDER — CALCIUM CARBONATE 500 MG/1
500 TABLET, CHEWABLE ORAL DAILY PRN
Status: DISCONTINUED | OUTPATIENT
Start: 2025-01-12 | End: 2025-01-14 | Stop reason: HOSPADM

## 2025-01-12 RX ORDER — OXYCODONE HYDROCHLORIDE 5 MG/1
5 TABLET ORAL EVERY 4 HOURS PRN
Refills: 0 | Status: DISCONTINUED | OUTPATIENT
Start: 2025-01-12 | End: 2025-01-12

## 2025-01-12 RX ADMIN — ACETAMINOPHEN 975 MG: 325 TABLET, FILM COATED ORAL at 05:17

## 2025-01-12 RX ADMIN — CHLORHEXIDINE GLUCONATE 0.12% ORAL RINSE 15 ML: 1.2 LIQUID ORAL at 08:32

## 2025-01-12 RX ADMIN — LEVETIRACETAM 500 MG: 500 TABLET, FILM COATED ORAL at 08:33

## 2025-01-12 RX ADMIN — MIDODRINE HYDROCHLORIDE 2.5 MG: 2.5 TABLET ORAL at 08:33

## 2025-01-12 RX ADMIN — Medication 2.5 MG: at 02:57

## 2025-01-12 RX ADMIN — MELATONIN 3 MG: at 04:24

## 2025-01-12 RX ADMIN — SENNOSIDES AND DOCUSATE SODIUM 1 TABLET: 50; 8.6 TABLET ORAL at 21:58

## 2025-01-12 RX ADMIN — METOPROLOL SUCCINATE 50 MG: 50 TABLET, EXTENDED RELEASE ORAL at 08:33

## 2025-01-12 RX ADMIN — ACETAMINOPHEN 975 MG: 325 TABLET, FILM COATED ORAL at 21:58

## 2025-01-12 RX ADMIN — METOPROLOL SUCCINATE 50 MG: 50 TABLET, EXTENDED RELEASE ORAL at 21:58

## 2025-01-12 RX ADMIN — PANTOPRAZOLE SODIUM 40 MG: 40 TABLET, DELAYED RELEASE ORAL at 08:32

## 2025-01-12 RX ADMIN — SODIUM CHLORIDE 3 G: 9 INJECTION, SOLUTION INTRAVENOUS at 04:24

## 2025-01-12 RX ADMIN — PANTOPRAZOLE SODIUM 40 MG: 40 TABLET, DELAYED RELEASE ORAL at 17:32

## 2025-01-12 RX ADMIN — ENOXAPARIN SODIUM 30 MG: 30 INJECTION SUBCUTANEOUS at 21:58

## 2025-01-12 RX ADMIN — ACETAMINOPHEN 975 MG: 325 TABLET, FILM COATED ORAL at 15:56

## 2025-01-12 RX ADMIN — MIDODRINE HYDROCHLORIDE 2.5 MG: 2.5 TABLET ORAL at 15:56

## 2025-01-12 RX ADMIN — POLYETHYLENE GLYCOL 3350 17 G: 17 POWDER, FOR SOLUTION ORAL at 08:33

## 2025-01-12 NOTE — ASSESSMENT & PLAN NOTE
Large mass on the left forearm that is melanoma  Follows with dermatology as an outpatient last seen in December 2024 but not interested in any treatments

## 2025-01-12 NOTE — ASSESSMENT & PLAN NOTE
Large scalp mass noted on admission.  Also follows with dermatology as an outpatient but declining any interventions  Some localized spot bleeding from the edges noted

## 2025-01-12 NOTE — ASSESSMENT & PLAN NOTE
Secondary to fall  CT of the head on 1/4 also showed comminuted and mildly displaced fractures of the lateral and anterior walls of the right maxilla, lateral and inferior walls of the right orbit, right inferior orbital wall fractures appear to involve the inferior orbital foramen with associated small extraconal soft tissue edema and focus of air.  There is no herniation of extraocular muscles and a nondisplaced fracture of the zygomatic arch as well as the temporal bone at the TMJ.  There is diffuse opacification of the right maxillary sinus and right nasal cavity with blood products and air  Seen by oral maxillofacial surgery team and surgical intervention was declined by patient  7-day course of Unasyn/Augmentin  Sinus precautions  Follow-up with [facility] as an outpatient

## 2025-01-12 NOTE — ASSESSMENT & PLAN NOTE
Most recent WBC count 11.04 and actually trending down from its maximum of 17.59  Unclear etiology, may be directly related to reactive changes from the SDH and SAH as well as fractures  Continue to monitor and if considerable changes may repeat scans given the fractures in the facial bones  Was on a course of Unasyn/Augmentin for 7 days

## 2025-01-12 NOTE — ARC ADMISSION
Patient is medically cleared for discharge and is approved for admission to [facility] today.  [facility]-2 and has a 1315 transport time.  Report can be called to [phone].  CM has been updated with same in [facility].

## 2025-01-12 NOTE — CASE MANAGEMENT
Case Management Discharge Planning Note    Patient name Sandra Purvis  Location [facility] 625/[facility] 625-01 MRN [id number]  : 12/3/1934 Date 2025       Current Admission Date: 2025  Current Admission Diagnosis:SDH (subdural hematoma) ([facility])   Patient Active Problem List    Diagnosis Date Noted Date Diagnosed    Syncope 2025     Chest pain 2025     Orthostatic hypotension 2025     Fall 2025     SDH (subdural hematoma) ([facility]) 2025     SAH (subarachnoid hemorrhage) ([facility]) 2025     Closed extensive facial fractures ([facility]) 2025     Primary insomnia 2024     Basal cell carcinoma (BCC) of scalp 2023     Malignant melanoma of arm, left ([facility]) 2023     Scalp lesion 2023     Arm skin lesion, left 2023     Irritant contact dermatitis due to other agents 2022     COVID-19 2022     GERD (gastroesophageal reflux disease)      Stage 3 chronic kidney disease, unspecified whether stage 3a or 3b CKD ([facility]) 2021     Paroxysmal atrial fibrillation ([facility])      Preop examination 2021     Esophagus disorder 10/19/2021     Ureteral stone 10/15/2021     Bacteremia 10/15/2021     Mild protein-calorie malnutrition ([facility]) 2021     Hypertension 2015       LOS (days): 8  Geometric Mean LOS (GMLOS) (days): 1.9  Days to GMLOS:-5.7     OBJECTIVE:  Risk of Unplanned Readmission Score: 14.62         Current admission status: Inpatient   Preferred Pharmacy:   [facility]/pharm[facility] #[id number] - [place] - [address] [id number] [facility] [address]  Phone: [phone] Fax: [phone]    Primary Care Provider: Berhane Page DO    Primary Insurance: MEDICARE  Secondary Insurance: BLUE CROSS    DISCHARGE DETAILS:    Pt medically stable for d/c today to [facility]  Pt will d/c into room 269 @1315 via [employer]  Pt is aware and she informed her family via phone

## 2025-01-12 NOTE — ASSESSMENT & PLAN NOTE
Takes Toprol 50 mg daily at home.  Currently on Lopressor 25 mg every 12 hours.  HCTZ on hold.  Will not resume due to orthostasis.  Goal SBP per neurosurgery due to head bleed SBP less than 160.  Blood pressures remain somewhat elevated today.  She is a bit more tachycardic, therefore we increased her Lopressor to 50 mg every 8 hours initially  Transitioned metoprolol to Toprol-XL 50 mg twice daily for improved heart rate and blood pressure control  Will likely need to except a slightly higher blood pressures at baseline due to significant orthostasis

## 2025-01-12 NOTE — ASSESSMENT & PLAN NOTE
Current TTE LVEF 70%.  Systolic function vigorous.  Grade 2 diastolic dysfunction.  RV mildly reduced.  Left atrial dilatation mild.  Mild AI.  Mild TR.  Trivial pericardial effusion.  On Lopressor 25 mg every 12 hours.   Patient presented with sinus bradycardia.  Cardiology evaluation she was in controlled atrial fibrillation.  No bradycardia.  Not on AC PTA, anticoagulation contraindicated at this time due to SDH and SAH.  VBE4UI8-DDHa equals 4 after repeat head imaging she would need re evaluation for candidacy.   Does not appear she currently follows cardiology.  Patient with PAF per PCP notes.  Takes Toprol 50 mg daily at home.  She was mildly tachycardic over the past 2 days, and therefore we increased her metoprolol dosing -would not further uptitrate considering orthostasis and need for midodrine

## 2025-01-12 NOTE — TREATMENT PLAN
Individualized Plan of Care - [facility] [place]  Sandra Purvis 89 y.o. female MRN: [id number]  Unit/Bed#: [facility] 269-02 Encounter: [id number]     PATIENT INFORMATION  ADMISSION DATE: 1/12/2025  2:08 PM JENNIE CATEGORY:Brain Dysfunction:  02.22  Traumatic, Closed Injury   ADMISSION DIAGNOSIS: Facial fracture due to fall  (HCC) [S02.92XA, W19.XXXA]  EXPECTED LOS: 10 to 14 days     MEDICAL/FUNCTIONAL PROGNOSIS  Based on my assessment of the patient's medical conditions and current functional status, the prognosis for attaining medical and functional goals or the IRF stay is:  Fair-good    Medical Goals: Patient will be medically stable for discharge to [facility] upon completion of rehab program and Patient will be able to manage medical conditions and comorbid conditions with medications and follow up upon completion of rehab program      Cardiopulmonary function/Anemia: Ensure cardiopulmonary stability and optimize cardiopulmonary function not only at rest but with activity as patient's activity level significantly increases in acute rehab compared with prior to transfer in preparation for safe discharge from [facility].  Must closely and frequently monitor blood pressure, HR, anemia to ensure adequate cardiac output during ADLs and ambulation as patient is at increased risk for orthostatic hypotension/syncope and potential injury if not monitored for and managed adequately.    Blood pressure management:    Frequent monitoring of blood pressure with appropriate adjustments in blood pressure medication management to optimize blood pressure control and prevent/limit renal complications.   Monitoring impact of blood pressure and side-effects of blood pressure medications at rest and with activity.  Hypoxia prevention: Ensure appropriate level of oxygenation at rest and with activity to avoid symptomatic hypoxia, maximize functional performance, and decrease risk of atelectasis/pneumonia through  close and frequent monitoring, providing appropriate respiratory treatments (such as incentive spirometry), and when necessary provide/adjust respiratory medications.    Pain management:  Pain will improve with frequent evaluation of pain, careful adjustments in medications, frequent re-evaluation of patient's pain and medical/neurologic status to ensure optimal pain control, avoidance of potential serious and even life-threatening side-effects and drug interactions, as well as weaning pain medications as soon as possible to decrease risk of short and long-term use.     Brain injury:  Patient's cognitive status is significantly impaired and neurologic status can fluctuate particularly early in rehabilitation process.  Patient requires frequent rehab physician and rehab nursing neurologic monitoring for subtle and more significant changes in mentation and neurologic function.  Labs must be monitored closely along with hydration and nutritional status.  Low threshold to obtain brain imaging.  Medications must be carefully monitored and adjusted to optimize functional recovery while limit cognitive impairments.  Goal to improve cognitive and neurologic function, provide appropriate patient (and/or family education), and to ensure appropriate optimal discharge plan.    Inpatient rehabilitation education/teaching:  To be provided to patient and typically family/caregiver (if able to be identified) by all skilled therapists, rehab nursing, case management, and rehab specialized physician to ensure optimal recovery and decrease risks of complications in both acute rehabilitation setting as well as after discharge.   Chronic kidney failure management and blood pressure management: Frequent measurements and evaluation of urinary intake, urinary output, and labs which include BUN/Cr and electrolytes with appropriate adjustments in medication and when necessary order additional testing.  Frequent monitoring of blood pressure  with appropriate adjustments in blood pressure medication management to optimize blood pressure control and prevent/limit renal complications.    Bladder dysfunction:  Appropriate bladder management with appropriate toileting program from rehab nursing and staff with oversight management by rehabilitation physicians which include appropriate monitoring and possible adjustments in medications to with goals to optimize bladder function and decrease risk of bladder retention, incontinence, and urinary tract infection.   Bowel dysfunction: Appropriate bowel management with appropriate toileting program from rehab nursing and staff with oversight management by rehabilitation physicians which include adjustments in medications to optimize appropriate bowel function and prevent/decrease risk of constipation and bowel obstruction.    Skin wounds: Appropriate skin checks for wound/skin evaluation including evaluation of healing, worsening of wounds, or signs of infection.   Wound care management from rehab nursing, wound care nursing, physicians.  Ensure frequent appropriate turning, positioning in bed, in chair, when mobilizing, and when appropriate with use of appropriate devices to optimize healing and decrease risk of worsening or new skin breakdown.        ANTICIPATED DISCHARGE DISPOSITION AND SERVICES  COMMUNITY SETTING: Home - with supervision    ANTICIPATED FOLLOW-UP SERVICE:   Home Health Services: PT, OT, SLP, and Nursing    DISCIPLINE SPECIFIC PLANS:  Required Disciplines & Services: Rehabillitation Nursing, Case Management, and Psychology    REQUIRED THERAPY:  Therapy Hours per Day Days per Week   Physical Therapy 1-2 5-6   Occupational Therapy 1-2 5-6   Speech/Language Therapy 1-2 3-5   NOTE: Additional therapy time(s) or changes to allocation of therapies as appropriate to meet patient needs and to achieve functional goals.      Patient will participate in above therapy regimen consisting of PT, OT, and SLP due  to the following medical procedure/condition:Brain Dysfunction:  02.22  Traumatic, Closed Injury    ANTICIPATED FUNCTIONAL OUTCOMES:  ADL:  Modified independent to set up   Bladder/Bowel:  Modified independent   Transfers:  Modified independent   Locomotion:  Modified independent for household distance ambulation with rolling walker   Cognitive:  Supervision     DISCHARGE PLANNING NEEDS  Equipment needs: Discharge needs to be reviewed with team      REHAB ANTICIPATED PARTICIPATION RESTRICTIONS:  Amubulate Short Distances Only, Assist with Bathing Shower, Assist with Bathing in Tub, Assist with Mobility, Assist with Tub Shower Transfer, Decreased Insight to Deficits, Decreaed Safety Awareness, Inability to Drive, Rquires Assist with ADLS, Requires Assit with Homemaking, and Requires Assit with Steps    Richard Brown, DO  Physical Medicine and Rehabilitation  [facility]

## 2025-01-12 NOTE — ASSESSMENT & PLAN NOTE
Most recent hemoglobin of 9.9 which is stable over the last 4 days  Continue to follow biweekly CBC or sooner if clinically indicated  Will need repeat scan if significant drops or concern for acute bleeds

## 2025-01-12 NOTE — PROGRESS NOTES
"Progress Note - Trauma   Name: Sandra Purvis 89 y.o. female I MRN: [id number]  Unit/Bed#: [facility] 625-01 I Date of Admission: 1/4/2025   Date of Service: 1/12/2025 I Hospital Day: 8  Assessment & Plan  Fall  Described as \"fainting\"--no history or associated symptoms  Troponin was initially negative and then marques to over 8000.  EKG is nonischemic  Orthostatics positive this morning 1/8  Echocardiogram showed LVEF 70%.  Systolic function vigorous.  Grade 2 diastolic dysfunction.  RV mildly reduced.  Left atrial dilatation mild.  Mild AI.    Telemetry--showed A-fib with RVR  Cardiology was consulted and note appreciated  Recommended continued hydration orally and with IVF  TEDS  Continue to monitor orthostatics  Midodrine restarted at 2.5mg BID  Increasing metoprolol to 3 a day dosing  SDH (subdural hematoma) (HCC)  Secondary to fall  Initial CTH on 1/4: Acute subdural hemorrhage along the left cerebral convexity with maximal thickness of 1.9 cm. No midline shift. Acute subarachnoid hemorrhage in the left frontoparietal sulci, left sylvian fissure and suprasellar cistern  Repeat CTH on 1/5: stable  Neurosurgery consulted, appreciate recs  No reversal needed due to patient not taking any AP/AC  Maintain normotension, goal SBP less than 160  Continue on 7 day course of Keppra for seizure prophylaxis  Hold all anticoagulant or antiplatelet medications until patient follows up with neurosurgery in 2 weeks with repeat CT head.  SAH (subarachnoid hemorrhage) (HCC)  See above  Closed extensive facial fractures (HCC)  Secondary to fall  1/4 Initial CT imaging of face showed Comminuted and mildly displaced fractures of the lateral and anterior walls of right maxilla, lateral and inferior walls of right orbit. Right inferior orbital wall fracture appears to involve the inferior orbital foramen with associated small extraconal soft tissue edema and focus of air. No herniation of extraocular muscles. Nondisplaced fractures of " zygomatic arch and the temporal bone at the TMJ. Diffuse opacification of right maxillary sinus and right nasal cavity with blood products and air.  OMFS consulted and note appreciated  Surgical intervention discussed with patient by OMFS team, but refused by patient  Continue with 7-day course of Unasyn/Augmentin  Sinus precautions  Follow-up with OMFS as an outpatient for continued monitoring and management of fractures.  Paroxysmal atrial fibrillation (HCC)  Increase metoprolol to 3 times daily dosing  Currently not on AC  Follow-up with cardiology as an outpatient  Hypertension  Increased home metoprolol to 3 times daily  GERD (gastroesophageal reflux disease)  Protonix initiated--symptoms improved  Chest pain  Patient was noted to be experiencing chest pain/epigastric pain on 1/7  EKG was nonischemic but troponins elevated to greater than 8000.  Cardiology was consulted and note appreciated  Not a candidate for ischemic evaluation  Recommend starting ASA 81 mg daily; will hold for now in the setting of TBI  Syncope  See Fall  Orthostatic hypotension  See Fall    Bowel Regimen: MiraLAX, Senokot  VTE Prophylaxis:VTE covered by:  enoxaparin, Subcutaneous, 30 mg at 01/11/25 2129        Disposition: Continue current level of care, pending placement.  ARC following pending medical clearance by cardiology.  Please contact the SecureChat role for the Trauma service with any questions/concerns.    24 Hour Events : none  Subjective : AVSS on room air. Patient reports pain is controlled. Tolerating diet. Voiding spontaneously and having bowel function. Denies fever, chills, nausea, vomiting.     Objective :  Temp:  [97.7 °F (36.5 °C)-98.5 °F (36.9 °C)] 97.7 °F (36.5 °C)  HR:  [] 94  BP: (136-171)/(55-80) 171/78  Resp:  [16-17] 17  SpO2:  [93 %-98 %] 96 %    I/O         01/10 0701 01/11 0700 01/11 0701 01/12 0700 01/12 0701 01/13 0700    P.O. 480 421     Total Intake(mL/kg) 480 (9.4) 421 (8.2)     Urine (mL/kg/hr)  450 (0.4) 400 (0.3)     Stool       Total Output 450 400     Net +30 +21                  Lines/Drains/Airways       Active Status       Name Placement date Placement time Site Days    External Urinary Catheter 01/05/25  0100  -- 7                  Physical Exam   General: NAD  HENT: NCAT MMM, slowly improving facial ecchymoses  Neck: supple, no JVD  CV: nl rate  Lungs: nl wob. No resp distress  ABD: Soft, nontender, nondistended  Extrem: No CCE  Neuro: AAOx3       Lab Results: I have reviewed the following results:  Recent Labs     01/10/25  0541   WBC 11.04*   HGB 9.9*   HCT 31.2*      SODIUM 137   K 3.7      CO2 29   BUN 17   CREATININE 0.81   GLUC 102   MG 2.1   PHOS 3.3     Ricardo Rodriguez MD  General Surgery Resident

## 2025-01-12 NOTE — PLAN OF CARE
Problem: PAIN - ADULT  Goal: Verbalizes/displays adequate comfort level or baseline comfort level  Description: Interventions:  - Encourage patient to monitor pain and request assistance  - Assess pain using appropriate pain scale  - Administer analgesics based on type and severity of pain and evaluate response  - Implement non-pharmacological measures as appropriate and evaluate response  - Consider cultural and social influences on pain and pain management  - Notify physician/advanced practitioner if interventions unsuccessful or patient reports new pain  Outcome: Progressing     Problem: INFECTION - ADULT  Goal: Absence or prevention of progression during hospitalization  Description: INTERVENTIONS:  - Assess and monitor for signs and symptoms of infection  - Monitor lab/diagnostic results  - Monitor all insertion sites, i.e. indwelling lines, tubes, and drains  - Monitor endotracheal if appropriate and nasal secretions for changes in amount and color  - Deal appropriate cooling/warming therapies per order  - Administer medications as ordered  - Instruct and encourage patient and family to use good hand hygiene technique  - Identify and instruct in appropriate isolation precautions for identified infection/condition  Outcome: Progressing  Goal: Absence of fever/infection during neutropenic period  Description: INTERVENTIONS:  - Monitor WBC    Outcome: Progressing     Problem: SAFETY ADULT  Goal: Patient will remain free of falls  Description: INTERVENTIONS:  - Educate patient/family on patient safety including physical limitations  - Instruct patient to call for assistance with activity   - Consult OT/PT to assist with strengthening/mobility   - Keep Call bell within reach  - Keep bed low and locked with side rails adjusted as appropriate  - Keep care items and personal belongings within reach  - Initiate and maintain comfort rounds  - Make Fall Risk Sign visible to staff  - Offer Toileting every  Hours,  in advance of need  - Initiate/Maintain alarm  - Obtain necessary fall risk management equipment:   - Apply yellow socks and bracelet for high fall risk patients  - Consider moving patient to room near nurses station  Outcome: Progressing  Goal: Maintain or return to baseline ADL function  Description: INTERVENTIONS:  -  Assess patient's ability to carry out ADLs; assess patient's baseline for ADL function and identify physical deficits which impact ability to perform ADLs (bathing, care of mouth/teeth, toileting, grooming, dressing, etc.)  - Assess/evaluate cause of self-care deficits   - Assess range of motion  - Assess patient's mobility; develop plan if impaired  - Assess patient's need for assistive devices and provide as appropriate  - Encourage maximum independence but intervene and supervise when necessary  - Involve family in performance of ADLs  - Assess for home care needs following discharge   - Consider OT consult to assist with ADL evaluation and planning for discharge  - Provide patient education as appropriate  Outcome: Progressing  Goal: Maintains/Returns to pre admission functional level  Description: INTERVENTIONS:  - Perform AM-PAC 6 Click Basic Mobility/ Daily Activity assessment daily.  - Set and communicate daily mobility goal to care team and patient/family/caregiver.   - Collaborate with rehabilitation services on mobility goals if consulted  - Perform Range of Motion  times a day.  - Reposition patient every  hours.  - Dangle patient  times a day  - Stand patient  times a day  - Ambulate patient  times a day  - Out of bed to chair  times a day   - Out of bed for meals  times a day  - Out of bed for toileting  - Record patient progress and toleration of activity level   Outcome: Progressing     Problem: DISCHARGE PLANNING  Goal: Discharge to home or other facility with appropriate resources  Description: INTERVENTIONS:  - Identify barriers to discharge w/patient and caregiver  - Arrange for  needed discharge resources and transportation as appropriate  - Identify discharge learning needs (meds, wound care, etc.)  - Arrange for interpretive services to assist at discharge as needed  - Refer to Case Management Department for coordinating discharge planning if the patient needs post-hospital services based on physician/advanced practitioner order or complex needs related to functional status, cognitive ability, or social support system  Outcome: Progressing     Problem: Knowledge Deficit  Goal: Patient/family/caregiver demonstrates understanding of disease process, treatment plan, medications, and discharge instructions  Description: Complete learning assessment and assess knowledge base.  Interventions:  - Provide teaching at level of understanding  - Provide teaching via preferred learning methods  Outcome: Progressing     Problem: Nutrition/Hydration-ADULT  Goal: Nutrient/Hydration intake appropriate for improving, restoring or maintaining nutritional needs  Description: Monitor and assess patient's nutrition/hydration status for malnutrition. Collaborate with interdisciplinary team and initiate plan and interventions as ordered.  Monitor patient's weight and dietary intake as ordered or per policy. Utilize nutrition screening tool and intervene as necessary. Determine patient's food preferences and provide high-protein, high-caloric foods as appropriate.     INTERVENTIONS:  - Monitor oral intake, urinary output, labs, and treatment plans  - Assess nutrition and hydration status and recommend course of action  - Evaluate amount of meals eaten  - Assist patient with eating if necessary   - Allow adequate time for meals  - Recommend/ encourage appropriate diets, oral nutritional supplements, and vitamin/mineral supplements  - Order, calculate, and assess calorie counts as needed  - Recommend, monitor, and adjust tube feedings and TPN/PPN based on assessed needs  - Assess need for intravenous fluids  -  Provide specific nutrition/hydration education as appropriate  - Include patient/family/caregiver in decisions related to nutrition  Outcome: Progressing     Problem: Potential for Falls  Goal: Patient will remain free of falls  Description: INTERVENTIONS:  - Educate patient/family on patient safety including physical limitations  - Instruct patient to call for assistance with activity   - Consult OT/PT to assist with strengthening/mobility   - Keep Call bell within reach  - Keep bed low and locked with side rails adjusted as appropriate  - Keep care items and personal belongings within reach  - Initiate and maintain comfort rounds  - Make Fall Risk Sign visible to staff  - Offer Toileting every  Hours, in advance of need  - Initiate/Maintain alarm  - Obtain necessary fall risk management equipment:  - Apply yellow socks and bracelet for high fall risk patients  - Consider moving patient to room near nurses station  Outcome: Progressing     Problem: Prexisting or High Potential for Compromised Skin Integrity  Goal: Skin integrity is maintained or improved  Description: INTERVENTIONS:  - Identify patients at risk for skin breakdown  - Assess and monitor skin integrity  - Assess and monitor nutrition and hydration status  - Monitor labs   - Assess for incontinence   - Turn and reposition patient  - Assist with mobility/ambulation  - Relieve pressure over bony prominences  - Avoid friction and shearing  - Provide appropriate hygiene as needed including keeping skin clean and dry  - Evaluate need for skin moisturizer/barrier cream  - Collaborate with interdisciplinary team   - Patient/family teaching  - Consider wound care consult   Outcome: Progressing

## 2025-01-12 NOTE — DISCHARGE SUMMARY
"Discharge Summary - Trauma   Name: Sandra Purvis 89 y.o. female I MRN: [id number]  Unit/Bed#: [facility] 625-01 I Date of Admission: 1/4/2025   Date of Service: 1/12/2025 I Hospital Day: 8  Admission Date: 1/4/2025 1916  Discharge Date: 01/12/25  Admitting Diagnosis: Subdural bleeding (HCC) [I62.00]  Discharge Diagnosis:   Medical Problems       Resolved Problems  Date Reviewed: 11/21/2024   None         HPI: Per H&P by Dr. Barbara Mcgill on 1/4/25:   \"Sandra Purvis is a 89 y.o. female  with PMHx HTN, paroxysmal afib, CKD, malignant melanoma s/p resection, who presents as a trauma transfer from [facility] for extensive facial fractures and SDH/SAH s/p syncope with collapse. Extensive conversation with patient at bedside regarding results, prognosis, and patient's wishes. Patient does not desire surgery at this time. Confirmed level 3 DNR/DNI status.     Called and spoke with patient's  and daughter on the phone regarding patient's wishes and plan moving forward. All questions answered.\"    Procedures Performed: No orders of the defined types were placed in this encounter.      Summary of Hospital Course:    Trauma imaging was obtained upon arrival and patient was found to have SDH and facial fractures.  Patient was admitted to the ICU with consults to neurosurgery and OMFS in regards to CT findings.  Patient started on twice daily For seizure prophylaxis for total 7 days with every 2 hours neurochecks until repeat CT head was obtained.  Unasyn was started for antimicrobial prophylaxis for sinus fractures and sinus precautions.  Patient was restarted on her home metoprolol and hydrochlorothiazide and as her blood pressure would allow.  On repeat CT scan patient's SDH was roughly stable per neurosurgery with no plans for further invasive interventions.  Patient's exam remained stable without any focal neurodeficits.  On hospital day 1 patient was transferred out of the ICU to the floor as she remained stable " "without any changes in GCS.  They waited the patient and spoke with her as well as her daughter who both requested surgery at the time.  Continued sinus precautions and antibiotics.  Patient worked with PT/OT who recommended level 2, current resource intensity at discharge.  Patient had a syncope workup given her mechanism of the fall she described as \"fainting\".  EKG showed SR with left axis deviation and anterior/inferior infarcts, were obtained showing orthostatic hypotension.  Echocardiogram was obtained showing LVEF at 70% with vigorous systolic function moderately abnormal diastolic dysfunction consistent with grade 2 relaxation.  On hospital day 3 patient began complaining of epigastric pain and tenderness to troponin and EKG were obtained.  Troponin had increased from 192 3866 and patient was placed on telemetry with a cardiology consult.  In regards to the chest pain cardiology recommended starting aspirin 81 Mg daily when able from a bleeding perspective thinking most likely secondary to trauma and brain bleed versus demand ischemia due to A-fib.  In regards to patient's orthostatic hypotension she was started on midodrine per cardiology recommendations unless increase in Lopressor for tachycardia related to management.  Patient continue to work with PT/OT during her hospitalization.  Patient was tolerating a diet, pain was controlled, having urinary and bowel function.  Case management assisted with placement at [facility].  Patient deemed safe and appropriate for discharge from a trauma as well as cardiology standpoint.  Patient will follow-up with neurosurgery with repeat CT.  Discussed discharge instructions with patient, voiced understanding.  Addressed all questions and concerns.    Significant Findings, Care, Treatment and Services Provided:   Management of SDH, A-fib, orthostatic hypotension, chest pain.    Complications: None    Condition at Discharge: stable       Discharge instructions/Information to " patient and family:   See After Visit Summary (AVS) for information provided to patient and family.      Provisions for Follow-Up Care:  See after visit summary for information related to follow-up care and any pertinent home health orders.      PCP: Berhane Page DO    Disposition:  [facility]    Planned Readmission: No     Discharge Medications:  See after visit summary for reconciled discharge medications provided to patient and family.      Discharge Statement:  I have spent a total time of 30 minutes in caring for this patient on the day of the visit/encounter. >30 minutes of time was spent on: Instructions for management, Patient and family education, Counseling / Coordination of care, Documenting in the medical record, Reviewing / ordering tests, medicine, procedures  , and Communicating with other healthcare professionals .    Ricardo Rodriguez MD  General Surgery Resident

## 2025-01-12 NOTE — ASSESSMENT & PLAN NOTE
Grade 2 diastolic dysfunction noted on echo  Monitor weights Monday Wednesday Friday and adjust accordingly

## 2025-01-12 NOTE — ASSESSMENT & PLAN NOTE
Home regimen: Metoprolol XL 50 mg daily, HCTZ  Initially on metoprolol tartrate 25 mg every 12 hours and HCTZ is on hold due to orthostasis  Goal blood pressures of a systolic less than 160 however have been elevated  Now on metoprolol succinate 50 mg twice daily  Difficulty maintaining blood pressures and plan to except a slightly higher blood pressure as they anticipate drops with her significant orthostatic hypotension

## 2025-01-12 NOTE — ASSESSMENT & PLAN NOTE
Troponins obtained on initial evaluation of the patient in the emergency room and cardiology was consulted for evaluation  Despite elevated troponins there was nonischemic findings on her EKG and felt to be potentially related to a nonischemic troponin elevation in the setting of trauma and brain bleed versus demand ischemia due to her paroxysmal atrial fibrillation and syncopal episode  Not a candidate for cardiology for ischemic evaluation due to advanced age, frailty and recent subarachnoid hemorrhage/subdural hemorrhage  No new regional wall motion abnormalities noted on echocardiogram on 1/6/2025  Recommended to start aspirin 81 mg when cleared by neurosurgery with CT head  Follow-up with cardiology as an outpatient

## 2025-01-12 NOTE — ASSESSMENT & PLAN NOTE
Brendon BP 70/40 during physical therapy at time of cardiology consult. Patient was symptomatic of lightheadedness and presyncope.  Had gentle fluids repeat orthostatics positive but not as profound.  Patient asymptomatic.  Given additional IV fluids.  We added BALJIT hose stockings.  Recommended at least 60 ounces of fluid at home on a day.  Repeat orthostatics lying 164/74, sitting 161/81 and standing 159/79.  Interestingly then became more hypertensive   Patient has recently been get lightheaded upon position changes at home.  She believes this is what caused her syncope.  Continue to encourage p.o. hydration  With continued orthostasis noted, we started on low-dose midodrine therapy at 2.5 milligrams twice daily  Remains asymptomatic now, stable from cardiac standpoint, please call with questions.

## 2025-01-12 NOTE — ASSESSMENT & PLAN NOTE
Initially presented with sinus bradycardia and evaluated by cardiology and was in controlled atrial fibrillation without bradycardia  Not on anticoagulation prior to admission and not indicated at this point due to the SDH and SAH and overall remains high risk due to risk of falls and given her advanced age  No recent follow-up with cardiology as an outpatient and generally sees her primary care physician for management and was on Toprol 50 mg daily at home which has been changed well and in the hospital  Tachycardic in the hospital and given IV fluids and medication changes include addition of midodrine and changes in the metoprolol dosing  Recommended for aspirin but would likely not be a good long-term candidate for anticoagulation

## 2025-01-12 NOTE — ASSESSMENT & PLAN NOTE
"Described as \"fainting\"--no history or associated symptoms  Troponin was initially negative and then mraques to over 8000.  EKG is nonischemic  Orthostatics positive this morning 1/8  Echocardiogram showed LVEF 70%.  Systolic function vigorous.  Grade 2 diastolic dysfunction.  RV mildly reduced.  Left atrial dilatation mild.  Mild AI.    Telemetry--showed A-fib with RVR  Cardiology was consulted and note appreciated  Recommended continued hydration orally and with IVF  TEDS  Continue to monitor orthostatics  Midodrine restarted at 2.5mg BID  Increasing metoprolol to 3 a day dosing  "

## 2025-01-12 NOTE — ASSESSMENT & PLAN NOTE
Presented with syncopal episode preceded by lightheadedness that led to the fall which caused the sudden dural hemorrhage and subarachnoid hemorrhage  Also found to be significantly orthostatic which was likely the etiology and was placed on telemetry with no evidence of bradycardia or pauses  Limited p.o. intake and fluid intake and suspected orthostatic hypotension as the cause

## 2025-01-12 NOTE — PROGRESS NOTES
Cardiology Progress Note - Sandra Purvis 89 y.o. female MRN: [id number]    Unit/Bed#: [facility] 625-01 Encounter: [id number]        Assessment & Plan  Orthostatic hypotension  Brendon BP 70/40 during physical therapy at time of cardiology consult. Patient was symptomatic of lightheadedness and presyncope.  Had gentle fluids repeat orthostatics positive but not as profound.  Patient asymptomatic.  Given additional IV fluids.  We added BALJIT hose stockings.  Recommended at least 60 ounces of fluid at home on a day.  Repeat orthostatics lying 164/74, sitting 161/81 and standing 159/79.  Interestingly then became more hypertensive   Patient has recently been get lightheaded upon position changes at home.  She believes this is what caused her syncope.  Continue to encourage p.o. hydration  With continued orthostasis noted, we started on low-dose midodrine therapy at 2.5 milligrams twice daily  Remains asymptomatic now, stable from cardiac standpoint, please call with questions.  Chest pain  0-hour troponin 1/6 .There was a random troponin yesterday morning 190 followed by 3866.  Repeat troponin checked this morning 8384.  On cardiology evaluation she had complained of abdominal pain.  Patient remains free of chest pain.   EKG has been nonischemic  Ddx includes nonischemic troponin elevation in s/o trauma and brain bleed vs demand ischemia due to PAF and syncope.  She is not a candidate for ischemic evaluation at this time due to advanced age, frailty, and presently with intracranial hemorrhage which would contraindicate any intervention  No new RWMA on echo 1/6/25  Start aspirin 81 mg daily when able from bleeding perspective.  Neurosurgery plans to repeat CT in 1 weeks time  Syncope  Patient presented with syncope preceded by lightheadedness  Found to be significantly orthostatic  Remains on telemetry no evidence of bradycardia or significant pauses  Suspect due to orthostatic hypotension.  Recently has been getting  lightheaded upon standing.  She does not drink fluids consistently at home.    HCTZ on hold, will not continue on discharge  Compression stockings, p.o. hydration, and midodrine started  Paroxysmal atrial fibrillation (HCC)  Current TTE LVEF 70%.  Systolic function vigorous.  Grade 2 diastolic dysfunction.  RV mildly reduced.  Left atrial dilatation mild.  Mild AI.  Mild TR.  Trivial pericardial effusion.  On Lopressor 25 mg every 12 hours.   Patient presented with sinus bradycardia.  Cardiology evaluation she was in controlled atrial fibrillation.  No bradycardia.  Not on AC PTA, anticoagulation contraindicated at this time due to SDH and SAH.  [id number] equals 4 after repeat head imaging she would need re evaluation for candidacy.   Does not appear she currently follows cardiology.  Patient with PAF per PCP notes.  Takes Toprol 50 mg daily at home.  She was mildly tachycardic over the past 2 days, and therefore we increased her metoprolol dosing -would not further uptitrate considering orthostasis and need for midodrine  SDH (subdural hematoma) (HCC)  Patient with SDH/SAH.    No surgical intervention planned at this time.  Hypertension  Takes Toprol 50 mg daily at home.  Currently on Lopressor 25 mg every 12 hours.  HCTZ on hold.  Will not resume due to orthostasis.  Goal SBP per neurosurgery due to head bleed SBP less than 160.  Blood pressures remain somewhat elevated today.  She is a bit more tachycardic, therefore we increased her Lopressor to 50 mg every 8 hours initially  Transitioned metoprolol to Toprol-XL 50 mg twice daily for improved heart rate and blood pressure control  Will likely need to except a slightly higher blood pressures at baseline due to significant orthostasis  SAH (subarachnoid hemorrhage) ([facility])  No surgical intervention planned at this time  Repeat CT head as per neurosurgical recommendations    Subjective:   Patient seen and examined.  No significant events overnight.  ; pertinent  "negatives - chest pain, chest pressure/discomfort, dyspnea, irregular heart beat, lower extremity edema, and palpitations.    Objective:     Vitals: Blood pressure (!) 171/78, pulse 94, temperature 97.7 °F (36.5 °C), resp. rate 17, height 5' 2\" (1.575 m), weight 51.1 kg (112 lb 9.6 oz), SpO2 96%, not currently breastfeeding., Body mass index is 20.59 kg/m².,   Orthostatic Blood Pressures      Flowsheet Row Most Recent Value   Blood Pressure 171/78 filed at 01/12/2025 0657   Patient Position - Orthostatic VS Standing - Orthostatic VS filed at 01/08/2025 1738              Intake/Output Summary (Last 24 hours) at 1/12/2025 0916  Last data filed at 1/11/2025 1700  Gross per 24 hour   Intake 200 ml   Output 400 ml   Net -200 ml         Physical Exam:    GEN: Sandra Purvis appears well, alert and oriented x 3, pleasant and cooperative   HEENT: pupils equal, round, and reactive to light; extraocular muscles intact  NECK: supple, no carotid bruits   HEART: regular rhythm, normal S1 and S2, no murmurs, clicks, gallops or rubs   LUNGS: clear to auscultation bilaterally; no wheezes, rales, or rhonchi   ABDOMEN: normal bowel sounds, soft, no tenderness, no distention  EXTREMITIES: peripheral pulses normal; no clubbing, cyanosis, or edema  NEURO: no focal findings   SKIN: +significant bruising    Medications:      Current Facility-Administered Medications:     acetaminophen (TYLENOL) tablet 975 mg, 975 mg, Oral, Q8H Theodora JOHNSON MD, 975 mg at 01/12/25 0517    bisacodyl (DULCOLAX) rectal suppository 10 mg, 10 mg, Rectal, Daily PRN, Theodora Cooper MD    calcium carbonate (TUMS) chewable tablet 500 mg, 500 mg, Oral, Daily PRN, Barbara Mcgill,     chlorhexidine (PERIDEX) 0.12 % oral rinse 15 mL, 15 mL, Mouth/Throat, Q12H Theodora JOHNSON MD, 15 mL at 01/12/25 0832    enoxaparin (LOVENOX) subcutaneous injection 30 mg, 30 mg, Subcutaneous, Q24H, Ramon Stern DO, 30 mg at 01/11/25 2129    " labetalol (NORMODYNE) injection 10 mg, 10 mg, Intravenous, Q8H PRN, Barbara Mcgill DO, 10 mg at 01/10/25 2236    melatonin tablet 3 mg, 3 mg, Oral, HS, Theodora Cooper MD, 3 mg at 01/12/25 0424    metoprolol succinate (TOPROL-XL) 24 hr tablet 50 mg, 50 mg, Oral, BID, Rica Che MD, 50 mg at 01/12/25 0833    midodrine (PROAMATINE) tablet 2.5 mg, 2.5 mg, Oral, BID AC, LUIS Hicks, 2.5 mg at 01/12/25 0833    naloxone (NARCAN) 0.04 mg/mL syringe 0.04 mg, 0.04 mg, Intravenous, Q1MIN PRN, Theodora Cooper MD    ondansetron (ZOFRAN) injection 4 mg, 4 mg, Intravenous, Q4H PRN, Theodora Cooper MD    oxyCODONE (ROXICODONE) split tablet 2.5 mg, 2.5 mg, Oral, Q4H PRN, 2.5 mg at 01/12/25 0257 **OR** oxyCODONE (ROXICODONE) IR tablet 5 mg, 5 mg, Oral, Q4H PRN, Theodora Cooper MD    pantoprazole (PROTONIX) EC tablet 40 mg, 40 mg, Oral, BID, Barbara Mcgill DO, 40 mg at 01/12/25 0832    polyethylene glycol (MIRALAX) packet 17 g, 17 g, Oral, Daily, Theodora Cooper MD, 17 g at 01/12/25 0833    senna-docusate sodium (SENOKOT S) 8.6-50 mg per tablet 1 tablet, 1 tablet, Oral, HS, Theodora Cooper MD, 1 tablet at 01/11/25 9860     Labs & Results:        Results from last 7 days   Lab Units 01/10/25  0541 01/09/25  1244 01/08/25  0828   WBC Thousand/uL 11.04* 13.75* 16.27*   HEMOGLOBIN g/dL 9.9* 9.7* 9.6*   HEMATOCRIT % 31.2* 30.8* 29.8*   PLATELETS Thousands/uL 183 187 188         Results from last 7 days   Lab Units 01/10/25  0541 01/08/25  0828 01/07/25  2313 01/07/25  0532   POTASSIUM mmol/L 3.7 3.7 3.6 3.8   CHLORIDE mmol/L 100 103 100 103   CO2 mmol/L 29 30 28 27   BUN mg/dL 17 24 28* 25   CREATININE mg/dL 0.81 0.99 0.98 0.99   CALCIUM mg/dL 8.6 8.8 9.0 9.1   ALK PHOS U/L  --   --   --  52   ALT U/L  --   --   --  32   AST U/L  --   --   --  26         Results from last 7 days   Lab Units 01/10/25  0541 01/07/25  0532 01/06/25  0452   MAGNESIUM mg/dL 2.1 2.1 2.0       Echo: personally reviewed -  normal  LV size with vigorous LV function. Grade 2 diastolic dysfunction. Mildly reduced RV function. Left atrial enlargement. Mild regurgitation of the aortic valve and tricuspid valve with moderate pulmonary hypertension     EKG personally reviewed by Rica Che MD.

## 2025-01-12 NOTE — H&P
PHYSICAL MEDICINE AND REHABILITATION H&P/ADMISSION NOTE  Sandra Purvis 89 y.o. female MRN: [id number]  Unit/Bed#: -02 Encounter: [id number]     Rehab Diagnosis: Impairment of mobility, safety, Activities of Daily Living (ADLs), and cognitive/communication skills due to Brain Dysfunction:  02.22  Traumatic, Closed Injury    Etiologic: Acute subdural hemorrhage along the left cerebral convexity;Acute subarachnoid hemorrhage in the left frontoparietal sulci, left sylvian fissure and suprasellar cistern   Date of Onset: 1/4/25     Date of surgery: n/a    History of Present Illness:   Sandra Purvis is a 89 y.o. female with history of atrial fibrillation, GERD, hypertension who presented to the [facility] on 1//25 as a trauma case from the [facility] to [facility] after a syncopal episode and collapse with imaging specifically a CT head on the same day showing comminuted and mildly displaced fractures of the lateral and anterior walls of the right maxilla, lateral and inferior walls of the right orbit, right inferior orbital wall fracture appears to involve the inferior orbital foramen associated with small extraconal soft tissue edema and focus of air.  There is also a nondisplaced fracture of the zygomatic arch and the temporal bone at the TMJ and diffuse opacification of the right maxillary sinus and right nasal cavity with blood products and air.  Additionally there was an acute subdural hemorrhage along the left cerebral convexity with a maximal thickness of 1.9 cm with no midline shift and an acute subarachnoid hemorrhage in the left frontal parietal sulci, left sylvian fissure as well as suprasellar cistern.  OMFS was consulted as well as neurosurgery and from a neurosurgical standpoint placed on Keppra 7 days for seizure prophylaxis and treated nonoperatively.  OMFS recommended surgical interventions however declined by patient with plans for  outpatient follow-up and was placed on a 7-day course of Unasyn/Augmentin.  Goal blood pressure systolic less than 160.  Patient also had an echocardiogram showing an LVEF of 70% with vigorous systolic function and a grade 2 diastolic dysfunction.  Right ventricular function mildly reduced and left atrium was mildly dilated.  Telemetry did show atrial fibrillation with RVR and cardiology was also consulted and following and given IV fluids BALJIT stockings and changes in the medications including the Lopressor as well as initiation of low-dose midodrine 2.5 mg twice daily.  Recommended also to start on 81 mg of aspirin daily when cleared from a SDH/SAH perspective.  Due for follow-up CT in approximately 1 week. The patient was evaluated by the Rehabilitation team and deemed an appropriate candidate for comprehensive inpatient rehabilitation and admitted to the [facility] on 1/12/2025  2:08 PM      Plan:     Rehabilitation  Functional deficits: impaired mobility, self care  Begin PT/OT/SLP.  Rehabilitation goals are to achieve a modified independent level with mobility and self care.  Prognosis is fair to good.  ELOS is 10 to 14 days.  Estimated discharge is home.     DVT prophylaxis      Pain  Acetaminophen 975 mg every 8 hours  Oxycodone 2.5-5 mg every 4 hours as needed change at the time of admission to every 6 hours as needed to continue to wean due to high risk for sedation and falls secondary to her overall age and condition    Bladder plan  Incontinent of urine utilizing a pure wick    Bowel plan  Continent  Last bowel movement on 1/9    Code Status  Level 3 DNAR/DNI      * SDH (subdural hematoma) ([facility])  Assessment & Plan  Patient presents with fall and initial CTh on 1/4 showing an acute subdural hemorrhage along the left cerebral convexity with a maximum thickness of 1.9 cm with no midline shift as well as an acute subarachnoid hemorrhage in the left frontoparietal sulci, left sylvian fissure and suprasellar  cistern  Repeat CT head on 1/5 was stable  Evaluated by neurosurgery no plans for reversal agent as the patient was not taking any anticoagulation or antiplatelet agents and maintain normotension with systolic blood pressure goals less than 160  7-day course of Keppra for posttraumatic head injury seizure prophylaxis   Follow-up with neurosurgery in 2 weeks repeat head CT  Physical, occupational and speech therapy  Discharge planning    SAH (subarachnoid hemorrhage) (HCC)  Assessment & Plan  Subarachnoid hemorrhage noted on initial CT on 1/4 and stable on 1/5  Continue same management for the subarachnoid hemorrhage as for the subdural hematoma, please see that separate section for overall management plans    Diastolic dysfunction  Assessment & Plan  Grade 2 diastolic dysfunction noted on echo  Monitor weights Monday Wednesday Friday and adjust accordingly    Leukocytosis  Assessment & Plan  Most recent WBC count 11.04 and actually trending down from its maximum of 17.59  Unclear etiology, may be directly related to reactive changes from the SDH and SAH as well as fractures  Continue to monitor and if considerable changes may repeat scans given the fractures in the facial bones  Was on a course of Unasyn/Augmentin for 7 days    Anemia  Assessment & Plan  Most recent hemoglobin of 9.9 which is stable over the last 4 days  Continue to follow biweekly CBC or sooner if clinically indicated  Will need repeat scan if significant drops or concern for acute bleeds    Syncope  Assessment & Plan  Presented with syncopal episode preceded by lightheadedness that led to the fall which caused the sudden dural hemorrhage and subarachnoid hemorrhage  Also found to be significantly orthostatic which was likely the etiology and was placed on telemetry with no evidence of bradycardia or pauses  Limited p.o. intake and fluid intake and suspected orthostatic hypotension as the cause    Orthostatic hypotension  Assessment & Plan  Blood  pressures ranging in physical therapy as low as 70/40 and cardiology was evaluating the patient  Symptomatic with lightheadedness and presyncope and had received gentle fluids with some improvement  BALJIT stockings as well and encouragement of fluid intake  Repeat orthostatics improved but still getting lightheaded at times which was felt to be the initiating factor in her syncopal episode that led to the fall and injury.  Started on low-dose midodrine 2.5 mg twice daily and improved from a symptomatic standpoint.  To contact cardiology with any questions and follow-up as an outpatient    Chest pain  Assessment & Plan  Troponins obtained on initial evaluation of the patient in the emergency room and cardiology was consulted for evaluation  Despite elevated troponins there was nonischemic findings on her EKG and felt to be potentially related to a nonischemic troponin elevation in the setting of trauma and brain bleed versus demand ischemia due to her paroxysmal atrial fibrillation and syncopal episode  Not a candidate for cardiology for ischemic evaluation due to advanced age, frailty and recent subarachnoid hemorrhage/subdural hemorrhage  No new regional wall motion abnormalities noted on echocardiogram on 1/6/2025  Recommended to start aspirin 81 mg when cleared by neurosurgery with CT head  Follow-up with cardiology as an outpatient    Closed extensive facial fractures (HCC)  Assessment & Plan  Secondary to fall  CT of the head on 1/4 also showed comminuted and mildly displaced fractures of the lateral and anterior walls of the right maxilla, lateral and inferior walls of the right orbit, right inferior orbital wall fractures appear to involve the inferior orbital foramen with associated small extraconal soft tissue edema and focus of air.  There is no herniation of extraocular muscles and a nondisplaced fracture of the zygomatic arch as well as the temporal bone at the TMJ.  There is diffuse opacification of the  right maxillary sinus and right nasal cavity with blood products and air  Seen by oral maxillofacial surgery team and surgical intervention was declined by patient  7-day course of Unasyn/Augmentin  Sinus precautions  Follow-up with OMFS as an outpatient    Malignant melanoma of arm, left (HCC)  Assessment & Plan  Large mass on the left forearm that is melanoma  Follows with dermatology as an outpatient last seen in December 2024 but not interested in any treatments    Basal cell carcinoma (BCC) of scalp  Assessment & Plan  Large scalp mass noted on admission.  Also follows with dermatology as an outpatient but declining any interventions  Some localized spot bleeding from the edges noted    GERD (gastroesophageal reflux disease)  Assessment & Plan  Continue Protonix    Stage 3 chronic kidney disease, unspecified whether stage 3a or 3b CKD (HCC)  Assessment & Plan  Lab Results   Component Value Date    EGFR 64 01/10/2025    EGFR 50 01/08/2025    EGFR 50 01/07/2025    CREATININE 0.81 01/10/2025    CREATININE 0.99 01/08/2025    CREATININE 0.98 01/07/2025     Stage III CKD per notes in the outpatient setting with primary care physician but not actively following with nephrology  Continue to monitor labs however they have been stable and will need to follow-up as an outpatient for continued management    Paroxysmal atrial fibrillation (HCC)  Assessment & Plan  Initially presented with sinus bradycardia and evaluated by cardiology and was in controlled atrial fibrillation without bradycardia  Not on anticoagulation prior to admission and not indicated at this point due to the SDH and SAH and overall remains high risk due to risk of falls and given her advanced age  No recent follow-up with cardiology as an outpatient and generally sees her primary care physician for management and was on Toprol 50 mg daily at home which has been changed well and in the hospital  Tachycardic in the hospital and given IV fluids and  medication changes include addition of midodrine and changes in the metoprolol dosing  Recommended for aspirin but would likely not be a good long-term candidate for anticoagulation    Hypertension  Assessment & Plan  Home regimen: Metoprolol XL 50 mg daily, HCTZ  Initially on metoprolol tartrate 25 mg every 12 hours and HCTZ is on hold due to orthostasis  Goal blood pressures of a systolic less than 160 however have been elevated  Now on metoprolol succinate 50 mg twice daily  Difficulty maintaining blood pressures and plan to except a slightly higher blood pressure as they anticipate drops with her significant orthostatic hypotension        Subjective/Interval Events:       Review of Systems   Constitutional:  Negative for chills and fever.   HENT:  Negative for hearing loss and trouble swallowing.    Eyes:  Negative for photophobia and visual disturbance.   Respiratory:  Negative for cough and shortness of breath.    Cardiovascular:  Negative for chest pain and leg swelling.   Gastrointestinal:  Negative for constipation, diarrhea, nausea and vomiting.   Endocrine: Negative for cold intolerance and heat intolerance.   Genitourinary:  Negative for dysuria and hematuria.   Musculoskeletal:  Positive for gait problem. Negative for back pain and neck pain.   Allergic/Immunologic: Positive for food allergies. Negative for environmental allergies.   Neurological:  Positive for weakness. Negative for dizziness, speech difficulty, light-headedness and headaches.   Hematological:  Negative for adenopathy. Does not bruise/bleed easily.   Psychiatric/Behavioral:  Negative for dysphoric mood and sleep disturbance.          Function:  PRIOR LEVEL OF FUNCTION:  She lives in a(n) single family home  Sandra Purvis is  and lives with their spouse.  Self Care: Independent, Indoor Mobility: Independent, Stairs (in/outdoor): Independent, and Cognition: Independent     HOME ENVIRONMENT:  The living area:  patient lives in  "a 1 story home  There are 3 steps to enter the home.    The patient will have 24 hour supervision available upon discharge.    Current level of function:  CARE SCORES:  Self Care:  Eatin: Setup or clean-up assistance  Oral hygiene: 04: Supervision or touching  assistance  Toilet hygiene: 09: Not applicable  Shower/bathing self: 09: Not applicable  Upper body dressin: Partial/moderate assistance  Lower body dressin: Partial/moderate assistance  Putting on/taking off footwear: 03: Partial/moderate assistance  Transfers:  Roll left and right: 09: Not applicable  Sit to lyin: Not applicable  Lying to sitting on side of bed: 03: Partial/moderate assistance  Sit to stand: 03: Partial/moderate assistance  Chair/bed to chair transfer: 03: Partial/moderate assistance  Toilet transfer: 09: Not applicable  Mobility:  Walk 10 ft: 88: Not attempted due to medical conditions or safety concerns  Walk 50 ft with two turns: 88: Not attempted due to medical conditions or safety concerns  Walk 150ft: 88: Not attempted due to medical conditions or safety concerns    Physical Exam:  BP (!) 177/76 (BP Location: Left arm)   Pulse 86   Temp 98.6 °F (37 °C) (Tympanic)   Resp 19   Ht 5' 2\" (1.575 m)   Wt 53.5 kg (118 lb)   SpO2 94%   BMI 21.58 kg/m²      No intake or output data in the 24 hours ending 25 1709    Body mass index is 21.58 kg/m².      Physical Exam  Vitals reviewed.   Constitutional:       General: She is not in acute distress.     Comments: Frail-appearing   HENT:      Head: Normocephalic.      Comments: Ecchymosis across the face and regions of the fractures     Right Ear: External ear normal.      Left Ear: External ear normal.      Nose: Nose normal. No rhinorrhea.      Mouth/Throat:      Mouth: Mucous membranes are moist.      Pharynx: Oropharynx is clear.   Eyes:      General: No scleral icterus.  Cardiovascular:      Rate and Rhythm: Normal rate. Rhythm irregular.      Comments: HR low " 80s  Pulmonary:      Effort: Pulmonary effort is normal. No respiratory distress.   Abdominal:      General: There is no distension.      Palpations: Abdomen is soft.   Skin:     General: Skin is warm and dry.      Comments: Large tumor/lesion noted on the posterior cranial aspect of the head, some spot bleeding from the edges   Neurological:      Mental Status: She is alert and oriented to person, place, and time.      Comments: Generalized weakness throughout however given age and overall frailty is within acceptable limits for full strength.  No sensory deficits   Psychiatric:         Mood and Affect: Mood normal.         Behavior: Behavior normal.            Labs, medications, and imaging personally reviewed.    Laboratory:    Lab Results   Component Value Date    SODIUM 137 01/10/2025    K 3.7 01/10/2025     01/10/2025    CO2 29 01/10/2025    BUN 17 01/10/2025    CREATININE 0.81 01/10/2025    GLUC 102 01/10/2025    CALCIUM 8.6 01/10/2025     Lab Results   Component Value Date    WBC 11.04 (H) 01/10/2025    HGB 9.9 (L) 01/10/2025    HCT 31.2 (L) 01/10/2025    MCV 90 01/10/2025     01/10/2025     Lab Results   Component Value Date    INR 1.12 01/04/2025    INR 1.35 (H) 09/01/2015    INR 1.19 (H) 08/31/2015    PROTIME 14.6 01/04/2025    PROTIME 16.5 (H) 09/01/2015    PROTIME 15.0 (H) 08/31/2015         Current Facility-Administered Medications:     acetaminophen (TYLENOL) tablet 975 mg, 975 mg, Oral, Q8H [facility], 975 mg at 01/12/25 1556    bisacodyl (DULCOLAX) rectal suppository 10 mg, 10 mg, Rectal, Daily PRN, Richard Brown DO    calcium carbonate (TUMS) chewable tablet 500 mg, 500 mg, Oral, Daily PRN, Richard Brown DO    enoxaparin (LOVENOX) subcutaneous injection 30 mg, 30 mg, Subcutaneous, Q24H, Richard Brown DO    [START ON 1/13/2025] melatonin tablet 3 mg, 3 mg, Oral, HS, Richard Brown DO    metoprolol succinate (TOPROL-XL) 24 hr tablet 50 mg, 50 mg, Oral, BID, Richard Brown,  DO    midodrine (PROAMATINE) tablet 2.5 mg, 2.5 mg, Oral, BID AC, Richard Brown DO, 2.5 mg at 01/12/25 1556    ondansetron (ZOFRAN-ODT) dispersible tablet 4 mg, 4 mg, Oral, Q6H PRN, Richard Brown DO    oxyCODONE (ROXICODONE) split tablet 2.5 mg, 2.5 mg, Oral, Q6H PRN **OR** oxyCODONE (ROXICODONE) IR tablet 5 mg, 5 mg, Oral, Q6H PRN, Richard Brown DO    pantoprazole (PROTONIX) EC tablet 40 mg, 40 mg, Oral, BID, Richard Brown DO    [START ON 1/13/2025] polyethylene glycol (MIRALAX) packet 17 g, 17 g, Oral, Daily, Richard Brown DO    senna-docusate sodium (SENOKOT S) 8.6-50 mg per tablet 1 tablet, 1 tablet, Oral, HS, Richard Brown DO  Allergies   Allergen Reactions    Lactose - Food Allergy GI Intolerance    Penicillins Other (See Comments)     Unsure of reaction       Patient Active Problem List    Diagnosis Date Noted    SDH (subdural hematoma) ([facility]) 01/04/2025    SAH (subarachnoid hemorrhage) ([facility]) 01/04/2025    Anemia 01/12/2025    Leukocytosis 01/12/2025    Diastolic dysfunction 01/12/2025    Syncope 01/08/2025    Chest pain 01/07/2025    Orthostatic hypotension 01/07/2025    Fall 01/06/2025    Closed extensive facial fractures ([facility]) 01/04/2025    Primary insomnia 11/21/2024    Basal cell carcinoma (BCC) of scalp 08/01/2023    Malignant melanoma of arm, left ([facility]) 08/01/2023    Scalp lesion 07/25/2023    Arm skin lesion, left 07/25/2023    Irritant contact dermatitis due to other agents 04/26/2022    COVID-19 01/14/2022    GERD (gastroesophageal reflux disease)     Stage 3 chronic kidney disease, unspecified whether stage 3a or 3b CKD ([facility]) 12/14/2021    Paroxysmal atrial fibrillation ([facility])     Preop examination 11/22/2021    Esophagus disorder 10/19/2021    Ureteral stone 10/15/2021    Bacteremia 10/15/2021    Mild protein-calorie malnutrition (HCC) 05/04/2021    Hypertension 09/22/2015     Past Medical History:   Diagnosis Date    Arthritis     Cataract     LastAssessed:9/8/15    GERD (gastroesophageal  reflux disease)     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    Hypertension     PAF (paroxysmal atrial fibrillation) (HCC)     Last Assessed:2/3/16    Wrist fracture     Resolved:9/8/15     Past Surgical History:   Procedure Laterality Date    CATARACT EXTRACTION      Last Assessed:9/8/15    FL RETROGRADE PYELOGRAM  10/17/2021    LYMPHADENECTOMY      Last Assessed:9/8/15    NE CYSTO/URETERO W/LITHOTRIPSY &INDWELL STENT INSRT Right 11/24/2021    Procedure: CYSTOSCOPY URETEROSCOPY WITH LITHOTRIPSY HOLMIUM LASER, RETROGRADE PYELOGRAM AND INSERTION STENT URETERAL;  Surgeon: David Sr MD;  Location: [facility];  Service: Urology    NE CYSTOURETHROSCOPY W/URETERAL CATHETERIZATION Right 10/17/2021    Procedure: CYSTOSCOPY RETROGRADE PYELOGRAM WITH INSERTION STENT URETERAL;  Surgeon: Benjie Tinoco MD;  Location: [facility];  Service: Urology    TONSILLECTOMY      Last Assessed:9/8/15    WRIST SURGERY      Last Assessed:9/8/15     Social History     Socioeconomic History    Marital status: /Civil Union     Spouse name: Not on file    Number of children: Not on file    Years of education: Not on file    Highest education level: Not on file   Occupational History    Occupation: retired   Tobacco Use    Smoking status: Never     Passive exposure: Never    Smokeless tobacco: Never   Vaping Use    Vaping status: Never Used   Substance and Sexual Activity    Alcohol use: Never    Drug use: Never    Sexual activity: Not Currently     Partners: Male   Other Topics Concern    Not on file   Social History Narrative    Not on file     Social Drivers of Health     Financial Resource Strain: Not on file   Food Insecurity: No Food Insecurity (1/12/2025)    Nursing - Inadequate Food Risk Classification     Worried About Running Out of Food in the Last Year: Never true     Ran Out of Food in the Last Year: Never true     Ran Out of Food in the Last Year: Never true   Transportation Needs: Unmet Transportation Needs  (2025)    Nursing - Transportation Risk Classification     Lack of Transportation: Not on file     Lack of Transportation: Yes   Physical Activity: Not on file   Stress: Not on file   Social Connections: Not on file   Intimate Partner Violence: Unknown (2025)    Nursing IPS     Feels Physically and Emotionally Safe: Not on file     Physically Hurt by Someone: Not on file     Humiliated or Emotionally Abused by Someone: Not on file     Physically Hurt by Someone: No     Hurt or Threatened by Someone: No   Housing Stability: Unknown (2025)    Nursing: Inadequate Housing Risk Classification     Has Housing: Not on file     Worried About Losing Housing: Not on file     Unable to Get Utilities: Not on file     Unable to Pay for Housing in the Last Year: No     Has Housin     Social History     Tobacco Use   Smoking Status Never    Passive exposure: Never   Smokeless Tobacco Never     Social History     Substance and Sexual Activity   Alcohol Use Never     Family History   Problem Relation Age of Onset    No Known Problems Family          Medical Necessity Criteria for [facility] Admission: Chronic Kidney Disease, Anemia, with the following plan: monitor H/H, Hypertension, Bowel/Bladder Management, and Leukocystosis. In addition, the preadmission screen, post-admission physical evaluation, overall plan of care and admissions order demonstrate a reasonable expectation that the following criteria were met at the time of admission to the [facility].  The patient requires active and ongoing therapeutic intervention of multiple therapy disciplines (physical therapy, occupational therapy, speech-language pathology, or prosthetics/orthotics), one of which is physical or occupational therapy.    Patient requires an intensive rehabilitation therapy program, as defined in Chapter 1, section 110.2.2 of the CMS Medicare Policy Manual. This intensive rehabilitation therapy program will consist of at least 3 hours of therapy per  day at least 5 days per week or at least 15 hours of intensive rehabilitation therapy within a 7 consecutive day period, beginning with the date of admission to the [facility].    The patient is reasonably expected to actively participate in, and benefit significantly from, the intensive rehabilitation therapy program as defined in Chapter 1, section 110.2.2 of the CMS Medicare Policy Manual at this time of admission to the [facility]. She can reasonably be expected to make measurable improvement (that will be of practical value to improve the patient’s functional capacity or adaptation to impairments) as a result of the rehabilitation treatment, as defined in section 110.3, and such improvement can be expected to be made within the prescribed period of time. As noted in the CMS Medicare Policy Manual, the patient need not be expected to achieve complete independence in the domain of self-care nor be expected to return to his or her prior level of functioning in order to meet this standard.  The patient must require physician supervision by a rehabilitation physician. As such, a rehabilitation physician will conduct face-to-face visits with the patient at least 3 days per week throughout the patient’s stay in the [facility] to assess the patient both medically and functionally, as well as to modify the course of treatment as needed to maximize the patient’s capacity to benefit from the rehabilitation process.  The patient requires an intensive and coordinated interdisciplinary approach to providing rehabilitation, as defined in Chapter 1, section 110.2.5 of the CMS Medicare Policy Manual. This will be achieved through periodic team conferences, conducted at least once in a 7-day period, and comprising of an interdisciplinary team of medical professionals consisting of: a rehabilitation physician, registered nurse,  and/or , and a licensed/certified therapist from each therapy discipline involved in treating the  patient.     Changes Since Pre-admission Assessment: None -This patient's participation in rehab continues to be reasonable, necessary and appropriate.    CMS Required Post-Admission Physician Evaluation Elements  History and Physical, including medical history, functional history and active comorbidities as in above text.     Post-Admission Physician Evaluation:  The patient has the potential to make improvement and is in need of physical, occupational, and/or therapy services. The patient may also need nutritional services. Given the patient's complex medical condition and risk of further medical complications, rehabilitative services cannot be safely provided at a lower level of care, such as a skilled nursing facility. I have reviewed the patient's functional and medical status at the time of the preadmission screening and they are the same as on the day of this admission. I acknowledge that I have personally performed a full physical examination on this patient within 24 hours of admission. The patient and/or family demonstrated understanding the rehabilitation program and the discharge process after we discussed them.     Agree in entirety: yes  Minor adaptions: none    Major changes: none    Richard Brown, DO  Physical Medicine and Rehabilitation  [facility]      I have spent a total time of 85 minutes in caring for this patient on the day of the visit/encounter including Risks and benefits of tx options, Instructions for management, Importance of tx compliance, Risk factor reductions, Impressions, Counseling / Coordination of care, Documenting in the medical record, Reviewing / ordering tests, medicine, procedures  , Obtaining or reviewing history  , and Communicating with other healthcare professionals .    ** Please Note: Fluency Direct voice to text software may have been used in the creation of this document. **

## 2025-01-12 NOTE — ASSESSMENT & PLAN NOTE
Lab Results   Component Value Date    EGFR 64 01/10/2025    EGFR 50 01/08/2025    EGFR 50 01/07/2025    CREATININE 0.81 01/10/2025    CREATININE 0.99 01/08/2025    CREATININE 0.98 01/07/2025     Stage III CKD per notes in the outpatient setting with primary care physician but not actively following with nephrology  Continue to monitor labs however they have been stable and will need to follow-up as an outpatient for continued management

## 2025-01-12 NOTE — ASSESSMENT & PLAN NOTE
Patient presents with fall and initial CTh on 1/4 showing an acute subdural hemorrhage along the left cerebral convexity with a maximum thickness of 1.9 cm with no midline shift as well as an acute subarachnoid hemorrhage in the left frontoparietal sulci, left sylvian fissure and suprasellar cistern  Repeat CT head on 1/5 was stable  Evaluated by neurosurgery no plans for reversal agent as the patient was not taking any anticoagulation or antiplatelet agents and maintain normotension with systolic blood pressure goals less than 160  7-day course of Keppra for posttraumatic head injury seizure prophylaxis   Follow-up with neurosurgery in 2 weeks repeat head CT  Physical, occupational and speech therapy  Discharge planning

## 2025-01-12 NOTE — ASSESSMENT & PLAN NOTE
Subarachnoid hemorrhage noted on initial CT on 1/4 and stable on 1/5  Continue same management for the subarachnoid hemorrhage as for the subdural hematoma, please see that separate section for overall management plans

## 2025-01-12 NOTE — ASSESSMENT & PLAN NOTE
Blood pressures ranging in physical therapy as low as 70/40 and cardiology was evaluating the patient  Symptomatic with lightheadedness and presyncope and had received gentle fluids with some improvement  BALJIT stockings as well and encouragement of fluid intake  Repeat orthostatics improved but still getting lightheaded at times which was felt to be the initiating factor in her syncopal episode that led to the fall and injury.  Started on low-dose midodrine 2.5 mg twice daily and improved from a symptomatic standpoint.  To contact cardiology with any questions and follow-up as an outpatient

## 2025-01-13 ENCOUNTER — TRANSITIONAL CARE MANAGEMENT (OUTPATIENT)
Age: OVER 89
End: 2025-01-13

## 2025-01-13 LAB
ANION GAP SERPL CALCULATED.3IONS-SCNC: 9 MMOL/L (ref 4–13)
ANISOCYTOSIS BLD QL SMEAR: PRESENT
BASOPHILS # BLD MANUAL: 0 THOUSAND/UL (ref 0–0.1)
BASOPHILS NFR MAR MANUAL: 0 % (ref 0–1)
BUN SERPL-MCNC: 17 MG/DL (ref 5–25)
CALCIUM SERPL-MCNC: 8.9 MG/DL (ref 8.4–10.2)
CHLORIDE SERPL-SCNC: 98 MMOL/L (ref 96–108)
CO2 SERPL-SCNC: 27 MMOL/L (ref 21–32)
CREAT SERPL-MCNC: 0.86 MG/DL (ref 0.6–1.3)
EOSINOPHIL # BLD MANUAL: 0 THOUSAND/UL (ref 0–0.4)
EOSINOPHIL NFR BLD MANUAL: 0 % (ref 0–6)
ERYTHROCYTE [DISTWIDTH] IN BLOOD BY AUTOMATED COUNT: 15.2 % (ref 11.6–15.1)
FERRITIN SERPL-MCNC: 98 NG/ML (ref 11–307)
FOLATE SERPL-MCNC: 21.2 NG/ML
GFR SERPL CREATININE-BSD FRML MDRD: 59 ML/MIN/1.73SQ M
GLUCOSE SERPL-MCNC: 149 MG/DL (ref 65–140)
HCT VFR BLD AUTO: 29.3 % (ref 34.8–46.1)
HGB BLD-MCNC: 9.6 G/DL (ref 11.5–15.4)
IRON SATN MFR SERPL: 12 % (ref 15–50)
IRON SERPL-MCNC: 34 UG/DL (ref 50–212)
LYMPHOCYTES # BLD AUTO: 0.56 THOUSAND/UL (ref 0.6–4.47)
LYMPHOCYTES # BLD AUTO: 4 % (ref 14–44)
MAGNESIUM SERPL-MCNC: 1.9 MG/DL (ref 1.9–2.7)
MCH RBC QN AUTO: 28.7 PG (ref 26.8–34.3)
MCHC RBC AUTO-ENTMCNC: 32.8 G/DL (ref 31.4–37.4)
MCV RBC AUTO: 88 FL (ref 82–98)
MONOCYTES # BLD AUTO: 0.34 THOUSAND/UL (ref 0–1.22)
MONOCYTES NFR BLD: 3 % (ref 4–12)
NEUTROPHILS # BLD MANUAL: 10.31 THOUSAND/UL (ref 1.85–7.62)
NEUTS HYPERSEG BLD QL SMEAR: PRESENT
NEUTS SEG NFR BLD AUTO: 92 % (ref 43–75)
PLATELET # BLD AUTO: 223 THOUSANDS/UL (ref 149–390)
PLATELET BLD QL SMEAR: ADEQUATE
PMV BLD AUTO: 10.2 FL (ref 8.9–12.7)
POTASSIUM SERPL-SCNC: 3.6 MMOL/L (ref 3.5–5.3)
QRS AXIS: -22 DEGREES
QRSD INTERVAL: 78 MS
QT INTERVAL: 334 MS
QTC INTERVAL: 442 MS
RBC # BLD AUTO: 3.34 MILLION/UL (ref 3.81–5.12)
RBC MORPH BLD: PRESENT
SODIUM SERPL-SCNC: 134 MMOL/L (ref 135–147)
T WAVE AXIS: 135 DEGREES
TIBC SERPL-MCNC: 295.4 UG/DL (ref 250–450)
TRANSFERRIN SERPL-MCNC: 211 MG/DL (ref 203–362)
UIBC SERPL-MCNC: 261 UG/DL (ref 155–355)
VARIANT LYMPHS # BLD AUTO: 1 %
VENTRICULAR RATE: 105 BPM
VIT B12 SERPL-MCNC: 1125 PG/ML (ref 180–914)
WBC # BLD AUTO: 11.21 THOUSAND/UL (ref 4.31–10.16)

## 2025-01-13 PROCEDURE — 97129 THER IVNTJ 1ST 15 MIN: CPT

## 2025-01-13 PROCEDURE — 93005 ELECTROCARDIOGRAM TRACING: CPT

## 2025-01-13 PROCEDURE — 83735 ASSAY OF MAGNESIUM: CPT | Performed by: NURSE PRACTITIONER

## 2025-01-13 PROCEDURE — 97167 OT EVAL HIGH COMPLEX 60 MIN: CPT

## 2025-01-13 PROCEDURE — 97130 THER IVNTJ EA ADDL 15 MIN: CPT

## 2025-01-13 PROCEDURE — 83540 ASSAY OF IRON: CPT | Performed by: NURSE PRACTITIONER

## 2025-01-13 PROCEDURE — 93010 ELECTROCARDIOGRAM REPORT: CPT

## 2025-01-13 PROCEDURE — 82746 ASSAY OF FOLIC ACID SERUM: CPT | Performed by: NURSE PRACTITIONER

## 2025-01-13 PROCEDURE — 99222 1ST HOSP IP/OBS MODERATE 55: CPT | Performed by: INTERNAL MEDICINE

## 2025-01-13 PROCEDURE — 97530 THERAPEUTIC ACTIVITIES: CPT

## 2025-01-13 PROCEDURE — 82607 VITAMIN B-12: CPT | Performed by: NURSE PRACTITIONER

## 2025-01-13 PROCEDURE — 82728 ASSAY OF FERRITIN: CPT | Performed by: NURSE PRACTITIONER

## 2025-01-13 PROCEDURE — 83550 IRON BINDING TEST: CPT | Performed by: NURSE PRACTITIONER

## 2025-01-13 PROCEDURE — 99233 SBSQ HOSP IP/OBS HIGH 50: CPT | Performed by: INTERNAL MEDICINE

## 2025-01-13 PROCEDURE — 85007 BL SMEAR W/DIFF WBC COUNT: CPT | Performed by: NURSE PRACTITIONER

## 2025-01-13 PROCEDURE — 92523 SPEECH SOUND LANG COMPREHEN: CPT

## 2025-01-13 PROCEDURE — 99222 1ST HOSP IP/OBS MODERATE 55: CPT

## 2025-01-13 PROCEDURE — 85027 COMPLETE CBC AUTOMATED: CPT | Performed by: NURSE PRACTITIONER

## 2025-01-13 PROCEDURE — 80048 BASIC METABOLIC PNL TOTAL CA: CPT | Performed by: NURSE PRACTITIONER

## 2025-01-13 PROCEDURE — 92610 EVALUATE SWALLOWING FUNCTION: CPT

## 2025-01-13 RX ORDER — HYDRALAZINE HYDROCHLORIDE 10 MG/1
10 TABLET, FILM COATED ORAL EVERY 8 HOURS PRN
Status: DISCONTINUED | OUTPATIENT
Start: 2025-01-13 | End: 2025-01-14 | Stop reason: HOSPADM

## 2025-01-13 RX ORDER — PANTOPRAZOLE SODIUM 40 MG/1
40 TABLET, DELAYED RELEASE ORAL
Status: DISCONTINUED | OUTPATIENT
Start: 2025-01-14 | End: 2025-01-14 | Stop reason: HOSPADM

## 2025-01-13 RX ORDER — METOPROLOL TARTRATE 25 MG/1
25 TABLET, FILM COATED ORAL ONCE
Status: COMPLETED | OUTPATIENT
Start: 2025-01-13 | End: 2025-01-13

## 2025-01-13 RX ORDER — POTASSIUM CHLORIDE 1500 MG/1
40 TABLET, EXTENDED RELEASE ORAL ONCE
Status: COMPLETED | OUTPATIENT
Start: 2025-01-13 | End: 2025-01-13

## 2025-01-13 RX ADMIN — HYDRALAZINE HYDROCHLORIDE 10 MG: 10 TABLET ORAL at 08:34

## 2025-01-13 RX ADMIN — ACETAMINOPHEN 975 MG: 325 TABLET, FILM COATED ORAL at 13:59

## 2025-01-13 RX ADMIN — Medication 2.5 MG: at 06:00

## 2025-01-13 RX ADMIN — METOPROLOL TARTRATE 25 MG: 25 TABLET, FILM COATED ORAL at 08:34

## 2025-01-13 RX ADMIN — SENNOSIDES AND DOCUSATE SODIUM 1 TABLET: 50; 8.6 TABLET ORAL at 22:00

## 2025-01-13 RX ADMIN — BISACODYL 10 MG: 10 SUPPOSITORY RECTAL at 04:52

## 2025-01-13 RX ADMIN — POTASSIUM CHLORIDE 40 MEQ: 1500 TABLET, EXTENDED RELEASE ORAL at 13:59

## 2025-01-13 RX ADMIN — METOPROLOL SUCCINATE 50 MG: 50 TABLET, EXTENDED RELEASE ORAL at 06:29

## 2025-01-13 RX ADMIN — ACETAMINOPHEN 975 MG: 325 TABLET, FILM COATED ORAL at 22:00

## 2025-01-13 RX ADMIN — ACETAMINOPHEN 975 MG: 325 TABLET, FILM COATED ORAL at 05:00

## 2025-01-13 RX ADMIN — MIDODRINE HYDROCHLORIDE 2.5 MG: 2.5 TABLET ORAL at 16:57

## 2025-01-13 RX ADMIN — MELATONIN TAB 3 MG 3 MG: 3 TAB at 22:00

## 2025-01-13 RX ADMIN — HYDRALAZINE HYDROCHLORIDE 10 MG: 10 TABLET ORAL at 22:00

## 2025-01-13 RX ADMIN — ENOXAPARIN SODIUM 30 MG: 30 INJECTION SUBCUTANEOUS at 22:00

## 2025-01-13 RX ADMIN — METOPROLOL SUCCINATE 50 MG: 50 TABLET, EXTENDED RELEASE ORAL at 20:34

## 2025-01-13 RX ADMIN — PANTOPRAZOLE SODIUM 40 MG: 40 TABLET, DELAYED RELEASE ORAL at 08:34

## 2025-01-13 RX ADMIN — POLYETHYLENE GLYCOL 3350 17 G: 17 POWDER, FOR SOLUTION ORAL at 08:34

## 2025-01-13 NOTE — ASSESSMENT & PLAN NOTE
Presented on 1/4 s/p syncopal fall.  EKG showed atrial fibrillation with RVR on admission.  ECHO on 1/6 showed EF 70%, G2DD.  Noted to have significant orthostasis in acute setting.  Started on midodrine 2.5mg BID.  Continue to monitor orthostatics.  Encourage PO hydration.  Apply abdominal binder/TEDs as needed.    Follow-up/establish with Cardiology as outpatient.

## 2025-01-13 NOTE — CONSULTS
Cardiology Consultation  MD Mehul Moon MD, FACC  Carlos Bauer DO, ISSA MAHMOOD FACP Ather Mansoor, MD Rujul Patel, DO, [facility]  Kal Tobias DO, FACC, FASNC  ----------------------------------------------------------------  [address] [address]    Sandra Purvis 89 y.o. female MRN: [id number]  Unit/Bed#: -02 Encounter: [id number]      01/13/25    Referring Physician: Sheri Pope MD    Chief Complain/Reason for Referal: Atrial fibrillation    IMPRESSION:  Atrial fibrillation with rapid ventricular response  Subdural hematoma/subarachnoid hemorrhage  Facial fracture  Hypertension  Chronic kidney disease stage III  Basal cell carcinoma  Orthostatic hypotension  Syncope    Echo January 6, 2025    Left Ventricle: Left ventricular cavity size is normal. Wall thickness is mildly increased. The left ventricular ejection fraction is 70%. Systolic function is vigorous. Wall motion is normal. Diastolic function is moderately abnormal, consistent with grade II (pseudonormal) relaxation.    Right Ventricle: Systolic function is mildly reduced.    Left Atrium: The atrium is mildly dilated.    Aortic Valve: There is mild regurgitation.    Mitral Valve: There is mild annular calcification.    Tricuspid Valve: There is mild regurgitation. The right ventricular systolic pressure is moderately elevated. The estimated right ventricular systolic pressure is 50.00 mmHg.    Pericardium: There is a trivial pericardial effusion posterior to the heart.      DISCUSSION/RECOMMENDATIONS:  Cardiology's been consulted for the patient's atrial fibrillation.  She is currently not on anticoagulation, however this is due to her significant fall history as well as recent subdural hematoma and subarachnoid hemorrhage.  Agree with monitoring off anticoagulation given significant morbidity associated with this and her current situation    With her A-fib, heart rates  are mildly elevated but seem to be improved since the metoprolol dose was increased to 50 mg twice a day.  This has improved blood pressure as well.  Will continue with this for now as she may be able to be controlled with this dose alone but can titrate up by 25 mg to be taken twice a day as needed to achieve better heart rate and blood pressure control (ex: If heart rate consistently greater than 100 and systolic blood pressure greater than 150 millimeters mercury consistently, would uptitrate to 75 mg p.o. twice daily, if still uncontrolled at that dose after several days can uptitrate further to 100 mg p.o. twice daily etc.) this will need to be balanced against her orthostatic vital signs as well.  Hopefully, with above plan with beta-blocker we can avoid adding other medications for rate/rhythm control.  Continue with midodrine to avoid orthostatic hypotension.  Encourage p.o. fluid intake with goal of 2 L/day water, EF is 70%    Can start Aspirin 81 milligrams daily when able to from a bleeding perspective as per neurosurgery    Kal Tobias DO, [facility], [facility]    ----------------------------------------------------  EKG: A-fib RVR, inferior infarct, anterior infarct ST-T wave intermittently consider lateral ischemia      ======================================================    HPI:  I am seeing this patient in cardiology consultation for: A-fib    Sandra Purvis is a 89 y.o. female with:   Atrial fibrillation with rapid ventricular response  Subdural hematoma/subarachnoid hemorrhage  Facial fracture  Hypertension  Chronic kidney disease stage III  Basal cell carcinoma  Orthostatic hypotension  Syncope    She is now in rehab after subdural hematoma/subarachnoid hemorrhage.  She had syncope which was presumed to orthostatic hypotension and significant injury secondary to this.  Also has history of A-fib where she is not on anticoagulation long-term.  This morning had increased blood pressure and heart  rate prompting cardiology consultation.  She states that she feels fine, did not really notice her heart rate being elevated.  Denies any chest pain    Past Medical History:   Diagnosis Date    Arthritis     Cataract     LastAssessed:9/8/15    GERD (gastroesophageal reflux disease)     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    Hypertension     PAF (paroxysmal atrial fibrillation) ([facility])     Last Assessed:2/3/16    Wrist fracture     Resolved:9/8/15         Scheduled Meds:  Current Facility-Administered Medications   Medication Dose Route Frequency Provider Last Rate    acetaminophen  975 mg Oral Q8H ELIZABETH Richard S Brown, DO      bisacodyl  10 mg Rectal Daily PRN Richard S Brown, DO      calcium carbonate  500 mg Oral Daily PRN Richard S Brwon, DO      enoxaparin  30 mg Subcutaneous Q24H Richard S Brown, DO      hydrALAZINE  10 mg Oral Q8H PRN GLENDA MonsalveNP      melatonin  3 mg Oral HS Richard S Brown, DO      metoprolol succinate  50 mg Oral BID Richard S Brown, DO      midodrine  2.5 mg Oral BID AC Richard S Brown, DO      ondansetron  4 mg Oral Q6H PRN Richard S Brown, DO      oxyCODONE  2.5 mg Oral Q6H PRN Richard S Brown, DO      Or    oxyCODONE  5 mg Oral Q6H PRN Richard S Brown, DO      [START ON 1/14/2025] pantoprazole  40 mg Oral Early Morning LUIS Monsalve      polyethylene glycol  17 g Oral Daily Richard S Brown, DO      senna-docusate sodium  1 tablet Oral HS Richard S Brown, DO       Continuous Infusions:   PRN Meds:.  bisacodyl    calcium carbonate    hydrALAZINE    ondansetron    oxyCODONE **OR** oxyCODONE  Allergies   Allergen Reactions    Lactose - Food Allergy GI Intolerance    Penicillins Other (See Comments)     Unsure of reaction      I reviewed the Home Medication list in the chart.     Family History   Problem Relation Age of Onset    No Known Problems Family        Social History     Socioeconomic History    Marital status: /Civil Union     Spouse name: Not on file    Number of  children: Not on file    Years of education: Not on file    Highest education level: Not on file   Occupational History    Occupation: retired   Tobacco Use    Smoking status: Never     Passive exposure: Never    Smokeless tobacco: Never   Vaping Use    Vaping status: Never Used   Substance and Sexual Activity    Alcohol use: Never    Drug use: Never    Sexual activity: Not Currently     Partners: Male   Other Topics Concern    Not on file   Social History Narrative    Not on file     Social Drivers of Health     Financial Resource Strain: Not on file   Food Insecurity: No Food Insecurity (2025)    Nursing - Inadequate Food Risk Classification     Worried About Running Out of Food in the Last Year: Never true     Ran Out of Food in the Last Year: Never true     Ran Out of Food in the Last Year: Never true   Transportation Needs: Unmet Transportation Needs (2025)    Nursing - Transportation Risk Classification     Lack of Transportation: Not on file     Lack of Transportation: Yes   Physical Activity: Not on file   Stress: Not on file   Social Connections: Not on file   Intimate Partner Violence: Unknown (2025)    Nursing IPS     Feels Physically and Emotionally Safe: Not on file     Physically Hurt by Someone: Not on file     Humiliated or Emotionally Abused by Someone: Not on file     Physically Hurt by Someone: No     Hurt or Threatened by Someone: No   Housing Stability: Unknown (2025)    Nursing: Inadequate Housing Risk Classification     Has Housing: Not on file     Worried About Losing Housing: Not on file     Unable to Get Utilities: Not on file     Unable to Pay for Housing in the Last Year: No     Has Housin       Review of Systems   Review of Systems   Constitutional:  Negative for chills and fever.   HENT:  Negative for facial swelling and sore throat.    Eyes:  Negative for visual disturbance.   Respiratory:  Negative for cough, chest tightness, shortness of breath and wheezing.     Cardiovascular:  Negative for chest pain, palpitations and leg swelling.   Gastrointestinal:  Negative for abdominal pain, blood in stool, constipation, diarrhea, nausea and vomiting.   Endocrine: Negative for cold intolerance and heat intolerance.   Genitourinary:  Negative for decreased urine volume, difficulty urinating, dysuria and hematuria.   Musculoskeletal:  Positive for arthralgias and back pain. Negative for myalgias.   Skin:  Negative for rash.   Neurological:  Positive for headaches. Negative for dizziness, syncope, weakness and numbness.   Psychiatric/Behavioral:  Negative for agitation, behavioral problems and confusion. The patient is not nervous/anxious.       Vitals:    01/13/25 1442   BP: 142/82   Pulse: 96   Resp: 18   Temp: 99.4 °F (37.4 °C)   SpO2: 96%     I/O         01/11 0701 01/12 0700 01/12 0701 01/13 0700 01/13 0701 01/14 0700    P.O.  240     Total Intake(mL/kg)  240 (4.5)     Urine (mL/kg/hr)  150     Stool  0     Total Output  150     Net  +90            Unmeasured Urine Occurrence  1 x 1 x    Unmeasured Stool Occurrence  0 x 1 x          Weight (last 2 days)       Date/Time Weight    01/12/25 1425 53.5 (118)    01/12/25 1415 53.5 (118)            Physical Exam  Constitutional: awake, alert and oriented, in no acute distress, no obvious deformities  Head: Normocephalic, without obvious abnormality, atraumatic  Eyes: conjunctivae clear and moist. Sclera anicteric.  No xanthelasmas. Pupils equal bilaterally.  Extraocular motions are full.  Ear nose mouth and throat: ears are symmetrical bilaterally, hearing appears to be equal bilaterally, no nasal discharge or epistaxis, oropharynx is clear with moist mucous membranes  Neck:  Trachea is midline, neck is supple, no thyromegaly or significant lymphadenopathy, there is full range of motion.  Lungs: clear to auscultation bilaterally, no wheezes, no rales, no rhonchi, no accessory muscle use, breathing is nonlabored  Heart: Irregularly  "irregular rhythm with a Tachycardic heart rate, S1, S2 normal, No Murmur, no click, rub or gallop, No lower extremity edema  Abdomen: soft, non-tender; bowel sounds normal; no masses,  no organomegaly  Skin: She has ecchymosis on her face due to recent fall and injury    Results from last 7 days   Lab Units 01/13/25  1025 01/10/25  0541 01/09/25  1244 01/08/25  0828 01/07/25  2313 01/07/25  0532   WBC Thousand/uL 11.21* 11.04* 13.75* 16.27*   < > 12.04*   HEMOGLOBIN g/dL 9.6* 9.9* 9.7* 9.6*   < > 11.5   HEMATOCRIT % 29.3* 31.2* 30.8* 29.8*   < > 35.8   PLATELETS Thousands/uL 223 183 187 188   < > 195   SEGS PCT %  --  82*  --  85*  --  83*   MONO PCT % 3* 7  --  6   < > 6   EOS PCT % 0 1  --  0   < > 0    < > = values in this interval not displayed.     Results from last 7 days   Lab Units 01/13/25  1025 01/10/25  0541 01/08/25  0828   POTASSIUM mmol/L 3.6 3.7 3.7   CHLORIDE mmol/L 98 100 103   CO2 mmol/L 27 29 30   BUN mg/dL 17 17 24   CREATININE mg/dL 0.86 0.81 0.99   CALCIUM mg/dL 8.9 8.6 8.8     Results from last 7 days   Lab Units 01/13/25  1025 01/10/25  0541 01/08/25  0828 01/07/25  2313 01/07/25  0532   POTASSIUM mmol/L 3.6 3.7 3.7   < > 3.8   CHLORIDE mmol/L 98 100 103   < > 103   CO2 mmol/L 27 29 30   < > 27   BUN mg/dL 17 17 24   < > 25   CREATININE mg/dL 0.86 0.81 0.99   < > 0.99   CALCIUM mg/dL 8.9 8.6 8.8   < > 9.1   ALK PHOS U/L  --   --   --   --  52   ALT U/L  --   --   --   --  32   AST U/L  --   --   --   --  26    < > = values in this interval not displayed.     No results found for: \"TROPONINT\"                            I have personally reviewed the EKG, CXR and Telemetry images directly.      Patient Active Problem List    Diagnosis Date Noted    Anemia 01/12/2025    Leukocytosis 01/12/2025    Diastolic dysfunction 01/12/2025    Syncope 01/08/2025    Chest pain 01/07/2025    Orthostatic hypotension 01/07/2025    Fall 01/06/2025    SDH (subdural hematoma) ([facility]) 01/04/2025    SAH (subarachnoid " "hemorrhage) (HCC) 01/04/2025    Closed extensive facial fractures (HCC) 01/04/2025    Primary insomnia 11/21/2024    Basal cell carcinoma (BCC) of scalp 08/01/2023    Malignant melanoma of arm, left (HCC) 08/01/2023    Scalp lesion 07/25/2023    Arm skin lesion, left 07/25/2023    Irritant contact dermatitis due to other agents 04/26/2022    COVID-19 01/14/2022    GERD (gastroesophageal reflux disease)     Stage 3 chronic kidney disease, unspecified whether stage 3a or 3b CKD ([facility]) 12/14/2021    Paroxysmal atrial fibrillation ([facility])     Preop examination 11/22/2021    Esophagus disorder 10/19/2021    Ureteral stone 10/15/2021    Bacteremia 10/15/2021    Mild protein-calorie malnutrition (HCC) 05/04/2021    Hypertension 09/22/2015       Portions of the record may have been created with voice recognition software. Occasional wrong word or \"sound a like\" substitutions may have occurred due to the inherent limitations of voice recognition software. Read the chart carefully and recognize, using context, where substitutions have occurred.    Kal Tobias DO, [facility], [facility]  1/13/2025 4:08 PM            "

## 2025-01-13 NOTE — ASSESSMENT & PLAN NOTE
S/p fall on 1/4.  CT facial bones showed comminuted and mildly displaced fractures of the lateral and anterior walls of the R maxilla, lateral, and inferior wall of the R orbit, wall fracture appears to involve the inferior orbital foramen with associated small extraconal soft tissue edema, nondisplaced fractures of the zygomatic arch and the temporal bone at the TMJ, diffuse opacification of R maxillary sinus and R nasal cavity with blood products and air.    OMFS evaluated in acute setting - declined surgical intervention.  Received IV Unasyn for 7 days.  Facial sinus precautions.    Follow-up with OMFS on discharge.

## 2025-01-13 NOTE — NURSING NOTE
Pts manual /86(L arm sitting up) with an irregular apical HR of 104. Pt is complaining of a headache and lower back pain. 0600 midodrine held and 0900 dose of metoprolol 50 mg given. Pt was also given scheduled Tylenol and prn oxy 2.5mg for her headache and lower back pain. Cool wash cloth applied to patients forehead per patient request to help with her headache. On call provider made aware of midodrine being held and metoprolol being given, awaiting response. Pt is resting in her bed with her call bell in reach and all needs met.

## 2025-01-13 NOTE — CONSULTS
Consultation - Internal Medicine   Name: Sandra Purvis 89 y.o. female I MRN: [id number]  Unit/Bed#: -02 I Date of Admission: 1/12/2025   Date of Service: 1/13/2025 I Hospital Day: 1   Inpatient consult to Internal Medicine  Consult performed by: LUIS Monsalve  Consult ordered by: Richard Brown DO        Physician Requesting Evaluation: Sheri Pope MD     Assessment & Plan  SDH (subdural hematoma) (HCC)  Presented on 1/4 after syncopal fall.  CTH showed acute subdural hemorrhage along the L cerebral convexity with maximal thickness of 1.9cm.  No midline shift.    Received Keppra x7 days.  Continue to hold AC/AP.  Neurovascular checks Q shift.  Maintain normotension.  Avoid SBP >160.    Follow-up with Neurosurgery in 2 weeks with [facility] (1/18).    Primary team following.  PT/OT/SLP.  Hypertension  Home regimen: Toprol XL 50mg daily and HCTZ 12.5mg daily.  Currently receiving Toprol XL 50mg BID.  Noted to have orthostasis in acute setting - started on midodrine 2.5mg BID.  Continue to monitor orthostatics.  Keep SBP <160 due to intracranial bleed.    SBP 180s-190s over the past 24 hours.  Started on hydralazine 10mg Q8 PRN and given additional dose of metoprolol tartrate 25mg once due to tachycardia.    Cardiology consulted.  Paroxysmal atrial fibrillation (HCC)  Not taking AC at home due to hx of falls.  Continue to hold with recent falls and now subarachnoid/subdural.  Had been taking Toprol XL 50mg daily at home - currently receiving Toprol XL 50mg BID for rate control.  Monitor routine VS.  Replete electrolytes as needed.  Given dose of metoprolol tartrate 25mg once this morning due to uncontrolled rate.  Cardiology consulted.  GERD (gastroesophageal reflux disease)  Started on Protonix 40mg BID in acute setting.  Had not been on PPI as outpatient - recommend decreasing dose to 40mg daily.  Basal cell carcinoma (BCC) of scalp  Mass noted on scalp.  Had been declining intervention.  Follows  with LUIS Mejía (Surgical Oncology) as outpatient.  Malignant melanoma of arm, left (HCC)  Recent surgical excision on 12/19.  Follows with LUIS Mejía (Surg/Onc) as outpatient.  Had declined further treatment.  SAH (subarachnoid hemorrhage) (HCC)  Presented on 1/4 after syncopal fall.  CTH showed acute subarachnoid hemorrhage in the L frontoparietal sulci, L sylvian fissure and suprasellar cistern.    Received Keppra x7 days.  Continue to hold AC/AP.  Neurovascular checks Q shift.  Maintain normotension.  Avoid SBP >160.    Follow-up with Neurosurgery in 2 weeks with [facility] (1/18).    Primary team following.  PT/OT/SLP.  Closed extensive facial fractures (HCC)  S/p fall on 1/4.  CT facial bones showed comminuted and mildly displaced fractures of the lateral and anterior walls of the R maxilla, lateral, and inferior wall of the R orbit, wall fracture appears to involve the inferior orbital foramen with associated small extraconal soft tissue edema, nondisplaced fractures of the zygomatic arch and the temporal bone at the TMJ, diffuse opacification of R maxillary sinus and R nasal cavity with blood products and air.    OMFS evaluated in acute setting - declined surgical intervention.  Received IV Unasyn for 7 days.  Facial sinus precautions.    Follow-up with OMFS on discharge.  Chest pain  Noted to have elevated troponins on admission to acute setting.  [place] to be due to trauma/brain bleed vs demand ischemia due to paroxysmal a-fib and syncope.  Cardiology recommended starting ASA 81mg daily when cleared by Neurosurgery.  Recommend establishing with Cardiology on discharge.  Orthostatic hypotension  Presented with syncope on 1/4.  Significant orthostasis noted in acute setting.  Started on midodrine 2.5mg BID.  Apply abdominal binder/TEDs as needed.  Encourage PO hydration.  Continue to monitor orthostatics closely.  Syncope  Presented on 1/4 s/p syncopal fall.  EKG showed atrial fibrillation with RVR on  admission.  ECHO on 1/6 showed EF 70%, G2DD.  Noted to have significant orthostasis in acute setting.  Started on midodrine 2.5mg BID.  Continue to monitor orthostatics.  Encourage PO hydration.  Apply abdominal binder/TEDs as needed.    Follow-up/establish with Cardiology as outpatient.  Anemia  Hgb currently 9.9.  Baseline Hgb 9-10.  No active s/s of bleeding.  Obtain iron panel, folate, and vitamin B12.  Continue to trend routine CBC.  Leukocytosis  WBC count currently 11.21.  Chronically elevated.  No s/s of active infection.  Continue to trend routine CBC.  Diastolic dysfunction  ECHO on 1/6 showed EF 70%, G2DD.  Monitor volume status - currently euvolemic.  Monitor I&Os, daily weights.    History of Present Illness:    Sandra Purvis is a 89 y.o. female with a PMH of atrial fibrillation, CKD stage 3, malignant melanoma s/p resection, and HTN who originally presented to [facility] on 1/4/2025 after experiencing a syncopal fall with head strike.  CTH showed acute subdural hemorrhage along the L cerebral convexity with maximal thickness of 1.9cm, no midline shift, and acute subarachnoid hemorrhage in the L frontoparietal sulci, L sylvian fissure, and suprasellar cistern.  CT facial bones showed comminuted and mildly displaced fractures of the lateral and anterior walls of the R maxilla, lateral and inferior wall of R orbit, R inferior orbital wall fracture appears to involve the inferior orbital foramen with associated small extraconal soft tissue edema, non-displaced fractures of the zygomatic arch and the temporal bone at the TMJ, and diffuse opacification of the R maxillary sinus and R nasal cavity with blood products and air.  Patient was transferred to [facility] for Neurosurgery and OMFS evaluation.  Neurosurgery recommended conservative measures with control of hypertension.  Received Keppra for 7 days for seizure prophylaxis.  CTH obtained on 1/7 showed stability.  Neurosurgery  recommending repeat [facility] in 2 weeks and to continue to hold AC/AP.  Started on IV Unasyn for 7 days per OMFS.  OMFS discussed surgical intervention but patient declined.  Will require follow-up with OMFS as outpatient.  Syncopal work-up included, EKG, troponins, and ECHO.  Troponins were elevated but Cardiology did not feel that this was due to ischemia but rather trauma or poor perfusion due to atrial fibrillation.  Cardiology did recommend starting aspirin once cleared by Neurosurgery.  During admission, patient noted to be in atrial fibrillation with RVR and was given IV metoprolol with improvement in rate.  Cardiology was consulted and increased Toprol XL to 50mg BID.  Patient was evaluated by PT/OT and was recommended for acute inpatient rehabilitation.      Admitted to [facility] on 1/12/2025; we are consulted for medical clearance.        Pt examined while pt sitting in bed in pt room.  Alert and oriented but did take some prompting and time to answer questions appropriately.  At first stated that it was the 1990s but did correct herself within a few seconds.  Also states that she did not fall but then remembered the situation in which she came into the hospital.  Denies any headaches, lightheadedness, dizziness, SOB, palpitations, or CP.  Denies any facial pain or sinus pressure.  Denies any bowel or bladder complaints.  Lives with her .  Has children whom live a few hours away.  Had been managing her medications on her own but states that she was not taking that many at home.      Review of Systems:    Review of Systems   Constitutional:  Negative for appetite change, chills, fatigue and fever.   HENT:  Negative for congestion, sinus pressure, sinus pain and trouble swallowing.    Eyes:  Negative for visual disturbance.   Respiratory:  Negative for cough, shortness of breath, wheezing and stridor.    Cardiovascular:  Negative for chest pain, palpitations and leg swelling.    Gastrointestinal:  Positive for constipation. Negative for abdominal distention, abdominal pain, diarrhea, nausea and vomiting.        LBM 1/9   Genitourinary:  Negative for difficulty urinating, dysuria, frequency and urgency.   Musculoskeletal:  Negative for arthralgias, back pain and gait problem.   Neurological:  Negative for dizziness, weakness, light-headedness, numbness and headaches.   Psychiatric/Behavioral:  Positive for confusion. Negative for dysphoric mood and sleep disturbance. The patient is not nervous/anxious.    All other systems reviewed and are negative.      Past Medical and Surgical History:     Past Medical History:   Diagnosis Date    Arthritis     Cataract     LastAssessed:9/8/15    GERD (gastroesophageal reflux disease)     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    Hypertension     PAF (paroxysmal atrial fibrillation) ([facility])     Last Assessed:2/3/16    Wrist fracture     Resolved:9/8/15       Past Surgical History:   Procedure Laterality Date    CATARACT EXTRACTION      Last Assessed:9/8/15    FL RETROGRADE PYELOGRAM  10/17/2021    LYMPHADENECTOMY      Last Assessed:9/8/15    VA CYSTO/URETERO W/LITHOTRIPSY &INDWELL STENT INSRT Right 11/24/2021    Procedure: CYSTOSCOPY URETEROSCOPY WITH LITHOTRIPSY HOLMIUM LASER, RETROGRADE PYELOGRAM AND INSERTION STENT URETERAL;  Surgeon: David Sr MD;  Location: [facility];  Service: Urology    VA CYSTOURETHROSCOPY W/URETERAL CATHETERIZATION Right 10/17/2021    Procedure: CYSTOSCOPY RETROGRADE PYELOGRAM WITH INSERTION STENT URETERAL;  Surgeon: Benjie Tinoco MD;  Location: [facility];  Service: Urology    TONSILLECTOMY      Last Assessed:9/8/15    WRIST SURGERY      Last Assessed:9/8/15       Meds/Allergies:    all medications and allergies reviewed    Allergies:   Allergies   Allergen Reactions    Lactose - Food Allergy GI Intolerance    Penicillins Other (See Comments)     Unsure of reaction        Social History:     Marital Status:  "/Civil Union    Substance Use History:   Social History     Substance and Sexual Activity   Alcohol Use Never     Social History     Tobacco Use   Smoking Status Never    Passive exposure: Never   Smokeless Tobacco Never     Social History     Substance and Sexual Activity   Drug Use Never       Family History:  Reviewed    Physical Exam:     Vitals:   Blood Pressure: 162/100 (01/13/25 1207)  Pulse: 86 (01/13/25 1015)  Temperature: 97.8 °F (36.6 °C) (01/13/25 0441)  Temp Source: Tympanic (01/13/25 0441)  Respirations: 18 (01/13/25 0441)  Height: 5' 2\" (157.5 cm) (01/12/25 1425)  Weight - Scale: 53.5 kg (118 lb) (01/12/25 1425)  SpO2: 95 % (01/13/25 0441)    Physical Exam  Vitals and nursing note reviewed.   Constitutional:       General: She is not in acute distress.     Appearance: Normal appearance. She is not ill-appearing.   HENT:      Head: Normocephalic and atraumatic.   Cardiovascular:      Rate and Rhythm: Normal rate. Rhythm irregularly irregular.      Pulses: Normal pulses.      Heart sounds: Normal heart sounds. No murmur heard.     No friction rub.   Pulmonary:      Effort: Pulmonary effort is normal. No respiratory distress.      Breath sounds: Normal breath sounds. No wheezing or rhonchi.   Abdominal:      General: Abdomen is flat. Bowel sounds are normal. There is no distension.      Palpations: Abdomen is soft. There is no mass.      Tenderness: There is no abdominal tenderness. There is no guarding or rebound.      Hernia: No hernia is present.   Musculoskeletal:      Cervical back: Normal range of motion and neck supple.      Right lower leg: No edema.      Left lower leg: No edema.   Skin:     General: Skin is warm and dry.      Capillary Refill: Capillary refill takes less than 2 seconds.      Findings: Bruising (R sided facial and neck ecchymosis) present.   Neurological:      Mental Status: She is alert and oriented to person, place, and time.   Psychiatric:         Mood and Affect: Mood " normal.         Behavior: Behavior normal.         Additional Data:     Labs and imaging reviewed.    EKG Reviewed - last EKG on 1/7 showed atrial fibrillation with RVR, QTc 481.    M*Modal software was used to dictate this note.  It may contain errors with dictating incorrect words or incorrect spelling. Please contact the provider directly with any questions.

## 2025-01-13 NOTE — ASSESSMENT & PLAN NOTE
Hgb currently 9.9.  Baseline Hgb 9-10.  No active s/s of bleeding.  Obtain iron panel, folate, and vitamin B12.  Continue to trend routine CBC.

## 2025-01-13 NOTE — ASSESSMENT & PLAN NOTE
Mass noted on scalp.  Had been declining intervention.  Follows with LUIS Mejía (Surgical Oncology) as outpatient.

## 2025-01-13 NOTE — PROGRESS NOTES
01/13/25 1000   Therapy Time missed   Time missed? Yes   Amount of time missed 90   Reason for time missed Medical hold   Time(s) multiple attempts made Pt eval not able to completed at this time due to pt. on med hold . re attempt again tomorrow if medically cleared.

## 2025-01-13 NOTE — ASSESSMENT & PLAN NOTE
Presented on 1/4 after syncopal fall.  CTH showed acute subdural hemorrhage along the L cerebral convexity with maximal thickness of 1.9cm.  No midline shift.    Received Keppra x7 days.  Continue to hold AC/AP.  Neurovascular checks Q shift.  Maintain normotension.  Avoid SBP >160.    Follow-up with Neurosurgery in 2 weeks with [facility] (1/18).    Primary team following.  PT/OT/SLP.

## 2025-01-13 NOTE — NURSING NOTE
Pt alert and oriented times 4.  Complaining of lower back pain and headache , BP elevated.  Agree with Fina with admisnistering morning meteprolol and oxy 2.5mg. She notified doctor.  Will continue to monitor.

## 2025-01-13 NOTE — ASSESSMENT & PLAN NOTE
Started on Protonix 40mg BID in acute setting.  Had not been on PPI as outpatient - recommend decreasing dose to 40mg daily.

## 2025-01-13 NOTE — PLAN OF CARE
Problem: SAFETY ADULT  Goal: Patient will remain free of falls  Description: INTERVENTIONS:  - Educate patient/family on patient safety including physical limitations  - Instruct patient to call for assistance with activity   - Consult OT/PT to assist with strengthening/mobility   - Keep Call bell within reach  - Keep bed low and locked with side rails adjusted as appropriate  - Keep care items and personal belongings within reach  - Initiate and maintain comfort rounds  - Make Fall Risk Sign visible to staff  - Offer Toileting every 2 Hours, in advance of need  - Initiate/Maintain bed/chair alarm  - Obtain necessary fall risk management equipment: bed/chair alarms  - Apply yellow socks and bracelet for high fall risk patients  - Consider moving patient to room near nurses station  Outcome: Progressing

## 2025-01-13 NOTE — PROGRESS NOTES
25 1300   Pain Assessment   Pain Assessment Tool 0-10   Pain Score No Pain   Restrictions/Precautions   Precautions Bed/chair alarms;Cognitive;Fall Risk;Pain;Supervision on toilet/commode   Cognitive Linguisitic Assessments   [facility] ([facility] Mental Status Exam) Pt completed the [facility] cognitive assessment. Pt total score was 9 which as compared to those with less than high school education correlates with severe neurocognitive disorder. Breakdown of scores as follows:    Orientation: 2/3  Functional problem solving with math: 0/3  Divergent naming (timed 1 minute- animals): 1/3 (6 animals total)  Word recall: 0/5  Mental flexibility with number order: 0/2  Clock Drawin/4   Following directions given shapes: 2/2  Story recall comprehension questions: 4/8    Total score: 9/30    Based on score, patient will benefit from further skilled SLP services to maximize overall cognitive lingusitic communication abilities for increased independence and decreased burden of care.    Comprehension   Assist Devices Glasses   Auditory Basic   Visual Basic   Findings Pt completed the [facility]-see SLP rehab note for details,   QI: Comprehension 3. Usually Understands: Understands most conversations, but misses some part/intent of message. Requires cues at times to understand.   Comprehension (FIM) 3 - Needs parts of sentences repeated   Expression   Verbal Basic   Non-Verbal Basic   Intelligibility Sentence   Findings Pt completed the [facility] SLP rehab note for details,   QI: Expression 3. Exhibits some difficulty with expressing needs and ideas (e.g., some words or finishing thoughts) or speech is not clear   Expression (FIM) 4 - Expresses basic info/needs 75-90% of time   Social Interaction   Cooperation with staff   Participation Individual   Behaviors observed Appropriate   Findings Pt was cooperative and participatory throughout session.   Social Interaction (FIM) 5 - Interacts appropriately with others 90%  of time   Problem Solving   Routine Manages call bell   Findings Pt completed the [facility]-see SLP rehab note for details,   Problem solving (FIM) 2 - Solves basic problems 25-49% of time   Memory   Recognize People Yes   Initiates Tasks No   Short-Term Impaired   Long Term Intact   Recalls Precaution No   Findings Pt completed the [facility]-see SLP rehab note for details,   Memory (FIM) 2 - Recognizes, recalls/performs 25-49%   Speech/Language/Cognition Assessment   Treatment Assessment Cognitive linguistic evaluation was completed with patient interview and administration of the [facility]. Based on current evaluation, pt is presenting with at least moderate and moderate to severe cognitive linguistic deficits at time of assessment, characterized by decreased ST memory recall, working memory, attention, processing, comprehension, problem solving, sequencing, insight into deficits, thought organization and mental flexibility. During patient interview, pt oriented to person, place, month and year but disoriented to situation. Pt demonstrated accurate LTM recall of biographical info where she also presented with functional basic expression. However, pt benefited from repetition of questions, along with times of rewording in order to elicit target responses. Pt reports that she was living with her . She reported that she was independent for cleaning and laundry, but that her  manages cooking, household finances, her appts and driving. Pt reported that she was not taking any medication prior to admission. It should be noted that some of the information that pt provided was inconsistent with information that was provided to SLP from the [facility] therapy manager, based on a conversation with pt's daughter. At times, SLP also needed to ask the same question multiple times to get a specific answer as pt's responses were at times vague and appeared to [place] the target answer. It was also relayed to [facility] therapy manager from  pt's daughter that pt has some degree of cognitive linguistic deficits at baseline, however, pt's daughter did report that pt is below her baseline level of functioning. Plan for ST team to speak with pt's family to obtain additional info related to pt's baseline level of functioning. Based on current evaluation and pt presenting with cognitive linguistic deficits which are below her baseline level of functioning, pt is recommended for skilled ST services during acute rehab stay in order to maximize functional and overall cognitive linguistic skills, as well as to decrease caregiver burden on discharge.   Eating   Type of Assistance Needed Set-up / clean-up;Supervision   Physical Assistance Level No physical assistance   Eating CARE Score 4   Swallow Assessment   Swallow Treatment Assessment Bedside Swallow Evaluation      Patient Name: Sandra Purvis    Today's Date: 1/13/2025     Problem List  Principal Problem:    SDH (subdural hematoma) (HCC)  Active Problems:    Hypertension    Paroxysmal atrial fibrillation (HCC)    Stage 3 chronic kidney disease, unspecified whether stage 3a or 3b CKD (HCC)    GERD (gastroesophageal reflux disease)    Basal cell carcinoma (BCC) of scalp    Malignant melanoma of arm, left (HCC)    SAH (subarachnoid hemorrhage) (HCC)    Closed extensive facial fractures (HCC)    Chest pain    Orthostatic hypotension    Syncope    Anemia    Leukocytosis    Diastolic dysfunction      Past Medical History  Past Medical History:   Diagnosis Date    Arthritis     Cataract     LastAssessed:9/8/15    GERD (gastroesophageal reflux disease)     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    Hypertension     PAF (paroxysmal atrial fibrillation) (HCC)     Last Assessed:2/3/16    Wrist fracture     Resolved:9/8/15       Past Surgical History  Past Surgical History:   Procedure Laterality Date    CATARACT EXTRACTION      Last Assessed:9/8/15    FL RETROGRADE PYELOGRAM  10/17/2021    LYMPHADENECTOMY       Last Assessed:9/8/15    LA CYSTO/URETERO W/LITHOTRIPSY &INDWELL STENT INSRT Right 11/24/2021    Procedure: CYSTOSCOPY URETEROSCOPY WITH LITHOTRIPSY HOLMIUM LASER, RETROGRADE PYELOGRAM AND INSERTION STENT URETERAL;  Surgeon: David Sr MD;  Location: [facility];  Service: Urology    LA CYSTOURETHROSCOPY W/URETERAL CATHETERIZATION Right 10/17/2021    Procedure: CYSTOSCOPY RETROGRADE PYELOGRAM WITH INSERTION STENT URETERAL;  Surgeon: Benjie Tinoco MD;  Location: [facility];  Service: Urology    TONSILLECTOMY      Last Assessed:9/8/15    WRIST SURGERY      Last Assessed:9/8/15         Reason for consult: Pt is s/p syncope episode, which resulted in mildly displaced fractures of the lateral and anterior walls of right maxilla, lateral and inferior walls of right orbit, along with acute subdural hemorrhage along the left cerebral convexity, acute subarachnoid hemorrhage in the left frontoparietal sulci and left sylvian fissure and suprasellar cistern. Pt was recently seen by speech therapy in the acute care setting in which she was upgraded to a regular diet and thin liquids and was sign off from services. However, upon admission to the acute rehab unit, pt is reporting intermittent difficulty with chewing and was reported by staff to be falling asleep during her meals.     Swallow Information   Current Risks for Dysphagia & Aspiration: Weak cough;Weak voicing;General debilitation;Brain injury;Cognitive deficit;Reduced alertnes;   Current Symptoms/Concerns: difficulty chewing  Current Diet: regular diet and thin liquids   Baseline Diet: regular diet and thin liquids      Baseline Assessment   Behavior/Cognition: impaired; decreased insight, safety, comprehension, ST memory recall  Speech/Language Status: able to participate in basic conversation and able to follow commands inconsistently  Patient Positioning: upright in bed      Swallow Mechanism Exam  Facial: symmetrical  Labial: WFL  Lingual: WFL  Velum: unable to  visualize  Mandible: adequate ROM  Dentition: adequate  Vocal quality:clear/adequate but lower volume  Volitional Cough: weak   Respiratory Status: on RA        Consistencies Assessed and Total Amount Consumed:   Consistencies Administered: regular diet, thin liquids  Materials administered included baked tilapia, baked sweet potato, steamed green beans, diced peaches, thin juice by straw sips; took pills whole with water with RN      Oral stage: WFL to mildly impaired  Lip closure: complete; functional bolus retrieval via cup, utensil  Anterior spillage: none  Mastication: mild to occasionally mil-moderately prolonged but appeared with functional bolus breakdown of items; consistent piecemeal deglutition observed  Bolus formation: minimally reduced with solids  Bolus control: appeared WFL  Transfer: prompt with liquids; slower with solids but once bolus prep was achieved, was functional; decreased in completeness with solids  Oral residue: inconsistent min b/l residual, which pt cleared herself given increased time  Pocketing: none     Pharyngeal stage:WFL  Swallow promptness: appeared timely  Hyolaryngeal elevation: present to palpation  Wet voice: none  Throat clear: x1 towards end of meal  Cough: none   Secondary swallows: with thins  Audible swallows: with thins     Esophageal stage: Hx of GERD. Some belching observed prior to PO intake/meal.    Summary:  Pt is presenting with WFL to minimal oral dysphagia and WFL pharyngeal swallow skills this session and for pt's current age.     Pt was assessed during lunch meal with softer solids as per pt's choices. Pt was assisted upright in bed and provided with tray set up, though able to self feed independently for full meal. Prior to meal, pt denied any swallow deficits but did report sometimes having pain with mastication of harder solids, along with the need for increased time for mastication since her syncope episode/fall. She reported that she now has been  "choosing softer foods that \"I know I can handle.\"    Regarding swallow function, pt time observed with lengthy mastication of all solids but appeared with functional bolus breakdown. Minimally reduced bolus formation leading to inconsistent b/l residual, however, pt cleared independently. Pt frequently used piecemeal deglutition throughout meal and secondary swallows were also noted with multiple, consecutive sips of thins. Single throat clear towards end of meal. However, no further symptoms or concerns noted this meal. Pt then took pills one at a time with water without difficulty.     Overall, pt's primary symptom/concern presented as prolonged mastication and pain with mastication of hard solids. In review of pt's current functioning and options of either continuing on a regular diet but choosing a softer diet or changing diet to dysphagia level 3 as foods will then be softer and chopped, pt opted to remain on a regular diet at this time. Pt asked appropriate questions between the options and verbally reasoned with her choice. SLP reviewed recommendations to continue to choose softer solids. SLP also reviewed current sinus precautions due to multiple facial fractures multiple times during session with specific focus on no straws as pt desired straw use. Pt pleasant and receptive to all recommendations at this time. SLP also reviewed current treatment plan with Dr. Pope RN, PCA, and Therapy Manager in monitoring pt's ability to choose softer solids, as well as her amount of PO intake with plan to modify diet if appropriate or appears beneficial to pt with follow up dysphagia sessions.       Recommendations:  Diet: regular diet (choosing softer solids) and thin liquids-NO STRAW  Meds: whole with liquid-as tolerated  Strategies: upright posture, only feed when fully alert, slow rate of feeding, small bites/sips, no straws, and alternating bites and sips    Sinus Precautions:       -no straws       -no blowing " nose       -no closed mouth/nose sneezing       -reduce straining    FULL supervision w/ meals.    Consider consult with:  -  Results reviewed with:  patient, Dr. Pope, RN Razia, AYO Cervantes, therapy manager Mckayla  Aspiration precautions posted.        F/u ST tx: Pt is currently recommended for further skilled SLP services targeting dysphagia therapy in order to maximize oral and pharyngeal swallow skills, while safely supporting PO intake, as well as to improve independent carryover of safe swallow strategies. Also recommending continued services for follow up of tolerance of current diet.    Plan:        Continue Regular diet (choosing softer solids), thin liquids       Follow up (suspected short term) to assess pt's ability to choose softer solids, tolerance of current diet       Modify diet if needed      SLP Therapy Minutes   SLP Time In 1300   SLP Time Out 1430   SLP Total Time (minutes) 90   SLP Mode of treatment - Individual (minutes) 90   SLP Mode of treatment - Concurrent (minutes) 0   SLP Mode of treatment - Group (minutes) 0   SLP Mode of treatment - Co-treat (minutes) 0   SLP Mode of Treatment - Total time(minutes) 90 minutes   SLP Cumulative Minutes 90   Therapy Time missed   Time missed? No

## 2025-01-13 NOTE — PROGRESS NOTES
01/13/25 0700   Patient Data   Rehab Impairment Impairment of mobility, safety and Activities of Daily Living (ADLs) due to Brain Dysfunction:  02.22  Traumatic, Closed Injury   Etiologic Diagnosis Acute subdural hemorrhage along the left cerebral convexity;Acute subarachnoid hemorrhage in the left frontoparietal sulci, left sylvian fissure and suprasellar cistern   Date of Onset 01/04/25   Support System   Name Darryl  (89 years old)   Relationship    Health Status uses walker for mobility   Able to provide 24 hour supervision No   Able to provide physical help? No   Multiple Support Systems Yes   Support System 2   Name 2 Son lives [place] and Dtr lives [place]   Home Setup   Type of Home Single Level   Method of Entry Stairs   Number of Stairs 2   Number of Stairs in Home   (full flight to basement)   First Floor Bathroom Full;Tub;Shower;Curtain;Grab Bars   First Floor Bathroom Accessibility Grab bars in tub/shower;Shower chair   First Floor Setup Available Yes   Home Modifications Necessary?   (pending progress)   Home Modification Comment pending progress   Available Equipment Shower Chair;Roller Walker  (GB in tub)   Baseline Information   Vocation Other  (retired)   Transportation Family/friends drive  ()   Prior Device(s) Used Shower Chair   Prior IADL Participation   Money Management   ( and daughter)   Meal Preparation Oven  (reports daughter purchases premade foods)   Laundry Full Participation  (located in [facility], when daughter visits she will complete. but mainly pt and  completes)   Home Cleaning Cleaning service  (once every other week)   Prior Level of Function   Self-Care 3. Independent - Patient completed the activities by him/herself, with or without an assistive device, with no assistance from a helper.   Indoor-Mobility (Ambulation) 3. Independent - Patient completed the activities by him/herself, with or without an assistive device, with no assistance from  a helper.   Stairs 3. Independent - Patient completed the activities by him/herself, with or without an assistive device, with no assistance from a helper.   Functional Cognition 3. Independent - Patient completed the activities by him/herself, with or without an assistive device, with no assistance from a helper.   Prior Assistance Needed for Driving;Household Chores/Cleaning;Money Management  (indep with meds with use of pill box)   Prior Device Used Z. None of the above   Falls in the Last Year   Number of falls in the past 12 months 0   Patient Preference   Nickname (Patient Preference) (S)  Sandra   Psychosocial   Psychosocial (WDL) WDL   Restrictions/Precautions   Precautions Bed/chair alarms;Cognitive;Fall Risk   Weight Bearing Restrictions No   ROM Restrictions No   Pain Assessment   Pain Assessment Tool 0-10   Pain Score 6   Pain Location/Orientation Orientation: Upper;Location: Back   Pain Radiating Towards none   Pain Onset/Description Descriptor: Aching   Patient's Stated Pain Goal No pain   Hospital Pain Intervention(s) Rest   Multiple Pain Sites No   Oral Hygiene   Reason if not Attempted Medical concerns   Oral Hygiene CARE Score 88   Tub/Shower Transfer   Reason Not Assessed Medical   Shower/Bathe Self   Reason if not Attempted Medical concerns   Shower/Bathe Self CARE Score 88   Dressing/Undressing Clothing   Reason if not Attempted Medical concerns   Upper Body Dressing CARE Score 88   Reason if not Attempted Medical concerns   Lower Body Dressing CARE Score 88   Putting On/Taking Off Footwear   Reason if not Attempted Medical concerns   Putting On/Taking Off Footwear CARE Score 88   Toileting Hygiene   Reason if not Attempted Medical concerns   Toileting Hygiene CARE Score 88   Toilet Transfer   Reason if not Attempted Medical concerns   Toilet Transfer CARE Score 88   Transfer Bed/Chair/Wheelchair   Reason if not Attempted Medical concerns   Chair/Bed-to-Chair Transfer CARE Score 88   Sit to  "Stand   Reason if not Attempted Medical concerns   Sit to Stand CARE Score 88   Walk 10 Feet   Reason if not Attempted Medical concerns   Walk 10 Feet CARE Score 88   RUE Assessment   RUE Assessment WFL  (general weakness)   LUE Assessment   LUE Assessment WFL  (general weakness)   Sensation   Light Touch No apparent deficits   Cognition   Overall Cognitive Status Impaired   Arousal/Participation Alert;Cooperative   Attention Attends with cues to redirect   Orientation Level Oriented X4   Memory Decreased short term memory;Decreased recall of recent events   Following Commands Follows one step commands without difficulty   Comments pleasant and cooperative   Vision   Vision Comments no c/o blurry/double vision   Discharge Information   Vocational Plan Retired/not working   Barriers to Return to Vocation Strength;Endurance  (elevated BP)   Patient's Discharge Plan Return home with    Patient's Rehab Expectations \"Get better\"   Barriers to Discharge Home Decreased Strength;Decreased Endurance   Impressions Pt is 89-year-old female who presented to [facility] on 1/4/25 as a trauma transfer from the [facility] s/p syncope with collapse.  Imaging on 1/4 showed \"comminuted and mildly displaced fractures of the lateral and anterior walls of right maxilla, lateral and inferior walls of right orbit. Right inferior orbital wall fracture appears to involve the inferior orbital foramen with associated small extraconal soft tissue edema and focus of air.  No herniation of extraocular muscles.  Nondisplaced fractures of zygomatic arch and the temporal bone at the TMJ.  Diffuse opacification of right maxillary sinus and right nasal cavity with blood products and air\".  OMFS consulted and surgical intervention was discussed with patient by OMFS team but refused by patient.  Patient is to continue with 7-day course of Unasyn/Augmentin and was initiated on sinus precautions.  Neurosurgery was consulted for " the SDH/SAH and no surgical intervention was recommended.  Recommended SBP <160.    Pt supine indep upon therapist arrival, CHACHA Espino reporting elevated BP this AM. Therapist gathered home setup information, pt reporting she lives with  in single level home with 2STE. Indep with Adls/meal. Reports she has a cleaning service that comes every other week. Assess pts /88 and irregular -120. Took BP again post questions 194/111 automatic. Communicated with CHACHA Espino who reports she will contact LUIS Rodas. Pt unsafe to be seen at this time. Med hold to be placed. Pt alert with call bell within reach. Communicated with CHACHA Espino final BPs. No fx scores gathered 2* medical concerns Will trial again once medically stable and establish OT goals.       OT Therapy Minutes   OT Time In 0700   OT Time Out 0730   OT Total Time (minutes) 30   OT Mode of treatment - Individual (minutes) 30   OT Mode of treatment - Concurrent (minutes) 0   OT Mode of treatment - Group (minutes) 0   OT Mode of treatment - Co-treat (minutes) 0   OT Mode of Treatment - Total time(minutes) 30 minutes   OT Cumulative Minutes 30   Cumulative Minutes   Cumulative therapy minutes 30

## 2025-01-13 NOTE — ASSESSMENT & PLAN NOTE
WBC count currently 11.21.  Chronically elevated.  No s/s of active infection.  Continue to trend routine CBC.

## 2025-01-13 NOTE — TELEPHONE ENCOUNTER
1/15/25 - PT READMITTED TO HOSPITAL. PER ZOFIA, PUSH APT OUT 1 WK  1/21/25 1 WK HFU W/CT HEAD     1/13/25 - PT IN [facility]. PER FRITZ, PT TO BE SEEN ON 1/15/25 W/CT HEAD. CALLED  [facility] AND SPOKE TO ANGEL CONFIRMING VIRTUAL ROUNDING W/CT HEAD NEEDED PRIOR. CALLED PT DAUGHTER SANTOS AND LMOM CONFIRMING UPDATED DATE AS SHE WOULD LIKE TO BE ON CALL AS WELL. EM2 AWARE OF VIRTUAL ROUNDING.  1/15/25 1 WK HFU W/CT HEAD *VIRTUAL ROUNDING*      01/10/2025- PT STILL IN HOSPITAL.

## 2025-01-13 NOTE — ASSESSMENT & PLAN NOTE
ECHO on 1/6 showed EF 70%, G2DD.  Monitor volume status - currently euvolemic.  Monitor I&Os, daily weights.

## 2025-01-13 NOTE — H&P
H&P - PMR   Name: Sandra Purvis 89 y.o. female I MRN: [id number]  Unit/Bed#: -02 I Date of Admission: 1/12/2025   Date of Service: 1/13/2025 I Hospital Day: 1     Assessment & Plan  SDH (subdural hematoma) (HCC)  Patient presents with fall and initial CTh on 1/4 showing an acute subdural hemorrhage along the left cerebral convexity with a maximum thickness of 1.9 cm with no midline shift as well as an acute subarachnoid hemorrhage in the left frontoparietal sulci, left sylvian fissure and suprasellar cistern  Repeat CT head on 1/5 was stable  Evaluated by neurosurgery no plans for reversal agent as the patient was not taking any anticoagulation or antiplatelet agents and maintain normotension with systolic blood pressure goals less than 160  7-day course of Keppra for posttraumatic head injury seizure prophylaxis   Follow-up with neurosurgery in 2 weeks repeat head CT  Physical, occupational and speech therapy  Discharge planning  Hypertension  Home regimen: Metoprolol XL 50 mg daily, HCTZ  Initially on metoprolol tartrate 25 mg every 12 hours and HCTZ is on hold due to orthostasis  Goal blood pressures of a systolic less than 160 however have been elevated  Now on metoprolol succinate 50 mg twice daily  Difficulty maintaining blood pressures and plan to except a slightly higher blood pressure as they anticipate drops with her significant orthostatic hypotension  Paroxysmal atrial fibrillation (HCC)  Initially presented with sinus bradycardia and evaluated by cardiology and was in controlled atrial fibrillation without bradycardia  Not on anticoagulation prior to admission and not indicated at this point due to the SDH and SAH and overall remains high risk due to risk of falls and given her advanced age  No recent follow-up with cardiology as an outpatient and generally sees her primary care physician for management and was on Toprol 50 mg daily at home which has been changed well and in the  hospital  Tachycardic in the hospital and given IV fluids and medication changes include addition of midodrine and changes in the metoprolol dosing  Recommended for aspirin but would likely not be a good long-term candidate for anticoagulation  Stage 3 chronic kidney disease, unspecified whether stage 3a or 3b CKD (HCC)  Lab Results   Component Value Date    EGFR 64 01/10/2025    EGFR 50 01/08/2025    EGFR 50 01/07/2025    CREATININE 0.81 01/10/2025    CREATININE 0.99 01/08/2025    CREATININE 0.98 01/07/2025     Stage III CKD per notes in the outpatient setting with primary care physician but not actively following with nephrology  Continue to monitor labs however they have been stable and will need to follow-up as an outpatient for continued management  GERD (gastroesophageal reflux disease)  Continue Protonix  Basal cell carcinoma (BCC) of scalp  Large scalp mass noted on admission.  Also follows with dermatology as an outpatient but declining any interventions  Some localized spot bleeding from the edges noted  Malignant melanoma of arm, left (HCC)  Large mass on the left forearm that is melanoma  Follows with dermatology as an outpatient last seen in December 2024 but not interested in any treatments  SAH (subarachnoid hemorrhage) (HCC)  Subarachnoid hemorrhage noted on initial CT on 1/4 and stable on 1/5  Continue same management for the subarachnoid hemorrhage as for the subdural hematoma, please see that separate section for overall management plans  Closed extensive facial fractures (HCC)  Secondary to fall  CT of the head on 1/4 also showed comminuted and mildly displaced fractures of the lateral and anterior walls of the right maxilla, lateral and inferior walls of the right orbit, right inferior orbital wall fractures appear to involve the inferior orbital foramen with associated small extraconal soft tissue edema and focus of air.  There is no herniation of extraocular muscles and a nondisplaced fracture  of the zygomatic arch as well as the temporal bone at the TMJ.  There is diffuse opacification of the right maxillary sinus and right nasal cavity with blood products and air  Seen by oral maxillofacial surgery team and surgical intervention was declined by patient  7-day course of Unasyn/Augmentin  Sinus precautions  Follow-up with OMFS as an outpatient  Chest pain  Troponins obtained on initial evaluation of the patient in the emergency room and cardiology was consulted for evaluation  Despite elevated troponins there was nonischemic findings on her EKG and felt to be potentially related to a nonischemic troponin elevation in the setting of trauma and brain bleed versus demand ischemia due to her paroxysmal atrial fibrillation and syncopal episode  Not a candidate for cardiology for ischemic evaluation due to advanced age, frailty and recent subarachnoid hemorrhage/subdural hemorrhage  No new regional wall motion abnormalities noted on echocardiogram on 1/6/2025  Recommended to start aspirin 81 mg when cleared by neurosurgery with CT head  Follow-up with cardiology as an outpatient  Orthostatic hypotension  Blood pressures ranging in physical therapy as low as 70/40 and cardiology was evaluating the patient  Symptomatic with lightheadedness and presyncope and had received gentle fluids with some improvement  BALJIT stockings as well and encouragement of fluid intake  Repeat orthostatics improved but still getting lightheaded at times which was felt to be the initiating factor in her syncopal episode that led to the fall and injury.  Started on low-dose midodrine 2.5 mg twice daily and improved from a symptomatic standpoint.  To contact cardiology with any questions and follow-up as an outpatient  Syncope  Presented with syncopal episode preceded by lightheadedness that led to the fall which caused the sudden dural hemorrhage and subarachnoid hemorrhage  Also found to be significantly orthostatic which was likely the  etiology and was placed on telemetry with no evidence of bradycardia or pauses  Limited p.o. intake and fluid intake and suspected orthostatic hypotension as the cause  Anemia  Most recent hemoglobin of 9.9 which is stable over the last 4 days  Continue to follow biweekly CBC or sooner if clinically indicated  Will need repeat scan if significant drops or concern for acute bleeds  Leukocytosis  Most recent WBC count 11.04 and actually trending down from its maximum of 17.59  Unclear etiology, may be directly related to reactive changes from the SDH and SAH as well as fractures  Continue to monitor and if considerable changes may repeat scans given the fractures in the facial bones  Was on a course of Unasyn/Augmentin for 7 days  Diastolic dysfunction  Grade 2 diastolic dysfunction noted on echo  Monitor weights Monday Wednesday Friday and adjust accordingly    Rehab Diagnosis: Impairment of mobility, safety, Activities of Daily Living (ADLs), and cognitive/communication skills due to Brain Dysfunction:  02.22  Traumatic, Closed Injury    History of Present Illness    Sandra Purvis is a 89 y.o. female with history of atrial fibrillation, GERD, hypertension who presented to the [facility] on 1//25 as a trauma case from the [facility] to [facility] after a syncopal episode and collapse with imaging specifically a CT head on the same day showing comminuted and mildly displaced fractures of the lateral and anterior walls of the right maxilla, lateral and inferior walls of the right orbit, right inferior orbital wall fracture appears to involve the inferior orbital foramen associated with small extraconal soft tissue edema and focus of air.  There is also a nondisplaced fracture of the zygomatic arch and the temporal bone at the TMJ and diffuse opacification of the right maxillary sinus and right nasal cavity with blood products and air.  Additionally there was an  acute subdural hemorrhage along the left cerebral convexity with a maximal thickness of 1.9 cm with no midline shift and an acute subarachnoid hemorrhage in the left frontal parietal sulci, left sylvian fissure as well as suprasellar cistern.  OMFS was consulted as well as neurosurgery and from a neurosurgical standpoint placed on Keppra 7 days for seizure prophylaxis and treated nonoperatively.  OMFS recommended surgical interventions however declined by patient with plans for outpatient follow-up and was placed on a 7-day course of Unasyn/Augmentin.  Goal blood pressure systolic less than 160.  Patient also had an echocardiogram showing an LVEF of 70% with vigorous systolic function and a grade 2 diastolic dysfunction.  Right ventricular function mildly reduced and left atrium was mildly dilated.  Telemetry did show atrial fibrillation with RVR and cardiology was also consulted and following and given IV fluids BALJIT stockings and changes in the medications including the Lopressor as well as initiation of low-dose midodrine 2.5 mg twice daily.  Recommended also to start on 81 mg of aspirin daily when cleared from a SDH/SAH perspective.  Due for follow-up CT in approximately 1 week. The patient was evaluated by the Rehabilitation team and deemed an appropriate candidate for comprehensive inpatient rehabilitation and admitted to the [facility] on 1/12/2025  2:08 PM     Subjective : Pt seen in bed. Reports difficulty sleeping unchanged from prior to admission, constipation, and slight headache. Patient reports urgency unchanged from prior to admission. Denies chest pain and palpitations.    Review of Systems   Cardiovascular: Negative.  Negative for chest pain and palpitations.   Gastrointestinal:  Positive for constipation.   Genitourinary:  Positive for urgency.   Musculoskeletal:         Pain behind knees     Neurological:  Positive for headaches.       Objective :  Temp:  [97.8 °F (36.6 °C)-98.6 °F (37 °C)] 97.8 °F (36.6  "°C)  HR:  [] 86  BP: (127-194)/() 170/78  Resp:  [18-19] 18  SpO2:  [94 %-97 %] 95 %  O2 Device: None (Room air)    General: in no acute distress  HEENT: moist mucus membranes  Pulm: non-labored breathing  Cardiac: irregular rhythm, normal rate  Heme: Negative for edema  Skin: Positive for bruising  Neuro: Alert, speech intact  Psych: normal mood and behavior        Lab Results: I have reviewed the following results:  Results from last 7 days   Lab Units 01/13/25  1025 01/10/25  0541 01/09/25  1244   HEMOGLOBIN g/dL 9.6* 9.9* 9.7*   HEMATOCRIT % 29.3* 31.2* 30.8*   WBC Thousand/uL 11.21* 11.04* 13.75*   PLATELETS Thousands/uL 223 183 187     Results from last 7 days   Lab Units 01/10/25  0541 01/08/25  0828 01/07/25  2313 01/07/25  0532   BUN mg/dL 17 24 28* 25   SODIUM mmol/L 137 138 136 139   POTASSIUM mmol/L 3.7 3.7 3.6 3.8   CHLORIDE mmol/L 100 103 100 103   CREATININE mg/dL 0.81 0.99 0.98 0.99   AST U/L  --   --   --  26   ALT U/L  --   --   --  32              Wt Readings from Last 1 Encounters:   01/12/25 53.5 kg (118 lb)     Estimated body mass index is 21.58 kg/m² as calculated from the following:    Height as of this encounter: 5' 2\" (1.575 m).    Weight as of this encounter: 53.5 kg (118 lb).    Physical Medicine and Rehabilitation  Autumn Chapman PA-C  "

## 2025-01-13 NOTE — ASSESSMENT & PLAN NOTE
Presented with syncope on 1/4.  Significant orthostasis noted in acute setting.  Started on midodrine 2.5mg BID.  Apply abdominal binder/TEDs as needed.  Encourage PO hydration.  Continue to monitor orthostatics closely.

## 2025-01-13 NOTE — PROGRESS NOTES
01/13/25 0700   Therapy Time missed   Time missed? Yes   Amount of time missed 60   Reason for time missed Medical hold  (elevated /88 irregular -120; 194/111 . EKG completed. Will finalize eval and goals once medically stable.)

## 2025-01-13 NOTE — ASSESSMENT & PLAN NOTE
Noted to have elevated troponins on admission to acute setting.  Stokes to be due to trauma/brain bleed vs demand ischemia due to paroxysmal a-fib and syncope.  Cardiology recommended starting ASA 81mg daily when cleared by Neurosurgery.  Recommend establishing with Cardiology on discharge.

## 2025-01-13 NOTE — ASSESSMENT & PLAN NOTE
Recent surgical excision on 12/19.  Follows with LUIS Mejía (Surg/Onc) as outpatient.  Had declined further treatment.

## 2025-01-13 NOTE — PROGRESS NOTES
SLP TAA     01/13/25 1300   Patient Data   Rehab Impairment Impairment of mobility, safety and Activities of Daily Living (ADLs) due to Brain Dysfunction: 02.22 Traumatic, Closed Injury   Etiologic Diagnosis Acute subdural hemorrhage along the left cerebral convexity;Acute subarachnoid hemorrhage in the left frontoparietal sulci, left sylvian fissure and suprasellar cistern   Date of Onset 01/04/25   Support System   Name Darryl   Relationship    Support System 2   Name 2 Son lives [place] and Dtr lives [place]   Prior IADL Participation   Money Management   (pt reported  manages)   Meal Preparation   (pt reported that  manages)   Laundry Full Participation   Home Cleaning   (pt reported that she manages-this differs from chart review from OT note)   Prior Level of Function   Functional Cognition 2. Needed Some Help - Patient needed a partial assistance from another person to complete activities.  (as per pt's daughter)   Psychosocial   Psychosocial (WDL) WDL   Restrictions/Precautions   Precautions Bed/chair alarms;Cognitive;Fall Risk;Pain;Supervision on toilet/commode   Pain Assessment   Pain Assessment Tool 0-10   Pain Score No Pain   Eating Assessment   Type of Assistance Needed Set-up / clean-up;Supervision   Physical Assistance Level No physical assistance   Eating CARE Score 4   Comprehension   Assist Devices Glasses   Auditory Basic   Visual Basic   Findings Pt completed the [facility]MS-see SLP rehab note for details,   QI: Comprehension 3. Usually Understands: Understands most conversations, but misses some part/intent of message. Requires cues at times to understand.   Comprehension (FIM) 3 - Needs parts of sentences repeated   Expression   Verbal Basic   Non-Verbal Basic   Intelligibility Sentence   Findings Pt completed the SLUMS-see SLP rehab note for details,   QI: Expression 3. Exhibits some difficulty with expressing needs and ideas (e.g., some words or finishing thoughts) or  "speech is not clear   Expression (FIM) 4 - Expresses basic info/needs 75-90% of time   Social Interaction   Cooperation with staff   Participation Individual   Behaviors observed Appropriate   Findings Pt was cooperative and participatory throughout session.   Social Interaction (FIM) 5 - Interacts appropriately with others 90% of time   Problem Solving   Routine Manages call bell   Findings Pt completed the [email or web address] SLP rehab note for details,   Problem solving (FIM) 2 - Solves basic problems 25-49% of time   Memory   Recognize People Yes   Initiates Tasks No   Short-Term Impaired   Long Term Intact   Recalls Precaution No   Findings Pt completed the [facility] SLP rehab note for details,   Memory (FIM) 2 - Recognizes, recalls/performs 25-49%   Cognition   Overall Cognitive Status Impaired   Arousal/Participation Alert;Cooperative   Attention Attends with cues to redirect   Orientation Level Oriented to person;Oriented to place;Oriented to time   Memory Decreased short term memory;Decreased recall of recent events;Decreased recall of precautions   Following Commands Follows one step commands with increased time or repetition   Comments Pt completed the [facility] SLP rehab note for details,   Discharge Information   Patient's Discharge Plan Return home with    Patient's Rehab Expectations \"get better\"   Barriers to Discharge Home Limited Family Support;Decreased Cognitive Function;Decreased Strength;Decreased Endurance;Pain;Safety Considerations   Impressions Pt is 89-year-old female who presented to [facility] ER on 1/4/25 as a trauma transfer from the [facility] s/p syncope with collapse. Imaging on 1/4 showed \"comminuted and mildly displaced fractures of the lateral and anterior walls of right maxilla, lateral and inferior walls of right orbit. Right inferior orbital wall fracture appears to involve the inferior orbital foramen with associated small extraconal soft tissue edema and focus of " "air. No herniation of extraocular muscles. Nondisplaced fractures of zygomatic arch and the temporal bone at the TMJ. Diffuse opacification of right maxillary sinus and right nasal cavity with blood products and air\". OMFS consulted and surgical intervention was discussed with patient by OMFS team but refused by patient. Patient is to continue with 7-day course of Unasyn/Augmentin and was initiated on sinus precautions. Neurosurgery was consulted for the SDH/SAH and no surgical intervention was recommended. Recommended SBP <160.     Currently, SLP services were consulted for assessment of cognitive linguistic skills and assessment of swallow function. Pt able to complete formalized cognitive assessment in which pt is demonstrating cognitive skills to be severely impaired at this time. Pt is a good rehab candidate to improve level of independence for functional cognitive skills upon anticipated discharge home w/ family support/supervision. Barriers which present at this time include: decreased ST memory recall, working memory, attention, processing, comprehension, problem solving, sequencing, insight into deficits, thought organization and mental flexibility. In order to address the noted barriers, skilled SLP services will address this by targeting the following interventions: visual orientation checklist, memory book/memory packet, verbal problem solving task, verbal working memory tasks, visual memory recall tasks, drawing conclusions activities, written sequencing tasks, verbal sequencing tasks, categorization tasks , picture problem solving activities, verbal reasoning tasks, verbal review of current medications, written review of medications, tangible medication management task, functional reading tasks , and family education/training. ELOS ~ 3 weeks . At this time, pt is recommended for skilled ST services during acute rehab stay in order to maximize functional and overall cognitive linguistic skills, as well " as to decrease caregiver burden on discharge.     Additionally, SLP services were consulted for assessment of dysphagia and overall swallow skills. Pt completed bedside dysphagia assessment in which pt is demonstrating WFL and minimal oral and WFL pharyngeal dysphagia at this time. Pt is a a good rehab candidate to achieve least restrictive diet upon anticipated discharge home w/ family support/supervision. Barriers which present include the following risks for aspiration such as: prolonged mastication, decreased bolus formation/incomplete transfers of solids, pain with mastication . In order to address the noted barriers, skilled SLP services will address this by targeting the following interventions: diet modification, safe swallow strategies, and family education/training. Pt is currently recommended for further skilled SLP services targeting dysphagia therapy in order to maximize oral and pharyngeal swallow skills, while safely supporting PO intake, as well as to improve independent carryover of safe swallow strategies. Also recommending continued services for follow up of tolerance of current diet.    SLP Therapy Minutes   SLP Time In 1300   SLP Time Out 1430   SLP Total Time (minutes) 90   SLP Mode of treatment - Individual (minutes) 90   SLP Mode of treatment - Concurrent (minutes) 0   SLP Mode of treatment - Group (minutes) 0   SLP Mode of treatment - Co-treat (minutes) 0   SLP Mode of Treatment - Total time(minutes) 90 minutes   SLP Cumulative Minutes 90   Cumulative Minutes   Cumulative therapy minutes 120

## 2025-01-13 NOTE — ASSESSMENT & PLAN NOTE
Not taking AC at home due to hx of falls.  Continue to hold with recent falls and now subarachnoid/subdural.  Had been taking Toprol XL 50mg daily at home - currently receiving Toprol XL 50mg BID for rate control.  Monitor routine VS.  Replete electrolytes as needed.  Given dose of metoprolol tartrate 25mg once this morning due to uncontrolled rate.  Cardiology consulted.

## 2025-01-13 NOTE — ASSESSMENT & PLAN NOTE
Presented on 1/4 after syncopal fall.  CTH showed acute subarachnoid hemorrhage in the L frontoparietal sulci, L sylvian fissure and suprasellar cistern.    Received Keppra x7 days.  Continue to hold AC/AP.  Neurovascular checks Q shift.  Maintain normotension.  Avoid SBP >160.    Follow-up with Neurosurgery in 2 weeks with CTH (1/18).    Primary team following.  PT/OT/SLP.

## 2025-01-13 NOTE — ASSESSMENT & PLAN NOTE
Home regimen: Toprol XL 50mg daily and HCTZ 12.5mg daily.  Currently receiving Toprol XL 50mg BID.  Noted to have orthostasis in acute setting - started on midodrine 2.5mg BID.  Continue to monitor orthostatics.  Keep SBP <160 due to intracranial bleed.    SBP 180s-190s over the past 24 hours.  Started on hydralazine 10mg Q8 PRN and given additional dose of metoprolol tartrate 25mg once due to tachycardia.    Cardiology consulted.

## 2025-01-14 ENCOUNTER — TRANSITIONAL CARE MANAGEMENT (OUTPATIENT)
Age: OVER 89
End: 2025-01-14

## 2025-01-14 ENCOUNTER — APPOINTMENT (INPATIENT)
Dept: RADIOLOGY | Facility: HOSPITAL | Age: OVER 89
End: 2025-01-14
Payer: MEDICARE

## 2025-01-14 ENCOUNTER — APPOINTMENT (INPATIENT)
Dept: CT IMAGING | Facility: HOSPITAL | Age: OVER 89
DRG: 950 | End: 2025-01-14
Payer: MEDICARE

## 2025-01-14 ENCOUNTER — HOSPITAL ENCOUNTER (INPATIENT)
Facility: HOSPITAL | Age: OVER 89
LOS: 3 days | End: 2025-01-17
Attending: FAMILY MEDICINE | Admitting: FAMILY MEDICINE
Payer: MEDICARE

## 2025-01-14 VITALS
WEIGHT: 118 LBS | HEIGHT: 62 IN | BODY MASS INDEX: 21.71 KG/M2 | TEMPERATURE: 98.9 F | DIASTOLIC BLOOD PRESSURE: 80 MMHG | OXYGEN SATURATION: 92 % | HEART RATE: 120 BPM | RESPIRATION RATE: 18 BRPM | SYSTOLIC BLOOD PRESSURE: 190 MMHG

## 2025-01-14 DIAGNOSIS — S06.5XAA SDH (SUBDURAL HEMATOMA) (HCC): ICD-10-CM

## 2025-01-14 DIAGNOSIS — I95.1 ORTHOSTATIC HYPOTENSION: ICD-10-CM

## 2025-01-14 DIAGNOSIS — R41.81 AGE-RELATED COGNITIVE DECLINE: ICD-10-CM

## 2025-01-14 DIAGNOSIS — I50.33 ACUTE ON CHRONIC DIASTOLIC CONGESTIVE HEART FAILURE (HCC): ICD-10-CM

## 2025-01-14 DIAGNOSIS — I60.9 SAH (SUBARACHNOID HEMORRHAGE) (HCC): ICD-10-CM

## 2025-01-14 DIAGNOSIS — I16.0 HYPERTENSIVE URGENCY: ICD-10-CM

## 2025-01-14 DIAGNOSIS — G47.00 INSOMNIA: ICD-10-CM

## 2025-01-14 DIAGNOSIS — I48.91 ATRIAL FIBRILLATION WITH RVR (HCC): Primary | ICD-10-CM

## 2025-01-14 PROBLEM — I50.32 CHRONIC DIASTOLIC CONGESTIVE HEART FAILURE (HCC): Status: ACTIVE | Noted: 2025-01-12

## 2025-01-14 PROBLEM — R26.2 AMBULATORY DYSFUNCTION: Status: ACTIVE | Noted: 2025-01-14

## 2025-01-14 PROBLEM — R54 FRAILTY SYNDROME IN GERIATRIC PATIENT: Status: ACTIVE | Noted: 2025-01-14

## 2025-01-14 PROBLEM — H91.93 BILATERAL HEARING LOSS: Status: ACTIVE | Noted: 2025-01-14

## 2025-01-14 PROBLEM — Z91.89 AT RISK FOR DELIRIUM: Status: ACTIVE | Noted: 2025-01-14

## 2025-01-14 PROBLEM — R65.10 SIRS (SYSTEMIC INFLAMMATORY RESPONSE SYNDROME) (HCC): Status: ACTIVE | Noted: 2025-01-14

## 2025-01-14 LAB
ANION GAP SERPL CALCULATED.3IONS-SCNC: 10 MMOL/L (ref 4–13)
BACTERIA UR QL AUTO: ABNORMAL /HPF
BILIRUB UR QL STRIP: NEGATIVE
BUN SERPL-MCNC: 15 MG/DL (ref 5–25)
CALCIUM SERPL-MCNC: 9.3 MG/DL (ref 8.4–10.2)
CHLORIDE SERPL-SCNC: 97 MMOL/L (ref 96–108)
CLARITY UR: CLEAR
CO2 SERPL-SCNC: 26 MMOL/L (ref 21–32)
COLOR UR: ABNORMAL
CREAT SERPL-MCNC: 0.82 MG/DL (ref 0.6–1.3)
ERYTHROCYTE [DISTWIDTH] IN BLOOD BY AUTOMATED COUNT: 15.2 % (ref 11.6–15.1)
GFR SERPL CREATININE-BSD FRML MDRD: 63 ML/MIN/1.73SQ M
GLUCOSE SERPL-MCNC: 146 MG/DL (ref 65–140)
GLUCOSE UR STRIP-MCNC: NEGATIVE MG/DL
HCT VFR BLD AUTO: 30.6 % (ref 34.8–46.1)
HGB BLD-MCNC: 10.1 G/DL (ref 11.5–15.4)
HGB UR QL STRIP.AUTO: 10
KETONES UR STRIP-MCNC: NEGATIVE MG/DL
LACTATE SERPL-SCNC: 0.9 MMOL/L (ref 0.5–2)
LEUKOCYTE ESTERASE UR QL STRIP: NEGATIVE
MAGNESIUM SERPL-MCNC: 1.8 MG/DL (ref 1.9–2.7)
MCH RBC QN AUTO: 29.1 PG (ref 26.8–34.3)
MCHC RBC AUTO-ENTMCNC: 33 G/DL (ref 31.4–37.4)
MCV RBC AUTO: 88 FL (ref 82–98)
MUCOUS THREADS UR QL AUTO: ABNORMAL
NITRITE UR QL STRIP: NEGATIVE
NON-SQ EPI CELLS URNS QL MICRO: ABNORMAL /HPF
PH UR STRIP.AUTO: 7 [PH]
PLATELET # BLD AUTO: 272 THOUSANDS/UL (ref 149–390)
PMV BLD AUTO: 10.3 FL (ref 8.9–12.7)
POTASSIUM SERPL-SCNC: 3.6 MMOL/L (ref 3.5–5.3)
PROCALCITONIN SERPL-MCNC: 0.11 NG/ML
PROT UR STRIP-MCNC: ABNORMAL MG/DL
RBC # BLD AUTO: 3.47 MILLION/UL (ref 3.81–5.12)
RBC #/AREA URNS AUTO: ABNORMAL /HPF
SODIUM SERPL-SCNC: 133 MMOL/L (ref 135–147)
SP GR UR STRIP.AUTO: 1.01 (ref 1–1.04)
TSH SERPL DL<=0.05 MIU/L-ACNC: 0.27 UIU/ML (ref 0.45–4.5)
UROBILINOGEN UA: NEGATIVE MG/DL
WBC # BLD AUTO: 14.66 THOUSAND/UL (ref 4.31–10.16)
WBC #/AREA URNS AUTO: ABNORMAL /HPF

## 2025-01-14 PROCEDURE — 83735 ASSAY OF MAGNESIUM: CPT | Performed by: FAMILY MEDICINE

## 2025-01-14 PROCEDURE — 87040 BLOOD CULTURE FOR BACTERIA: CPT | Performed by: FAMILY MEDICINE

## 2025-01-14 PROCEDURE — 99223 1ST HOSP IP/OBS HIGH 75: CPT

## 2025-01-14 PROCEDURE — 85027 COMPLETE CBC AUTOMATED: CPT | Performed by: FAMILY MEDICINE

## 2025-01-14 PROCEDURE — 99222 1ST HOSP IP/OBS MODERATE 55: CPT | Performed by: INTERNAL MEDICINE

## 2025-01-14 PROCEDURE — 84145 PROCALCITONIN (PCT): CPT | Performed by: FAMILY MEDICINE

## 2025-01-14 PROCEDURE — 84443 ASSAY THYROID STIM HORMONE: CPT | Performed by: FAMILY MEDICINE

## 2025-01-14 PROCEDURE — 71045 X-RAY EXAM CHEST 1 VIEW: CPT

## 2025-01-14 PROCEDURE — 93005 ELECTROCARDIOGRAM TRACING: CPT

## 2025-01-14 PROCEDURE — 83605 ASSAY OF LACTIC ACID: CPT | Performed by: FAMILY MEDICINE

## 2025-01-14 PROCEDURE — 99232 SBSQ HOSP IP/OBS MODERATE 35: CPT | Performed by: INTERNAL MEDICINE

## 2025-01-14 PROCEDURE — 70450 CT HEAD/BRAIN W/O DYE: CPT

## 2025-01-14 PROCEDURE — 81001 URINALYSIS AUTO W/SCOPE: CPT | Performed by: FAMILY MEDICINE

## 2025-01-14 PROCEDURE — 80048 BASIC METABOLIC PNL TOTAL CA: CPT | Performed by: FAMILY MEDICINE

## 2025-01-14 PROCEDURE — 99223 1ST HOSP IP/OBS HIGH 75: CPT | Performed by: FAMILY MEDICINE

## 2025-01-14 PROCEDURE — 99239 HOSP IP/OBS DSCHRG MGMT >30: CPT | Performed by: INTERNAL MEDICINE

## 2025-01-14 RX ORDER — HYDRALAZINE HYDROCHLORIDE 10 MG/1
10 TABLET, FILM COATED ORAL EVERY 8 HOURS PRN
Status: DISCONTINUED | OUTPATIENT
Start: 2025-01-14 | End: 2025-01-14

## 2025-01-14 RX ORDER — CEFTRIAXONE 1 G/50ML
1000 INJECTION, SOLUTION INTRAVENOUS EVERY 24 HOURS
Status: DISCONTINUED | OUTPATIENT
Start: 2025-01-14 | End: 2025-01-15

## 2025-01-14 RX ORDER — ACETAMINOPHEN 325 MG/1
975 TABLET ORAL EVERY 8 HOURS SCHEDULED
Start: 2025-01-14 | End: 2025-02-03

## 2025-01-14 RX ORDER — CALCIUM CARBONATE 500 MG/1
500 TABLET, CHEWABLE ORAL DAILY PRN
Status: DISCONTINUED | OUTPATIENT
Start: 2025-01-14 | End: 2025-01-17 | Stop reason: HOSPADM

## 2025-01-14 RX ORDER — ENOXAPARIN SODIUM 100 MG/ML
30 INJECTION SUBCUTANEOUS EVERY 24 HOURS
Status: DISCONTINUED | OUTPATIENT
Start: 2025-01-14 | End: 2025-01-17 | Stop reason: HOSPADM

## 2025-01-14 RX ORDER — DILTIAZEM HYDROCHLORIDE 5 MG/ML
15 INJECTION INTRAVENOUS ONCE
Status: COMPLETED | OUTPATIENT
Start: 2025-01-14 | End: 2025-01-14

## 2025-01-14 RX ORDER — BISACODYL 10 MG
10 SUPPOSITORY, RECTAL RECTAL DAILY PRN
Status: DISCONTINUED | OUTPATIENT
Start: 2025-01-14 | End: 2025-01-17 | Stop reason: HOSPADM

## 2025-01-14 RX ORDER — BISACODYL 10 MG
10 SUPPOSITORY, RECTAL RECTAL DAILY PRN
Start: 2025-01-14

## 2025-01-14 RX ORDER — AMLODIPINE BESYLATE 2.5 MG/1
2.5 TABLET ORAL DAILY
Status: DISCONTINUED | OUTPATIENT
Start: 2025-01-14 | End: 2025-01-14 | Stop reason: HOSPADM

## 2025-01-14 RX ORDER — PANTOPRAZOLE SODIUM 40 MG/1
40 TABLET, DELAYED RELEASE ORAL
Start: 2025-01-15

## 2025-01-14 RX ORDER — SODIUM CHLORIDE, SODIUM GLUCONATE, SODIUM ACETATE, POTASSIUM CHLORIDE, MAGNESIUM CHLORIDE, SODIUM PHOSPHATE, DIBASIC, AND POTASSIUM PHOSPHATE .53; .5; .37; .037; .03; .012; .00082 G/100ML; G/100ML; G/100ML; G/100ML; G/100ML; G/100ML; G/100ML
500 INJECTION, SOLUTION INTRAVENOUS ONCE
Status: COMPLETED | OUTPATIENT
Start: 2025-01-14 | End: 2025-01-14

## 2025-01-14 RX ORDER — METOPROLOL TARTRATE 25 MG/1
25 TABLET, FILM COATED ORAL ONCE
Status: COMPLETED | OUTPATIENT
Start: 2025-01-14 | End: 2025-01-14

## 2025-01-14 RX ORDER — ONDANSETRON 4 MG/1
4 TABLET, ORALLY DISINTEGRATING ORAL EVERY 6 HOURS PRN
Start: 2025-01-14

## 2025-01-14 RX ORDER — PANTOPRAZOLE SODIUM 40 MG/1
40 TABLET, DELAYED RELEASE ORAL
Status: DISCONTINUED | OUTPATIENT
Start: 2025-01-15 | End: 2025-01-17 | Stop reason: HOSPADM

## 2025-01-14 RX ORDER — ACETAMINOPHEN 325 MG/1
650 TABLET ORAL EVERY 6 HOURS PRN
Status: DISCONTINUED | OUTPATIENT
Start: 2025-01-14 | End: 2025-01-14

## 2025-01-14 RX ORDER — DILTIAZEM HYDROCHLORIDE 30 MG/1
30 TABLET, FILM COATED ORAL EVERY 6 HOURS SCHEDULED
Status: DISCONTINUED | OUTPATIENT
Start: 2025-01-14 | End: 2025-01-16

## 2025-01-14 RX ORDER — AMLODIPINE BESYLATE 2.5 MG/1
2.5 TABLET ORAL DAILY
Start: 2025-01-15 | End: 2025-01-17

## 2025-01-14 RX ORDER — MIDODRINE HYDROCHLORIDE 2.5 MG/1
5 TABLET ORAL 3 TIMES DAILY PRN
Status: DISCONTINUED | OUTPATIENT
Start: 2025-01-14 | End: 2025-01-17 | Stop reason: HOSPADM

## 2025-01-14 RX ORDER — POLYETHYLENE GLYCOL 3350 17 G/17G
17 POWDER, FOR SOLUTION ORAL DAILY
Start: 2025-01-15 | End: 2025-02-03

## 2025-01-14 RX ORDER — OXYCODONE HYDROCHLORIDE 5 MG/1
5 TABLET ORAL EVERY 6 HOURS PRN
Start: 2025-01-14 | End: 2025-01-17

## 2025-01-14 RX ORDER — ONDANSETRON 2 MG/ML
4 INJECTION INTRAMUSCULAR; INTRAVENOUS EVERY 6 HOURS PRN
Status: DISCONTINUED | OUTPATIENT
Start: 2025-01-14 | End: 2025-01-16

## 2025-01-14 RX ORDER — AMOXICILLIN 250 MG
1 CAPSULE ORAL
Start: 2025-01-14 | End: 2025-02-03

## 2025-01-14 RX ORDER — OXYCODONE HYDROCHLORIDE 5 MG/1
2.5 TABLET ORAL EVERY 6 HOURS PRN
Start: 2025-01-14 | End: 2025-01-17

## 2025-01-14 RX ORDER — AMLODIPINE BESYLATE 2.5 MG/1
2.5 TABLET ORAL DAILY
Status: DISCONTINUED | OUTPATIENT
Start: 2025-01-15 | End: 2025-01-14

## 2025-01-14 RX ORDER — CALCIUM CARBONATE 500 MG/1
500 TABLET, CHEWABLE ORAL DAILY PRN
Start: 2025-01-14

## 2025-01-14 RX ORDER — POLYETHYLENE GLYCOL 3350 17 G/17G
17 POWDER, FOR SOLUTION ORAL DAILY
Status: DISCONTINUED | OUTPATIENT
Start: 2025-01-15 | End: 2025-01-17 | Stop reason: HOSPADM

## 2025-01-14 RX ORDER — ACETAMINOPHEN 325 MG/1
975 TABLET ORAL EVERY 8 HOURS SCHEDULED
Status: DISCONTINUED | OUTPATIENT
Start: 2025-01-14 | End: 2025-01-17 | Stop reason: HOSPADM

## 2025-01-14 RX ORDER — ONDANSETRON 4 MG/1
4 TABLET, ORALLY DISINTEGRATING ORAL EVERY 6 HOURS PRN
Status: DISCONTINUED | OUTPATIENT
Start: 2025-01-14 | End: 2025-01-17 | Stop reason: HOSPADM

## 2025-01-14 RX ORDER — HYDRALAZINE HYDROCHLORIDE 10 MG/1
10 TABLET, FILM COATED ORAL EVERY 8 HOURS PRN
Start: 2025-01-14 | End: 2025-01-17

## 2025-01-14 RX ORDER — METOPROLOL SUCCINATE 25 MG/1
75 TABLET, EXTENDED RELEASE ORAL 2 TIMES DAILY
Start: 2025-01-14 | End: 2025-02-03

## 2025-01-14 RX ORDER — MIDODRINE HYDROCHLORIDE 5 MG/1
5 TABLET ORAL 3 TIMES DAILY PRN
Status: DISCONTINUED | OUTPATIENT
Start: 2025-01-14 | End: 2025-01-14 | Stop reason: HOSPADM

## 2025-01-14 RX ORDER — FUROSEMIDE 10 MG/ML
20 INJECTION INTRAMUSCULAR; INTRAVENOUS ONCE
Status: COMPLETED | OUTPATIENT
Start: 2025-01-14 | End: 2025-01-14

## 2025-01-14 RX ORDER — DILTIAZEM HYDROCHLORIDE 30 MG/1
30 TABLET, FILM COATED ORAL EVERY 6 HOURS SCHEDULED
Status: DISCONTINUED | OUTPATIENT
Start: 2025-01-14 | End: 2025-01-14 | Stop reason: SDUPTHER

## 2025-01-14 RX ORDER — OXYCODONE HYDROCHLORIDE 5 MG/1
5 TABLET ORAL EVERY 6 HOURS PRN
Status: DISCONTINUED | OUTPATIENT
Start: 2025-01-14 | End: 2025-01-17 | Stop reason: HOSPADM

## 2025-01-14 RX ORDER — MIDODRINE HYDROCHLORIDE 5 MG/1
5 TABLET ORAL 3 TIMES DAILY PRN
Start: 2025-01-14 | End: 2025-01-17

## 2025-01-14 RX ORDER — AMOXICILLIN 250 MG
1 CAPSULE ORAL
Status: DISCONTINUED | OUTPATIENT
Start: 2025-01-14 | End: 2025-01-17 | Stop reason: HOSPADM

## 2025-01-14 RX ORDER — MAGNESIUM SULFATE HEPTAHYDRATE 40 MG/ML
2 INJECTION, SOLUTION INTRAVENOUS ONCE
Status: COMPLETED | OUTPATIENT
Start: 2025-01-14 | End: 2025-01-14

## 2025-01-14 RX ORDER — SODIUM CHLORIDE, SODIUM GLUCONATE, SODIUM ACETATE, POTASSIUM CHLORIDE, MAGNESIUM CHLORIDE, SODIUM PHOSPHATE, DIBASIC, AND POTASSIUM PHOSPHATE .53; .5; .37; .037; .03; .012; .00082 G/100ML; G/100ML; G/100ML; G/100ML; G/100ML; G/100ML; G/100ML
75 INJECTION, SOLUTION INTRAVENOUS CONTINUOUS
Status: DISCONTINUED | OUTPATIENT
Start: 2025-01-14 | End: 2025-01-14

## 2025-01-14 RX ORDER — ENOXAPARIN SODIUM 100 MG/ML
30 INJECTION SUBCUTANEOUS EVERY 24 HOURS
Start: 2025-01-14 | End: 2025-02-03

## 2025-01-14 RX ADMIN — ENOXAPARIN SODIUM 30 MG: 30 INJECTION SUBCUTANEOUS at 21:11

## 2025-01-14 RX ADMIN — SODIUM CHLORIDE, SODIUM GLUCONATE, SODIUM ACETATE, POTASSIUM CHLORIDE, MAGNESIUM CHLORIDE, SODIUM PHOSPHATE, DIBASIC, AND POTASSIUM PHOSPHATE 500 ML: .53; .5; .37; .037; .03; .012; .00082 INJECTION, SOLUTION INTRAVENOUS at 15:28

## 2025-01-14 RX ADMIN — METOPROLOL SUCCINATE 50 MG: 50 TABLET, EXTENDED RELEASE ORAL at 06:43

## 2025-01-14 RX ADMIN — DILTIAZEM HYDROCHLORIDE 15 MG: 5 INJECTION, SOLUTION INTRAVENOUS at 12:17

## 2025-01-14 RX ADMIN — MELATONIN TAB 3 MG 3 MG: 3 TAB at 21:10

## 2025-01-14 RX ADMIN — SENNOSIDES AND DOCUSATE SODIUM 1 TABLET: 50; 8.6 TABLET ORAL at 21:10

## 2025-01-14 RX ADMIN — METOPROLOL SUCCINATE 75 MG: 25 TABLET, FILM COATED, EXTENDED RELEASE ORAL at 21:10

## 2025-01-14 RX ADMIN — DILTIAZEM HYDROCHLORIDE 30 MG: 30 TABLET, FILM COATED ORAL at 14:48

## 2025-01-14 RX ADMIN — ACETAMINOPHEN 975 MG: 325 TABLET, FILM COATED ORAL at 06:35

## 2025-01-14 RX ADMIN — AMLODIPINE BESYLATE 2.5 MG: 2.5 TABLET ORAL at 09:06

## 2025-01-14 RX ADMIN — ACETAMINOPHEN 975 MG: 325 TABLET, FILM COATED ORAL at 13:24

## 2025-01-14 RX ADMIN — FUROSEMIDE 20 MG: 10 INJECTION, SOLUTION INTRAMUSCULAR; INTRAVENOUS at 18:23

## 2025-01-14 RX ADMIN — POLYETHYLENE GLYCOL 3350 17 G: 17 POWDER, FOR SOLUTION ORAL at 09:09

## 2025-01-14 RX ADMIN — DILTIAZEM HYDROCHLORIDE 30 MG: 30 TABLET, FILM COATED ORAL at 21:09

## 2025-01-14 RX ADMIN — HYDRALAZINE HYDROCHLORIDE 10 MG: 10 TABLET ORAL at 06:34

## 2025-01-14 RX ADMIN — SODIUM CHLORIDE, SODIUM GLUCONATE, SODIUM ACETATE, POTASSIUM CHLORIDE, MAGNESIUM CHLORIDE, SODIUM PHOSPHATE, DIBASIC, AND POTASSIUM PHOSPHATE 75 ML/HR: .53; .5; .37; .037; .03; .012; .00082 INJECTION, SOLUTION INTRAVENOUS at 16:29

## 2025-01-14 RX ADMIN — PANTOPRAZOLE SODIUM 40 MG: 40 TABLET, DELAYED RELEASE ORAL at 06:35

## 2025-01-14 RX ADMIN — ACETAMINOPHEN 975 MG: 325 TABLET, FILM COATED ORAL at 21:10

## 2025-01-14 RX ADMIN — CEFTRIAXONE 1000 MG: 1 INJECTION, SOLUTION INTRAVENOUS at 18:15

## 2025-01-14 RX ADMIN — METOPROLOL TARTRATE 25 MG: 25 TABLET, FILM COATED ORAL at 09:05

## 2025-01-14 RX ADMIN — MAGNESIUM SULFATE IN WATER 2 G: 40 INJECTION, SOLUTION INTRAVENOUS at 15:44

## 2025-01-14 NOTE — ASSESSMENT & PLAN NOTE
Syncope and collapse at home sustaining facial fractures, SDH, SAH  Was started on midodrine    Currently on hold d/t hypertension

## 2025-01-14 NOTE — ASSESSMENT & PLAN NOTE
"No prior history of prior memory issues or cognitive impairment   Family recently reporting mental status declining since hospitalization  Alert and oriented x 2 on exam today  Most recent TSH on 01/14/2025 noted to be 0.265  Most recent vitamin B12 level on 01/13/2025 noted to be 1,125  CT of the head on 01/14/2025 revealed: \"No intracranial mass, mass effect or midline shift. No CT signs of acute infarction.  No acute parenchymal hemorrhage. There is however ongoing extra-axial hemorrhage which is detailed below.\"  No MOCA charted in epic   Recommend continuing to monitor as patient is still recovering from SAH/SDH  Recently received antibiotics outpatient, bactrim DS and doxycycline for wound   Bactrim DS on [employer] Criteria as a medication to avoid in older adults   Received keppra x 8 days which can produce AE of psychosis, aggressive behavior, insomnia   Recently finished on 01/12   Would continue to monitor off medication  Keep physically, mentally, and socially active    Can consider [facility] inpatient once medically stable  "

## 2025-01-14 NOTE — NURSING NOTE
Pt's Bp at 0624 was 198/100 manually and 115 HR apical. Prn hydralazine 10 mg was given. Secure chatted Dr. Anton. Told him the pt had tremors, and that the patient stated that her heart hurts and beating fast. Gave 0900 Metoprolol early and held Midodrine 2.5 mg per MD. EKG was also done. The result was Atrial fibrillation with rapid ventricular response with premature ventricular or aberrantly conducted complexes. Picture of the EKG result was sent to MD. LUIS Fontana and Kal Tobias was added in the chat by Dr. Anton.  Was told to keep an eye on vitals and heart rate. No changes to current regimen as she just got her metoprolol 50 mg and the hydralazine 10 mg. Also was told to recheck after 30 min and again in 60 mins. Bp after 30 mins was 188/94. Gave report to primary nurse for the next shift and to also recheck the pt's vitals. Pt in bed. Call bell within reach. Bed alarm intact.

## 2025-01-14 NOTE — ASSESSMENT & PLAN NOTE
Presented with syncope on 1/4.  Significant orthostasis noted in acute setting.  Started on midodrine 2.5mg BID.  Apply abdominal binder/TEDs as needed.  Encourage PO hydration.  Continue to monitor orthostatics closely.    Changed midodrine to PRN today due to ongoing hypertension.

## 2025-01-14 NOTE — PROGRESS NOTES
Progress Note - Internal Medicine   Name: Sandra Purvis 89 y.o. female I MRN: [id number]  Unit/Bed#: -02 I Date of Admission: 1/12/2025   Date of Service: 1/14/2025 I Hospital Day: 2    Assessment & Plan  SDH (subdural hematoma) (HCC)  Presented on 1/4 after syncopal fall.  CTH showed acute subdural hemorrhage along the L cerebral convexity with maximal thickness of 1.9cm.  No midline shift.    Received Keppra x7 days.  Continue to hold AC/AP.  Neurovascular checks Q shift.  Maintain normotension.  Avoid SBP >160.    Follow-up with Neurosurgery in 2 weeks with CTH (1/18).  Obtain STAT CTH today due to change in mental status and uncontrolled HTN.    Primary team following.  PT/OT/SLP.  Hypertension  Home regimen: Toprol XL 50mg daily and HCTZ 12.5mg daily.  Currently receiving Toprol XL 50mg BID.  Noted to have orthostasis in acute setting - started on midodrine 2.5mg BID.  Continue to monitor orthostatics.  Keep SBP <160 due to intracranial bleed.    Continues to have SBP 180s-190s over the past 24 hours.  Has been receiving hydralazine 10mg Q8 PRN.  Discontinue standing midodrine and change to PRN.  Start on amlodipine 2.5mg daily today.    Cardiology consulted - appreciate recommendations.  Paroxysmal atrial fibrillation (HCC)  Not taking AC at home due to hx of falls.  Continue to hold with recent falls and now subarachnoid/subdural.  Had been taking Toprol XL 50mg daily at home - currently receiving Toprol XL 50mg BID for rate control.  Increase Toprol XL to 75mg BID today.  Monitor routine VS.  Replete electrolytes as needed.  Given dose of metoprolol tartrate 25mg once this morning and yesterday due to uncontrolled rate.  Cardiology consulted - appreciate recommendations.  GERD (gastroesophageal reflux disease)  Started on Protonix 40mg BID in acute setting.  Had not been on PPI as outpatient - recommend decreasing dose to 40mg daily.  Basal cell carcinoma (BCC) of scalp  Mass noted on scalp.  Had  been declining intervention.  Follows with LUIS Mejía (Surgical Oncology) as outpatient.  Malignant melanoma of arm, left (HCC)  Recent surgical excision on 12/19.  Follows with LUIS Mejía (Surg/Onc) as outpatient.  Had declined further treatment.  SAH (subarachnoid hemorrhage) (HCC)  Presented on 1/4 after syncopal fall.  CTH showed acute subarachnoid hemorrhage in the L frontoparietal sulci, L sylvian fissure and suprasellar cistern.    Received Keppra x7 days.  Continue to hold AC/AP.  Neurovascular checks Q shift.  Maintain normotension.  Avoid SBP >160.    Follow-up with Neurosurgery in 2 weeks with CTH (1/18).    Primary team following.  PT/OT/SLP.  Closed extensive facial fractures (HCC)  S/p fall on 1/4.  CT facial bones showed comminuted and mildly displaced fractures of the lateral and anterior walls of the R maxilla, lateral, and inferior wall of the R orbit, wall fracture appears to involve the inferior orbital foramen with associated small extraconal soft tissue edema, nondisplaced fractures of the zygomatic arch and the temporal bone at the TMJ, diffuse opacification of R maxillary sinus and R nasal cavity with blood products and air.    OMFS evaluated in acute setting - declined surgical intervention.  Received IV Unasyn for 7 days.  Facial sinus precautions.    Follow-up with OMFS on discharge.  Chest pain  Noted to have elevated troponins on admission to acute setting.  [place] to be due to trauma/brain bleed vs demand ischemia due to paroxysmal a-fib and syncope.  Cardiology recommended starting ASA 81mg daily when cleared by Neurosurgery.  Recommend establishing with Cardiology on discharge.  Orthostatic hypotension  Presented with syncope on 1/4.  Significant orthostasis noted in acute setting.  Started on midodrine 2.5mg BID.  Apply abdominal binder/TEDs as needed.  Encourage PO hydration.  Continue to monitor orthostatics closely.    Changed midodrine to PRN today due to ongoing  hypertension.  Syncope  Presented on  s/p syncopal fall.  EKG showed atrial fibrillation with RVR on admission.  ECHO on  showed EF 70%, G2DD.  Noted to have significant orthostasis in acute setting.  Started on midodrine 2.5mg BID.  Continue to monitor orthostatics.  Encourage PO hydration.  Apply abdominal binder/TEDs as needed.    Follow-up/establish with Cardiology as outpatient.  Anemia  Hgb currently 9.6.  Baseline Hgb 9-10.  No active s/s of bleeding.  Obtain iron panel, folate, and vitamin B12.  Continue to trend routine CBC.  Leukocytosis  WBC count currently 11.21.  Chronically elevated.  No s/s of active infection.  Continue to trend routine CBC.  Diastolic dysfunction  ECHO on  showed EF 70%, G2DD.  Monitor volume status - currently euvolemic.  Monitor I&Os, daily weights.    VTE Pharmacologic Prophylaxis:   Pharmacologic: Enoxaparin (Lovenox)  Mechanical VTE Prophylaxis in Place: Yes - sequential compression devices.    Current Length of Stay: 2 day(s)    Current Patient Status: Inpatient Rehab     Discharge Plan: As per primary team.    Code Status: Level 3 - DNAR and DNI    Subjective:   Pt examined while pt lying in bed in pt room.  Appears lethargic and is having difficulty keeping her eyes open and difficulty answering questions.  Complaints of frontal headache surrounding the R eye.  Unable to describe severity of headache at this time.  Complaints of L sided chest pain and feels her heart racing.  Feels lightheaded and has complaints of dyspnea.  Recommend transferring to acute care for telemetry monitoring and cardiac management at this time.  Will also obtain STAT CTH due to uncontrolled hypertension and history of subdural and subarachnoid.    Objective:     Vitals:   Temp (24hrs), Av.2 °F (37.3 °C), Min:98.9 °F (37.2 °C), Max:99.4 °F (37.4 °C)    Temp:  [98.9 °F (37.2 °C)-99.4 °F (37.4 °C)] 98.9 °F (37.2 °C)  HR:  [] 120  Resp:  [18] 18  BP: (128-198)/() 190/92  SpO2:   [92 %-96 %] 92 %  Body mass index is 21.58 kg/m².     Review of Systems   Constitutional:  Positive for fatigue. Negative for fever.   HENT:  Negative for trouble swallowing.    Eyes:  Negative for visual disturbance.   Respiratory:  Positive for shortness of breath. Negative for cough, chest tightness, wheezing and stridor.    Cardiovascular:  Positive for chest pain and palpitations.   Gastrointestinal:  Negative for abdominal distention, abdominal pain, nausea and vomiting.   Genitourinary:  Negative for difficulty urinating.   Musculoskeletal:  Negative for arthralgias and back pain.   Neurological:  Positive for weakness and headaches (Frontal headache, surrounding the R eye, unable to describe severity). Negative for dizziness and light-headedness.   Psychiatric/Behavioral:  Positive for decreased concentration and sleep disturbance.    All other systems reviewed and are negative.       Input and Output Summary (last 24 hours):       Intake/Output Summary (Last 24 hours) at 1/14/2025 0920  Last data filed at 1/13/2025 1601  Gross per 24 hour   Intake 240 ml   Output --   Net 240 ml       Physical Exam:     Physical Exam  Vitals and nursing note reviewed.   Constitutional:       General: She is not in acute distress.     Appearance: Normal appearance. She is ill-appearing. She is not diaphoretic.   HENT:      Head: Normocephalic and atraumatic.   Cardiovascular:      Rate and Rhythm: Tachycardia present. Rhythm irregularly irregular.      Pulses: Normal pulses.      Heart sounds: Normal heart sounds. No murmur heard.     No friction rub.   Pulmonary:      Effort: Pulmonary effort is normal. No respiratory distress.      Breath sounds: Normal breath sounds. No wheezing or rhonchi.   Abdominal:      General: Abdomen is flat. Bowel sounds are normal. There is no distension.      Palpations: Abdomen is soft. There is no mass.      Tenderness: There is no abdominal tenderness. There is no guarding or rebound.       Hernia: No hernia is present.   Musculoskeletal:      Cervical back: Normal range of motion and neck supple.      Right lower leg: No edema.      Left lower leg: No edema.   Skin:     General: Skin is warm and dry.   Neurological:      Mental Status: She is lethargic and disoriented.   Psychiatric:         Mood and Affect: Mood normal.         Speech: Speech is delayed.         Behavior: Behavior is slowed.         Additional Data:     Labs:    Results from last 7 days   Lab Units 01/13/25  1025 01/10/25  0541   WBC Thousand/uL 11.21* 11.04*   HEMOGLOBIN g/dL 9.6* 9.9*   HEMATOCRIT % 29.3* 31.2*   PLATELETS Thousands/uL 223 183   SEGS PCT %  --  82*   LYMPHO PCT % 4* 9*   MONO PCT % 3* 7   EOS PCT % 0 1     Results from last 7 days   Lab Units 01/13/25  1025   SODIUM mmol/L 134*   POTASSIUM mmol/L 3.6   CHLORIDE mmol/L 98   CO2 mmol/L 27   BUN mg/dL 17   CREATININE mg/dL 0.86   ANION GAP mmol/L 9   CALCIUM mg/dL 8.9   GLUCOSE RANDOM mg/dL 149*                       Labs reviewed    Imaging:    Imaging reviewed    Recent Cultures (last 7 days):           Last 24 Hours Medication List:   Current Facility-Administered Medications   Medication Dose Route Frequency Provider Last Rate    acetaminophen  975 mg Oral Q8H ELIZABETH Richard S Kevin, DO      amLODIPine  2.5 mg Oral Daily LUIS Monsalve      bisacodyl  10 mg Rectal Daily PRN Richard S Brown, DO      calcium carbonate  500 mg Oral Daily PRN Richard S Brown, DO      enoxaparin  30 mg Subcutaneous Q24H Richard S Kevin, DO      hydrALAZINE  10 mg Oral Q8H PRN LUIS Monsalve      melatonin  3 mg Oral HS Richard S Brown, DO      metoprolol succinate  75 mg Oral BID LUIS Monsalve      midodrine  5 mg Oral TID PRN LUIS Monsalve      ondansetron  4 mg Oral Q6H PRN Richard S Brown, DO      oxyCODONE  2.5 mg Oral Q6H PRN Richard S Brown, DO      Or    oxyCODONE  5 mg Oral Q6H PRN Richard S Brown, DO      pantoprazole  40 mg Oral Early  Morning LUIS Monsalve      polyethylene glycol  17 g Oral Daily Richard Brown DO      senna-docusate sodium  1 tablet Oral HS Richard Brown DO          M*Modal software was used to dictate this note.  It may contain errors with dictating incorrect words or incorrect spelling. Please contact the provider directly with any questions.

## 2025-01-14 NOTE — ASSESSMENT & PLAN NOTE
Noted to have elevated troponins on admission to acute setting.  [place] to be due to trauma/brain bleed vs demand ischemia due to paroxysmal a-fib and syncope.  Cardiology recommended starting ASA 81mg daily when cleared by Neurosurgery.  Recommend establishing with Cardiology on discharge.

## 2025-01-14 NOTE — ASSESSMENT & PLAN NOTE
Wt Readings from Last 3 Encounters:   01/14/25 53.5 kg (118 lb)   01/12/25 53.5 kg (118 lb)   01/10/25 51.1 kg (112 lb 9.6 oz)     Weights seem to be fairly stable

## 2025-01-14 NOTE — ASSESSMENT & PLAN NOTE
Hearing impairment strongly correlated with depression, cognitive impairment, delirium and falls in the older adult  Use hearing aids or sound amplifier  Speaking face to face  Use clear dictation, enunciation of words

## 2025-01-14 NOTE — ASSESSMENT & PLAN NOTE
Lab Results   Component Value Date    EGFR 63 01/14/2025    EGFR 59 01/13/2025    EGFR 64 01/10/2025    CREATININE 0.82 01/14/2025    CREATININE 0.86 01/13/2025    CREATININE 0.81 01/10/2025

## 2025-01-14 NOTE — DISCHARGE INSTR - AVS FIRST PAGE
DISCHARGE INSTRUCTIONS: [facility] ACUTE REHABILITATION [place]    Bring these instructions with you to your Outpatient Physician appointments so they can order and follow-up any additional lab work or imaging recommended at time of discharge.      Resume follow-up with all your prior providers that you have established care prior to this hospitalization.  Discuss with primary care physician (PCP) if you have additional questions.     It  is you or your caregivers responsibility to obtain follow-up MEDICATION REFILLS  As indicated through your Primary Care Physician (PCP) and other outpatient specialty provider(s) after discharge.  Please follow-up with your PCP as soon as possible after discharge to set-up follow-up management and when appropriate refills.      You remain a fall and injury risk which could be severe.  - Your risk of fall has decreased however since admission to acute rehab.  Caregiver training has been completed with our staff.  - Appropriate supervision +/- assistance as instructed during your rehab course is recommended to decrease risk of fall and injury.    - Continue skilled therapy as discussed after discharge to further decrease this risk    If you (or your health care proxy) have any questions or concerns regarding your acute rehabilitation stay including issues with medications, rehabilitation, and follow-up plan, please call:          [facility] Acute Rehabilitation Unit at [place] at [phone]    Should you develop fevers, chills, new weakness, changes in sensation, difficulty speaking, facial weakness, confusion, shortness of breath, chest pain, or other concerning symptoms please call 911 and/or obtain transportation to nearest ER immediately.            PHYSICIANS to see:  Please see your doctors listed in the follow up providers section of your discharge paperwork, and take the discharge paperwork with you to your appointments.    Buttocks/Sacrum  Turn as  full as possible off sacrum/buttocks every 2 hours  Use Cushion while in chair or wheelchair  Weight shift every 10-15 minutes while in chair  Keep skin clean and dry as possible.  Remove wet or soiled clothing/linens promptly  Use barrier cream or similar 2 times per day   Monitor skin for increased breakdown which you are at risk of and notify nursing, PCP, or other physician providers should this occur right away    Heels/bony edges of feet  Float heels off edge of pillow for added pressure relief.  Monitor heels and bony protuberances and notify physician or nursing for any increased redness, bogginess, or breakdown      WEIGHTBEARING/ACTIVITY PRECAUTIONS to follow:  Seizure precautions, Sinus precaution, Fall Precautions     Driving restrictions:  You are recommended against driving.       Alcohol restrictions:  You are recommended to not drink alcohol at this time unless cleared by an outpatient physician.  Drinking alcohol in your current functional condition can increase your risk of injury which could be severe.  Drinking alcohol given your current health problems can lead to increased medical complications which could be severe.    Combining alcohol with your current medications can increase your risk of injury which could be severe.        ------------------------------------------------------------------------------------------------------------  ------------------------------------------------------------------------------------------------------------    MEDICATIONS:  Please see a full list of your medications outlined in the After Visit Summary that is attached to these Discharge Instructions.  Please note changes may have been made to your medications please refer to your discharge paperwork for your current medications and take this list with you to all your doctors appointments for your doctors to review.  Please do not resume a home medication unless the medication reconciliation sheet indicates  to do so, please do not assume that a medication that you were given a prescription for is the same as a medication you have at home based on both medications having the same name as dosages and frequency may have changed.      Unless specifically noted in your medication list provided to you in your discharge paper work do not resume prior vitamins, minerals, or supplements you may have been taking prior to your hospitalization unless instructed by an outpatient physician in the future.  Discuss with your primary care at next visit if applicable.          Please note a summary of your hospital stay with relevant information for your doctors will try to be sent to them.  Please confirm with your doctors at your follow up visits that they have received this summary and have them contact [facility] Medical Records if they have not received them along with any other medical records they may require.     [facility] Phone Number:  [phone]

## 2025-01-14 NOTE — PLAN OF CARE
Problem: SAFETY ADULT  Goal: Patient will remain free of falls  Description: INTERVENTIONS:  - Educate patient/family on patient safety including physical limitations  - Instruct patient to call for assistance with activity   - Consult OT/PT to assist with strengthening/mobility   - Keep Call bell within reach  - Keep bed low and locked with side rails adjusted as appropriate  - Keep care items and personal belongings within reach  - Initiate and maintain comfort rounds  - Make Fall Risk Sign visible to staff  - Apply yellow socks and bracelet for high fall risk patients  - Consider moving patient to room near nurses station  Outcome: Progressing     Problem: SKIN/TISSUE INTEGRITY - ADULT  Goal: Skin Integrity remains intact(Skin Breakdown Prevention)  Description: Assess:  -Inspect skin when repositioning, toileting, and assisting with ADLS  -Assess extremities for adequate circulation and sensation     Bed Management:  -Have minimal linens on bed & keep smooth, unwrinkled  -Change linens as needed when moist or perspiring    Toileting:  -Offer bedside commode      Activity:  -Encourage activity and walks on unit  -Encourage or provide ROM exercises   -Use appropriate equipment to lift or move patient in bed    Skin Care:  -Avoid use of baby powder, tape, friction and shearing, hot water or constrictive clothing    -Do not massage red bony areas      Outcome: Progressing

## 2025-01-14 NOTE — DISCHARGE SUMMARY
Discharge Summary - PMR   Name: Sandra Purvis 89 y.o. female I MRN: [id number]  Unit/Bed#: -02 I Date of Admission: 1/12/2025   Date of Service: 1/14/2025 I Hospital Day: 2    Medical Problems       Resolved Problems  Date Reviewed: 1/14/2025   None         Admission Date: 1/12/2025   Discharge Date:  1/14/2025    Etiologic/Rehabilitation Diagnosis: Impairment of mobility, safety and Activities of Daily Living (ADLs) due to Brain Dysfunction:  02.22  Traumatic, Closed Injury    HPI: Sandra Purvis is a 89 y.o. female with history of atrial fibrillation, GERD, hypertension who presented to the [facility] on 1//25 as a trauma case from the [facility] to [facility] after a syncopal episode and collapse with imaging specifically a CT head on the same day showing comminuted and mildly displaced fractures of the lateral and anterior walls of the right maxilla, lateral and inferior walls of the right orbit, right inferior orbital wall fracture appears to involve the inferior orbital foramen associated with small extraconal soft tissue edema and focus of air.  There is also a nondisplaced fracture of the zygomatic arch and the temporal bone at the TMJ and diffuse opacification of the right maxillary sinus and right nasal cavity with blood products and air.  Additionally there was an acute subdural hemorrhage along the left cerebral convexity with a maximal thickness of 1.9 cm with no midline shift and an acute subarachnoid hemorrhage in the left frontal parietal sulci, left sylvian fissure as well as suprasellar cistern.  [facility] was consulted as well as neurosurgery and from a neurosurgical standpoint placed on Keppra 7 days for seizure prophylaxis and treated nonoperatively.  OMFS recommended surgical interventions however declined by patient with plans for outpatient follow-up and was placed on a 7-day course of Unasyn/Augmentin.  Goal blood pressure  "systolic less than 160.  Patient also had an echocardiogram showing an LVEF of 70% with vigorous systolic function and a grade 2 diastolic dysfunction.  Right ventricular function mildly reduced and left atrium was mildly dilated.  Telemetry did show atrial fibrillation with RVR and cardiology was also consulted and following and given IV fluids BALJIT stockings and changes in the medications including the Lopressor as well as initiation of low-dose midodrine 2.5 mg twice daily.  Recommended also to start on 81 mg of aspirin daily when cleared from a SDH/SAH perspective.  Due for follow-up CT in approximately 1 week. The patient was evaluated by the Rehabilitation team and deemed an appropriate candidate for comprehensive inpatient rehabilitation and admitted to the [facility] on 1/12/2025  2:08 PM       Procedures Performed During [facility] Admission: none    Acute Rehabilitation Center Course: Patient required transfer to acute hospital.     Assessment & Plan  SDH (subdural hematoma) (HCC)  Patient presents with fall and initial CTh on 1/4 showing an acute subdural hemorrhage along the left cerebral convexity with a maximum thickness of 1.9 cm with no midline shift as well as an acute subarachnoid hemorrhage in the left frontoparietal sulci, left sylvian fissure and suprasellar cistern  Repeat CT head on 1/5 was stable  Evaluated by neurosurgery no plans for reversal agent as the patient was not taking any anticoagulation or antiplatelet agents and maintain normotension with systolic blood pressure goals less than 160  7-day course of Keppra for posttraumatic head injury seizure prophylaxis   Follow-up with neurosurgery in 2 weeks repeat head CT  Repeat CTH 1/14\" Interval evolution of recent subdural and subarachnoid hemorrhage as detailed above. No definitive new or enlarging intracranial hemorrhage.\"   Physical, occupational and speech therapy  Discharge planning  Hypertension  Home regimen: Metoprolol XL 50 mg daily, " HCTZ  Goal blood pressures of a systolic less than 160 however have been elevated  Now on metoprolol succinate 50 mg twice daily --> inc to 75 mg BID, amlodipine 2.5 mg (started 1/14)   Difficulty maintaining blood pressures and plan to except a slightly higher blood pressure as they anticipate drops with her significant orthostatic hypotension  Paroxysmal atrial fibrillation (HCC)  Initially presented with sinus bradycardia and evaluated by cardiology and was in controlled atrial fibrillation without bradycardia  Not on anticoagulation prior to admission and not indicated at this point due to the SDH and SAH and overall remains high risk due to risk of falls and given her advanced age  No recent follow-up with cardiology as an outpatient and generally sees her primary care physician for management and was on Toprol 50 mg daily at home which has been changed well and in the hospital  Tachycardic in the hospital and given IV fluids and medication changes include addition of midodrine and changes in the metoprolol dosing  Recommended for aspirin but would likely not be a good long-term candidate for anticoagulation  Cardiology consulted 1/14   Metoprolol inc to 75 BID transferred to [facility] for telemetry given uncontrolled Afib and HTN   Stage 3 chronic kidney disease, unspecified whether stage 3a or 3b CKD (HCC)  Lab Results   Component Value Date    EGFR 59 01/13/2025    EGFR 64 01/10/2025    EGFR 50 01/08/2025    CREATININE 0.86 01/13/2025    CREATININE 0.81 01/10/2025    CREATININE 0.99 01/08/2025     Stage III CKD per notes in the outpatient setting with primary care physician but not actively following with nephrology  Continue to monitor labs however they have been stable and will need to follow-up as an outpatient for continued management  GERD (gastroesophageal reflux disease)  Continue Protonix  Basal cell carcinoma (BCC) of scalp  Large scalp mass noted on admission.  Also follows with dermatology as an  "outpatient but declining any interventions  Some localized spot bleeding from the edges noted  Malignant melanoma of arm, left (HCC)  Large mass on the left forearm that is melanoma  Follows with dermatology as an outpatient last seen in December 2024 but not interested in any treatments  SAH (subarachnoid hemorrhage) (HCC)  Subarachnoid hemorrhage noted on initial CT on 1/4 and stable on 1/5   CTH 1/14: \" Interval evolution of recent subdural and subarachnoid hemorrhage as detailed above. No definitive new or enlarging intracranial hemorrhage.\"   Continue same management for the subarachnoid hemorrhage as for the subdural hematoma, please see that separate section for overall management plans  Closed extensive facial fractures (HCC)  Secondary to fall  CT of the head on 1/4 also showed comminuted and mildly displaced fractures of the lateral and anterior walls of the right maxilla, lateral and inferior walls of the right orbit, right inferior orbital wall fractures appear to involve the inferior orbital foramen with associated small extraconal soft tissue edema and focus of air.  There is no herniation of extraocular muscles and a nondisplaced fracture of the zygomatic arch as well as the temporal bone at the TMJ.  There is diffuse opacification of the right maxillary sinus and right nasal cavity with blood products and air  Seen by oral maxillofacial surgery team and surgical intervention was declined by patient  7-day course of Unasyn/Augmentin completed   Sinus precautions  Follow-up with OMFS as an outpatient  Chest pain  Troponins obtained on initial evaluation of the patient in the emergency room and cardiology was consulted for evaluation  Despite elevated troponins there was nonischemic findings on her EKG and felt to be potentially related to a nonischemic troponin elevation in the setting of trauma and brain bleed versus demand ischemia due to her paroxysmal atrial fibrillation and syncopal episode  Not a " candidate for cardiology for ischemic evaluation due to advanced age, frailty and recent subarachnoid hemorrhage/subdural hemorrhage  No new regional wall motion abnormalities noted on echocardiogram on 1/6/2025  Recommended to start aspirin 81 mg when cleared by neurosurgery with CT head  Follow-up with cardiology as an outpatient  Orthostatic hypotension  Blood pressures ranging in physical therapy as low as 70/40 and cardiology was evaluating the patient  Symptomatic with lightheadedness and presyncope and had received gentle fluids with some improvement  BALJIT stockings as well and encouragement of fluid intake  Repeat orthostatics improved but still getting lightheaded at times which was felt to be the initiating factor in her syncopal episode that led to the fall and injury.  Started on low-dose midodrine 2.5 mg twice daily and improved from a symptomatic standpoint.  To contact cardiology with any questions and follow-up as an outpatient  Midodrine made PRN   Syncope  Presented with syncopal episode preceded by lightheadedness that led to the fall which caused the sudden dural hemorrhage and subarachnoid hemorrhage  Also found to be significantly orthostatic which was likely the etiology and was placed on telemetry with no evidence of bradycardia or pauses  Limited p.o. intake and fluid intake and suspected orthostatic hypotension as the cause  Anemia  Most recent hemoglobin of 9.6 1/13 which is stable over the last 4 days  Continue to follow biweekly CBC or sooner if clinically indicated  Will need repeat scan if significant drops or concern for acute bleeds  Leukocytosis  Most recent WBC count 11.04 and actually trending down from its maximum of 17.59  Unclear etiology, may be directly related to reactive changes from the SDH and SAH as well as fractures  Continue to monitor and if considerable changes may repeat scans given the fractures in the facial bones  Was on a course of Unasyn/Augmentin for 7  days  Diastolic dysfunction  Grade 2 diastolic dysfunction noted on echo  Monitor weights  and adjust accordingly    Interval History: Patient seen and examined in bed. Patient with afib rates 120+ and htn -190s since the am. Patient w/ frontal HA and full body pain. CTH was completed w/o any acute changes. Discussed with  SLIM plan to transfer patient for telemetry and medication titration to better control her HTN and afib.  Reports overall feeling well. Last BM . Per sleep log poor sleep overnight. Attempted to call daughter Courtney @ [phone] discussed plan with her.    Physical Exam    Gen: No acute distress, Thin/frail   HEENT: Moist mucus membranes, Normocephalic/Atraumatic  Cardiovascular: Tachycardic   Heme/Extr: No edema  Pulmonary: Non-labored breathing  : no lewis  GI: Soft, non-tender, non-distended. BS+  MSK: moving all 4 limbs antigravity   Integumentary: ecchymosis across face   Neuro: , Appropriate to questioning.   Speech is fluent, hypophonic     Significant Findings, Care, Treatment and Services Provided:  Patient participated in a comprehensive physical, occupational and speech therapy program under the guidance of rehab physician and nursing oversight     Complications: HTN, uncontrolled afib     Functional Status Upon Admission to [facility]:  Self Care:  Eatin: Setup or clean-up assistance  Oral hygiene: 04: Supervision or touching  assistance  Toilet hygiene: 09: Not applicable  Shower/bathing self: 09: Not applicable  Upper body dressin: Partial/moderate assistance  Lower body dressin: Partial/moderate assistance  Putting on/taking off footwear: 03: Partial/moderate assistance  Transfers:  Roll left and right: 09: Not applicable  Sit to lyin: Not applicable  Lying to sitting on side of bed: 03: Partial/moderate assistance  Sit to stand: 03: Partial/moderate assistance  Chair/bed to chair transfer: 03: Partial/moderate assistance  Toilet  transfer: 09: Not applicable  Mobility:  Walk 10 ft: 88: Not attempted due to medical conditions or safety concerns  Walk 50 ft with two turns: 88: Not attempted due to medical conditions or safety concerns  Walk 150ft: 88: Not attempted due to medical conditions or safety concern    Functional Status Upon Discharge from [facility]:   Unable to be assess due to med hold   SLP rubial 1/14 9/30 SLUMS, remained on reg diet.       Discharge Diagnosis: Impairment of mobility, safety and Activities of Daily Living (ADLs) due to Brain Dysfunction:  02.22  Traumatic, Closed Injury    Discharge Medications:   See after visit summary for reconciled discharge medications provided to patient and family.      Condition at Discharge: stable     Discharge instructions/Information to patient and family:   See after visit summary for information provided to patient and family.      Provisions for Follow-Up Care:  See after visit summary for information related to follow-up care and any pertinent home health orders.      Future Appointments   Date Time Provider Department Center   1/15/2025  1:00 PM YASMEEN [facility]   5/27/2025  1:00 PM DO LAURIE Mott  [facility] [place]       Disposition: See After Visit Summary for discharge disposition information.    Planned Readmission or Procedure within next 30 days      Future Encounters        1/15/2025  1:00 PM (30 min) [facility]    HOSP FOLLOW UP [facility]    PAUL CLEMENT ([facility])    Sera Amador PA-C                               Planned Readmission: No    Discharge Statement   I have spent a total time of 46 minutes in caring for this patient on the day of the visit/encounter. >30 minutes of time was spent on: Instructions for management, Patient and family education, Counseling / Coordination of care, Documenting in the medical record, and Communicating with other healthcare professionals .    Discharge Medications:  See after visit summary for  reconciled discharge medications provided to patient and family.

## 2025-01-14 NOTE — ASSESSMENT & PLAN NOTE
Malnutrition Findings:     Decreased appetite   Eats primarily 50% of meals  Albumin level on 01/07/2025 was 3.5    BMI Findings:          Body mass index is 21.58 kg/m².     Recommend protein supplements  Can consider dietary consult

## 2025-01-14 NOTE — ASSESSMENT & PLAN NOTE
Clinical Frail Scale: 6- Moderately Frail  Need help with all outside activities  Need help with stairs and bathing  May need assistance with dressing  In setting of recent fall, hospitalization    Albumin level on 01/07/2025 was 3.5  Recommend protein supplements   PT/OT to enhance ADL performance, mobility

## 2025-01-14 NOTE — ASSESSMENT & PLAN NOTE
In setting of recent fall, hospitalization, pain  1 recent fall requiring ED/hospital admission  PT/OT  Encourage use of assistive device   At baseline denies use of assistive device  Continue fall precautions  Out of bed as tolerated  Encourage patient to participate with activities  Provide education for patient, family, and caregivers

## 2025-01-14 NOTE — PLAN OF CARE
Problem: Prexisting or High Potential for Compromised Skin Integrity  Goal: Skin integrity is maintained or improved  Description: INTERVENTIONS:  - Identify patients at risk for skin breakdown  - Assess and monitor skin integrity  - Assess and monitor nutrition and hydration status  - Monitor labs   - Assess for incontinence   - Turn and reposition patient  - Assist with mobility/ambulation  - Relieve pressure over bony prominences  - Avoid friction and shearing  - Provide appropriate hygiene as needed including keeping skin clean and dry  - Evaluate need for skin moisturizer/barrier cream  - Collaborate with interdisciplinary team   - Patient/family teaching  - Consider wound care consult   1/14/2025 1314 by Corie Delacruz  Outcome: Progressing  1/14/2025 1309 by Corie Delacruz  Outcome: Progressing     Problem: PAIN - ADULT  Goal: Verbalizes/displays adequate comfort level or baseline comfort level  Description: Interventions:  - Encourage patient to monitor pain and request assistance  - Assess pain using appropriate pain scale  - Administer analgesics based on type and severity of pain and evaluate response  - Implement non-pharmacological measures as appropriate and evaluate response  - Consider cultural and social influences on pain and pain management  - Notify physician/advanced practitioner if interventions unsuccessful or patient reports new pain  1/14/2025 1314 by Corie Delacruz  Outcome: Progressing  1/14/2025 1309 by Corie Delacruz  Outcome: Progressing     Problem: INFECTION - ADULT  Goal: Absence or prevention of progression during hospitalization  Description: INTERVENTIONS:  - Assess and monitor for signs and symptoms of infection  - Monitor lab/diagnostic results  - Monitor all insertion sites, i.e. indwelling lines, tubes, and drains  - Monitor endotracheal if appropriate and nasal secretions for changes in amount and color  - [place] appropriate cooling/warming therapies per order  -  Administer medications as ordered  - Instruct and encourage patient and family to use good hand hygiene technique  - Identify and instruct in appropriate isolation precautions for identified infection/condition  1/14/2025 1314 by Corie Delacruz  Outcome: Progressing  1/14/2025 1309 by Corie Delacruz  Outcome: Progressing  Goal: Absence of fever/infection during neutropenic period  Description: INTERVENTIONS:  - Monitor WBC    1/14/2025 1314 by Corie Delacruz  Outcome: Progressing  1/14/2025 1309 by Corie Delacruz  Outcome: Progressing     Problem: SAFETY ADULT  Goal: Patient will remain free of falls  Description: INTERVENTIONS:  - Educate patient/family on patient safety including physical limitations  - Instruct patient to call for assistance with activity   - Consult OT/PT to assist with strengthening/mobility   - Keep Call bell within reach  - Keep bed low and locked with side rails adjusted as appropriate  - Keep care items and personal belongings within reach  - Initiate and maintain comfort rounds  - Make Fall Risk Sign visible to staff  - - Apply yellow socks and bracelet for high fall risk patients  - Consider moving patient to room near nurses station  1/14/2025 1314 by Corie Delacruz  Outcome: Progressing  1/14/2025 1309 by Corie Delacruz  Outcome: Progressing  Goal: Maintain or return to baseline ADL function  Description: INTERVENTIONS:  -  Assess patient's ability to carry out ADLs; assess patient's baseline for ADL function and identify physical deficits which impact ability to perform ADLs (bathing, care of mouth/teeth, toileting, grooming, dressing, etc.)  - Assess/evaluate cause of self-care deficits   - Assess range of motion  - Assess patient's mobility; develop plan if impaired  - Assess patient's need for assistive devices and provide as appropriate  - Encourage maximum independence but intervene and supervise when necessary  - Involve family in performance of ADLs  - Assess for home care needs  following discharge   - Consider OT consult to assist with ADL evaluation and planning for discharge  - Provide patient education as appropriate  1/14/2025 1314 by Corie Delacruz  Outcome: Progressing  1/14/2025 1309 by Corie Delacruz  Outcome: Progressing  Goal: Maintains/Returns to pre admission functional level  Description: INTERVENTIONS:  - Perform AM-PAC 6 Click Basic Mobility/ Daily Activity assessment daily.  - Set and communicate daily mobility goal to care team and patient/family/caregiver.   - Collaborate with rehabilitation services on mobility goals if consulted  -  Problem: SAFETY ADULT  Goal: Patient will remain free of falls  Description: INTERVENTIONS:  - Educate patient/family on patient safety including physical limitations  - Instruct patient to call for assistance with activity   - Consult OT/PT to assist with strengthening/mobility   - Keep Call bell within reach  - Keep bed low and locked with side rails adjusted as appropriate  - Keep care items and personal belongings within reach  - Initiate and maintain comfort rounds  - Make Fall Risk Sign visible to staff  -- Apply yellow socks and bracelet for high fall risk patients  - Consider moving patient to room near nurses station  1/14/2025 1314 by Corie Delacruz  Outcome: Progressing  1/14/2025 1309 by Corie Delacruz  Outcome: Progressing  Goal: Maintain or return to baseline ADL function  Description: INTERVENTIONS:  -  Assess patient's ability to carry out ADLs; assess patient's baseline for ADL function and identify physical deficits which impact ability to perform ADLs (bathing, care of mouth/teeth, toileting, grooming, dressing, etc.)  - Assess/evaluate cause of self-care deficits   - Assess range of motion  - Assess patient's mobility; develop plan if impaired  - Assess patient's need for assistive devices and provide as appropriate  - Encourage maximum independence but intervene and supervise when necessary  - Involve family in  performance of ADLs  - Assess for home care needs following discharge   - Consider OT consult to assist with ADL evaluation and planning for discharge  - Provide patient education as appropriate  1/14/2025 1314 by Corie Delacruz  Outcome: Progressing  1/14/2025 1309 by Corie Delacruz  Outcome: Progressing  Goal: Maintains/Returns to pre admission functional level  Description: INTERVENTIONS:  - Perform AM-PAC 6 Click Basic Mobility/ Daily Activity assessment daily.  - Set and communicate daily mobility goal to care team and patient/family/caregiver.   - Collaborate with rehabilitation services on mobility goals if consulted  - P  Problem: SAFETY ADULT  Goal: Patient will remain free of falls  Description: INTERVENTIONS:  - Educate patient/family on patient safety including physical limitations  - Instruct patient to call for assistance with activity   - Consult OT/PT to assist with strengthening/mobility   - Keep Call bell within reach  - Keep bed low and locked with side rails adjusted as appropriate  - Keep care items and personal belongings within reach  - Initiate and maintain comfort rounds  - Make Fall Risk Sign visible to staff  - - Apply yellow socks and bracelet for high fall risk patients  - Consider moving patient to room near nurses station  1/14/2025 1314 by Corie Delacruz  Outcome: Progressing  1/14/2025 1309 by Corie Delacruz  Outcome: Progressing  Goal: Maintain or return to baseline ADL function  Description: INTERVENTIONS:  -  Assess patient's ability to carry out ADLs; assess patient's baseline for ADL function and identify physical deficits which impact ability to perform ADLs (bathing, care of mouth/teeth, toileting, grooming, dressing, etc.)  - Assess/evaluate cause of self-care deficits   - Assess range of motion  - Assess patient's mobility; develop plan if impaired  - Assess patient's need for assistive devices and provide as appropriate  - Encourage maximum independence but intervene and  supervise when necessary  - Involve family in performance of ADLs  - Assess for home care needs following discharge   - Consider OT consult to assist with ADL evaluation and planning for discharge  - Provide patient education as appropriate  1/14/2025 1314 by Corie Delacruz  Outcome: Progressing  1/14/2025 1309 by Corie Delacruz  Outcome: Progressing  Goal: Maintains/Returns to pre admission functional level  Description: INTERVENTIONS:  - Perform AM-PAC 6 Click Basic Mobility/ Daily Activity assessment daily.  - Set and communicate daily mobility goal to care team and patient/family/caregiver.   - Collaborate with rehabilitation services on mobility goals if consulted    Problem: DISCHARGE PLANNING  Goal: Discharge to home or other facility with appropriate resources  Description: INTERVENTIONS:  - Identify barriers to discharge w/patient and caregiver  - Arrange for needed discharge resources and transportation as appropriate  - Identify discharge learning needs (meds, wound care, etc.)  - Arrange for interpretive services to assist at discharge as needed  - Refer to Case Management Department for coordinating discharge planning if the patient needs post-hospital services based on physician/advanced practitioner order or complex needs related to functional status, cognitive ability, or social support system  1/14/2025 1314 by Corie Delacruz  Outcome: Progressing  1/14/2025 1309 by Corie Delacruz  Outcome: Progressing     Problem: Knowledge Deficit  Goal: Patient/family/caregiver demonstrates understanding of disease process, treatment plan, medications, and discharge instructions  Description: Complete learning assessment and assess knowledge base.  Interventions:  - Provide teaching at level of understanding  - Provide teaching via preferred learning methods  1/14/2025 1314 by Corie Delacruz  Outcome: Progressing  1/14/2025 1309 by Corie Delacruz  Outcome: Progressing   - Out of bed for toileting  - Record  patient progress and toleration of activity level   1/14/2025 1314 by Corie Delacruz  Outcome: Progressing  1/14/2025 1309 by Corie Delacruz  Outcome: Progressing   - Out of bed for toileting  - Record patient progress and toleration of activity level   1/14/2025 1314 by Corie Delacruz  Outcome: Progressing  1/14/2025 1309 by Croie Delacruz  Outcome: Progressing   - Out of bed for toileting  - Record patient progress and toleration of activity level   1/14/2025 1314 by Corie Delacruz  Outcome: Progressing  1/14/2025 1309 by Corie Delacruz  Outcome: Progressing

## 2025-01-14 NOTE — ASSESSMENT & PLAN NOTE
This is a 89-year-old female patient who was recently admitted to [facility] ICU secondary to fall with resultant subdural hematoma, subarachnoid hematoma and facial fractures and discharged to [facility] who was transferred to medical service due to rapid A-fib, hypertension associated with generalized weakness and lightheadedness  The patient was seen by cardiology the day prior and her metoprolol succinate dosing was increased to 75 mg twice daily, however without significant improvement in her rates  Her [id number] score is calculated at 5, however given recent intracranial hemorrhage anticoagulation is currently contraindicated  Her heart rates are currently 120-130 bpm  The patient is admitted to stepdown 2 level of care  Will give Cardizem 15 mg IV now and determine need for Cardizem infusion vs starting Cardizem PO  Blood work from 1/13/25 unremarkable, will update  Reviewed recent 2D echo 1/6/25 noted from grade II DD, pulmonary HTN, trivial pericardial effusion - no need to update  Requesting Cardiology evaluation

## 2025-01-14 NOTE — ASSESSMENT & PLAN NOTE
Blood pressures ranging in physical therapy as low as 70/40 and cardiology was evaluating the patient  Symptomatic with lightheadedness and presyncope and had received gentle fluids with some improvement  BALJIT stockings as well and encouragement of fluid intake  Repeat orthostatics improved but still getting lightheaded at times which was felt to be the initiating factor in her syncopal episode that led to the fall and injury.  Started on low-dose midodrine 2.5 mg twice daily and improved from a symptomatic standpoint.  To contact cardiology with any questions and follow-up as an outpatient  Midodrine made PRN

## 2025-01-14 NOTE — ASSESSMENT & PLAN NOTE
PTA medications include Metoprolol succinate 50 mg tablet, take 1 tablet daily and Hydrochlorothiazide 12.5 mg tablet, take 1 tablet daily  Was discharged from B on hydrochlorothiazide, metoprolol, and midodrine  Midodrine held in setting of hypertension   Continues on diltiazem and metoprolol succinate

## 2025-01-14 NOTE — ASSESSMENT & PLAN NOTE
Lab Results   Component Value Date    EGFR 63 01/14/2025    EGFR 59 01/13/2025    EGFR 64 01/10/2025    CREATININE 0.82 01/14/2025    CREATININE 0.86 01/13/2025    CREATININE 0.81 01/10/2025     Appears stable  Continue to monitor kidney function  Monitor I/O's  Encourage PO hydration   Avoid hypotension  Avoid nephrotoxins, hypotension, IV contrast

## 2025-01-14 NOTE — ASSESSMENT & PLAN NOTE
Per my discussion with patient's family history has been noted for cognitive decline, sundowning  Her mental status has declined since recent hospitalization  Geriatric medicine consulted requested

## 2025-01-14 NOTE — TREATMENT PLAN
Reviewed chest x-ray, noted for pulmonary congestion.  Discontinue IV fluids, will give Lasix 20 mg IV now.

## 2025-01-14 NOTE — ASSESSMENT & PLAN NOTE
History of insomnia  Per chart review was noted in November 2024 by PCP at that time there was a recommendation to slowly increase melatonin   Patient admits to being a poor sleeper  First line is behavioral therapy  Avoid sedative hypnotics such as benzodiazepines and benadryl  Encourage staying awake during the day  Encourage daytime activity, morning exercise  Decrease or eliminate day time naps  Avoid caffiene, alcohol especially during late afternoon and evening hours  Establish a night time routine  Recommend melatonin 3mg po QHS

## 2025-01-14 NOTE — ASSESSMENT & PLAN NOTE
Wt Readings from Last 3 Encounters:   01/12/25 53.5 kg (118 lb)   01/10/25 51.1 kg (112 lb 9.6 oz)   01/04/25 53.4 kg (117 lb 11.6 oz)     Recent 2D echo reviewed, grade 2 diastolic dysfunction, pulmonary hypertension, trivial pericardial effusion  Patient euvolemic on exam, monitor volume status closely in setting of rapid A-fib  Cardiology consult has been requested, input appreciated

## 2025-01-14 NOTE — ASSESSMENT & PLAN NOTE
Admission CBC reviewed, noted for leukocytosis at 14,000 although has been having persistent leukocytosis likely due to intracranial bleed  The patient does not meet SIRS criteria with leukocytosis and tachycardia and given rapid A-fib we will pursue infectious workup  Infectious workup negative  Received IV fluids, discontinued with evidence of fluid overload chest x-ray  Discontinue antibiotics and observe

## 2025-01-14 NOTE — ASSESSMENT & PLAN NOTE
Lab Results   Component Value Date    EGFR 59 01/13/2025    EGFR 64 01/10/2025    EGFR 50 01/08/2025    CREATININE 0.86 01/13/2025    CREATININE 0.81 01/10/2025    CREATININE 0.99 01/08/2025     At baseline

## 2025-01-14 NOTE — ASSESSMENT & PLAN NOTE
Home regimen: Metoprolol XL 50 mg daily, HCTZ  Goal blood pressures of a systolic less than 160 however have been elevated  Now on metoprolol succinate 50 mg twice daily --> inc to 75 mg BID, amlodipine 2.5 mg (started 1/14)   Difficulty maintaining blood pressures and plan to except a slightly higher blood pressure as they anticipate drops with her significant orthostatic hypotension

## 2025-01-14 NOTE — ASSESSMENT & PLAN NOTE
Patient presents with rapid afib, accelerated BP with SBP in the 190's  She is on midodrine due to orthostatic hypotension - hold for now  Continue metoprolol succinate 75 mg twice daily  Will likely initiate on Cardizem infusion versus p.o.  Discontinued amlodipine

## 2025-01-14 NOTE — ASSESSMENT & PLAN NOTE
Lab Results   Component Value Date    EGFR 59 01/13/2025    EGFR 64 01/10/2025    EGFR 50 01/08/2025    CREATININE 0.86 01/13/2025    CREATININE 0.81 01/10/2025    CREATININE 0.99 01/08/2025     Stage III CKD per notes in the outpatient setting with primary care physician but not actively following with nephrology  Continue to monitor labs however they have been stable and will need to follow-up as an outpatient for continued management

## 2025-01-14 NOTE — ASSESSMENT & PLAN NOTE
"CT head on 01/04/2025 revealing, \"Acute subarachnoid hemorrhage in the left frontoparietal sulci, left sylvian fissure and suprasellar cistern.\"  "

## 2025-01-14 NOTE — WOUND OSTOMY CARE
Progress Note - Wound   Sandra Purvis 89 y.o. female MRN: [id number]  Unit/Bed#: [facility] 269-02 Encounter: [id number]        Assessment:   Left elbow skin tear open to air  Heels sacrum without redness  Pt to be transferred to higher level of care at this time due to heart rate.                  Wound Care will sign off      Courtney BARDALESN RN CWON

## 2025-01-14 NOTE — ASSESSMENT & PLAN NOTE
Initially presented with sinus bradycardia and evaluated by cardiology and was in controlled atrial fibrillation without bradycardia  Not on anticoagulation prior to admission and not indicated at this point due to the SDH and SAH and overall remains high risk due to risk of falls and given her advanced age  No recent follow-up with cardiology as an outpatient and generally sees her primary care physician for management and was on Toprol 50 mg daily at home which has been changed well and in the hospital  Tachycardic in the hospital and given IV fluids and medication changes include addition of midodrine and changes in the metoprolol dosing  Recommended for aspirin but would likely not be a good long-term candidate for anticoagulation  Cardiology consulted 1/14   Metoprolol inc to 75 BID transferred to [facility] for telemetry given uncontrolled Afib and HTN

## 2025-01-14 NOTE — NURSING NOTE
Pt discharged back to Acute -report was called by ISAIAH Chester to receiving RN. All belongings are with the pt. Daughter was notified of transfer by attending MD.

## 2025-01-14 NOTE — ASSESSMENT & PLAN NOTE
Wt Readings from Last 3 Encounters:   01/14/25 53.5 kg (118 lb)   01/12/25 53.5 kg (118 lb)   01/10/25 51.1 kg (112 lb 9.6 oz)     Denies shortness of breath  No increased edema  Weights appear stable  PTA was on hydrochlorothiazide

## 2025-01-14 NOTE — TREATMENT PLAN
Admission CBC reviewed, noted for leukocytosis at 14,000 although has been having persistent leukocytosis likely due to intracranial bleed  The patient does not meet SIRS criteria with leukocytosis and tachycardia and given rapid A-fib we will pursue infectious workup  Check blood cultures, lactic acid level, procalcitonin  IV fluid bolus, initiate 500 mL Isolyte bolus followed by gentle IV fluids, patient has history of CHF and is at high risk for fluid overload  Check chest x-ray, urinalysis  Empiric ceftriaxone for now  Monitor CBC, vital signs

## 2025-01-14 NOTE — ASSESSMENT & PLAN NOTE
Transferred from [facility] due to rapid A-fib and hypertension  Recently admitted to [facility] after a fall sustaining a subdural hematoma, subarachnoid hematoma and facial fractures   Being monitored on telemetry   HR appears controlled, primarily in the 80's  Received IV and po Cardizem today   EKG completed on 01/13/2025 showing atrial fibrillation with rapid ventricular response with premature ventricular or aberrantly conducted complexes  Cardiology consulted  Continues on diltiazem and metoprolol succinate   Unable to be on anticoagulation d/t SDH and SAH  Denies chest pain, nausea on assessment

## 2025-01-14 NOTE — LETTER
January 17, 2025     [facility]    Patient: Sandra Purvis   YOB: 1934   Date of Visit: 1/14/2025       Dear Dr. Izaguirre:    Thank you for referring Sandra Purvis to me for evaluation. Below are my notes for this consultation.    If you have questions, please do not hesitate to call me. I look forward to following your patient along with you.         Sincerely,        No name on file        CC: No Recipients    Mehul Millan MD  1/16/2025  3:21 PM  Signed  Progress Note - Cardiology   Name: Sandra Purvis 89 y.o. female I MRN: [id number]  Unit/Bed#: 5T -01 I Date of Admission: 1/14/2025   Date of Service: 1/16/2025 I Hospital Day: 2     Assessment & Plan  Atrial fibrillation with rapid ventricular response (HCC)  Patient was in A-fib with RVR in rehab  Transferred to inpatient for better heart rate control, rates better controlled now  Continue with diltiazem 120 mg daily and Toprol-XL 75 mg twice daily  Not on anticoagulation due to recent subdural/subarachnoid hemorrhage  Hypertension  Continue diltiazem 120 mg daily and Toprol-XL 75 mg twice daily  Currently holding midodrine in setting of hypertension  Mild protein-calorie malnutrition (HCC)    Body mass index is 20.49 kg/m².     Stage 3 chronic kidney disease, unspecified whether stage 3a or 3b CKD (HCC)  Lab Results   Component Value Date    EGFR 56 01/16/2025    EGFR 56 01/15/2025    EGFR 63 01/14/2025    CREATININE 0.90 01/16/2025    CREATININE 0.90 01/15/2025    CREATININE 0.82 01/14/2025     SDH (subdural hematoma) (HCC)  - Initially presented with fall with subdural and subarachnoid hemorrhage  - CT head 1//2025 showed acute subdural hemorrhage along the left cerebral convexity with maximal thickness of 1.9 cm, acute subarachnoid hemorrhage in the left frontoparietal sulci, left sylvian fissure and suprasellar cistern  -Patient elected for conservative/nonsurgical management  SAH (subarachnoid hemorrhage)  "(HCC)    Orthostatic hypotension  Continue to hold midodrine while hypertensive    Acute on chronic diastolic congestive heart failure (HCC)  Wt Readings from Last 3 Encounters:   01/16/25 50.8 kg (112 lb)   01/12/25 53.5 kg (118 lb)   01/10/25 51.1 kg (112 lb 9.6 oz)     - TTE 1/6/2025 showed EF 70%, grade 2 DD, mildly dilated RV, mild LA, mild AI, mild TR, estimated PA pressure 50 mmHg, trivial pericardial effusion  - Not on diuretics prior to admission  - Received furosemide 20 mg IV x 1 yesterday  - Volume status appears improved, will change to furosemide 20 mg daily to maintain euvolemia  Age-related cognitive decline    Frailty syndrome in geriatric patient      Summary:  -Short acting diltiazem converted to long-acting 120 mg daily  - Continue with diltiazem 120 mg daily and Toprol-XL 75 mg twice daily  - Appears euvolemic on exam, start Lasix 20 mg p.o. daily to maintain euvolemia  - Stable from cardiac standpoint for transfer back to rehab    Subjective:   No significant events overnight.  She reports feeling okay this afternoon.  She continues to have some back pain which is chronic for her.  Denies chest pain, shortness of breath, orthopnea, abdominal pain, nausea, vomiting, fever, chills, or palpitations.    Objective:     Vitals: Blood pressure 122/54, pulse 76, temperature 98 °F (36.7 °C), temperature source Temporal, resp. rate 18, height 5' 2\" (1.575 m), weight 50.8 kg (112 lb), SpO2 96%, not currently breastfeeding., Body mass index is 20.49 kg/m².,   Orthostatic Blood Pressures      Flowsheet Row Most Recent Value   Blood Pressure 122/54 filed at 01/16/2025 1225   Patient Position - Orthostatic VS Sitting filed at 01/16/2025 1225              Intake/Output Summary (Last 24 hours) at 1/16/2025 1458  Last data filed at 1/16/2025 1420  Gross per 24 hour   Intake 600 ml   Output 1750 ml   Net -1150 ml           Physical Exam:    GEN: Sandra Purvis appears well, alert and oriented x 3, pleasant " and cooperative   HEENT: Mucous membranes moist, no scleral icterus, no conjunctival pallor  NECK: No significant JVD  HEART: Regular rate, frequent ectopic beats, 2 out of 6 systolic murmur  LUNGS: clear to auscultation bilaterally; no wheezes, rales, or rhonchi   ABDOMEN: normal bowel sounds, soft, no tenderness, no distention  EXTREMITIES: peripheral pulses normal; no lower extremity edema   NEURO: no focal findings   SKIN: + Ecchymosis on right side of face and around right eye        Current Facility-Administered Medications:   •  acetaminophen (TYLENOL) tablet 975 mg, 975 mg, Oral, Q8H ELIZABETH, Nicolle Junior MD, 975 mg at 01/16/25 1326  •  bisacodyl (DULCOLAX) rectal suppository 10 mg, 10 mg, Rectal, Daily PRN, Nicolle Junior MD  •  calcium carbonate (TUMS) chewable tablet 500 mg, 500 mg, Oral, Daily PRN, Nicolle Junior MD  •  diltiazem (CARDIZEM CD) 24 hr capsule 120 mg, 120 mg, Oral, Daily, Nicolle Junior MD, 120 mg at 01/16/25 1230  •  enoxaparin (LOVENOX) subcutaneous injection 30 mg, 30 mg, Subcutaneous, Q24H, Nicolle Junior MD, 30 mg at 01/15/25 2103  •  melatonin tablet 3 mg, 3 mg, Oral, HS, Nicolle Junior MD, 3 mg at 01/15/25 2104  •  metoprolol succinate (TOPROL-XL) 24 hr tablet 75 mg, 75 mg, Oral, BID, Nicolle Junior MD, 75 mg at 01/16/25 0813  •  [Held by provider] midodrine (PROAMATINE) tablet 5 mg, 5 mg, Oral, TID PRN, Nicolle Junior MD  •  ondansetron (ZOFRAN) injection 4 mg, 4 mg, Intravenous, Q6H PRN, Nicolle Junior MD  •  ondansetron (ZOFRAN-ODT) dispersible tablet 4 mg, 4 mg, Oral, Q6H PRN, Nicolle Junior MD  •  oxyCODONE (ROXICODONE) split tablet 2.5 mg, 2.5 mg, Oral, Q6H PRN **OR** oxyCODONE (ROXICODONE) IR tablet 5 mg, 5 mg, Oral, Q6H PRN, Nicolle Junior MD  •  pantoprazole (PROTONIX) EC tablet 40 mg, 40 mg, Oral, Early Morning, Nicolle Junior MD, 40 mg at 01/16/25 0518  •  polyethylene glycol (MIRALAX) packet  "17 g, 17 g, Oral, Daily, Nicolle Junior MD, 17 g at 01/16/25 0813  •  senna-docusate sodium (SENOKOT S) 8.6-50 mg per tablet 1 tablet, 1 tablet, Oral, HS, Nicolle Junior MD, 1 tablet at 01/15/25 2104    Labs & Results:    Lab Results   Component Value Date    TROPONINI 0.05 (H) 09/01/2015    TROPONINI 0.04 08/31/2015       Lab Results   Component Value Date    GLUCOSE 104 09/02/2015    CALCIUM 9.0 01/16/2025     (L) 09/02/2015    K 3.6 01/16/2025    CO2 31 01/16/2025    CL 95 (L) 01/16/2025    BUN 17 01/16/2025    CREATININE 0.90 01/16/2025       Lab Results   Component Value Date    WBC 12.88 (H) 01/16/2025    HGB 10.0 (L) 01/16/2025    HCT 31.7 (L) 01/16/2025    MCV 89 01/16/2025     01/16/2025           No results found for: \"CHOL\"  No results found for: \"HDL\"  No results found for: \"LDLCALC\"  No results found for: \"TRIG\"    Lab Results   Component Value Date    ALT 40 01/15/2025    AST 31 01/15/2025    ALKPHOS 83 01/15/2025         EKG personally reviewed by )Mehul Millan MD. No acute changes            Nicolle Junior MD  1/16/2025 11:52 AM  Signed  Progress Note - Hospitalist   Name: Sandra Purvis 89 y.o. female I MRN: [id number]  Unit/Bed#: 5T -01 I Date of Admission: 1/14/2025   Date of Service: 1/16/2025 I Hospital Day: 2    Assessment & Plan  Atrial fibrillation with rapid ventricular response (HCC)  This is a 89-year-old female patient who was recently admitted to [facility] ICU secondary to fall with resultant subdural hematoma, subarachnoid hematoma and facial fractures and discharged to [facility] who was transferred to medical service due to rapid A-fib, hypertension associated with generalized weakness and lightheadedness  The patient was seen by cardiology the day prior to admission and her metoprolol succinate dosing was increased to 75 mg twice daily, however without significant improvement in her rates  Her [id number] score is calculated " "at 5, however given recent intracranial hemorrhage anticoagulation is currently contraindicated  Her heart rates were 120-130 bpm on admission, now around 90 following Cardizem 15 mg IV x1, has been on scheduled PO Cardizem 30 mg Q6H now converted to NSR with occasional PVCs. Transition to Cardizem  mg daily  Infectious workup unremarkable, Abx discontinued  Chest x-ray with pulmonary edema, given Lasix 20 mg IV 1/14, and 1/15 with good response  Reviewed recent 2D echo 1/6/25 noted from grade II DD, pulmonary HTN, trivial pericardial effusion - no need to update  Appreciate cardiology input  Hypertension  Patient is on metoprolol but dose recently increased in setting of rapid heart rates, A-fib, had also been on midodrine due to postural hypotension  She presents with elevated blood pressures now improved, please see above under hypertensive urgency  Mild protein-calorie malnutrition (HCC)  Malnutrition Findings:      BMI Findings:  21.58     Body mass index is 20.49 kg/m².   Request nutrition evaluation  Stage 3 chronic kidney disease, unspecified whether stage 3a or 3b CKD (HCC)  Lab Results   Component Value Date    EGFR 56 01/16/2025    EGFR 56 01/15/2025    EGFR 63 01/14/2025    CREATININE 0.90 01/16/2025    CREATININE 0.90 01/15/2025    CREATININE 0.82 01/14/2025     At baseline  Primary insomnia  Patient with age-related cognitive decline and \"sundowning\" per discussion with family  She is on melatonin 3 mg HS -appreciate geriatric medicine input, continue  Mental status appears improved today, 1/15  SDH (subdural hematoma) (HCC)  Please refer to above, recent hospitalization with traumatic ICH following fall  Patient has elected conservative/non-surgical approach  Monitor neurologic exam  SAH (subarachnoid hemorrhage) (HCC)  As above  Orthostatic hypotension  Patient presents in rapid afib, hypertensive urgency  She is on midodrine - hold for now, no significant orthostatic hypotension currently " observed; may need to introduce at 2.5 mg TID if develops episodes  Monitor orthostatic Bps Q shift  Acute on chronic diastolic congestive heart failure (HCC)  Wt Readings from Last 3 Encounters:   01/16/25 50.8 kg (112 lb)   01/12/25 53.5 kg (118 lb)   01/10/25 51.1 kg (112 lb 9.6 oz)     Recent 2D echo reviewed, grade 2 diastolic dysfunction, pulmonary hypertension, trivial pericardial effusion  Noted for pulmonary edema in the setting of rapid A-fib  Cardiology consult has been requested, input appreciated  Received Lasix 20 mg IV 01/14, given an additional dose 1/15 - appears euvolemic today          Hypertensive urgency  Patient presents with rapid afib, accelerated BP with SBP in the 190's  BP improved now, hypertensive urgency resolved  She is on midodrine due to orthostatic hypotension - hold for now, may need to reintroduce at a lower dose  Continue metoprolol succinate 75 mg twice daily  Cardizem 30 mg every 6 hours - changed to Cardizem 120 mg XR daily today 1/16  Discontinued amlodipine  Age-related cognitive decline  Per my discussion with patient's family history has been noted for cognitive decline, sundowning  Her mental status has declined since recent hospitalization, appears improved today  Geriatric medicine consult appreciated, continue melatonin for sleep regulation, supportive measures  SIRS (systemic inflammatory response syndrome) ([facility])  Admission CBC reviewed, noted for leukocytosis at 14,000 although has been having persistent leukocytosis likely due to intracranial bleed  The patient does not meet SIRS criteria with leukocytosis and tachycardia and given rapid A-fib we will pursue infectious workup  Infectious workup negative  Received IV fluids, discontinued with evidence of fluid overload chest x-ray  Stable off Abx discontinued 1/15  Ambulatory dysfunction  Anticipated to return back to [facility]    VTE Pharmacologic Prophylaxis: VTE Score: 6 High Risk (Score >/= 5) - Pharmacological DVT  Prophylaxis Ordered: enoxaparin (Lovenox). Sequential Compression Devices Ordered.    Mobility:   Basic Mobility Inpatient Raw Score: 12  JH-HLM Goal: 4: Move to chair/commode  JH-HLM Achieved: 6: Walk 10 steps or more  JH-HLM Goal NOT achieved. Continue with multidisciplinary rounding and encourage appropriate mobility to improve upon JH-HLM goals.    Patient Centered Rounds: I performed bedside rounds with nursing staff today.   Discussions with Specialists or Other Care Team Provider: CM, Cardiology    Education and Discussions with Family / Patient: ongoing update of family    Current Length of Stay: 2 day(s)  Current Patient Status: Inpatient   Certification Statement: The patient will continue to require additional inpatient hospital stay due to close monitoring  Discharge Plan: Anticipate discharge tomorrow to rehab facility.    Code Status: Level 3 - DNAR and DNI    Subjective  No acute complaints overnight events.    Objective:  Temp:  [97.5 °F (36.4 °C)-98.3 °F (36.8 °C)] 97.5 °F (36.4 °C)  HR:  [56-94] 56  BP: (111-175)/(55-82) 160/82  Resp:  [16-18] 16  SpO2:  [92 %-98 %] 92 %  O2 Device: None (Room air)    Body mass index is 20.49 kg/m².     Input and Output Summary (last 24 hours):     Intake/Output Summary (Last 24 hours) at 1/16/2025 1129  Last data filed at 1/16/2025 1000  Gross per 24 hour   Intake 600 ml   Output 1500 ml   Net -900 ml       Physical Exam  Constitutional:       General: She is not in acute distress.  HENT:      Head: Normocephalic.      Comments: Facial bruising  Eyes:      Conjunctiva/sclera: Conjunctivae normal.   Cardiovascular:      Rate and Rhythm: Normal rate and regular rhythm.   Pulmonary:      Effort: No respiratory distress.      Breath sounds: No wheezing or rales.   Abdominal:      General: There is no distension.      Tenderness: There is no abdominal tenderness. There is no guarding.   Musculoskeletal:      Right lower leg: No edema.      Left lower leg: No edema.    Skin:     General: Skin is warm and dry.   Neurological:      Mental Status: Mental status is at baseline.         Lines/Drains:  Lines/Drains/Airways       Active Status       Name Placement date Placement time Site Days    External Urinary Catheter 01/05/25  0100  -- 11                            Lab Results: I have reviewed the following results:   Results from last 7 days   Lab Units 01/16/25  0527   WBC Thousand/uL 12.88*   HEMOGLOBIN g/dL 10.0*   HEMATOCRIT % 31.7*   PLATELETS Thousands/uL 290   SEGS PCT % 83*   LYMPHO PCT % 9*   MONO PCT % 6   EOS PCT % 1     Results from last 7 days   Lab Units 01/16/25  0527 01/15/25  0517   SODIUM mmol/L 134* 134*   POTASSIUM mmol/L 3.6 3.3*   CHLORIDE mmol/L 95* 93*   CO2 mmol/L 31 30   BUN mg/dL 17 18   CREATININE mg/dL 0.90 0.90   ANION GAP mmol/L 8 11   CALCIUM mg/dL 9.0 9.5   ALBUMIN g/dL  --  3.9   TOTAL BILIRUBIN mg/dL  --  0.66   ALK PHOS U/L  --  83   ALT U/L  --  40   AST U/L  --  31   GLUCOSE RANDOM mg/dL 107 114                 Results from last 7 days   Lab Units 01/15/25  0517 01/14/25  1605   LACTIC ACID mmol/L  --  0.9   PROCALCITONIN ng/ml 0.13 0.11       Recent Cultures (last 7 days):   Results from last 7 days   Lab Units 01/14/25  1607   BLOOD CULTURE  No Growth at 24 hrs.  No Growth at 24 hrs.             Last 24 Hours Medication List:     Current Facility-Administered Medications:   •  acetaminophen (TYLENOL) tablet 975 mg, Q8H ELIZABETH  •  bisacodyl (DULCOLAX) rectal suppository 10 mg, Daily PRN  •  calcium carbonate (TUMS) chewable tablet 500 mg, Daily PRN  •  diltiazem (CARDIZEM CD) 24 hr capsule 120 mg, Daily  •  enoxaparin (LOVENOX) subcutaneous injection 30 mg, Q24H  •  melatonin tablet 3 mg, HS  •  metoprolol succinate (TOPROL-XL) 24 hr tablet 75 mg, BID  •  [Held by provider] midodrine (PROAMATINE) tablet 5 mg, TID PRN  •  ondansetron (ZOFRAN) injection 4 mg, Q6H PRN  •  ondansetron (ZOFRAN-ODT) dispersible tablet 4 mg, Q6H PRN  •  oxyCODONE  (ROXICODONE) split tablet 2.5 mg, Q6H PRN **OR** oxyCODONE (ROXICODONE) IR tablet 5 mg, Q6H PRN  •  pantoprazole (PROTONIX) EC tablet 40 mg, Early Morning  •  polyethylene glycol (MIRALAX) packet 17 g, Daily  •  senna-docusate sodium (SENOKOT S) 8.6-50 mg per tablet 1 tablet, HS    Administrative Statements  Today, Patient Was Seen By: Nicolle Junior MD  I have spent a total time of 51 minutes in caring for this patient on the day of the visit/encounter including Patient and family education, Reviewing / ordering tests, medicine, procedures  , and Communicating with other healthcare professionals .    **Please Note: This note may have been constructed using a voice recognition system.**    Mike, Analia Oreilly  1/16/2025 11:05 AM  Signed  Patient:    MRN:  [id number]    Analia Request ID:  [id number]    Level of care reserved:  Inpatient Rehab Facility    Partner Reserved:  [facility] - ([place]), KON[place] Reed [id number] [phone]    Clinical needs requested:    Geography searched:  10 miles around [phone]    Start of Service:    Request sent:  3:28pm EST on 1/15/2025 by Jaja Jenkins    Partner reserved:  11:05am EST on 1/16/2025 by Jaja Jenkins    Choice list shared:    Nicolle Junior MD  1/15/2025  2:14 PM  Signed  Progress Note - Hospitalist   Name: Sandra Purvis 89 y.o. female I MRN: [id number]  Unit/Bed#: 5T -01 I Date of Admission: 1/14/2025   Date of Service: 1/15/2025 I Hospital Day: 1    Assessment & Plan  Atrial fibrillation with rapid ventricular response (HCC)  This is a 89-year-old female patient who was recently admitted to [facility] ICU secondary to fall with resultant subdural hematoma, subarachnoid hematoma and facial fractures and discharged to [facility] who was transferred to medical service due to rapid A-fib, hypertension associated with generalized weakness and lightheadedness  The patient was seen by cardiology the day prior and her  "metoprolol succinate dosing was increased to 75 mg twice daily, however without significant improvement in her rates  Her [id number] score is calculated at 5, however given recent intracranial hemorrhage anticoagulation is currently contraindicated  Her heart rates were 120-130 bpm on admission, now around 90 following Cardizem 15 mg IV x1, scheduled PO Cardizem 30 mg Q6H  The patient was admitted to stepdown 2 level of care, downgrade to Med Surg tele  Dated infectious workup which was unremarkable  Chest x-ray with pulmonary edema, given Lasix 20 mg IV 1/14, will repeat dose  Reviewed recent 2D echo 1/6/25 noted from grade II DD, pulmonary HTN, trivial pericardial effusion - no need to update  Appreciate cardiology input  Hypertension  Patient is on metoprolol but dose recently increased in setting of rapid heart rates, A-fib, had also been on midodrine due to postural hypotension  She presents with elevated blood pressures now improved, please see above under hypertensive urgency  Mild protein-calorie malnutrition (HCC)  Malnutrition Findings:      BMI Findings:  21.58     Body mass index is 20.36 kg/m².   Request nutrition evaluation  Stage 3 chronic kidney disease, unspecified whether stage 3a or 3b CKD (HCC)  Lab Results   Component Value Date    EGFR 56 01/15/2025    EGFR 63 01/14/2025    EGFR 59 01/13/2025    CREATININE 0.90 01/15/2025    CREATININE 0.82 01/14/2025    CREATININE 0.86 01/13/2025     At baseline  Primary insomnia  Patient with age-related cognitive decline and \"sundowning\" per discussion with family  She is on melatonin 3 mg HS -appreciate geriatric medicine input, continue  Mental status appears improved today, 1/15  SDH (subdural hematoma) (HCC)  Please refer to above, recent hospitalization with traumatic ICH following fall  Patient has elected conservative/non-surgical approach  Monitor neurologic exam  SAH (subarachnoid hemorrhage) (HCC)  As above  Orthostatic hypotension  Patient " presents in rapid afib, hypertensive urgency  She is on midodrine - hold for now, may need to introduce at 2.5 mg TID  Monitor orthostatic BPs  Acute on chronic diastolic congestive heart failure (HCC)  Wt Readings from Last 3 Encounters:   01/15/25 50.5 kg (111 lb 4.8 oz)   01/12/25 53.5 kg (118 lb)   01/10/25 51.1 kg (112 lb 9.6 oz)     Recent 2D echo reviewed, grade 2 diastolic dysfunction, pulmonary hypertension, trivial pericardial effusion  Noted for pulmonary edema in the setting of rapid A-fib  Cardiology consult has been requested, input appreciated  Received Lasix 20 mg IV 01/14, will give another dose today, 1/15          Hypertensive urgency  Patient presents with rapid afib, accelerated BP with SBP in the 190's  BP improved now, hypertensive urgency resolved  She is on midodrine due to orthostatic hypotension - hold for now, may need to reintroduce at a lower dose  Continue metoprolol succinate 75 mg twice daily  Cardizem 30 mg every 6 hours  Discontinued amlodipine  Age-related cognitive decline  Per my discussion with patient's family history has been noted for cognitive decline, sundowning  Her mental status has declined since recent hospitalization, appears improved today  Geriatric medicine consult appreciated, continue melatonin for sleep regulation, supportive measures  SIRS (systemic inflammatory response syndrome) ([facility])  Admission CBC reviewed, noted for leukocytosis at 14,000 although has been having persistent leukocytosis likely due to intracranial bleed  The patient does not meet SIRS criteria with leukocytosis and tachycardia and given rapid A-fib we will pursue infectious workup  Infectious workup negative  Received IV fluids, discontinued with evidence of fluid overload chest x-ray  Discontinue antibiotics and observe    VTE Pharmacologic Prophylaxis: VTE Score: 6 High Risk (Score >/= 5) - Pharmacological DVT Prophylaxis Ordered: enoxaparin (Lovenox). Sequential Compression Devices  Ordered.    Mobility:   Basic Mobility Inpatient Raw Score: 12  -HLM Goal: 4: Move to chair/commode  -HLM Achieved: 4: Move to chair/commode    Patient Centered Rounds: I performed bedside rounds with nursing staff today.   Discussions with Specialists or Other Care Team Provider: Cardiology    Education and Discussions with Family / Patient: Updated  (daughter) via phone.    Current Length of Stay: 1 day(s)  Current Patient Status: Inpatient   Certification Statement: The patient will continue to require additional inpatient hospital stay due to close monitoring  Discharge Plan: Anticipate discharge in 24-48 hrs to rehab facility.    Code Status: Level 3 - DNAR and DNI    Subjective  Patient appears more alert and awake.  She worked with physical therapy.  She voices no acute complaints.    Objective:  Temp:  [97.2 °F (36.2 °C)-98.3 °F (36.8 °C)] 98.3 °F (36.8 °C)  HR:  [] 88  BP: (131-186)/(70-91) 131/70  Resp:  [18] 18  SpO2:  [93 %-97 %] 97 %  O2 Device: None (Room air)    Body mass index is 20.36 kg/m².     Input and Output Summary (last 24 hours):     Intake/Output Summary (Last 24 hours) at 1/15/2025 1406  Last data filed at 1/15/2025 1404  Gross per 24 hour   Intake --   Output 1616 ml   Net -1616 ml       Physical Exam  Constitutional:       General: She is not in acute distress.  HENT:      Head:      Comments: Right-sided facial echymosis     Nose: No congestion.   Cardiovascular:      Rate and Rhythm: Normal rate. Rhythm irregular.   Pulmonary:      Effort: No respiratory distress.      Breath sounds: No wheezing or rales.   Abdominal:      General: There is no distension.      Tenderness: There is no abdominal tenderness. There is no guarding.   Musculoskeletal:      Right lower leg: No edema.      Left lower leg: No edema.   Skin:     General: Skin is warm and dry.   Neurological:      Mental Status: Mental status is at baseline.         Lines/Drains:  Lines/Drains/Airways        Active Status       Name Placement date Placement time Site Days    External Urinary Catheter 01/05/25  0100  -- 10                      Telemetry:  Telemetry Orders (From admission, onward)               24 Hour Telemetry Monitoring  Continuous x 24 Hours (Telem)        Expiring   Question:  Reason for 24 Hour Telemetry  Answer:  Arrhythmias requiring acute medical intervention / PPM or ICD malfunction                     Telemetry Reviewed: Atrial fibrillation. HR averaging 90s  Indication for Continued Telemetry Use: Arrthymias requiring medical therapy               Lab Results: I have reviewed the following results:   Results from last 7 days   Lab Units 01/15/25  0517 01/14/25  1303 01/13/25  1025 01/10/25  0541   WBC Thousand/uL 14.82*   < > 11.21* 11.04*   HEMOGLOBIN g/dL 10.0*   < > 9.6* 9.9*   HEMATOCRIT % 30.9*   < > 29.3* 31.2*   PLATELETS Thousands/uL 201   < > 223 183   SEGS PCT %  --   --   --  82*   LYMPHO PCT %  --   --  4* 9*   MONO PCT %  --   --  3* 7   EOS PCT %  --   --  0 1    < > = values in this interval not displayed.     Results from last 7 days   Lab Units 01/15/25  0517   SODIUM mmol/L 134*   POTASSIUM mmol/L 3.3*   CHLORIDE mmol/L 93*   CO2 mmol/L 30   BUN mg/dL 18   CREATININE mg/dL 0.90   ANION GAP mmol/L 11   CALCIUM mg/dL 9.5   ALBUMIN g/dL 3.9   TOTAL BILIRUBIN mg/dL 0.66   ALK PHOS U/L 83   ALT U/L 40   AST U/L 31   GLUCOSE RANDOM mg/dL 114                 Results from last 7 days   Lab Units 01/15/25  0517 01/14/25  1605   LACTIC ACID mmol/L  --  0.9   PROCALCITONIN ng/ml 0.13 0.11       Recent Cultures (last 7 days):   Results from last 7 days   Lab Units 01/14/25  1607   BLOOD CULTURE  Received in Microbiology Lab. Culture in Progress.  Received in Microbiology Lab. Culture in Progress.       Imaging Results Review: I reviewed radiology reports from this admission including: chest xray.      Last 24 Hours Medication List:     Current Facility-Administered Medications:   •   acetaminophen (TYLENOL) tablet 975 mg, Q8H ELIZABETH  •  bisacodyl (DULCOLAX) rectal suppository 10 mg, Daily PRN  •  calcium carbonate (TUMS) chewable tablet 500 mg, Daily PRN  •  cefTRIAXone (ROCEPHIN) IVPB (premix in dextrose) 1,000 mg 50 mL, Q24H, Last Rate: 1,000 mg (01/14/25 1815)  •  diltiazem (CARDIZEM) tablet 30 mg, Q6H ELIZABETH  •  enoxaparin (LOVENOX) subcutaneous injection 30 mg, Q24H  •  melatonin tablet 3 mg, HS  •  metoprolol succinate (TOPROL-XL) 24 hr tablet 75 mg, BID  •  [Held by provider] midodrine (PROAMATINE) tablet 5 mg, TID PRN  •  ondansetron (ZOFRAN) injection 4 mg, Q6H PRN  •  ondansetron (ZOFRAN-ODT) dispersible tablet 4 mg, Q6H PRN  •  oxyCODONE (ROXICODONE) split tablet 2.5 mg, Q6H PRN **OR** oxyCODONE (ROXICODONE) IR tablet 5 mg, Q6H PRN  •  pantoprazole (PROTONIX) EC tablet 40 mg, Early Morning  •  polyethylene glycol (MIRALAX) packet 17 g, Daily  •  senna-docusate sodium (SENOKOT S) 8.6-50 mg per tablet 1 tablet, HS    Administrative Statements  Today, Patient Was Seen By: Nicolle Junior MD  I have spent a total time of 52 minutes in caring for this patient on the day of the visit/encounter including Patient and family education, Counseling / Coordination of care, Documenting in the medical record, Reviewing / ordering tests, medicine, procedures  , and Communicating with other healthcare professionals .    **Please Note: This note may have been constructed using a voice recognition system.**

## 2025-01-14 NOTE — ASSESSMENT & PLAN NOTE
Presented on 1/4 after syncopal fall.  CTH showed acute subdural hemorrhage along the L cerebral convexity with maximal thickness of 1.9cm.  No midline shift.    Received Keppra x7 days.  Continue to hold AC/AP.  Neurovascular checks Q shift.  Maintain normotension.  Avoid SBP >160.    Follow-up with Neurosurgery in 2 weeks with CTH (1/18).  Obtain STAT CTH today due to change in mental status and uncontrolled HTN.    Primary team following.  PT/OT/SLP.   No

## 2025-01-14 NOTE — ASSESSMENT & PLAN NOTE
"Patient with age-related cognitive decline and \"sundowning\" per discussion with family  She is on melatonin 3 mg HS while here  Her mental status has declined in setting of recent fall with ICH, hospitalization  Will request geriatric medicine evaluation  "

## 2025-01-14 NOTE — ASSESSMENT & PLAN NOTE
Secondary to fall  CT of the head on 1/4 also showed comminuted and mildly displaced fractures of the lateral and anterior walls of the right maxilla, lateral and inferior walls of the right orbit, right inferior orbital wall fractures appear to involve the inferior orbital foramen with associated small extraconal soft tissue edema and focus of air.  There is no herniation of extraocular muscles and a nondisplaced fracture of the zygomatic arch as well as the temporal bone at the TMJ.  There is diffuse opacification of the right maxillary sinus and right nasal cavity with blood products and air  Seen by oral maxillofacial surgery team and surgical intervention was declined by patient  7-day course of Unasyn/Augmentin completed   Sinus precautions  Follow-up with OMFS as an outpatient

## 2025-01-14 NOTE — ASSESSMENT & PLAN NOTE
Patient is on metoprolol but dose recently increased in setting of rapid heart rates, A-fib, had also been on midodrine due to postural hypotension  She presents with elevated blood pressures, please see above under hypertensive urgency

## 2025-01-14 NOTE — ASSESSMENT & PLAN NOTE
"Syncope and collapse sustaining SDH  CT head on 01/04/2025 revealing, \"Acute subdural hemorrhage along the left cerebral convexity with maximal thickness of 1.9 cm along the frontal lobe (2/31). There is extension of the hematoma along left anterior temporal lobe.\"  "

## 2025-01-14 NOTE — ASSESSMENT & PLAN NOTE
Not taking AC at home due to hx of falls.  Continue to hold with recent falls and now subarachnoid/subdural.  Had been taking Toprol XL 50mg daily at home - currently receiving Toprol XL 50mg BID for rate control.  Increase Toprol XL to 75mg BID today.  Monitor routine VS.  Replete electrolytes as needed.  Given dose of metoprolol tartrate 25mg once this morning and yesterday due to uncontrolled rate.  Cardiology consulted - appreciate recommendations.

## 2025-01-14 NOTE — ASSESSMENT & PLAN NOTE
Rate controlled at this time while on diltiazem and metoprolol.  Continue the same.  Can titrate up medications as needed for heart rate control.  At this time, she has plenty of blood pressure room.  If her blood pressure drops, would consider replacing diltiazem with digoxin although considering her low body weight would like to avoid the same.  Patient not anticoagulation candidate in light of recent fall and subdural hematoma/subarachnoid hemorrhage.  Continue to monitor off anticoagulation.  Patient off midodrine at this time due to hypertension  Check BMP with next set of labs  Replete magnesium  TSH low, may need to check free T4, will defer to internal medicine  Check procalcitonin, blood cultures results pending

## 2025-01-14 NOTE — ASSESSMENT & PLAN NOTE
Home regimen: Toprol XL 50mg daily and HCTZ 12.5mg daily.  Currently receiving Toprol XL 50mg BID.  Noted to have orthostasis in acute setting - started on midodrine 2.5mg BID.  Continue to monitor orthostatics.  Keep SBP <160 due to intracranial bleed.    Continues to have SBP 180s-190s over the past 24 hours.  Has been receiving hydralazine 10mg Q8 PRN.  Discontinue standing midodrine and change to PRN.  Start on amlodipine 2.5mg daily today.    Cardiology consulted - appreciate recommendations.

## 2025-01-14 NOTE — ASSESSMENT & PLAN NOTE
Please refer to above, recent hospitalization with traumatic ICH following fall  Patient has elected conservative/non-surgical approach  Monitor neurologic exam

## 2025-01-14 NOTE — ASSESSMENT & PLAN NOTE
Current mentation: alert and oriented x 2  Patient is at high risk secondary to age, fall, traumatic injuries, acute pain, atrial fibrillation, infection, and hospitalization  Maintain delirium precautions   Provide redirection, reorientation, and distraction techniques  Maintain fall and safety precautions   Assist with ADLs/IADLs  Avoid deliriogenic medications such as tramadol, benzodiazepines, anticholinergics, benadryl  Treat pain using geriatric pain protocol   Encourage oral hydration and nutrition   Monitor for constipation and urinary retention   Implement sleep hygiene and limit night time interuptions   Maintain sleep-wake cycle   Encourage early and frequent mobilization   Encourage participation in group activities  Most recent EKG on 01/13/2025 revealed a QTc interval of 442  If all other interventions are unsuccessful for acute agitation and behaviors, would recommend zyprexa 2.5mg IM q8h prn  Would avoid benzodiazepines such as Ativan as these can worsen delirium

## 2025-01-14 NOTE — ASSESSMENT & PLAN NOTE
"Patient presents with fall and initial CTh on 1/4 showing an acute subdural hemorrhage along the left cerebral convexity with a maximum thickness of 1.9 cm with no midline shift as well as an acute subarachnoid hemorrhage in the left frontoparietal sulci, left sylvian fissure and suprasellar cistern  Repeat CT head on 1/5 was stable  Evaluated by neurosurgery no plans for reversal agent as the patient was not taking any anticoagulation or antiplatelet agents and maintain normotension with systolic blood pressure goals less than 160  7-day course of Keppra for posttraumatic head injury seizure prophylaxis   Follow-up with neurosurgery in 2 weeks repeat head CT  Repeat CTH 1/14\" Interval evolution of recent subdural and subarachnoid hemorrhage as detailed above. No definitive new or enlarging intracranial hemorrhage.\"   Physical, occupational and speech therapy  Discharge planning  "

## 2025-01-14 NOTE — PLAN OF CARE
Problem: SAFETY ADULT  Goal: Patient will remain free of falls  Description: INTERVENTIONS:  - Educate patient/family on patient safety including physical limitations  - Instruct patient to call for assistance with activity   - Consult OT/PT to assist with strengthening/mobility   - Keep Call bell within reach  - Keep bed low and locked with side rails adjusted as appropriate  - Keep care items and personal belongings within reach  - Initiate and maintain comfort rounds  - Make Fall Risk Sign visible to staff  - Offer Toileting every 2 Hours, in advance of need  - Initiate/Maintain bed/chair alarm  - Obtain necessary fall risk management equipment  - Apply yellow socks and bracelet for high fall risk patients  - Consider moving patient to room near nurses station  Outcome: Progressing

## 2025-01-14 NOTE — ASSESSMENT & PLAN NOTE
Malnutrition Findings:      BMI Findings:  21.58     There is no height or weight on file to calculate BMI.   Request nutrition evaluation

## 2025-01-14 NOTE — ASSESSMENT & PLAN NOTE
Hgb currently 9.6.  Baseline Hgb 9-10.  No active s/s of bleeding.  Obtain iron panel, folate, and vitamin B12.  Continue to trend routine CBC.

## 2025-01-14 NOTE — ASSESSMENT & PLAN NOTE
Patient presents in rapid afib, hypertensive urgency  She is on midodrine - hold for now  Monitor VS

## 2025-01-14 NOTE — H&P
H&P - Hospitalist   Name: Sandra Purvis 89 y.o. female I MRN: [id number]  Unit/Bed#: 7T [facility] 712-01 I Date of Admission: 1/14/2025   Date of Service: 1/14/2025 I Hospital Day: 0     Assessment & Plan  Atrial fibrillation with rapid ventricular response (HCC)  This is a 89-year-old female patient who was recently admitted to [facility] ICU secondary to fall with resultant subdural hematoma, subarachnoid hematoma and facial fractures and discharged to [facility] who was transferred to medical service due to rapid A-fib, hypertension associated with generalized weakness and lightheadedness  The patient was seen by cardiology the day prior and her metoprolol succinate dosing was increased to 75 mg twice daily, however without significant improvement in her rates  Her [id number] score is calculated at 5, however given recent intracranial hemorrhage anticoagulation is currently contraindicated  Her heart rates are currently 120-130 bpm  The patient is admitted to stepdown 2 level of care  Will give Cardizem 15 mg IV now and determine need for Cardizem infusion vs starting Cardizem PO  Blood work from 1/13/25 unremarkable, will update  Reviewed recent 2D echo 1/6/25 noted from grade II DD, pulmonary HTN, trivial pericardial effusion - no need to update  Requesting Cardiology evaluation  Hypertension  Patient is on metoprolol but dose recently increased in setting of rapid heart rates, A-fib, had also been on midodrine due to postural hypotension  She presents with elevated blood pressures, please see above under hypertensive urgency  Mild protein-calorie malnutrition (HCC)  Malnutrition Findings:      BMI Findings:  21.58     There is no height or weight on file to calculate BMI.   Request nutrition evaluation  Stage 3 chronic kidney disease, unspecified whether stage 3a or 3b CKD (HCC)  Lab Results   Component Value Date    EGFR 59 01/13/2025    EGFR 64 01/10/2025    EGFR 50 01/08/2025    CREATININE 0.86  "01/13/2025    CREATININE 0.81 01/10/2025    CREATININE 0.99 01/08/2025     At baseline  Primary insomnia  Patient with age-related cognitive decline and \"sundowning\" per discussion with family  She is on melatonin 3 mg HS while here  Her mental status has declined in setting of recent fall with ICH, hospitalization  Will request geriatric medicine evaluation  SDH (subdural hematoma) (HCC)  Please refer to above, recent hospitalization with traumatic ICH following fall  Patient has elected conservative/non-surgical approach  Monitor neurologic exam  SAH (subarachnoid hemorrhage) (HCC)  As above  Orthostatic hypotension  Patient presents in rapid afib, hypertensive urgency  She is on midodrine - hold for now  Monitor VS  Chronic diastolic congestive heart failure (HCC)  Wt Readings from Last 3 Encounters:   01/12/25 53.5 kg (118 lb)   01/10/25 51.1 kg (112 lb 9.6 oz)   01/04/25 53.4 kg (117 lb 11.6 oz)     Recent 2D echo reviewed, grade 2 diastolic dysfunction, pulmonary hypertension, trivial pericardial effusion  Patient euvolemic on exam, monitor volume status closely in setting of rapid A-fib  Cardiology consult has been requested, input appreciated          Hypertensive urgency  Patient presents with rapid afib, accelerated BP with SBP in the 190's  She is on midodrine due to orthostatic hypotension - hold for now  Continue metoprolol succinate 75 mg twice daily  Will likely initiate on Cardizem infusion versus p.o.  Discontinued amlodipine  Age-related cognitive decline  Per my discussion with patient's family history has been noted for cognitive decline, sundowning  Her mental status has declined since recent hospitalization  Geriatric medicine consulted requested      VTE Pharmacologic Prophylaxis: VTE Score: 6 High Risk (Score >/= 5) - Pharmacological DVT Prophylaxis Contraindicated. Sequential Compression Devices Ordered.  Code Status: Level 3 - DNAR and DNI   Discussion with family: Updated  " ( and daughter) at bedside.    Anticipated Length of Stay: Patient will be admitted on an inpatient basis with an anticipated length of stay of greater than 2 midnights secondary to close monitoring, IV Rx.    History of Present Illness   Chief Complaint: lightheadedness, generalized weakness    Sandra Purvis is a 89 y.o. female with a PMH of hypertension complicated by orthostatic hypotension, CKD, diastolic CHF with recent admission at [facility] ICU with intracranial hemorrhage and facial fractures who is transferred from [facility] due to A-fib with RVR.  The patient does report feeling lightheaded and generalized weakness without focal deficits.  She does report a frontal headache.  She also reports some chest discomfort and palpitations.  She reports history of constipation, denies urinary symptoms, denies lower extremity edema.    Review of Systems   Constitutional:  Positive for fatigue. Negative for chills and fever.   HENT:  Negative for congestion.    Eyes:  Negative for visual disturbance.   Respiratory:  Positive for shortness of breath. Negative for wheezing.    Cardiovascular:  Positive for palpitations. Negative for chest pain and leg swelling.   Gastrointestinal:  Positive for constipation. Negative for abdominal pain, diarrhea, nausea and vomiting.   Genitourinary:  Negative for dysuria.   Musculoskeletal:  Positive for gait problem.   Skin:  Positive for color change (facial bruising).   Neurological:  Positive for light-headedness.   Hematological:  Negative for adenopathy.   Psychiatric/Behavioral:  Positive for confusion.        Historical Information   Past Medical History:   Diagnosis Date    Arthritis     Cataract     LastAssessed:9/8/15    GERD (gastroesophageal reflux disease)     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    Hypertension     PAF (paroxysmal atrial fibrillation) ([facility])     Last Assessed:2/3/16    Wrist fracture     Resolved:9/8/15     Past Surgical History:    Procedure Laterality Date    CATARACT EXTRACTION      Last Assessed:9/8/15    FL RETROGRADE PYELOGRAM  10/17/2021    LYMPHADENECTOMY      Last Assessed:9/8/15    CT CYSTO/URETERO W/LITHOTRIPSY &INDWELL STENT INSRT Right 11/24/2021    Procedure: CYSTOSCOPY URETEROSCOPY WITH LITHOTRIPSY HOLMIUM LASER, RETROGRADE PYELOGRAM AND INSERTION STENT URETERAL;  Surgeon: David Sr MD;  Location: [facility];  Service: Urology    CT CYSTOURETHROSCOPY W/URETERAL CATHETERIZATION Right 10/17/2021    Procedure: CYSTOSCOPY RETROGRADE PYELOGRAM WITH INSERTION STENT URETERAL;  Surgeon: Benjie Tinoco MD;  Location: [facility];  Service: Urology    TONSILLECTOMY      Last Assessed:9/8/15    WRIST SURGERY      Last Assessed:9/8/15     Social History     Tobacco Use    Smoking status: Never     Passive exposure: Never    Smokeless tobacco: Never   Vaping Use    Vaping status: Never Used   Substance and Sexual Activity    Alcohol use: Never    Drug use: Never    Sexual activity: Not Currently     Partners: Male     E-Cigarette/Vaping    E-Cigarette Use Never User      E-Cigarette/Vaping Substances    Nicotine No     THC No     CBD No     Flavoring No     Other No     Unknown No        Social History:  Marital Status: /Civil Union   Patient Pre-hospital Living Situation: Home  Patient Pre-hospital Level of Mobility: unable to be assessed at time of evaluation  Patient Pre-hospital Diet Restrictions: none    Meds/Allergies     Prior to Admission medications    Medication Sig Start Date End Date Taking? Authorizing Provider   acetaminophen (TYLENOL) 325 mg tablet Take 3 tablets (975 mg total) by mouth every 8 (eight) hours 1/14/25   Sheri Pope MD   amLODIPine (NORVASC) 2.5 mg tablet Take 1 tablet (2.5 mg total) by mouth daily 1/15/25   Sheri Pope MD   bisacodyl (DULCOLAX) 10 mg suppository Insert 1 suppository (10 mg total) into the rectum daily as needed for constipation 1/14/25   Sheri Pope MD   calcium carbonate  (TUMS) 500 mg chewable tablet Chew 1 tablet (500 mg total) daily as needed for indigestion or heartburn 1/14/25   Sheri Pope MD   DAILY MULTIPLE VITAMINS/IRON PO Take 1 tablet by mouth daily    Historical Provider, MD   enoxaparin (LOVENOX) 30 mg/0.3 mL Inject 0.3 mL (30 mg total) under the skin every 24 hours 1/14/25   Sheri Pope MD   hydrALAZINE (APRESOLINE) 10 mg tablet Take 1 tablet (10 mg total) by mouth every 8 (eight) hours as needed (Give for SBP >160) 1/14/25   Sheri Pope MD   melatonin 3 mg Take 1 tablet (3 mg total) by mouth daily at bedtime 1/14/25   Sheri Pope MD   metoprolol succinate (TOPROL-XL) 25 mg 24 hr tablet Take 3 tablets (75 mg total) by mouth 2 (two) times a day 1/14/25   Sheri Pope MD   midodrine (PROAMATINE) 5 mg tablet Take 1 tablet (5 mg total) by mouth 3 (three) times a day as needed (Give for SBP <90) 1/14/25   Sheri Pope MD   multivitamin (THERAGRAN) TABS Take 1 tablet by mouth daily    Historical Provider, MD   ondansetron (ZOFRAN-ODT) 4 mg disintegrating tablet Take 1 tablet (4 mg total) by mouth every 6 (six) hours as needed for nausea or vomiting 1/14/25   Sheri Pope MD   oxyCODONE (ROXICODONE) 5 immediate release tablet Take 0.5 tablets (2.5 mg total) by mouth every 6 (six) hours as needed for moderate pain for up to 10 days Max Daily Amount: 10 mg 1/14/25 1/24/25  Sheri Pope MD   oxyCODONE (ROXICODONE) 5 immediate release tablet Take 1 tablet (5 mg total) by mouth every 6 (six) hours as needed for severe pain for up to 10 days Max Daily Amount: 20 mg 1/14/25 1/24/25  Sheri Pope MD   pantoprazole (PROTONIX) 40 mg tablet Take 1 tablet (40 mg total) by mouth daily in the early morning 1/15/25   Sheri Pope MD   polyethylene glycol (MIRALAX) 17 g packet Take 17 g by mouth daily 1/15/25   Sheri Pope MD   senna-docusate sodium (SENOKOT S) 8.6-50 mg per tablet Take 1 tablet by mouth daily at bedtime 1/14/25   Sheri Pope MD   hydroCHLOROthiazide 12.5 mg  tablet Take 1 tablet (12.5 mg total) by mouth daily 11/21/24 1/14/25  Cj Page DO   metoprolol succinate (TOPROL-XL) 50 mg 24 hr tablet Take 1 tablet (50 mg total) by mouth 2 (two) times a day 1/12/25 1/14/25  Ricardo Rodriguez MD   midodrine (PROAMATINE) 2.5 mg tablet Take 1 tablet (2.5 mg total) by mouth 2 (two) times a day before meals 1/12/25 1/14/25  Ricardo Rodriguez MD   pantoprazole (PROTONIX) 40 mg tablet Take 1 tablet (40 mg total) by mouth 2 (two) times a day 1/12/25 1/14/25  Ricardo Rodriguez MD     Allergies   Allergen Reactions    Lactose - Food Allergy GI Intolerance    Penicillins Other (See Comments)     Unsure of reaction        Objective :  Temp:  [98.9 °F (37.2 °C)-99.4 °F (37.4 °C)] 98.9 °F (37.2 °C)  HR:  [] 112  BP: (128-206)/() 191/108  Resp:  [18] 18  SpO2:  [92 %-96 %] 92 %  O2 Device: None (Room air)    Physical Exam  Constitutional:       General: She is not in acute distress.  HENT:      Head: Normocephalic.      Comments: Facial ecchymoses  Cardiovascular:      Rate and Rhythm: Tachycardia present. Rhythm irregular.   Pulmonary:      Effort: No respiratory distress.      Breath sounds: No wheezing or rales.   Abdominal:      General: There is no distension.      Tenderness: There is no abdominal tenderness. There is no guarding.   Musculoskeletal:      Right lower leg: No edema.      Left lower leg: No edema.   Neurological:      Mental Status: Mental status is at baseline.   Psychiatric:         Behavior: Behavior is slowed and withdrawn.          Lines/Drains:            Lab Results: I have reviewed the following results:  Results from last 7 days   Lab Units 01/13/25  1025 01/10/25  0541   WBC Thousand/uL 11.21* 11.04*   HEMOGLOBIN g/dL 9.6* 9.9*   HEMATOCRIT % 29.3* 31.2*   PLATELETS Thousands/uL 223 183   SEGS PCT %  --  82*   LYMPHO PCT % 4* 9*   MONO PCT % 3* 7   EOS PCT % 0 1     Results from last 7 days   Lab Units 01/13/25  1025   SODIUM mmol/L 134*   POTASSIUM  "mmol/L 3.6   CHLORIDE mmol/L 98   CO2 mmol/L 27   BUN mg/dL 17   CREATININE mg/dL 0.86   ANION GAP mmol/L 9   CALCIUM mg/dL 8.9   GLUCOSE RANDOM mg/dL 149*             No results found for: \"HGBA1C\"        Imaging Results Review: I reviewed radiology reports from this admission including: CT head.  Other Study Results Review: EKG was reviewed.     Administrative Statements   I have spent a total time of 75 minutes in caring for this patient on the day of the visit/encounter including Diagnostic results, Prognosis, Documenting in the medical record, Reviewing / ordering tests, medicine, procedures  , Obtaining or reviewing history  , and Communicating with other healthcare professionals .    ** Please Note: This note has been constructed using a voice recognition system. **    "

## 2025-01-14 NOTE — ASSESSMENT & PLAN NOTE
"Crista Ma's goals for this visit include:   Chief Complaint   Patient presents with     RECHECK     discuss upcoming surgery      She requests these members of her care team be copied on today's visit information: YES -     Referring Provider:  Noel Jimenez MD  [facility]  [address] [facility] [facility] [address]  [place] [address]    Initial /83  Pulse 88  LMP 12/06/2016 (Exact Date) Estimated body mass index is 31.17 kg/(m^2) as calculated from the following:    Height as of 1/31/17: 1.715 m (5' 7.5\").    Weight as of 1/31/17: 91.6 kg (202 lb).  BP completed using cuff size: large      " As above

## 2025-01-14 NOTE — ASSESSMENT & PLAN NOTE
Most recent hemoglobin of 9.6 1/13 which is stable over the last 4 days  Continue to follow biweekly CBC or sooner if clinically indicated  Will need repeat scan if significant drops or concern for acute bleeds

## 2025-01-14 NOTE — CONSULTS
Consultation - Cardiology   Name: Sandra Purvis 89 y.o. female I MRN: [id number]  Unit/Bed#: 7T [facility] 712-01 I Date of Admission: 1/14/2025   Date of Service: 1/14/2025 I Hospital Day: 0   Consults  Physician Requesting Evaluation: Nicolle Junior MD   Reason for Evaluation / Principal Problem: Atrial fibrillation    Assessment & Plan  Atrial fibrillation with rapid ventricular response (HCC)  Rate controlled at this time while on diltiazem and metoprolol.  Continue the same.  Can titrate up medications as needed for heart rate control.  At this time, she has plenty of blood pressure room.  If her blood pressure drops, would consider replacing diltiazem with digoxin although considering her low body weight would like to avoid the same.  Patient not anticoagulation candidate in light of recent fall and subdural hematoma/subarachnoid hemorrhage.  Continue to monitor off anticoagulation.  Patient off midodrine at this time due to hypertension  Check BMP with next set of labs  Replete magnesium  TSH low, may need to check free T4, will defer to internal medicine  Check procalcitonin, blood cultures results pending  Hypertension  Continue midodrine and diltiazem  Mild protein-calorie malnutrition (HCC)    Body mass index is 21.58 kg/m².     Stage 3 chronic kidney disease, unspecified whether stage 3a or 3b CKD (HCC)  Lab Results   Component Value Date    EGFR 63 01/14/2025    EGFR 59 01/13/2025    EGFR 64 01/10/2025    CREATININE 0.82 01/14/2025    CREATININE 0.86 01/13/2025    CREATININE 0.81 01/10/2025     Primary insomnia    SDH (subdural hematoma) (HCC)    SAH (subarachnoid hemorrhage) (HCC)    Orthostatic hypotension  Okay to keep off midodrine while hypertensive    Chronic diastolic congestive heart failure (HCC)  Wt Readings from Last 3 Encounters:   01/14/25 53.5 kg (118 lb)   01/12/25 53.5 kg (118 lb)   01/10/25 51.1 kg (112 lb 9.6 oz)     Weights seem to be fairly stable        Hypertensive  urgency    Age-related cognitive decline    SIRS (systemic inflammatory response syndrome) (HCC)    I have discussed the above management plan in detail with the primary service.     History of Present Illness   Sandra Purvis is a 89 y.o. female who presents with atrial fibrillation with RVR while at rehab at [facility].  She has now been admitted to the medical floor for telemetry monitoring.  She has been initiated on diltiazem for better heart rate control since metoprolol was increased to 75 mg twice daily yesterday by Dr. Tobias.  At this time her heart rate is in the 80s.  She admits to fatigue but denies chest pain or shortness of breath.  She appears somnolent and is unable to answer all questions.    Review of Systems   Unable to perform ROS: Acuity of condition     I have reviewed the patient's PMH, PSH, Social History, Family History, Meds, and Allergies    Objective :  Temp:  [98.9 °F (37.2 °C)-99.4 °F (37.4 °C)] 98.9 °F (37.2 °C)  HR:  [] 90  BP: (128-206)/() 186/91  Resp:  [18] 18  SpO2:  [92 %-96 %] 92 %  O2 Device: None (Room air)  Orthostatic Blood Pressures      Flowsheet Row Most Recent Value   Blood Pressure 186/91 filed at 01/14/2025 1446   Patient Position - Orthostatic VS Lying filed at 01/14/2025 1446          First Weight: Weight - Scale: 53.5 kg (118 lb) (01/14/25 1324)  Vitals:    01/14/25 1324   Weight: 53.5 kg (118 lb)       Physical Exam  Constitutional:       Appearance: She is ill-appearing.      Comments: Somnolent   Cardiovascular:      Rate and Rhythm: Normal rate. Rhythm irregular.   Pulmonary:      Effort: Pulmonary effort is normal.      Breath sounds: Normal breath sounds.   Abdominal:      Palpations: Abdomen is soft.   Musculoskeletal:      Right lower leg: No edema.      Left lower leg: No edema.   Skin:     General: Skin is warm and dry.   Psychiatric:         Mood and Affect: Mood normal.           Lab Results: I have reviewed the following  "results:CBC/BMP:   .     01/14/25  1303   WBC 14.66*   HGB 10.1*   HCT 30.6*      SODIUM 133*   K 3.6   CL 97   CO2 26   BUN 15   CREATININE 0.82   GLUC 146*   MG 1.8*    , Creatinine Clearance: Estimated Creatinine Clearance: 36.1 mL/min (by C-G formula based on SCr of 0.82 mg/dL).  Results from last 7 days   Lab Units 01/14/25  1303 01/13/25  1025 01/10/25  0541   WBC Thousand/uL 14.66* 11.21* 11.04*   HEMOGLOBIN g/dL 10.1* 9.6* 9.9*   HEMATOCRIT % 30.6* 29.3* 31.2*   PLATELETS Thousands/uL 272 223 183     Results from last 7 days   Lab Units 01/14/25  1303 01/13/25  1025 01/10/25  0541   POTASSIUM mmol/L 3.6 3.6 3.7   CHLORIDE mmol/L 97 98 100   CO2 mmol/L 26 27 29   BUN mg/dL 15 17 17   CREATININE mg/dL 0.82 0.86 0.81   CALCIUM mg/dL 9.3 8.9 8.6         No results found for: \"HGBA1C\"  Lab Results   Component Value Date    TROPONINI 0.05 (H) 09/01/2015     "

## 2025-01-14 NOTE — CONSULTS
"Consultation - Geriatric Medicine   Name: Sandra Purvis 89 y.o. female I MRN: [id number]  Unit/Bed#: 7T [facility] 712-01 I Date of Admission: 1/14/2025   Date of Service: 1/14/2025 I Hospital Day: 0   Inpatient consult to Gerontology  Consult performed by: LUIS Navarrete  Consult ordered by: Nicolle Junior MD        Physician Requesting Evaluation: Nicolle Junior MD   Reason for Evaluation / Principal Problem: Insomnia, Age-related cognitive decline    Assessment & Plan  Atrial fibrillation with rapid ventricular response (HCC)  Transferred from [facility] due to rapid A-fib and hypertension  Recently admitted to [facility] after a fall sustaining a subdural hematoma, subarachnoid hematoma and facial fractures   Being monitored on telemetry   HR appears controlled, primarily in the 80's  Received IV and po Cardizem today   EKG completed on 01/13/2025 showing atrial fibrillation with rapid ventricular response with premature ventricular or aberrantly conducted complexes  Cardiology consulted  Continues on diltiazem and metoprolol succinate   Unable to be on anticoagulation d/t SDH and SAH  Denies chest pain, nausea on assessment   Age-related cognitive decline  No prior history of prior memory issues or cognitive impairment   Family recently reporting mental status declining since hospitalization  Alert and oriented x 2 on exam today  Most recent TSH on 01/14/2025 noted to be 0.265  Most recent vitamin B12 level on 01/13/2025 noted to be 1,125  CT of the head on 01/14/2025 revealed: \"No intracranial mass, mass effect or midline shift. No CT signs of acute infarction.  No acute parenchymal hemorrhage. There is however ongoing extra-axial hemorrhage which is detailed below.\"  No MOCA charted in epic   Recommend continuing to monitor as patient is still recovering from SAH/SDH  Recently received antibiotics outpatient, bactrim DS and doxycycline for wound   Bactrim DS on [place] Criteria as a " medication to avoid in older adults   Received keppra x 8 days which can produce AE of psychosis, aggressive behavior, insomnia   Recently finished on 01/12   Would continue to monitor off medication  Keep physically, mentally, and socially active    Can consider [facility] inpatient once medically stable  Primary insomnia  History of insomnia  Per chart review was noted in November 2024 by PCP at that time there was a recommendation to slowly increase melatonin   Patient admits to being a poor sleeper  First line is behavioral therapy  Avoid sedative hypnotics such as benzodiazepines and benadryl  Encourage staying awake during the day  Encourage daytime activity, morning exercise  Decrease or eliminate day time naps  Avoid caffiene, alcohol especially during late afternoon and evening hours  Establish a night time routine  Recommend melatonin 3mg po QHS    Hypertension  PTA medications include Metoprolol succinate 50 mg tablet, take 1 tablet daily and Hydrochlorothiazide 12.5 mg tablet, take 1 tablet daily  Was discharged from [facility] on hydrochlorothiazide, metoprolol, and midodrine  Midodrine held in setting of hypertension   Continues on diltiazem and metoprolol succinate   Mild protein-calorie malnutrition (HCC)  Malnutrition Findings:     Decreased appetite   Eats primarily 50% of meals  Albumin level on 01/07/2025 was 3.5    BMI Findings:          Body mass index is 21.58 kg/m².     Recommend protein supplements  Can consider dietary consult   Stage 3 chronic kidney disease, unspecified whether stage 3a or 3b CKD (HCC)  Lab Results   Component Value Date    EGFR 63 01/14/2025    EGFR 59 01/13/2025    EGFR 64 01/10/2025    CREATININE 0.82 01/14/2025    CREATININE 0.86 01/13/2025    CREATININE 0.81 01/10/2025     Appears stable  Continue to monitor kidney function  Monitor I/O's  Encourage PO hydration   Avoid hypotension  Avoid nephrotoxins, hypotension, IV contrast    SDH (subdural hematoma) (HCC)  Syncope and  "collapse sustaining SDH  CT head on 01/04/2025 revealing, \"Acute subdural hemorrhage along the left cerebral convexity with maximal thickness of 1.9 cm along the frontal lobe (2/31). There is extension of the hematoma along left anterior temporal lobe.\"  SAH (subarachnoid hemorrhage) ([facility])  CT head on 01/04/2025 revealing, \"Acute subarachnoid hemorrhage in the left frontoparietal sulci, left sylvian fissure and suprasellar cistern.\"  Orthostatic hypotension  Syncope and collapse at home sustaining facial fractures, SDH, SAH  Was started on midodrine    Currently on hold d/t hypertension   Chronic diastolic congestive heart failure ([facility])  Wt Readings from Last 3 Encounters:   01/14/25 53.5 kg (118 lb)   01/12/25 53.5 kg (118 lb)   01/10/25 51.1 kg (112 lb 9.6 oz)     Denies shortness of breath  No increased edema  Weights appear stable  PTA was on hydrochlorothiazide   SIRS (systemic inflammatory response syndrome) ([facility])  Elevated WBC, tachycardia  Blood cultures, procalcitonin, lactic acid, UA pending   Frailty syndrome in geriatric patient  Clinical Frail Scale: 6- Moderately Frail  Need help with all outside activities  Need help with stairs and bathing  May need assistance with dressing  In setting of recent fall, hospitalization    Albumin level on 01/07/2025 was 3.5  Recommend protein supplements   PT/OT to enhance ADL performance, mobility     Ambulatory dysfunction  In setting of recent fall, hospitalization, pain  1 recent fall requiring ED/hospital admission  PT/OT  Encourage use of assistive device   At baseline denies use of assistive device  Continue fall precautions  Out of bed as tolerated  Encourage patient to participate with activities  Provide education for patient, family, and caregivers    At risk for delirium  Current mentation: alert and oriented x 2  Patient is at high risk secondary to age, fall, traumatic injuries, acute pain, atrial fibrillation, infection, and hospitalization  Maintain " delirium precautions   Provide redirection, reorientation, and distraction techniques  Maintain fall and safety precautions   Assist with ADLs/IADLs  Avoid deliriogenic medications such as tramadol, benzodiazepines, anticholinergics, benadryl  Treat pain using geriatric pain protocol   Encourage oral hydration and nutrition   Monitor for constipation and urinary retention   Implement sleep hygiene and limit night time interuptions   Maintain sleep-wake cycle   Encourage early and frequent mobilization   Encourage participation in group activities  Most recent EKG on 01/13/2025 revealed a QTc interval of 442  If all other interventions are unsuccessful for acute agitation and behaviors, would recommend zyprexa 2.5mg IM q8h prn  Would avoid benzodiazepines such as Ativan as these can worsen delirium   Bilateral hearing loss  Hearing impairment strongly correlated with depression, cognitive impairment, delirium and falls in the older adult  Use hearing aids or sound amplifier  Speaking face to face  Use clear dictation, enunciation of words      History of Present Illness   Hx and PE limited by: fatigue, forgetfulness   HPI: Sandra Purvis is a 89 y.o. year old female who presents with medical problems including, not limited to, hypertension, atrial fibrillation, insomnia, subdural hematoma, and age-related cognitive decline.    Patient presented to [facility] on 1/4/2025 after syncopal episode where she stood up from a seated position and fell.  She suffered facial fractures and a subdural hemorrhage.  As a result of the trauma she was transferred to [facility], for a trauma neurosurgery consult.  On 1/12/2025 she was discharged from [facility] to [facility] to continue rehab.  While at [facility] she was found to have rapid A-fib and transferred to a stepdown unit.    Sandra is being seen today for a geriatric consult concerning insomnia and age-related cognitive decline.  Upon entering the room she is resting in bed.  Nurses at bedside.  She is alert and oriented x 2 (person/, month/year).  Pleasant, calm, cooperative.  Denies pain.  Appears to have discomfort when blood pressure is taken.  She is able to state why she needed to go into the hospital initially.  She states that she fell.  She lives at home with her  Darryl.  States she has two children, 1 girl, 1 boy.  Can state their names.  She states that at baseline she does not use an assistive device.  She admits to being a poor sleeper at baseline.  Denies SOB, chest pain.    Review of Systems   Constitutional:  Positive for activity change and fatigue. Negative for fever and unexpected weight change.   HENT:  Positive for hearing loss. Negative for congestion.    Eyes:  Negative for pain, discharge and visual disturbance.        Glasses   Respiratory:  Negative for cough and shortness of breath.    Cardiovascular:  Negative for chest pain and palpitations.   Gastrointestinal:  Negative for abdominal pain, constipation and diarrhea.   Genitourinary:  Negative for difficulty urinating, dysuria, hematuria and urgency.   Musculoskeletal:  Positive for gait problem. Negative for arthralgias.   Skin:  Negative for color change.   Neurological:  Negative for syncope and weakness.   Hematological:  Bruises/bleeds easily.   Psychiatric/Behavioral:  Positive for sleep disturbance. Negative for confusion.    All other systems reviewed and are negative.          I have reviewed the patient's PMH, PSH, Social History, Family History, Meds, and Allergies  Historical Information   Past Medical History:   Diagnosis Date    Arthritis     Cataract     LastAssessed:9/8/15    GERD (gastroesophageal reflux disease)     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    Hypertension     PAF (paroxysmal atrial fibrillation) ([facility])     Last Assessed:2/3/16    Wrist fracture     Resolved:9/8/15     Past Surgical History:   Procedure Laterality Date     CATARACT EXTRACTION      Last Assessed:9/8/15    FL RETROGRADE PYELOGRAM  10/17/2021    LYMPHADENECTOMY      Last Assessed:9/8/15    VT CYSTO/URETERO W/LITHOTRIPSY &INDWELL STENT INSRT Right 11/24/2021    Procedure: CYSTOSCOPY URETEROSCOPY WITH LITHOTRIPSY HOLMIUM LASER, RETROGRADE PYELOGRAM AND INSERTION STENT URETERAL;  Surgeon: David Sr MD;  Location: [facility];  Service: Urology    VT CYSTOURETHROSCOPY W/URETERAL CATHETERIZATION Right 10/17/2021    Procedure: CYSTOSCOPY RETROGRADE PYELOGRAM WITH INSERTION STENT URETERAL;  Surgeon: Benjie Tinoco MD;  Location: [facility];  Service: Urology    TONSILLECTOMY      Last Assessed:9/8/15    WRIST SURGERY      Last Assessed:9/8/15     Social History     Tobacco Use    Smoking status: Never     Passive exposure: Never    Smokeless tobacco: Never   Vaping Use    Vaping status: Never Used   Substance and Sexual Activity    Alcohol use: Never    Drug use: Never    Sexual activity: Not Currently     Partners: Male     E-Cigarette/Vaping    E-Cigarette Use Never User      E-Cigarette/Vaping Substances    Nicotine No     THC No     CBD No     Flavoring No     Other No     Unknown No      Family History   Problem Relation Age of Onset    No Known Problems Family      Social History     Tobacco Use    Smoking status: Never     Passive exposure: Never    Smokeless tobacco: Never   Vaping Use    Vaping status: Never Used   Substance and Sexual Activity    Alcohol use: Never    Drug use: Never    Sexual activity: Not Currently     Partners: Male       Current Facility-Administered Medications:     acetaminophen (TYLENOL) tablet 975 mg, Q8H ELIZABETH    bisacodyl (DULCOLAX) rectal suppository 10 mg, Daily PRN    calcium carbonate (TUMS) chewable tablet 500 mg, Daily PRN    cefTRIAXone (ROCEPHIN) IVPB (premix in dextrose) 1,000 mg 50 mL, Q24H    diltiazem (CARDIZEM) tablet 30 mg, Q6H ELIZABETH    enoxaparin (LOVENOX) subcutaneous injection 30 mg, Q24H    magnesium sulfate 2 g/50 mL  IVPB (premix) 2 g, Once, Last Rate: 2 g (01/14/25 1544)    melatonin tablet 3 mg, HS    metoprolol succinate (TOPROL-XL) 24 hr tablet 75 mg, BID    [Held by provider] midodrine (PROAMATINE) tablet 5 mg, TID PRN    multi-electrolyte (PLASMALYTE-A/ISOLYTE-S PH 7.4) IV solution, Continuous, Last Rate: 75 mL/hr (01/14/25 1629)    ondansetron (ZOFRAN) injection 4 mg, Q6H PRN    ondansetron (ZOFRAN-ODT) dispersible tablet 4 mg, Q6H PRN    oxyCODONE (ROXICODONE) split tablet 2.5 mg, Q6H PRN **OR** oxyCODONE (ROXICODONE) IR tablet 5 mg, Q6H PRN    [START ON 1/15/2025] pantoprazole (PROTONIX) EC tablet 40 mg, Early Morning    [START ON 1/15/2025] polyethylene glycol (MIRALAX) packet 17 g, Daily    senna-docusate sodium (SENOKOT S) 8.6-50 mg per tablet 1 tablet, HS  Lactose - food allergy and Penicillins    Meds/Allergies   Home medication review  Metoprolol succinate 50 mg tablet, take 1 tablet daily   Hydrochlorothiazide 12.5 mg tablet, take 1 tablet daily    Personally confirmed with [facility], [place], [phone]     Objective :  Temp:  [98.9 °F (37.2 °C)-99.4 °F (37.4 °C)] 98.9 °F (37.2 °C)  HR:  [] 90  BP: (128-206)/() 186/91  Resp:  [18] 18  SpO2:  [92 %-96 %] 92 %  O2 Device: None (Room air)    Physical Exam  Vitals and nursing note reviewed.   Constitutional:       General: She is not in acute distress.     Appearance: Normal appearance. She is well-developed.   HENT:      Head: Normocephalic and atraumatic.   Eyes:      Conjunctiva/sclera: Conjunctivae normal.   Cardiovascular:      Rate and Rhythm: Tachycardia present. Rhythm irregular.      Heart sounds: No murmur heard.  Pulmonary:      Effort: Pulmonary effort is normal. No respiratory distress.      Breath sounds: Normal breath sounds.   Abdominal:      General: Bowel sounds are normal.      Palpations: Abdomen is soft.      Tenderness: There is no abdominal tenderness.   Musculoskeletal:         General: No swelling.      Cervical back: Neck  "supple.   Skin:     General: Skin is warm and dry.      Capillary Refill: Capillary refill takes less than 2 seconds.      Findings: Bruising present.   Neurological:      Mental Status: She is alert.      Comments: Alert and oriented x 2   Psychiatric:         Mood and Affect: Mood normal.         Behavior: Behavior normal.           Lab Results: I have reviewed the following results:CBC/BMP:   .     01/14/25  1303   WBC 14.66*   HGB 10.1*   HCT 30.6*      SODIUM 133*   K 3.6   CL 97   CO2 26   BUN 15   CREATININE 0.82   GLUC 146*   MG 1.8*    , Vitamins B1, B6, B12, A, and D: No results found for: \"B1\", \"THYROGLB\", \"GDFHVIAR71\", \"[id number]\", Iron: No results found for: \"IRON\", TSH:   Results from last 7 days   Lab Units 01/14/25  1303   TSH 3RD GENERATON uIU/mL 0.265*       Imaging Results Review: I reviewed radiology reports from this admission including: CT head and CT C-spine.  Other Study Results Review: EKG was reviewed.     Therapies:   Basic Mobility Inpatient Raw Score: 6  -[facility] Goal: 2: Bed activities/Dependent transfer  -[facility] Achieved: 1: Laying in bed      VTE Prophylaxis: Sequential compression device (Venodyne)     Code Status: Level 3 - DNAR and DNI      Family and Social Support: Spouse: Darryl, Daughter: Courtney  Living Arrangements: Lives w/ Spouse/significant other  Support Systems: Spouse/significant other; Daughter  Assistance Needed: yes  Type of Current Residence: Private residence  Current Home Care Services: No      I have spent a total time of 75 minutes in caring for this patient on the day of the visit/encounter including Diagnostic results, Prognosis, Risks and benefits of tx options, Instructions for management, Patient and family education, Importance of tx compliance, Risk factor reductions, Impressions, Counseling / Coordination of care, Documenting in the medical record, Reviewing / ordering tests, medicine, procedures  , Obtaining or reviewing history  , and Communicating " with other healthcare professionals .

## 2025-01-14 NOTE — CASE MANAGEMENT
CM NOTE      Attempted to meet pt for CM open however pt being transferred to acute 2* medical status.

## 2025-01-14 NOTE — PCC NURSING
Sandra Purvis is a 89 y.o. female with history of atrial fibrillation, GERD, hypertension who presented to the [facility] on 1//25 as a trauma case from the [facility] to [facility] after a syncopal episode and collapse with imaging specifically a CT head on the same day showing comminuted and mildly displaced fractures of the lateral and anterior walls of the right maxilla, lateral and inferior walls of the right orbit, right inferior orbital wall fracture appears to involve the inferior orbital foramen associated with small extraconal soft tissue edema and focus of air.  There is also a nondisplaced fracture of the zygomatic arch and the temporal bone at the TMJ and diffuse opacification of the right maxillary sinus and right nasal cavity with blood products and air.  Additionally there was an acute subdural hemorrhage along the left cerebral convexity with a maximal thickness of 1.9 cm with no midline shift and an acute subarachnoid hemorrhage in the left frontal parietal sulci, left sylvian fissure as well as suprasellar cistern.  OMFS was consulted as well as neurosurgery and from a neurosurgical standpoint placed on Keppra 7 days for seizure prophylaxis and treated nonoperatively.  OMFS recommended surgical interventions however declined by patient with plans for outpatient follow-up and was placed on a 7-day course of Unasyn/Augmentin.  Goal blood pressure systolic less than 160.  Patient also had an echocardiogram showing an LVEF of 70% with vigorous systolic function and a grade 2 diastolic dysfunction.  Right ventricular function mildly reduced and left atrium was mildly dilated.  Telemetry did show atrial fibrillation with RVR and cardiology was also consulted and following and given IV fluids BALJIT stockings and changes in the medications including the Lopressor as well as initiation of low-dose midodrine 2.5 mg twice daily.  Recommended also to  start on 81 mg of aspirin daily when cleared from a SDH/SAH perspective.  Due for follow-up CT in approximately 1 week. The patient was evaluated by the Rehabilitation team and deemed an appropriate candidate for comprehensive inpatient rehabilitation and admitted to the [facility] on 1/12/2025  2:08 PM         Pain managed with tylenol 975 mg Q8hrs and oxy 2.5mg/5mg q6hrs prn. DVT prophylaxis is Lovenox 30 mg q24hrs. Melatonin 3 mg for insomnia. Bowel regimen; senokot 8.6-50 mg at bedtime, mirlax daily, and dulcolax rectal supp. 10 mg daily prn. HTN managed with TOPROL-XL 50 mg 2x daily and hydralazine 10 mg q8hrs prn. Orthostatic hypotension managed with midodrine 2.5mg BID. Apply abdominal binder/TEDs as needed. Protonix for GERD.        This week we will continue to monitor vital signs and lab values. We will manage her pain with above orders so that the pt can participate in her therapy sessions to her optimal ability.  We will teach pt how to conserve energy so that she may perform ADLs independently as much as she can. We will educate the pt on the importance of repositioning and off-loading pressure to help lower the risk of skin breakdown. We will prevent falls by keeping personal items and call bell within reach, we will maintain hourly rounding.

## 2025-01-15 PROBLEM — I50.33 ACUTE ON CHRONIC DIASTOLIC CONGESTIVE HEART FAILURE (HCC): Status: ACTIVE | Noted: 2025-01-12

## 2025-01-15 LAB
ALBUMIN SERPL BCG-MCNC: 3.9 G/DL (ref 3.5–5)
ALP SERPL-CCNC: 83 U/L (ref 34–104)
ALT SERPL W P-5'-P-CCNC: 40 U/L (ref 7–52)
ANION GAP SERPL CALCULATED.3IONS-SCNC: 11 MMOL/L (ref 4–13)
AST SERPL W P-5'-P-CCNC: 31 U/L (ref 13–39)
BILIRUB SERPL-MCNC: 0.66 MG/DL (ref 0.2–1)
BUN SERPL-MCNC: 18 MG/DL (ref 5–25)
CALCIUM SERPL-MCNC: 9.5 MG/DL (ref 8.4–10.2)
CHLORIDE SERPL-SCNC: 93 MMOL/L (ref 96–108)
CO2 SERPL-SCNC: 30 MMOL/L (ref 21–32)
CREAT SERPL-MCNC: 0.9 MG/DL (ref 0.6–1.3)
ERYTHROCYTE [DISTWIDTH] IN BLOOD BY AUTOMATED COUNT: 15.5 % (ref 11.6–15.1)
GFR SERPL CREATININE-BSD FRML MDRD: 56 ML/MIN/1.73SQ M
GLUCOSE SERPL-MCNC: 114 MG/DL (ref 65–140)
HCT VFR BLD AUTO: 30.9 % (ref 34.8–46.1)
HGB BLD-MCNC: 10 G/DL (ref 11.5–15.4)
MAGNESIUM SERPL-MCNC: 2.1 MG/DL (ref 1.9–2.7)
MCH RBC QN AUTO: 28.7 PG (ref 26.8–34.3)
MCHC RBC AUTO-ENTMCNC: 32.4 G/DL (ref 31.4–37.4)
MCV RBC AUTO: 89 FL (ref 82–98)
PLATELET # BLD AUTO: 201 THOUSANDS/UL (ref 149–390)
PMV BLD AUTO: 11.2 FL (ref 8.9–12.7)
POTASSIUM SERPL-SCNC: 3.3 MMOL/L (ref 3.5–5.3)
PROCALCITONIN SERPL-MCNC: 0.13 NG/ML
PROT SERPL-MCNC: 7.3 G/DL (ref 6.4–8.4)
QRS AXIS: -12 DEGREES
QRS AXIS: -8 DEGREES
QRSD INTERVAL: 76 MS
QRSD INTERVAL: 78 MS
QT INTERVAL: 286 MS
QT INTERVAL: 304 MS
QTC INTERVAL: 408 MS
QTC INTERVAL: 441 MS
RBC # BLD AUTO: 3.48 MILLION/UL (ref 3.81–5.12)
SODIUM SERPL-SCNC: 134 MMOL/L (ref 135–147)
T WAVE AXIS: 102 DEGREES
T WAVE AXIS: 107 DEGREES
VENTRICULAR RATE: 122 BPM
VENTRICULAR RATE: 126 BPM
WBC # BLD AUTO: 14.82 THOUSAND/UL (ref 4.31–10.16)

## 2025-01-15 PROCEDURE — 85027 COMPLETE CBC AUTOMATED: CPT | Performed by: FAMILY MEDICINE

## 2025-01-15 PROCEDURE — 93010 ELECTROCARDIOGRAM REPORT: CPT | Performed by: INTERNAL MEDICINE

## 2025-01-15 PROCEDURE — 84145 PROCALCITONIN (PCT): CPT | Performed by: FAMILY MEDICINE

## 2025-01-15 PROCEDURE — 97167 OT EVAL HIGH COMPLEX 60 MIN: CPT

## 2025-01-15 PROCEDURE — 83735 ASSAY OF MAGNESIUM: CPT | Performed by: FAMILY MEDICINE

## 2025-01-15 PROCEDURE — 80053 COMPREHEN METABOLIC PANEL: CPT | Performed by: FAMILY MEDICINE

## 2025-01-15 PROCEDURE — 97163 PT EVAL HIGH COMPLEX 45 MIN: CPT

## 2025-01-15 PROCEDURE — 99233 SBSQ HOSP IP/OBS HIGH 50: CPT | Performed by: FAMILY MEDICINE

## 2025-01-15 PROCEDURE — NC001 PR NO CHARGE: Performed by: INTERNAL MEDICINE

## 2025-01-15 RX ORDER — FUROSEMIDE 10 MG/ML
20 INJECTION INTRAMUSCULAR; INTRAVENOUS ONCE
Status: COMPLETED | OUTPATIENT
Start: 2025-01-15 | End: 2025-01-15

## 2025-01-15 RX ORDER — POTASSIUM CHLORIDE 1500 MG/1
40 TABLET, EXTENDED RELEASE ORAL ONCE
Status: COMPLETED | OUTPATIENT
Start: 2025-01-15 | End: 2025-01-15

## 2025-01-15 RX ADMIN — METOPROLOL SUCCINATE 75 MG: 25 TABLET, FILM COATED, EXTENDED RELEASE ORAL at 21:04

## 2025-01-15 RX ADMIN — POLYETHYLENE GLYCOL 3350 17 G: 17 POWDER, FOR SOLUTION ORAL at 08:40

## 2025-01-15 RX ADMIN — SENNOSIDES AND DOCUSATE SODIUM 1 TABLET: 50; 8.6 TABLET ORAL at 21:04

## 2025-01-15 RX ADMIN — POTASSIUM CHLORIDE 40 MEQ: 1500 TABLET, EXTENDED RELEASE ORAL at 10:37

## 2025-01-15 RX ADMIN — FUROSEMIDE 20 MG: 10 INJECTION, SOLUTION INTRAMUSCULAR; INTRAVENOUS at 13:34

## 2025-01-15 RX ADMIN — PANTOPRAZOLE SODIUM 40 MG: 40 TABLET, DELAYED RELEASE ORAL at 06:11

## 2025-01-15 RX ADMIN — ENOXAPARIN SODIUM 30 MG: 30 INJECTION SUBCUTANEOUS at 21:03

## 2025-01-15 RX ADMIN — ACETAMINOPHEN 975 MG: 325 TABLET, FILM COATED ORAL at 13:34

## 2025-01-15 RX ADMIN — ACETAMINOPHEN 975 MG: 325 TABLET, FILM COATED ORAL at 06:10

## 2025-01-15 RX ADMIN — DILTIAZEM HYDROCHLORIDE 30 MG: 30 TABLET, FILM COATED ORAL at 12:56

## 2025-01-15 RX ADMIN — METOPROLOL SUCCINATE 75 MG: 25 TABLET, FILM COATED, EXTENDED RELEASE ORAL at 08:40

## 2025-01-15 RX ADMIN — DILTIAZEM HYDROCHLORIDE 30 MG: 30 TABLET, FILM COATED ORAL at 01:14

## 2025-01-15 RX ADMIN — DILTIAZEM HYDROCHLORIDE 30 MG: 30 TABLET, FILM COATED ORAL at 19:14

## 2025-01-15 RX ADMIN — DILTIAZEM HYDROCHLORIDE 30 MG: 30 TABLET, FILM COATED ORAL at 23:24

## 2025-01-15 RX ADMIN — DILTIAZEM HYDROCHLORIDE 30 MG: 30 TABLET, FILM COATED ORAL at 06:11

## 2025-01-15 RX ADMIN — MELATONIN TAB 3 MG 3 MG: 3 TAB at 21:04

## 2025-01-15 RX ADMIN — ACETAMINOPHEN 975 MG: 325 TABLET, FILM COATED ORAL at 21:05

## 2025-01-15 NOTE — PROGRESS NOTES
Progress Note - Hospitalist   Name: Sandra Purvis 89 y.o. female I MRN: [id number]  Unit/Bed#: 5T -01 I Date of Admission: 1/14/2025   Date of Service: 1/15/2025 I Hospital Day: 1    Assessment & Plan  Atrial fibrillation with rapid ventricular response (HCC)  This is a 89-year-old female patient who was recently admitted to [facility] ICU secondary to fall with resultant subdural hematoma, subarachnoid hematoma and facial fractures and discharged to [facility] who was transferred to medical service due to rapid A-fib, hypertension associated with generalized weakness and lightheadedness  The patient was seen by cardiology the day prior and her metoprolol succinate dosing was increased to 75 mg twice daily, however without significant improvement in her rates  Her [id number] score is calculated at 5, however given recent intracranial hemorrhage anticoagulation is currently contraindicated  Her heart rates were 120-130 bpm on admission, now around 90 following Cardizem 15 mg IV x1, scheduled PO Cardizem 30 mg Q6H  The patient was admitted to stepdown 2 level of care, downgrade to Med Surg tele  Dated infectious workup which was unremarkable  Chest x-ray with pulmonary edema, given Lasix 20 mg IV 1/14, will repeat dose  Reviewed recent 2D echo 1/6/25 noted from grade II DD, pulmonary HTN, trivial pericardial effusion - no need to update  Appreciate cardiology input  Hypertension  Patient is on metoprolol but dose recently increased in setting of rapid heart rates, A-fib, had also been on midodrine due to postural hypotension  She presents with elevated blood pressures now improved, please see above under hypertensive urgency  Mild protein-calorie malnutrition (HCC)  Malnutrition Findings:      BMI Findings:  21.58     Body mass index is 20.36 kg/m².   Request nutrition evaluation  Stage 3 chronic kidney disease, unspecified whether stage 3a or 3b CKD (HCC)  Lab Results   Component Value Date    EGFR 56  "01/15/2025    EGFR 63 01/14/2025    EGFR 59 01/13/2025    CREATININE 0.90 01/15/2025    CREATININE 0.82 01/14/2025    CREATININE 0.86 01/13/2025     At baseline  Primary insomnia  Patient with age-related cognitive decline and \"sundowning\" per discussion with family  She is on melatonin 3 mg HS -appreciate geriatric medicine input, continue  Mental status appears improved today, 1/15  SDH (subdural hematoma) ([facility])  Please refer to above, recent hospitalization with traumatic ICH following fall  Patient has elected conservative/non-surgical approach  Monitor neurologic exam  SAH (subarachnoid hemorrhage) ([facility])  As above  Orthostatic hypotension  Patient presents in rapid afib, hypertensive urgency  She is on midodrine - hold for now, may need to introduce at 2.5 mg TID  Monitor orthostatic BPs  Acute on chronic diastolic congestive heart failure ([facility])  Wt Readings from Last 3 Encounters:   01/15/25 50.5 kg (111 lb 4.8 oz)   01/12/25 53.5 kg (118 lb)   01/10/25 51.1 kg (112 lb 9.6 oz)     Recent 2D echo reviewed, grade 2 diastolic dysfunction, pulmonary hypertension, trivial pericardial effusion  Noted for pulmonary edema in the setting of rapid A-fib  Cardiology consult has been requested, input appreciated  Received Lasix 20 mg IV 01/14, will give another dose today, 1/15          Hypertensive urgency  Patient presents with rapid afib, accelerated BP with SBP in the 190's  BP improved now, hypertensive urgency resolved  She is on midodrine due to orthostatic hypotension - hold for now, may need to reintroduce at a lower dose  Continue metoprolol succinate 75 mg twice daily  Cardizem 30 mg every 6 hours  Discontinued amlodipine  Age-related cognitive decline  Per my discussion with patient's family history has been noted for cognitive decline, sundowning  Her mental status has declined since recent hospitalization, appears improved today  Geriatric medicine consult appreciated, continue melatonin for sleep " regulation, supportive measures  SIRS (systemic inflammatory response syndrome) ([facility])  Admission CBC reviewed, noted for leukocytosis at 14,000 although has been having persistent leukocytosis likely due to intracranial bleed  The patient does not meet SIRS criteria with leukocytosis and tachycardia and given rapid A-fib we will pursue infectious workup  Infectious workup negative  Received IV fluids, discontinued with evidence of fluid overload chest x-ray  Discontinue antibiotics and observe    VTE Pharmacologic Prophylaxis: VTE Score: 6 High Risk (Score >/= 5) - Pharmacological DVT Prophylaxis Ordered: enoxaparin (Lovenox). Sequential Compression Devices Ordered.    Mobility:   Basic Mobility Inpatient Raw Score: 12  -[facility] Goal: 4: Move to chair/commode  -[facility] Achieved: 4: Move to chair/commode    Patient Centered Rounds: I performed bedside rounds with nursing staff today.   Discussions with Specialists or Other Care Team Provider: Cardiology    Education and Discussions with Family / Patient: Updated  (daughter) via phone.    Current Length of Stay: 1 day(s)  Current Patient Status: Inpatient   Certification Statement: The patient will continue to require additional inpatient hospital stay due to close monitoring  Discharge Plan: Anticipate discharge in 24-48 hrs to rehab facility.    Code Status: Level 3 - DNAR and DNI    Subjective   Patient appears more alert and awake.  She worked with physical therapy.  She voices no acute complaints.    Objective :  Temp:  [97.2 °F (36.2 °C)-98.3 °F (36.8 °C)] 98.3 °F (36.8 °C)  HR:  [] 88  BP: (131-186)/(70-91) 131/70  Resp:  [18] 18  SpO2:  [93 %-97 %] 97 %  O2 Device: None (Room air)    Body mass index is 20.36 kg/m².     Input and Output Summary (last 24 hours):     Intake/Output Summary (Last 24 hours) at 1/15/2025 1406  Last data filed at 1/15/2025 1404  Gross per 24 hour   Intake --   Output 1616 ml   Net -1616 ml       Physical  Exam  Constitutional:       General: She is not in acute distress.  HENT:      Head:      Comments: Right-sided facial echymosis     Nose: No congestion.   Cardiovascular:      Rate and Rhythm: Normal rate. Rhythm irregular.   Pulmonary:      Effort: No respiratory distress.      Breath sounds: No wheezing or rales.   Abdominal:      General: There is no distension.      Tenderness: There is no abdominal tenderness. There is no guarding.   Musculoskeletal:      Right lower leg: No edema.      Left lower leg: No edema.   Skin:     General: Skin is warm and dry.   Neurological:      Mental Status: Mental status is at baseline.         Lines/Drains:  Lines/Drains/Airways       Active Status       Name Placement date Placement time Site Days    External Urinary Catheter 01/05/25  0100  -- 10                      Telemetry:  Telemetry Orders (From admission, onward)               24 Hour Telemetry Monitoring  Continuous x 24 Hours (Telem)        Expiring   Question:  Reason for 24 Hour Telemetry  Answer:  Arrhythmias requiring acute medical intervention / PPM or ICD malfunction                     Telemetry Reviewed: Atrial fibrillation. HR averaging 90s  Indication for Continued Telemetry Use: Arrthymias requiring medical therapy               Lab Results: I have reviewed the following results:   Results from last 7 days   Lab Units 01/15/25  0517 01/14/25  1303 01/13/25  1025 01/10/25  0541   WBC Thousand/uL 14.82*   < > 11.21* 11.04*   HEMOGLOBIN g/dL 10.0*   < > 9.6* 9.9*   HEMATOCRIT % 30.9*   < > 29.3* 31.2*   PLATELETS Thousands/uL 201   < > 223 183   SEGS PCT %  --   --   --  82*   LYMPHO PCT %  --   --  4* 9*   MONO PCT %  --   --  3* 7   EOS PCT %  --   --  0 1    < > = values in this interval not displayed.     Results from last 7 days   Lab Units 01/15/25  0517   SODIUM mmol/L 134*   POTASSIUM mmol/L 3.3*   CHLORIDE mmol/L 93*   CO2 mmol/L 30   BUN mg/dL 18   CREATININE mg/dL 0.90   ANION GAP mmol/L 11    CALCIUM mg/dL 9.5   ALBUMIN g/dL 3.9   TOTAL BILIRUBIN mg/dL 0.66   ALK PHOS U/L 83   ALT U/L 40   AST U/L 31   GLUCOSE RANDOM mg/dL 114                 Results from last 7 days   Lab Units 01/15/25  0517 01/14/25  1605   LACTIC ACID mmol/L  --  0.9   PROCALCITONIN ng/ml 0.13 0.11       Recent Cultures (last 7 days):   Results from last 7 days   Lab Units 01/14/25  1607   BLOOD CULTURE  Received in Microbiology Lab. Culture in Progress.  Received in Microbiology Lab. Culture in Progress.       Imaging Results Review: I reviewed radiology reports from this admission including: chest xray.      Last 24 Hours Medication List:     Current Facility-Administered Medications:     acetaminophen (TYLENOL) tablet 975 mg, Q8H ELIZABETH    bisacodyl (DULCOLAX) rectal suppository 10 mg, Daily PRN    calcium carbonate (TUMS) chewable tablet 500 mg, Daily PRN    cefTRIAXone (ROCEPHIN) IVPB (premix in dextrose) 1,000 mg 50 mL, Q24H, Last Rate: 1,000 mg (01/14/25 1815)    diltiazem (CARDIZEM) tablet 30 mg, Q6H ELIZABETH    enoxaparin (LOVENOX) subcutaneous injection 30 mg, Q24H    melatonin tablet 3 mg, HS    metoprolol succinate (TOPROL-XL) 24 hr tablet 75 mg, BID    [Held by provider] midodrine (PROAMATINE) tablet 5 mg, TID PRN    ondansetron (ZOFRAN) injection 4 mg, Q6H PRN    ondansetron (ZOFRAN-ODT) dispersible tablet 4 mg, Q6H PRN    oxyCODONE (ROXICODONE) split tablet 2.5 mg, Q6H PRN **OR** oxyCODONE (ROXICODONE) IR tablet 5 mg, Q6H PRN    pantoprazole (PROTONIX) EC tablet 40 mg, Early Morning    polyethylene glycol (MIRALAX) packet 17 g, Daily    senna-docusate sodium (SENOKOT S) 8.6-50 mg per tablet 1 tablet, HS    Administrative Statements   Today, Patient Was Seen By: Nicolle Junior MD  I have spent a total time of 52 minutes in caring for this patient on the day of the visit/encounter including Patient and family education, Counseling / Coordination of care, Documenting in the medical record, Reviewing / ordering tests,  medicine, procedures  , and Communicating with other healthcare professionals .    **Please Note: This note may have been constructed using a voice recognition system.**

## 2025-01-15 NOTE — PLAN OF CARE
Problem: Prexisting or High Potential for Compromised Skin Integrity  Goal: Skin integrity is maintained or improved  Description: INTERVENTIONS:  - Identify patients at risk for skin breakdown  - Assess and monitor skin integrity  - Assess and monitor nutrition and hydration status  - Monitor labs   - Assess for incontinence   - Turn and reposition patient  - Assist with mobility/ambulation  - Relieve pressure over bony prominences  - Avoid friction and shearing  - Provide appropriate hygiene as needed including keeping skin clean and dry  - Evaluate need for skin moisturizer/barrier cream  - Collaborate with interdisciplinary team   - Patient/family teaching  - Consider wound care consult   Outcome: Progressing     Problem: INFECTION - ADULT  Goal: Absence or prevention of progression during hospitalization  Description: INTERVENTIONS:  - Assess and monitor for signs and symptoms of infection  - Monitor lab/diagnostic results  - Monitor all insertion sites, i.e. indwelling lines, tubes, and drains  - Monitor endotracheal if appropriate and nasal secretions for changes in amount and color  - [place] appropriate cooling/warming therapies per order  - Administer medications as ordered  - Instruct and encourage patient and family to use good hand hygiene technique  - Identify and instruct in appropriate isolation precautions for identified infection/condition  Outcome: Progressing     Problem: PAIN - ADULT  Goal: Verbalizes/displays adequate comfort level or baseline comfort level  Description: Interventions:  - Encourage patient to monitor pain and request assistance  - Assess pain using appropriate pain scale  - Administer analgesics based on type and severity of pain and evaluate response  - Implement non-pharmacological measures as appropriate and evaluate response  - Consider cultural and social influences on pain and pain management  - Notify physician/advanced practitioner if interventions unsuccessful or  patient reports new pain  Outcome: Progressing     Problem: SAFETY ADULT  Goal: Patient will remain free of falls  Description: INTERVENTIONS:  - Educate patient/family on patient safety including physical limitations  - Instruct patient to call for assistance with activity   - Consult OT/PT to assist with strengthening/mobility   - Keep Call bell within reach  - Keep bed low and locked with side rails adjusted as appropriate  - Keep care items and personal belongings within reach  - Initiate and maintain comfort rounds  - Make Fall Risk Sign visible to staff  - Offer Toileting every 2 Hours, in advance of need  - Initiate/Maintain bed alarm  - Obtain necessary fall risk management equipment  - Apply yellow socks and bracelet for high fall risk patients  - Consider moving patient to room near nurses station  Outcome: Progressing

## 2025-01-15 NOTE — ASSESSMENT & PLAN NOTE
"Patient with age-related cognitive decline and \"sundowning\" per discussion with family  She is on melatonin 3 mg HS -appreciate geriatric medicine input, continue  Mental status appears improved today, 1/15  "

## 2025-01-15 NOTE — ASSESSMENT & PLAN NOTE
This is a 89-year-old female patient who was recently admitted to [facility] ICU secondary to fall with resultant subdural hematoma, subarachnoid hematoma and facial fractures and discharged to [facility] who was transferred to medical service due to rapid A-fib, hypertension associated with generalized weakness and lightheadedness  The patient was seen by cardiology the day prior and her metoprolol succinate dosing was increased to 75 mg twice daily, however without significant improvement in her rates  Her [id number] score is calculated at 5, however given recent intracranial hemorrhage anticoagulation is currently contraindicated  Her heart rates were 120-130 bpm on admission, now around 90 following Cardizem 15 mg IV x1, scheduled PO Cardizem 30 mg Q6H  The patient was admitted to stepdown 2 level of care, downgrade to Med Surg tele  Dated infectious workup which was unremarkable  Chest x-ray with pulmonary edema, given Lasix 20 mg IV 1/14, will repeat dose  Reviewed recent 2D echo 1/6/25 noted from grade II DD, pulmonary HTN, trivial pericardial effusion - no need to update  Appreciate cardiology input

## 2025-01-15 NOTE — ASSESSMENT & PLAN NOTE
Patient presents with rapid afib, accelerated BP with SBP in the 190's  BP improved now, hypertensive urgency resolved  She is on midodrine due to orthostatic hypotension - hold for now, may need to reintroduce at a lower dose  Continue metoprolol succinate 75 mg twice daily  Cardizem 30 mg every 6 hours  Discontinued amlodipine

## 2025-01-15 NOTE — ASSESSMENT & PLAN NOTE
Lab Results   Component Value Date    EGFR 56 01/15/2025    EGFR 63 01/14/2025    EGFR 59 01/13/2025    CREATININE 0.90 01/15/2025    CREATININE 0.82 01/14/2025    CREATININE 0.86 01/13/2025     At baseline

## 2025-01-15 NOTE — ASSESSMENT & PLAN NOTE
Malnutrition Findings:      BMI Findings:  21.58     Body mass index is 20.36 kg/m².   Request nutrition evaluation

## 2025-01-15 NOTE — ASSESSMENT & PLAN NOTE
Wt Readings from Last 3 Encounters:   01/15/25 50.5 kg (111 lb 4.8 oz)   01/12/25 53.5 kg (118 lb)   01/10/25 51.1 kg (112 lb 9.6 oz)     Recent 2D echo reviewed, grade 2 diastolic dysfunction, pulmonary hypertension, trivial pericardial effusion  Noted for pulmonary edema in the setting of rapid A-fib  Cardiology consult has been requested, input appreciated  Received Lasix 20 mg IV 01/14, will give another dose today, 1/15

## 2025-01-15 NOTE — CASE MANAGEMENT
Case Management Assessment & Discharge Planning Note    Patient name Sandra Purvis  Location 5T /5T -01 MRN [id number]  : 12/3/1934 Date 1/15/2025       Current Admission Date: 2025  Current Admission Diagnosis:Atrial fibrillation with rapid ventricular response ([facility])   Patient Active Problem List    Diagnosis Date Noted Date Diagnosed    Hypertensive urgency 2025     Age-related cognitive decline 2025     SIRS (systemic inflammatory response syndrome) ([facility]) 2025     Frailty syndrome in geriatric patient 2025     Ambulatory dysfunction 2025     At risk for delirium 2025     Bilateral hearing loss 2025     Anemia 2025     Leukocytosis 2025     Acute on chronic diastolic congestive heart failure ([facility]) 2025     Syncope 2025     Chest pain 2025     Orthostatic hypotension 2025     Fall 2025     SDH (subdural hematoma) ([facility]) 2025     SAH (subarachnoid hemorrhage) ([facility]) 2025     Closed extensive facial fractures ([facility]) 2025     Primary insomnia 2024     Basal cell carcinoma (BCC) of scalp 2023     Malignant melanoma of arm, left ([facility]) 2023     Scalp lesion 2023     Arm skin lesion, left 2023     Irritant contact dermatitis due to other agents 2022     COVID-19 2022     GERD (gastroesophageal reflux disease)      Stage 3 chronic kidney disease, unspecified whether stage 3a or 3b CKD ([facility]) 2021     Atrial fibrillation with rapid ventricular response ([facility])      Preop examination 2021     Esophagus disorder 10/19/2021     Ureteral stone 10/15/2021     Bacteremia 10/15/2021     Mild protein-calorie malnutrition ([facility]) 2021     Hypertension 2015       LOS (days): 1  Geometric Mean LOS (GMLOS) (days): 2.3  Days to GMLOS:1.1     OBJECTIVE:    Risk of Unplanned Readmission Score: 18.3      Current admission status: Inpatient  Referral  Reason: Acute Rehab    Preferred Pharmacy:   [facility]/pharmacy #[id number] - [place], [place] - [address]  [address] [address]  Phone: [phone] Fax: [phone]    Primary Care Provider: Berhane Page DO    Primary Insurance: MEDICARE  Secondary Insurance: BLUE CROSS    ASSESSMENT:  Active Health Care Proxies       Darryl Purvis Health Care Representative - Spouse   Primary Phone: [phone] (Home)                 Advance Directives  Does patient have a Health Care POA?: Yes  Does patient have Advance Directives?: Yes  Advance Directives: Living will, Power of  for health care  Primary Contact: Courtney Berger (Daughter) [phone]      Readmission Root Cause  30 Day Readmission: Yes  During your hospital stay, did someone (provider, nurse, ) explain your care to you in a way you could understand?:  (NA was ar ARC)  Did you feel medically stable to leave the hospital?:  (NA was ar ARC)  Were you able to pay for your medication at the pharmacy?:  (NA was ar ARC)  Did you have reliable transportation to take you to your appointments?:  (NA was ar ARC)  During previous admission, was a post-acute recommendation made?: Yes  What post-acute resources were offered?: Other (Acute Rehab)  Patient was readmitted due to: A Fib  Action Plan: Back to rehab    Patient Information  Admitted from:: Other (comment) (Was at [facility])  Mental Status: Alert  During Assessment patient was accompanied by: Spouse, Daughter  Assessment information provided by:: Patient, Daughter, Spouse  Primary Caregiver: Self  Support Systems: Spouse/significant other, Daughter, Self, Family members  County of Residence: [place]  What city do you live in?: [place]  Home entry access options. Select all that apply.: Stairs  Number of steps to enter home.: 2  Do the steps have railings?: Yes  Type of Current Residence: [facility]  Living Arrangements: Lives w/ Spouse/significant other  Is patient a ?: No    Activities of  Daily Living Prior to Admission  Functional Status: Independent  Completes ADLs independently?: Yes  Ambulates independently?: Yes  Does patient use assisted devices?: No  Does patient currently own DME?: No  Does patient have a history of Outpatient Therapy (PT/OT)?: Yes  Does the patient have a history of Short-Term Rehab?: Yes ([facility])  Does patient have a history of HHC?: Yes  Does patient currently have HHC?: No    Patient Information Continued  Income Source: Pension/FPC  Does patient have prescription coverage?: Yes  Does patient receive dialysis treatments?: No  Does patient have a history of substance abuse?: No  Does patient have a history of Mental Health Diagnosis?: No    Means of Transportation  Means of Transport to Appts:: Family transport    DISCHARGE DETAILS:    Discharge planning discussed with:: Patient, Spouse and dtr Courtney  Freedom of Choice: Yes     CM contacted family/caregiver?: Yes  Were Treatment Team discharge recommendations reviewed with patient/caregiver?: Yes  Did patient/caregiver verbalize understanding of patient care needs?: N/A- going to facility  Were patient/caregiver advised of the risks associated with not following Treatment Team discharge recommendations?: Yes    Contacts  Patient Contacts: Courtney Berger (Daughter) [phone]  Contact Method: In Person  Phone Number: [phone]  Reason/Outcome: Discharge Planning, Emergency Contact, Continuity of Care    Other Referral/Resources/Interventions Provided:  Interventions: Acute Rehab    Would you like to participate in our [facility] service program?  : No - Declined    Treatment Team Recommendation: Acute Rehab       Additional Comments: Met with patient, spouse and daughter at bedside.  Discussed discharge planning.  Referral made back to [facility].  Patient was at [facility] and was transferred to medical floor for afib.  Family and patient agreeable to [facility].  CM department following thru discharge

## 2025-01-15 NOTE — PLAN OF CARE
Problem: PHYSICAL THERAPY ADULT  Goal: Performs mobility at highest level of function for planned discharge setting.  See evaluation for individualized goals.  Description: Treatment/Interventions: ADL retraining, Functional transfer training, LE strengthening/ROM, Elevations, Therapeutic exercise, Endurance training, Equipment eval/education, Patient/family training, Bed mobility, Gait training, Spoke to nursing, Spoke to case management, OT  Equipment Recommended: Walker       See flowsheet documentation for full assessment, interventions and recommendations.  Outcome: Progressing  Note: Prognosis: Good  Problem List: Decreased strength, Decreased range of motion, Decreased endurance, Decreased coordination, Decreased mobility, Impaired balance, Decreased cognition, Decreased safety awareness, Impaired judgement, Impaired sensation, Pain     Barriers to Discharge: Inaccessible home environment, Decreased caregiver support     Rehab Resource Intensity Level, PT: I (Maximum Resource Intensity)    See flowsheet documentation for full assessment.

## 2025-01-15 NOTE — ASSESSMENT & PLAN NOTE
Patient is on metoprolol but dose recently increased in setting of rapid heart rates, A-fib, had also been on midodrine due to postural hypotension  She presents with elevated blood pressures now improved, please see above under hypertensive urgency

## 2025-01-15 NOTE — ASSESSMENT & PLAN NOTE
Per my discussion with patient's family history has been noted for cognitive decline, sundowning  Her mental status has declined since recent hospitalization, appears improved today  Geriatric medicine consult appreciated, continue melatonin for sleep regulation, supportive measures

## 2025-01-15 NOTE — PLAN OF CARE
Problem: OCCUPATIONAL THERAPY ADULT  Goal: Performs self-care activities at highest level of function for planned discharge setting.  See evaluation for individualized goals.  Description: Treatment Interventions: ADL retraining, Functional transfer training, UE strengthening/ROM, Endurance training, Continued evaluation, Energy conservation, Activityengagement          See flowsheet documentation for full assessment, interventions and recommendations.   Outcome: Progressing  Note: Limitation: Decreased high-level ADLs, Decreased Safe judgement during ADL, Decreased ADL status, Decreased UE strength, Decreased cognition, Decreased endurance  Prognosis: Good  Assessment: Pt is a 89 y.o. female seen for OT evaluation s/p admit to [facility] on 1/14/2025 w/ Atrial fibrillation with rapid ventricular response (HCC).  See medical history above for extensive list of comorbidities affecting pt's functional performance at time of assessment. Personal factors affecting Pt at time of IE include:limited home support, behavioral pattern, difficulty performing ADLS, difficulty performing IADLS , and health management . Upon evaluation: Pt requires maxA for ADL transfers and transfer to standing. Pt with posterior lean, WB through heels with tactile and verbal cues for proper BLE placement. Pt initially unable to achieve full stand, additional attempts required. Pt requires maxA for ADLs in standing. Pt's primary barrier(s) at this time: decreased stability, endurance. The following deficits impact occupational performance: weakness, decreased strength, decreased balance, decreased tolerance, decreased safety awareness, increased pain, and orthopedic restrictions. Pt to benefit from continued skilled OT services while in the hospital to address deficits as defined above and maximize level of functional independence w ADL's and functional mobility. Occupational performance areas to address include: bathing/shower, toilet hygiene,  dressing, health maintenance, functional mobility, and clothing management. From OT standpoint, recommendation at time of d/c would be maximum resource intensity.       Rehab Resource Intensity Level, OT: I (Maximum Resource Intensity)

## 2025-01-15 NOTE — ASSESSMENT & PLAN NOTE
Patient presents in rapid afib, hypertensive urgency  She is on midodrine - hold for now, may need to introduce at 2.5 mg TID  Monitor orthostatic BPs

## 2025-01-15 NOTE — PHYSICAL THERAPY NOTE
Physical Therapy Evaluation    Patient's Name: Sandra Purvis    Admitting Diagnosis  Subdural hematoma (HCC) [S06.5XAA]    Problem List  Patient Active Problem List   Diagnosis    Hypertension    Mild protein-calorie malnutrition (HCC)    Ureteral stone    Bacteremia    Esophagus disorder    Preop examination    Atrial fibrillation with rapid ventricular response (HCC)    Stage 3 chronic kidney disease, unspecified whether stage 3a or 3b CKD (HCC)    GERD (gastroesophageal reflux disease)    COVID-19    Irritant contact dermatitis due to other agents    Scalp lesion    Arm skin lesion, left    Basal cell carcinoma (BCC) of scalp    Malignant melanoma of arm, left (HCC)    Primary insomnia    SDH (subdural hematoma) (HCC)    SAH (subarachnoid hemorrhage) (HCC)    Closed extensive facial fractures (HCC)    Fall    Chest pain    Orthostatic hypotension    Syncope    Anemia    Leukocytosis    Chronic diastolic congestive heart failure (HCC)    Hypertensive urgency    Age-related cognitive decline    SIRS (systemic inflammatory response syndrome) ([facility])    Frailty syndrome in geriatric patient    Ambulatory dysfunction    At risk for delirium    Bilateral hearing loss       Past Medical History  Past Medical History:   Diagnosis Date    Arthritis     Cataract     LastAssessed:9/8/15    GERD (gastroesophageal reflux disease)     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    Hypertension     PAF (paroxysmal atrial fibrillation) ([facility])     Last Assessed:2/3/16    Wrist fracture     Resolved:9/8/15       Past Surgical History  Past Surgical History:   Procedure Laterality Date    CATARACT EXTRACTION      Last Assessed:9/8/15    FL RETROGRADE PYELOGRAM  10/17/2021    LYMPHADENECTOMY      Last Assessed:9/8/15    OR CYSTO/URETERO W/LITHOTRIPSY &INDWELL STENT INSRT Right 11/24/2021    Procedure: CYSTOSCOPY URETEROSCOPY WITH LITHOTRIPSY HOLMIUM LASER, RETROGRADE PYELOGRAM AND INSERTION STENT URETERAL;  Surgeon: David Sr  MD;  Location: [facility];  Service: Urology    PA CYSTOURETHROSCOPY W/URETERAL CATHETERIZATION Right 10/17/2021    Procedure: CYSTOSCOPY RETROGRADE PYELOGRAM WITH INSERTION STENT URETERAL;  Surgeon: Benjie Tinoco MD;  Location: [facility];  Service: Urology    TONSILLECTOMY      Last Assessed:9/8/15    WRIST SURGERY      Last Assessed:9/8/15       Recent Imaging  XR chest portable   Final Result by Crispin Lopes MD (01/14 1650)      Development of pulmonary vascular congestion with bilateral pleural effusions and edema.            Workstation performed: [id number]             Recent Vital Signs  Vitals:    01/15/25 0728 01/15/25 0916 01/15/25 1154 01/15/25 1212   BP: 154/84  131/70    BP Location: Left arm      Pulse: 98  88    Resp: 18  18    Temp: 98.3 °F (36.8 °C)  98.3 °F (36.8 °C)    TempSrc: Temporal  Temporal    SpO2: 96%  97%    Weight:  50.5 kg (111 lb 4.8 oz)  50.5 kg (111 lb 4.8 oz)   Height:            01/15/25 1145   PT Last Visit   PT Visit Date 01/15/25   Note Type   Note type Evaluation   Pain Assessment   Pain Assessment Tool 0-10   Pain Score 3   Pain Location/Orientation Location: Head   Restrictions/Precautions   Weight Bearing Precautions Per Order No   Other Precautions Cognitive;Bed Alarm;Chair Alarm;Pain;Fall Risk;Multiple lines;Telemetry   Home Living   Type of Home House   Home Layout One level;Stairs to enter with rails;Laundry in basement;Performs ADLs on one level;Able to live on main level with bedroom/bathroom   Bathroom Shower/Tub Tub/shower unit   Bathroom Toilet Standard   Bathroom Equipment Grab bars in shower;Shower chair;Grab bars around toilet   Bathroom Accessibility Accessible   Prior Function   Level of [place] Independent with ADLs;Independent with functional mobility;Independent with IADLS   Lives With Spouse   Receives Help From Family;Friend(s);Other (Comment)   IADLs Independent with meal prep;Independent with medication management;Family/Friend/Other  provides transportation   Falls in the last 6 months 1 to 4   Vocational Retired   General   Family/Caregiver Present No   Cognition   Overall Cognitive Status Impaired   Arousal/Participation Alert   Attention Attends with cues to redirect   Orientation Level Oriented to person;Oriented to place;Oriented to time   Memory Decreased short term memory;Decreased recall of recent events;Decreased recall of precautions   Following Commands Follows one step commands with increased time or repetition   RLE Assessment   RLE Assessment X   Strength RLE   R Hip Flexion 3/5   R Knee Flexion 3/5   R Knee Extension 3+/5   R Ankle Dorsiflexion 3+/5   R Ankle Plantar Flexion 3-/5   LLE Assessment   LLE Assessment X   Strength LLE   L Hip Flexion 3/5   L Knee Flexion 3/5   L Knee Extension 3+/5   L Ankle Dorsiflexion 3+/5   L Ankle Plantar Flexion 3-/5   Coordination   Movements are Fluid and Coordinated 0   Coordination and Movement Description retropulsive with standing, decreased seated and standing balance, reduced motor planning   Sensation X   Light Touch   RLE Light Touch Impaired   LLE Light Touch Impaired   Bed Mobility   Supine to Sit 4  Minimal assistance   Additional items Assist x 1;Bedrails;Increased time required;Verbal cues;LE management   Transfers   Sit to Stand 3  Moderate assistance   Additional items Assist x 1;Bedrails;Increased time required;Verbal cues   Stand to Sit 3  Moderate assistance   Additional items Assist x 1;Bedrails;Increased time required;Verbal cues   Stand pivot 3  Moderate assistance   Additional items Assist x 1;Armrests;Increased time required;Verbal cues   Balance   Static Sitting Fair -   Dynamic Sitting Poor +   Static Standing Poor   Dynamic Standing Poor   Ambulatory Poor -   Endurance Deficit   Endurance Deficit Yes   Endurance Deficit Description LE weakness and fatigue, general deconditioning   Activity Tolerance   Activity Tolerance Patient limited by fatigue   Medical Staff Made  Aware spoke to CM   Nurse Made Aware spoke to RN   Assessment   Prognosis Good   Problem List Decreased strength;Decreased range of motion;Decreased endurance;Decreased coordination;Decreased mobility;Impaired balance;Decreased cognition;Decreased safety awareness;Impaired judgement;Impaired sensation;Pain   Barriers to Discharge Inaccessible home environment;Decreased caregiver support   Goals   Patient Goals to be able to walk again   STG Expiration Date 01/25/25   Short Term Goal #1 see eval note   PT Treatment Day 0   Plan   Treatment/Interventions ADL retraining;Functional transfer training;LE strengthening/ROM;Elevations;Therapeutic exercise;Endurance training;Equipment eval/education;Patient/family training;Bed mobility;Gait training;Spoke to nursing;Spoke to case management;OT   PT Frequency 3-5x/wk   Discharge Recommendation   Rehab Resource Intensity Level, PT I (Maximum Resource Intensity)   Equipment Recommended Walker   Walker Package Recommended Wheeled walker   AM-PAC Basic Mobility Inpatient   Turning in Flat Bed Without Bedrails 3   Lying on Back to Sitting on Edge of Flat Bed Without Bedrails 2   Moving Bed to Chair 2   Standing Up From Chair Using Arms 2   Walk in Room 2   Climb 3-5 Stairs With Railing 1   Basic Mobility Inpatient Raw Score 12   Basic Mobility Standardized Score 32.23   [facility] Highest Level Of Mobility   -[facility] Goal 4: Move to chair/commode   -[facility] Achieved 4: Move to chair/commode   End of Consult   Patient Position at End of Consult Bedside chair;All needs within reach;Bed/Chair alarm activated         ASSESSMENT                                                                                                                     Sandra Purvis is a 89 y.o. female admitted to \[facility]\"" on 1/14/2025 for Atrial fibrillation with rapid ventricular response (HCC). Pt  has a past medical history of Arthritis, Cataract, GERD (gastroesophageal reflux disease), Hypertaurodontism,  Hypertension, PAF (paroxysmal atrial fibrillation) (HCC), and Wrist fracture.. PT was consulted and pt was seen on 1/15/2025 for mobility assessment and d/c planning.   Pt presents Supine in bed alert and agreeable to therapy, motivated to attempt OOB mobility. She is reporting some pain in the head and face due to injuries. Minor pain in back which she reports is baseline. Demonstrating generalized weakness throughout although strength against gravity in the LE. Primary limitation during transfers is poor balance and retropulsion likely with component of fear of falling worsening retropulsion. Orthostatic BP taken with some drop in systolic BP however pt not symptomatic and BP remained within normal limits during evaluation.   Impairments limiting pt at this time include decreased ROM, impaired balance, decreased endurance, decreased coordination, increased fall risk, new onset of impairment of functional mobility, decreased ADLS, decreased IADLS, pain, decreased activity tolerance, decreased sensation, and decreased strength. Pt is currently functioning at a minimum assistance x1 level for bed mobility, moderate assistance x1 level for transfers. The patient's AM-PAC Basic Mobility Inpatient Short Form Raw Score is 12. A Raw score of less than or equal to 16 suggests the patient may benefit from discharge to post-acute rehabilitation services. Please also refer to the recommendation of the Physical Therapist for safe discharge planning.    Goals                                                                                                                                    1) Bed mobility skills with modified independent assistance to facilitate safe return to previous living environment 2) Functional transfers with modified independent assistance to facilitate safe return to previous living environment  3) Ambulation with least restrictive AD modified independent assistance without LOB and stable vitals for safe  ambulation home/ community distances. 4) Stair training up/down flight 12 step/s with appropriate rail/s  and modified independent assistance for safe access to previous living environment. 5) Improve balance grades to fair + to reduce risk of falls. 6)Improve LE strength grades by 1 to increase independence w/ transfers and gait.  7) PT for ongoing pt and family education; DME needs and D/C planning to promote highest level of function in least restrictive environment.     Recommendations                                                                                                              Pt will benefit from continued skilled IP PT to address the above mentioned impairments in order to maximize recovery and increase functional independence when completing mobility and ADLs. See flow sheet for goals and POC.     DME: [facility]    Discharge Disposition:  Post Acute Rehab Services      Lalo Granado PT, DPT

## 2025-01-15 NOTE — OCCUPATIONAL THERAPY NOTE
Occupational Therapy Evaluation     Patient Name: Sandra Purvis  Today's Date: 1/15/2025  Problem List  Principal Problem:    Atrial fibrillation with rapid ventricular response ([facility])  Active Problems:    Hypertension    Mild protein-calorie malnutrition ([facility])    Stage 3 chronic kidney disease, unspecified whether stage 3a or 3b CKD ([facility])    Primary insomnia    SDH (subdural hematoma) (HCC)    SAH (subarachnoid hemorrhage) ([facility])    Orthostatic hypotension    Acute on chronic diastolic congestive heart failure ([facility])    Hypertensive urgency    Age-related cognitive decline    SIRS (systemic inflammatory response syndrome) ([facility])    Frailty syndrome in geriatric patient    Ambulatory dysfunction    At risk for delirium    Bilateral hearing loss    Past Medical History  Past Medical History:   Diagnosis Date    Arthritis     Cataract     LastAssessed:9/8/15    GERD (gastroesophageal reflux disease)     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    Hypertension     PAF (paroxysmal atrial fibrillation) ([facility])     Last Assessed:2/3/16    Wrist fracture     Resolved:9/8/15     Past Surgical History  Past Surgical History:   Procedure Laterality Date    CATARACT EXTRACTION      Last Assessed:9/8/15    FL RETROGRADE PYELOGRAM  10/17/2021    LYMPHADENECTOMY      Last Assessed:9/8/15    SC CYSTO/URETERO W/LITHOTRIPSY &INDWELL STENT INSRT Right 11/24/2021    Procedure: CYSTOSCOPY URETEROSCOPY WITH LITHOTRIPSY HOLMIUM LASER, RETROGRADE PYELOGRAM AND INSERTION STENT URETERAL;  Surgeon: David Sr MD;  Location: [facility];  Service: Urology    SC CYSTOURETHROSCOPY W/URETERAL CATHETERIZATION Right 10/17/2021    Procedure: CYSTOSCOPY RETROGRADE PYELOGRAM WITH INSERTION STENT URETERAL;  Surgeon: Benjie Tinoco MD;  Location: [facility];  Service: Urology    TONSILLECTOMY      Last Assessed:9/8/15    WRIST SURGERY      Last Assessed:9/8/15             01/15/25 1216   OT Last Visit   OT Visit Date 01/15/25   Note Type    Note type Evaluation   Pain Assessment   Pain Assessment Tool 0-10   Pain Score 4   Pain Location/Orientation Location: Generalized   Restrictions/Precautions   Weight Bearing Precautions Per Order No   Other Precautions Cognitive;Bed Alarm;Chair Alarm;Fall Risk   Home Living   Type of Home House   Home Layout One level;Stairs to enter with rails;Performs ADLs on one level   Bathroom Shower/Tub Tub/shower unit   Bathroom Toilet Standard   Bathroom Equipment Grab bars in shower   Bathroom Accessibility Accessible   Prior Function   Level of Todd Independent with ADLs;Independent with functional mobility;Needs assistance with IADLS   Lives With Spouse   Receives Help From Family;Friend(s)   IADLs Family/Friend/Other provides transportation   Falls in the last 6 months 1 to 4   ADL   Eating Assistance 5  Supervision/Setup   Grooming Assistance 5  Supervision/Setup   UB Bathing Assistance 4  Minimal Assistance   LB Bathing Assistance 2  Maximal Assistance   UB Dressing Assistance 4  Minimal Assistance   LB Dressing Assistance 2  Maximal Assistance   Toileting Assistance  2  Maximal Assistance   Transfers   Sit to Stand 2  Maximal assistance   Additional items Assist x 1   Stand to Sit 2  Maximal assistance   Additional items Assist x 1   Balance   Static Sitting Fair +   Dynamic Sitting Fair   Static Standing Fair -   Dynamic Standing Poor +   Ambulatory Poor   Activity Tolerance   Activity Tolerance Patient limited by fatigue   RUE Assessment   RUE Assessment WFL   LUE Assessment   LUE Assessment WFL   Cognition   Overall Cognitive Status Impaired   Arousal/Participation Alert;Cooperative   Attention Attends with cues to redirect   Orientation Level Oriented to person;Oriented to place;Disoriented to time   Memory Decreased short term memory;Decreased recall of recent events   Following Commands Follows one step commands without difficulty   Assessment   Limitation Decreased high-level ADLs;Decreased Safe  judgement during ADL;Decreased ADL status;Decreased UE strength;Decreased cognition;Decreased endurance   Prognosis Good   Assessment   Pt is a 89 y.o. female seen for OT evaluation s/p admit to [facility] on 1/14/2025 w/ Atrial fibrillation with rapid ventricular response (HCC).  See medical history above for extensive list of comorbidities affecting pt's functional performance at time of assessment. Personal factors affecting Pt at time of IE include:limited home support, behavioral pattern, difficulty performing ADLS, difficulty performing IADLS , and health management . Upon evaluation: Pt requires maxA for ADL transfers and transfer to standing. Pt with posterior lean, WB through heels with tactile and verbal cues for proper BLE placement. Pt initially unable to achieve full stand, additional attempts required. Pt requires maxA for ADLs in standing. Pt's primary barrier(s) at this time: decreased stability, endurance. The following deficits impact occupational performance: weakness, decreased strength, decreased balance, decreased tolerance, decreased safety awareness, increased pain, and orthopedic restrictions. Pt to benefit from continued skilled OT services while in the hospital to address deficits as defined above and maximize level of functional independence w ADL's and functional mobility. Occupational performance areas to address include: bathing/shower, toilet hygiene, dressing, health maintenance, functional mobility, and clothing management. From OT standpoint, recommendation at time of d/c would be maximum resource intensity.       Goals   STG Time Frame   (2 weeks)   Short Term Goals Pt will complete ADL transfers with Luis Eduardo.   Pt will complete functional ambulation with Luis Eduardo.   Pt will complete toilet hygiene with Luis Eduardo.    Plan   Treatment Interventions ADL retraining;Functional transfer training;UE strengthening/ROM;Endurance training;Continued evaluation;Energy conservation;Activityengagement   Goal  Expiration Date 01/29/25   OT Frequency 3-5x/wk   Discharge Recommendation   Rehab Resource Intensity Level, OT I (Maximum Resource Intensity)   AM-PAC Daily Activity Inpatient   Lower Body Dressing 2   Bathing 2   Toileting 2   Upper Body Dressing 2   Grooming 2   Eating 3   Daily Activity Raw Score 13   Daily Activity Standardized Score (Calc for Raw Score >=11) 32.03

## 2025-01-16 LAB
ANION GAP SERPL CALCULATED.3IONS-SCNC: 8 MMOL/L (ref 4–13)
BASOPHILS # BLD AUTO: 0.05 THOUSANDS/ΜL (ref 0–0.1)
BASOPHILS NFR BLD AUTO: 0 % (ref 0–1)
BUN SERPL-MCNC: 17 MG/DL (ref 5–25)
CALCIUM SERPL-MCNC: 9 MG/DL (ref 8.4–10.2)
CHLORIDE SERPL-SCNC: 95 MMOL/L (ref 96–108)
CO2 SERPL-SCNC: 31 MMOL/L (ref 21–32)
CREAT SERPL-MCNC: 0.9 MG/DL (ref 0.6–1.3)
EOSINOPHIL # BLD AUTO: 0.16 THOUSAND/ΜL (ref 0–0.61)
EOSINOPHIL NFR BLD AUTO: 1 % (ref 0–6)
ERYTHROCYTE [DISTWIDTH] IN BLOOD BY AUTOMATED COUNT: 15.5 % (ref 11.6–15.1)
GFR SERPL CREATININE-BSD FRML MDRD: 56 ML/MIN/1.73SQ M
GLUCOSE SERPL-MCNC: 107 MG/DL (ref 65–140)
HCT VFR BLD AUTO: 31.7 % (ref 34.8–46.1)
HGB BLD-MCNC: 10 G/DL (ref 11.5–15.4)
IMM GRANULOCYTES # BLD AUTO: 0.1 THOUSAND/UL (ref 0–0.2)
IMM GRANULOCYTES NFR BLD AUTO: 1 % (ref 0–2)
LYMPHOCYTES # BLD AUTO: 1.2 THOUSANDS/ΜL (ref 0.6–4.47)
LYMPHOCYTES NFR BLD AUTO: 9 % (ref 14–44)
MAGNESIUM SERPL-MCNC: 2 MG/DL (ref 1.9–2.7)
MCH RBC QN AUTO: 28 PG (ref 26.8–34.3)
MCHC RBC AUTO-ENTMCNC: 31.5 G/DL (ref 31.4–37.4)
MCV RBC AUTO: 89 FL (ref 82–98)
MONOCYTES # BLD AUTO: 0.83 THOUSAND/ΜL (ref 0.17–1.22)
MONOCYTES NFR BLD AUTO: 6 % (ref 4–12)
NEUTROPHILS # BLD AUTO: 10.54 THOUSANDS/ΜL (ref 1.85–7.62)
NEUTS SEG NFR BLD AUTO: 83 % (ref 43–75)
NRBC BLD AUTO-RTO: 0 /100 WBCS
PLATELET # BLD AUTO: 290 THOUSANDS/UL (ref 149–390)
PMV BLD AUTO: 10.6 FL (ref 8.9–12.7)
POTASSIUM SERPL-SCNC: 3.6 MMOL/L (ref 3.5–5.3)
RBC # BLD AUTO: 3.57 MILLION/UL (ref 3.81–5.12)
SODIUM SERPL-SCNC: 134 MMOL/L (ref 135–147)
WBC # BLD AUTO: 12.88 THOUSAND/UL (ref 4.31–10.16)

## 2025-01-16 PROCEDURE — 99233 SBSQ HOSP IP/OBS HIGH 50: CPT | Performed by: FAMILY MEDICINE

## 2025-01-16 PROCEDURE — 85025 COMPLETE CBC W/AUTO DIFF WBC: CPT | Performed by: FAMILY MEDICINE

## 2025-01-16 PROCEDURE — 83735 ASSAY OF MAGNESIUM: CPT | Performed by: FAMILY MEDICINE

## 2025-01-16 PROCEDURE — 99232 SBSQ HOSP IP/OBS MODERATE 35: CPT | Performed by: STUDENT IN AN ORGANIZED HEALTH CARE EDUCATION/TRAINING PROGRAM

## 2025-01-16 PROCEDURE — 92610 EVALUATE SWALLOWING FUNCTION: CPT

## 2025-01-16 PROCEDURE — 80048 BASIC METABOLIC PNL TOTAL CA: CPT | Performed by: FAMILY MEDICINE

## 2025-01-16 RX ORDER — ONDANSETRON 2 MG/ML
4 INJECTION INTRAMUSCULAR; INTRAVENOUS EVERY 6 HOURS PRN
Status: DISCONTINUED | OUTPATIENT
Start: 2025-01-16 | End: 2025-01-17 | Stop reason: HOSPADM

## 2025-01-16 RX ORDER — FUROSEMIDE 20 MG/1
20 TABLET ORAL DAILY
Status: DISCONTINUED | OUTPATIENT
Start: 2025-01-16 | End: 2025-01-17 | Stop reason: HOSPADM

## 2025-01-16 RX ORDER — DILTIAZEM HYDROCHLORIDE 120 MG/1
120 CAPSULE, COATED, EXTENDED RELEASE ORAL DAILY
Status: DISCONTINUED | OUTPATIENT
Start: 2025-01-16 | End: 2025-01-17 | Stop reason: HOSPADM

## 2025-01-16 RX ADMIN — POLYETHYLENE GLYCOL 3350 17 G: 17 POWDER, FOR SOLUTION ORAL at 08:13

## 2025-01-16 RX ADMIN — ACETAMINOPHEN 975 MG: 325 TABLET, FILM COATED ORAL at 13:26

## 2025-01-16 RX ADMIN — ACETAMINOPHEN 975 MG: 325 TABLET, FILM COATED ORAL at 05:17

## 2025-01-16 RX ADMIN — SENNOSIDES AND DOCUSATE SODIUM 1 TABLET: 50; 8.6 TABLET ORAL at 21:53

## 2025-01-16 RX ADMIN — ENOXAPARIN SODIUM 30 MG: 30 INJECTION SUBCUTANEOUS at 21:54

## 2025-01-16 RX ADMIN — DILTIAZEM HYDROCHLORIDE 120 MG: 120 CAPSULE, COATED, EXTENDED RELEASE ORAL at 12:30

## 2025-01-16 RX ADMIN — DILTIAZEM HYDROCHLORIDE 30 MG: 30 TABLET, FILM COATED ORAL at 05:18

## 2025-01-16 RX ADMIN — FUROSEMIDE 20 MG: 20 TABLET ORAL at 16:42

## 2025-01-16 RX ADMIN — MELATONIN TAB 3 MG 3 MG: 3 TAB at 21:54

## 2025-01-16 RX ADMIN — ACETAMINOPHEN 975 MG: 325 TABLET, FILM COATED ORAL at 21:53

## 2025-01-16 RX ADMIN — METOPROLOL SUCCINATE 75 MG: 25 TABLET, FILM COATED, EXTENDED RELEASE ORAL at 21:53

## 2025-01-16 RX ADMIN — METOPROLOL SUCCINATE 75 MG: 25 TABLET, FILM COATED, EXTENDED RELEASE ORAL at 08:13

## 2025-01-16 RX ADMIN — PANTOPRAZOLE SODIUM 40 MG: 40 TABLET, DELAYED RELEASE ORAL at 05:18

## 2025-01-16 NOTE — CASE MANAGEMENT
Case Management Discharge Planning Note    Patient name Sandra Purvis  Location 5T /5T -01 MRN [id number]  : 12/3/1934 Date 2025       Current Admission Date: 2025  Current Admission Diagnosis:Atrial fibrillation with rapid ventricular response ([facility])   Patient Active Problem List    Diagnosis Date Noted Date Diagnosed    Hypertensive urgency 2025     Age-related cognitive decline 2025     SIRS (systemic inflammatory response syndrome) ([facility]) 2025     Frailty syndrome in geriatric patient 2025     Ambulatory dysfunction 2025     At risk for delirium 2025     Bilateral hearing loss 2025     Anemia 2025     Leukocytosis 2025     Acute on chronic diastolic congestive heart failure ([facility]) 2025     Syncope 2025     Chest pain 2025     Orthostatic hypotension 2025     Fall 2025     SDH (subdural hematoma) ([facility]) 2025     SAH (subarachnoid hemorrhage) ([facility]) 2025     Closed extensive facial fractures ([facility]) 2025     Primary insomnia 2024     Basal cell carcinoma (BCC) of scalp 2023     Malignant melanoma of arm, left ([facility]) 2023     Scalp lesion 2023     Arm skin lesion, left 2023     Irritant contact dermatitis due to other agents 2022     COVID-19 2022     GERD (gastroesophageal reflux disease)      Stage 3 chronic kidney disease, unspecified whether stage 3a or 3b CKD ([facility]) 2021     Atrial fibrillation with rapid ventricular response ([facility])      Preop examination 2021     Esophagus disorder 10/19/2021     Ureteral stone 10/15/2021     Bacteremia 10/15/2021     Mild protein-calorie malnutrition ([facility]) 2021     Hypertension 2015       LOS (days): 2  Geometric Mean LOS (GMLOS) (days): 2.3  Days to GMLOS:0.2     OBJECTIVE:  Risk of Unplanned Readmission Score: 17.77         Current admission status: Inpatient   Preferred Pharmacy:    [id number] - [place], [place] - [address]  [address] [address]  Phone: [phone] Fax: [phone]    Primary Care Provider: Berhane Page DO    Primary Insurance: MEDICARE  Secondary Insurance: BLUE CROSS    DISCHARGE DETAILS:    Discharge planning discussed with:: Patient and dtr Courtney  Freedom of Choice: Yes     CM contacted family/caregiver?: Yes  Were Treatment Team discharge recommendations reviewed with patient/caregiver?: Yes  Did patient/caregiver verbalize understanding of patient care needs?: N/A- going to facility  Were patient/caregiver advised of the risks associated with not following Treatment Team discharge recommendations?: Yes    Contacts  Patient Contacts: Courtney Berger (Daughter) [phone]  Relationship to Patient:: Family  Contact Method: Phone  Phone Number: [phone]  Reason/Outcome: Discharge Planning, Emergency Contact, Continuity of Care    Other Referral/Resources/Interventions Provided:  Interventions: Acute Rehab    Treatment Team Recommendation: Acute Rehab  Discharge Destination Plan:: Acute Rehab  Transport at Discharge : Other (Comment) ( and staff transport)      Additional Comments: Message from [facility] and they can offer a bed.  Per RAUL alves be medically ready tomorrow.  Call to Dtr Courtney and updated same she will update patient's spouse.  Met with patient at bedside and updated on discharge planning and agreeable to same

## 2025-01-16 NOTE — ASSESSMENT & PLAN NOTE
Patient presents with rapid afib, accelerated BP with SBP in the 190's  BP improved now, hypertensive urgency resolved  She is on midodrine due to orthostatic hypotension - hold for now, may need to reintroduce at a lower dose  Continue metoprolol succinate 75 mg twice daily  Cardizem 30 mg every 6 hours - changed to Cardizem 120 mg XR daily today 1/16  Discontinued amlodipine

## 2025-01-16 NOTE — ASSESSMENT & PLAN NOTE
Patient presents in rapid afib, hypertensive urgency  She is on midodrine - hold for now, no significant orthostatic hypotension currently observed; may need to introduce at 2.5 mg TID if develops episodes  Monitor orthostatic Bps Q shift

## 2025-01-16 NOTE — ASSESSMENT & PLAN NOTE
Lab Results   Component Value Date    EGFR 56 01/16/2025    EGFR 56 01/15/2025    EGFR 63 01/14/2025    CREATININE 0.90 01/16/2025    CREATININE 0.90 01/15/2025    CREATININE 0.82 01/14/2025     At baseline

## 2025-01-16 NOTE — ASSESSMENT & PLAN NOTE
Continue diltiazem 120 mg daily and Toprol-XL 75 mg twice daily  Currently holding midodrine in setting of hypertension

## 2025-01-16 NOTE — ASSESSMENT & PLAN NOTE
Lab Results   Component Value Date    EGFR 56 01/16/2025    EGFR 56 01/15/2025    EGFR 63 01/14/2025    CREATININE 0.90 01/16/2025    CREATININE 0.90 01/15/2025    CREATININE 0.82 01/14/2025

## 2025-01-16 NOTE — SPEECH THERAPY NOTE
Speech Pathology Bedside Swallow Evaluation:                    SLP RECOMMENDATIONS:         Diet: Level 3 Dental soft         Liquids: Thin liquids         Medications: as best tolerated         Strategies: upright, slow rate, small bites/sips       Summary:  Pt seen for clinical swallow evaluation. Pt is currently on a Level 3 Dental soft/thin liquid diet. Pt reports some difficulty chewing r/t zygomatic arch and temporal bone fx. Pt reports she feels the dental soft diet is easier for her to eat and she has also been choosing softer food from the menu as needed. Pt's oral mech/CN exam was grossly WFL. Bruising and slight edema noted on R side of face. Pt observed with portion of breakfast meal with slow, but functional mastication with dental soft solids. No overt s/s aspiration noted with thin liquids, however intermittent burping noted. Cervical CT notable for partially imaged diffuse gaseous distention of the esophagus. Bite strength is adequate.   Pt presents with mild oral phase dysphagia r/t facial fx. No s/s suggestive of pharyngeal dysphagia observed. Recommend continuing Level 3 Dental soft/thin liquid diet. SLP to follow.     Therapy Prognosis:  Good   Prognosis considerations: high PLOF   Frequency: 1-3x/week       Vitals:    01/16/25 0505 01/16/25 0517 01/16/25 0544 01/16/25 0715   BP: (!) 175/80   160/82   BP Location: Right arm   Left arm   Pulse: 94   56   Resp: 18   16   Temp: 98 °F (36.7 °C)   97.5 °F (36.4 °C)   TempSrc: Temporal   Temporal   SpO2: 95%   92%   Weight:  50.8 kg (112 lb) 50.8 kg (112 lb)    Height:         Lab Results   Component Value Date    WBC 12.88 (H) 01/16/2025    HGB 10.0 (L) 01/16/2025    HCT 31.7 (L) 01/16/2025    MCV 89 01/16/2025     01/16/2025         Consider consult w/:  Nutrition    Goal(s):  Pt will tolerate least restrictive diet w/out s/s aspiration or oral/pharyngeal difficulties.     H&P/Admit info/ pertinent provider notes: (PMH noted  above)  Sandra Purvis is a 89 y.o. female with history of atrial fibrillation, GERD, hypertension who presented to the [facility] on 1//25 as a trauma case from the [facility] to [facility] after a syncopal episode and collapse with imaging specifically a CT head on the same day showing comminuted and mildly displaced fractures of the lateral and anterior walls of the right maxilla, lateral and inferior walls of the right orbit, right inferior orbital wall fracture appears to involve the inferior orbital foramen associated with small extraconal soft tissue edema and focus of air. There is also a nondisplaced fracture of the zygomatic arch and the temporal bone at the TMJ and diffuse opacification of the right maxillary sinus and right nasal cavity with blood products and air. Additionally there was an acute subdural hemorrhage along the left cerebral convexity with a maximal thickness of 1.9 cm with no midline shift and an acute subarachnoid hemorrhage in the left frontal parietal sulci, left sylvian fissure as well as suprasellar cistern. OMFS was consulted as well as neurosurgery and from a neurosurgical standpoint placed on Keppra 7 days for seizure prophylaxis and treated nonoperatively. OMFS recommended surgical interventions however declined by patient with plans for outpatient follow-up and was placed on a 7-day course of Unasyn/Augmentin.     Special Studies:  Head CT 1/14/25:  IMPRESSION:    Interval evolution of recent subdural and subarachnoid hemorrhage as detailed above. No definitive new or enlarging intracranial hemorrhage.      CT facial bones 1/4/25:  IMPRESSION:  1. Comminuted and mildly displaced fractures of the lateral and anterior walls of right maxilla, lateral and inferior walls of right orbit.  2. Right inferior orbital wall fracture appears to involve the inferior orbital foramen with associated small extraconal soft tissue edema and  focus of air. No herniation of extraocular muscles.  3. Nondisplaced fractures of zygomatic arch and the temporal bone at the TMJ.  4. Diffuse opacification of right maxillary sinus and right nasal cavity with blood products and air.    Previous VBS:  None     Patient's goal: none stated     Did the pt report pain? No   If yes, was nursing notified/was it addressed?    Reason for consult:  Determine safest and least restrictive diet        Food allergies:  Allergies   Allergen Reactions    Lactose - Food Allergy GI Intolerance    Penicillins Other (See Comments)     Unsure of reaction         Current diet:  Dental soft/thin    Premorbid diet:  Regular/thin    O2 requirements:  RA   Voice/Speech:  WFL   Follows commands:  Intact    Cognitive status:  Receiving cognitive therapy while in [facility]C,  Alert & oriented

## 2025-01-16 NOTE — ASSESSMENT & PLAN NOTE
Patient was in A-fib with RVR in rehab  Transferred to inpatient for better heart rate control, rates better controlled now  Continue with diltiazem 120 mg daily and Toprol-XL 75 mg twice daily  Not on anticoagulation due to recent subdural/subarachnoid hemorrhage

## 2025-01-16 NOTE — PROGRESS NOTES
Progress Note - Cardiology   Name: Sandra Purvis 89 y.o. female I MRN: [id number]  Unit/Bed#: 5T -01 I Date of Admission: 1/14/2025   Date of Service: 1/16/2025 I Hospital Day: 2     Assessment & Plan  Atrial fibrillation with rapid ventricular response (HCC)  Patient was in A-fib with RVR in rehab  Transferred to inpatient for better heart rate control, rates better controlled now  Continue with diltiazem 120 mg daily and Toprol-XL 75 mg twice daily  Not on anticoagulation due to recent subdural/subarachnoid hemorrhage  Hypertension  Continue diltiazem 120 mg daily and Toprol-XL 75 mg twice daily  Currently holding midodrine in setting of hypertension  Mild protein-calorie malnutrition ([facility])    Body mass index is 20.49 kg/m².     Stage 3 chronic kidney disease, unspecified whether stage 3a or 3b CKD ([facility])  Lab Results   Component Value Date    EGFR 56 01/16/2025    EGFR 56 01/15/2025    EGFR 63 01/14/2025    CREATININE 0.90 01/16/2025    CREATININE 0.90 01/15/2025    CREATININE 0.82 01/14/2025     SDH (subdural hematoma) ([facility])  - Initially presented with fall with subdural and subarachnoid hemorrhage  - CT head 1//2025 showed acute subdural hemorrhage along the left cerebral convexity with maximal thickness of 1.9 cm, acute subarachnoid hemorrhage in the left frontoparietal sulci, left sylvian fissure and suprasellar cistern  -Patient elected for conservative/nonsurgical management  SAH (subarachnoid hemorrhage) ([facility])    Orthostatic hypotension  Continue to hold midodrine while hypertensive    Acute on chronic diastolic congestive heart failure ([facility])  Wt Readings from Last 3 Encounters:   01/16/25 50.8 kg (112 lb)   01/12/25 53.5 kg (118 lb)   01/10/25 51.1 kg (112 lb 9.6 oz)     - TTE 1/6/2025 showed EF 70%, grade 2 DD, mildly dilated RV, mild LA, mild AI, mild TR, estimated PA pressure 50 mmHg, trivial pericardial effusion  - Not on diuretics prior to admission  - Received furosemide 20 mg IV x 1  "yesterday  - Volume status appears improved, will change to furosemide 20 mg daily to maintain euvolemia  Age-related cognitive decline    Frailty syndrome in geriatric patient      Summary:  -Short acting diltiazem converted to long-acting 120 mg daily  - Continue with diltiazem 120 mg daily and Toprol-XL 75 mg twice daily  - Appears euvolemic on exam, start Lasix 20 mg p.o. daily to maintain euvolemia  - Stable from cardiac standpoint for transfer back to rehab    Subjective:   No significant events overnight.  She reports feeling okay this afternoon.  She continues to have some back pain which is chronic for her.  Denies chest pain, shortness of breath, orthopnea, abdominal pain, nausea, vomiting, fever, chills, or palpitations.    Objective:     Vitals: Blood pressure 122/54, pulse 76, temperature 98 °F (36.7 °C), temperature source Temporal, resp. rate 18, height 5' 2\" (1.575 m), weight 50.8 kg (112 lb), SpO2 96%, not currently breastfeeding., Body mass index is 20.49 kg/m².,   Orthostatic Blood Pressures      Flowsheet Row Most Recent Value   Blood Pressure 122/54 filed at 01/16/2025 1225   Patient Position - Orthostatic VS Sitting filed at 01/16/2025 1225              Intake/Output Summary (Last 24 hours) at 1/16/2025 1458  Last data filed at 1/16/2025 1420  Gross per 24 hour   Intake 600 ml   Output 1750 ml   Net -1150 ml           Physical Exam:    GEN: Sandra Purvis appears well, alert and oriented x 3, pleasant and cooperative   HEENT: Mucous membranes moist, no scleral icterus, no conjunctival pallor  NECK: No significant JVD  HEART: Regular rate, frequent ectopic beats, 2 out of 6 systolic murmur  LUNGS: clear to auscultation bilaterally; no wheezes, rales, or rhonchi   ABDOMEN: normal bowel sounds, soft, no tenderness, no distention  EXTREMITIES: peripheral pulses normal; no lower extremity edema   NEURO: no focal findings   SKIN: + Ecchymosis on right side of face and around right " eye        Current Facility-Administered Medications:     acetaminophen (TYLENOL) tablet 975 mg, 975 mg, Oral, Q8H ELIZABETH, Nicolle Junior MD, 975 mg at 01/16/25 1326    bisacodyl (DULCOLAX) rectal suppository 10 mg, 10 mg, Rectal, Daily PRN, Nicolle Junior MD    calcium carbonate (TUMS) chewable tablet 500 mg, 500 mg, Oral, Daily PRN, Nicolle Junior MD    diltiazem (CARDIZEM CD) 24 hr capsule 120 mg, 120 mg, Oral, Daily, Nicolle Junior MD, 120 mg at 01/16/25 1230    enoxaparin (LOVENOX) subcutaneous injection 30 mg, 30 mg, Subcutaneous, Q24H, Nicolle Junior MD, 30 mg at 01/15/25 2103    melatonin tablet 3 mg, 3 mg, Oral, HS, Nicolle Junior MD, 3 mg at 01/15/25 2104    metoprolol succinate (TOPROL-XL) 24 hr tablet 75 mg, 75 mg, Oral, BID, Nicolle Junior MD, 75 mg at 01/16/25 0813    [Held by provider] midodrine (PROAMATINE) tablet 5 mg, 5 mg, Oral, TID PRN, Nicolle Junior MD    ondansetron (ZOFRAN) injection 4 mg, 4 mg, Intravenous, Q6H PRN, Nicolle Junior MD    ondansetron (ZOFRAN-ODT) dispersible tablet 4 mg, 4 mg, Oral, Q6H PRN, Nicolle Junior MD    oxyCODONE (ROXICODONE) split tablet 2.5 mg, 2.5 mg, Oral, Q6H PRN **OR** oxyCODONE (ROXICODONE) IR tablet 5 mg, 5 mg, Oral, Q6H PRN, Nicolle Junior MD    pantoprazole (PROTONIX) EC tablet 40 mg, 40 mg, Oral, Early Morning, Nicolle Junior MD, 40 mg at 01/16/25 0518    polyethylene glycol (MIRALAX) packet 17 g, 17 g, Oral, Daily, Nicolle Junior MD, 17 g at 01/16/25 0813    senna-docusate sodium (SENOKOT S) 8.6-50 mg per tablet 1 tablet, 1 tablet, Oral, HS, Nicolle Junior MD, 1 tablet at 01/15/25 2106    Labs & Results:    Lab Results   Component Value Date    TROPONINI 0.05 (H) 09/01/2015    TROPONINI 0.04 08/31/2015       Lab Results   Component Value Date    GLUCOSE 104 09/02/2015    CALCIUM 9.0 01/16/2025     (L) 09/02/2015    K 3.6 01/16/2025    CO2 31 01/16/2025     "CL 95 (L) 01/16/2025    BUN 17 01/16/2025    CREATININE 0.90 01/16/2025       Lab Results   Component Value Date    WBC 12.88 (H) 01/16/2025    HGB 10.0 (L) 01/16/2025    HCT 31.7 (L) 01/16/2025    MCV 89 01/16/2025     01/16/2025           No results found for: \"CHOL\"  No results found for: \"HDL\"  No results found for: \"LDLCALC\"  No results found for: \"TRIG\"    Lab Results   Component Value Date    ALT 40 01/15/2025    AST 31 01/15/2025    ALKPHOS 83 01/15/2025         EKG personally reviewed by )Mehul Millan MD. No acute changes          "

## 2025-01-16 NOTE — ASSESSMENT & PLAN NOTE
Wt Readings from Last 3 Encounters:   01/16/25 50.8 kg (112 lb)   01/12/25 53.5 kg (118 lb)   01/10/25 51.1 kg (112 lb 9.6 oz)     Recent 2D echo reviewed, grade 2 diastolic dysfunction, pulmonary hypertension, trivial pericardial effusion  Noted for pulmonary edema in the setting of rapid A-fib  Cardiology consult has been requested, input appreciated  Received Lasix 20 mg IV 01/14, given an additional dose 1/15 - appears euvolemic today

## 2025-01-16 NOTE — PROGRESS NOTES
Progress Note - Hospitalist   Name: Sandra Purvis 89 y.o. female I MRN: [id number]  Unit/Bed#: 5T -01 I Date of Admission: 1/14/2025   Date of Service: 1/16/2025 I Hospital Day: 2    Assessment & Plan  Atrial fibrillation with rapid ventricular response (HCC)  This is a 89-year-old female patient who was recently admitted to [facility] ICU secondary to fall with resultant subdural hematoma, subarachnoid hematoma and facial fractures and discharged to [facility] who was transferred to medical service due to rapid A-fib, hypertension associated with generalized weakness and lightheadedness  The patient was seen by cardiology the day prior to admission and her metoprolol succinate dosing was increased to 75 mg twice daily, however without significant improvement in her rates  Her [id number] score is calculated at 5, however given recent intracranial hemorrhage anticoagulation is currently contraindicated  Her heart rates were 120-130 bpm on admission, now around 90 following Cardizem 15 mg IV x1, has been on scheduled PO Cardizem 30 mg Q6H now converted to NSR with occasional PVCs. Transition to Cardizem  mg daily  Infectious workup unremarkable, Abx discontinued  Chest x-ray with pulmonary edema, given Lasix 20 mg IV 1/14, and 1/15 with good response  Reviewed recent 2D echo 1/6/25 noted from grade II DD, pulmonary HTN, trivial pericardial effusion - no need to update  Appreciate cardiology input  Hypertension  Patient is on metoprolol but dose recently increased in setting of rapid heart rates, A-fib, had also been on midodrine due to postural hypotension  She presents with elevated blood pressures now improved, please see above under hypertensive urgency  Mild protein-calorie malnutrition (HCC)  Malnutrition Findings:      BMI Findings:  21.58     Body mass index is 20.49 kg/m².   Request nutrition evaluation  Stage 3 chronic kidney disease, unspecified whether stage 3a or 3b CKD (HCC)  Lab  "Results   Component Value Date    EGFR 56 01/16/2025    EGFR 56 01/15/2025    EGFR 63 01/14/2025    CREATININE 0.90 01/16/2025    CREATININE 0.90 01/15/2025    CREATININE 0.82 01/14/2025     At baseline  Primary insomnia  Patient with age-related cognitive decline and \"sundowning\" per discussion with family  She is on melatonin 3 mg HS -appreciate geriatric medicine input, continue  Mental status appears improved today, 1/15  SDH (subdural hematoma) (HCC)  Please refer to above, recent hospitalization with traumatic ICH following fall  Patient has elected conservative/non-surgical approach  Monitor neurologic exam  SAH (subarachnoid hemorrhage) (HCC)  As above  Orthostatic hypotension  Patient presents in rapid afib, hypertensive urgency  She is on midodrine - hold for now, no significant orthostatic hypotension currently observed; may need to introduce at 2.5 mg TID if develops episodes  Monitor orthostatic Bps Q shift  Acute on chronic diastolic congestive heart failure (HCC)  Wt Readings from Last 3 Encounters:   01/16/25 50.8 kg (112 lb)   01/12/25 53.5 kg (118 lb)   01/10/25 51.1 kg (112 lb 9.6 oz)     Recent 2D echo reviewed, grade 2 diastolic dysfunction, pulmonary hypertension, trivial pericardial effusion  Noted for pulmonary edema in the setting of rapid A-fib  Cardiology consult has been requested, input appreciated  Received Lasix 20 mg IV 01/14, given an additional dose 1/15 - appears euvolemic today          Hypertensive urgency  Patient presents with rapid afib, accelerated BP with SBP in the 190's  BP improved now, hypertensive urgency resolved  She is on midodrine due to orthostatic hypotension - hold for now, may need to reintroduce at a lower dose  Continue metoprolol succinate 75 mg twice daily  Cardizem 30 mg every 6 hours - changed to Cardizem 120 mg XR daily today 1/16  Discontinued amlodipine  Age-related cognitive decline  Per my discussion with patient's family history has been noted for " cognitive decline, sundowning  Her mental status has declined since recent hospitalization, appears improved today  Geriatric medicine consult appreciated, continue melatonin for sleep regulation, supportive measures  SIRS (systemic inflammatory response syndrome) (HCC)  Admission CBC reviewed, noted for leukocytosis at 14,000 although has been having persistent leukocytosis likely due to intracranial bleed  The patient does not meet SIRS criteria with leukocytosis and tachycardia and given rapid A-fib we will pursue infectious workup  Infectious workup negative  Received IV fluids, discontinued with evidence of fluid overload chest x-ray  Stable off Abx discontinued 1/15  Ambulatory dysfunction  Anticipated to return back to [facility]    VTE Pharmacologic Prophylaxis: VTE Score: 6 High Risk (Score >/= 5) - Pharmacological DVT Prophylaxis Ordered: enoxaparin (Lovenox). Sequential Compression Devices Ordered.    Mobility:   Basic Mobility Inpatient Raw Score: 12  JH-HLM Goal: 4: Move to chair/commode  JH-HLM Achieved: 6: Walk 10 steps or more  JH-HLM Goal NOT achieved. Continue with multidisciplinary rounding and encourage appropriate mobility to improve upon JH-HLM goals.    Patient Centered Rounds: I performed bedside rounds with nursing staff today.   Discussions with Specialists or Other Care Team Provider: CM, Cardiology    Education and Discussions with Family / Patient: ongoing update of family    Current Length of Stay: 2 day(s)  Current Patient Status: Inpatient   Certification Statement: The patient will continue to require additional inpatient hospital stay due to close monitoring  Discharge Plan: Anticipate discharge tomorrow to rehab facility.    Code Status: Level 3 - DNAR and DNI    Subjective   No acute complaints overnight events.    Objective :  Temp:  [97.5 °F (36.4 °C)-98.3 °F (36.8 °C)] 97.5 °F (36.4 °C)  HR:  [56-94] 56  BP: (111-175)/(55-82) 160/82  Resp:  [16-18] 16  SpO2:  [92 %-98 %] 92 %  O2  Device: None (Room air)    Body mass index is 20.49 kg/m².     Input and Output Summary (last 24 hours):     Intake/Output Summary (Last 24 hours) at 1/16/2025 1129  Last data filed at 1/16/2025 1000  Gross per 24 hour   Intake 600 ml   Output 1500 ml   Net -900 ml       Physical Exam  Constitutional:       General: She is not in acute distress.  HENT:      Head: Normocephalic.      Comments: Facial bruising  Eyes:      Conjunctiva/sclera: Conjunctivae normal.   Cardiovascular:      Rate and Rhythm: Normal rate and regular rhythm.   Pulmonary:      Effort: No respiratory distress.      Breath sounds: No wheezing or rales.   Abdominal:      General: There is no distension.      Tenderness: There is no abdominal tenderness. There is no guarding.   Musculoskeletal:      Right lower leg: No edema.      Left lower leg: No edema.   Skin:     General: Skin is warm and dry.   Neurological:      Mental Status: Mental status is at baseline.         Lines/Drains:  Lines/Drains/Airways       Active Status       Name Placement date Placement time Site Days    External Urinary Catheter 01/05/25  0100  -- 11                            Lab Results: I have reviewed the following results:   Results from last 7 days   Lab Units 01/16/25  0527   WBC Thousand/uL 12.88*   HEMOGLOBIN g/dL 10.0*   HEMATOCRIT % 31.7*   PLATELETS Thousands/uL 290   SEGS PCT % 83*   LYMPHO PCT % 9*   MONO PCT % 6   EOS PCT % 1     Results from last 7 days   Lab Units 01/16/25  0527 01/15/25  0517   SODIUM mmol/L 134* 134*   POTASSIUM mmol/L 3.6 3.3*   CHLORIDE mmol/L 95* 93*   CO2 mmol/L 31 30   BUN mg/dL 17 18   CREATININE mg/dL 0.90 0.90   ANION GAP mmol/L 8 11   CALCIUM mg/dL 9.0 9.5   ALBUMIN g/dL  --  3.9   TOTAL BILIRUBIN mg/dL  --  0.66   ALK PHOS U/L  --  83   ALT U/L  --  40   AST U/L  --  31   GLUCOSE RANDOM mg/dL 107 114                 Results from last 7 days   Lab Units 01/15/25  0517 01/14/25  1605   LACTIC ACID mmol/L  --  0.9   PROCALCITONIN  ng/ml 0.13 0.11       Recent Cultures (last 7 days):   Results from last 7 days   Lab Units 01/14/25  1607   BLOOD CULTURE  No Growth at 24 hrs.  No Growth at 24 hrs.             Last 24 Hours Medication List:     Current Facility-Administered Medications:     acetaminophen (TYLENOL) tablet 975 mg, Q8H ELIZABETH    bisacodyl (DULCOLAX) rectal suppository 10 mg, Daily PRN    calcium carbonate (TUMS) chewable tablet 500 mg, Daily PRN    diltiazem (CARDIZEM CD) 24 hr capsule 120 mg, Daily    enoxaparin (LOVENOX) subcutaneous injection 30 mg, Q24H    melatonin tablet 3 mg, HS    metoprolol succinate (TOPROL-XL) 24 hr tablet 75 mg, BID    [Held by provider] midodrine (PROAMATINE) tablet 5 mg, TID PRN    ondansetron (ZOFRAN) injection 4 mg, Q6H PRN    ondansetron (ZOFRAN-ODT) dispersible tablet 4 mg, Q6H PRN    oxyCODONE (ROXICODONE) split tablet 2.5 mg, Q6H PRN **OR** oxyCODONE (ROXICODONE) IR tablet 5 mg, Q6H PRN    pantoprazole (PROTONIX) EC tablet 40 mg, Early Morning    polyethylene glycol (MIRALAX) packet 17 g, Daily    senna-docusate sodium (SENOKOT S) 8.6-50 mg per tablet 1 tablet, HS    Administrative Statements   Today, Patient Was Seen By: Nicolle Junior MD  I have spent a total time of 51 minutes in caring for this patient on the day of the visit/encounter including Patient and family education, Reviewing / ordering tests, medicine, procedures  , and Communicating with other healthcare professionals .    **Please Note: This note may have been constructed using a voice recognition system.**

## 2025-01-16 NOTE — ASSESSMENT & PLAN NOTE
Wt Readings from Last 3 Encounters:   01/16/25 50.8 kg (112 lb)   01/12/25 53.5 kg (118 lb)   01/10/25 51.1 kg (112 lb 9.6 oz)     - TTE 1/6/2025 showed EF 70%, grade 2 DD, mildly dilated RV, mild LA, mild AI, mild TR, estimated PA pressure 50 mmHg, trivial pericardial effusion  - Not on diuretics prior to admission  - Received furosemide 20 mg IV x 1 yesterday  - Volume status appears improved, will change to furosemide 20 mg daily to maintain euvolemia

## 2025-01-16 NOTE — SPEECH THERAPY NOTE
[facility] Speech Therapy Discharge Summary    Pt discharged to acute care setting due to change in medical status and need for further medical intervention. Pt was evaluated for cognitive linguistic skills and dysphagia, however, due to transfer to acute care setting, only evaluation was completed and no further follow up therapy sessions were able to be completed. Pt was found to be presenting with severe cognitive linguistic deficits and was functioning at mod assist for comprehension, min assist for expression and max assist for problem solving and memory. Pt was also evaluated for swallow function where pt presenting with WFL to minimally impaired oral swallow skills and WFL pharyngeal swallow skills. She was continued on a regular diet with thin liquids but encouraged to choose softer solids due to tooth pain when chewing harder items. Pt is recommended for additional skilled SLP services once medically stable in order to maximize overall functional cognitive linguistic skills and for brief follow up regarding swallow function.

## 2025-01-16 NOTE — ASSESSMENT & PLAN NOTE
Malnutrition Findings:      BMI Findings:  21.58     Body mass index is 20.49 kg/m².   Request nutrition evaluation

## 2025-01-16 NOTE — PROGRESS NOTES
Patient:    MRN:  [id number]    Analia Request ID:  [id number]    Level of care reserved:  Inpatient Rehab Facility    Partner Reserved:  [facility] ([place]), KONRAD Reed [id number] [phone]    Clinical needs requested:    Geography searched:  10 miles around [id number]    Start of Service:    Request sent:  3:28pm EST on 1/15/2025 by Jaja Jenkins    Partner reserved:  11:05am EST on 1/16/2025 by Jaja Jenkins    Choice list shared:

## 2025-01-16 NOTE — ASSESSMENT & PLAN NOTE
This is a 89-year-old female patient who was recently admitted to [facility] ICU secondary to fall with resultant subdural hematoma, subarachnoid hematoma and facial fractures and discharged to [facility] who was transferred to medical service due to rapid A-fib, hypertension associated with generalized weakness and lightheadedness  The patient was seen by cardiology the day prior to admission and her metoprolol succinate dosing was increased to 75 mg twice daily, however without significant improvement in her rates  Her [id number] score is calculated at 5, however given recent intracranial hemorrhage anticoagulation is currently contraindicated  Her heart rates were 120-130 bpm on admission, now around 90 following Cardizem 15 mg IV x1, has been on scheduled PO Cardizem 30 mg Q6H now converted to NSR with occasional PVCs. Transition to Cardizem  mg daily  Infectious workup unremarkable, Abx discontinued  Chest x-ray with pulmonary edema, given Lasix 20 mg IV 1/14, and 1/15 with good response  Reviewed recent 2D echo 1/6/25 noted from grade II DD, pulmonary HTN, trivial pericardial effusion - no need to update  Appreciate cardiology input

## 2025-01-16 NOTE — ASSESSMENT & PLAN NOTE
- Initially presented with fall with subdural and subarachnoid hemorrhage  - CT head 1//2025 showed acute subdural hemorrhage along the left cerebral convexity with maximal thickness of 1.9 cm, acute subarachnoid hemorrhage in the left frontoparietal sulci, left sylvian fissure and suprasellar cistern  -Patient elected for conservative/nonsurgical management

## 2025-01-16 NOTE — ASSESSMENT & PLAN NOTE
Admission CBC reviewed, noted for leukocytosis at 14,000 although has been having persistent leukocytosis likely due to intracranial bleed  The patient does not meet SIRS criteria with leukocytosis and tachycardia and given rapid A-fib we will pursue infectious workup  Infectious workup negative  Received IV fluids, discontinued with evidence of fluid overload chest x-ray  Stable off Abx discontinued 1/15

## 2025-01-16 NOTE — NURSING NOTE
Patient was assit of 2 with rolling walker and oob in recliner for 2 hours. 2 assit back to bed. Adult brief changed and new pure wic placed.

## 2025-01-17 ENCOUNTER — HOSPITAL ENCOUNTER (INPATIENT)
Facility: HOSPITAL | Age: OVER 89
LOS: 17 days | DRG: 949 | End: 2025-02-03
Attending: INTERNAL MEDICINE | Admitting: INTERNAL MEDICINE
Payer: MEDICARE

## 2025-01-17 VITALS
WEIGHT: 112.6 LBS | TEMPERATURE: 98.1 F | HEIGHT: 62 IN | DIASTOLIC BLOOD PRESSURE: 59 MMHG | OXYGEN SATURATION: 98 % | RESPIRATION RATE: 18 BRPM | BODY MASS INDEX: 20.72 KG/M2 | SYSTOLIC BLOOD PRESSURE: 123 MMHG | HEART RATE: 63 BPM

## 2025-01-17 DIAGNOSIS — D64.9 ANEMIA: ICD-10-CM

## 2025-01-17 DIAGNOSIS — L98.9 ARM SKIN LESION, LEFT: ICD-10-CM

## 2025-01-17 DIAGNOSIS — I10 HYPERTENSION: ICD-10-CM

## 2025-01-17 DIAGNOSIS — F43.10 PTSD (POST-TRAUMATIC STRESS DISORDER): ICD-10-CM

## 2025-01-17 DIAGNOSIS — R93.5 ABNORMAL CT OF THE ABDOMEN: ICD-10-CM

## 2025-01-17 DIAGNOSIS — R26.2 AMBULATORY DYSFUNCTION: ICD-10-CM

## 2025-01-17 DIAGNOSIS — R54 FRAILTY SYNDROME IN GERIATRIC PATIENT: ICD-10-CM

## 2025-01-17 DIAGNOSIS — K21.00 GASTROESOPHAGEAL REFLUX DISEASE WITH ESOPHAGITIS WITHOUT HEMORRHAGE: ICD-10-CM

## 2025-01-17 DIAGNOSIS — N18.30 STAGE 3 CHRONIC KIDNEY DISEASE, UNSPECIFIED WHETHER STAGE 3A OR 3B CKD (HCC): ICD-10-CM

## 2025-01-17 DIAGNOSIS — R68.89 COPIOUS ORAL SECRETIONS: ICD-10-CM

## 2025-01-17 DIAGNOSIS — I60.9 SAH (SUBARACHNOID HEMORRHAGE) (HCC): ICD-10-CM

## 2025-01-17 DIAGNOSIS — G47.00 INSOMNIA: ICD-10-CM

## 2025-01-17 DIAGNOSIS — N30.00 ACUTE CYSTITIS: ICD-10-CM

## 2025-01-17 DIAGNOSIS — R41.81 AGE-RELATED COGNITIVE DECLINE: ICD-10-CM

## 2025-01-17 DIAGNOSIS — S06.5XAA SDH (SUBDURAL HEMATOMA) (HCC): ICD-10-CM

## 2025-01-17 DIAGNOSIS — R13.19 ESOPHAGEAL DYSPHAGIA: ICD-10-CM

## 2025-01-17 DIAGNOSIS — I95.1 ORTHOSTATIC HYPOTENSION: Primary | ICD-10-CM

## 2025-01-17 PROBLEM — E87.6 HYPOKALEMIA: Status: ACTIVE | Noted: 2025-01-17

## 2025-01-17 LAB
ANION GAP SERPL CALCULATED.3IONS-SCNC: 6 MMOL/L (ref 4–13)
BASOPHILS # BLD AUTO: 0.04 THOUSANDS/ΜL (ref 0–0.1)
BASOPHILS NFR BLD AUTO: 0 % (ref 0–1)
BUN SERPL-MCNC: 15 MG/DL (ref 5–25)
CALCIUM SERPL-MCNC: 8.6 MG/DL (ref 8.4–10.2)
CHLORIDE SERPL-SCNC: 97 MMOL/L (ref 96–108)
CO2 SERPL-SCNC: 30 MMOL/L (ref 21–32)
CREAT SERPL-MCNC: 0.81 MG/DL (ref 0.6–1.3)
EOSINOPHIL # BLD AUTO: 0.24 THOUSAND/ΜL (ref 0–0.61)
EOSINOPHIL NFR BLD AUTO: 2 % (ref 0–6)
ERYTHROCYTE [DISTWIDTH] IN BLOOD BY AUTOMATED COUNT: 15.7 % (ref 11.6–15.1)
GFR SERPL CREATININE-BSD FRML MDRD: 64 ML/MIN/1.73SQ M
GLUCOSE SERPL-MCNC: 106 MG/DL (ref 65–140)
HCT VFR BLD AUTO: 28.6 % (ref 34.8–46.1)
HGB BLD-MCNC: 9.4 G/DL (ref 11.5–15.4)
IMM GRANULOCYTES # BLD AUTO: 0.05 THOUSAND/UL (ref 0–0.2)
IMM GRANULOCYTES NFR BLD AUTO: 0 % (ref 0–2)
LYMPHOCYTES # BLD AUTO: 1.2 THOUSANDS/ΜL (ref 0.6–4.47)
LYMPHOCYTES NFR BLD AUTO: 10 % (ref 14–44)
MAGNESIUM SERPL-MCNC: 1.9 MG/DL (ref 1.9–2.7)
MCH RBC QN AUTO: 28.7 PG (ref 26.8–34.3)
MCHC RBC AUTO-ENTMCNC: 32.9 G/DL (ref 31.4–37.4)
MCV RBC AUTO: 88 FL (ref 82–98)
MONOCYTES # BLD AUTO: 0.74 THOUSAND/ΜL (ref 0.17–1.22)
MONOCYTES NFR BLD AUTO: 6 % (ref 4–12)
NEUTROPHILS # BLD AUTO: 9.81 THOUSANDS/ΜL (ref 1.85–7.62)
NEUTS SEG NFR BLD AUTO: 82 % (ref 43–75)
NRBC BLD AUTO-RTO: 0 /100 WBCS
PLATELET # BLD AUTO: 267 THOUSANDS/UL (ref 149–390)
PMV BLD AUTO: 10.2 FL (ref 8.9–12.7)
POTASSIUM SERPL-SCNC: 3.4 MMOL/L (ref 3.5–5.3)
RBC # BLD AUTO: 3.27 MILLION/UL (ref 3.81–5.12)
SODIUM SERPL-SCNC: 133 MMOL/L (ref 135–147)
WBC # BLD AUTO: 12.08 THOUSAND/UL (ref 4.31–10.16)

## 2025-01-17 PROCEDURE — 85025 COMPLETE CBC W/AUTO DIFF WBC: CPT | Performed by: FAMILY MEDICINE

## 2025-01-17 PROCEDURE — 80048 BASIC METABOLIC PNL TOTAL CA: CPT | Performed by: FAMILY MEDICINE

## 2025-01-17 PROCEDURE — 92526 ORAL FUNCTION THERAPY: CPT

## 2025-01-17 PROCEDURE — 99239 HOSP IP/OBS DSCHRG MGMT >30: CPT | Performed by: FAMILY MEDICINE

## 2025-01-17 PROCEDURE — NC001 PR NO CHARGE: Performed by: INTERNAL MEDICINE

## 2025-01-17 PROCEDURE — 99223 1ST HOSP IP/OBS HIGH 75: CPT | Performed by: INTERNAL MEDICINE

## 2025-01-17 PROCEDURE — 83735 ASSAY OF MAGNESIUM: CPT | Performed by: FAMILY MEDICINE

## 2025-01-17 RX ORDER — CALCIUM CARBONATE 500 MG/1
500 TABLET, CHEWABLE ORAL DAILY PRN
Status: DISCONTINUED | OUTPATIENT
Start: 2025-01-17 | End: 2025-02-03 | Stop reason: HOSPADM

## 2025-01-17 RX ORDER — FUROSEMIDE 20 MG/1
20 TABLET ORAL DAILY
Qty: 30 TABLET | Refills: 0 | Status: ON HOLD | OUTPATIENT
Start: 2025-01-18

## 2025-01-17 RX ORDER — AMOXICILLIN 250 MG
1 CAPSULE ORAL
Status: DISCONTINUED | OUTPATIENT
Start: 2025-01-17 | End: 2025-01-23

## 2025-01-17 RX ORDER — ENOXAPARIN SODIUM 100 MG/ML
30 INJECTION SUBCUTANEOUS EVERY 24 HOURS
Status: DISCONTINUED | OUTPATIENT
Start: 2025-01-17 | End: 2025-02-03 | Stop reason: HOSPADM

## 2025-01-17 RX ORDER — BISACODYL 10 MG
10 SUPPOSITORY, RECTAL RECTAL DAILY PRN
Status: DISCONTINUED | OUTPATIENT
Start: 2025-01-17 | End: 2025-01-27

## 2025-01-17 RX ORDER — ONDANSETRON 4 MG/1
4 TABLET, ORALLY DISINTEGRATING ORAL EVERY 6 HOURS PRN
Status: DISCONTINUED | OUTPATIENT
Start: 2025-01-17 | End: 2025-02-03 | Stop reason: HOSPADM

## 2025-01-17 RX ORDER — MIDODRINE HYDROCHLORIDE 5 MG/1
5 TABLET ORAL 3 TIMES DAILY PRN
Status: DISCONTINUED | OUTPATIENT
Start: 2025-01-17 | End: 2025-02-03 | Stop reason: HOSPADM

## 2025-01-17 RX ORDER — PANTOPRAZOLE SODIUM 40 MG/1
40 TABLET, DELAYED RELEASE ORAL
Status: DISCONTINUED | OUTPATIENT
Start: 2025-01-18 | End: 2025-02-03 | Stop reason: HOSPADM

## 2025-01-17 RX ORDER — DILTIAZEM HYDROCHLORIDE 120 MG/1
120 CAPSULE, COATED, EXTENDED RELEASE ORAL DAILY
Qty: 30 CAPSULE | Refills: 0 | Status: ON HOLD | OUTPATIENT
Start: 2025-01-18

## 2025-01-17 RX ORDER — POTASSIUM CHLORIDE 1500 MG/1
40 TABLET, EXTENDED RELEASE ORAL ONCE
Status: COMPLETED | OUTPATIENT
Start: 2025-01-17 | End: 2025-01-17

## 2025-01-17 RX ORDER — OXYCODONE HYDROCHLORIDE 5 MG/1
5 TABLET ORAL EVERY 6 HOURS PRN
Refills: 0 | Status: DISCONTINUED | OUTPATIENT
Start: 2025-01-17 | End: 2025-01-30

## 2025-01-17 RX ORDER — ACETAMINOPHEN 325 MG/1
975 TABLET ORAL EVERY 8 HOURS SCHEDULED
Status: DISCONTINUED | OUTPATIENT
Start: 2025-01-17 | End: 2025-01-30

## 2025-01-17 RX ORDER — DILTIAZEM HYDROCHLORIDE 120 MG/1
120 CAPSULE, COATED, EXTENDED RELEASE ORAL DAILY
Status: DISCONTINUED | OUTPATIENT
Start: 2025-01-18 | End: 2025-01-28

## 2025-01-17 RX ORDER — POLYETHYLENE GLYCOL 3350 17 G/17G
17 POWDER, FOR SOLUTION ORAL DAILY
Status: DISCONTINUED | OUTPATIENT
Start: 2025-01-18 | End: 2025-01-23

## 2025-01-17 RX ORDER — FUROSEMIDE 20 MG/1
20 TABLET ORAL DAILY
Status: DISCONTINUED | OUTPATIENT
Start: 2025-01-18 | End: 2025-01-21

## 2025-01-17 RX ORDER — ONDANSETRON 2 MG/ML
4 INJECTION INTRAMUSCULAR; INTRAVENOUS EVERY 6 HOURS PRN
Status: DISCONTINUED | OUTPATIENT
Start: 2025-01-17 | End: 2025-01-17

## 2025-01-17 RX ADMIN — MELATONIN TAB 3 MG 3 MG: 3 TAB at 21:06

## 2025-01-17 RX ADMIN — OXYCODONE 2.5 MG: 5 TABLET ORAL at 09:12

## 2025-01-17 RX ADMIN — POTASSIUM CHLORIDE 40 MEQ: 1500 TABLET, EXTENDED RELEASE ORAL at 09:13

## 2025-01-17 RX ADMIN — ACETAMINOPHEN 975 MG: 325 TABLET, FILM COATED ORAL at 05:46

## 2025-01-17 RX ADMIN — ACETAMINOPHEN 975 MG: 325 TABLET, FILM COATED ORAL at 21:06

## 2025-01-17 RX ADMIN — METOPROLOL SUCCINATE 75 MG: 25 TABLET, FILM COATED, EXTENDED RELEASE ORAL at 08:32

## 2025-01-17 RX ADMIN — Medication 2.5 MG: at 00:32

## 2025-01-17 RX ADMIN — POLYETHYLENE GLYCOL 3350 17 G: 17 POWDER, FOR SOLUTION ORAL at 08:32

## 2025-01-17 RX ADMIN — ACETAMINOPHEN 975 MG: 325 TABLET, FILM COATED ORAL at 14:59

## 2025-01-17 RX ADMIN — SENNOSIDES AND DOCUSATE SODIUM 1 TABLET: 50; 8.6 TABLET ORAL at 21:06

## 2025-01-17 RX ADMIN — PANTOPRAZOLE SODIUM 40 MG: 40 TABLET, DELAYED RELEASE ORAL at 05:46

## 2025-01-17 RX ADMIN — ENOXAPARIN SODIUM 30 MG: 30 INJECTION SUBCUTANEOUS at 21:06

## 2025-01-17 RX ADMIN — DILTIAZEM HYDROCHLORIDE 120 MG: 120 CAPSULE, COATED, EXTENDED RELEASE ORAL at 08:32

## 2025-01-17 RX ADMIN — FUROSEMIDE 20 MG: 20 TABLET ORAL at 08:32

## 2025-01-17 RX ADMIN — METOPROLOL SUCCINATE 75 MG: 50 TABLET, EXTENDED RELEASE ORAL at 21:06

## 2025-01-17 NOTE — ASSESSMENT & PLAN NOTE
This is a 89-year-old female patient who was recently admitted to [facility] ICU secondary to fall with resultant subdural hematoma, subarachnoid hematoma and facial fractures and discharged to [facility] who was transferred to medical service due to rapid A-fib, hypertension associated with generalized weakness and lightheadedness  The patient was seen by cardiology the day prior to admission and her metoprolol succinate dosing was increased to 75 mg twice daily, however without significant improvement in her rates  Her [id number] score is calculated at 5, however given recent intracranial hemorrhage anticoagulation is currently contraindicated  Her heart rates were 120-130 bpm on admission, now around 90 following Cardizem 15 mg IV x1, has been on scheduled PO Cardizem 30 mg Q6H now converted to NSR with occasional PVCs. Transition to Cardizem  mg daily  Infectious workup unremarkable, Abx discontinued  Chest x-ray with pulmonary edema, given Lasix 20 mg IV 1/14, and 1/15 with good response  Reviewed recent 2D echo 1/6/25 noted from grade II DD, pulmonary HTN, trivial pericardial effusion - no need to update  Appreciate cardiology input  Discharge on Cardizem 120 mg XR daily, Lasix 20 mg daily

## 2025-01-17 NOTE — H&P
PHYSICAL MEDICINE AND REHABILITATION [facility] REHAB CONSULT  Sandra Purvis 89 y.o. female MRN: [id number]  Unit/Bed#: 5T -01 Encounter: [id number]     Rehab Diagnosis: {ARC Rehab Diagnosis:23035}  Etiologic Diagnosis:     History of Present Illness:   Sandra Purvis is a 89 y.o. female who presented to the [facility] ***     Subjective:     Review of Systems: A 10-point review of systems was performed. Negative except as listed above.    Plan:     No new Assessment & Plan notes have been filed under this hospital service since the last note was generated.  Service: PMR      Health Maintenance  #Delirium/Sleep: At risk. Optimize sleep/wake, pain, bowel, bladder management. Avoid deliriogenic meds and physical restraint. Environmental/behavioral interventions.   #Pain:  #Bowel:   #Bladder:  #Skin/Pressure Injury Prevention: Turn Q2hr in bed, with weight shifts [id number] in wheelchair. Float heels in bed.  #DVT Prophylaxis:  #GI Prophylaxis:  #Code Status  #FEN  #Dispo    {adptimespent:20614}   Drug regimen reviewed, all potential adverse effects identified and addressed:    Scheduled Meds:  Current Facility-Administered Medications   Medication Dose Route Frequency Provider Last Rate    acetaminophen  975 mg Oral Q8H [facility] Nicolle Junior MD      bisacodyl  10 mg Rectal Daily PRN Nicolle Junior MD      calcium carbonate  500 mg Oral Daily PRN Nicolle Junior MD      diltiazem  120 mg Oral Daily Nicolle Junior MD      enoxaparin  30 mg Subcutaneous Q24H Nicolle Junior MD      furosemide  20 mg Oral Daily Mehul Millan MD      melatonin  3 mg Oral HS Nicolle Junior MD      metoprolol succinate  75 mg Oral BID Nicolle Junior MD      [Held by provider] midodrine  5 mg Oral TID PRN Nicolle Junior MD      ondansetron  4 mg Intravenous Q6H PRN Nicolle Junior MD      ondansetron  4 mg Oral Q6H PRN Nicolle Junior MD   "    oxyCODONE  2.5 mg Oral Q6H PRN Nicolle Junior MD      Or    oxyCODONE  5 mg Oral Q6H PRN Nicolle Junior MD      pantoprazole  40 mg Oral Early Morning Nicolle Junior MD      polyethylene glycol  17 g Oral Daily Nicolle Junior MD      senna-docusate sodium  1 tablet Oral HS Nicolle Junior MD          Restrictions include:  Fall precautions      Functional History/Home Set-up - Prior to Admission:    Was independent with mobility/ambulation, transfers, ADLs, IADLs    Lives in a single family home   and lives with spouce  Lives in a 1 story home  3 steps to enter  24 hr supervision on discharge    Functional Status Upon Admission to [facility]:  CARE SCORES:  Self Care:  Eatin: Setup or clean-up assistance  Oral hygiene: 04: Supervision or touching  assistance  Toilet hygiene: 02: Substantial/maximal assistance  Shower/bathing self: 02: Substantial/maximal assistance  Upper body dressin: Partial/moderate assistance  Lower body dressin: Substantial/maximal assistance  Putting on/taking off footwear: 09: Not applicable  Transfers:  Roll left and right: 09: Not applicable  Sit to lyin: Not applicable  Lying to sitting on side of bed: 03: Partial/moderate assistance  Sit to stand: 03: Partial/moderate assistance  Chair/bed to chair transfer: 03: Partial/moderate assistance  Toilet transfer: 09: Not applicable  Mobility:  Walk 10 ft: 88: Not attempted due to medical conditions or safety concerns  Walk 50 ft with two turns: 88: Not attempted due to medical conditions or safety concerns  Walk 150ft: 88: Not attempted due to medical conditions or safety concerns      Physical Exam:  Temp:  [97.6 °F (36.4 °C)-98.1 °F (36.7 °C)] 98.1 °F (36.7 °C)  HR:  [63-67] 63  Resp:  [18-20] 18  BP: (123-164)/(59-75) 123/59  SpO2:  [95 %-98 %] 98 %  Physical Exam    General: {General Appearance:09603::\"alert\",\"no apparent " "distress\",\"cooperative\",\"comfortable\"}  {[facility]:83657}  MMT:   Strength:   Right  Left  Site  Right  Left  Site    5 5  S Ab: Shoulder Abductors  5  5  HF: Hip Flexors    5 5  EF: Elbow Flexors  5  5 KF: Knee Flexors    5  5  EE: Elbow Extensors  5  5  KE: Knee Extensors    5  5  WE: Wrist Extensors  5  5  DR: Dorsi Flexors    5  5  FF: Finger Flexors  5  5  PF: Plantar Flexors    5  5  HI: Hand Intrinsics  5  5  EHL: Extensor Hallucis Longus     Laboratory:    Results from last 7 days   Lab Units 01/17/25  0530 01/16/25  0527 01/15/25  0517   HEMOGLOBIN g/dL 9.4* 10.0* 10.0*   HEMATOCRIT % 28.6* 31.7* 30.9*   WBC Thousand/uL 12.08* 12.88* 14.82*     Results from last 7 days   Lab Units 01/17/25  0530 01/16/25  0527 01/15/25  0517   BUN mg/dL 15 17 18   SODIUM mmol/L 133* 134* 134*   POTASSIUM mmol/L 3.4* 3.6 3.3*   CHLORIDE mmol/L 97 95* 93*   CREATININE mg/dL 0.81 0.90 0.90   AST U/L  --   --  31   ALT U/L  --   --  40            Wt Readings from Last 1 Encounters:   01/17/25 51.1 kg (112 lb 9.6 oz)     Estimated body mass index is 20.59 kg/m² as calculated from the following:    Height as of this encounter: 5' 2\" (1.575 m).    Weight as of this encounter: 51.1 kg (112 lb 9.6 oz).    Imaging: reviewed  CT head wo contrast  Result Date: 1/7/2025  Impression: Evolving blood products in the left convexity subdural hematoma without evidence of new hemorrhage. Subdural and subarachnoid hemorrhage also stable when compared to the most recent prior exam. Workstation performed: [id number]      CT head wo contrast  Result Date: 1/5/2025  Impression: Acute subdural and subarachnoid hemorrhage with some redistribution compared to the prior examination. Subdural hemorrhages largest in the left frontal lobe with mild mass effect upon the adjacent brain parenchyma. No midline shift. No new hemorrhage. Multiple right facial fractures are identified with hemorrhage filling the right maxillary sinus and a small amount of " hemorrhage layering within the left maxillary sinus. There is an extracranial ulcerated soft tissue mass in the left parietal soft tissues. Workstation performed: [id number]      CT cervical spine without contrast  Result Date: 1/4/2025  Impression: 1. No cervical spine fracture or traumatic malalignment. 2. Partially imaged diffuse gaseous distention of the esophagus. Further evaluation with chest CT is recommended to evaluate the distal esophagus. I personally discussed this study with YUMI LAL on 1/4/2025 6:56 PM. Workstation performed: [id number]      CT facial bones without contrast  Result Date: 1/4/2025  Impression: 1. Comminuted and mildly displaced fractures of the lateral and anterior walls of right maxilla, lateral and inferior walls of right orbit. 2. Right inferior orbital wall fracture appears to involve the inferior orbital foramen with associated small extraconal soft tissue edema and focus of air. No herniation of extraocular muscles. 3. Nondisplaced fractures of zygomatic arch and the temporal bone at the TMJ. 4. Diffuse opacification of right maxillary sinus and right nasal cavity with blood products and air. I personally discussed this study with YUMI LAL on 1/4/2025 6:56 PM. Workstation performed: [id number]      CT head without contrast  Result Date: 1/4/2025  Impression: 1. Acute subdural hemorrhage along the left cerebral convexity with maximal thickness of 1.9 cm. No midline shift. 2. Acute subarachnoid hemorrhage in the left frontoparietal sulci, left sylvian fissure and suprasellar cistern. 3. Please refer to the separately dictated CT facial bones report for traumatic findings in the face. I personally discussed this study with YUMI LAL on 1/4/2025 6:56 PM. Workstation performed: [id number]      XR chest 1 view portable  Result Date: 1/4/2025  Impression: No acute disease. Distended air-filled esophagus. Moderate cardiomegaly. Workstation performed: [id number]       Rehabilitation Prognosis: good     Tolerance for three hours of therapy a day: good     Family/Patient Goals:  Patient/family's goals: Return to previous home/apartment.    Patient will receive PT, OT, and ST 60 minutes each per day, five days per week to achieve rehab goals or participate in 900 minutes of therapy within a 7 day week period.     Mobility Goals: Supervision / Standby Assist  Transfer Goals: Supervision / Standby Assist  Activities of Daily Living (ADLs) Goals: Supervision / Standby Assist    Discharge Planning:  Rehabilitation and discharge goals discussed with the patient and/or family.    Case Managment and Social Work to review patient/family resources and to coordinate Discharge Planning.    Estimated length of stay: 10 to 14 days    Patient and Family Education and Training:  Rehabilitation and discharge goals discussed with the patient and/or family.  Patient/family education/training needs to be discussed in weekly team meeting.    Equipment/DME needs: {ARC Other Equip:96319}    Past Medical History:   Past Surgical History:   Family History:   Social history:   Past Medical History:   Diagnosis Date    Arthritis     Cataract     LastAssessed:9/8/15    GERD (gastroesophageal reflux disease)     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    Hypertension     PAF (paroxysmal atrial fibrillation) ([facility])     Last Assessed:2/3/16    Wrist fracture     Resolved:9/8/15    Past Surgical History:   Procedure Laterality Date    CATARACT EXTRACTION      Last Assessed:9/8/15    FL RETROGRADE PYELOGRAM  10/17/2021    LYMPHADENECTOMY      Last Assessed:9/8/15    MS CYSTO/URETERO W/LITHOTRIPSY &INDWELL STENT INSRT Right 11/24/2021    Procedure: CYSTOSCOPY URETEROSCOPY WITH LITHOTRIPSY HOLMIUM LASER, RETROGRADE PYELOGRAM AND INSERTION STENT URETERAL;  Surgeon: David Sr MD;  Location: [facility];  Service: Urology    MS CYSTOURETHROSCOPY W/URETERAL CATHETERIZATION Right 10/17/2021    Procedure:  CYSTOSCOPY RETROGRADE PYELOGRAM WITH INSERTION STENT URETERAL;  Surgeon: Benjie Tinoco MD;  Location: [facility];  Service: Urology    TONSILLECTOMY      Last Assessed:9/8/15    WRIST SURGERY      Last Assessed:9/8/15     Family History   Problem Relation Age of Onset    No Known Problems Family       Social History     Socioeconomic History    Marital status: /Civil Union     Spouse name: Not on file    Number of children: Not on file    Years of education: Not on file    Highest education level: Not on file   Occupational History    Occupation: retired   Tobacco Use    Smoking status: Never     Passive exposure: Never    Smokeless tobacco: Never   Vaping Use    Vaping status: Never Used   Substance and Sexual Activity    Alcohol use: Never    Drug use: Never    Sexual activity: Not Currently     Partners: Male   Other Topics Concern    Not on file   Social History Narrative    Not on file     Social Drivers of Health     Financial Resource Strain: Not on file   Food Insecurity: No Food Insecurity (1/14/2025)    Nursing - Inadequate Food Risk Classification     Worried About Running Out of Food in the Last Year: Never true     Ran Out of Food in the Last Year: Never true     Ran Out of Food in the Last Year: Never true   Transportation Needs: No Transportation Needs (1/14/2025)    Nursing - Transportation Risk Classification     Lack of Transportation: Not on file     Lack of Transportation: No   Recent Concern: Transportation Needs - Unmet Transportation Needs (1/12/2025)    Nursing - Transportation Risk Classification     Lack of Transportation: Not on file     Lack of Transportation: Yes   Physical Activity: Not on file   Stress: Not on file   Social Connections: Not on file   Intimate Partner Violence: Unknown (1/14/2025)    Nursing IPS     Feels Physically and Emotionally Safe: Not on file     Physically Hurt by Someone: Not on file     Humiliated or Emotionally Abused by Someone: Not on file      Physically Hurt by Someone: No     Hurt or Threatened by Someone: No   Housing Stability: Unknown (2025)    Nursing: Inadequate Housing Risk Classification     Has Housing: Not on file     Worried About Losing Housing: Not on file     Unable to Get Utilities: Not on file     Unable to Pay for Housing in the Last Year: No     Has Housin          Current Medical Diagnosis Allergies   Patient Active Problem List   Diagnosis    Hypertension    Mild protein-calorie malnutrition (HCC)    Ureteral stone    Bacteremia    Esophagus disorder    Preop examination    Atrial fibrillation with rapid ventricular response ([facility])    Stage 3 chronic kidney disease, unspecified whether stage 3a or 3b CKD ([facility])    GERD (gastroesophageal reflux disease)    COVID-19    Irritant contact dermatitis due to other agents    Scalp lesion    Arm skin lesion, left    Basal cell carcinoma (BCC) of scalp    Malignant melanoma of arm, left ([facility])    Primary insomnia    SDH (subdural hematoma) ([facility])    SAH (subarachnoid hemorrhage) ([facility])    Closed extensive facial fractures ([facility])    Fall    Chest pain    Orthostatic hypotension    Syncope    Anemia    Leukocytosis    Acute on chronic diastolic congestive heart failure (HCC)    Hypertensive urgency    Age-related cognitive decline    SIRS (systemic inflammatory response syndrome) ([facility])    Frailty syndrome in geriatric patient    Ambulatory dysfunction    At risk for delirium    Bilateral hearing loss    Hypokalemia    Allergies   Allergen Reactions    Lactose - Food Allergy GI Intolerance    Penicillins Other (See Comments)     Unsure of reaction            Medical Necessity Criteria for [facility] Admission: Chronic Kidney Disease and Hypertension. In addition, the preadmission screen, post-admission physical evaluation, overall plan of care and admissions order demonstrate a reasonable expectation that the following criteria were met at the time of admission to the [facility].  The patient requires active  and ongoing therapeutic intervention of multiple therapy disciplines (physical therapy, occupational therapy, speech-language pathology, or prosthetics/orthotics), one of which is physical or occupational therapy.    Patient requires an intensive rehabilitation therapy program, as defined in Chapter 1, section 110.2.2 of the CMS Medicare Policy Manual. This intensive rehabilitation therapy program will consist of at least 3 hours of therapy per day at least 5 days per week or at least 15 hours of intensive rehabilitation therapy within a 7 consecutive day period, beginning with the date of admission to the [facility].    The patient is reasonably expected to actively participate in, and benefit significantly from, the intensive rehabilitation therapy program as defined in Chapter 1, section 110.2.2 of the CMS Medicare Policy Manual at this time of admission to the [facility]. She can reasonably be expected to make measurable improvement (that will be of practical value to improve the patient’s functional capacity or adaptation to impairments) as a result of the rehabilitation treatment, as defined in section 110.3, and such improvement can be expected to be made within the prescribed period of time. As noted in the CMS Medicare Policy Manual, the patient need not be expected to achieve complete independence in the domain of self-care nor be expected to return to his or her prior level of functioning in order to meet this standard.  The patient must require physician supervision by a rehabilitation physician. As such, a rehabilitation physician will conduct face-to-face visits with the patient at least 3 days per week throughout the patient’s stay in the [facility] to assess the patient both medically and functionally, as well as to modify the course of treatment as needed to maximize the patient’s capacity to benefit from the rehabilitation process.  The patient requires an intensive and coordinated interdisciplinary approach to  providing rehabilitation, as defined in Chapter 1, section 110.2.5 of the CMS Medicare Policy Manual. This will be achieved through periodic team conferences, conducted at least once in a 7-day period, and comprising of an interdisciplinary team of medical professionals consisting of: a rehabilitation physician, registered nurse,  and/or , and a licensed/certified therapist from each therapy discipline involved in treating the patient.       ** Please Note: Fluency Direct voice to text software may have been used in the creation of this document. **

## 2025-01-17 NOTE — ASSESSMENT & PLAN NOTE
Secondary to fall  CT of the head on 1/4 also showed comminuted and mildly displaced fractures of the lateral and anterior walls of the right maxilla, lateral and inferior walls of the right orbit, right inferior orbital wall fractures appear to involve the inferior orbital foramen with associated small extraconal soft tissue edema and focus of air.  There is no herniation of extraocular muscles and a nondisplaced fracture of the zygomatic arch as well as the temporal bone at the TMJ.  There is diffuse opacification of the right maxillary sinus and right nasal cavity with blood products and air  Seen by oral maxillofacial surgery team and surgical intervention was declined by patient  Completed 7-day course of Unasyn/Augmentin  Sinus precautions  Follow-up with [facility] as an outpatient

## 2025-01-17 NOTE — ARC ADMISSION
Patient is medically cleared for discharge and is approved for admission to [facility] today.  Patient will admit to [facility] room 268-2 and has a 12:30 pm transport time.  Report can be called to [phone].  CM has been updated with same in [facility].

## 2025-01-17 NOTE — ASSESSMENT & PLAN NOTE
Initially presented with sinus bradycardia and evaluated by cardiology and was in controlled atrial fibrillation without bradycardia  Not on anticoagulation prior to admission and not indicated at this point due to the SDH and SAH and overall remains high risk due to risk of falls and given her advanced age  No recent follow-up with cardiology as an outpatient and generally sees her primary care physician for management and was on Toprol 50 mg daily at home which has been changed well and in the hospital  Tachycardic in the hospital and given IV fluids and medication changes include addition of midodrine and changes in the metoprolol dosing  Recommended for aspirin but would likely not be a good long-term candidate for anticoagulation  Cardio rec: continue with cardizem 120 daily and Toprol-XL 75 mg twice daily 1/16

## 2025-01-17 NOTE — H&P
"PHYSICAL MEDICINE AND REHABILITATION ARC REHAB CONSULT  Sandra Purvis 89 y.o. female MRN: [id number]  Unit/Bed#: [facility] 268-02 Encounter: [id number]     Rehab Diagnosis: Impairment of mobility, safety and Activities of Daily Living (ADLs) due to Brain Dysfunction:  02.22  Traumatic, Closed Injury  Etiologic Diagnosis:     History of Present Illness:   Sandra Purvis is a 89 y.o. female who presented to the [facility] on 1/4/25 as a trauma transfer from the [facility] s/p syncope with collapse. Imaging on 1/4 showed \"comminuted and mildly displaced fractures of the lateral and anterior walls of right maxilla, lateral and inferior walls of right orbit. Right inferior orbital wall fracture appears to involve the inferior orbital foramen with associated small extraconal soft tissue edema and focus of air.  No herniation of extraocular muscles.  Nondisplaced fractures of zygomatic arch and the temporal bone at the TMJ.  Diffuse opacification of right maxillary sinus and right nasal cavity with blood products and air\".  OMFS consulted and surgical intervention was discussed with patient by OMFS team but refused by patient.  Patient is to continue with 7-day course of Unasyn/Augmentin and was initiated on sinus precautions.  She is to follow-up with OMFS as an outpatient for continued monitoring and management of fractures.  Neurosurgery was consulted for the SDH/SAH and no surgical intervention was recommended.  Recommended SBP <160.  Neurosurgery signed off.  Patient with c/o abdominal pain and troponin was checked.  Troponin was initially negative and then marques to over 8000. EKG is nonischemic.  Orthostatics positive.  Echocardiogram showed LVEF 70%.  Systolic function vigorous.  Grade 2 diastolic dysfunction.  RV mildly reduced.  Left atrial dilatation mild.  Mild AI.  Telemetry--showed A-fib with RVR.  Cardiology recommended continued hydration orally and with IVF, TEDS and " continuing to monitor orthostatics (improved with IVF).  Midodrine added.  On 1/10, her blood pressures remained somewhat elevated.  She was also a bit more tachycardic, therefore her Lopressor was increased to 50 mg every 8 hours initially with plan to transition to metoprolol to Toprol-XL 50 mg twice daily for improved heart rate and blood pressure control.  Cardiology recommended starting ASA 81 mg daily when able from bleeding perspective.  Neurosurgery plans to repeat CT in 1 weeks time.  Leukocytosis downtrending and WBC 12.08 on 1/17.  Patient received Unasyn through 1/12.  Patient admitted to the [facility] on 1/12.  On 1/14, she developed a-fib with RVR and was transferred back to the acute care setting.  Cardiology was consulted.  Chest x-ray with pulmonary edema, given Lasix 20 mg IV 1/14, and 1/15 with good response.  Reviewed recent 2D echo 1/6/25 noted from grade II DD, pulmonary HTN, trivial pericardial effusion - no need to update.  She will be discharged on Cardizem 120 mg XR daily and Lasix 20 mg daily.  After the medication adjustments, she converted back to normal sinus rhythm.  She worked with therapy and was once again recommended for rehab following her hospitalization.  She has demonstrated that she can tolerate three hours of therapy, five days a week and is medically stable for re-admission to [facility]  Sandra was readmitted to the [facility] on 1/17.    Subjective: Pt seen in bed. Reports some back pain but overall feeling well. Denies SOB,chest pain, n/v, constipation or diarrhea. Last BM was yesterday. Pt seems motivated for therapy.    Review of Systems: A 10-point review of systems was performed. Negative except as listed above.    Plan:     SAH (subarachnoid hemorrhage) (HCC)  Assessment & Plan  Subarachnoid hemorrhage noted on initial CT on 1/4 and stable on 1/5  Continue same management for the subarachnoid hemorrhage as for the subdural hematoma, please see that separate section for overall  management plans    SDH (subdural hematoma) (HCC)  Assessment & Plan  Patient presents with fall and initial CTh on 1/4 showing an acute subdural hemorrhage along the left cerebral convexity with a maximum thickness of 1.9 cm with no midline shift as well as an acute subarachnoid hemorrhage in the left frontoparietal sulci, left sylvian fissure and suprasellar cistern  Repeat CT head on 1/5 was stable  Completed 7 day course of keppra for seizure ppx  F/u w/ NSGY for repeat CTH ~1/27    Leukocytosis  Assessment & Plan  Most recent WBC count 11.04 and actually trending down from its maximum of 17.59  Unclear etiology, may be directly related to reactive changes from the SDH and SAH as well as fractures  Continue to monitor and if considerable changes may repeat scans given the fractures in the facial bones  Was on a course of Unasyn/Augmentin for 7 days  Infectious workup negative  Received IV fluids, discontinued with evidence of fluid overload CXR  Stable off abx d/c 1/15  12.08 1/17    Anemia  Assessment & Plan  Most recent hemoglobin of 9.9 which is stable over the last 4 days  Continue to follow biweekly CBC or sooner if clinically indicated  Will need repeat scan if significant drops or concern for acute bleeds    Syncope  Assessment & Plan  Presented with syncopal episode preceded by lightheadedness that led to the fall which caused the sudden dural hemorrhage and subarachnoid hemorrhage  Also found to be significantly orthostatic which was likely the etiology and was placed on telemetry with no evidence of bradycardia or pauses  Limited p.o. intake and fluid intake and suspected orthostatic hypotension as the cause    Orthostatic hypotension  Assessment & Plan  Blood pressures ranging in physical therapy as low as 70/40 and cardiology was evaluating the patient  Symptomatic with lightheadedness and presyncope and had received gentle fluids with some improvement  BALJIT stockings as well and encouragement of  fluid intake  Repeat orthostatics improved but still getting lightheaded at times which was felt to be the initiating factor in her syncopal episode that led to the fall and injury.  IM held midodrine, no significant orthostatic hypotension currently observed may need to introduce at 2.5 mg TID if develops episodes at a later time, continue to monitor orthostatic Bps upon discharge 1/17    Chest pain  Assessment & Plan  Troponins obtained on initial evaluation of the patient in the emergency room and cardiology was consulted for evaluation  Despite elevated troponins there was nonischemic findings on her EKG and felt to be potentially related to a nonischemic troponin elevation in the setting of trauma and brain bleed versus demand ischemia due to her paroxysmal atrial fibrillation and syncopal episode  Not a candidate for cardiology for ischemic evaluation due to advanced age, frailty and recent subarachnoid hemorrhage/subdural hemorrhage  No new regional wall motion abnormalities noted on echocardiogram on 1/6/2025  Recommended to start aspirin 81 mg when cleared by neurosurgery with CT head  Follow-up with cardiology as an outpatient    Closed extensive facial fractures (HCC)  Assessment & Plan  Secondary to fall  CT of the head on 1/4 also showed comminuted and mildly displaced fractures of the lateral and anterior walls of the right maxilla, lateral and inferior walls of the right orbit, right inferior orbital wall fractures appear to involve the inferior orbital foramen with associated small extraconal soft tissue edema and focus of air.  There is no herniation of extraocular muscles and a nondisplaced fracture of the zygomatic arch as well as the temporal bone at the TMJ.  There is diffuse opacification of the right maxillary sinus and right nasal cavity with blood products and air  Seen by oral maxillofacial surgery team and surgical intervention was declined by patient  7-day course of  Unasyn/Augmentin  Sinus precautions  Follow-up with OM as an outpatient    Malignant melanoma of arm, left (HCC)  Assessment & Plan  Large mass on the left forearm that is melanoma  Follows with dermatology as an outpatient last seen in December 2024 but not interested in any treatments    Basal cell carcinoma (BCC) of scalp  Assessment & Plan  Large scalp mass noted on admission.  Also follows with dermatology as an outpatient but declining any interventions  Some localized spot bleeding from the edges noted    GERD (gastroesophageal reflux disease)  Assessment & Plan  Continue protonix    Stage 3 chronic kidney disease, unspecified whether stage 3a or 3b CKD (HCC)  Assessment & Plan  Lab Results   Component Value Date    EGFR 64 01/17/2025    EGFR 56 01/16/2025    EGFR 56 01/15/2025    CREATININE 0.81 01/17/2025    CREATININE 0.90 01/16/2025    CREATININE 0.90 01/15/2025   At baseline    Atrial fibrillation with rapid ventricular response (HCC)  Assessment & Plan  Initially presented with sinus bradycardia and evaluated by cardiology and was in controlled atrial fibrillation without bradycardia  Not on anticoagulation prior to admission and not indicated at this point due to the SDH and SAH and overall remains high risk due to risk of falls and given her advanced age  No recent follow-up with cardiology as an outpatient and generally sees her primary care physician for management and was on Toprol 50 mg daily at home which has been changed well and in the hospital  Tachycardic in the hospital and given IV fluids and medication changes include addition of midodrine and changes in the metoprolol dosing  Recommended for aspirin but would likely not be a good long-term candidate for anticoagulation  Cardio rec: continue with cardizem 120 daily and Toprol-XL 75 mg twice daily 1/16    Hypertension  Assessment & Plan  Cw metoprolol succinate 75 mg twice daily  Cardizem 120 mg XR daily, d/c amlodipine       Drug regimen  reviewed, all potential adverse effects identified and addressed:    Scheduled Meds:  Current Facility-Administered Medications   Medication Dose Route Frequency Provider Last Rate    acetaminophen  975 mg Oral Q8H ELIZABETH Autumn Chapman PA-C      bisacodyl  10 mg Rectal Daily PRN Autumn Chapman PA-C      calcium carbonate  500 mg Oral Daily PRN Autumn Chapman PA-C      [START ON 2025] diltiazem  120 mg Oral Daily Autumn Chapman PA-C      enoxaparin  30 mg Subcutaneous Q24H Autumn Chapman PA-C      [START ON 2025] furosemide  20 mg Oral Daily Autumn Chapman PA-C      melatonin  3 mg Oral HS Autumn Chapman PA-C      metoprolol succinate  75 mg Oral BID Autumn Chapman PA-C      midodrine  5 mg Oral TID PRN Autumn Chapman PA-C      ondansetron  4 mg Intravenous Q6H PRN Autumn Chapman PA-C      ondansetron  4 mg Oral Q6H PRN Autumn Chapman PA-C      oxyCODONE  2.5 mg Oral Q6H PRN Autumn Chapman PA-C      Or    oxyCODONE  5 mg Oral Q6H PRN Autumn Chapman PA-C      [START ON 2025] pantoprazole  40 mg Oral Early Morning Autumn Chapman PA-C      [START ON 2025] polyethylene glycol  17 g Oral Daily Autumn Chapman PA-C      senna-docusate sodium  1 tablet Oral HS Autumn Chapman PA-C          Restrictions include:  Fall precautions      Functional History/Home Set-up - Prior to Admission:    Was independent with mobility/ambulation, transfers, ADLs, IADLs    Lives in a single family home   and lives with spouce  Lives in a 1 story home  3 steps to enter  24 hr supervision on discharge     Functional Status Upon Admission to [facility]:  CARE SCORES:  Self Care:  Eatin: Setup or clean-up assistance  Oral hygiene: 04: Supervision or touching  assistance  Toilet hygiene: 02: Substantial/maximal assistance  Shower/bathing self: 02: Substantial/maximal assistance  Upper body dressin: Partial/moderate  assistance  Lower body dressin: Substantial/maximal assistance  Putting on/taking off footwear: 09: Not applicable  Transfers:  Roll left and right: 09: Not applicable  Sit to lyin: Not applicable  Lying to sitting on side of bed: 03: Partial/moderate assistance  Sit to stand: 03: Partial/moderate assistance  Chair/bed to chair transfer: 03: Partial/moderate assistance  Toilet transfer: 09: Not applicable  Mobility:  Walk 10 ft: 88: Not attempted due to medical conditions or safety concerns  Walk 50 ft with two turns: 88: Not attempted due to medical conditions or safety concerns  Walk 150ft: 88: Not attempted due to medical conditions or safety concerns     Physical Exam:  Temp:  [96.8 °F (36 °C)-98.1 °F (36.7 °C)] 96.8 °F (36 °C)  HR:  [63-67] 64  Resp:  [18-20] 18  BP: (123-164)/(59-75) 146/65  SpO2:  [95 %-98 %] 96 %      Physical Exam  Constitutional:       Appearance: Normal appearance.   HENT:      Head: Normocephalic and atraumatic.      Mouth/Throat:      Mouth: Mucous membranes are moist.   Cardiovascular:      Rate and Rhythm: Normal rate.      Heart sounds: Murmur heard.   Pulmonary:      Effort: No respiratory distress.   Abdominal:      General: Abdomen is flat. Bowel sounds are normal.   Musculoskeletal:      Right lower leg: No edema.      Left lower leg: No edema.   Skin:     General: Skin is warm.      Findings: Bruising present.      Comments: Brusing present on face and arms   Neurological:      Mental Status: She is alert.       MT:   Strength:   Right  Left  Site  Right  Left  Site    4 4 S Ab: Shoulder Abductors  4  4  HF: Hip Flexors    4 4  EF: Elbow Flexors  4  4 KF: Knee Flexors    4 4  EE: Elbow Extensors  4  4  KE: Knee Extensors    3 3  WE: Wrist Extensors  4  4  DR: Dorsi Flexors    3  3  FF: Finger Flexors  4  4  PF: Plantar Flexors    4  4  HI: Hand Intrinsics  4 4  EHL: Extensor Hallucis Longus     Laboratory:    Results from last 7 days   Lab Units 25  7584  "01/16/25  0527 01/15/25  0517   HEMOGLOBIN g/dL 9.4* 10.0* 10.0*   HEMATOCRIT % 28.6* 31.7* 30.9*   WBC Thousand/uL 12.08* 12.88* 14.82*     Results from last 7 days   Lab Units 01/17/25  0530 01/16/25  0527 01/15/25  0517   BUN mg/dL 15 17 18   SODIUM mmol/L 133* 134* 134*   POTASSIUM mmol/L 3.4* 3.6 3.3*   CHLORIDE mmol/L 97 95* 93*   CREATININE mg/dL 0.81 0.90 0.90   AST U/L  --   --  31   ALT U/L  --   --  40            Wt Readings from Last 1 Encounters:   01/17/25 51.7 kg (113 lb 15.7 oz)     Estimated body mass index is 17.85 kg/m² as calculated from the following:    Height as of this encounter: 5' 7\" (1.702 m).    Weight as of this encounter: 51.7 kg (113 lb 15.7 oz).    CT head wo contrast  Result Date: 1/7/2025  Impression: Evolving blood products in the left convexity subdural hematoma without evidence of new hemorrhage. Subdural and subarachnoid hemorrhage also stable when compared to the most recent prior exam. Workstation performed: [id number]      CT head wo contrast  Result Date: 1/5/2025  Impression: Acute subdural and subarachnoid hemorrhage with some redistribution compared to the prior examination. Subdural hemorrhages largest in the left frontal lobe with mild mass effect upon the adjacent brain parenchyma. No midline shift. No new hemorrhage. Multiple right facial fractures are identified with hemorrhage filling the right maxillary sinus and a small amount of hemorrhage layering within the left maxillary sinus. There is an extracranial ulcerated soft tissue mass in the left parietal soft tissues. Workstation performed: [id number]      CT cervical spine without contrast  Result Date: 1/4/2025  Impression: 1. No cervical spine fracture or traumatic malalignment. 2. Partially imaged diffuse gaseous distention of the esophagus. Further evaluation with chest CT is recommended to evaluate the distal esophagus. I personally discussed this study with YUMI LAL on 1/4/2025 6:56 PM. Workstation " performed: [id number]      CT facial bones without contrast  Result Date: 1/4/2025  Impression: 1. Comminuted and mildly displaced fractures of the lateral and anterior walls of right maxilla, lateral and inferior walls of right orbit. 2. Right inferior orbital wall fracture appears to involve the inferior orbital foramen with associated small extraconal soft tissue edema and focus of air. No herniation of extraocular muscles. 3. Nondisplaced fractures of zygomatic arch and the temporal bone at the TMJ. 4. Diffuse opacification of right maxillary sinus and right nasal cavity with blood products and air. I personally discussed this study with YUMI LAL on 1/4/2025 6:56 PM. Workstation performed: [id number]      CT head without contrast  Result Date: 1/4/2025  Impression: 1. Acute subdural hemorrhage along the left cerebral convexity with maximal thickness of 1.9 cm. No midline shift. 2. Acute subarachnoid hemorrhage in the left frontoparietal sulci, left sylvian fissure and suprasellar cistern. 3. Please refer to the separately dictated CT facial bones report for traumatic findings in the face. I personally discussed this study with YUMI LAL on 1/4/2025 6:56 PM. Workstation performed: [id number]      XR chest 1 view portable  Result Date: 1/4/2025  Impression: No acute disease. Distended air-filled esophagus. Moderate cardiomegaly. Workstation performed: [id number]      Rehabilitation Prognosis: good     Tolerance for three hours of therapy a day: good      Family/Patient Goals:  Patient/family's goals: Return to previous home/apartment.     Patient will receive PT, OT, and ST 60 minutes each per day, five days per week to achieve rehab goals or participate in 900 minutes of therapy within a 7 day week period.      Mobility Goals: Supervision / Standby Assist  Transfer Goals: Supervision / Standby Assist  Activities of Daily Living (ADLs) Goals: Supervision / Standby Assist     Discharge  Planning:  Rehabilitation and discharge goals discussed with the patient and/or family.     Case Managment and Social Work to review patient/family resources and to coordinate Discharge Planning.     Estimated length of stay: 10 to 14 days     Patient and Family Education and Training:  Rehabilitation and discharge goals discussed with the patient and/or family.  Patient/family education/training needs to be discussed in weekly team meeting.     Equipment/DME needs: Therapy teams to assess and evaluate for additional equipment/DME needs throughout rehabilitation stay  Past Medical History:   Past Surgical History:   Family History:   Social history:   Past Medical History:   Diagnosis Date    Arthritis     Cataract     LastAssessed:9/8/15    GERD (gastroesophageal reflux disease)     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    Hypertension     PAF (paroxysmal atrial fibrillation) ([facility])     Last Assessed:2/3/16    Wrist fracture     Resolved:9/8/15    Past Surgical History:   Procedure Laterality Date    CATARACT EXTRACTION      Last Assessed:9/8/15    FL RETROGRADE PYELOGRAM  10/17/2021    LYMPHADENECTOMY      Last Assessed:9/8/15    MI CYSTO/URETERO W/LITHOTRIPSY &INDWELL STENT INSRT Right 11/24/2021    Procedure: CYSTOSCOPY URETEROSCOPY WITH LITHOTRIPSY HOLMIUM LASER, RETROGRADE PYELOGRAM AND INSERTION STENT URETERAL;  Surgeon: David Sr MD;  Location: [facility];  Service: Urology    MI CYSTOURETHROSCOPY W/URETERAL CATHETERIZATION Right 10/17/2021    Procedure: CYSTOSCOPY RETROGRADE PYELOGRAM WITH INSERTION STENT URETERAL;  Surgeon: Benjie Tinoco MD;  Location: [facility];  Service: Urology    TONSILLECTOMY      Last Assessed:9/8/15    WRIST SURGERY      Last Assessed:9/8/15     Family History   Problem Relation Age of Onset    No Known Problems Family       Social History     Socioeconomic History    Marital status: /Civil Union     Spouse name: Not on file    Number of children: Not on file     Years of education: Not on file    Highest education level: Not on file   Occupational History    Occupation: retired   Tobacco Use    Smoking status: Never     Passive exposure: Never    Smokeless tobacco: Never   Vaping Use    Vaping status: Never Used   Substance and Sexual Activity    Alcohol use: Never    Drug use: Never    Sexual activity: Not Currently     Partners: Male   Other Topics Concern    Not on file   Social History Narrative    Not on file     Social Drivers of Health     Financial Resource Strain: Not on file   Food Insecurity: No Food Insecurity (2025)    Nursing - Inadequate Food Risk Classification     Worried About Running Out of Food in the Last Year: Never true     Ran Out of Food in the Last Year: Never true     Ran Out of Food in the Last Year: Never true   Transportation Needs: No Transportation Needs (2025)    Nursing - Transportation Risk Classification     Lack of Transportation: Not on file     Lack of Transportation: No   Recent Concern: Transportation Needs - Unmet Transportation Needs (2025)    Nursing - Transportation Risk Classification     Lack of Transportation: Not on file     Lack of Transportation: Yes   Physical Activity: Not on file   Stress: Not on file   Social Connections: Not on file   Intimate Partner Violence: Unknown (2025)    Nursing IPS     Feels Physically and Emotionally Safe: Not on file     Physically Hurt by Someone: Not on file     Humiliated or Emotionally Abused by Someone: Not on file     Physically Hurt by Someone: No     Hurt or Threatened by Someone: No   Housing Stability: Unknown (2025)    Nursing: Inadequate Housing Risk Classification     Has Housing: Not on file     Worried About Losing Housing: Not on file     Unable to Get Utilities: Not on file     Unable to Pay for Housing in the Last Year: No     Has Housin          Current Medical Diagnosis Allergies   Patient Active Problem List   Diagnosis    Hypertension     Mild protein-calorie malnutrition (HCC)    Ureteral stone    Bacteremia    Esophagus disorder    Preop examination    Atrial fibrillation with rapid ventricular response ([facility])    Stage 3 chronic kidney disease, unspecified whether stage 3a or 3b CKD (HCC)    GERD (gastroesophageal reflux disease)    COVID-19    Irritant contact dermatitis due to other agents    Scalp lesion    Arm skin lesion, left    Basal cell carcinoma (BCC) of scalp    Malignant melanoma of arm, left (HCC)    Primary insomnia    SDH (subdural hematoma) (HCC)    SAH (subarachnoid hemorrhage) ([facility])    Closed extensive facial fractures (HCC)    Fall    Chest pain    Orthostatic hypotension    Syncope    Anemia    Leukocytosis    Acute on chronic diastolic congestive heart failure (HCC)    Hypertensive urgency    Age-related cognitive decline    SIRS (systemic inflammatory response syndrome) ([facility])    Frailty syndrome in geriatric patient    Ambulatory dysfunction    At risk for delirium    Bilateral hearing loss    Hypokalemia    Allergies   Allergen Reactions    Lactose - Food Allergy GI Intolerance    Penicillins Other (See Comments)     Unsure of reaction            Medical Necessity Criteria for [facility] Admission: Chronic Kidney Disease and Hypertension. In addition, the preadmission screen, post-admission physical evaluation, overall plan of care and admissions order demonstrate a reasonable expectation that the following criteria were met at the time of admission to the [facility].  The patient requires active and ongoing therapeutic intervention of multiple therapy disciplines (physical therapy, occupational therapy, speech-language pathology, or prosthetics/orthotics), one of which is physical or occupational therapy.    Patient requires an intensive rehabilitation therapy program, as defined in Chapter 1, section 110.2.2 of the CMS Medicare Policy Manual. This intensive rehabilitation therapy program will consist of at least 3 hours of therapy per day  at least 5 days per week or at least 15 hours of intensive rehabilitation therapy within a 7 consecutive day period, beginning with the date of admission to the [facility].    The patient is reasonably expected to actively participate in, and benefit significantly from, the intensive rehabilitation therapy program as defined in Chapter 1, section 110.2.2 of the CMS Medicare Policy Manual at this time of admission to the [facility]. She can reasonably be expected to make measurable improvement (that will be of practical value to improve the patient’s functional capacity or adaptation to impairments) as a result of the rehabilitation treatment, as defined in section 110.3, and such improvement can be expected to be made within the prescribed period of time. As noted in the CMS Medicare Policy Manual, the patient need not be expected to achieve complete independence in the domain of self-care nor be expected to return to his or her prior level of functioning in order to meet this standard.  The patient must require physician supervision by a rehabilitation physician. As such, a rehabilitation physician will conduct face-to-face visits with the patient at least 3 days per week throughout the patient’s stay in the [facility] to assess the patient both medically and functionally, as well as to modify the course of treatment as needed to maximize the patient’s capacity to benefit from the rehabilitation process.  The patient requires an intensive and coordinated interdisciplinary approach to providing rehabilitation, as defined in Chapter 1, section 110.2.5 of the CMS Medicare Policy Manual. This will be achieved through periodic team conferences, conducted at least once in a 7-day period, and comprising of an interdisciplinary team of medical professionals consisting of: a rehabilitation physician, registered nurse,  and/or , and a licensed/certified therapist from each therapy discipline involved in treating the  patient.       ** Please Note: Fluency Direct voice to text software may have been used in the creation of this document. **

## 2025-01-17 NOTE — PLAN OF CARE
Problem: SAFETY ADULT  Goal: Patient will remain free of falls  Description: INTERVENTIONS:  - Educate patient/family on patient safety including physical limitations  - Instruct patient to call for assistance with activity   - Consult OT/PT to assist with strengthening/mobility   - Keep Call bell within reach  - Keep bed low and locked with side rails adjusted as appropriate  - Keep care items and personal belongings within reach  - Initiate and maintain comfort rounds  - Make Fall Risk Sign visible to staff  - Offer Toileting every 4 Hours, in advance of need  - Initiate/Maintain   alarm  - Obtain necessary fall risk management equipment: wc  - Apply yellow socks and bracelet for high fall risk patients  - Consider moving patient to room near nurses station  Outcome: Progressing

## 2025-01-17 NOTE — ASSESSMENT & PLAN NOTE
Patient presents with fall and initial CTh on 1/4 showing an acute subdural hemorrhage along the left cerebral convexity with a maximum thickness of 1.9 cm with no midline shift as well as an acute subarachnoid hemorrhage in the left frontoparietal sulci, left sylvian fissure and suprasellar cistern  Repeat CT head on 1/5 was stable; repeat 1/14 w/ interval evolution no new/evolving hemorrage   Completed 7 day course of keppra for seizure ppx  F/u w/ NSGY for repeat CTH ~1/27

## 2025-01-17 NOTE — ASSESSMENT & PLAN NOTE
Most recent hemoglobin of 9.4 1/17; which is stable  Continue to follow biweekly CBC or sooner if clinically indicated  Will need repeat scan if significant drops or concern for acute bleeds

## 2025-01-17 NOTE — ASSESSMENT & PLAN NOTE
Most recent WBC count 12.08 and actually trending down from its maximum of 17.59  Unclear etiology, may be directly related to reactive changes from the SDH and SAH as well as fractures  Continue to monitor and if considerable changes may repeat scans given the fractures in the facial bones  Was on a course of Unasyn/Augmentin for 7 days  IM: Infectious workup negative  Received IV fluids, discontinued with evidence of fluid overload CXR  Stable off abx d/c 1/15  12.08 1/17

## 2025-01-17 NOTE — ASSESSMENT & PLAN NOTE
Blood pressures ranging in physical therapy as low as 70/40 and cardiology was evaluating the patient  Symptomatic with lightheadedness and presyncope and had received gentle fluids with some improvement  BALJIT stockings PRN as well and encouragement of fluid intake  Repeat orthostatics improved but still getting lightheaded at times which was felt to be the initiating factor in her syncopal episode that led to the fall and injury.  1/17 IM held midodrine, no significant orthostatic hypotension currently observed may need to introduce at 2.5 mg TID if develops episodes at a later time, continue to monitor orthostatic Bps

## 2025-01-17 NOTE — ASSESSMENT & PLAN NOTE
Patient presents in rapid afib, hypertensive urgency  She is on midodrine - hold for now, no significant orthostatic hypotension currently observed; may need to introduce at 2.5 mg TID if develops episodes at a later time, continue to monitor orthostatic Bps upon discharge

## 2025-01-17 NOTE — TREATMENT PLAN
Individualized Plan of Care - [facility] [place]  Sandra Purvis 89 y.o. female MRN: [id number]  Unit/Bed#: [facility] 268-02 Encounter: [id number]     PATIENT INFORMATION  ADMISSION DATE: 1/17/2025  1:14 PM JENNIE CATEGORY:Brain Dysfunction:  02.22  Traumatic, Closed Injury   ADMISSION DIAGNOSIS: Subdural hematoma (HCC) [S06.5XAA]  EXPECTED LOS: 10 to 14 days     MEDICAL/FUNCTIONAL PROGNOSIS  Based on my assessment of the patient's medical conditions and current functional status, the prognosis for attaining medical and functional goals or the IRF stay is:  Fair    Medical Goals: Patient will be medically stable for discharge to [facility] upon completion of rehab program    Cardiopulmonary function: Ensure cardiopulmonary stability and optimize cardiopulmonary function not only at rest but with activity as patient's activity level significantly increases in acute rehab compared with prior to transfer in preparation for safe discharge from [facility].  Must closely and frequently monitor blood pressure and HR to ensure adequate cardiac output during ADLs and ambulation as patient is at increased risk for orthostatic hypotension/syncope and potential injury if not monitored for and managed adequately.    Blood pressure management:    Frequent monitoring of blood pressure with appropriate adjustments in blood pressure medication management to optimize blood pressure control and prevent/limit renal complications.   Monitoring impact of blood pressure and side-effects of blood pressure medications at rest and with activity.  Brain injury:  Patient's cognitive status is significantly impaired and neurologic status can fluctuate particularly early in rehabilitation process.  Patient requires frequent rehab physician and rehab nursing neurologic monitoring for subtle and more significant changes in mentation and neurologic function.  Labs must be monitored closely along with hydration and nutritional  status.  Low threshold to obtain brain imaging.  Medications must be carefully monitored and adjusted to optimize functional recovery while limit cognitive impairments.  Goal to improve cognitive and neurologic function, provide appropriate patient (and/or family education), and to ensure appropriate optimal discharge plan.    Inpatient rehabilitation education/teaching:  To be provided to patient and typically family/caregiver (if able to be identified) by all skilled therapists, rehab nursing, case management, and rehab specialized physician to ensure optimal recovery and decrease risks of complications in both acute rehabilitation setting as well as after discharge.   Anxiety (and/or Depression): Patient's mood and it's impact on therapy participation and functional recovery will improve during course with supportive counseling, relaxation/breathing techniques and if necessary medication management.  Requires frequent re-assessment and close management to ensure anxiety/depression management during acute rehab course with planning for appropriate outpatient management to ensure optimal mental health and functional recovery.    Chronic kidney failure management and blood pressure management: Frequent measurements and evaluation of urinary intake, urinary output, and labs which include BUN/Cr and electrolytes with appropriate adjustments in medication and when necessary order additional testing.  Frequent monitoring of blood pressure with appropriate adjustments in blood pressure medication management to optimize blood pressure control and prevent/limit renal complications.    Electrolyte abnormality: hypokalemia, hyponatremia :  placing patient at risk for additional complications which may impact medical stability and functional recovery.  Frequent measurement and monitoring of labs by with appropriate adjustments in medication and/or fluid management by rehabilitation physician and internal medicine consultant.     Skin wounds: Appropriate skin checks for wound/skin evaluation including evaluation of healing, worsening of wounds, or signs of infection.   Wound care management from rehab nursing, wound care nursing, physicians.  Ensure frequent appropriate turning, positioning in bed, in chair, when mobilizing, and when appropriate with use of appropriate devices to optimize healing and decrease risk of worsening or new skin breakdown.      ANTICIPATED DISCHARGE DISPOSITION AND SERVICES  COMMUNITY SETTING: Home - with supervision    Is a 24-hr caregiver available? No  Has discharge plan been discussed with primary caregiver?  No  Date of Discussion: unknown date    ANTICIPATED FOLLOW-UP SERVICE:   Outpatient Therapy Services: PT, OT, and SLP      Home Health Services: PT, OT, and Nursing      DISCIPLINE SPECIFIC PLANS:  Required Disciplines & Services: Rehabillitation Nursing, Case Management, Dietay/Nutrition, and Psychology    REQUIRED THERAPY:  Therapy Hours per Day Days per Week   Physical Therapy 1-2 5-6   Occupational Therapy 1-2 5-6   Speech/Language Therapy 0.5-1 3-5   NOTE: Additional therapy time(s) or changes to allocation of therapies as appropriate to meet patient needs and to achieve functional goals.      Patient will participate in above therapy regimen consisting of PT, OT, and SLP due to the following medical procedure/condition:Brain Dysfunction:  02.22  Traumatic, Closed Injury    ANTICIPATED FUNCTIONAL OUTCOMES:  ADL:   mod I -set up    Bladder/Bowel: Patient will return to premorbid level for bladder/bowel management upon completion of rehab program    Transfers:  mod i   Locomotion:   mod I for household distance w/ rw    Cognitive:  sup     DISCHARGE PLANNING NEEDS  Equipment needs: Discharge needs to be reviewed with team      REHAB ANTICIPATED PARTICIPATION RESTRICTIONS:  Amubulate Short Distances Only, Assist with Mobility, Assist with Tub Shower Transfer, Decreased Insight to Deficits, Decreaed  Safety Awareness, Rquires Assist with ADLS, and Requires Assit with Steps

## 2025-01-17 NOTE — ASSESSMENT & PLAN NOTE
Wt Readings from Last 3 Encounters:   01/18/25 50.1 kg (110 lb 7.2 oz)   01/17/25 51.1 kg (112 lb 9.6 oz)   01/12/25 53.5 kg (118 lb)     TTE 1/6/2025 showed EF 70%, grade 2 DD, mildly dilated RV, mild LA, mild AI, mild TR, estimated PA pressure 50 mmHg, trivial pericardial effusion  Not on diuretics prior to admission  Cw furosemide 20 mg; monitor for volume status   Daily weights, monitor I/O

## 2025-01-17 NOTE — ASSESSMENT & PLAN NOTE
Subarachnoid hemorrhage noted on initial CT on 1/4 and stable on 1/5;   CTH1/14  Interval evolution of recent subdural and subarachnoid hemorrhage as detailed above. No definitive new or enlarging intracranial hemorrhage.  Continue same management for the subarachnoid hemorrhage as for the subdural hematoma, please see that separate section for overall management plans

## 2025-01-17 NOTE — PROGRESS NOTES
"PHYSICAL MEDICINE AND REHABILITATION   PREADMISSION ASSESSMENT     Projected IGC and Rehabilitation Diagnoses:  Impairment of mobility, safety and Activities of Daily Living (ADLs) due to Brain Dysfunction:  02.22  Traumatic, Closed Injury  Etiologic: Acute subdural hemorrhage along the left cerebral convexity;Acute subarachnoid hemorrhage in the left frontoparietal sulci, left sylvian fissure and suprasellar cistern   Date of Onset: 1/4/25   Date of surgery: n/a    PATIENT INFORMATION  Name: Sandra Purvis Phone #: [phone] (home)   Address: [address] [place] [id number]  YOB: 1934 Age: 89 y.o. #   Marital Status: /Civil Union  Ethnicity: Not  or  or [place]  Employment Status: retired  Extended Emergency Contact Information  Primary Emergency Contact: zay ramírez  Mobile Phone: [phone]  Relation: Daughter  Secondary Emergency Contact: Darryl Purvis  Home Phone: [phone]  Relation: Spouse  Advance Directive: DNAR and DNI (no ACP docs)    INSURANCE/COVERAGE:     Primary Payor: MEDICARE / Plan: MEDICARE A AND B / Product Type: Medicare A & B Fee for Service /   Secondary Payer: [employer] #[id number]   Payer Contact:  Payer Contact:   Contact Phone:  Contact Phone:     Authorization #: n/a  Coverage Dates: n/a  LCD: n/a  MEDICARE #: [id number]  Medicare Days: 60/30/60  Medical Record #: [id number]    REFERRAL SOURCE:   Referring provider: Nicolle Junior MD  Referring facility: [facility]  Room: 5T /5T -01  PCP: Berhane Page DO PCP phone number: [phone]    MEDICAL INFORMATION  HPI:   This is a 89-year-old female who presented to [facility] on 1/4/25 as a trauma transfer from the [facility] s/p syncope with collapse.  Imaging on 1/4 showed \"comminuted and mildly displaced fractures of the lateral and anterior walls of right maxilla, lateral and inferior walls " "of right orbit. Right inferior orbital wall fracture appears to involve the inferior orbital foramen with associated small extraconal soft tissue edema and focus of air.  No herniation of extraocular muscles.  Nondisplaced fractures of zygomatic arch and the temporal bone at the TMJ.  Diffuse opacification of right maxillary sinus and right nasal cavity with blood products and air\".  OMFS consulted and surgical intervention was discussed with patient by OMFS team but refused by patient.  Patient is to continue with 7-day course of Unasyn/Augmentin and was initiated on sinus precautions.  She is to follow-up with OMFS as an outpatient for continued monitoring and management of fractures.  Neurosurgery was consulted for the SDH/SAH and no surgical intervention was recommended.  Recommended SBP <160.  Neurosurgery signed off.  Patient with c/o abdominal pain and troponin was checked.  Troponin was initially negative and then marques to over 8000. EKG is nonischemic.  Orthostatics positive.  Echocardiogram showed LVEF 70%.  Systolic function vigorous.  Grade 2 diastolic dysfunction.  RV mildly reduced.  Left atrial dilatation mild.  Mild AI.  Telemetry--showed A-fib with RVR.  Cardiology recommended continued hydration orally and with IVF, TEDS and continuing to monitor orthostatics (improved with IVF).  Midodrine added.  On 1/10, her blood pressures remained somewhat elevated.  She was also a bit more tachycardic, therefore her Lopressor was increased to 50 mg every 8 hours initially with plan to transition to metoprolol to Toprol-XL 50 mg twice daily for improved heart rate and blood pressure control.  Cardiology recommended starting ASA 81 mg daily when able from bleeding perspective.  Neurosurgery plans to repeat CT in 1 weeks time.  Leukocytosis downtrending and WBC 12.08 on 1/17.  Patient received Unasyn through 1/12.  Patient admitted to the [facility] on 1/12.  On 1/14, she developed a-fib with RVR and was transferred " back to the acute care setting.  Cardiology was consulted.  Chest x-ray with pulmonary edema, given Lasix 20 mg IV 1/14, and 1/15 with good response.  Reviewed recent 2D echo 1/6/25 noted from grade II DD, pulmonary HTN, trivial pericardial effusion - no need to update.  She will be discharged on Cardizem 120 mg XR daily and Lasix 20 mg daily.  After the medication adjustments, she converted back to normal sinus rhythm.  She worked with therapy and was once again recommended for rehab following her hospitalization.  She has demonstrated that she can tolerate three hours of therapy, five days a week and is medically stable for re-admission to [facility].          Past Medical History:   Past Surgical History:   Allergies:     Past Medical History:   Diagnosis Date    Arthritis     Cataract     LastAssessed:9/8/15    GERD (gastroesophageal reflux disease)     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    Hypertension     PAF (paroxysmal atrial fibrillation) ([facility])     Last Assessed:2/3/16    Wrist fracture     Resolved:9/8/15    Past Surgical History:   Procedure Laterality Date    CATARACT EXTRACTION      Last Assessed:9/8/15    FL RETROGRADE PYELOGRAM  10/17/2021    LYMPHADENECTOMY      Last Assessed:9/8/15    NY CYSTO/URETERO W/LITHOTRIPSY &INDWELL STENT INSRT Right 11/24/2021    Procedure: CYSTOSCOPY URETEROSCOPY WITH LITHOTRIPSY HOLMIUM LASER, RETROGRADE PYELOGRAM AND INSERTION STENT URETERAL;  Surgeon: David Sr MD;  Location: [facility];  Service: Urology    NY CYSTOURETHROSCOPY W/URETERAL CATHETERIZATION Right 10/17/2021    Procedure: CYSTOSCOPY RETROGRADE PYELOGRAM WITH INSERTION STENT URETERAL;  Surgeon: Benjie Tinoco MD;  Location: [facility];  Service: Urology    TONSILLECTOMY      Last Assessed:9/8/15    WRIST SURGERY      Last Assessed:9/8/15     Allergies   Allergen Reactions    Lactose - Food Allergy GI Intolerance    Penicillins Other (See Comments)     Unsure of reaction          Medical/functional  conditions requiring inpatient rehabilitation:   S/p fall secondary to syncope and collapse resulting in acute subdural hemorrhage along the left cerebral convexity;Acute subarachnoid hemorrhage in the left frontoparietal sulci, left sylvian fissure and suprasellar cistern    A-fib with RVR  Acute on chronic heart failure  Closed extensive facial fractures   Chest pain/epigastric pain  Hypertensive urgency  SIRS  Hypokalemia  GERD  Orthostatic hypotension  Acute pain due to trauma  Insomnia  Mild protein calorie malnutrition  Impaired self care  Impaired mobility    Risk for medical/clinical complications: risk for falls, risk for infection, risk for aspiration, risk for skin breakdown, risk for DVT/PE, risk for worsening bleed, risk for hospital delirium, risk for hypo/hypertensive episodes, risk for episodes of a-fib with RVR     Comorbidities/Surgeries in the last 100 days:   HTN  Paroxysmal afib  CKD  Malignant melanoma s/p resection  GERD  Arthritis     CURRENT VITAL SIGNS:   Temp:  [97.6 °F (36.4 °C)-98.1 °F (36.7 °C)] 98.1 °F (36.7 °C)  HR:  [63-76] 67  BP: (122-164)/(54-75) 164/65  Resp:  [18-20] 20  SpO2:  [95 %-96 %] 96 %  O2 Device: None (Room air)   Intake/Output Summary (Last 24 hours) at 1/17/2025 1146  Last data filed at 1/17/2025 0801  Gross per 24 hour   Intake 485 ml   Output 1800 ml   Net -1315 ml        LABORATORY RESULTS:      Lab Results   Component Value Date    HGB 9.4 (L) 01/17/2025    HGB 10.1 (L) 09/02/2015    HCT 28.6 (L) 01/17/2025    HCT 28.9 (L) 09/02/2015    WBC 12.08 (H) 01/17/2025    WBC 19.28 (H) 09/02/2015     Lab Results   Component Value Date    BUN 15 01/17/2025    BUN 42 (H) 09/02/2015     (L) 09/02/2015    K 3.4 (L) 01/17/2025    K 4.3 09/02/2015    CL 97 01/17/2025     09/02/2015    GLUCOSE 104 09/02/2015    CREATININE 0.81 01/17/2025    CREATININE 1.85 (H) 09/02/2015     Lab Results   Component Value Date    PROTIME 14.6 01/04/2025    PROTIME 16.5 (H) 09/01/2015     INR 1.12 01/04/2025    INR 1.35 (H) 09/01/2015        DIAGNOSTIC STUDIES:  CT head wo contrast  Result Date: 1/7/2025  Impression: Evolving blood products in the left convexity subdural hematoma without evidence of new hemorrhage. Subdural and subarachnoid hemorrhage also stable when compared to the most recent prior exam. Workstation performed: [id number]     CT head wo contrast  Result Date: 1/5/2025  Impression: Acute subdural and subarachnoid hemorrhage with some redistribution compared to the prior examination. Subdural hemorrhages largest in the left frontal lobe with mild mass effect upon the adjacent brain parenchyma. No midline shift. No new hemorrhage. Multiple right facial fractures are identified with hemorrhage filling the right maxillary sinus and a small amount of hemorrhage layering within the left maxillary sinus. There is an extracranial ulcerated soft tissue mass in the left parietal soft tissues. Workstation performed: [id number]     CT cervical spine without contrast  Result Date: 1/4/2025  Impression: 1. No cervical spine fracture or traumatic malalignment. 2. Partially imaged diffuse gaseous distention of the esophagus. Further evaluation with chest CT is recommended to evaluate the distal esophagus. I personally discussed this study with YUMI LAL on 1/4/2025 6:56 PM. Workstation performed: [id number]     CT facial bones without contrast  Result Date: 1/4/2025  Impression: 1. Comminuted and mildly displaced fractures of the lateral and anterior walls of right maxilla, lateral and inferior walls of right orbit. 2. Right inferior orbital wall fracture appears to involve the inferior orbital foramen with associated small extraconal soft tissue edema and focus of air. No herniation of extraocular muscles. 3. Nondisplaced fractures of zygomatic arch and the temporal bone at the TMJ. 4. Diffuse opacification of right maxillary sinus and right nasal cavity with blood products and air. I  personally discussed this study with YUMI M HALI on 1/4/2025 6:56 PM. Workstation performed: [id number]     CT head without contrast  Result Date: 1/4/2025  Impression: 1. Acute subdural hemorrhage along the left cerebral convexity with maximal thickness of 1.9 cm. No midline shift. 2. Acute subarachnoid hemorrhage in the left frontoparietal sulci, left sylvian fissure and suprasellar cistern. 3. Please refer to the separately dictated CT facial bones report for traumatic findings in the face. I personally discussed this study with YUMI ROMERO HALI on 1/4/2025 6:56 PM. Workstation performed: [id number]     XR chest 1 view portable  Result Date: 1/4/2025  Impression: No acute disease. Distended air-filled esophagus. Moderate cardiomegaly. Workstation performed: [id number]       PRECAUTIONS/SPECIAL NEEDS:  Pain Management, Bladder Incontinence: # of accidents 15, Bowel Incontinence: # of accidents 3, Dietary Restrictions: dysphagia 3 (dental soft diet) 2 gm Na, no dairy diet with think liquids, Hard of Hearing, Visually Impaired, Language Preference: English, and fall precautions, seizure precautions    MEDICATIONS:     Current Facility-Administered Medications:     acetaminophen (TYLENOL) tablet 975 mg, 975 mg, Oral, Q8H ELIZABETH, Nicolle Junior MD, 975 mg at 01/17/25 0546    bisacodyl (DULCOLAX) rectal suppository 10 mg, 10 mg, Rectal, Daily PRN, Nicolle Junior MD    calcium carbonate (TUMS) chewable tablet 500 mg, 500 mg, Oral, Daily PRN, Nicolle Junior MD    diltiazem (CARDIZEM CD) 24 hr capsule 120 mg, 120 mg, Oral, Daily, Nicolle Junior MD, 120 mg at 01/17/25 0832    enoxaparin (LOVENOX) subcutaneous injection 30 mg, 30 mg, Subcutaneous, Q24H, Nicolle Junior MD, 30 mg at 01/16/25 2154    furosemide (LASIX) tablet 20 mg, 20 mg, Oral, Daily, Mehul Millan MD, 20 mg at 01/17/25 0832    melatonin tablet 3 mg, 3 mg, Oral, HS, Nicolle Junior MD, 3 mg at 01/16/25 0081     metoprolol succinate (TOPROL-XL) 24 hr tablet 75 mg, 75 mg, Oral, BID, Nicolle Junior MD, 75 mg at 01/17/25 0832    [Held by provider] midodrine (PROAMATINE) tablet 5 mg, 5 mg, Oral, TID PRN, Nicolle Junior MD    ondansetron (ZOFRAN) injection 4 mg, 4 mg, Intravenous, Q6H PRN, Nicolle Junior MD    ondansetron (ZOFRAN-ODT) dispersible tablet 4 mg, 4 mg, Oral, Q6H PRN, Nicolle Junior MD    oxyCODONE (ROXICODONE) split tablet 2.5 mg, 2.5 mg, Oral, Q6H PRN, 2.5 mg at 01/17/25 0032 **OR** oxyCODONE (ROXICODONE) IR tablet 5 mg, 5 mg, Oral, Q6H PRN, Nicolle Junior MD, 2.5 mg at 01/17/25 0912    pantoprazole (PROTONIX) EC tablet 40 mg, 40 mg, Oral, Early Morning, Nicolle Junior MD, 40 mg at 01/17/25 0546    polyethylene glycol (MIRALAX) packet 17 g, 17 g, Oral, Daily, Nicolle Junior MD, 17 g at 01/17/25 0832    senna-docusate sodium (SENOKOT S) 8.6-50 mg per tablet 1 tablet, 1 tablet, Oral, HS, Nicolle Junior MD, 1 tablet at 01/16/25 2153    SKIN INTEGRITY:   Left arm wound  Facial wound    PRIOR LEVEL OF FUNCTION:  She lives in a(n) single family home  Sandra Purvis is  and lives with their spouse.  Self Care: Independent, Indoor Mobility: Independent, Stairs (in/outdoor): Independent, and Cognition: Independent    FALLS IN THE LAST 6 MONTHS: 1    HOME ENVIRONMENT:  The living area:  patient lives in a 1 story home  There are 3 steps to enter the home.    The patient will have 24 hour supervision available upon discharge.    PREVIOUS DME:  Equipment in home (previous DME): Shower Chair, Grab Bars, and Rolling Walker    FUNCTIONAL STATUS:  Physical Therapy Occupational Therapy Speech Therapy   01/15/25 1145    PT Last Visit   PT Visit Date 01/15/25   Note Type   Note type Evaluation   Pain Assessment   Pain Assessment Tool 0-10   Pain Score 3   Pain Location/Orientation Location: Head   Restrictions/Precautions   Weight Bearing Precautions Per Order No    Other Precautions Cognitive;Bed Alarm;Chair Alarm;Pain;Fall Risk;Multiple lines;Telemetry   Home Living   Type of Home House   Home Layout One level;Stairs to enter with rails;Laundry in basement;Performs ADLs on one level;Able to live on main level with bedroom/bathroom   Bathroom Shower/Tub Tub/shower unit   Bathroom Toilet Standard   Bathroom Equipment Grab bars in shower;Shower chair;Grab bars around toilet   Bathroom Accessibility Accessible   Prior Function   Level of [place] Independent with ADLs;Independent with functional mobility;Independent with IADLS   Lives With Spouse   Receives Help From Family;Friend(s);Other (Comment)   IADLs Independent with meal prep;Independent with medication management;Family/Friend/Other provides transportation   Falls in the last 6 months 1 to 4   Vocational Retired   General   Family/Caregiver Present No   Cognition   Overall Cognitive Status Impaired   Arousal/Participation Alert   Attention Attends with cues to redirect   Orientation Level Oriented to person;Oriented to place;Oriented to time   Memory Decreased short term memory;Decreased recall of recent events;Decreased recall of precautions   Following Commands Follows one step commands with increased time or repetition   RLE Assessment   RLE Assessment X   Strength RLE   R Hip Flexion 3/5   R Knee Flexion 3/5   R Knee Extension 3+/5   R Ankle Dorsiflexion 3+/5   R Ankle Plantar Flexion 3-/5   LLE Assessment   LLE Assessment X   Strength LLE   L Hip Flexion 3/5   L Knee Flexion 3/5   L Knee Extension 3+/5   L Ankle Dorsiflexion 3+/5   L Ankle Plantar Flexion 3-/5   Coordination   Movements are Fluid and Coordinated 0   Coordination and Movement Description retropulsive with standing, decreased seated and standing balance, reduced motor planning   Sensation X   Light Touch   RLE Light Touch Impaired   LLE Light Touch Impaired   Bed Mobility   Supine to Sit 4  Minimal assistance   Additional items Assist x  1;Bedrails;Increased time required;Verbal cues;LE management   Transfers   Sit to Stand 3  Moderate assistance   Additional items Assist x 1;Bedrails;Increased time required;Verbal cues   Stand to Sit 3  Moderate assistance   Additional items Assist x 1;Bedrails;Increased time required;Verbal cues   Stand pivot 3  Moderate assistance   Additional items Assist x 1;Armrests;Increased time required;Verbal cues   Balance   Static Sitting Fair -   Dynamic Sitting Poor +   Static Standing Poor   Dynamic Standing Poor   Ambulatory Poor -   Endurance Deficit   Endurance Deficit Yes   Endurance Deficit Description LE weakness and fatigue, general deconditioning   Activity Tolerance   Activity Tolerance Patient limited by fatigue   Medical Staff Made Aware spoke to CM   Nurse Made Aware spoke to RN   Assessment   Prognosis Good   Problem List Decreased strength;Decreased range of motion;Decreased endurance;Decreased coordination;Decreased mobility;Impaired balance;Decreased cognition;Decreased safety awareness;Impaired judgement;Impaired sensation;Pain   Barriers to Discharge Inaccessible home environment;Decreased caregiver support   Goals   Patient Goals to be able to walk again   STG Expiration Date 01/25/25   Short Term Goal #1 see eval note   PT Treatment Day 0   Plan   Treatment/Interventions ADL retraining;Functional transfer training;LE strengthening/ROM;Elevations;Therapeutic exercise;Endurance training;Equipment eval/education;Patient/family training;Bed mobility;Gait training;Spoke to nursing;Spoke to case management;OT   PT Frequency 3-5x/wk   Discharge Recommendation   Rehab Resource Intensity Level, PT I (Maximum Resource Intensity)   Equipment Recommended Walker   Walker Package Recommended Wheeled walker   AM-PAC Basic Mobility Inpatient   Turning in Flat Bed Without Bedrails 3   Lying on Back to Sitting on Edge of Flat Bed Without Bedrails 2   Moving Bed to Chair 2   Standing Up From Chair Using Arms 2   Walk  in Room 2   Climb 3-5 Stairs With Railing 1   Basic Mobility Inpatient Raw Score 12   Basic Mobility Standardized Score 32.23   [facility] Highest Level Of Mobility   -HL Goal 4: Move to chair/commode   -HL Achieved 4: Move to chair/commode   End of Consult   Patient Position at End of Consult Bedside chair;All needs within reach;Bed/Chair alarm activated            ASSESSMENT                                                                                                                     Sanrda Purvis is a 89 y.o. female admitted to [facility] on 1/14/2025 for Atrial fibrillation with rapid ventricular response (HCC). Pt  has a past medical history of Arthritis, Cataract, GERD (gastroesophageal reflux disease), Hypertaurodontism, Hypertension, PAF (paroxysmal atrial fibrillation) ([facility]), and Wrist fracture.. PT was consulted and pt was seen on 1/15/2025 for mobility assessment and d/c planning.   Pt presents Supine in bed alert and agreeable to therapy, motivated to attempt OOB mobility. She is reporting some pain in the head and face due to injuries. Minor pain in back which she reports is baseline. Demonstrating generalized weakness throughout although strength against gravity in the LE. Primary limitation during transfers is poor balance and retropulsion likely with component of fear of falling worsening retropulsion. Orthostatic BP taken with some drop in systolic BP however pt not symptomatic and BP remained within normal limits during evaluation.   Impairments limiting pt at this time include decreased ROM, impaired balance, decreased endurance, decreased coordination, increased fall risk, new onset of impairment of functional mobility, decreased ADLS, decreased IADLS, pain, decreased activity tolerance, decreased sensation, and decreased strength. Pt is currently functioning at a minimum assistance x1 level for bed mobility, moderate assistance x1 level for transfers. The patient's AM-PAC Basic  Mobility Inpatient Short Form Raw Score is 12. A Raw score of less than or equal to 16 suggests the patient may benefit from discharge to post-acute rehabilitation services. Please also refer to the recommendation of the Physical Therapist for safe discharge planning.     Goals                                                                                                                                    1) Bed mobility skills with modified independent assistance to facilitate safe return to previous living environment 2) Functional transfers with modified independent assistance to facilitate safe return to previous living environment  3) Ambulation with least restrictive AD modified independent assistance without LOB and stable vitals for safe ambulation home/ community distances. 4) Stair training up/down flight 12 step/s with appropriate rail/s  and modified independent assistance for safe access to previous living environment. 5) Improve balance grades to fair + to reduce risk of falls. 6)Improve LE strength grades by 1 to increase independence w/ transfers and gait.  7) PT for ongoing pt and family education; DME needs and D/C planning to promote highest level of function in least restrictive environment.      Recommendations                                                                                                              Pt will benefit from continued skilled IP PT to address the above mentioned impairments in order to maximize recovery and increase functional independence when completing mobility and ADLs. See flow sheet for goals and POC.    01/15/25 1216    OT Last Visit   OT Visit Date 01/15/25   Note Type   Note type Evaluation   Pain Assessment   Pain Assessment Tool 0-10   Pain Score 4   Pain Location/Orientation Location: Generalized   Restrictions/Precautions   Weight Bearing Precautions Per Order No   Other Precautions Cognitive;Bed Alarm;Chair Alarm;Fall Risk   Home Living   Type of  Home House   Home Layout One level;Stairs to enter with rails;Performs ADLs on one level   Bathroom Shower/Tub Tub/shower unit   Bathroom Toilet Standard   Bathroom Equipment Grab bars in shower   Bathroom Accessibility Accessible   Prior Function   Level of [place] Independent with ADLs;Independent with functional mobility;Needs assistance with IADLS   Lives With Spouse   Receives Help From Family;Friend(s)   IADLs Family/Friend/Other provides transportation   Falls in the last 6 months 1 to 4   ADL   Eating Assistance 5  Supervision/Setup   Grooming Assistance 5  Supervision/Setup   UB Bathing Assistance 4  Minimal Assistance   LB Bathing Assistance 2  Maximal Assistance   UB Dressing Assistance 4  Minimal Assistance   LB Dressing Assistance 2  Maximal Assistance   Toileting Assistance  2  Maximal Assistance   Transfers   Sit to Stand 2  Maximal assistance   Additional items Assist x 1   Stand to Sit 2  Maximal assistance   Additional items Assist x 1   Balance   Static Sitting Fair +   Dynamic Sitting Fair   Static Standing Fair -   Dynamic Standing Poor +   Ambulatory Poor   Activity Tolerance   Activity Tolerance Patient limited by fatigue   RUE Assessment   RUE Assessment WFL   LUE Assessment   LUE Assessment WFL   Cognition   Overall Cognitive Status Impaired   Arousal/Participation Alert;Cooperative   Attention Attends with cues to redirect   Orientation Level Oriented to person;Oriented to place;Disoriented to time   Memory Decreased short term memory;Decreased recall of recent events   Following Commands Follows one step commands without difficulty   Assessment   Limitation Decreased high-level ADLs;Decreased Safe judgement during ADL;Decreased ADL status;Decreased UE strength;Decreased cognition;Decreased endurance   Prognosis Good   Assessment    Pt is a 89 y.o. female seen for OT evaluation s/p admit to [facility] on 1/14/2025 w/ Atrial fibrillation with rapid ventricular response (HCC).  See medical  history above for extensive list of comorbidities affecting pt's functional performance at time of assessment. Personal factors affecting Pt at time of IE include:limited home support, behavioral pattern, difficulty performing ADLS, difficulty performing IADLS , and health management . Upon evaluation: Pt requires maxA for ADL transfers and transfer to standing. Pt with posterior lean, WB through heels with tactile and verbal cues for proper BLE placement. Pt initially unable to achieve full stand, additional attempts required. Pt requires maxA for ADLs in standing. Pt's primary barrier(s) at this time: decreased stability, endurance. The following deficits impact occupational performance: weakness, decreased strength, decreased balance, decreased tolerance, decreased safety awareness, increased pain, and orthopedic restrictions. Pt to benefit from continued skilled OT services while in the hospital to address deficits as defined above and maximize level of functional independence w ADL's and functional mobility. Occupational performance areas to address include: bathing/shower, toilet hygiene, dressing, health maintenance, functional mobility, and clothing management. From OT standpoint, recommendation at time of d/c would be maximum resource intensity.      Speech Pathology Bedside Swallow Evaluation:   Speech/Language Pathology Progress Note     Patient Name: Sandra Purvis  Today's Date: 1/17/2025                   SLP RECOMMENDATIONS:         Diet: Level 3 Dental soft         Liquids: Thin liquids         Medications: as best tolerated         Strategies: upright, slow rate, small bites/sips         Summary:  Pt seen for clinical swallow evaluation. Pt is currently on a Level 3 Dental soft/thin liquid diet. Pt reports some difficulty chewing r/t zygomatic arch and temporal bone fx. Pt reports she feels the dental soft diet is easier for her to eat and she has also been choosing softer food from the menu as  needed. Pt's oral mech/CN exam was grossly WFL. Bruising and slight edema noted on R side of face. Pt observed with portion of breakfast meal with slow, but functional mastication with dental soft solids. No overt s/s aspiration noted with thin liquids, however intermittent burping noted. Cervical CT notable for partially imaged diffuse gaseous distention of the esophagus. Bite strength is adequate.   Pt presents with mild oral phase dysphagia r/t facial fx. No s/s suggestive of pharyngeal dysphagia observed. Recommend continuing Level 3 Dental soft/thin liquid diet. SLP to follow.      Therapy Prognosis:  Good   Prognosis considerations: high PLOF   Frequency: 1-3x/week         Vitals          Vitals:     01/16/25 0505 01/16/25 0517 01/16/25 0544 01/16/25 0715   BP: (!) 175/80     160/82   BP Location: Right arm     Left arm   Pulse: 94     56   Resp: 18     16   Temp: 98 °F (36.7 °C)     97.5 °F (36.4 °C)   TempSrc: Temporal     Temporal   SpO2: 95%     92%   Weight:   50.8 kg (112 lb) 50.8 kg (112 lb)     Height:                       Lab Results   Component Value Date     WBC 12.88 (H) 01/16/2025     HGB 10.0 (L) 01/16/2025     HCT 31.7 (L) 01/16/2025     MCV 89 01/16/2025      01/16/2025            Consider consult w/:  Nutrition     Goal(s):  Pt will tolerate least restrictive diet w/out s/s aspiration or oral/pharyngeal difficulties.      H&P/Admit info/ pertinent provider notes: (PMH noted above)  Sandra Purvis is a 89 y.o. female with history of atrial fibrillation, GERD, hypertension who presented to the [facility] on 1//25 as a trauma case from the [facility] to [facility] after a syncopal episode and collapse with imaging specifically a CT head on the same day showing comminuted and mildly displaced fractures of the lateral and anterior walls of the right maxilla, lateral and inferior walls of the right orbit, right inferior orbital wall  fracture appears to involve the inferior orbital foramen associated with small extraconal soft tissue edema and focus of air. There is also a nondisplaced fracture of the zygomatic arch and the temporal bone at the TMJ and diffuse opacification of the right maxillary sinus and right nasal cavity with blood products and air. Additionally there was an acute subdural hemorrhage along the left cerebral convexity with a maximal thickness of 1.9 cm with no midline shift and an acute subarachnoid hemorrhage in the left frontal parietal sulci, left sylvian fissure as well as suprasellar cistern. OMFS was consulted as well as neurosurgery and from a neurosurgical standpoint placed on Keppra 7 days for seizure prophylaxis and treated nonoperatively. OMFS recommended surgical interventions however declined by patient with plans for outpatient follow-up and was placed on a 7-day course of Unasyn/Augmentin.      Special Studies:  Head CT 1/14/25:  IMPRESSION:     Interval evolution of recent subdural and subarachnoid hemorrhage as detailed above. No definitive new or enlarging intracranial hemorrhage.        CT facial bones 1/4/25:  IMPRESSION:  1. Comminuted and mildly displaced fractures of the lateral and anterior walls of right maxilla, lateral and inferior walls of right orbit.  2. Right inferior orbital wall fracture appears to involve the inferior orbital foramen with associated small extraconal soft tissue edema and focus of air. No herniation of extraocular muscles.  3. Nondisplaced fractures of zygomatic arch and the temporal bone at the TMJ.  4. Diffuse opacification of right maxillary sinus and right nasal cavity with blood products and air.     Previous VBS:  None      Patient's goal: none stated      Did the pt report pain? No   If yes, was nursing notified/was it addressed?     Reason for consult:  Determine safest and least restrictive diet           Food allergies:  Allergies         Allergies   Allergen  Reactions    Lactose - Food Allergy GI Intolerance    Penicillins Other (See Comments)       Unsure of reaction              Current diet:  Dental soft/thin    Premorbid diet:  Regular/thin    O2 requirements:  RA   Voice/Speech:  WFL   Follows commands:  Intact    Cognitive status:  Receiving cognitive therapy while in ARC,  Alert & oriented           CARE SCORES:  Self Care:  Eatin: Setup or clean-up assistance  Oral hygiene: 04: Supervision or touching  assistance  Toilet hygiene: 02: Substantial/maximal assistance  Shower/bathing self: 02: Substantial/maximal assistance  Upper body dressin: Partial/moderate assistance  Lower body dressin: Substantial/maximal assistance  Putting on/taking off footwear: 09: Not applicable  Transfers:  Roll left and right: 09: Not applicable  Sit to lyin: Not applicable  Lying to sitting on side of bed: 03: Partial/moderate assistance  Sit to stand: 03: Partial/moderate assistance  Chair/bed to chair transfer: 03: Partial/moderate assistance  Toilet transfer: 09: Not applicable  Mobility:  Walk 10 ft: 88: Not attempted due to medical conditions or safety concerns  Walk 50 ft with two turns: 88: Not attempted due to medical conditions or safety concerns  Walk 150ft: 88: Not attempted due to medical conditions or safety concerns    CURRENT GAP IN FUNCTION  Prior to Admission: Functional Status: Patient was not independent with mobility/ambulation, transfers, ADL's, IADL's.    Expected functional outcomes: It is expected that with skilled acute rehabilitation services the patient will progress to Supervision for self care and Supervision for mobility     Estimated length of stay: 10 to 14 days    Anticipated Post-Discharge Disposition/Treatment  Disposition: Return to previous home/apartment.  Outpatient Services: Physical Therapy (PT) and Occupational Therapy (OT)    BARRIERS TO DISCHARGE  Home Accessibility and Caregiver Accessibility;Decreased strength;Decreased  "endurance;Impaired balance;Decreased mobility;Decreased safety awareness;Decreased ADL status;Decreased Safe judgement during ADL;Decreased cognition;Decreased self-care trans;Decreased high-level ADLs     INTERVENTIONS FOR DISCHARGE  Functional transfer training;LE strengthening/ROM;Endurance training;Therapeutic exercise;Patient/family training;Equipment eval/education;Gait training;Bed mobility;ADL retraining;Cognitive reorientation;Compensatory technique education;Energy conservation;Activity engagement     REQUIRED THERAPY:  Patient will require PT, OT and ST 60 minutes each per day, five days per week to achieve rehab goals.     REQUIRED FUNCTIONAL AND MEDICAL MANAGEMENT FOR INPATIENT REHABILITATION:  Skin:  There are no pressure sores currently, patient will need close monitoring of her skin for any breakdown secondary to decreased mobility.  She will also need close monitoring of wounds on her arm for any complications, Pain Management: Overall pain is moderately controlled, Deep Vein Thrombosis (DVT) Prophylaxis:  low molecular weight heparin and SCD's while in bed, PT and OT interventions, any labs, consults, imaging and medication adjustments patient may need while on ARC    RECOMMENDED LEVEL OF CARE:  This is a 89-year-old female who presented to [facility] ER on 1/4/25 as a trauma transfer from the [facility] s/p syncope with collapse.  Imaging on 1/4 showed \"comminuted and mildly displaced fractures of the lateral and anterior walls of right maxilla, lateral and inferior walls of right orbit. Right inferior orbital wall fracture appears to involve the inferior orbital foramen with associated small extraconal soft tissue edema and focus of air.  No herniation of extraocular muscles.  Nondisplaced fractures of zygomatic arch and the temporal bone at the TMJ.  Diffuse opacification of right maxillary sinus and right nasal cavity with blood products and air\".  OMFS consulted and surgical " intervention was discussed with patient by FS team but refused by patient.  Patient is to continue with 7-day course of Unasyn/Augmentin and was initiated on sinus precautions.  She is to follow-up with FS as an outpatient for continued monitoring and management of fractures.  Neurosurgery was consulted for the SDH/SAH and no surgical intervention was recommended.  Recommended SBP <160.  Neurosurgery signed off.  Patient with c/o abdominal pain and troponin was checked.  Troponin was initially negative and then marques to over 8000. EKG is nonischemic.  Orthostatics positive.  Echocardiogram showed LVEF 70%.  Systolic function vigorous.  Grade 2 diastolic dysfunction.  RV mildly reduced.  Left atrial dilatation mild.  Mild AI.  Telemetry--showed A-fib with RVR.  Cardiology recommended continued hydration orally and with IVF, TEDS and continuing to monitor orthostatics (improved with IVF).  Midodrine added.  On 1/10, her blood pressures remained somewhat elevated.  She was also a bit more tachycardic, therefore her Lopressor was increased to 50 mg every 8 hours initially with plan to transition to metoprolol to Toprol-XL 50 mg twice daily for improved heart rate and blood pressure control.  Cardiology recommended starting ASA 81 mg daily when able from bleeding perspective.  Neurosurgery plans to repeat CT in 1 weeks time.  Leukocytosis downtrending and WBC 12.08 on 1/17.  Patient received Unasyn through 1/12.  Patient admitted to the ARC on 1/12.  On 1/14, she developed a-fib with RVR and was transferred back to the acute care setting.  Cardiology was consulted.  Chest x-ray with pulmonary edema, given Lasix 20 mg IV 1/14, and 1/15 with good response.  Reviewed recent 2D echo 1/6/25 noted from grade II DD, pulmonary HTN, trivial pericardial effusion - no need to update.  She will be discharged on Cardizem 120 mg XR daily and Lasix 20 mg daily.  After the medication adjustments, she converted back to normal sinus  rhythm.  She worked with therapy and was once again recommended for rehab following her hospitalization.  She has demonstrated that she can tolerate three hours of therapy, five days a week and is medically stable for re-admission to [facility].          Prior to admission, patient was independent with her ADLs and ambulated without an AD.  Her daughter assisted with heavy IADLs.  She did not drive.  She is now functioning below her baseline requiring supervision to max assistance to complete her ADLs.  With PT, she is requiring min x 1 assistance for bed mobility and mod x 1 assistance for transfers.      Patient requires close oversight by a physician, PM&R management, 24/7 rehabilitation nursing care and specialized interdisciplinary therapy services in preparation for discharge which can only be provided in the inpatient acute rehabilitation setting.  While on [facility], this patient will need a bowel and bladder program, routine neuro checks and close monitoring of her VS, I/Os, skin, pain level, wounds and her overall condition.  Inpatient acute rehabilitation is recommended for this patient to maximize her overall strength, endurance, self-care and mobility upon discharge upon discharge back to her home with the support of her family.

## 2025-01-17 NOTE — ASSESSMENT & PLAN NOTE
Lab Results   Component Value Date    EGFR 64 01/17/2025    EGFR 56 01/16/2025    EGFR 56 01/15/2025    CREATININE 0.81 01/17/2025    CREATININE 0.90 01/16/2025    CREATININE 0.90 01/15/2025   At baseline

## 2025-01-17 NOTE — ASSESSMENT & PLAN NOTE
On admission CBC reviewed, noted for leukocytosis at 14,000 although has been having persistent leukocytosis likely due to intracranial bleed  The patient does not meet SIRS criteria with leukocytosis and tachycardia and given rapid A-fib we will pursue infectious workup  Infectious workup negative  Received IV fluids, discontinued with evidence of fluid overload chest x-ray  Stable off Abx discontinued 1/15

## 2025-01-17 NOTE — DISCHARGE SUMMARY
Discharge Summary - Hospitalist   Name: Sandra Purvis 89 y.o. female I MRN: [id number]  Unit/Bed#: 5T -01 I Date of Admission: 1/14/2025   Date of Service: 1/17/2025 I Hospital Day: 3     Assessment & Plan  Atrial fibrillation with rapid ventricular response (HCC)  This is a 89-year-old female patient who was recently admitted to [facility] ICU secondary to fall with resultant subdural hematoma, subarachnoid hematoma and facial fractures and discharged to [facility] who was transferred to medical service due to rapid A-fib, hypertension associated with generalized weakness and lightheadedness  The patient was seen by cardiology the day prior to admission and her metoprolol succinate dosing was increased to 75 mg twice daily, however without significant improvement in her rates  Her [id number] score is calculated at 5, however given recent intracranial hemorrhage anticoagulation is currently contraindicated  Her heart rates were 120-130 bpm on admission, now around 90 following Cardizem 15 mg IV x1, has been on scheduled PO Cardizem 30 mg Q6H now converted to NSR with occasional PVCs. Transition to Cardizem  mg daily  Infectious workup unremarkable, Abx discontinued  Chest x-ray with pulmonary edema, given Lasix 20 mg IV 1/14, and 1/15 with good response  Reviewed recent 2D echo 1/6/25 noted from grade II DD, pulmonary HTN, trivial pericardial effusion - no need to update  Appreciate cardiology input  Discharge on Cardizem 120 mg XR daily, Lasix 20 mg daily  Hypertension  Patient is on metoprolol but dose recently increased in setting of rapid heart rates, A-fib, had also been on midodrine due to postural hypotension  She presents with elevated blood pressures now improved, please see above under hypertensive urgency  Mild protein-calorie malnutrition (HCC)  Malnutrition Findings:      BMI Findings:  21.58     Body mass index is 20.59 kg/m².   Request nutrition evaluation  Stage 3 chronic kidney  "disease, unspecified whether stage 3a or 3b CKD ([facility])  Lab Results   Component Value Date    EGFR 64 01/17/2025    EGFR 56 01/16/2025    EGFR 56 01/15/2025    CREATININE 0.81 01/17/2025    CREATININE 0.90 01/16/2025    CREATININE 0.90 01/15/2025     At baseline  Primary insomnia  Patient with age-related cognitive decline and \"sundowning\" per discussion with family  She is on melatonin 3 mg HS -appreciate geriatric medicine input, continue  Mental status appears improved today, 1/15  SDH (subdural hematoma) ([facility])  Please refer to above, recent hospitalization with traumatic ICH following fall  Patient has elected conservative/non-surgical approach  Monitor neurologic exam  SAH (subarachnoid hemorrhage) ([facility])  As above  Orthostatic hypotension  Patient presents in rapid afib, hypertensive urgency  She is on midodrine - hold for now, no significant orthostatic hypotension currently observed; may need to introduce at 2.5 mg TID if develops episodes at a later time, continue to monitor orthostatic Bps upon discharge  Acute on chronic diastolic congestive heart failure ([facility])  Wt Readings from Last 3 Encounters:   01/17/25 51.1 kg (112 lb 9.6 oz)   01/12/25 53.5 kg (118 lb)   01/10/25 51.1 kg (112 lb 9.6 oz)     Recent 2D echo reviewed, grade 2 diastolic dysfunction, pulmonary hypertension, trivial pericardial effusion  Noted for pulmonary edema in the setting of rapid A-fib  Cardiology consult has been requested, input appreciated  Received Lasix 20 mg IV 01/14, given an additional dose 1/15 - appears euvolemic today          Hypertensive urgency  Patient presents with rapid afib, accelerated BP with SBP in the 190's  BP improved now, hypertensive urgency resolved  She is on midodrine due to orthostatic hypotension - hold for now, may need to reintroduce at a lower dose  Continue metoprolol succinate 75 mg twice daily  Cardizem 30 mg every 6 hours - changed to Cardizem 120 mg XR daily today 1/16  Discontinued " amlodipine  Age-related cognitive decline  Per my discussion with patient's family history has been noted for cognitive decline, sundowning  Her mental status has declined since recent hospitalization, appears improved today  Geriatric medicine consult appreciated, continue melatonin for sleep regulation, supportive measures  SIRS (systemic inflammatory response syndrome) (HCC)  On admission CBC reviewed, noted for leukocytosis at 14,000 although has been having persistent leukocytosis likely due to intracranial bleed  The patient does not meet SIRS criteria with leukocytosis and tachycardia and given rapid A-fib we will pursue infectious workup  Infectious workup negative  Received IV fluids, discontinued with evidence of fluid overload chest x-ray  Stable off Abx discontinued 1/15  Ambulatory dysfunction  Accepted to return to [facility]  Hypokalemia  In setting of diuretic use  Replace prior to discharge  Continue on potassium replacement     Medical Problems       Resolved Problems  Date Reviewed: 1/14/2025   None       Discharging Physician / Practitioner: Nicolle Junior MD  PCP: Berhane Page DO  Admission Date:   Admission Orders (From admission, onward)       Ordered        01/14/25 1206  INPATIENT ADMISSION  Once                          Discharge Date: 01/17/25    Consultations During Hospital Stay:  Cardiology    Procedures Performed:   XR chest portable   Final Result by Crispin Lopes MD (01/14 1650)      Development of pulmonary vascular congestion with bilateral pleural effusions and edema.            Workstation performed: [id number]             Significant Findings / Test Results:   Results from last 7 days   Lab Units 01/17/25  0530   WBC Thousand/uL 12.08*   HEMOGLOBIN g/dL 9.4*   HEMATOCRIT % 28.6*   PLATELETS Thousands/uL 267     Results from last 7 days   Lab Units 01/17/25  0530   SODIUM mmol/L 133*   POTASSIUM mmol/L 3.4*   CHLORIDE mmol/L 97   CO2 mmol/L 30   BUN mg/dL 15   CREATININE mg/dL  "0.81   CALCIUM mg/dL 8.6         Test Results Pending at Discharge (will require follow up):   None     Outpatient Tests Requested:  Monitor BMP, CBC    Complications:  None     Reason for Admission: Symptomatic rapid A-fib    Hospital Course:   Sandra Purvis is a 89 y.o. female with a PMH of hypertension complicated by orthostatic hypotension, CKD, diastolic CHF with recent admission at [facility] ICU with intracranial hemorrhage and facial fractures who is transferred from [facility] due to A-fib with RVR.  Patient's medications were adjusted as above with her subsequently converting to normal sinus rhythm.  The patient was noted for overall improvement as well.  She was accepted back to [facility] facility.    Please see above list of diagnoses and related plan for additional information.     Condition at Discharge: good    Discharge Day Visit / Exam:     Subjective: Patient reports feeling well.  She is in agreement to return to acute rehab facility at [place].  Denies acute complaints.  No overnight events reported.    Vitals: Blood Pressure: 164/65 (01/17/25 0538)  Pulse: 67 (01/17/25 0538)  Temperature: 98.1 °F (36.7 °C) (01/17/25 0538)  Temp Source: Temporal (01/17/25 0538)  Respirations: 20 (01/17/25 0538)  Height: 5' 2\" (157.5 cm) (01/14/25 1324)  Weight - Scale: 51.1 kg (112 lb 9.6 oz) (01/17/25 0600)  SpO2: 96 % (01/17/25 0538)  Physical Exam  Constitutional:       General: She is not in acute distress.  HENT:      Head: Normocephalic.      Comments: Facial ecchymoses  Eyes:      Conjunctiva/sclera: Conjunctivae normal.   Cardiovascular:      Rate and Rhythm: Normal rate and regular rhythm.   Pulmonary:      Effort: No respiratory distress.      Breath sounds: No wheezing or rales.   Abdominal:      General: There is no distension.      Tenderness: There is no abdominal tenderness. There is no guarding.   Musculoskeletal:      Right lower leg: No edema.      Left lower leg: No edema. "   Neurological:      Mental Status: Mental status is at baseline.   Psychiatric:         Mood and Affect: Mood normal.          Discussion with Family: Updated  (daughter) via phone.    Discharge instructions/Information to patient and family:   See after visit summary for information provided to patient and family.      Provisions for Follow-Up Care:  See after visit summary for information related to follow-up care and any pertinent home health orders.      Mobility at time of Discharge:   Basic Mobility Inpatient Raw Score: 13  -HLM Goal: 4: Move to chair/commode  -HLM Achieved: 2: Bed activities/Dependent transfer  HLM Goal NOT achieved. Continue to encourage mobility in post discharge setting.     Disposition:   Acute Rehab at     Planned Readmission: No    Discharge Medications:  See after visit summary for reconciled discharge medications provided to patient and/or family.      Administrative Statements   Discharge Statement:  I have spent a total time of 35 minutes in caring for this patient on the day of the visit/encounter. >30 minutes of time was spent on: Diagnostic results, Risks and benefits of tx options, Counseling / Coordination of care, Documenting in the medical record, and Communicating with other healthcare professionals .    **Please Note: This note may have been constructed using a voice recognition system**

## 2025-01-17 NOTE — SPEECH THERAPY NOTE
Speech Language/Pathology    Speech/Language Pathology Progress Note    Patient Name: Sandra Purvis  Today's Date: 1/17/2025                     SLP RECOMMENDATIONS:         Diet: Level 3 Dental soft         Liquids: Thin liquids         Medications: as best tolerated         Aspiration Precautions: upright, slow rate        Summary:  Pt seen for follow up. Pt observed with portion of breakfast meal with slow, but functional mastication with dental soft solids. Pt reports discomfort with chewing is improving, but she still feels dental soft diet is easiest for her to eat. Pt observed with thin liquids via cup and straw sips with no overt s/s aspiration. SLP reviewed general aspiration precautions and importance of oral care with pt. Recommend continuing current diet. SLP to follow briefly for diet tolerance. Recommend ST services at next level of care, pt was previously receiving ST services for cognitive-linguistic deficits.     Assessment:  Slow, but functional mastication with dental soft solids. No overt s/s aspiration with thin liquids     Plan/Recommendations:  Level 3 Dental soft/thin liquids   Aspiration precautions  SLP to follow.         Lab Results   Component Value Date    WBC 12.08 (H) 01/17/2025    HGB 9.4 (L) 01/17/2025    HCT 28.6 (L) 01/17/2025    MCV 88 01/17/2025     01/17/2025           Problem List  Principal Problem:    Atrial fibrillation with rapid ventricular response ([facility])  Active Problems:    Hypertension    Mild protein-calorie malnutrition (HCC)    Stage 3 chronic kidney disease, unspecified whether stage 3a or 3b CKD ([facility])    Primary insomnia    SDH (subdural hematoma) ([facility])    SAH (subarachnoid hemorrhage) ([facility])    Orthostatic hypotension    Acute on chronic diastolic congestive heart failure ([facility])    Hypertensive urgency    Age-related cognitive decline    SIRS (systemic inflammatory response syndrome) ([facility])    Frailty syndrome in geriatric patient    Ambulatory  dysfunction    At risk for delirium    Bilateral hearing loss       Past Medical History  Past Medical History:   Diagnosis Date    Arthritis     Cataract     LastAssessed:9/8/15    GERD (gastroesophageal reflux disease)     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    Hypertension     PAF (paroxysmal atrial fibrillation) (HCC)     Last Assessed:2/3/16    Wrist fracture     Resolved:9/8/15        Past Surgical History  Past Surgical History:   Procedure Laterality Date    CATARACT EXTRACTION      Last Assessed:9/8/15    FL RETROGRADE PYELOGRAM  10/17/2021    LYMPHADENECTOMY      Last Assessed:9/8/15    KS CYSTO/URETERO W/LITHOTRIPSY &INDWELL STENT INSRT Right 11/24/2021    Procedure: CYSTOSCOPY URETEROSCOPY WITH LITHOTRIPSY HOLMIUM LASER, RETROGRADE PYELOGRAM AND INSERTION STENT URETERAL;  Surgeon: David Sr MD;  Location: [facility];  Service: Urology    KS CYSTOURETHROSCOPY W/URETERAL CATHETERIZATION Right 10/17/2021    Procedure: CYSTOSCOPY RETROGRADE PYELOGRAM WITH INSERTION STENT URETERAL;  Surgeon: Benjie Tinoco MD;  Location: [facility];  Service: Urology    TONSILLECTOMY      Last Assessed:9/8/15    WRIST SURGERY      Last Assessed:9/8/15

## 2025-01-17 NOTE — ASSESSMENT & PLAN NOTE
Wt Readings from Last 3 Encounters:   01/17/25 51.1 kg (112 lb 9.6 oz)   01/12/25 53.5 kg (118 lb)   01/10/25 51.1 kg (112 lb 9.6 oz)     Recent 2D echo reviewed, grade 2 diastolic dysfunction, pulmonary hypertension, trivial pericardial effusion  Noted for pulmonary edema in the setting of rapid A-fib  Cardiology consult has been requested, input appreciated  Received Lasix 20 mg IV 01/14, given an additional dose 1/15 - appears euvolemic today

## 2025-01-17 NOTE — ASSESSMENT & PLAN NOTE
Malnutrition Findings:      BMI Findings:  21.58     Body mass index is 20.59 kg/m².   Request nutrition evaluation

## 2025-01-17 NOTE — CASE MANAGEMENT
Case Management Discharge Planning Note    Patient name Sandra Purvis  Location 5T /5T -01 MRN [id number]  : 12/3/1934 Date 2025       Current Admission Date: 2025  Current Admission Diagnosis:Atrial fibrillation with rapid ventricular response ([facility])   Patient Active Problem List    Diagnosis Date Noted Date Diagnosed    Hypokalemia 2025     Hypertensive urgency 2025     Age-related cognitive decline 2025     SIRS (systemic inflammatory response syndrome) ([facility]) 2025     Frailty syndrome in geriatric patient 2025     Ambulatory dysfunction 2025     At risk for delirium 2025     Bilateral hearing loss 2025     Anemia 2025     Leukocytosis 2025     Acute on chronic diastolic congestive heart failure ([facility]) 2025     Syncope 2025     Chest pain 2025     Orthostatic hypotension 2025     Fall 2025     SDH (subdural hematoma) ([facility]) 2025     SAH (subarachnoid hemorrhage) ([facility]) 2025     Closed extensive facial fractures ([facility]) 2025     Primary insomnia 2024     Basal cell carcinoma (BCC) of scalp 2023     Malignant melanoma of arm, left ([facility]) 2023     Scalp lesion 2023     Arm skin lesion, left 2023     Irritant contact dermatitis due to other agents 2022     COVID-19 2022     GERD (gastroesophageal reflux disease)      Stage 3 chronic kidney disease, unspecified whether stage 3a or 3b CKD ([facility]) 2021     Atrial fibrillation with rapid ventricular response ([facility])      Preop examination 2021     Esophagus disorder 10/19/2021     Ureteral stone 10/15/2021     Bacteremia 10/15/2021     Mild protein-calorie malnutrition ([facility]) 2021     Hypertension 2015       LOS (days): 3  Geometric Mean LOS (GMLOS) (days): 2.3  Days to GMLOS:-0.7     OBJECTIVE:  Risk of Unplanned Readmission Score: 20.76         Current admission status:  Inpatient   Preferred Pharmacy:   [facility]/pharmacy #[id number] - [place] - [address]  [address] [address]  Phone: [phone] Fax: [phone]    Primary Care Provider: Berhane Page DO    Primary Insurance: MEDICARE  Secondary Insurance: BLUE CROSS    DISCHARGE DETAILS:    Discharge planning discussed with:: Patient and daughter Courtney  Freedom of Choice: Yes     CM contacted family/caregiver?: Yes  Were Treatment Team discharge recommendations reviewed with patient/caregiver?: Yes  Did patient/caregiver verbalize understanding of patient care needs?: N/A- going to facility  Were patient/caregiver advised of the risks associated with not following Treatment Team discharge recommendations?: Yes    Contacts  Patient Contacts: Courtney Berger (Daughter) [phone]  Relationship to Patient:: Family  Contact Method: Phone  Phone Number: [phone]  Reason/Outcome: Discharge Planning, Emergency Contact, Continuity of Care    Other Referral/Resources/Interventions Provided:  Interventions: Acute Rehab      Treatment Team Recommendation: Acute Rehab  Discharge Destination Plan:: Acute Rehab        Additional Comments: Patient medically cleared for discharge.  Can go to [facility] at 1230.  Updated nurse Courtney.  Met with patient at bedside and updated same.  Call to dtr Courtney and updated on discharge    Accepting Facility Name, City & State : [facility]  Receiving Facility/Agency Phone Number: [phone].  Facility/Agency Fax Number: [phone].       IMM reviewed with patient and caregiver, patient and caregiver agrees with discharge determination.

## 2025-01-17 NOTE — [facility] ADMISSION
Patient looks appropriate to come to [facility] pending medical stability, LOF at discharge and bed availability.  [facility] admissions will continue to follow patient for progression of care and discharge readiness.

## 2025-01-18 PROBLEM — G47.00 INSOMNIA: Status: ACTIVE | Noted: 2025-01-18

## 2025-01-18 PROCEDURE — 92610 EVALUATE SWALLOWING FUNCTION: CPT

## 2025-01-18 PROCEDURE — 97535 SELF CARE MNGMENT TRAINING: CPT

## 2025-01-18 PROCEDURE — 97530 THERAPEUTIC ACTIVITIES: CPT

## 2025-01-18 PROCEDURE — 97163 PT EVAL HIGH COMPLEX 45 MIN: CPT

## 2025-01-18 PROCEDURE — 99232 SBSQ HOSP IP/OBS MODERATE 35: CPT | Performed by: INTERNAL MEDICINE

## 2025-01-18 PROCEDURE — 97129 THER IVNTJ 1ST 15 MIN: CPT

## 2025-01-18 PROCEDURE — 97166 OT EVAL MOD COMPLEX 45 MIN: CPT

## 2025-01-18 PROCEDURE — 97130 THER IVNTJ EA ADDL 15 MIN: CPT

## 2025-01-18 PROCEDURE — 92523 SPEECH SOUND LANG COMPREHEN: CPT

## 2025-01-18 RX ORDER — HYDRALAZINE HYDROCHLORIDE 10 MG/1
10 TABLET, FILM COATED ORAL EVERY 8 HOURS PRN
Status: DISCONTINUED | OUTPATIENT
Start: 2025-01-18 | End: 2025-01-18

## 2025-01-18 RX ORDER — TRAZODONE HYDROCHLORIDE 50 MG/1
25 TABLET, FILM COATED ORAL
Status: DISCONTINUED | OUTPATIENT
Start: 2025-01-18 | End: 2025-01-21

## 2025-01-18 RX ORDER — HYDRALAZINE HYDROCHLORIDE 10 MG/1
10 TABLET, FILM COATED ORAL EVERY 8 HOURS PRN
Status: DISCONTINUED | OUTPATIENT
Start: 2025-01-18 | End: 2025-01-22

## 2025-01-18 RX ADMIN — METOPROLOL SUCCINATE 75 MG: 50 TABLET, EXTENDED RELEASE ORAL at 21:03

## 2025-01-18 RX ADMIN — Medication 2.5 MG: at 17:41

## 2025-01-18 RX ADMIN — PANTOPRAZOLE SODIUM 40 MG: 40 TABLET, DELAYED RELEASE ORAL at 05:51

## 2025-01-18 RX ADMIN — METOPROLOL SUCCINATE 75 MG: 50 TABLET, EXTENDED RELEASE ORAL at 07:02

## 2025-01-18 RX ADMIN — DILTIAZEM HYDROCHLORIDE 120 MG: 120 CAPSULE, COATED, EXTENDED RELEASE ORAL at 05:51

## 2025-01-18 RX ADMIN — POLYETHYLENE GLYCOL 3350 17 G: 17 POWDER, FOR SOLUTION ORAL at 17:40

## 2025-01-18 RX ADMIN — ACETAMINOPHEN 975 MG: 325 TABLET, FILM COATED ORAL at 21:02

## 2025-01-18 RX ADMIN — ENOXAPARIN SODIUM 30 MG: 30 INJECTION SUBCUTANEOUS at 21:02

## 2025-01-18 RX ADMIN — MELATONIN TAB 3 MG 6 MG: 3 TAB at 21:04

## 2025-01-18 RX ADMIN — ACETAMINOPHEN 975 MG: 325 TABLET, FILM COATED ORAL at 14:11

## 2025-01-18 RX ADMIN — FUROSEMIDE 20 MG: 20 TABLET ORAL at 08:51

## 2025-01-18 RX ADMIN — Medication 2.5 MG: at 08:52

## 2025-01-18 RX ADMIN — SENNOSIDES AND DOCUSATE SODIUM 1 TABLET: 50; 8.6 TABLET ORAL at 21:03

## 2025-01-18 RX ADMIN — ACETAMINOPHEN 975 MG: 325 TABLET, FILM COATED ORAL at 05:51

## 2025-01-18 NOTE — PROGRESS NOTES
"[facility] PT EVAL   01/18/25 2000   Patient Data   Rehab Impairment Impairment of mobility, safety and Activities of Daily Living (ADLs) due to Brain Dysfunction: 02.22 Traumatic, Closed Injury   Etiologic Diagnosis acute SDH along the L cerebral convexity; acute SAH in the L frontalparietal sulci; L sylican fissure and suprasellar cistern   Date of Onset 01/04/25   Support System   Name Darryl Gross spouse   Health Status uses walker for mobility   Able to provide 24 hour supervision No   Able to provide physical help? No  (pt states PTA, she and spouse shared laundry and shopping duties)   Multiple Support Systems No  (has 2 children, however, they are not local. Dtr lives in [place] and son lives in [place])   Home Setup   Type of Home Single Level   Method of Entry Stairs;Hand Rail Left  (pt reports there is a \"pole\" to hold onto on L side)   Number of Stairs 2   Number of Stairs in Home 12  (to basement where laundry is located)   In Home Hand Rail Left   First Floor Bathroom Full;Tub;Shower;Combo;Grab Bars   First Floor Bathroom Accessibility Grab bars by toilet;Grab bars in tub/shower;Shower chair   First Floor Setup Available Yes   Home Modifications Necessary?   (pending progress)   Available Equipment Roller Walker;Single Point Cane;Shower Chair  (pt believes there are 2 RWs at home, spouse is currently using 1 of them)   Baseline Information   Transportation Family/friends drive  (spouse drives short distances)   Prior Device(s) Used Shower Chair   Prior Level of Function   Self-Care 3. Independent - Patient completed the activities by him/herself, with or without an assistive device, with no assistance from a helper.   Indoor-Mobility (Ambulation) 3. Independent - Patient completed the activities by him/herself, with or without an assistive device, with no assistance from a helper.   Stairs 3. Independent - Patient completed the activities by him/herself, with or without an assistive device, with no " assistance from a helper.   Functional Cognition 2. Needed Some Help - Patient needed a partial assistance from another person to complete activities.   Prior Assistance Needed for Driving;Household Chores/Cleaning;Money Management;Shopping;Medication Management   Prior Device Used Z. None of the above   Falls in the Last Year   Number of falls in the past 12 months 1   Type of Injury Associated with Fall Major injury  (reason for admission)   Patient Preference   Nickname (Patient Preference) Sandra   Psychosocial   Patient Behaviors/Mood Calm;Cooperative;Pleasant  (fatigued t/o session requiring cues to cont with tasks and to stay alert)   Restrictions/Precautions   Precautions Aspiration;Bed/chair alarms;Cognitive;Fall Risk;Supervision on toilet/commode;Pain  (+ortho BP, use of b/l knee high TEDs and binder)   Weight Bearing Restrictions No   ROM Restrictions No   Braces or Orthoses   (b/l knee high TEDs and binder)   Pain Assessment   Pain Assessment Tool 0-10   Pain Score 6   Pain Location/Orientation Location: Arm   Effect of Pain on Daily Activities limits tolerance to being upright   Transfer Bed/Chair/Wheelchair   Comment (S)  pt unable to perform transfer this session due to ortho BPs and c/o extreme fatigue, returned to supine for safety   Reason if not Attempted Medical concerns   Chair/Bed-to-Chair Transfer CARE Score 88   Roll Left and Right   Type of Assistance Needed Physical assistance   Physical Assistance Level 76% or more   Comment maxAx1 w/o bedrails, HOB flat   Roll Left and Right CARE Score 2   Sit to Lying   Type of Assistance Needed Physical assistance   Physical Assistance Level 76% or more   Comment maxAx1 for trunk and LE assist   Sit to Lying CARE Score 2   Lying to Sitting on Side of Bed   Type of Assistance Needed Physical assistance   Physical Assistance Level 51%-75%   Comment mod-maxAx1 for trunk assist and to scoot to EOB   Lying to Sitting on Side of Bed CARE Score 2   Sit to  Stand   Type of Assistance Needed Physical assistance   Physical Assistance Level Total assistance   Comment mod-maxAx2 with RW with severe retropulsion noted; pt closing eyes and c/o lightheadedness/dizziness, unable to obtain standing BP due to safety concerns or perform standing transfer   Sit to Stand CARE Score 1   Picking Up Object   Comment due to ortho BPs not performed this session   Reason if not Attempted Medical concerns   Picking Up Object CARE Score 88   Car Transfer   Comment due to ortho BPs not performed this session   Reason if not Attempted Medical concerns   Car Transfer CARE Score 88   Ambulation   Primary Mode of Locomotion Prior to Admission Walk   Walk 10 Feet   Comment due to ortho BPs not performed this session   Reason if not Attempted Medical concerns   Walk 10 Feet CARE Score 88   Walk 50 Feet with Two Turns   Reason if not Attempted Medical concerns   Walk 50 Feet with Two Turns CARE Score 88   Walk 150 Feet   Reason if not Attempted Medical concerns   Walk 150 Feet CARE Score 88   Walking 10 Feet on Uneven Surfaces   Reason if not Attempted Medical concerns   Walking 10 Feet on Uneven Surfaces CARE Score 88   Wheel 50 Feet with Two Turns   Reason if not Attempted Activity not applicable   Wheel 50 Feet with Two Turns CARE Score 9   Wheel 150 Feet   Reason if not Attempted Activity not applicable   Wheel 150 Feet CARE Score 9   Curb or Single Stair   Comment due to ortho BPs not performed this session   Reason if not Attempted Medical concerns   1 Step (Curb) CARE Score 88   4 Steps   Reason if not Attempted Medical concerns   4 Steps CARE Score 88   12 Steps   Reason if not Attempted Medical concerns   12 Steps CARE Score 88   Comprehension   QI: Comprehension 3. Usually Understands: Understands most conversations, but misses some part/intent of message. Requires cues at times to understand.   Expression   QI: Expression 3. Exhibits some difficulty with expressing needs and ideas  (e.g., some words or finishing thoughts) or speech is not clear   RLE Assessment   RLE Assessment X   Strength RLE   RLE Overall Strength 3/5   LLE Assessment   LLE Assessment X   Strength LLE   LLE Overall Strength 3/5   Coordination   Movements are Fluid and Coordinated 0   Coordination and Movement Description severe retropulsion with standing   Cognition   Overall Cognitive Status Impaired   Arousal/Participation Cooperative   Attention Attends with cues to redirect   Orientation Level Oriented to person;Oriented to place;Oriented to time   Memory Decreased short term memory;Decreased recall of recent events;Decreased recall of precautions   Following Commands Follows one step commands with increased time or repetition   Comments pt with decreased STM noted during session; very fatigued needing stimuli to stay awake t/o session   Objective Measure   PT Findings (S)  Supine BP with b/l TEDs donned 160/70, HR 60, SP02 97%. Seated /65, HR 65, slight c/o lightheadedness but resolved. Standing BP unable to be obtained due to c/o symptoms, returned pt to sitting, seated BP after brief stance 117/57, HR 62, pt returned to supine per pt request. Supine /67, HR 64, ortho BP sx's resolved. Recommend use of abdominal binder next session when standing attempted due to +ortho BP likely during STS this session. Binder is in pt's room. Provided pt with HEP to perform each morning prior to therapy sessions but due to cog deficits will likely need to be reminded. Also encouraged pt to increase fluid intake as pt not on fluid restriction. Would recommend having pt drink cup of water at start of therapy session and perform seated TE prior to attempting to stand to assist with ortho BPs   Therapeutic Exercise   Therapeutic Exercise/Activity seated EOB 6' total, pt fatigued though and cont to request to lie down. Increased posterior lean as pt fatigued needing minAx1 to maintain upright posture. in room HEP provided to  "pt this session and reviewed including APs 3x10, heel slides 3x10, and glute sets 3x10; encouraged pt to perform each morning prior to therapy sessions as well as in evening   Discharge Information   Vocational Plan Retired/not working   Patient's Discharge Plan d/c home with spouse   Patient's Rehab Expectations \"I want to get up and go and keep on going\"   Barriers to Discharge Home Limited Family Support;Unsafe Home Setup;Decreased Cognitive Function;Decreased Strength;Decreased Endurance;Pain;Safety Considerations   Impressions Pt is a 89 female who presents to [facility] with dx of  Acute subdural hemorrhage along the left cerebral convexity;Acute subarachnoid hemorrhage in the left frontoparietal sulci, left sylvian fissure and suprasellar cistern . Pt was seen for a high complexity evaluation with PMH including afib, GERD, HTN, who initially presented to [facility] on 1/4/2025 after a syncopan episode and collapse at home. CT had showed \"comminuted and mildly displaced fractures of the lateral and anterior walls of the right maxilla, lateral and inferior walls of the right orbit, right inferior orbital wall fracture appears to involve the inferior orbital foramen associated with small extraconal soft tissue edema and focus of air. There is also a nondisplaced fracture of the zygomatic arch and the temporal bone at the TMJ and diffuse opacification of the right maxillary sinus and right nasal cavity with blood products and air. Additionally there was an acute subdural hemorrhage along the left cerebral convexity with a maximal thickness of 1.9 cm with no midline shift and an acute subarachnoid hemorrhage in the left frontal parietal sulci, left sylvian fissure as well as suprasellar cistern.\" OMFS was consulted as well as neurosurgery. Patient and family declined any acute intervention at this time. Her hospital stay was also complicated with A-fib with RVR and cardiology " was consulted. Patient then had orthostatic hypotension and started on midodrine. Patient initially transferred to [facility] 1/12/2025. While at [facility] patient with hypertensive episodes to SBP >190 and Afib rates 120-130s. Patient became more symptomatic from her A-fib and was transferred to acute care 1/14. She had X ray w/ pulmonary edema was given lasix 1/14-1/15 with good response. Patient was seen by cardiology. Patient was placed on Cardizem drip and now on PO Cardizem. Her rates have improved.  Refer to preadmission screen for more comprehensive list. PTA pt lives with  in a 1SH with 2 ROSA. PTA pt was independent for mobilities without use of AD. Upon evaluation pt presents with the following impairments: decreased strength, decreased ROM, imbalance, coordination deficits, decreased endurance, fatigue, pain, cognitive deficits, orthostatic hypotension, dizziness, lightheadedness, impaired sitting balance, delayed righting reactions, and retropulsion. Pt presents with the following functional barriers to d/c home: inaccessible home environment, steps inside of home, decreased family support, requires external assistance for all mobilities, positive fall history, and increased fall risk due to impairments. Pt currently requires mod-maxAx1 for bed mobilities, x2 person assist for STS transfers, and due to ortho Bps and extreme fatigue unable to assess transfers this session. Pt is a fair rehab candidate with ELOS 2 - 3 weeks and goals set at Supervision / Standby Assist. POC to focus on gait training, transfer training, stair training, neuromuscular re-education, improving pt's activity tolerance and endurance, LE strengthening, core strengthening, balance interventions, and assessment for appropriate AD.   PT Therapy Minutes   PT Time In 1330   PT Time Out 1430   PT Total Time (minutes) 60   PT Mode of treatment - Individual (minutes) 60   PT Mode of treatment - Concurrent (minutes) 0   PT Mode of treatment -  Group (minutes) 0   PT Mode of treatment - Co-treat (minutes) 0   PT Mode of Treatment - Total time(minutes) 60 minutes   PT Cumulative Minutes 60   Cumulative Minutes   Cumulative therapy minutes 230

## 2025-01-18 NOTE — PROGRESS NOTES
SLP COGNITIVE AND DYSPHAGIA EVALUATION      25 0800   Pain Assessment   Pain Assessment Tool 0-10   Pain Score 8   Pain Location/Orientation Orientation: Lower;Location: Back   Hospital Pain Intervention(s) Medication (See MAR);Repositioned;Distraction: Puzzles   Restrictions/Precautions   Precautions Aspiration;Bed/chair alarms;Cognitive;Fall Risk;Pain   Cognitive Linguisitic Assessments   The [employer] [facility] ([facility]) Pt completed the [facility] cognitive assessment. Pt total score was 6 which as compared to those with less than high school education correlates with severe neurocognitive disorder and is indicative of dementia. Breakdown of scores as follows:    Orientation: 2/3  Functional problem solving with math: 0/3  Divergent naming (timed 1 minute- animals): 0/3 (4 animals total)  Word recall: 0/5  Mental flexibility with number order: 0/2  Clock Drawin/4   Following directions given shapes: 2/2  Story recall comprehension questions:      Total score: 6/30     Based on score, patient will benefit from further skilled SLP services to maximize overall cognitive lingusitic communication abilities for increased independence and decreased burden of care.    Comprehension   Assist Devices Glasses   Auditory Basic   Visual Basic   Findings Pleasee see SLP tx note for further details.   QI: Comprehension 3. Usually Understands: Understands most conversations, but misses some part/intent of message. Requires cues at times to understand.   Comprehension (FIM) 3 - Understands basic info/conversation 50-74% of time   Expression   Verbal Basic   Non-Verbal Basic   Intelligibility Sentence   Findings Pleasee see SLP tx note for further details.   QI: Expression 3. Exhibits some difficulty with expressing needs and ideas (e.g., some words or finishing thoughts) or speech is not clear   Expression (FIM) 4 - Expresses basic info/needs 75-90% of time   Social Interaction   Cooperation with staff    Participation Individual   Behaviors observed Appropriate   Findings Pleasee see SLP tx note for further details.   Social Interaction (FIM) 5 - Interacts appropriately with others 90% of time   Problem Solving   Routine Manages call bell   Findings Pleasee see SLP tx note for further details.   Problem solving (FIM) 2 - Solves basic problems 25-49% of time   Memory   Recognize People Yes   Remember Routine Yes   Initiates Tasks No   Short-Term Impaired   Long Term Intact   Recalls Precaution No   Findings Pleasee see SLP tx note for further details.   Memory (FIM) 2 - Recognizes, recalls/performs 25-49%   Speech/Swallow Mechanism Exam   SpO2 96 %   Speech/Language/Cognition Assessment   Treatment Assessment Pt was seen in room for skilled ST evaluation assessing current cognitive linguistic communication skills. Upon entering room, pt was awake and agreeable for evaluation. SLP introducing self to pt and providing information regarding orders and assessment to be completed to assess cognition. Pt reports decline in cognitive skills in recent years and notes that decrease is observed since hospitalization. Pt reported 'I can't think like I use to, my memory isn't as good.' Of note, pt noting 8/10 pain consistently in lower back and throughout cognition evaluation, pt needed frequent breaks and suspect pain affecting overall attention and accuracy with formalized testing.      Pt reported that she lives with her  in a 2 story home. Their laundry is in the basement and reports that pt and  split the responsibility stating that 1 person will bring the laundry down, the other will put it in the wash, then someone takes a turn to put the clothes in the washer then the dryer then bring it upstairs. When asked if this was too much movement and stairs, pt reported 'I didn't think about it that way but I guess, it's just easier this way.' Pt reports she has a cleaning lady who comes in to clean the house. Pt  has 2 children-Darryl who lives in [place] and Courtney who lives near [facility]. Pt reports her daughter will help at times with cooking by cooking meals at her home and brining it for pt. If daughter is unable to bring food, pt reports that they will go to the grocery store and get frozen, prepared foods. Regarding medication management, pt reports using a pill pack that she gets from the pharmacy. Pt reported her  completed bill management at home for them. In regards to d/c, pt reports that 's health has been declining and notes that 'he hasn't been able to help himself let alone me.' SLP discussed possible options such as SNF and assisted living that some patients go to after this. Pt reported 'I have good neighbors, they can help, they are always happy to help.' Per discussion regarding biographical information, pt noting decrease awareness of deficits, problem solving, reasoning and memory affecting safety.      Of note, pt was admitted to [facility] and evaluated by ST on 1/13/2025 with cognitive and swallowing evaluation completed. Pt completing SLUMS and scored 9/30 correlating to severe neurocognitive disorder. Pt noted to complete same assessment this evaluation, scoring lower with a 6/30, still correlating to a severe neurocognitive disorder. Due to change in medical status and need for further medical intervention, pt was discharged on 1/14/2025 to acute care setting. Pt was only evaluated for cognitive linguistic skills and dysphagia and no further f/u tx sessions were able to be completed.      Pt completed the SLUMS cognitive assessment on today's evaluation. Pt total score was 6 which as compared to those with less than high school education correlates with severe neurocognitive disorder and is indicative of dementia. Based on current evaluation, pt is presenting with moderate to severe cognitive linguistic deficits at time of assessment, characterized by decreased ST memory recall, working  memory, attention, processing, comprehension, problem solving, sequencing, insight into deficits, thought organization and mental flexibility. Pt was oriented x5 to month, year, current location, current city and reason for hospitalization. Based on previous evaluation done less than a week ago to today's evaluation, difficulty in recall of biographical information is noted as reports this date differ than last evaluation. Therapy team to discuss and clarify PLOF and d/c plan with daughter. Therapist educated pt on rehab process, goal setting and ELOS which pt verbalized understanding. At this time, pt continues to benefit from ongoing skilled SLP services to maximize overall cognitive linguistic skills in attempts to decrease caregiver burden over time.   Eating   Type of Assistance Needed Set-up / clean-up;Supervision;Verbal cues   Physical Assistance Level No physical assistance   Eating CARE Score 4   Swallow Assessment   Swallow Treatment Assessment Bedside Dysphagia Evaluation        Patient Name: Sandra Purvis     Today's Date: 1/18/2025     Problem List  Active Problems:    Hypertension    Atrial fibrillation with rapid ventricular response (HCC)    Stage 3 chronic kidney disease, unspecified whether stage 3a or 3b CKD (HCC)    GERD (gastroesophageal reflux disease)    Basal cell carcinoma (BCC) of scalp    Malignant melanoma of arm, left (HCC)    SDH (subdural hematoma) (HCC)    SAH (subarachnoid hemorrhage) (HCC)    Closed extensive facial fractures (HCC)    Chest pain    Orthostatic hypotension    Syncope    Anemia    Leukocytosis    Acute on chronic diastolic congestive heart failure (HCC)        Past Medical History  Medical History        Past Medical History:   Diagnosis Date    Arthritis      Cataract       LastAssessed:9/8/15    GERD (gastroesophageal reflux disease)      Hypertaurodontism       Last Assessed: 9/8/15    Hypertension      PAF (paroxysmal atrial fibrillation) ([facility])       Last  Assessed:2/3/16    Wrist fracture       Resolved:9/8/15            Past Surgical History  Surgical History         Past Surgical History:   Procedure Laterality Date    CATARACT EXTRACTION         Last Assessed:9/8/15    FL RETROGRADE PYELOGRAM   10/17/2021    LYMPHADENECTOMY         Last Assessed:9/8/15    FL CYSTO/URETERO W/LITHOTRIPSY &INDWELL STENT INSRT Right 11/24/2021     Procedure: CYSTOSCOPY URETEROSCOPY WITH LITHOTRIPSY HOLMIUM LASER, RETROGRADE PYELOGRAM AND INSERTION STENT URETERAL;  Surgeon: David Sr MD;  Location: [facility];  Service: Urology    FL CYSTOURETHROSCOPY W/URETERAL CATHETERIZATION Right 10/17/2021     Procedure: CYSTOSCOPY RETROGRADE PYELOGRAM WITH INSERTION STENT URETERAL;  Surgeon: Benjie Tinoco MD;  Location: [facility];  Service: Urology    TONSILLECTOMY         Last Assessed:9/8/15    WRIST SURGERY         Last Assessed:9/8/15               Summary   Pt presented with s/s suggestive of minimal and mild oral and suspected pharyngeal dysphagia.  Symptoms or concerns included mild decreased mastication, decreased bolus formation, oral residue with soft solids , residue effectively cleared with liquid wash and lingual sweeps, piecemeal deglutition, and minimal anterior loss/spillage of solids. Additionally, pt is presenting with suspected pharyngeal swallow delay, multiple swallows, and audible swallows.       Pt was observed during breakfast meal of level 3 diet with thins for dysphagia evaluation. Pt was awake upon arrival of SLP for evaluation. Pt noting to report continued difficulty with chewing and reported jaw pain for the last ~2 weeks. Pt noted that this is more prevalent and painful with harder foods such as toast and prefers to have softer foods. Pt currently on level 3 with thins and SLP discussing with pt about getting pt back to regular foods with pt choose softer solids to increase tolerance of regular diet. Pt in agreement as she is aware and understanding of diet  and feels softer items are a preference that she is okay with. Pt was able to set up tray independently with SLP assisting at times for opening packages. Pt was independent in self feeding self.     Mastication was adequate but prolonged with the materials administered today. Breakdown complete overall but at times, pt would take larger bites not realizing and needed verbal cues for reminder which pt then recalled for the rest of session.  Bolus formation was mildly decreased as pt noted difficulty recalling use of liquid wash and alternating liquids and solids. Bolus transfer were minimally functional with some oral residue noted but cleared with brief cues.  No overt s/s reduced oral control. Swallowing initiation appeared prompt but mildly decreased with solids, prompt with thins.  Laryngeal rise was palpated and judged to be within functional limits.  No throat clearing, change in vocal quality or respiratory status noted today. Pt noted to cough x2 with oatmeal and cough was observed to be weak. Suspect due to mild decrease in swallow initiation and bolus control.      Based on evaluation, pt is open to getting back to regular diet but choosing a softer diet that includes items that are not on a level 3 diet such as rice. Pt asked appropriate questions between the options and verbally reasoned her choices when dietary arrived to order her meal on level 3 however, pt on Sodium 2 diet which restricts certain items as well. SLP reviewed with pt recommendations for continued dysphagia tx to trial regular items that are softer however, based on current intake, it may be possible that pt stays on this diet closer to discharge because it is softer and chopped food. Pt agreeable to all recommendations at this time. SLP discussing with CHACHA Dela Cruz current recommendations and modifications in future tx sessions as well as current recommendations for distant supervision as pt is more aware of strategies throughout sessions  and when reviewed at end of meal.      Risk/s for Aspiration: mild         Recommendations:  Recommended Diet: soft/level 3 diet and thin liquids   Recommended Form of Meds: as best tolerated  Aspiration precautions and swallowing strategies: upright posture, only feed when fully alert, slow rate of feeding, small bites/sips, quiet environment (tv off, limit talking, door closed, etc.), alternating bites and sips, and OOB FOR ALL MEALS  Other Recommendations: Continue frequent oral care        DISTANT supervision w/ meals.  Consider consult with:  nutrition and GI  Results reviewed with:  patient and RN-Lanie  Aspiration precautions posted.     F/u ST tx: Pt will continue to benefit from ongoing skilled dysphagia tx sessions to establish safest least restrictive diet w/o increased oropharyngeal or aspiration sxs as well as monitor ability to carryover swallow strategies independently.        Plan:  -Continue Level 3 diet and trial regular diet (with mainly softer foods) and thin liquids   -Follow up (suspected short term) to assess pt's ability to choose softer solids and tolerate possible upgrade to regular diet  -Modify as needed diet and liquids.        Current Medical Status  Pt is a 89 y.o. female who presented to [facility] with collapse, which resulted in mildly displaced fractures of the lateral and anterior walls of right maxilla, lateral and inferior walls of right orbit, along with acute subdural hemorrhage along the left cerebral convexity, acute subarachnoid hemorrhage in the left frontoparietal sulci and left sylvian fissure and suprasellar cistern. Patient previously admitted to the [facility] on 1/12. Pt had been evaluated by [facility] SLP team with charting showing pt has recently been seen and upgrades to regular diet. Previously, staff reported by has been falling asleep during meal. On 1/14, she developed a-fib with RVR and was transferred back to the acute care setting. Orders were  received for dysphagia and language/cognition upon re-admission to [facility]. Pt was seen on Acute care setting with pt currently on level 3 diet with thins and is aspiration risk. Pt reporting discomfort and difficulty with chewing and noted ~2 weeks ago increase in jaw pain and her higher preference for softer foods. [facility] staff currently reporting no difficulty observed with meals or with intake of medication.      Allergies:  No known food allergies     Past medical history:  Please see H&P for details     Special Studies:  XR chest 1 view portable  Result Date: 1/4/2025  Impression: No acute disease. Distended air-filled esophagus. Moderate cardiomegaly. Workstation performed: [id number]     Social/Education/Vocational Hx:  Pt lives  with  in 2 story home     Swallow Information   Current Risks for Dysphagia & Aspiration: Weak cough;Weak voicing;General debilitation;Brain injury;Cognitive deficit;Reduced alertness; positioning issues  Current Symptoms/Concerns: difficulty chewing, coughing with soft solids, weight loss  Current Diet: soft/level 3 diet and thin liquids   Baseline Diet: regular diet and thin liquids       Baseline Assessment   Behavior/Cognition: alert  Speech/Language Status: able to participate in conversation and able to follow commands  Patient Positioning: upright in bed positioned in chair mode  Pain Status/Interventions/Response to Interventions: Reporting pain in lower back and stomach, RN providing medication and SLP assisting in repositioning while in bed        Swallow Mechanism Exam  Facial: symmetrical  Labial: WFL  Lingual: WFL  Velum: unable to visualize  Mandible: adequate ROM  Dentition: adequate  Vocal quality:clear/adequate but low volume  Volitional Cough: weak   Respiratory Status: on RA        Consistencies Assessed and Total Amount Consumed:  Consistencies Administered: thin liquids and soft solids  Materials administered included level 3 diet of scrambled eggs, oatmeal,  diced pears and with water and cranberry juice via straw     75% of meal and ~320 cc of thins by straw    Oral Stage: minimal and mild  Mastication was adequate but prolonged with the materials administered today. Breakdown complete overall but at times, pt would take larger bites not realizing and needed verbal cues for reminder which pt then recalled for the rest of session.  Bolus formation was mildly decreased as pt noted difficulty recalling use of liquid wash and alternating liquids and solids. Bolus transfer were minimally functional with some oral residue noted but cleared with brief cues.  No overt s/s reduced oral control.    Pharyngeal Stage: minimal and mild  Swallow Mechanics:  Swallowing initiation appeared prompt but mildly decreased with solids, prompt with thins.  Laryngeal rise was palpated and judged to be within functional limits.  No throat clearing, change in vocal quality or respiratory status noted today. Pt noted to cough x2 with oatmeal and cough was observed to be weak. Suspect due to mild decrease in swallow initiation and bolus control.     Esophageal Concerns: none reported or observed    Strategies and Efficacy: upright posture, only feed when fully alert, slow rate of feeding, small bites/sips, quiet environment (tv off, limit talking, door closed, etc.), alternating bites and sips, and OOB FOR ALL MEALS     Summary and Recommendations (see above)   Swallow Assessment Prognosis   Prognosis Good   Prognosis Considerations Age;Co-morbidities;Family/community support;Medical diagnosis;Medical prognosis;Severity of impairments;Ability to carry over   SLP Therapy Minutes   SLP Time In 0800   SLP Time Out 0920   SLP Total Time (minutes) 80   SLP Mode of treatment - Individual (minutes) 80   SLP Mode of treatment - Concurrent (minutes) 0   SLP Mode of treatment - Group (minutes) 0   SLP Mode of treatment - Co-treat (minutes) 0   SLP Mode of Treatment - Total time(minutes) 80 minutes   SLP  Cumulative Minutes 80   Therapy Time missed   Time missed? No

## 2025-01-18 NOTE — SPEECH THERAPY NOTE
Bedside Dysphagia Evaluation      Patient Name: Sandra Purvis    Today's Date: 1/18/2025     Problem List  Active Problems:    Hypertension    Atrial fibrillation with rapid ventricular response (HCC)    Stage 3 chronic kidney disease, unspecified whether stage 3a or 3b CKD (HCC)    GERD (gastroesophageal reflux disease)    Basal cell carcinoma (BCC) of scalp    Malignant melanoma of arm, left (HCC)    SDH (subdural hematoma) (HCC)    SAH (subarachnoid hemorrhage) (HCC)    Closed extensive facial fractures (HCC)    Chest pain    Orthostatic hypotension    Syncope    Anemia    Leukocytosis    Acute on chronic diastolic congestive heart failure (HCC)      Past Medical History  Past Medical History:   Diagnosis Date    Arthritis     Cataract     LastAssessed:9/8/15    GERD (gastroesophageal reflux disease)     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    Hypertension     PAF (paroxysmal atrial fibrillation) (Formerly [facility])     Last Assessed:2/3/16    Wrist fracture     Resolved:9/8/15       Past Surgical History  Past Surgical History:   Procedure Laterality Date    CATARACT EXTRACTION      Last Assessed:9/8/15    FL RETROGRADE PYELOGRAM  10/17/2021    LYMPHADENECTOMY      Last Assessed:9/8/15    KY CYSTO/URETERO W/LITHOTRIPSY &INDWELL STENT INSRT Right 11/24/2021    Procedure: CYSTOSCOPY URETEROSCOPY WITH LITHOTRIPSY HOLMIUM LASER, RETROGRADE PYELOGRAM AND INSERTION STENT URETERAL;  Surgeon: David Sr MD;  Location: [facility];  Service: Urology    KY CYSTOURETHROSCOPY W/URETERAL CATHETERIZATION Right 10/17/2021    Procedure: CYSTOSCOPY RETROGRADE PYELOGRAM WITH INSERTION STENT URETERAL;  Surgeon: Benjie Tinoco MD;  Location: [facility];  Service: Urology    TONSILLECTOMY      Last Assessed:9/8/15    WRIST SURGERY      Last Assessed:9/8/15         Summary   Pt presented with s/s suggestive of minimal and mild oral and suspected pharyngeal dysphagia.  Symptoms or concerns included mild decreased mastication,  decreased bolus formation, oral residue with soft solids , residue effectively cleared with liquid wash and lingual sweeps, piecemeal deglutition, and minimal anterior loss/spillage of solids. Additionally, pt is presenting with suspected pharyngeal swallow delay, multiple swallows, and audible swallows.      Pt was observed during breakfast meal of level 3 diet with thins for dysphagia evaluation. Pt was awake upon arrival of SLP for evaluation. Pt noting to report continued difficulty with chewing and reported jaw pain for the last ~2 weeks. Pt noted that this is more prevalent and painful with harder foods such as toast and prefers to have softer foods. Pt currently on level 3 with thins and SLP discussing with pt about getting pt back to regular foods with pt choose softer solids to increase tolerance of regular diet. Pt in agreement as she is aware and understanding of diet and feels softer items are a preference that she is okay with. Pt was able to set up tray independently with SLP assisting at times for opening packages. Pt was independent in self feeding self.    Mastication was adequate but prolonged with the materials administered today. Breakdown complete overall but at times, pt would take larger bites not realizing and needed verbal cues for reminder which pt then recalled for the rest of session.  Bolus formation was mildly decreased as pt noted difficulty recalling use of liquid wash and alternating liquids and solids. Bolus transfer were minimally functional with some oral residue noted but cleared with brief cues.  No overt s/s reduced oral control. Swallowing initiation appeared prompt but mildly decreased with solids, prompt with thins.  Laryngeal rise was palpated and judged to be within functional limits.  No throat clearing, change in vocal quality or respiratory status noted today. Pt noted to cough x2 with oatmeal and cough was observed to be weak. Suspect due to mild decrease in swallow  initiation and bolus control.     Based on evaluation, pt is open to getting back to regular diet but choosing a softer diet that includes items that are not on a level 3 diet such as rice. Pt asked appropriate questions between the options and verbally reasoned her choices when dietary arrived to order her meal on level 3 however, pt on Sodium 2 diet which restricts certain items as well. SLP reviewed with pt recommendations for continued dysphagia tx to trial regular items that are softer however, based on current intake, it may be possible that pt stays on this diet closer to discharge because it is softer and chopped food. Pt agreeable to all recommendations at this time. SLP discussing with CHACHA Dela Cruz current recommendations and modifications in future tx sessions as well as current recommendations for distant supervision as pt is more aware of strategies throughout sessions and when reviewed at end of meal.     Risk/s for Aspiration: mild       Recommendations:  Recommended Diet: soft/level 3 diet and thin liquids   Recommended Form of Meds: as best tolerated  Aspiration precautions and swallowing strategies: upright posture, only feed when fully alert, slow rate of feeding, small bites/sips, quiet environment (tv off, limit talking, door closed, etc.), alternating bites and sips, and OOB FOR ALL MEALS  Other Recommendations: Continue frequent oral care      DISTANT supervision w/ meals.  Consider consult with:  nutrition and GI  Results reviewed with:  patient and Andrei  Aspiration precautions posted.    F/u ST tx: Pt will continue to benefit from ongoing skilled dysphagia tx sessions to establish safest least restrictive diet w/o increased oropharyngeal or aspiration sxs as well as monitor ability to carryover swallow strategies independently.      Plan:  -Continue Level 3 diet and trial regular diet (with mainly softer foods) and thin liquids   -Follow up (suspected short term) to assess pt's ability to  choose softer solids and tolerate possible upgrade to regular diet  -Modify as needed diet and liquids.        Current Medical Status  Pt is a 89 y.o. female who presented to [facility] with collapse, which resulted in mildly displaced fractures of the lateral and anterior walls of right maxilla, lateral and inferior walls of right orbit, along with acute subdural hemorrhage along the left cerebral convexity, acute subarachnoid hemorrhage in the left frontoparietal sulci and left sylvian fissure and suprasellar cistern. Patient previously admitted to the [facility] on 1/12. Pt had been evaluated by [facility] SLP team with charting showing pt has recently been seen and upgrades to regular diet. Previously, staff reported by has been falling asleep during meal. On 1/14, she developed a-fib with RVR and was transferred back to the acute care setting. Orders were received for dysphagia and language/cognition upon re-admission to [facility]. Pt was seen on Acute care setting with pt currently on level 3 diet with thins and is aspiration risk. Pt reporting discomfort and difficulty with chewing and noted ~2 weeks ago increase in jaw pain and her higher preference for softer foods. [facility] staff currently reporting no difficulty observed with meals or with intake of medication.     Allergies:  No known food allergies    Past medical history:  Please see H&P for details    Special Studies:  XR chest 1 view portable  Result Date: 1/4/2025  Impression: No acute disease. Distended air-filled esophagus. Moderate cardiomegaly. Workstation performed: [id number]    Social/Education/Vocational Hx:  Pt lives  with  in 2 story home    Swallow Information   Current Risks for Dysphagia & Aspiration: Weak cough;Weak voicing;General debilitation;Brain injury;Cognitive deficit;Reduced alertness; positioning issues  Current Symptoms/Concerns: difficulty chewing, coughing with soft solids, weight loss  Current Diet: soft/level 3 diet and  thin liquids   Baseline Diet: regular diet and thin liquids      Baseline Assessment   Behavior/Cognition: alert  Speech/Language Status: able to participate in conversation and able to follow commands  Patient Positioning: upright in bed positioned in chair mode  Pain Status/Interventions/Response to Interventions: Reporting pain in lower back and stomach, RN providing medication and SLP assisting in repositioning while in bed       Swallow Mechanism Exam  Facial: symmetrical  Labial: WFL  Lingual: WFL  Velum: unable to visualize  Mandible: adequate ROM  Dentition: adequate  Vocal quality:clear/adequate but low volume  Volitional Cough: weak   Respiratory Status: on RA       Consistencies Assessed and Total Amount Consumed:  Consistencies Administered: thin liquids and soft solids  Materials administered included level 3 diet of scrambled eggs, oatmeal, diced pears and with water and cranberry juice via straw    75% of meal and ~320 cc of thins by straw    Oral Stage: minimal and mild  Mastication was adequate but prolonged with the materials administered today. Breakdown complete overall but at times, pt would take larger bites not realizing and needed verbal cues for reminder which pt then recalled for the rest of session.  Bolus formation was mildly decreased as pt noted difficulty recalling use of liquid wash and alternating liquids and solids. Bolus transfer were minimally functional with some oral residue noted but cleared with brief cues.  No overt s/s reduced oral control.    Pharyngeal Stage: minimal and mild  Swallow Mechanics:  Swallowing initiation appeared prompt but mildly decreased with solids, prompt with thins.  Laryngeal rise was palpated and judged to be within functional limits.  No throat clearing, change in vocal quality or respiratory status noted today. Pt noted to cough x2 with oatmeal and cough was observed to be weak. Suspect due to mild decrease in swallow initiation and bolus control.      Esophageal Concerns: none reported or observed    Strategies and Efficacy: upright posture, only feed when fully alert, slow rate of feeding, small bites/sips, quiet environment (tv off, limit talking, door closed, etc.), alternating bites and sips, and OOB FOR ALL MEALS    Summary and Recommendations (see above)    Pt completed the [facility] cognitive assessment. Pt total score was 6 which as compared to those with less than high school education correlates with severe neurocognitive disorder and is indicative of dementia. Breakdown of scores as follows:    Orientation: 2/3  Functional problem solving with math: 0/3  Divergent naming (timed 1 minute- animals): 0/3 (4 animals total)  Word recall: 0/5  Mental flexibility with number order: 0/2  Clock Drawin/4   Following directions given shapes: 2/2  Story recall comprehension questions:     Total score: 6/30    Based on score, patient will benefit from further skilled SLP services to maximize overall cognitive lingusitic communication abilities for increased independence and decreased burden of care.     Pt was seen in room for skilled ST evaluation assessing current cognitive linguistic communication skills. Upon entering room, pt was awake and agreeable for evaluation. SLP introducing self to pt and providing information regarding orders and assessment to be completed to assess cognition. Pt reports decline in cognitive skills in recent years and notes that decrease is observed since hospitalization. Pt reported 'I can't think like I use to, my memory isn't as good.' Of note, pt noting 8/10 pain consistently in lower back and throughout cognition evaluation, pt needed frequent breaks and suspect pain affecting overall attention and accuracy with formalized testing.     Pt reported that she lives with her  in a 2 story home. Their laundry is in the basement and reports that pt and  split the responsibility stating that 1 person will bring the  laundry down, the other will put it in the wash, then someone takes a turn to put the clothes in the washer then the dryer then bring it upstairs. When asked if this was too much movement and stairs, pt reported 'I didn't think about it that way but I guess, it's just easier this way.' Pt reports she has a cleaning lady who comes in to clean the house. Pt has 2 children-Darryl who lives in [place] and Courtney who lives near [facility]. Pt reports her daughter will help at times with cooking by cooking meals at her home and brining it for pt. If daughter is unable to bring food, pt reports that they will go to the grocery store and get frozen, prepared foods. Regarding medication management, pt reports using a pill pack that she gets from the pharmacy. Pt reported her  completed bill management at home for them. In regards to d/c, pt reports that 's health has been declining and notes that 'he hasn't been able to help himself let alone me.' SLP discussed possible options such as SNF and assisted living that some patients go to after this. Pt reported 'I have good neighbors, they can help, they are always happy to help.' Per discussion regarding biographical information, pt noting decrease awareness of deficits, problem solving, reasoning and memory affecting safety.     Of note, pt was admitted to [facility] and evaluated by ST on 1/13/2025 with cognitive and swallowing evaluation completed. Pt completing SLUMS and scored 9/30 correlating to severe neurocognitive disorder. Pt noted to complete same assessment this evaluation, scoring lower with a 6/30, still correlating to a severe neurocognitive disorder. Due to change in medical status and need for further medical intervention, pt was discharged on 1/14/2025 to acute care setting. Pt was only evaluated for cognitive linguistic skills and dysphagia and no further f/u tx sessions were able to be completed.     Pt completed the SLUMS cognitive assessment on  "today's evaluation. Pt total score was 6 which as compared to those with less than high school education correlates with severe neurocognitive disorder and is indicative of dementia. Based on current evaluation, pt is presenting with moderate to severe cognitive linguistic deficits at time of assessment, characterized by decreased ST memory recall, working memory, attention, processing, comprehension, problem solving, sequencing, insight into deficits, thought organization and mental flexibility. Pt was oriented x5 to month, year, current location, current city and reason for hospitalization. Based on previous evaluation done less than a week ago to today's evaluation, difficulty in recall of biographical information is noted as reports this date differ than last evaluation. Therapy team to discuss and clarify PLOF and d/c plan with daughter. Therapist educated pt on rehab process, goal setting and ELOS which pt verbalized understanding. At this time, pt continues to benefit from ongoing skilled SLP services to maximize overall cognitive linguistic skills in attempts to decrease caregiver burden over time.         TAA    Pt is 89-year-old female who presented to [facility] ER on 1/4/25 as a trauma transfer from the [facility] s/p syncope with collapse. Imaging on 1/4 showed \"comminuted and mildly displaced fractures of the lateral and anterior walls of right maxilla, lateral and inferior walls of right orbit. Right inferior orbital wall fracture appears to involve the inferior orbital foramen with associated small extraconal soft tissue edema and focus of air. No herniation of extraocular muscles. Nondisplaced fractures of zygomatic arch and the temporal bone at the TMJ. Diffuse opacification of right maxillary sinus and right nasal cavity with blood products and air\". OMFS consulted and surgical intervention was discussed with patient by OMFS team but refused by patient. Patient is to continue with " 7-day course of Unasyn/Augmentin and was initiated on sinus precautions. Neurosurgery was consulted for the SDH/SAH and no surgical intervention was recommended. Recommended SBP <160.      Currently, SLP services were consulted for assessment of cognitive linguistic skills and assessment of swallow function. Pt presenting with s/s suggestive of minimal and mild oral and suspected pharyngeal dysphagia.  Symptoms or concerns included mild decreased mastication, decreased bolus formation, oral residue with soft solids , residue effectively cleared with liquid wash and lingual sweeps, piecemeal deglutition, and minimal anterior loss/spillage of solids. Additionally, pt is presenting with suspected pharyngeal swallow delay, multiple swallows, and audible swallows.  Based on evaluation, pt is open to getting back to regular diet but choosing a softer diet that includes items that are not on a level 3 diet such as rice. SLP reviewed with pt recommendations for continued dysphagia tx to trial regular items that are softer however, based on current intake, it may be possible that pt stays on this diet closer to discharge because it is softer and chopped food. Pt is a a good rehab candidate to achieve least restrictive diet upon anticipated discharge home w/ family support/supervision. Barriers which present include the following risks for aspiration such as: prolonged mastication, decreased bolus formation/incomplete transfers of solids, pain with mastication . In order to address the noted barriers, skilled SLP services will address this by targeting the following interventions: diet modification, safe swallow strategies, and family education/training. Pt is currently recommended for further skilled SLP services targeting dysphagia therapy in order to maximize oral and pharyngeal swallow skills, while safely supporting PO intake, as well as to improve independent carryover of safe swallow strategies. Also recommending  continued services for follow up for possible upgrade to regular diet or assess tolerance of level 3 diet as possible new diet.     In regards to cognition, pt completed the SLUMS cognitive assessment on today's evaluation. Pt total score was 6 which as compared to those with less than high school education correlates with severe neurocognitive disorder and is indicative of dementia. Due to PLOF per pt report, skilled ST recommended at this time to maximize overall cognitive linguistic skills in attempts to decrease caregiver burden over time. Pt is a good rehab candidate to achieve mod assist upon anticipated discharge home w/ family support/supervision. Current barriers include: decreased ST memory recall, working memory, attention, processing, comprehension, problem solving, sequencing, insight into deficits, thought organization and mental flexibility  and decrease independence overall ADLs which will impact pt's safety awareness and functional mobility. Pt estimated length of stay ~2-3 weeks in [facility]. Pt will benefit from skilled SLP services to maximize overall cognitive and swallow abilities at this time to decrease burden of care for family at time of discharge.

## 2025-01-18 NOTE — PCC NURSING
"Sandra Purvis is a 89 y.o. female w/ PMHx of afib, GERD, HTN, who initially presented to [facility] on 1/4/2025 after a syncopan episode and collapse at home.  CT had showed \"comminuted and mildly displaced fractures of the lateral and anterior walls of the right maxilla, lateral and inferior walls of the right orbit, right inferior orbital wall fracture appears to involve the inferior orbital foramen associated with small extraconal soft tissue edema and focus of air. There is also a nondisplaced fracture of the zygomatic arch and the temporal bone at the TMJ and diffuse opacification of the right maxillary sinus and right nasal cavity with blood products and air. Additionally there was an acute subdural hemorrhage along the left cerebral convexity with a maximal thickness of 1.9 cm with no midline shift and an acute subarachnoid hemorrhage in the left frontal parietal sulci, left sylvian fissure as well as suprasellar cistern.\"  OMFS was consulted as well as neurosurgery.  Patient and family declined any acute intervention at this time.  Patient was started on Keppra x 7 days.  Patient was treated with 7-day course of Unasyn/Augmentin.  Her hospital stay was also complicated with A-fib with RVR and cardiology was consulted.  Patient then had orthostatic hypotension and started on midodrine.  Patient initially transferred to [facility] 1/12/2025.  While at [facility] patient with hypertensive episodes to SBP >190 and Afib rates 120-130s.  Patient became more symptomatic from her A-fib and was transferred to acute care 1/14. She had X ray w/ pulmonary edema was given lasix 1/14-1/15 with good response.  Patient was seen by cardiology. Patient was placed on Cardizem drip and now on PO Cardizem. Her rates have improved. Sandra Purvis was admitted to the [facility] on 01/17/25       Pain managed with Tylenol 975 mg q8hrs and oxy 2.5 mg/5mg q6hrs prn. HTN managed with cardizem cd 120 mg " daily at bedtime, toprol-xl 25 mg BID and hydralazine 10 mg q8hrs prn. Bowel regimen managed with senokot 8.6-50 mg daily, Miralax 17 g packet daily,  and dulcolax rectal supp. 10 mg daily prn. DVT prophylaxis is Lovenox 30 mg q24hrs. Pulmonary edema managed with lasix 20 mg daily. Insomnia managed with melatonin 6 mg, Remeron 7.5 mg, and prn trazodone 50 mg. Orthostatic hypotension managed with midodrine 5 mg TID prn. Protonix 40 mg for GERD.        This week we will continue to monitor vital signs and lab values. We will manage her pain with above orders so that the pt can participate in her therapy sessions to her optimal ability.  We will teach pt how to conserve energy so that she may perform ADLs independently as much as she can.  We will educate the pt on the importance of repositioning and off-loading pressure to help lower the risk of skin breakdown.  We will prevent falls by keeping personal items and call bell within reach, we will maintain hourly rounding.    shoulder/yes(specify)

## 2025-01-18 NOTE — PROGRESS NOTES
SLP TAA      01/18/25 0800   Patient Data   Rehab Impairment Impairment of mobility, safety and Activities of Daily Living (ADLs) due to Brain Dysfunction:  02.22  Traumatic, Closed Injury   Etiologic Diagnosis Acute subdural hemorrhage along the left cerebral convexity;Acute subarachnoid hemorrhage in the left frontoparietal sulci, left sylvian fissure and suprasellar cistern   Date of Onset 01/04/25   Restrictions/Precautions   Precautions Aspiration;Bed/chair alarms;Cognitive;Fall Risk;Pain   Pain Assessment   Pain Assessment Tool 0-10   Pain Score 8   Pain Location/Orientation Orientation: Lower;Location: Back   Hospital Pain Intervention(s) Medication (See MAR);Repositioned;Distraction: Puzzles   Eating Assessment   Type of Assistance Needed Set-up / clean-up;Supervision;Verbal cues   Physical Assistance Level No physical assistance   Eating CARE Score 4   Comprehension   Assist Devices Glasses   Auditory Basic   Visual Basic   Findings Pleasee see SLP tx note for further details.   QI: Comprehension 3. Usually Understands: Understands most conversations, but misses some part/intent of message. Requires cues at times to understand.   Comprehension (FIM) 3 - Understands basic info/conversation 50-74% of time   Expression   Verbal Basic   Non-Verbal Basic   Intelligibility Sentence   Findings Pleasee see SLP tx note for further details.   QI: Expression 3. Exhibits some difficulty with expressing needs and ideas (e.g., some words or finishing thoughts) or speech is not clear   Expression (FIM) 4 - Expresses basic info/needs 75-90% of time   Social Interaction   Cooperation with staff   Participation Individual   Behaviors observed Appropriate   Findings Pleasee see SLP tx note for further details.   Social Interaction (FIM) 5 - Interacts appropriately with others 90% of time   Problem Solving   Routine Manages call bell   Findings Pleasee see SLP tx note for further details.   Problem solving (FIM) 2 - Solves  "basic problems 25-49% of time   Memory   Recognize People Yes   Remember Routine Yes   Initiates Tasks No   Short-Term Impaired   Long Term Intact   Recalls Precaution No   Findings Pleasee see SLP tx note for further details.   Memory (FIM) 2 - Recognizes, recalls/performs 25-49%   Discharge Information   Vocational Plan Retired/not working   Barriers to Return to Vocation Strength;Endurance;Other  (elevated BP, Cognition/awareness and safety)   Patient's Discharge Plan home with    Patient's Rehab Expectations 'To gain physically'   Barriers to Discharge Home Limited Family Support;Decreased Cognitive Function;Decreased Strength;Decreased Endurance;Pain;Safety Considerations   Impressions    Pt is 89-year-old female who presented to [facility] ER on 1/4/25 as a trauma transfer from the [facility] s/p syncope with collapse. Imaging on 1/4 showed \"comminuted and mildly displaced fractures of the lateral and anterior walls of right maxilla, lateral and inferior walls of right orbit. Right inferior orbital wall fracture appears to involve the inferior orbital foramen with associated small extraconal soft tissue edema and focus of air. No herniation of extraocular muscles. Nondisplaced fractures of zygomatic arch and the temporal bone at the TMJ. Diffuse opacification of right maxillary sinus and right nasal cavity with blood products and air\". OMFS consulted and surgical intervention was discussed with patient by OMFS team but refused by patient. Patient is to continue with 7-day course of Unasyn/Augmentin and was initiated on sinus precautions. Neurosurgery was consulted for the SDH/SAH and no surgical intervention was recommended. Recommended SBP <160.      Currently, SLP services were consulted for assessment of cognitive linguistic skills and assessment of swallow function. Pt presenting with s/s suggestive of minimal and mild oral and suspected pharyngeal dysphagia.  Symptoms or concerns " included mild decreased mastication, decreased bolus formation, oral residue with soft solids , residue effectively cleared with liquid wash and lingual sweeps, piecemeal deglutition, and minimal anterior loss/spillage of solids. Additionally, pt is presenting with suspected pharyngeal swallow delay, multiple swallows, and audible swallows.  Based on evaluation, pt is open to getting back to regular diet but choosing a softer diet that includes items that are not on a level 3 diet such as rice. SLP reviewed with pt recommendations for continued dysphagia tx to trial regular items that are softer however, based on current intake, it may be possible that pt stays on this diet closer to discharge because it is softer and chopped food. Pt is a a good rehab candidate to achieve least restrictive diet upon anticipated discharge home w/ family support/supervision. Barriers which present include the following risks for aspiration such as: prolonged mastication, decreased bolus formation/incomplete transfers of solids, pain with mastication . In order to address the noted barriers, skilled SLP services will address this by targeting the following interventions: diet modification, safe swallow strategies, and family education/training. Pt is currently recommended for further skilled SLP services targeting dysphagia therapy in order to maximize oral and pharyngeal swallow skills, while safely supporting PO intake, as well as to improve independent carryover of safe swallow strategies. Also recommending continued services for follow up for possible upgrade to regular diet or assess tolerance of level 3 diet as possible new diet.      In regards to cognition, pt completed the SLUMS cognitive assessment on today's evaluation. Pt total score was 6 which as compared to those with less than high school education correlates with severe neurocognitive disorder and is indicative of dementia. Due to PLOF per pt report, merline [place]  recommended at this time to maximize overall cognitive linguistic skills in attempts to decrease caregiver burden over time. Pt is a good rehab candidate to achieve mod assist upon anticipated discharge home w/ family support/supervision. Current barriers include: decreased ST memory recall, working memory, attention, processing, comprehension, problem solving, sequencing, insight into deficits, thought organization and mental flexibility  and decrease independence overall ADLs which will impact pt's safety awareness and functional mobility. Pt estimated length of stay ~2-3 weeks in [facility]. Pt will benefit from skilled SLP services to maximize overall cognitive and swallow abilities at this time to decrease burden of care for family at time of discharge.    SLP Therapy Minutes   SLP Time In 0800   SLP Time Out 0920   SLP Total Time (minutes) 80   SLP Mode of treatment - Individual (minutes) 80   SLP Mode of treatment - Concurrent (minutes) 0   SLP Mode of treatment - Group (minutes) 0   SLP Mode of treatment - Co-treat (minutes) 0   SLP Mode of Treatment - Total time(minutes) 80 minutes   SLP Cumulative Minutes 80   Cumulative Minutes   Cumulative therapy minutes 80

## 2025-01-18 NOTE — PROGRESS NOTES
[facility] PT LTGs   01/18/25 1330   Rehab Team Goals   Transfer Team Goal Patient will require supervision with transfers with least restrictive device upon completion of rehab program   Locomotion Team Goal Patient will require supervision with locomotion with least restrictive device upon completion of rehab program   Rehab Team Interventions   PT Interventions Gait Training;Therapeutic Exercise;Neuromuscualr Reeducation;Transfer Training;Bed Mobility;Modalities;Patient/Family Education   PT Transfer Goal   Roll left and right Goal 04. Supervision or touching assistance- [place] provides VERBAL CUES or supervision throughout activity.   Sit to lying Goal 04. Supervision or touching assistance- [place] provides VERBAL CUES or supervision throughout activity.   Lying to sitting on side of bed Goal 04. Supervision or touching assistance- [place] provides VERBAL CUES or supervision throughout activity.   Sit to stand Goal 04. Supervision or touching assistance- [place] provides VERBAL CUES or supervision throughout activity.   Chair/bed-to-chair transfer Goal 04. Supervision or touching assistance- [place] provides VERBAL CUES or supervision throughout activity.   Car Transfer Goal 04. TOUCHING/ STEADYING assistance as patient completes activity.   Assistive Device Roller Walker   Environment Level Surface;Well Lit   Status Ongoing;Target goal - two weeks;Target goal - three weeks   Locomotion Goal   Primary discharge mode of locomotion Walking   Target Walk Distance 75 ft   Assist Device Roller Walker   Environment Level Surface;Well Lit   Walk 10 feet Goal 04. Supervision or touching assistance- [place] provides VERBAL CUES or supervision throughout activity.   Walk 50 feet with 2 turns Goal 04. Supervision or touching assistance- [place] provides VERBAL CUES or supervision throughout activity.   Walk 150 feet Goal 04. Supervision or touching assistance- [place] provides VERBAL CUES or supervision throughout activity.  (for  endurance trng, not hinderance to d/c home)   Walking 10 feet on uneven surface 04. TOUCHING/ STEADYING assistance as patient completes activity.   Walking Goal Status Ongoing;Target goal - two weeks;Target goal - three weeks   Wheel 50 feet with 2 turns Goal 09. Not applicable   Wheel 150 feet Goal 09. Not applicable   Stairs Goal   1 step or curb goal 03. Partial/moderate assistance - [place] does less than half the effort. [place] lifts or holds trunk or limbs and provides more than half the effort.   4 steps Goal 03. Partial/moderate assistance - [place] does less than half the effort. [place] lifts or holds trunk or limbs and provides more than half the effort.   12 steps Goal 88. Not attempted due to medical condition or safety concerns   Assist Level Minimum Assist   Number of Stairs 2   Technique Non-reciprocal   Hand Rail Left   Status Ongoing;Target goal - two weeks;Target goal - three weeks   Object Retrieval Goal   Picking up object Goal 04. Supervision or touching assistance- [place] provides VERBAL CUES or supervision throughout activity.   Assistive Device  Reacher   Small Object Picked Up marker/pen

## 2025-01-18 NOTE — PROGRESS NOTES
OT long term goals       01/18/25 1000   Rehab Team Goals   ADL Team Goal Patient will require supervision with ADLs with least restrictive device upon completion of rehab program   Rehab Team Interventions   OT Interventions Self Care;Home Management;Therapeutic Exercise;Community Reintegration;Cognitive Reintegration;Cognitive Retraining;Energy Conservation;Patient/Family Education   Eating Goal   Eating Goal 05. Setup or clean-up assistance - [place] SETS UP or CLEANS UP, patient completes activity. [place] assists only prior to or following the activity.   Status Target goal - two weeks   Interventions Optimal Position   Grooming Goal   Oral Hygiene Goal 05. Setup or clean-up assistance - [place] SETS UP or CLEANS UP, patient completes activity. [place] assists only prior to or following the activity.   Environment Seated in Chair   Status Target goal - two weeks   Intervention Assistive Device;Balance Work;Neuromuscular Education;Therapeutic Exercise;Tolerance Work   Tub/Shower Transfer Goal   Method Tub Shower   Assist Device Seat with Back;Seat with out Back;Tub Bench;Grab Bar   Status Target goal - two weeks  (SUP)   Interventions ADL Training;Neuromuscular Education;Assistive Device   Bathing Goal   Shower/bathe self Goal 04. Supervision or touching assistance- [place] provides VERBAL CUES or supervision throughout activity.   Status Target goal - two weeks   Intervention Neuromuscular Education;ADL Training;Assistive Device;Therapeutic Exercise   Upper Body Dressing Goal   Upper body dressing Goal 04. Supervision or touching assistance- [place] provides VERBAL CUES or supervision throughout activity.   Status Target goal - two weeks   Intervention Assistive Device;Balance Work;Neuromuscular Education;Therapeutic Exercise;Tolerance Work   Lower Body Dressing Goal   Lower body dressing Goal 04. Supervision or touching assistance- [place] provides VERBAL CUES or supervision throughout activity.   Putting on/taking  off footwear Goal 03. Partial/moderate assistance - [place] does less than half the effort. [place] lifts or holds trunk or limbs and provides more than half the effort.   Status Target goal - two weeks   Intervention Assistive Device;Balance Work;Neuromuscular Education;Therapeutic Exercise;Tolerance Work   Toileting Transfer Goal   Toilet transfer Goal 04. Supervision or touching assistance- [place] provides VERBAL CUES or supervision throughout activity.   Status Target goal - two weeks   Intervention ADL Training;Balance Work;Assistive Device   Toileting Goal   Toileting hygiene Goal 04. Supervision or touching assistance- [place] provides VERBAL CUES or supervision throughout activity.   Status Target goal - two weeks   Intervention Balance Work;Assistive Device;ADL Training   Meal Prep and Kitchen Mobility   Assist Level   (consider adding goal if pt progresses well)   Medication Management   Assist Level Minimum Assist   Status Target goal - two weeks

## 2025-01-18 NOTE — NURSING NOTE
Pt's BP this morning was 170/76 manually and HR 71 bpm apical. Dr. Anton aware. MD ordered PRN hydralazine 10 mg but, was told to hold off on it for now. Was told to give her scheduled Cardizem 120 mg for 0900 early first and to recheck BP in an hour. Medication given. Pt in bed. Call bell within reach. Bed alarm intact.

## 2025-01-18 NOTE — NURSING NOTE
Pt's BP was 172/68 and HR 73 apical 1 hour after giving Cardizem. Updated Dr. Anton and was told to give her metoprolol 75 mg early too. Medication given. Reported to the primary nurse for the next shift to recheck BP after an hour when the med was given. Pt in bed. Call bell within reach. Bed alarm intact.

## 2025-01-18 NOTE — PROGRESS NOTES
OT Initial Evaluation       01/18/25 1000   Patient Data   Rehab Impairment Impairment of mobility, safety and Activities of Daily Living (ADLs) due to Brain Dysfunction:  02.22  Traumatic, Closed Injury   Etiologic Diagnosis acute SDH along the L cerebral convexity; acute SAH in the L frontalparietal sulci; L sylican fissure and suprasellar cistern   Date of Onset 01/04/25   Support System   Name Darryl   Relationship    Able to provide 24 hour supervision No   Able to provide physical help? No   Multiple Support Systems No  (reports she has two children one who lives in [place] and other who lives in [email or web address])   Home Setup   Type of Home Single Level   Method of Entry Stairs   Number of Stairs 2   Number of Stairs in Home 12  (to basement where laundry is located)   First Floor Bathroom Full;Tub;Shower;Combo;Grab Bars   First Floor Bathroom Accessibility Grab bars by toilet;Grab bars in tub/shower;Shower chair   First Floor Setup Available Yes   Home Modification Comment pending progress   Available Equipment Roller Walker;Shower Chair   Baseline Information   Transportation Family/friends drive  ( is primary )   Prior Device(s) Used Shower Chair   Prior IADL Participation   Money Management   (daughter and  manage)   Meal Preparation Partial Participation  (reports cooking is a shared activity with her spouse)   Laundry Partial Participation  (pt reports her and her  share this activity)   Home Cleaning Cleaning service   Prior Level of Function   Self-Care 3. Independent - Patient completed the activities by him/herself, with or without an assistive device, with no assistance from a helper.   Indoor-Mobility (Ambulation) 3. Independent - Patient completed the activities by him/herself, with or without an assistive device, with no assistance from a helper.   Stairs 3. Independent - Patient completed the activities by him/herself, with or without an assistive device, with no  "assistance from a helper.   Functional Cognition 2. Needed Some Help - Patient needed a partial assistance from another person to complete activities.  (assistance w/ finance mgmt; reports IND with med mgmt)   Prior Assistance Needed for Driving;Household Chores/Cleaning;Money Management;Shopping   Prior Device Used Z. None of the above   Falls in the Last Year   Number of falls in the past 12 months 1   Type of Injury Associated with Fall Injury  (reason for initial admission)   Patient Preference   Nickname (Patient Preference) Sandra   Psychosocial   Psychosocial (WDL) WDL   Patient Behaviors/Mood Calm;Cooperative;Pleasant   Ability to Express Feelings Able to express   Ability to Express Needs Able to express   Ability to Express Thoughts Able to express   Ability to Understand Others Usually understands   Restrictions/Precautions   Precautions (S)  Aspiration;Bed/chair alarms;Cognitive;Fall Risk;Supervision on toilet/commode;Pain  (sinus precautions; ortho BP with + symptoms; use of TEDS/Binder as needed)   Pain Assessment   Pain Assessment Tool 0-10   Pain Score 8   Pain Location/Orientation Orientation: Mid;Location: Back  (and \"heart\" as per pt report)   Hospital Pain Intervention(s) Emotional support; MD aware   Eating Assessment   Type of Assistance Needed Set-up / clean-up   Physical Assistance Level No physical assistance   Eating CARE Score 5   Oral Hygiene   Type of Assistance Needed Physical assistance   Physical Assistance Level 76% or more   Comment anticipate need for max A to complete in stance 2/2 ortho BP, dizziness and poor sitting/standing tolerance. today completed seated upright in bed with setup   Oral Hygiene CARE Score 2   Tub/Shower Transfer   Findings no active shower orders   Shower/Bathe Self   Type of Assistance Needed Physical assistance   Physical Assistance Level Total assistance   Comment completed with TA x1 today bed level. initially attempted to complete seated EOB but pt " with + orthostasis and reporting dizziness/light headedness and requested to lay back down. pt also with inc fatigue and did not participate in bathing tasks. recommending bed level ADLS for now until she can be reassesed at another time.   Shower/Bathe Self CARE Score 1   Dressing/Undressing Clothing   Type of Assistance Needed Physical assistance   Physical Assistance Level Total assistance   Comment seated upright in bed   Upper Body Dressing CARE Score 1   Type of Assistance Needed Physical assistance   Physical Assistance Level Total assistance   Comment bed level via rolling and minimal bridging. declined wearing pants 2/2 being warm. agreeable to wear brief.   Lower Body Dressing CARE Score 1   Putting On/Taking Off Footwear   Type of Assistance Needed Physical assistance   Physical Assistance Level Total assistance   Comment TEDS and  socks   Putting On/Taking Off Footwear CARE Score 1   Toileting Hygiene   Type of Assistance Needed Physical assistance   Physical Assistance Level Total assistance   Comment bed level x2 due to urinary incontinence   Toileting Hygiene CARE Score 1   Toilet Transfer   Type of Assistance Needed Physical assistance   Physical Assistance Level Total assistance   Comment recommending bed pan placement for now until transfers can be further assessed.   Toilet Transfer CARE Score 1   Transfer Bed/Chair/Wheelchair   Comment when pt transferred from supine to sit, pt reported immediate dizziness and requested to lie back down. ortho BP +. not safe to complete STS/SPT today. will reassess with PT this afternoon.   Reason if not Attempted Medical concerns   Chair/Bed-to-Chair Transfer CARE Score 88   Roll Left and Right   Type of Assistance Needed Physical assistance   Physical Assistance Level 51%-75%   Comment mod A x1 with reliance on bed rails   Roll Left and Right CARE Score 2   Sit to Lying   Type of Assistance Needed Physical assistance   Physical Assistance Level 76% or more    Comment max A x 1 for LE mgmt   Sit to Lying CARE Score 2   Lying to Sitting on Side of Bed   Type of Assistance Needed Physical assistance   Physical Assistance Level 76% or more   Comment LE and trunk mgmt. poor sitting balance at EOB, retropulsive at times. Min A to steady once seated unsupported at EOB.   Lying to Sitting on Side of Bed CARE Score 2   Sit to Stand   Comment when pt transferred from supine to sit, pt reported immediate dizziness and requested to lie back down. ortho BP +. not safe to complete STS/SPT today. will reassess with PT this afternoon.   Reason if not Attempted Medical concerns   Sit to Stand CARE Score 88   Comprehension   QI: Comprehension 3. Usually Understands: Understands most conversations, but misses some part/intent of message. Requires cues at times to understand.   Expression   QI: Expression 3. Exhibits some difficulty with expressing needs and ideas (e.g., some words or finishing thoughts) or speech is not clear   RUE Assessment   RUE Assessment WFL   LUE Assessment   LUE Assessment WFL   Cognition   Overall Cognitive Status Impaired   Arousal/Participation Alert;Cooperative   Attention Attends with cues to redirect   Orientation Level Oriented to person;Oriented to place;Oriented to time   Memory Decreased short term memory;Decreased recall of recent events;Decreased recall of precautions   Following Commands Follows one step commands with increased time or repetition   Comments pt noted to be mildly repetitive. being followed by ST for cog - would benefit from formal cog assessment and IADL mgmt as pt reported she was IND w/ med mgmt at baseline   Vision   Vision Comments wears glasses   Objective Measure   OT Measure(s) ortho BP   OT Findings (S)  140/63 supine > 117/54 seated (+ symptoms); OT then donned TEDS - 153/70 supine, 151/67 seated (+ symptoms). Dr Pope made aware.   Discharge Information   Vocational Plan Retired/not working   Patient's Discharge Plan DC home  "w/ SUP from    Patient's Rehab Expectations \"I want to get stronger\"   Barriers to Discharge Home Limited Family Support;Decreased Cognitive Function;Decreased Strength;Decreased Endurance;Pain;Safety Considerations   Impressions Pt is a 89 y.o. female who was admitted on 1/4/25 due to syncope with collapse at home. Pt was dx with acute SDH along the L cerebral convexity; acute SAH in the L frontalparietal sulci; L sylican fissure and suprasellar cistern. Once stabilized, pt transferred to [facility] to receive skilled therapy services. Pt  has a past medical history of Arthritis, Cataract, GERD (gastroesophageal reflux disease), Hypertaurodontism, Hypertension, PAF (paroxysmal atrial fibrillation) ([facility]), and Wrist fracture.. Current precautions include aspiration, bed/chair alarms, cognitive deficits, fall risk, pain, supervision on toilet/commode, sinus precautions and orthostatic BP w/ + symptoms and use of teds/binder as needed. See flowsheet for details of pts home setup, PLOF and current functional status. Pts impairments include pain, endurance, activity tolerance, functional mobility, balance, trunk control, functional standing tolerance, unsupportive home environment, decreased I w/ ADLS/IADLS, strength, and cognitive impairments. These impairments along with  limited caregiver support, steps to enter, difficulty performing ADLs, and difficulty performing IADLs causes the inability to safely complete A/IADLs including Eating, Grooming, Bathing, UB dressing, LB dressing, Toileting, Toilet transfer, Tub/shower Transfer, and IADL Management. Pt presents with good rehab potential with motivation to participate and achieve goal of DC home. Pt is unsafe to DC home at this time, recommending 2 weeks to achieve supervision level goals with bedside commode, shower chair, wheelchair , RW, and LRAD . Plan to achieve stated goals with interventions focused on ADL Retraining , LB Dressing, UB dressing, IADL training , " Kitchen Mobilty, Meal preparation, Medication management , Functional Transfers, Functional Cognition, Functional Attention, Assessment of Cognitive function, MOCA, Standing tolerance, Standing balance , midline awareness, DME training/education, Family training/education, Energy conservation training/education, healthy coping education, Leisure and social pursuits, community re-integration, sitting balance, and Core/trunk control/strengthening .   OT Therapy Minutes   OT Time In 1000   OT Time Out 1130   OT Total Time (minutes) 90   OT Mode of treatment - Individual (minutes) 90   OT Mode of treatment - Concurrent (minutes) 0   OT Mode of treatment - Group (minutes) 0   OT Mode of treatment - Co-treat (minutes) 0   OT Mode of Treatment - Total time(minutes) 90 minutes   OT Cumulative Minutes 90   Cumulative Minutes   Cumulative therapy minutes 170

## 2025-01-18 NOTE — ASSESSMENT & PLAN NOTE
Inc melatonin to 6mg HS, trazodone 25mg HS PRN   Sleep log   Patient may benefit from Remeron HS to address sleep, mood, and appetite, will discuss w/ patient next week
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
## 2025-01-19 LAB
BACTERIA BLD CULT: NORMAL
BACTERIA BLD CULT: NORMAL

## 2025-01-19 PROCEDURE — 97110 THERAPEUTIC EXERCISES: CPT

## 2025-01-19 PROCEDURE — 97530 THERAPEUTIC ACTIVITIES: CPT

## 2025-01-19 RX ADMIN — FUROSEMIDE 20 MG: 20 TABLET ORAL at 08:35

## 2025-01-19 RX ADMIN — DILTIAZEM HYDROCHLORIDE 120 MG: 120 CAPSULE, COATED, EXTENDED RELEASE ORAL at 08:35

## 2025-01-19 RX ADMIN — ACETAMINOPHEN 975 MG: 325 TABLET, FILM COATED ORAL at 05:13

## 2025-01-19 RX ADMIN — POLYETHYLENE GLYCOL 3350 17 G: 17 POWDER, FOR SOLUTION ORAL at 08:35

## 2025-01-19 RX ADMIN — ACETAMINOPHEN 975 MG: 325 TABLET, FILM COATED ORAL at 14:09

## 2025-01-19 RX ADMIN — ACETAMINOPHEN 975 MG: 325 TABLET, FILM COATED ORAL at 21:13

## 2025-01-19 RX ADMIN — HYDRALAZINE HYDROCHLORIDE 10 MG: 10 TABLET ORAL at 05:20

## 2025-01-19 RX ADMIN — SENNOSIDES AND DOCUSATE SODIUM 1 TABLET: 50; 8.6 TABLET ORAL at 21:13

## 2025-01-19 RX ADMIN — HYDRALAZINE HYDROCHLORIDE 10 MG: 10 TABLET ORAL at 17:38

## 2025-01-19 RX ADMIN — MELATONIN TAB 3 MG 6 MG: 3 TAB at 21:13

## 2025-01-19 RX ADMIN — ENOXAPARIN SODIUM 30 MG: 30 INJECTION SUBCUTANEOUS at 21:13

## 2025-01-19 RX ADMIN — METOPROLOL SUCCINATE 75 MG: 50 TABLET, EXTENDED RELEASE ORAL at 21:13

## 2025-01-19 RX ADMIN — METOPROLOL SUCCINATE 75 MG: 50 TABLET, EXTENDED RELEASE ORAL at 08:35

## 2025-01-19 RX ADMIN — PANTOPRAZOLE SODIUM 40 MG: 40 TABLET, DELAYED RELEASE ORAL at 05:13

## 2025-01-19 NOTE — NURSING NOTE
Was working with Shan to get patient oob patient's bp did drop on standing but when we sat her back down bp did come back up we were able to her into the tilt w/c unfornunattly she  said she had to have a bm which we had to put her back to bed to use the bedpan did not want to try commode due to bp flucuating . Patient also started with a sacral slit we are getting the tourtous turning device. Also patient states she in not able to chew some of the food that is on dysphagia diet will leave a note to downgrade it she is being seen by speech

## 2025-01-19 NOTE — PROGRESS NOTES
01/19/25 1030   Pain Assessment   Pain Assessment Tool 0-10   Pain Score 4   Pain Location/Orientation Orientation: Mid;Location: Back   Restrictions/Precautions   Precautions Aspiration;Bed/chair alarms;Cognitive;Fall Risk;Pain;Pressure Ulcer;Supervision on toilet/commode  (+ ortho BP)   Braces or Orthoses   (TEDs and binder)   Sit to Lying   Type of Assistance Needed Physical assistance   Physical Assistance Level 26%-50%   Comment Mod A for R leg and trunk,  also needed adjustment once lying back   Sit to Lying CARE Score 3   Lying to Sitting on Side of Bed   Type of Assistance Needed Physical assistance   Physical Assistance Level 76% or more   Comment barely any initiation   Lying to Sitting on Side of Bed CARE Score 2   Sit to Stand   Type of Assistance Needed Physical assistance   Physical Assistance Level Total assistance   Comment Mod/ Max A x2 to RW   Sit to Stand CARE Score 1   Bed-Chair Transfer   Type of Assistance Needed Physical assistance   Physical Assistance Level Total assistance   Comment Performed 2 std pivot xfers with RW this session to tilt in space -  cues for full knee ext and upright posture , needed guidance of walker and much balance assist   Chair/Bed-to-Chair Transfer CARE Score 1   Therapeutic Interventions   Strengthening Seated edge of bed,  marching and LAQ  2sets x20,  this session performed 3 sit-stands,  2 std pivots,  and had pt work on her own bed mobility vs therapist helping (this took much effort to try to get in / out of bed   Other Bps initially dropped from 160s systolic  down to 113 systolic with standing,  bp did stabalize with sitting on edge of bed for 15 mins performing seated TE ,  did not get additional std BP  would do so next session   Assessment   Treatment Assessment 45 min skilled PT hermilo pt was in bed,  worked with NSG  worked on bed mobility, sitting edge of bed for 15mins, seated TE,  pt kept closing eyes but would open with talking,  opted to trial  tilt in space due to unstable BPs  , performed multiple sit-stands with RW  then transfered to tilt in space which pt stated felt comfortable.  t/o this hector ept still kept closing eyes but stated she wanted to try to keep going,  brought pt down to gtm and then she states she needed to use bathroom,  due to extreme fatgiue opted to get back to bed to use bed pan , due to concerns pt falling asleep or not have energy to get off commode.  Cont skilled PT , work on out of bed tolerance and trial longer periods in tilt in space and wwokr on standing and stand pivot xfers.   Problem List Decreased strength;Decreased range of motion;Decreased endurance;Impaired balance;Decreased mobility;Decreased coordination;Decreased cognition;Impaired judgement;Decreased skin integrity;Pain   Barriers to Discharge Decreased caregiver support;Inaccessible home environment   Plan   Progress Slow progress, decreased activity tolerance   PT Therapy Minutes   PT Time In 1030   PT Time Out 1115   PT Total Time (minutes) 45   PT Mode of treatment - Individual (minutes) 45   PT Mode of treatment - Concurrent (minutes) 0   PT Mode of treatment - Group (minutes) 0   PT Mode of treatment - Co-treat (minutes) 0   PT Mode of Treatment - Total time(minutes) 45 minutes   PT Cumulative Minutes 105   Therapy Time missed   Time missed? No

## 2025-01-19 NOTE — PLAN OF CARE

## 2025-01-20 ENCOUNTER — TRANSITIONAL CARE MANAGEMENT (OUTPATIENT)
Age: OVER 89
End: 2025-01-20

## 2025-01-20 ENCOUNTER — APPOINTMENT (INPATIENT)
Dept: CT IMAGING | Facility: HOSPITAL | Age: OVER 89
DRG: 949 | End: 2025-01-20
Payer: MEDICARE

## 2025-01-20 ENCOUNTER — PATIENT OUTREACH (OUTPATIENT)
Dept: CASE MANAGEMENT | Facility: OTHER | Age: OVER 89
End: 2025-01-20

## 2025-01-20 LAB
ALBUMIN SERPL BCG-MCNC: 3.3 G/DL (ref 3.5–5)
ALP SERPL-CCNC: 68 U/L (ref 34–104)
ALT SERPL W P-5'-P-CCNC: 22 U/L (ref 7–52)
ANION GAP SERPL CALCULATED.3IONS-SCNC: 8 MMOL/L (ref 4–13)
AST SERPL W P-5'-P-CCNC: 16 U/L (ref 13–39)
BASOPHILS # BLD AUTO: 0.06 THOUSANDS/ΜL (ref 0–0.1)
BASOPHILS NFR BLD AUTO: 1 % (ref 0–1)
BILIRUB SERPL-MCNC: 0.52 MG/DL (ref 0.2–1)
BUN SERPL-MCNC: 14 MG/DL (ref 5–25)
CALCIUM ALBUM COR SERPL-MCNC: 9.6 MG/DL (ref 8.3–10.1)
CALCIUM SERPL-MCNC: 9 MG/DL (ref 8.4–10.2)
CHLORIDE SERPL-SCNC: 101 MMOL/L (ref 96–108)
CO2 SERPL-SCNC: 27 MMOL/L (ref 21–32)
CREAT SERPL-MCNC: 0.85 MG/DL (ref 0.6–1.3)
EOSINOPHIL # BLD AUTO: 0.17 THOUSAND/ΜL (ref 0–0.61)
EOSINOPHIL NFR BLD AUTO: 1 % (ref 0–6)
ERYTHROCYTE [DISTWIDTH] IN BLOOD BY AUTOMATED COUNT: 15.9 % (ref 11.6–15.1)
FLUAV RNA RESP QL NAA+PROBE: NEGATIVE
FLUBV RNA RESP QL NAA+PROBE: NEGATIVE
GFR SERPL CREATININE-BSD FRML MDRD: 60 ML/MIN/1.73SQ M
GLUCOSE SERPL-MCNC: 103 MG/DL (ref 65–140)
HCT VFR BLD AUTO: 30.5 % (ref 34.8–46.1)
HGB BLD-MCNC: 10.1 G/DL (ref 11.5–15.4)
IMM GRANULOCYTES # BLD AUTO: 0.06 THOUSAND/UL (ref 0–0.2)
IMM GRANULOCYTES NFR BLD AUTO: 1 % (ref 0–2)
LYMPHOCYTES # BLD AUTO: 0.97 THOUSANDS/ΜL (ref 0.6–4.47)
LYMPHOCYTES NFR BLD AUTO: 7 % (ref 14–44)
MCH RBC QN AUTO: 29.4 PG (ref 26.8–34.3)
MCHC RBC AUTO-ENTMCNC: 33.1 G/DL (ref 31.4–37.4)
MCV RBC AUTO: 89 FL (ref 82–98)
MONOCYTES # BLD AUTO: 0.68 THOUSAND/ΜL (ref 0.17–1.22)
MONOCYTES NFR BLD AUTO: 5 % (ref 4–12)
NEUTROPHILS # BLD AUTO: 11.12 THOUSANDS/ΜL (ref 1.85–7.62)
NEUTS SEG NFR BLD AUTO: 85 % (ref 43–75)
NRBC BLD AUTO-RTO: 0 /100 WBCS
PLATELET # BLD AUTO: 302 THOUSANDS/UL (ref 149–390)
PMV BLD AUTO: 9.9 FL (ref 8.9–12.7)
POTASSIUM SERPL-SCNC: 3.5 MMOL/L (ref 3.5–5.3)
PROT SERPL-MCNC: 6 G/DL (ref 6.4–8.4)
RBC # BLD AUTO: 3.43 MILLION/UL (ref 3.81–5.12)
RSV RNA RESP QL NAA+PROBE: NEGATIVE
SARS-COV-2 RNA RESP QL NAA+PROBE: NEGATIVE
SODIUM SERPL-SCNC: 136 MMOL/L (ref 135–147)
WBC # BLD AUTO: 13.06 THOUSAND/UL (ref 4.31–10.16)

## 2025-01-20 PROCEDURE — 99222 1ST HOSP IP/OBS MODERATE 55: CPT | Performed by: INTERNAL MEDICINE

## 2025-01-20 PROCEDURE — 99232 SBSQ HOSP IP/OBS MODERATE 35: CPT | Performed by: INTERNAL MEDICINE

## 2025-01-20 PROCEDURE — 97535 SELF CARE MNGMENT TRAINING: CPT

## 2025-01-20 PROCEDURE — 97129 THER IVNTJ 1ST 15 MIN: CPT

## 2025-01-20 PROCEDURE — 70450 CT HEAD/BRAIN W/O DYE: CPT

## 2025-01-20 PROCEDURE — 0241U HB NFCT DS VIR RESP RNA 4 TRGT: CPT | Performed by: NURSE PRACTITIONER

## 2025-01-20 PROCEDURE — 97530 THERAPEUTIC ACTIVITIES: CPT

## 2025-01-20 PROCEDURE — 92526 ORAL FUNCTION THERAPY: CPT

## 2025-01-20 PROCEDURE — 85025 COMPLETE CBC W/AUTO DIFF WBC: CPT | Performed by: INTERNAL MEDICINE

## 2025-01-20 PROCEDURE — 97110 THERAPEUTIC EXERCISES: CPT

## 2025-01-20 PROCEDURE — 80053 COMPREHEN METABOLIC PANEL: CPT | Performed by: INTERNAL MEDICINE

## 2025-01-20 RX ORDER — MIRTAZAPINE 7.5 MG/1
7.5 TABLET, FILM COATED ORAL
Status: DISCONTINUED | OUTPATIENT
Start: 2025-01-20 | End: 2025-02-03 | Stop reason: HOSPADM

## 2025-01-20 RX ADMIN — METOPROLOL SUCCINATE 75 MG: 50 TABLET, EXTENDED RELEASE ORAL at 08:31

## 2025-01-20 RX ADMIN — ACETAMINOPHEN 975 MG: 325 TABLET, FILM COATED ORAL at 21:14

## 2025-01-20 RX ADMIN — ENOXAPARIN SODIUM 30 MG: 30 INJECTION SUBCUTANEOUS at 21:15

## 2025-01-20 RX ADMIN — TRAZODONE HYDROCHLORIDE 25 MG: 50 TABLET ORAL at 23:07

## 2025-01-20 RX ADMIN — METOPROLOL SUCCINATE 75 MG: 50 TABLET, EXTENDED RELEASE ORAL at 21:14

## 2025-01-20 RX ADMIN — MELATONIN TAB 3 MG 6 MG: 3 TAB at 21:14

## 2025-01-20 RX ADMIN — TRAZODONE HYDROCHLORIDE 25 MG: 50 TABLET ORAL at 00:20

## 2025-01-20 RX ADMIN — SENNOSIDES AND DOCUSATE SODIUM 1 TABLET: 50; 8.6 TABLET ORAL at 21:17

## 2025-01-20 RX ADMIN — PANTOPRAZOLE SODIUM 40 MG: 40 TABLET, DELAYED RELEASE ORAL at 05:13

## 2025-01-20 RX ADMIN — POLYETHYLENE GLYCOL 3350 17 G: 17 POWDER, FOR SOLUTION ORAL at 08:31

## 2025-01-20 RX ADMIN — FUROSEMIDE 20 MG: 20 TABLET ORAL at 08:31

## 2025-01-20 RX ADMIN — MIRTAZAPINE 7.5 MG: 7.5 TABLET, FILM COATED ORAL at 21:14

## 2025-01-20 RX ADMIN — ACETAMINOPHEN 975 MG: 325 TABLET, FILM COATED ORAL at 14:19

## 2025-01-20 RX ADMIN — ACETAMINOPHEN 975 MG: 325 TABLET, FILM COATED ORAL at 05:13

## 2025-01-20 RX ADMIN — DILTIAZEM HYDROCHLORIDE 120 MG: 120 CAPSULE, COATED, EXTENDED RELEASE ORAL at 08:31

## 2025-01-20 NOTE — WOUND OSTOMY CARE
Consult Note - Wound   Sandra Purvis 89 y.o. female MRN: [id number]  Unit/Bed#: [facility] 268-02 Encounter: [id number]      History and Present Illness:  Admitted to [facility] 1/17/25.Basal cell carcinoma of scalp. Malignant melanoma of left arm,SDH ,SAH ,Extensive closed facial fractures,insomnia,Leukocytosis,syncope orthostatic hypotension     Assessment Findings:   Wound Care consulted due to right arm skin tear. Pt known to wound care, seen for skin tear prior to transfer to higher level of care, has now returned to [facility]. Pt was signed off with wound care.  Wound /care consult remote, current photo or skin tear in EMR and secure chat with Nurse,  1)Right arm skin tear dry scabbed, may leave open to air, cleanse with NS apply 3M skin barrier as protectant  2)Buttocks and heels intact    Skin care plans:  1-Hydraguard/Silicone Cream to bilateral sacrum, buttock, and heels BID and PRN  2-Elevate heels to offload pressure.  3-Ehob cushion in chair when out of bed.  4-Moisturize skin daily with skin nourishing cream.  5-Turn/reposition q2h or when medically stable for pressure re-distribution on skin.   6-Cleanse right arm skin tear with NSS apply 3M daily       Wound 01/05/25 Incision Arm Anterior;Left;Proximal (Active)   Wound Image   01/20/25 1134       Wound Care will sign off  Courtney BARDALESN CHACHA CWON

## 2025-01-20 NOTE — ASSESSMENT & PLAN NOTE
Most recent hemoglobin of 9.4 1/17; which is stable  Continue to follow biweekly CBC or sooner if clinically indicated  Will need repeat scan if significant drops or concern for acute bleeds  1/20 10.1

## 2025-01-20 NOTE — ASSESSMENT & PLAN NOTE
Lab Results   Component Value Date    EGFR 60 01/20/2025    EGFR 64 01/17/2025    EGFR 56 01/16/2025    CREATININE 0.85 01/20/2025    CREATININE 0.81 01/17/2025    CREATININE 0.90 01/16/2025   -encouraged good oral hydration  -Avoid nephrotoxin

## 2025-01-20 NOTE — ASSESSMENT & PLAN NOTE
Wt Readings from Last 3 Encounters:   01/20/25 50.1 kg (110 lb 7.2 oz)   01/12/25 53.5 kg (118 lb)   01/10/25 51.1 kg (112 lb 9.6 oz)     TTE 1/6/2025 showed EF 70%, grade 2 DD, mildly dilated RV, mild LA, mild AI, mild TR, estimated PA pressure 50 mmHg, trivial pericardial effusion  Not on diuretics prior to admission  Cw furosemide 20 mg; monitor for volume status   Daily weights, monitor I/O

## 2025-01-20 NOTE — ASSESSMENT & PLAN NOTE
Presented on 1/4 after syncopal fall.  CTH showed acute subdural hemorrhage along the L cerebral convexity with maximal thickness of 1.9cm.  No midline shift.    Received Keppra x7 days.  Continue to hold AC/AP.  Neurovascular checks Q shift.  Maintain normotension.  Avoid SBP >160.    Follow-up with Neurosurgery in 2 weeks with CTH   Repeat CT today New small hypodensity within the left frontal subcortical white matter, neurosurgery consulted and MRI was ordered    Primary team following.  PT/OT/SLP.

## 2025-01-20 NOTE — ASSESSMENT & PLAN NOTE
WBC count currently 13.06  Chronically elevated.  Unclear etiology, may be directly related to reactive changes from SDH and SAH as well as fracture  Did recently complete a course of Unasyn and Augmentin  No s/s of active infection.  Continue to trend routine CBC.    -Rapid COVID and RSV negative

## 2025-01-20 NOTE — CONSULTS
Consultation - Internal Medicine   Name: Sandra Purvis 89 y.o. female I MRN: [id number]  Unit/Bed#: -02 I Date of Admission: 1/17/2025   Date of Service: 1/20/2025 I Hospital Day: 3   Inpatient consult to Internal Medicine  Consult performed by: LUIS Ricardo  Consult ordered by: Sheri Pope MD        Physician Requesting Evaluation: Sheri Pope MD     Assessment & Plan  Hypertension  Home regimen: Toprol XL 50mg daily and HCTZ 12.5mg daily.  Currently receiving Toprol XL 75mg BID, Cardizem 120mg, lasix 20mg   Noted to have orthostasis midodrine as needed, wears compression stockings consider abdominal binder    Keep SBP <160 due to intracranial bleed.    Atrial fibrillation with rapid ventricular response (HCC)  -Initially presented with sinus bradycardia and evaluated by cardiology and was in controlled atrial fibrillation without bradycardia  -Not on anticoagulation prior to admission and not indicated at this point due to the SDH and SAH and overall remains high risk due to risk of falls and given her advanced age  -No recent follow-up with cardiology as an outpatient and generally sees her primary care physician for management and was on Toprol 50 mg daily at home which has been changed while in the hospital  -Tachycardic in the hospital and given IV fluids and medication changes include addition of midodrine and changes in the metoprolol dosing  -Recommended for aspirin but would likely not be a good long-term candidate for anticoagulation  -Cardio rec: continue with cardizem 120 daily and Toprol-XL 75 mg twice daily 1/16  Stage 3 chronic kidney disease, unspecified whether stage 3a or 3b CKD (HCC)  Lab Results   Component Value Date    EGFR 60 01/20/2025    EGFR 64 01/17/2025    EGFR 56 01/16/2025    CREATININE 0.85 01/20/2025    CREATININE 0.81 01/17/2025    CREATININE 0.90 01/16/2025   -encouraged good oral hydration  -Avoid nephrotoxin  GERD (gastroesophageal reflux  disease)  -Continue Protonix  Basal cell carcinoma (BCC) of scalp  -Large scalp mass noted on admission.  Also follows with dermatology as an outpatient but declining any interventions  -Some localized spot bleeding from the edges noted  Malignant melanoma of arm, left (HCC)  -Large mass on the left forearm that is melanoma  -Follows with dermatology as an outpatient last seen in December 2024 but not interested in any treatments  SDH (subdural hematoma) (HCC)  Presented on 1/4 after syncopal fall.  CTH showed acute subdural hemorrhage along the L cerebral convexity with maximal thickness of 1.9cm.  No midline shift.    Received Keppra x7 days.  Continue to hold AC/AP.  Neurovascular checks Q shift.  Maintain normotension.  Avoid SBP >160.    Follow-up with Neurosurgery in 2 weeks with CTH   Repeat CT today New small hypodensity within the left frontal subcortical white matter, neurosurgery consulted and MRI was ordered    Primary team following.  PT/OT/SLP.  SAH (subarachnoid hemorrhage) (HCC)    Closed extensive facial fractures (HCC)  S/p fall on 1/4.  CT facial bones showed comminuted and mildly displaced fractures of the lateral and anterior walls of the R maxilla, lateral, and inferior wall of the R orbit, wall fracture appears to involve the inferior orbital foramen with associated small extraconal soft tissue edema, nondisplaced fractures of the zygomatic arch and the temporal bone at the TMJ, diffuse opacification of R maxillary sinus and R nasal cavity with blood products and air.    OMFS evaluated in acute setting - declined surgical intervention.  Received IV Unasyn for 7 days.  Facial sinus precautions.    Follow-up with OMFS on discharge.  Chest pain  Noted to have elevated troponins on admission to acute setting.  [place] to be due to trauma/brain bleed vs demand ischemia due to paroxysmal a-fib and syncope.  Cardiology recommended starting ASA 81mg daily when cleared by Neurosurgery.  Recommend  establishing with Cardiology on discharge.  Orthostatic hypotension  Presented with syncope on 1/4.  Significant orthostasis noted in acute setting.  Continue midodrine as needed  Apply abdominal binder/TEDs as needed.  Encourage PO hydration.  Continue to monitor orthostatics closely.      Syncope  Presented on 1/4 s/p syncopal fall.  EKG showed atrial fibrillation with RVR on admission.  ECHO on 1/6 showed EF 70%, G2DD.  Noted to have significant orthostasis in acute setting.  Started on midodrine 2.5mg BID.  Continue to monitor orthostatics.  Encourage PO hydration.  Apply abdominal binder/TEDs as needed.    Follow-up/establish with Cardiology as outpatient.  Anemia  Hgb currently 10.1.  Baseline Hgb 9-10.  No active s/s of bleeding.  Ferritin 99, B12 1125, folate 21.2  Continue to trend routine CBC.  Leukocytosis  WBC count currently 13.06  Chronically elevated.  Unclear etiology, may be directly related to reactive changes from SDH and SAH as well as fracture  Did recently complete a course of Unasyn and Augmentin  No s/s of active infection.  Continue to trend routine CBC.    -Rapid COVID and RSV negative  Acute on chronic diastolic congestive heart failure (HCC)  ECHO on 1/6 showed EF 70%, G2DD.  Monitor volume status - currently euvolemic.  Monitor I&Os, daily weights.  Insomnia  -Inc melatonin to 6mg HS, trazodone 25mg HS PRN   -Sleep log   - Remeron added per primary team     History of Present Illness:    Sandra Purvis is a 89 y.o. female with a PMH of atrial fibrillation, CKD stage 3, malignant melanoma s/p resection, and HTN who originally presented to [facility] on 1/4/2025 after experiencing a syncopal fall with head strike.  CTH showed acute subdural hemorrhage along the L cerebral convexity with maximal thickness of 1.9cm, no midline shift, and acute subarachnoid hemorrhage in the L frontoparietal sulci, L sylvian fissure, and suprasellar cistern.  CT facial bones showed comminuted  and mildly displaced fractures of the lateral and anterior walls of the R maxilla, lateral and inferior wall of R orbit, R inferior orbital wall fracture appears to involve the inferior orbital foramen with associated small extraconal soft tissue edema, non-displaced fractures of the zygomatic arch and the temporal bone at the TMJ, and diffuse opacification of the R maxillary sinus and R nasal cavity with blood products and air.  Patient was transferred to [facility] for Neurosurgery and OMFS evaluation.  Neurosurgery recommended conservative measures with control of hypertension.  Received Keppra for 7 days for seizure prophylaxis.  CTH obtained on 1/7 showed stability.  Neurosurgery recommending repeat CTH in 2 weeks and to continue to hold AC/AP.  Started on IV Unasyn for 7 days per OMFS.  OMFS discussed surgical intervention but patient declined.  Will require follow-up with OMFS as outpatient.  Syncopal work-up included, EKG, troponins, and ECHO.  Troponins were elevated but Cardiology did not feel that this was due to ischemia but rather trauma or poor perfusion due to atrial fibrillation.  Cardiology did recommend starting aspirin once cleared by Neurosurgery.  During admission, patient noted to be in atrial fibrillation with RVR and was given IV metoprolol with improvement in rate.  Cardiology was consulted and increased Toprol XL to 50mg BID.  Patient was evaluated by PT/OT and was recommended for acute inpatient rehabilitation.    She was subsequently transferred to [facility] on January 12, 2025, while at [facility] patient with hypertensive episodes and A-fib.  Patient became more symptomatic from A-fib and was transferred to acute care on January 14.  She had x-ray which showed pulmonary edema and was given Lasix 1/14 and 1/15 with good response.  She was evaluated by cardiology, and was placed on Cardizem drip and then transition to p.o. Cardizem, heart rates have improved.    Admitted to [facility] on 1/17/2025; we are consulted for medical clearance.    Patient examined while seated in chair in bedroom.  Alert and oriented.  Did require some prompting.  Denies any headaches, lightheadedness, dizziness, shortness of breath, palpitations or chest pain.  Denies any facial pain or sinus pressure.  Denies URI-like symptoms.  Denies constipation and dysuria.      Review of Systems:    Review of Systems   Constitutional:  Positive for fatigue. Negative for chills and fever.   Respiratory:  Negative for cough, chest tightness, shortness of breath and wheezing.    Cardiovascular:  Negative for chest pain, palpitations and leg swelling.   Gastrointestinal:  Negative for abdominal pain, constipation, diarrhea, nausea and vomiting.   Genitourinary:  Negative for difficulty urinating, dysuria and urgency.   Musculoskeletal:  Positive for arthralgias (back and neck pain) and gait problem.   Neurological:  Positive for weakness. Negative for dizziness, numbness and headaches.   Psychiatric/Behavioral:  Positive for confusion.        Past Medical and Surgical History:     Past Medical History:   Diagnosis Date    Arthritis     Cataract     LastAssessed:9/8/15    GERD (gastroesophageal reflux disease)     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    Hypertension     PAF (paroxysmal atrial fibrillation) (Former[facility])     Last Assessed:2/3/16    Wrist fracture     Resolved:9/8/15       Past Surgical History:   Procedure Laterality Date    CATARACT EXTRACTION      Last Assessed:9/8/15    FL RETROGRADE PYELOGRAM  10/17/2021    LYMPHADENECTOMY      Last Assessed:9/8/15    PA CYSTO/URETERO W/LITHOTRIPSY &INDWELL STENT INSRT Right 11/24/2021    Procedure: CYSTOSCOPY URETEROSCOPY WITH LITHOTRIPSY HOLMIUM LASER, RETROGRADE PYELOGRAM AND INSERTION STENT URETERAL;  Surgeon: David Sr MD;  Location: [facility];  Service: Urology    PA CYSTOURETHROSCOPY W/URETERAL CATHETERIZATION Right 10/17/2021    Procedure: CYSTOSCOPY  "RETROGRADE PYELOGRAM WITH INSERTION STENT URETERAL;  Surgeon: Benjie Tinoco MD;  Location: [facility];  Service: Urology    TONSILLECTOMY      Last Assessed:9/8/15    WRIST SURGERY      Last Assessed:9/8/15       Meds/Allergies:    all medications and allergies reviewed    Allergies:   Allergies   Allergen Reactions    Lactose - Food Allergy GI Intolerance    Penicillins Other (See Comments)     Unsure of reaction        Social History:     Marital Status: /Civil Union    Substance Use History:   Social History     Substance and Sexual Activity   Alcohol Use Never     Social History     Tobacco Use   Smoking Status Never    Passive exposure: Never   Smokeless Tobacco Never     Social History     Substance and Sexual Activity   Drug Use Never       Family History:  Reviewed    Physical Exam:     Vitals:   Blood Pressure: 115/56 (symptomatic, applied TEDS) (01/20/25 1011)  Pulse: 65 (01/20/25 1011)  Temperature: 98.4 °F (36.9 °C) (01/20/25 0627)  Temp Source: Temporal (01/20/25 0627)  Respirations: 18 (01/20/25 0627)  Height: 5' 7\" (170.2 cm) (01/17/25 1300)  Weight - Scale: 50.1 kg (110 lb 7.2 oz) (01/20/25 0600)  SpO2: 97 % (01/20/25 0627)    Physical Exam  Vitals reviewed.   Constitutional:       General: She is not in acute distress.     Appearance: She is ill-appearing. She is not diaphoretic.   HENT:      Head: Normocephalic and atraumatic.   Cardiovascular:      Rate and Rhythm: Normal rate and regular rhythm.      Heart sounds: Normal heart sounds.   Pulmonary:      Effort: Pulmonary effort is normal. No respiratory distress.      Breath sounds: Normal breath sounds. No wheezing, rhonchi or rales.   Abdominal:      General: Bowel sounds are normal. There is no distension.      Palpations: Abdomen is soft.      Tenderness: There is no abdominal tenderness.   Musculoskeletal:      Right lower leg: No edema.      Left lower leg: No edema.   Lymphadenopathy:      Head:      Right side of head: Tonsillar " adenopathy present.      Left side of head: Tonsillar adenopathy present.   Skin:     Findings: Bruising (noted to face) present.   Neurological:      Mental Status: She is alert and oriented to person, place, and time. Mental status is at baseline.      Comments: Lethargic          Additional Data:     Labs and imaging reviewed.    EKG Reviewed - last EKG on 1/7 showed atrial fibrillation with RVR, QTc 481.    M*Acquis[employer] software was used to dictate this note.  It may contain errors with dictating incorrect words or incorrect spelling. Please contact the provider directly with any questions.

## 2025-01-20 NOTE — ASSESSMENT & PLAN NOTE
-Initially presented with sinus bradycardia and evaluated by cardiology and was in controlled atrial fibrillation without bradycardia  -Not on anticoagulation prior to admission and not indicated at this point due to the SDH and SAH and overall remains high risk due to risk of falls and given her advanced age  -No recent follow-up with cardiology as an outpatient and generally sees her primary care physician for management and was on Toprol 50 mg daily at home which has been changed while in the hospital  -Tachycardic in the hospital and given IV fluids and medication changes include addition of midodrine and changes in the metoprolol dosing  -Recommended for aspirin but would likely not be a good long-term candidate for anticoagulation  -Cardio rec: continue with cardizem 120 daily and Toprol-XL 75 mg twice daily 1/16

## 2025-01-20 NOTE — PROGRESS NOTES
Progress Note - PMR   Name: Sandra Purvis 89 y.o. female I MRN: [id number]  Unit/Bed#: -02 I Date of Admission: 1/17/2025   Date of Service: 1/20/2025 I Hospital Day: 3     Assessment & Plan  Hypertension  Cw metoprolol succinate 75 mg twice daily  Cardizem 120 mg XR daily, d/c amlodipine  Atrial fibrillation with rapid ventricular response (HCC)  Initially presented with sinus bradycardia and evaluated by cardiology and was in controlled atrial fibrillation without bradycardia  Not on anticoagulation prior to admission and not indicated at this point due to the SDH and SAH and overall remains high risk due to risk of falls and given her advanced age  No recent follow-up with cardiology as an outpatient and generally sees her primary care physician for management and was on Toprol 50 mg daily at home which has been changed well and in the hospital  Tachycardic in the hospital and given IV fluids and medication changes include addition of midodrine and changes in the metoprolol dosing  Recommended for aspirin but would likely not be a good long-term candidate for anticoagulation  Cardio rec: continue with cardizem 120 daily and Toprol-XL 75 mg twice daily 1/16  Stage 3 chronic kidney disease, unspecified whether stage 3a or 3b CKD (HCC)  Lab Results   Component Value Date    EGFR 60 01/20/2025    EGFR 64 01/17/2025    EGFR 56 01/16/2025    CREATININE 0.85 01/20/2025    CREATININE 0.81 01/17/2025    CREATININE 0.90 01/16/2025   At baseline  GERD (gastroesophageal reflux disease)  Continue protonix  Basal cell carcinoma (BCC) of scalp  Large scalp mass noted on admission.  Also follows with dermatology as an outpatient but declining any interventions  Some localized spot bleeding from the edges noted  Malignant melanoma of arm, left (HCC)  Large mass on the left forearm that is melanoma  Follows with dermatology as an outpatient last seen in December 2024 but not interested in any treatments  SDH (subdural  hematoma) (HCC)  Patient presents with fall and initial CTh on 1/4 showing an acute subdural hemorrhage along the left cerebral convexity with a maximum thickness of 1.9 cm with no midline shift as well as an acute subarachnoid hemorrhage in the left frontoparietal sulci, left sylvian fissure and suprasellar cistern  Repeat CT head on 1/5 was stable; repeat 1/14 w/ interval evolution no new/evolving hemorrage   Completed 7 day course of keppra for seizure ppx  F/u w/ NSGY for repeat CTH ~1/27 1/20 Repeat imaging ordered for f/o tomorrow at 1pm  SAH (subarachnoid hemorrhage) (HCC)  Subarachnoid hemorrhage noted on initial CT on 1/4 and stable on 1/5;   CTH1/14  Interval evolution of recent subdural and subarachnoid hemorrhage as detailed above. No definitive new or enlarging intracranial hemorrhage.  Continue same management for the subarachnoid hemorrhage as for the subdural hematoma, please see that separate section for overall management plans  Closed extensive facial fractures (HCC)  Secondary to fall  CT of the head on 1/4 also showed comminuted and mildly displaced fractures of the lateral and anterior walls of the right maxilla, lateral and inferior walls of the right orbit, right inferior orbital wall fractures appear to involve the inferior orbital foramen with associated small extraconal soft tissue edema and focus of air.  There is no herniation of extraocular muscles and a nondisplaced fracture of the zygomatic arch as well as the temporal bone at the TMJ.  There is diffuse opacification of the right maxillary sinus and right nasal cavity with blood products and air  Seen by oral maxillofacial surgery team and surgical intervention was declined by patient  Completed 7-day course of Unasyn/Augmentin  Sinus precautions  Follow-up with OMFS as an outpatient  Chest pain  Troponins obtained on initial evaluation of the patient in the emergency room and cardiology was consulted for evaluation  Despite elevated  troponins there was nonischemic findings on her EKG and felt to be potentially related to a nonischemic troponin elevation in the setting of trauma and brain bleed versus demand ischemia due to her paroxysmal atrial fibrillation and syncopal episode  Not a candidate for cardiology for ischemic evaluation due to advanced age, frailty and recent subarachnoid hemorrhage/subdural hemorrhage  No new regional wall motion abnormalities noted on echocardiogram on 1/6/2025  Recommended to start aspirin 81 mg when cleared by neurosurgery with CT head  Follow-up with cardiology as an outpatient  Orthostatic hypotension  Blood pressures ranging in physical therapy as low as 70/40 and cardiology was evaluating the patient  Symptomatic with lightheadedness and presyncope and had received gentle fluids with some improvement  BALJIT stockings PRN as well and encouragement of fluid intake  Repeat orthostatics improved but still getting lightheaded at times which was felt to be the initiating factor in her syncopal episode that led to the fall and injury.  1/17 IM held midodrine, no significant orthostatic hypotension currently observed may need to introduce at 2.5 mg TID if develops episodes at a later time, continue to monitor orthostatic Bps   1/19 bp dropped on standing, came back up on sitting  Syncope  Presented with syncopal episode preceded by lightheadedness that led to the fall which caused the sudden dural hemorrhage and subarachnoid hemorrhage  Also found to be significantly orthostatic which was likely the etiology and was placed on telemetry with no evidence of bradycardia or pauses  Limited p.o. intake and fluid intake and suspected orthostatic hypotension as the cause  Anemia  Most recent hemoglobin of 9.4 1/17; which is stable  Continue to follow biweekly CBC or sooner if clinically indicated  Will need repeat scan if significant drops or concern for acute bleeds  1/20 10.1  Leukocytosis  Most recent WBC count 12.08 and  "actually trending down from its maximum of 17.59  Unclear etiology, may be directly related to reactive changes from the SDH and SAH as well as fractures  Continue to monitor and if considerable changes may repeat scans given the fractures in the facial bones  Was on a course of Unasyn/Augmentin for 7 days  IM: Infectious workup negative  Received IV fluids, discontinued with evidence of fluid overload CXR  Stable off abx d/c 1/15  12.08 1/17---> 13.06 1/20  Acute on chronic diastolic congestive heart failure (HCC)  Wt Readings from Last 3 Encounters:   01/20/25 50.1 kg (110 lb 7.2 oz)   01/12/25 53.5 kg (118 lb)   01/10/25 51.1 kg (112 lb 9.6 oz)     TTE 1/6/2025 showed EF 70%, grade 2 DD, mildly dilated RV, mild LA, mild AI, mild TR, estimated PA pressure 50 mmHg, trivial pericardial effusion  Not on diuretics prior to admission  Cw furosemide 20 mg; monitor for volume status   Daily weights, monitor I/O     Insomnia  Inc melatonin to 6mg HS, trazodone 25mg HS PRN   Sleep log   Patient may benefit from Remeron HS to address sleep, mood, and appetite, will discuss w/ patient next week     History of Present Illness   Sandra Purvis is a 89 y.o. female w/ PMHx of afib, GERD, HTN, who initially presented to [facility] on 1/4/2025 after a syncopan episode and collapse at home.  CT had showed \"comminuted and mildly displaced fractures of the lateral and anterior walls of the right maxilla, lateral and inferior walls of the right orbit, right inferior orbital wall fracture appears to involve the inferior orbital foramen associated with small extraconal soft tissue edema and focus of air. There is also a nondisplaced fracture of the zygomatic arch and the temporal bone at the TMJ and diffuse opacification of the right maxillary sinus and right nasal cavity with blood products and air. Additionally there was an acute subdural hemorrhage along the left cerebral convexity with a " "maximal thickness of 1.9 cm with no midline shift and an acute subarachnoid hemorrhage in the left frontal parietal sulci, left sylvian fissure as well as suprasellar cistern.\"  OMFS was consulted as well as neurosurgery.  Patient and family declined any acute intervention at this time.  Patient was started on Keppra x 7 days.  Patient was treated with 7-day course of Unasyn/Augmentin.  Her hospital stay was also complicated with A-fib with RVR and cardiology was consulted.  Patient then had orthostatic hypotension and started on midodrine.  Patient initially transferred to [facility] 1/12/2025.  While at [facility] patient with hypertensive episodes to SBP >190 and Afib rates 120-130s.  Patient became more symptomatic from her A-fib and was transferred to acute care 1/14. She had X ray w/ pulmonary edema was given lasix 1/14-1/15 with good response.  Patient was seen by cardiology. Patient was placed on Cardizem drip and now on PO Cardizem. Her rates have improved. Sandra Purvis was admitted to the [facility] on 01/17/25     Chief Complaint: f/u ambulatory dysfunction    Interval: Patient seen and examined in bed. No events overnight.  Reports overall feeling well. Last BM 1/20. Does not sleep well at night as prior to admission. Roommate c/o of fever today.    Review of Systems   Constitutional:  Negative for fever.   Respiratory:  Negative for shortness of breath.    Cardiovascular:  Positive for chest pain. Negative for palpitations and leg swelling.   Gastrointestinal:  Negative for abdominal pain, constipation, diarrhea, nausea and vomiting.   Musculoskeletal:         Overall pain 8/10       Objective   Functional Update:  Physical Therapy Occupational Therapy Speech Therapy                           Temp:  [98.2 °F (36.8 °C)-99.3 °F (37.4 °C)] 98.4 °F (36.9 °C)  HR:  [60-66] 65  Resp:  [18] 18  BP: (115-176)/(56-68) 115/56  SpO2:  [96 %-97 %] 97 %    Physical Exam  Constitutional:       General: She is not in acute " distress.     Comments: frail   HENT:      Head: Normocephalic and atraumatic.      Mouth/Throat:      Mouth: Mucous membranes are moist.   Cardiovascular:      Rate and Rhythm: Normal rate and regular rhythm.   Pulmonary:      Effort: Pulmonary effort is normal.      Breath sounds: Normal breath sounds.   Abdominal:      General: Bowel sounds are normal.   Musculoskeletal:         General: Normal range of motion.   Skin:     General: Skin is warm and dry.      Findings: Bruising present.      Comments: Bruising present on face and arms   Neurological:      Mental Status: She is alert. Mental status is at baseline.   Psychiatric:         Mood and Affect: Mood normal.         Behavior: Behavior normal.           Scheduled Meds:  Current Facility-Administered Medications   Medication Dose Route Frequency Provider Last Rate    acetaminophen  975 mg Oral Q8H ELIZABETH Autumn Chapman PA-C      bisacodyl  10 mg Rectal Daily PRN Autumn Chapman PA-C      calcium carbonate  500 mg Oral Daily PRN Autumn Chapman PA-C      diltiazem  120 mg Oral Daily Autumn Chapman PA-C      enoxaparin  30 mg Subcutaneous Q24H Autumn Chapman PA-C      furosemide  20 mg Oral Daily Autumn Chapman PA-C      hydrALAZINE  10 mg Oral Q8H PRN Eduardo Anton MD      melatonin  6 mg Oral HS Sheri Pope MD      metoprolol succinate  75 mg Oral BID Autumn Chapman PA-C      midodrine  5 mg Oral TID PRN Autumn Chapman PA-C      ondansetron  4 mg Oral Q6H PRN Autumn Chapman PA-C      oxyCODONE  2.5 mg Oral Q6H PRN Autumn Chapman PA-C      Or    oxyCODONE  5 mg Oral Q6H PRN Autumn Chapman PA-C      pantoprazole  40 mg Oral Early Morning Autumn Chapman PA-C      polyethylene glycol  17 g Oral Daily Autumn Chapman PA-C      senna-docusate sodium  1 tablet Oral HS Autumn Chapman PA-C      traZODone  25 mg Oral HS PRN Sheri Pope MD           Lab Results: I have reviewed the  following results:  Results from last 7 days   Lab Units 01/20/25  0425 01/17/25  0530 01/16/25  0527   HEMOGLOBIN g/dL 10.1* 9.4* 10.0*   HEMATOCRIT % 30.5* 28.6* 31.7*   WBC Thousand/uL 13.06* 12.08* 12.88*   PLATELETS Thousands/uL 302 267 290     Results from last 7 days   Lab Units 01/20/25 0425 01/17/25  0530 01/16/25  0527 01/15/25  0517   BUN mg/dL 14 15 17 18   SODIUM mmol/L 136 133* 134* 134*   POTASSIUM mmol/L 3.5 3.4* 3.6 3.3*   CHLORIDE mmol/L 101 97 95* 93*   CREATININE mg/dL 0.85 0.81 0.90 0.90   AST U/L 16  --   --  31   ALT U/L 22  --   --  40              Autumn Chapman PA-C  Physical Medicine and Rehabilitation   01/20/25

## 2025-01-20 NOTE — PLAN OF CARE
Problem: Prexisting or High Potential for Compromised Skin Integrity  Goal: Skin integrity is maintained or improved  Description: INTERVENTIONS:  - Identify patients at risk for skin breakdown  - Assess and monitor skin integrity  - Assess and monitor nutrition and hydration status  - Monitor labs   - Assess for incontinence   - Turn and reposition patient  - Assist with mobility/ambulation  - Relieve pressure over bony prominences  - Avoid friction and shearing  - Provide appropriate hygiene as needed including keeping skin clean and dry  - Evaluate need for skin moisturizer/barrier cream  - Collaborate with interdisciplinary team   - Patient/family teaching  - Consider wound care consult   Outcome: Progressing     Problem: CARDIOVASCULAR - ADULT  Goal: Absence of cardiac dysrhythmias or at baseline rhythm  Description: INTERVENTIONS:  - Continuous cardiac monitoring, vital signs, obtain 12 lead EKG if ordered  - Administer antiarrhythmic and heart rate control medications as ordered  - Monitor electrolytes and administer replacement therapy as ordered  Outcome: Progressing     Problem: Potential for Falls  Goal: Patient will remain free of falls  Description: INTERVENTIONS:  - Educate patient/family on patient safety including physical limitations  - Instruct patient to call for assistance with activity   - Consult OT/PT to assist with strengthening/mobility   - Keep Call bell within reach  - Keep bed low and locked with side rails adjusted as appropriate  - Keep care items and personal belongings within reach  - Initiate and maintain comfort rounds  - Make Fall Risk Sign visible to staff  - Offer Toileting every 2 Hours, in advance of need  - Initiate/Maintain bed and chair alarm  - Obtain necessary fall risk management equipment: call light within reach, side rails up and functioning  - Apply yellow socks and bracelet for high fall risk patients  - Consider moving patient to room near nurses station  Outcome:  Progressing

## 2025-01-20 NOTE — PROGRESS NOTES
Outpatient Care Management MAHOGANY/SNF Pathway. Discharged 1/17/25 to [facility]. This Admin Coordinator will continue to monitor via chart review.   Inbasket reminder sent to self to do next outreach.

## 2025-01-20 NOTE — CASE MANAGEMENT
CM met with patient, explained rehab routine and CM role with introduction. Patient stated she lives with her  in a ranch home with 2STE. Patient independent PTA, has RWx2, does not ambulate with a device. Patient' has a tub shower in full bath. Solomon has no prior outpt therapy experience, does have experience with STR hre at [facility], and [facility] with [facility]. Patient has prescription coverage and gets m edications at [facility] on [facility]  CM confirmed patient's address and insurancer coverage with [employer]. Shreyas has a daughter who lives near [facility] and a son who lives in [place], and a grandson who lives near [facility]. Patient's  does drive.CM explained team meeting process, IMM explained, signed and sent to be scanned into patient's chart. CM will follow for discharge needs.

## 2025-01-20 NOTE — PCC SPEECH THERAPY
Pt is being followed for cognitive linguistic and dysphagia therapy sessions  where pt is making slower progress this week. Pt completed the SLUMS on initial evaluation with scores correlating to overall severe neurocognitive deficits. During recent sessions, continued cognitive barriers which present include: decreased ST memory, working memory, attention, processing, sequencing, problem solving, organization of thoughts and insight, impacting pt's overall safety, functional cognitive communication skills and functional mobility. Pt's fluctuating PRIYANKA and fatigue are also barriers to her progress at this time. The following interventions are used to target these barriers, including visual orientation checklist, verbal problem solving task, verbal working memory tasks, visual memory recall tasks, drawing conclusions activities, written sequencing tasks, verbal sequencing tasks, categorization tasks , picture problem solving activities, verbal reasoning tasks, verbal review of current medications, written review of medications, verbal health management tasks, functional reading tasks , and family education/training.     Pt was admitted to the [facility] on a dysphagia level 3 diet and thin liquids, however, most recently, pt was changed to a dysphagia level 2 diet with thin liquids in order to increase pt comfort and to decrease level of pain with mastication. Continued dysphagia barriers which present include the following risks for aspiration such as: prolonged mastication, reduced bolus formation, decreased transfers of solids, min oral residual and pain/discomfort with mastication of harder solids. In order to address the noted barriers, skilled SLP services will address this by targeting the following interventions: diet modification, safe swallow strategies, and family education/training.    Current diet is dysphagia level 2, thins      Current level of functioning:   Eating: set up  Comprehension: min A  Expression:  min A  Social Interaction: supervision  Executive functions: max A  Memory: max A    Family training/education has not yet been initiated but will plan to make contact with pt's family this week to provide an update. Although early in pt's rehab stay, anticipating that pt will be recommended for increased level of support and assistance on discharge. This week, focus for continued services to target assisting in determining most appropriate diet for pt at this time, along with plan to focus on STM recall, functional problem solving and safety. At this time, pt is recommended for further skilled ST focusing on cognitive linguistic skills in order to maximize functional independence and safety on discharge, as well as to assist in determining safest and least restrictive diet on discharge.     Update from week: 1/28/2025. Pt is being followed for cognitive linguistic tx and dysphagia tx  sessions to where pt is making slower and steady progress this week.     Continued cognitive barriers which present include: decreased attention, ST memory recall , problem solving, reasoning, sequencing, organization of thoughts, judgement, slower processing, and insight, which still impacts pt's overall safety, functional cognitive communication skills as well as functional mobility. The following interventions are used to target these barriers, including ongoing orientation review, ST memory strategies, verbal problem solving task, verbal working memory tasks, drawing conclusions activities, categorization tasks , and family education/training.     Continued dysphagia barriers which present include the following risks for aspiration such as: poor positioning, decreased cognition, ineffective mastication, delay in oral transit, delay in swallow initiation, pharyngeal weakness upon swallowing, multiple swallows due to pharyngeal weakness, and audible swallows w/ liquids . In order to address the noted barriers, skilled SLP services  will address this by targeting the following interventions: proper positioning, diet modification, safe swallow strategies, and family education/training.    Current diet is dysphagia level 2 diet but pureed vegetables per pt preference w/ thin liquids. Pt current is FULL supervision for meals. Of note, SINUS PRECAUTIONS have been discontinued by OMFS, where pt is allowed to use straws. Will also try to initiate level 3 diet items w/ pt as result of this change as well.     Current level of functioning:   Eating: FULL supervision  Comprehension: min-mod A  Expression: min A  Social Interaction: supervision  Executive functions: mod-max A  Memory: max A    Family training/education has been initiated with pt's dtr via phone to where current swallow and cognitive skills were discussed and addressed w/ dtr. Due to cognitive deficits, recommendations for pt is for increased supervision/assistance in I ADL tasks, which dtr is not local and per report spouse would not be able to increase this assistance at discharge. Dtr wishing to pursue subacute level of care for pt where continued ST services would be recommended.       This week, focus for continued services to target trials of diet advancement to level 3 items, tolerance of thin liquids now w/ straw, in addition to ongoing review of orientation, ST memory skills, basic problem solving, reasoning, sequencing, insight.  At this time, pt will continue to benefit from skilled cognitive linguistic tx and dysphagia tx  session to maximize overall functional independence given overall cognitive linguistic skills and swallow abilities  in attempts to decreased caregiver burden over time.

## 2025-01-20 NOTE — ASSESSMENT & PLAN NOTE
Lab Results   Component Value Date    EGFR 60 01/20/2025    EGFR 64 01/17/2025    EGFR 56 01/16/2025    CREATININE 0.85 01/20/2025    CREATININE 0.81 01/17/2025    CREATININE 0.90 01/16/2025   At baseline

## 2025-01-20 NOTE — NUTRITION
Pt does not meet 2 criteria for malnutrition.She reports her appetite as good, some difficulty with recall of what she ate for lunch. Pt likes fresh, natural foods with no preservatives. Offered pt Ensure which she said her  drinks but she would like to get healthy first and then drink them when she gets home. Will continue to monitor intake for adequacy.

## 2025-01-20 NOTE — ASSESSMENT & PLAN NOTE
Noted to have elevated troponins on admission to acute setting.  [place] to be due to trauma/brain bleed vs demand ischemia due to paroxysmal a-fib and syncope.  Cardiology recommended starting ASA 81mg daily when cleared by Neurosurgery.  Recommend establishing with Cardiology on discharge.

## 2025-01-20 NOTE — ASSESSMENT & PLAN NOTE
Most recent WBC count 12.08 and actually trending down from its maximum of 17.59  Unclear etiology, may be directly related to reactive changes from the SDH and SAH as well as fractures  Continue to monitor and if considerable changes may repeat scans given the fractures in the facial bones  Was on a course of Unasyn/Augmentin for 7 days  IM: Infectious workup negative  Received IV fluids, discontinued with evidence of fluid overload CXR  Stable off abx d/c 1/15  12.08 1/17---> 13.06 1/20

## 2025-01-20 NOTE — PLAN OF CARE
Problem: SAFETY ADULT  Goal: Patient will remain free of falls  Description: INTERVENTIONS:  - Educate patient/family on patient safety including physical limitations  - Instruct patient to call for assistance with activity   - Consult OT/PT to assist with strengthening/mobility   - Keep Call bell within reach  - Keep bed low and locked with side rails adjusted as appropriate  - Keep care items and personal belongings within reach  - Initiate and maintain comfort rounds  - Make Fall Risk Sign visible to staff  - Offer Toileting every 2 Hours, in advance of need  - Initiate/Maintain bed/chair alarm  - Obtain necessary fall risk management equipmen  - Apply yellow socks and bracelet for high fall risk patients  - Consider moving patient to room near nurses station  Outcome: Progressing

## 2025-01-20 NOTE — ASSESSMENT & PLAN NOTE
-Large mass on the left forearm that is melanoma  -Follows with dermatology as an outpatient last seen in December 2024 but not interested in any treatments

## 2025-01-20 NOTE — DISCHARGE INSTR - OTHER ORDERS
Skin care plans:  1-Hydraguard/Silicone Cream to bilateral sacrum, buttock, and heels BID and PRN  2-Elevate heels to offload pressure.  3-Ehob cushion in chair when out of bed.  4-Moisturize skin daily with skin nourishing cream.  5-Turn/reposition q2h or when medically stable for pressure re-distribution on skin.   6-Cleanse right arm skin tear with NSS apply 3M daily  7-Left head--cleanse/soak with normal saline, pat dry.  Cover wound with Xeroform and ABD.  Secure with surgical cap.  Change dressing daily and as needed.  Trim surrounding hair as needed to keep from getting matted into wound.

## 2025-01-20 NOTE — ASSESSMENT & PLAN NOTE
Blood pressures ranging in physical therapy as low as 70/40 and cardiology was evaluating the patient  Symptomatic with lightheadedness and presyncope and had received gentle fluids with some improvement  BALJIT stockings PRN as well and encouragement of fluid intake  Repeat orthostatics improved but still getting lightheaded at times which was felt to be the initiating factor in her syncopal episode that led to the fall and injury.  1/17 IM held midodrine, no significant orthostatic hypotension currently observed may need to introduce at 2.5 mg TID if develops episodes at a later time, continue to monitor orthostatic Bps   1/19 bp dropped on standing, came back up on sitting

## 2025-01-20 NOTE — PROGRESS NOTES
01/20/25 0830   Pain Assessment   Pain Assessment Tool 0-10   Pain Score No Pain   Restrictions/Precautions   Precautions Aspiration;Bed/chair alarms;Cognitive;Fall Risk;Pain;Pressure Ulcer;Supervision on toilet/commode   Comprehension   Comprehension (FIM) 4 - Understands basic info/conversation 75-90% of time   Expression   Expression (FIM) 4 - Expresses basic info/needs 75-90% of time   Social Interaction   Social Interaction (FIM) 5 - Interacts appropriately with others 90% of time   Problem Solving   Problem solving (FIM) 2 - Solves basic problems 25-49% of time   Memory   Memory (FIM) 2 - Recognizes, recalls/performs 25-49%   Speech/Language/Cognition Assessment   Treatment Assessment Pt participated in brief skilled SLP session focusing on cognitive linguistic skills due to time constraints and completion of dysphagia session. Pt was oriented to person, place, month, year and situation to begin. Pt demo accurate recall of biographical info related to her family member names, where they live and demonstrated overall improvement in PRIYANKA and processing this session. However, pt with decreased overall attention as she required frequent verbal redirections to her meal. Pt presented with improvement in recall of her PLOF in comparison to chart review and in comparison to previous [facility] admission. Of note, pt did remain with decreased organization of thought and with difficulty answering specific questions as she would often provide additional information with was not directly answering questions. Engaged in convo about pt's concerns regarding her diet. Pt benefited from multiple follow up questions to confirm info, along with SLP reiterating information back to pt to confirm. When dietary aide was present to obtain lunch order, pt with functional comprehension and expression, noting need for increased processing time. At this time, pt is recommended for further skilled ST focusing on cognitive linguistic skills in  order to maximize functional independence and safety on discharge.   Eating   Type of Assistance Needed Set-up / clean-up   Physical Assistance Level No physical assistance   Eating CARE Score 5   Swallow Assessment   Swallow Treatment Assessment Daily Dysphagia Tx Note     Patient Name: Sandra Purvis    Today's Date: 1/20/2025      Current Risks for Dysphagia & Aspiration: Weak cough;General debilitation;Cognitive deficit;Reduced alertness; facial fractures    Current Symptoms/Concerns: difficulty chewing    Current diet:soft/level 3 diet and thin liquids     Premorbid diet::regular diet and thin liquids     Positioning: upright in bed    Items administered:Consistencies Administered: thin liquids and soft solids  Materials administered included scrambled eggs, diced pears, thin juice    Total amount of meal consumed:   75% of meal, 120cc thins      Oral stage:mild  Lip closure: complete; effective bolus retrieval via cup, utensil  Anterior spillage: none  Mastication: mild to moderately prolonged but appeared effective for bolus breakdown; piecemeal deglutition observed  Bolus formation: reduced with soft solids  Bolus control: appeared functional  Transfer: mildly slower and incomplete at times with soft solids  Oral residue: mild amounts between bites but cleared independently to min to trace amounts; ultimately cleared with liquids  Pocketing: none         Pharyngeal stage:minimal  Swallow promptness: appeared functional  Hyolaryngeal elevation:  present to palpation  Wet voice: none  Throat clear: none  Cough: none  Secondary swallows: none  Audible swallows: with lthins       Esophageal stage: Hx of GERD. Some belching observed after rapid, consecutive sips of thins.        Summary:     Pt is presenting with s/s suggestive of mild oral and minimal pharyngeal dysphagia this session. See above for specific details. Primary concerns were present with soft solids as pt presented with prolonged mastication,  reduced bolus formation and decreased oral clearance, resulting in mild oral residual. However, pt able to clear independently with increased time and use of liquids. No overt s/s aspiration this meal with either soft solids or thins.     However, pt is currently on a dysphagia level 3 diet but as per chart review, pt reporting continued difficulty and pain/discomfort with mastication of chopped items, such as meats and vegetables. Despite increased sauces and gravy, pt reported still having difficulty with chewing things like chicken and green beans. SLP and pt spent increased time reviewing different items to determine most appropriate diet at this time. SLP offered options of remaining on this diet but chooser softer items and with additional moistening agents vs changing to dysphagia level 2 diet with ground and minced items. After increased time spent reviewing options and a treatment plan, mutually determined to trial dysphagia level 2 diet to assess if this preparation of foods is more comfortable for the time being as pt's facial fractures recover/heal. Plan will be to trial dysphagia level 2 diet and to reassess how pt is tolerating and liking the diet as this will likely allow greater ease and comfort with food items due to the need for minimal chewing. SLP concerned about greater limitations on types of items that will be available to pt and this impacting pt's PO intake, therefore, spoke to Dr. Pope about this plan with plan to monitor closely and for ST to reassess this week.       Recommendations:  Diet: mechanically altered/level 2 diet and thin liquids  Meds: whole with puree  Strategies: upright posture, only feed when fully alert, slow rate of feeding, small bites/sips, no straws, quiet environment (tv off, limit talking, door closed, etc.), and alternating bites and sips    Sinus Precautions:       -no straws       -no blowing nose       -no closed mouth/nose sneezing       -reduce  straining    DISTANT supervision w/ meals.  Results reviewed with:  patient, RN, and MD   Aspiration precautions posted.    F/u ST tx: Pt is currently recommended for further skilled SLP services targeting dysphagia therapy in order to maximize oral and pharyngeal swallow skills, while safely supporting PO intake, as well as to improve independent carryover of safe swallow strategies.      Plan:      Change to Dysphagia level 2 diet, continue THIN liquids      Continue Sinus precautions as per MD      Monitor diet tolerance and modify diet again either to less or more modified as needed     SLP Therapy Minutes   SLP Time In 0830   SLP Time Out 0930   SLP Total Time (minutes) 60   SLP Mode of treatment - Individual (minutes) 60   SLP Mode of treatment - Concurrent (minutes) 0   SLP Mode of treatment - Group (minutes) 0   SLP Mode of treatment - Co-treat (minutes) 0   SLP Mode of Treatment - Total time(minutes) 60 minutes   SLP Cumulative Minutes 140   Therapy Time missed   Time missed? No

## 2025-01-20 NOTE — ASSESSMENT & PLAN NOTE
Hgb currently 10.1.  Baseline Hgb 9-10.  No active s/s of bleeding.  Ferritin 99, B12 1125, folate 21.2  Continue to trend routine CBC.

## 2025-01-20 NOTE — ASSESSMENT & PLAN NOTE
Patient presents with fall and initial CTh on 1/4 showing an acute subdural hemorrhage along the left cerebral convexity with a maximum thickness of 1.9 cm with no midline shift as well as an acute subarachnoid hemorrhage in the left frontoparietal sulci, left sylvian fissure and suprasellar cistern  Repeat CT head on 1/5 was stable; repeat 1/14 w/ interval evolution no new/evolving hemorrage   Completed 7 day course of keppra for seizure ppx  F/u w/ NSGY for repeat CTH ~1/27 1/20 Repeat imaging ordered for f/o tomorrow at 1pm

## 2025-01-20 NOTE — PROGRESS NOTES
01/20/25 1005   Pain Assessment   Pain Score No Pain   Restrictions/Precautions   Precautions Aspiration;Bed/chair alarms;Cognitive;Fall Risk;Pain;Pressure Ulcer;Supervision on toilet/commode  (sinus prec, ortho BP, TEDS/binder PRN)   Weight Bearing Restrictions No   ROM Restrictions No   Oral Hygiene   Type of Assistance Needed Supervision   Comment completed seated in tilt in space at sink   Oral Hygiene CARE Score 4   Shower/Bathe Self   Type of Assistance Needed Physical assistance   Physical Assistance Level Total assistance   Comment completed SB this session seated EOB. pt req modA to maintain sitting balance. pt able to wash UB. Req TA to wash LB. Req Ax2 for STS and 2nd person for hygiene washing   Shower/Bathe Self CARE Score 1   Tub/Shower Transfer   Reason Not Assessed Sponge Bath   Upper Body Dressing   Type of Assistance Needed Physical assistance   Physical Assistance Level 26%-50%   Comment modA to maintain sitting balance while don OH shirt   Upper Body Dressing CARE Score 3   Lower Body Dressing   Type of Assistance Needed Physical assistance   Physical Assistance Level Total assistance   Comment TA thread pullups/pants. Ax2 for STS and CM   Lower Body Dressing CARE Score 1   Putting On/Taking Off Footwear   Type of Assistance Needed Physical assistance   Physical Assistance Level Total assistance   Comment TA TEDS/socks   Putting On/Taking Off Footwear CARE Score 1   Sit to Lying   Type of Assistance Needed Physical assistance   Physical Assistance Level 51%-75%   Sit to Lying CARE Score 2   Lying to Sitting on Side of Bed   Type of Assistance Needed Physical assistance   Physical Assistance Level 76% or more   Lying to Sitting on Side of Bed CARE Score 2   Sit to Stand   Type of Assistance Needed Physical assistance   Physical Assistance Level Total assistance   Comment modA/maxAx2 for STS with RW   Sit to Stand CARE Score 1   Bed-Chair Transfer   Type of Assistance Needed Physical assistance    Physical Assistance Level Total assistance   Comment Ax2 sit pivot transfer to tilt in space   Chair/Bed-to-Chair Transfer CARE Score 1   Toileting Hygiene   Type of Assistance Needed Physical assistance   Physical Assistance Level Total assistance   Comment pt able to perform STS with use of bedrail with one person steadying and 2nd person A withhygiene/CM   Toileting Hygiene CARE Score 1   Toilet Transfer   Type of Assistance Needed Physical assistance   Physical Assistance Level Total assistance   Comment Ax2 swap out at bed with use of bedrail   Toilet Transfer CARE Score 1   Cognition   Overall Cognitive Status Impaired   Arousal/Participation Cooperative   Attention Attends with cues to redirect   Orientation Level Oriented to place;Oriented to person;Oriented to time   Memory Decreased short term memory;Decreased recall of recent events;Decreased recall of precautions   Following Commands Follows one step commands with increased time or repetition   Activity Tolerance   Activity Tolerance Patient tolerated treatment well   Medical Staff Made Aware ortho BP supine 142/66 HR60; sit 115/56 HR65. Donned TEDS during session   Assessment   Treatment Assessment Engaged pt in 70mins of skilled OT services with focus on bed mobility, transfers, toileting and ADL. Pt at this time req maxA/TA for all transfers and ADLs. Pt is orthostatic and symptomatic with position change. Therapist applied TEDS and BP stable. Pt left in room in tilt in space with +alarm and call bell within reach. Pt scheduled for 90mins but able to tolerate 70mins with rest breaks. Pt is on 900mins program and therapies split to allow for breaks. Cont OT POC with focus on acitvity tolerance, sitting balance, transfers, UE TE to inc fx performance and dec CG burden. Cont to recommend SUP at DC.   Prognosis Good   Problem List Decreased strength;Decreased range of motion;Decreased endurance;Impaired balance;Decreased mobility;Decreased  coordination;Decreased cognition;Impaired judgement;Decreased skin integrity;Pain   Barriers to Discharge Decreased caregiver support;Inaccessible home environment   Plan   Treatment/Interventions ADL retraining;Functional transfer training;Therapeutic exercise;Endurance training;Patient/family training;Bed mobility;Compensatory technique education   Progress Slow progress, decreased activity tolerance   Discharge Recommendation   Rehab Resource Intensity Level, OT   (pending progress)   Equipment Recommended   (TBD)   OT Therapy Minutes   OT Time In 1005   OT Time Out 1115   OT Total Time (minutes) 70   OT Mode of treatment - Individual (minutes) 70   OT Mode of treatment - Concurrent (minutes) 0   OT Mode of treatment - Group (minutes) 0   OT Mode of treatment - Co-treat (minutes) 0   OT Mode of Treatment - Total time(minutes) 70 minutes   OT Cumulative Minutes 160   Therapy Time missed   Time missed? No

## 2025-01-20 NOTE — ASSESSMENT & PLAN NOTE
Home regimen: Toprol XL 50mg daily and HCTZ 12.5mg daily.  Currently receiving Toprol XL 75mg BID, Cardizem 120mg, lasix 20mg   Noted to have orthostasis midodrine as needed, wears compression stockings consider abdominal binder    Keep SBP <160 due to intracranial bleed.

## 2025-01-20 NOTE — ASSESSMENT & PLAN NOTE
-Large scalp mass noted on admission.  Also follows with dermatology as an outpatient but declining any interventions  -Some localized spot bleeding from the edges noted

## 2025-01-20 NOTE — PROGRESS NOTES
"   01/20/25 1230   Pain Assessment   Pain Assessment Tool 0-10   Pain Score No Pain   Restrictions/Precautions   Precautions Aspiration;Bed/chair alarms;Cognitive;Fall Risk;Pain;Pressure Ulcer;Supervision on toilet/commode  (sinus prec, ortho BP, TEDS/binder PRN)   Cognition   Overall Cognitive Status Impaired   Arousal/Participation Cooperative   Attention Attends with cues to redirect   Orientation Level Oriented to place;Oriented to person;Oriented to time   Memory Decreased short term memory;Decreased recall of recent events;Decreased recall of precautions   Following Commands Follows one step commands with increased time or repetition   Subjective   Subjective \"Im not supposed to have straws they told me\"   Sit to Stand   Type of Assistance Needed Physical assistance   Physical Assistance Level 76% or more   Comment max A to  parallel bars   Sit to Stand CARE Score 2   Bed-Chair Transfer   Type of Assistance Needed Physical assistance   Physical Assistance Level 76% or more   Comment max A sit pivot max cues for pt to A.   Chair/Bed-to-Chair Transfer CARE Score 2   Car Transfer   Type of Assistance Needed Physical assistance   Physical Assistance Level 76% or more   Comment given pts current clinical presentation pt would require max A for car transfer - can simulate trial when strength and tolerance improves.   Car Transfer CARE Score 2   Ambulation   Findings (S)  standing in parallel bars only  +BP drop standing. 85/46 teds in place, no binder   Does the patient walk? 1. No, and walking goal is clinically indicated.   Wheelchair mobility   Findings may benefit when strength improves and tolerance increases. Pt did not use WC at baseline.   Therapeutic Interventions   Strengthening seated in tilt in space prior to all mobility - APs, LAQ, AA seated march pillow squeeze, AA abd with knee extended 3x10; in pbars post BP drop - reclined in tilt in space - AA bicycle 2x30; manual resistance leg press 2x10 " "each ; AA abd and AA add 2x10 each. Seated EOM, partial sit ups 2x5, dynamic reaching x5 each UE.   Balance standing max A in parallel bars 2x less than 1 min ; seated unsupported EOM x10.   Other all vitals charted in vitals sections - BP at rest 108/51, standing 85/46; post resistance TE reclined 129/58; end of session 101/52   Assessment   Treatment Assessment Pt engaged in 90 min skilled PT intervention pt OOB in tilt n space upon entry +teds, (no binder) - mildly confused however recalled not being allowed to use straws. repeats self asking about TE in bed, previous PT provided handout for pt, reveiwed with pt multiple times t/o session. Pt often c/o being cold, has very warm blanket, however no additional \"warm\" clothes. SIg BP drop standing in pbars - max A for stand and all sit pivot transfers. Cooperative with TE and makes decent effort however ftgs very quickly and requires extensive rest breaks. C/o sacral soreness sitting EOM, on ROHO in tilt n space, lacks insight or physical ability to change her own position. LPN (paul) aware pt left OOB in chair to inc OOB tolerance, can tilt back to change position, return to supine when needed (tortise in bed). Continue conditioning efforts as able, very limited due to ortho BPs.   Plan   Progress Slow progress, medical status limitations   PT Therapy Minutes   PT Time In 1230   PT Time Out 1400   PT Total Time (minutes) 90   PT Mode of treatment - Individual (minutes) 90   PT Mode of treatment - Concurrent (minutes) 0   PT Mode of treatment - Group (minutes) 0   PT Mode of treatment - Co-treat (minutes) 0   PT Mode of Treatment - Total time(minutes) 90 minutes   PT Cumulative Minutes 195   Therapy Time missed   Time missed? No       "

## 2025-01-20 NOTE — ASSESSMENT & PLAN NOTE
Presented with syncope on 1/4.  Significant orthostasis noted in acute setting.  Continue midodrine as needed  Apply abdominal binder/TEDs as needed.  Encourage PO hydration.  Continue to monitor orthostatics closely.

## 2025-01-21 ENCOUNTER — APPOINTMENT (INPATIENT)
Dept: MRI IMAGING | Facility: HOSPITAL | Age: OVER 89
DRG: 949 | End: 2025-01-21
Payer: MEDICARE

## 2025-01-21 PROCEDURE — 70553 MRI BRAIN STEM W/O & W/DYE: CPT

## 2025-01-21 PROCEDURE — 97530 THERAPEUTIC ACTIVITIES: CPT

## 2025-01-21 PROCEDURE — 99232 SBSQ HOSP IP/OBS MODERATE 35: CPT | Performed by: INTERNAL MEDICINE

## 2025-01-21 PROCEDURE — A9585 GADOBUTROL INJECTION: HCPCS | Performed by: INTERNAL MEDICINE

## 2025-01-21 PROCEDURE — NC001 PR NO CHARGE: Performed by: PHYSICIAN ASSISTANT

## 2025-01-21 PROCEDURE — 97535 SELF CARE MNGMENT TRAINING: CPT

## 2025-01-21 PROCEDURE — 99233 SBSQ HOSP IP/OBS HIGH 50: CPT | Performed by: INTERNAL MEDICINE

## 2025-01-21 RX ORDER — GADOBUTROL 604.72 MG/ML
5 INJECTION INTRAVENOUS
Status: COMPLETED | OUTPATIENT
Start: 2025-01-21 | End: 2025-01-21

## 2025-01-21 RX ORDER — TRAZODONE HYDROCHLORIDE 50 MG/1
50 TABLET, FILM COATED ORAL
Status: DISCONTINUED | OUTPATIENT
Start: 2025-01-21 | End: 2025-01-22

## 2025-01-21 RX ADMIN — POLYETHYLENE GLYCOL 3350 17 G: 17 POWDER, FOR SOLUTION ORAL at 10:00

## 2025-01-21 RX ADMIN — ACETAMINOPHEN 975 MG: 325 TABLET, FILM COATED ORAL at 06:08

## 2025-01-21 RX ADMIN — PANTOPRAZOLE SODIUM 40 MG: 40 TABLET, DELAYED RELEASE ORAL at 06:08

## 2025-01-21 RX ADMIN — TRAZODONE HYDROCHLORIDE 50 MG: 50 TABLET ORAL at 22:44

## 2025-01-21 RX ADMIN — METOPROLOL SUCCINATE 75 MG: 50 TABLET, EXTENDED RELEASE ORAL at 21:30

## 2025-01-21 RX ADMIN — ENOXAPARIN SODIUM 30 MG: 30 INJECTION SUBCUTANEOUS at 21:30

## 2025-01-21 RX ADMIN — DILTIAZEM HYDROCHLORIDE 120 MG: 120 CAPSULE, COATED, EXTENDED RELEASE ORAL at 10:00

## 2025-01-21 RX ADMIN — METOPROLOL SUCCINATE 75 MG: 50 TABLET, EXTENDED RELEASE ORAL at 10:00

## 2025-01-21 RX ADMIN — SENNOSIDES AND DOCUSATE SODIUM 1 TABLET: 50; 8.6 TABLET ORAL at 21:30

## 2025-01-21 RX ADMIN — HYDRALAZINE HYDROCHLORIDE 10 MG: 10 TABLET ORAL at 06:08

## 2025-01-21 RX ADMIN — GADOBUTROL 5 ML: 604.72 INJECTION INTRAVENOUS at 09:29

## 2025-01-21 RX ADMIN — ACETAMINOPHEN 975 MG: 325 TABLET, FILM COATED ORAL at 21:30

## 2025-01-21 RX ADMIN — ACETAMINOPHEN 975 MG: 325 TABLET, FILM COATED ORAL at 16:03

## 2025-01-21 RX ADMIN — MIRTAZAPINE 7.5 MG: 7.5 TABLET, FILM COATED ORAL at 21:30

## 2025-01-21 RX ADMIN — MELATONIN TAB 3 MG 6 MG: 3 TAB at 21:30

## 2025-01-21 NOTE — ASSESSMENT & PLAN NOTE
Noted to have elevated troponins on admission to acute setting.  Larue to be due to trauma/brain bleed vs demand ischemia due to paroxysmal a-fib and syncope.  Cardiology recommended starting ASA 81mg daily when cleared by Neurosurgery.  Recommend establishing with Cardiology on discharge.

## 2025-01-21 NOTE — PCC CARE MANAGEMENT
CM continues to assist with d/c planning, after family meeting it was determined recommendation will be for subacute rehab, with goal to transition early next week. CM working with family on choices for subacute rehab

## 2025-01-21 NOTE — ASSESSMENT & PLAN NOTE
Hgb currently 10.1.  Baseline Hgb 9-10.  No active s/s of bleeding.  Vitamin B12 1125 on 1/13 - no need for supplementation.  Folate 21.1 on 1/13 - no need for supplementation.  Iron panel on 1/13 - Iron Sat 12%, TIBC 295, Iron 34, and Ferritin 98.    Continue to trend routine CBC.

## 2025-01-21 NOTE — WOUND OSTOMY CARE
Consult Note - Wound   Sandra Purvis 89 y.o. female MRN: [id number]  Unit/Bed#: [facility] 269-02 Encounter: [id number]      History and Present Illness:  Wound care consulted for eval scalp lesion,      Assessment Findings:   Basal cell carcinoma to left lateral scalp   Cleansed and dermagran dsd applied         Plan:     Gently cleanse left lateral/posterior scalp basal cell carcinoma with hibiclens to remove dry drainage. Apply Dermagran then ABD to wound. Secure dressing in place, my use stockinette to secure . Change daily and prn. Please maintain clean hands and fingernails as pt often scratches at wound.        Call or Secure Chat  with any questions  Wound Care will continue to follow weekly    Courtney BARDALESN RN [facility]ON          Wound 01/12/25 Malignant  Head (Active)   Wound Image   01/21/25 1443   Wound Description Beefy red;Drainage;Bleeding 01/21/25 1443   Alix-wound Assessment Intact;Dry 01/21/25 1443   Drainage Amount Scant 01/21/25 1443   Drainage Description Clear 01/21/25 1443   Treatments Site care;Cleansed 01/21/25 1443   Dressing Dermagran gauze;Dry dressing 01/21/25 1443   Dressing Changed New 01/21/25 1443   Patient Tolerance Tolerated well 01/21/25 1443   Dressing Status Clean;Dry;Intact 01/21/25 1443

## 2025-01-21 NOTE — ASSESSMENT & PLAN NOTE
Home regimen: Toprol XL 50mg daily and HCTZ 12.5mg daily.  Currently receiving Toprol XL 75mg BID, Cardizem 120mg daily, and Lasix 20mg daily.  Discontinue Lasix today.  Give midodrine as needed for SBP <90.  Orthostatic + - monitor orthostatics daily.  Keep SBP <160 due to intracranial bleed.

## 2025-01-21 NOTE — NURSING NOTE
Pt removed stockinette and began scratching wound when left alone. Needs frequent cues and distraction to not touch head wound. ABD with dermagran kept in place by pillow.

## 2025-01-21 NOTE — PROGRESS NOTES
01/21/25 1230   Restrictions/Precautions   Precautions Aspiration;Bed/chair alarms;Cognitive;Fall Risk;Supervision on toilet/commode  (ortho BP,  sinus precautions)   Braces or Orthoses   (Bilat TEDs and binde)   Lying to Sitting on Side of Bed   Type of Assistance Needed Physical assistance   Physical Assistance Level 51%-75%   Comment for trunk,  needs extra time   Lying to Sitting on Side of Bed CARE Score 2   Sit to Stand   Type of Assistance Needed Physical assistance   Physical Assistance Level Total assistance   Comment Max A to rise and stabalize at walker,  hard post lean   Sit to Stand CARE Score 1   Bed-Chair Transfer   Type of Assistance Needed Physical assistance   Physical Assistance Level Total assistance   Comment Max A to rise and stabalize at RW, then perform std pivot- 2nd person on stand by   Chair/Bed-to-Chair Transfer CARE Score 1   Walk 10 Feet   Reason if not Attempted Medical concerns   Walk 10 Feet CARE Score 88   Walk 50 Feet with Two Turns   Reason if not Attempted Medical concerns   Walk 50 Feet with Two Turns CARE Score 88   Walk 150 Feet   Reason if not Attempted Medical concerns   Walk 150 Feet CARE Score 88   Walking 10 Feet on Uneven Surfaces   Reason if not Attempted Medical concerns   Walking 10 Feet on Uneven Surfaces CARE Score 88   Wheel 50 Feet with Two Turns   Reason if not Attempted Activity not applicable   Wheel 50 Feet with Two Turns CARE Score 9   Wheel 150 Feet   Reason if not Attempted Activity not applicable   Wheel 150 Feet CARE Score 9   Curb or Single Stair   Reason if not Attempted Medical concerns   1 Step (Curb) CARE Score 88   Assessment   Treatment Assessment 30 min skilled PT session,  started with having pt work on bed mobility, better initiation with getting legs over,  poor motor planning of hands and trunk movement.  Performed std at RW, and then Std pivot to tilt in space,  performed 1 more stand at edge of tit in space.  Bps still dropping but  stabalize back when sitting back down.  Pt greatly limited by weakness, motor planning, balance, lethargic, dec BPs, skin integrity. Pt will need 24hr assistance and only has  at home to help,  unsure if she will be able to get back home without extra help.   Problem List Decreased strength;Decreased endurance;Impaired balance;Decreased mobility;Decreased coordination;Decreased safety awareness;Decreased cognition;Impaired judgement;Decreased skin integrity   Barriers to Discharge Inaccessible home environment;Decreased caregiver support   Plan   Progress Slow progress, decreased activity tolerance   PT Therapy Minutes   PT Time In 1230   PT Time Out 1300   PT Total Time (minutes) 30   PT Mode of treatment - Individual (minutes) 30   PT Mode of treatment - Concurrent (minutes) 0   PT Mode of treatment - Group (minutes) 0   PT Mode of treatment - Co-treat (minutes) 0   PT Mode of Treatment - Total time(minutes) 30 minutes   PT Cumulative Minutes 225   Therapy Time missed   Time missed? No

## 2025-01-21 NOTE — PCC OCCUPATIONAL THERAPY
01/28/2025    Pt continues to present with impairments in activity tolerance, endurance, standing balance/tolerance, sitting balance/tolerance, UE strength, FMC, memory, insight, safety , judgement , attention , sequencing , sensation , and task initiation . Additional functional barriers include fatigue, pain, decreased caregiver support, risk for falls, and home environment, poor activity tolerance, decreased upright tolerance, BP management. Pt demonstrates improvements in participation, functional transfers, sitting balance, bed mobility and dressing. Pt will continue to benefit from skilled OT services to address above mentioned barriers and maximize functional independence in baseline areas of occupation, utilizing the following interventions: ADL retraining, DME/adaptive equipment assessment , functional transfer training, cognitive retraining, family training, neuromuscular re-education , standing balance retraining, sitting balance retraining, activity tolerance/edurance training, UB strengthening, compensatory technique education, and energy conservation education. OT D/C recommendation is subacute rehab once medically stable and facility accepting.      01/21/2025    Pt continues to present with impairments in activity tolerance, endurance, standing balance/tolerance, sitting balance/tolerance, UE strength, FMC, memory, insight, safety , judgement , attention , sequencing , sensation , and task initiation . Additional functional barriers include fatigue, pain, decreased caregiver support, risk for falls, and home environment, poor activity tolerance, decreased upright tolerance, BP management. Pt demonstrates improvements in participation, functional transfers, sitting balance, bed mobility and dressing. Pt will continue to benefit from skilled OT services to address above mentioned barriers and maximize functional independence in baseline areas of occupation, utilizing the following interventions: ADL  retraining, DME/adaptive equipment assessment , functional transfer training, cognitive retraining, family training, neuromuscular re-education , standing balance retraining, sitting balance retraining, activity tolerance/edurance training, UB strengthening, compensatory technique education, and energy conservation education. OT D/C recommendation is pending progress.

## 2025-01-21 NOTE — PLAN OF CARE
Problem: PAIN - ADULT  Goal: Verbalizes/displays adequate comfort level or baseline comfort level  Description: Interventions:  - Encourage patient to monitor pain and request assistance  - Assess pain using appropriate pain scale  - Administer analgesics based on type and severity of pain and evaluate response  - Implement non-pharmacological measures as appropriate and evaluate response  - Consider cultural and social influences on pain and pain management  - Notify physician/advanced practitioner if interventions unsuccessful or patient reports new pain  Outcome: Progressing     Problem: METABOLIC, FLUID AND ELECTROLYTES - ADULT  Goal: Fluid balance maintained  Description: INTERVENTIONS:  - Monitor labs   - Monitor I/O and WT  - Instruct patient on fluid and nutrition as appropriate  - Assess for signs & symptoms of volume excess or deficit  Outcome: Progressing

## 2025-01-21 NOTE — TELEMEDICINE
"e-Consult (IPC)     Inpatient consult to Neurosurgery  Consult performed by: Justina Herrera PA-C  Consult ordered by: Sheri Pope MD         Sandra BRAR Hyun 89 y.o. female MRN: [id number]  Unit/Bed#: [facility] 268-02 Encounter: [id number]    Reason for Consult    Pt had  a virtual visit today for 2 week follow up for a left mixed density SDH that was noted after syncope and fall with head strike. Patient and her family stated that she did not desire surgical intervention and has been managed conservatively.     Pt reports that she is continues rehab at [facility] rehab. Per nursing report, she requires an assist of 2 for mobilization.  Headache at this time.  Denies dizziness, lightheadedness or new visual disturbance.  Per nursing report, her mental and physical exam is stable at this time. Per pt's daughter Courtney, who was available during the visit via phone she has not noticed any significant mental status change in her mother over recent days.     Pt's daughter stated that upon d/c from acute rehab, it is possible pt may need a long term care facility. She reports her father used to be the primary care giver, however, currently he has medical problems and he will not be able to care for both of them. She reported that she lives 3 hours away from her parents.     Available past medical history,social history, surgical history, medication list, drug allergies and review of systems were reviewed.    /58 (BP Location: Right arm)   Pulse 62   Temp 97.7 °F (36.5 °C) (Tympanic)   Resp 18   Ht 5' 7\" (1.702 m)   Wt 45.6 kg (100 lb 8.5 oz)   SpO2 97%   BMI 15.75 kg/m²      Clinical exam: patient is awake, alert, sitting in chair, no acute distress.  Patient is oriented to self and place. Patient able to lift bilateral upper extremities against gravity.    Imaging personally reviewed.  Final results below discussed with patient and her daughter.  CT head without contrast 1/20/2025: Continued evolution of " stable to decreased recent subdural and subarachnoid hemorrhages as above. No new or enlarging intracranial hemorrhage. New small hypodensity within the left frontal subcortical white matter is nonspecific.   MRI brain with without contrast 1/21/2025: Extensively motion-degraded study, particularly on the postcontrast series, which reduces diagnostic sensitivity. Within these confines Evolving bilateral cerebral convexity subdural hematomas containing blood products of varying ages, including recent. Scattered small volume subarachnoid hemorrhage. Within the confines of blood products resulting in susceptibility artifact that degrades evaluation of the diffusion weighted sequence, no evidence of acute large territory infarct.    Assessment and Recommendations    Recommend SBP goal < 160 mmHg.   Pt was not on any AC/AP therapy prior to admission. While admitted pt had elevated troponins and there was discussion if pt could be started on ASA once cleared by neurosurgery. Pt would improve SDH. Recommend risk vs benefit discussion with primary team/PCP/ Cardiology prior to AC/AP therapy. If needed, pt could be started on AP therapy in 2 weeks as needed/indicated.   Recommend safety precautions to avoid trauma to head or falls. Patient to avoid strenuous activity.  No neurosurgical intervention is anticipated at this time given stable imaging and exam. Additionally, patient's daughter reiterated that pt would not want any surgical intervention.   Discussed with patient and her daughter whether to have further follow-up in 4 weeks with repeat CT head wo versus further follow-up as needed. Pt/daughter elected further follow up PRN.   Patient// daughter made aware to contact neurosurgery with any questions or concern at .     All questions answered. Provider is in agreement with the course of action. 11-20 minutes, >50% of the total time devoted to medical consultative verbal/EMR discussion between providers.  Written report will be generated in the EMR.

## 2025-01-21 NOTE — PROGRESS NOTES
Progress Note - PMR   Name: Sandra Purvis 89 y.o. female I MRN: [id number]  Unit/Bed#: -02 I Date of Admission: 1/17/2025   Date of Service: 1/21/2025 I Hospital Day: 4     Assessment & Plan  Hypertension  Cw metoprolol succinate 75 mg twice daily  Cardizem 120 mg XR daily, d/c amlodipine  Wears compression stockings, abdominal binder  Atrial fibrillation with rapid ventricular response (HCC)  Initially presented with sinus bradycardia and evaluated by cardiology and was in controlled atrial fibrillation without bradycardia  Not on anticoagulation prior to admission and not indicated at this point due to the SDH and SAH and overall remains high risk due to risk of falls and given her advanced age  No recent follow-up with cardiology as an outpatient and generally sees her primary care physician for management and was on Toprol 50 mg daily at home which has been changed well and in the hospital  Tachycardic in the hospital and given IV fluids and medication changes include addition of midodrine and changes in the metoprolol dosing  Recommended for aspirin but would likely not be a good long-term candidate for anticoagulation  Cardio rec: continue with cardizem 120 daily and Toprol-XL 75 mg twice daily 1/16  Stage 3 chronic kidney disease, unspecified whether stage 3a or 3b CKD (HCC)  Lab Results   Component Value Date    EGFR 60 01/20/2025    EGFR 64 01/17/2025    EGFR 56 01/16/2025    CREATININE 0.85 01/20/2025    CREATININE 0.81 01/17/2025    CREATININE 0.90 01/16/2025   At baseline  GERD (gastroesophageal reflux disease)  Continue protonix  Basal cell carcinoma (BCC) of scalp  Large scalp mass noted on admission.  Also follows with dermatology as an outpatient but declining any interventions  Some localized spot bleeding from the edges noted  Malignant melanoma of arm, left (HCC)  Large mass on the left forearm that is melanoma  Follows with dermatology as an outpatient last seen in December 2024 but  not interested in any treatments  SDH (subdural hematoma) (HCC)  Patient presents with fall and initial CTh on 1/4 showing an acute subdural hemorrhage along the left cerebral convexity with a maximum thickness of 1.9 cm with no midline shift as well as an acute subarachnoid hemorrhage in the left frontoparietal sulci, left sylvian fissure and suprasellar cistern  Repeat CT head on 1/5 was stable; repeat 1/14 w/ interval evolution no new/evolving hemorrage   Completed 7 day course of keppra for seizure ppx  F/u w/ NSGY for repeat CTH ~1/27 1/20 Repeat imaging ordered for f/o tomorrow at 1pm  SAH (subarachnoid hemorrhage) (HCC)  Subarachnoid hemorrhage noted on initial CT on 1/4 and stable on 1/5;   CTH1/14  Interval evolution of recent subdural and subarachnoid hemorrhage as detailed above. No definitive new or enlarging intracranial hemorrhage.  Continue same management for the subarachnoid hemorrhage as for the subdural hematoma, please see that separate section for overall management plans  Closed extensive facial fractures (HCC)  Secondary to fall  CT of the head on 1/4 also showed comminuted and mildly displaced fractures of the lateral and anterior walls of the right maxilla, lateral and inferior walls of the right orbit, right inferior orbital wall fractures appear to involve the inferior orbital foramen with associated small extraconal soft tissue edema and focus of air.  There is no herniation of extraocular muscles and a nondisplaced fracture of the zygomatic arch as well as the temporal bone at the TMJ.  There is diffuse opacification of the right maxillary sinus and right nasal cavity with blood products and air  Seen by oral maxillofacial surgery team and surgical intervention was declined by patient  Completed 7-day course of Unasyn/Augmentin  Sinus precautions  Follow-up with OMFS as an outpatient  Chest pain  Troponins obtained on initial evaluation of the patient in the emergency room and  cardiology was consulted for evaluation  Despite elevated troponins there was nonischemic findings on her EKG and felt to be potentially related to a nonischemic troponin elevation in the setting of trauma and brain bleed versus demand ischemia due to her paroxysmal atrial fibrillation and syncopal episode  Not a candidate for cardiology for ischemic evaluation due to advanced age, frailty and recent subarachnoid hemorrhage/subdural hemorrhage  No new regional wall motion abnormalities noted on echocardiogram on 1/6/2025  Recommended to start aspirin 81 mg when cleared by neurosurgery with CT head  Follow-up with cardiology as an outpatient  Orthostatic hypotension  Blood pressures ranging in physical therapy as low as 70/40 and cardiology was evaluating the patient  Symptomatic with lightheadedness and presyncope and had received gentle fluids with some improvement  BALJIT stockings PRN as well and encouragement of fluid intake  Repeat orthostatics improved but still getting lightheaded at times which was felt to be the initiating factor in her syncopal episode that led to the fall and injury.  1/17 IM held midodrine, no significant orthostatic hypotension currently observed may need to introduce at 2.5 mg TID if develops episodes at a later time, continue to monitor orthostatic Bps   1/19 bp dropped on standing, came back up on sitting  Syncope  Presented with syncopal episode preceded by lightheadedness that led to the fall which caused the sudden dural hemorrhage and subarachnoid hemorrhage  Also found to be significantly orthostatic which was likely the etiology and was placed on telemetry with no evidence of bradycardia or pauses  Limited p.o. intake and fluid intake and suspected orthostatic hypotension as the cause  Anemia  Most recent hemoglobin of 9.4 1/17; which is stable  Continue to follow biweekly CBC or sooner if clinically indicated  Will need repeat scan if significant drops or concern for acute  "bleeds  1/20 10.1  Leukocytosis  Most recent WBC count 12.08 and actually trending down from its maximum of 17.59  Unclear etiology, may be directly related to reactive changes from the SDH and SAH as well as fractures  Continue to monitor and if considerable changes may repeat scans given the fractures in the facial bones  Was on a course of Unasyn/Augmentin for 7 days  IM: Infectious workup negative  Received IV fluids, discontinued with evidence of fluid overload CXR  Stable off abx d/c 1/15  12.08 1/17---> 13.06 1/20  Acute on chronic diastolic congestive heart failure (HCC)  Wt Readings from Last 3 Encounters:   01/21/25 45.6 kg (100 lb 8.5 oz)   01/12/25 53.5 kg (118 lb)   01/10/25 51.1 kg (112 lb 9.6 oz)     TTE 1/6/2025 showed EF 70%, grade 2 DD, mildly dilated RV, mild LA, mild AI, mild TR, estimated PA pressure 50 mmHg, trivial pericardial effusion  Not on diuretics prior to admission  Cw furosemide 20 mg; monitor for volume status   Daily weights, monitor I/O     Insomnia  Inc melatonin to 6mg HS, trazodone 25mg HS PRN----> increased to 50  Sleep log   Continue remeron, melatonin    History of Present Illness   Sandra Purvis is a 89 y.o. female w/ PMHx of afib, GERD, HTN, who initially presented to [facility] 1[facility] after a syncopan episode and collapse at home. CT had showed \"comminuted and mildly displaced fractures of the lateral and anterior walls of the right maxilla, lateral and inferior walls of the right orbit, right inferior orbital wall fracture appears to involve the inferior orbital foramen associated with small extraconal soft tissue edema and focus of air. There is also a nondisplaced fracture of the zygomatic arch and the temporal bone at the TMJ and diffuse opacification of the right maxillary sinus and right nasal cavity with blood products and air. Additionally there was an acute subdural hemorrhage along the left cerebral convexity " "with a maximal thickness of 1.9 cm with no midline shift and an acute subarachnoid hemorrhage in the left frontal parietal sulci, left sylvian fissure as well as suprasellar cistern.\" OMFS was consulted as well as neurosurgery. Patient and family declined any acute intervention at this time. Patient was started on Keppra x 7 days. Patient was treated with 7-day course of Unasyn/Augmentin. Her hospital stay was also complicated with A-fib with RVR and cardiology was consulted. Patient then had orthostatic hypotension and started on midodrine. Patient initially transferred to [facility] 1/12/2025. While at [facility] patient with hypertensive episodes to SBP >190 and Afib rates 120-130s. Patient became more symptomatic from her A-fib and was transferred to acute care 1/14. She had X ray w/ pulmonary edema was given lasix 1/14-1/15 with good response. Patient was seen by cardiology. Patient was placed on Cardizem drip and now on PO Cardizem. Her rates have improved. Sandra Purvis was admitted to the [facility] on 01/17/25     Chief Complaint: f/u ambulatory dysfunction    Interval: Patient seen and examined in bed after returning from MRI. Patient scratched at her basal carcinoma of her scalp last night and opened a wound.  Pt is only sleeping a couple hours a night based on sleep log. Sandra is difficult to arouse and is not oriented. She mentions a \"train\" and her  coming. Last BM 1/20.     Review of Systems   Respiratory:  Negative for shortness of breath.    Cardiovascular:  Negative for chest pain, palpitations and leg swelling.   Gastrointestinal:  Negative for abdominal pain, constipation, diarrhea, nausea and vomiting.       Objective   Functional Update:  Physical Therapy Occupational Therapy Speech Therapy           Mode of Communication: Verbal  Cognition: Exceptions to WNL  Cognition: Decreased Memory, Decreased Executive Functions, Decreased Attention, Decreased Comprehension, Decreased Safety  Orientation: " Person, Place, Time, Situation  Swallowing: Exceptions to WNL  Swallowing: Oral Dysphagia, Aspiration Risk  Diet Recommendations: Level 2/Mechanically Altered, Thin  Discharge Recommendations: Home with: (pending progress)  DC Home with:: 24 Hour Supervision, 24 Hour Assisteance, Family Support         Temp:  [97.1 °F (36.2 °C)-98.5 °F (36.9 °C)] 97.7 °F (36.5 °C)  HR:  [53-62] 62  Resp:  [18] 18  BP: ()/(46-68) 148/60  SpO2:  [95 %-97 %] 97 %    Physical Exam  Constitutional:       General: She is not in acute distress.     Comments: Not alert   HENT:      Head: Normocephalic.      Comments: Wound on scalp     Mouth/Throat:      Mouth: Mucous membranes are moist.   Cardiovascular:      Rate and Rhythm: Regular rhythm. Bradycardia present.   Pulmonary:      Effort: Pulmonary effort is normal.      Breath sounds: Normal breath sounds.   Abdominal:      General: Bowel sounds are normal.   Musculoskeletal:         General: Normal range of motion.   Skin:     General: Skin is warm and dry.      Findings: Bruising present.      Comments: Bruising on face and arms  Wound on scalp   Neurological:      Mental Status: She is disoriented.      Comments: Answers slower than baseline   Psychiatric:         Mood and Affect: Mood normal.           Scheduled Meds:  Current Facility-Administered Medications   Medication Dose Route Frequency Provider Last Rate    acetaminophen  975 mg Oral Q8H [facility] Autumn Chapman PA-C      bisacodyl  10 mg Rectal Daily PRN Autumn Chapman PA-C      calcium carbonate  500 mg Oral Daily PRN Autumn Chapman PA-C      diltiazem  120 mg Oral Daily Autumn Chapman PA-C      enoxaparin  30 mg Subcutaneous Q24H Autumn Chapman PA-C      hydrALAZINE  10 mg Oral Q8H PRN Eduardo Anton MD      melatonin  6 mg Oral HS Sheri Pope MD      metoprolol succinate  75 mg Oral BID Autumn Chapman PA-C      midodrine  5 mg Oral TID PRN Autumn Chapman PA-C      mirtazapine  7.5  mg Oral HS Sheri Pope MD      ondansetron  4 mg Oral Q6H PRN Autumn Chapman PA-C      oxyCODONE  2.5 mg Oral Q6H PRN Autumn Chapman PA-C      Or    oxyCODONE  5 mg Oral Q6H PRN Autumn Chapman PA-C      pantoprazole  40 mg Oral Early Morning Autumn Chapman PA-C      polyethylene glycol  17 g Oral Daily Autumn Chapman PA-C      senna-docusate sodium  1 tablet Oral HS Autumn Chapman PA-C      traZODone  50 mg Oral HS PRN Sheri Pope MD           Lab Results: I have reviewed the following results:  Results from last 7 days   Lab Units 01/20/25 0425 01/17/25  0530 01/16/25  0527   HEMOGLOBIN g/dL 10.1* 9.4* 10.0*   HEMATOCRIT % 30.5* 28.6* 31.7*   WBC Thousand/uL 13.06* 12.08* 12.88*   PLATELETS Thousands/uL 302 267 290     Results from last 7 days   Lab Units 01/20/25  0425 01/17/25  0530 01/16/25  0527 01/15/25  0517   BUN mg/dL 14 15 17 18   SODIUM mmol/L 136 133* 134* 134*   POTASSIUM mmol/L 3.5 3.4* 3.6 3.3*   CHLORIDE mmol/L 101 97 95* 93*   CREATININE mg/dL 0.85 0.81 0.90 0.90   AST U/L 16  --   --  31   ALT U/L 22  --   --  40              Autumn Chapman PA-C  Physical Medicine and Rehabilitation   01/21/25

## 2025-01-21 NOTE — PROGRESS NOTES
OT TREATMENT       01/21/25 0700   Pain Assessment   Pain Assessment Tool 0-10   Pain Score No Pain   Restrictions/Precautions   Precautions Aspiration;Bed/chair alarms;Cognitive;Fall Risk;Supervision on toilet/commode  (sinus precautions, ortho BP, binder/TEDs PRN)   Weight Bearing Restrictions No   ROM Restrictions No   Lifestyle   Autonomy Pt met supine in bed agreeable to therapy this AM   Eating   Type of Assistance Needed Supervision   Physical Assistance Level No physical assistance   Comment setup for container management. pt completed self feeding with distant supervision, verbal cuing for initiation   Eating CARE Score 4   Oral Hygiene   Type of Assistance Needed Supervision   Physical Assistance Level No physical assistance   Comment seated in WC at sink   Oral Hygiene CARE Score 4   Grooming   Able To Wash/Dry Face;Initiate Tasks   Limitation Noted In Timeliness;Strength   Findings seated in WC at sink to wash face, setup A for washcloth and verbal cuing for initiation   Shower/Bathe Self   Type of Assistance Needed Physical assistance   Physical Assistance Level 51%-75%   Comment completed sponge bath in WC at sink. Setup A for washcloths, pt able to wash UB, BUE, and B upper legs seated. TA required for BLE and feet. Pt transitioned to stance at sink (UE support of sink) with Max Ax1, Mod A to maintain stance 2/2 retropulsion and impaired strength, TA to wash perri/rear in stance.   Shower/Bathe Self CARE Score 2   Bathing   Assessed Bath Style Sponge Bath   Tub/Shower Transfer   Reason Not Assessed Sponge Bath   Upper Body Dressing   Type of Assistance Needed Physical assistance   Physical Assistance Level 25% or less   Comment Setup for clothing retrieval, pt donned sweatshirt seated in WC, A to complete acceleration down over trunk only   Upper Body Dressing CARE Score 3   Lower Body Dressing   Type of Assistance Needed Physical assistance   Physical Assistance Level 51%-75%   Comment Pt completed LB  dressing seated in WC to thread BLE via functional reach with CGA for sitting balance. Pt transitioned to stance with UE support of sink, TA to accelerate over hips in stance   Lower Body Dressing CARE Score 2   Putting On/Taking Off Footwear   Type of Assistance Needed Physical assistance   Physical Assistance Level Total assistance   Comment TA to don TEDs and  socks   Putting On/Taking Off Footwear CARE Score 1   Sit to Lying   Type of Assistance Needed Physical assistance   Physical Assistance Level 51%-75%   Comment A for BLE management, incidental A at trunk. HOB elevated with use of GB   Sit to Lying CARE Score 2   Lying to Sitting on Side of Bed   Type of Assistance Needed Physical assistance   Physical Assistance Level 51%-75%   Comment EXTENDED time to manage BLE off edge of bed with Min A, Mod-Max A at trunk with use of bedrail, HOB slightly elevated   Lying to Sitting on Side of Bed CARE Score 2   Sit to Stand   Type of Assistance Needed Physical assistance   Physical Assistance Level 76% or more   Comment Max Ax1 at sink   Sit to Stand CARE Score 2   Bed-Chair Transfer   Type of Assistance Needed Physical assistance   Physical Assistance Level 76% or more   Comment Max A SPT   Chair/Bed-to-Chair Transfer CARE Score 2   Toileting Hygiene   Type of Assistance Needed Physical assistance   Physical Assistance Level Total assistance   Comment TA for CM and hygiene in stance   Toileting Hygiene CARE Score 1   Toilet Transfer   Type of Assistance Needed Physical assistance   Physical Assistance Level 76% or more   Comment Max A SPT   Toilet Transfer CARE Score 2   Cognition   Overall Cognitive Status Impaired   Arousal/Participation Cooperative   Attention Attends with cues to redirect   Orientation Level Oriented to person;Oriented to place   Memory Decreased short term memory;Decreased recall of recent events;Decreased recall of precautions   Following Commands Follows one step commands with increased  time or repetition   Activity Tolerance   Activity Tolerance Patient tolerated treatment well   Medical Staff Made Aware ortho BP supine: 140/64 seated: 92/68. Priya arnett   Assessment   Treatment Assessment Pt participated in skilled OT session with focus on ADL retraining. Pt tolerated treatment well, pt remained supine in bed with all immediate needs met, call bell accessible, bed alarm secured . Pt will continue to benefit from skilled OT services to ensure safe discharge. Continue plan of care with focus on upright tolerance, strengthening and endurance.   Prognosis Good   Problem List Decreased strength;Decreased endurance;Impaired balance;Decreased mobility;Decreased cognition;Impaired judgement;Pain;Decreased skin integrity;Decreased coordination   Barriers to Discharge Decreased caregiver support;Inaccessible home environment   Plan   Treatment/Interventions ADL retraining;Functional transfer training;Therapeutic exercise;Endurance training;Patient/family training;Cognitive reorientation;Equipment eval/education;Bed mobility;Compensatory technique education;Continued evaluation   Progress Slow progress, medical status limitations   Discharge Recommendation   Rehab Resource Intensity Level, OT   (pending)   OT Therapy Minutes   OT Time In 0700   OT Time Out 0830   OT Total Time (minutes) 90   OT Mode of treatment - Individual (minutes) 90   OT Mode of treatment - Concurrent (minutes) 0   OT Mode of treatment - Group (minutes) 0   OT Mode of treatment - Co-treat (minutes) 0   OT Mode of Treatment - Total time(minutes) 90 minutes   OT Cumulative Minutes 250   Therapy Time missed   Time missed? No

## 2025-01-21 NOTE — PROGRESS NOTES
Progress Note - Internal Medicine   Name: Sandra Purvis 89 y.o. female I MRN: [id number]  Unit/Bed#: [facility] 268-02 I Date of Admission: 1/17/2025   Date of Service: 1/21/2025 I Hospital Day: 4    Assessment & Plan  Hypertension  Home regimen: Toprol XL 50mg daily and HCTZ 12.5mg daily.  Currently receiving Toprol XL 75mg BID, Cardizem 120mg daily, and Lasix 20mg daily.  Discontinue Lasix today.  Give midodrine as needed for SBP <90.  Orthostatic + - monitor orthostatics daily.  Keep SBP <160 due to intracranial bleed.  Atrial fibrillation with rapid ventricular response (HCC)  Not taking AC at home due to hx of falls.  Continue to hold with recent falls and now subarachnoid/subdural.  Had been taking Toprol XL 50mg daily at home - currently receiving Toprol XL 75mg BID and Cardizem 120mg daily.  Hx of a-fib with RVR while on [facility]C, recently transferred to be on Cardizem drip.  Monitor routine VS.  Replete electrolytes as needed.  Follow-up with Cardiology as outpatient.  GERD (gastroesophageal reflux disease)  Continue Protonix 40mg daily.  Had not been on Protonix as outpatient.  Basal cell carcinoma (BCC) of scalp  Large scalp mass noted on admission.  Declining intervention as an outpatient.  Follows with Dermatology and LUIS Mejía (Surgical Oncology) as outpatient.    Had been manipulating area overnight.  Wound care consulted today for follow-up.  Malignant melanoma of arm, left (HCC)  Recent surgical excision on 12/19.  Follows with LUIS Mejía (Surg/Onc) as outpatient.  Had declined further treatment.  SDH (subdural hematoma) (HCC)  Presented on 1/4 after syncopal fall.  CTH showed acute subdural hemorrhage along the L cerebral convexity with maximal thickness of 1.9cm.  No midline shift.    Received Keppra x7 days.  Continue to hold AC/AP.  Neurovascular checks Q shift.  Maintain normotension.  Avoid SBP >160.    2 week follow-up with Neurosurgery scheduled for today.  CTH on 1/20: New small  hypodensity within the left frontal subcortical white matter, differential includes age indeterminate infarct, evolving parenchymal contusion among other etiologies.  MRI brain obtained on 1/21 showing evolving bilateral cerebral convexity subdural hematomas containing blood products of varying ages, scattered small volume subarachnoid hemorrhage, no evidence of acute large territory infarct.    Primary team following.  PT/OT/SLP.  SAH (subarachnoid hemorrhage) (HCC)  Presented on 1/4 after syncopal fall.  CTH showed acute subarachnoid hemorrhage in the L frontoparietal sulci, L sylvian fissure and suprasellar cistern.     Received Keppra x7 days.  Continue to hold AC/AP.  Neurovascular checks Q shift.  Maintain normotension.  Avoid SBP >160.     2 week follow-up with Neurosurgery scheduled for today.  MRI brain obtained today - stable.     Primary team following.  PT/OT/SLP.  Closed extensive facial fractures (HCC)  S/p fall on 1/4.  CT facial bones showed comminuted and mildly displaced fractures of the lateral and anterior walls of the R maxilla, lateral, and inferior wall of the R orbit, wall fracture appears to involve the inferior orbital foramen with associated small extraconal soft tissue edema, nondisplaced fractures of the zygomatic arch and the temporal bone at the TMJ, diffuse opacification of R maxillary sinus and R nasal cavity with blood products and air.    OMFS evaluated in acute setting - declined surgical intervention.  Received IV Unasyn for 7 days.  Facial sinus precautions.    Follow-up with OMFS on discharge.  Chest pain  Noted to have elevated troponins on admission to acute setting.  Wilmot to be due to trauma/brain bleed vs demand ischemia due to paroxysmal a-fib and syncope.  Cardiology recommended starting ASA 81mg daily when cleared by Neurosurgery.  Recommend establishing with Cardiology on discharge.  Orthostatic hypotension  Presented with syncope on 1/4.  Significant orthostasis noted  in acute setting.  Continue midodrine as needed  Apply abdominal binder/TEDs as needed.  Encourage PO hydration.  Continue to monitor orthostatics closely.  Syncope  Presented on 1/4 s/p syncopal fall.  EKG showed atrial fibrillation with RVR on admission.  ECHO on 1/6 showed EF 70%, G2DD.  Noted to have significant orthostasis in acute setting.  Started on midodrine 2.5mg BID.  Continue to monitor orthostatics.  Encourage PO hydration.  Apply abdominal binder/TEDs as needed.    Follow-up/establish with Cardiology as outpatient.  Anemia  Hgb currently 10.1.  Baseline Hgb 9-10.  No active s/s of bleeding.  Vitamin B12 1125 on 1/13 - no need for supplementation.  Folate 21.1 on 1/13 - no need for supplementation.  Iron panel on 1/13 - Iron Sat 12%, TIBC 295, Iron 34, and Ferritin 98.    Continue to trend routine CBC.  Leukocytosis  WBC count currently 13.06.  Chronically elevated.  Unclear etiology, may be directly related to reactive changes from SDH and SAH as well as fracture.  Did recently complete a course of Unasyn and Augmentin.  No s/s of active infection.  Continue to trend routine CBC.  Acute on chronic diastolic congestive heart failure (HCC)  ECHO on 1/6 showed EF 70%, G2DD.  Monitor volume status - currently euvolemic.  Monitor I&Os, daily weights.  Insomnia  Continue melatonin 6mg at HS, Remeron 7.5mg at HS, and trazodone 25mg at HS PRN.  Continue sleep logs.    VTE Pharmacologic Prophylaxis:   Pharmacologic: Enoxaparin (Lovenox)  Mechanical VTE Prophylaxis in Place: Yes - sequential compression devices.    Current Length of Stay: 4 day(s)    Current Patient Status: Inpatient Rehab     Discharge Plan: As per primary team.    Code Status: Level 3 - DNAR and DNI    Subjective:   Pt examined while pt lying in bed in pt room.  Denies any headaches at this time or facial pain.  Does have lightheadedness according to therapy staff but pt could not remember if she did or not.  Denies any SOB, palpitations, or  "CP.  Complaints of posterior neck pain, states that it became worse after going for MRI but feels better now that she is lying in the bed.  Denies any abdominal pain or nausea.  Noted to have manipulated lesion on her scalp last night and was bleeding per nursing.  Educated pt on not touching/scratching lesion.  Wound Care consult to follow-up today.  Discussed pt looking into getting lesion removed as OP and pt states that she will \"think about it in the future.\"    Objective:     Vitals:   Temp (24hrs), Av.8 °F (36.6 °C), Min:97.1 °F (36.2 °C), Max:98.5 °F (36.9 °C)    Temp:  [97.1 °F (36.2 °C)-98.5 °F (36.9 °C)] 97.7 °F (36.5 °C)  HR:  [53-62] 62  Resp:  [18] 18  BP: ()/(46-68) 148/60  SpO2:  [95 %-97 %] 97 %  Body mass index is 15.75 kg/m².     Review of Systems   Constitutional:  Negative for appetite change, chills, fatigue and fever.   HENT:  Negative for trouble swallowing.    Eyes:  Negative for visual disturbance.   Respiratory:  Negative for cough, shortness of breath, wheezing and stridor.    Cardiovascular:  Negative for chest pain, palpitations and leg swelling.   Gastrointestinal:  Negative for abdominal distention, abdominal pain, constipation, diarrhea, nausea and vomiting.        LBM 1/20   Genitourinary:  Negative for difficulty urinating.   Musculoskeletal:  Positive for neck pain (posterior neck pain, worse after lying down for MRI, improving now that she is lying in bed). Negative for arthralgias, back pain and gait problem.   Skin:  Positive for wound (scalp lesion bled last night after patient was scratching at it).   Neurological:  Positive for light-headedness (occurred this morning with therapy). Negative for dizziness, weakness, numbness and headaches.   Psychiatric/Behavioral:  Negative for dysphoric mood and sleep disturbance. The patient is not nervous/anxious.    All other systems reviewed and are negative.       Input and Output Summary (last 24 hours):       Intake/Output " Summary (Last 24 hours) at 1/21/2025 1204  Last data filed at 1/21/2025 0622  Gross per 24 hour   Intake --   Output 600 ml   Net -600 ml       Physical Exam:     Physical Exam  Vitals and nursing note reviewed.   Constitutional:       General: She is not in acute distress.     Appearance: Normal appearance. She is not ill-appearing.   HENT:      Head: Normocephalic and atraumatic.   Cardiovascular:      Rate and Rhythm: Normal rate. Rhythm irregularly irregular.      Pulses: Normal pulses.      Heart sounds: Murmur heard.      Systolic murmur is present with a grade of 2/6.      No friction rub.   Pulmonary:      Effort: Pulmonary effort is normal. No respiratory distress.      Breath sounds: Normal breath sounds. No wheezing or rhonchi.   Abdominal:      General: Abdomen is flat. Bowel sounds are normal. There is no distension.      Palpations: Abdomen is soft. There is no mass.      Tenderness: There is no abdominal tenderness. There is no guarding or rebound.      Hernia: No hernia is present.   Musculoskeletal:      Cervical back: Normal range of motion and neck supple. No tenderness.      Right lower leg: No edema.      Left lower leg: No edema.   Skin:     General: Skin is warm and dry.      Findings: Bruising (R sided facial bruising) and lesion (large irregular lesion on the L parietal section of the scalp, small amount of dried blood) present.   Neurological:      Mental Status: She is alert and oriented to person, place, and time.   Psychiatric:         Mood and Affect: Mood normal.         Behavior: Behavior normal.         Additional Data:     Labs:    Results from last 7 days   Lab Units 01/20/25  0425   WBC Thousand/uL 13.06*   HEMOGLOBIN g/dL 10.1*   HEMATOCRIT % 30.5*   PLATELETS Thousands/uL 302   SEGS PCT % 85*   LYMPHO PCT % 7*   MONO PCT % 5   EOS PCT % 1     Results from last 7 days   Lab Units 01/20/25  0425   SODIUM mmol/L 136   POTASSIUM mmol/L 3.5   CHLORIDE mmol/L 101   CO2 mmol/L 27   BUN  mg/dL 14   CREATININE mg/dL 0.85   ANION GAP mmol/L 8   CALCIUM mg/dL 9.0   ALBUMIN g/dL 3.3*   TOTAL BILIRUBIN mg/dL 0.52   ALK PHOS U/L 68   ALT U/L 22   AST U/L 16   GLUCOSE RANDOM mg/dL 103                 Results from last 7 days   Lab Units 01/15/25  0517 01/14/25  1605   LACTIC ACID mmol/L  --  0.9   PROCALCITONIN ng/ml 0.13 0.11       Labs reviewed    Imaging:    Imaging reviewed    Recent Cultures (last 7 days):     Results from last 7 days   Lab Units 01/14/25  1607   BLOOD CULTURE  No Growth After 5 Days.  No Growth After 5 Days.       Last 24 Hours Medication List:   Current Facility-Administered Medications   Medication Dose Route Frequency Provider Last Rate    acetaminophen  975 mg Oral Q8H ELIZABETH Autumn Chapman PA-C      bisacodyl  10 mg Rectal Daily PRN Autumn Chapman PA-C      calcium carbonate  500 mg Oral Daily PRN Autumn Chapman PA-C      diltiazem  120 mg Oral Daily Autumn Chapman PA-C      enoxaparin  30 mg Subcutaneous Q24H Autumn Chapman PA-C      hydrALAZINE  10 mg Oral Q8H PRN Eduardo Anton MD      melatonin  6 mg Oral HS Sheri Pope MD      metoprolol succinate  75 mg Oral BID Autumn Chapman PA-C      midodrine  5 mg Oral TID PRN Autumn Chapman PA-C      mirtazapine  7.5 mg Oral HS Sheri Pope MD      ondansetron  4 mg Oral Q6H PRN Autumn Chapman PA-C      oxyCODONE  2.5 mg Oral Q6H PRN Autumn Chapman PA-C      Or    oxyCODONE  5 mg Oral Q6H PRN Autumn Chapman PA-C      pantoprazole  40 mg Oral Early Morning Autumn Chapman PA-C      polyethylene glycol  17 g Oral Daily Autumn Chapman PA-C      senna-docusate sodium  1 tablet Oral HS Autumn Chapman PA-C      traZODone  50 mg Oral HS PRN Sheri Pope MD          M*Modal software was used to dictate this note.  It may contain errors with dictating incorrect words or incorrect spelling. Please contact the provider directly with any questions.

## 2025-01-21 NOTE — ASSESSMENT & PLAN NOTE
Cw metoprolol succinate 75 mg twice daily  Cardizem 120 mg XR daily, d/c amlodipine  Wears compression stockings, abdominal binder

## 2025-01-21 NOTE — ASSESSMENT & PLAN NOTE
Not taking AC at home due to hx of falls.  Continue to hold with recent falls and now subarachnoid/subdural.  Had been taking Toprol XL 50mg daily at home - currently receiving Toprol XL 75mg BID and Cardizem 120mg daily.  Hx of a-fib with RVR while on ARC, recently transferred to be on Cardizem drip.  Monitor routine VS.  Replete electrolytes as needed.  Follow-up with Cardiology as outpatient.

## 2025-01-21 NOTE — ASSESSMENT & PLAN NOTE
Presented on 1/4 after syncopal fall.  CTH showed acute subdural hemorrhage along the L cerebral convexity with maximal thickness of 1.9cm.  No midline shift.    Received Keppra x7 days.  Continue to hold AC/AP.  Neurovascular checks Q shift.  Maintain normotension.  Avoid SBP >160.    2 week follow-up with Neurosurgery scheduled for today.  CTH on 1/20: New small hypodensity within the left frontal subcortical white matter, differential includes age indeterminate infarct, evolving parenchymal contusion among other etiologies.  MRI brain obtained on 1/21 showing evolving bilateral cerebral convexity subdural hematomas containing blood products of varying ages, scattered small volume subarachnoid hemorrhage, no evidence of acute large territory infarct.    Primary team following.  PT/OT/SLP.

## 2025-01-21 NOTE — ASSESSMENT & PLAN NOTE
Large scalp mass noted on admission.  Declining intervention as an outpatient.  Follows with Dermatology and LUIS Mejía (Surgical Oncology) as outpatient.    Had been manipulating area overnight.  Wound care consulted today for follow-up.

## 2025-01-21 NOTE — ASSESSMENT & PLAN NOTE
Inc melatonin to 6mg HS, trazodone 25mg HS PRN----> increased to 50  Sleep log   Continue remeron, melatonin

## 2025-01-21 NOTE — ASSESSMENT & PLAN NOTE
Presented on 1/4 after syncopal fall.  CTH showed acute subarachnoid hemorrhage in the L frontoparietal sulci, L sylvian fissure and suprasellar cistern.     Received Keppra x7 days.  Continue to hold AC/AP.  Neurovascular checks Q shift.  Maintain normotension.  Avoid SBP >160.     2 week follow-up with Neurosurgery scheduled for today.  MRI brain obtained today - stable.     Primary team following.  PT/OT/SLP.

## 2025-01-21 NOTE — NURSING NOTE
Pt's basal cell carcinoma mass on the left side of her scalp is open and had moderate bleeding due to her scratching it. Took a picture for her chart. Pt's mass was cleansed with NSS and kept DAVONTE. Pt in bed. Call bell within reach. Bed alarm intact.

## 2025-01-21 NOTE — PCC PHYSICAL THERAPY
Pt admitted to [facility] after fall S/P facial Fxs and SDH.   Pt was sent back to acute due to inc Bps.  Now re admitted which she has not been performing well due to pain/ lethagic/ orthostatic Bps/ lightheadedness/ and wants to go back to bed.  Pt is striving to perform but greatly limited mostly by fatigue and trouble keeping eyes open.  Currently working on sit pivot xfers which needs 2 people for safety, also 2 people Max for bed mobility.   Pt greatly limited by weakness, motor planning, balance, lethargic, dec BPs, skin integrity. Pt will need 24hr assistance and only has  at home to help, unsure if she will be able to get back home without extra help. Pt will benefit from cont skilled inpatient rehab to decrease burden of care and try to reach LTGs ,  questionable about D/C back home the way pt is presenting    1-28-25  Pt has been making some progress with functional mobility with sit-stands/ bed mobility/ and transfers but still fluctuates from Min/ Max assist depending on fatigue/ blood pressure/ pain/ agitation.  Rehab team spoke with family and they can not provide this assistance so will be D/C to SNF.  Pt will still benefit from cont skilled inpatient rehab for decrease burden of care and maximize functional mobility.

## 2025-01-21 NOTE — ASSESSMENT & PLAN NOTE
WBC count currently 13.06.  Chronically elevated.  Unclear etiology, may be directly related to reactive changes from SDH and SAH as well as fracture.  Did recently complete a course of Unasyn and Augmentin.  No s/s of active infection.  Continue to trend routine CBC.

## 2025-01-22 PROCEDURE — 99233 SBSQ HOSP IP/OBS HIGH 50: CPT | Performed by: INTERNAL MEDICINE

## 2025-01-22 PROCEDURE — 99232 SBSQ HOSP IP/OBS MODERATE 35: CPT | Performed by: INTERNAL MEDICINE

## 2025-01-22 PROCEDURE — 97110 THERAPEUTIC EXERCISES: CPT

## 2025-01-22 PROCEDURE — 97530 THERAPEUTIC ACTIVITIES: CPT

## 2025-01-22 PROCEDURE — 92526 ORAL FUNCTION THERAPY: CPT

## 2025-01-22 PROCEDURE — 97129 THER IVNTJ 1ST 15 MIN: CPT

## 2025-01-22 PROCEDURE — 97130 THER IVNTJ EA ADDL 15 MIN: CPT

## 2025-01-22 RX ORDER — HYDRALAZINE HYDROCHLORIDE 10 MG/1
10 TABLET, FILM COATED ORAL EVERY 8 HOURS PRN
Status: DISCONTINUED | OUTPATIENT
Start: 2025-01-22 | End: 2025-02-03 | Stop reason: HOSPADM

## 2025-01-22 RX ORDER — TRAZODONE HYDROCHLORIDE 50 MG/1
75 TABLET, FILM COATED ORAL
Status: DISCONTINUED | OUTPATIENT
Start: 2025-01-22 | End: 2025-01-24

## 2025-01-22 RX ADMIN — HYDRALAZINE HYDROCHLORIDE 10 MG: 10 TABLET ORAL at 05:58

## 2025-01-22 RX ADMIN — ENOXAPARIN SODIUM 30 MG: 30 INJECTION SUBCUTANEOUS at 20:15

## 2025-01-22 RX ADMIN — ACETAMINOPHEN 975 MG: 325 TABLET, FILM COATED ORAL at 05:58

## 2025-01-22 RX ADMIN — POLYETHYLENE GLYCOL 3350 17 G: 17 POWDER, FOR SOLUTION ORAL at 08:22

## 2025-01-22 RX ADMIN — DILTIAZEM HYDROCHLORIDE 120 MG: 120 CAPSULE, COATED, EXTENDED RELEASE ORAL at 08:22

## 2025-01-22 RX ADMIN — MIRTAZAPINE 7.5 MG: 7.5 TABLET, FILM COATED ORAL at 21:15

## 2025-01-22 RX ADMIN — METOPROLOL SUCCINATE 75 MG: 50 TABLET, EXTENDED RELEASE ORAL at 08:22

## 2025-01-22 RX ADMIN — ACETAMINOPHEN 975 MG: 325 TABLET, FILM COATED ORAL at 21:14

## 2025-01-22 RX ADMIN — METOPROLOL SUCCINATE 75 MG: 50 TABLET, EXTENDED RELEASE ORAL at 20:16

## 2025-01-22 RX ADMIN — SENNOSIDES AND DOCUSATE SODIUM 1 TABLET: 50; 8.6 TABLET ORAL at 21:16

## 2025-01-22 RX ADMIN — PANTOPRAZOLE SODIUM 40 MG: 40 TABLET, DELAYED RELEASE ORAL at 05:58

## 2025-01-22 RX ADMIN — MELATONIN TAB 3 MG 6 MG: 3 TAB at 21:15

## 2025-01-22 RX ADMIN — ACETAMINOPHEN 975 MG: 325 TABLET, FILM COATED ORAL at 15:08

## 2025-01-22 NOTE — PROGRESS NOTES
"   01/22/25 2021   Pain Assessment   Pain Assessment Tool 0-10   Pain Score 4   Pain Location/Orientation Location: Back   Pain Onset/Description Frequency: Intermittent   Hospital Pain Intervention(s) Repositioned   Restrictions/Precautions   Precautions Aspiration;Bed/chair alarms;Cognitive;Combative;Supervision on toilet/commode   Braces or Orthoses Other (Comment)  (BALJIT stockings, ABdominal binder when OOB)   Roll Left and Right   Type of Assistance Needed Physical assistance   Physical Assistance Level 76% or more   Roll Left and Right CARE Score 2   Sit to Lying   Type of Assistance Needed Physical assistance   Physical Assistance Level 76% or more   Sit to Lying CARE Score 2   Lying to Sitting on Side of Bed   Type of Assistance Needed Physical assistance   Physical Assistance Level 76% or more   Lying to Sitting on Side of Bed CARE Score 2   Sit to Stand   Type of Assistance Needed Physical assistance   Physical Assistance Level 76% or more   Sit to Stand CARE Score 2   Bed-Chair Transfer   Type of Assistance Needed Physical assistance   Physical Assistance Level 51%-75%   Comment Stand pivot, performed several times during session   Chair/Bed-to-Chair Transfer CARE Score 2   Therapeutic Interventions   Strengthening AA hip flex/ext BLE x 30 in supine, AA SLR 2 x 10 in bed.   Flexibility Gentle passive stretching B HS & Calves in supine   Equipment Use   NuStep lvl 1 2 x 3 minutes, intermittent assist from therapist   Parallel Bars Standing x 2 to tolerance, ~ 45 sec.   Assessment   Treatment Assessment Pt able to stay awake and participate for 60 min tehrapy session. BP monitored, see vitals for details. Pt HR irregular 60 bpm at rest, up to 88 bpm with activity on nustep with increased irregularity and pt c/o \"feeling lightheaded\" while on Nustep. Resolved with rest  after 4 minutes. Able to continue with standing afterwards. SLow to process all movement with fear of falling with STS. Legs stable with " transfers bed<> chair. Returned to bed at end of session and asisted with hygiene after urine incontinence.   PT Therapy Minutes   PT Time In 1330   PT Time Out 1430   PT Total Time (minutes) 60   PT Mode of treatment - Individual (minutes) 60   PT Mode of treatment - Concurrent (minutes) 0   PT Mode of treatment - Group (minutes) 0   PT Mode of treatment - Co-treat (minutes) 0   PT Mode of Treatment - Total time(minutes) 60 minutes   PT Cumulative Minutes 285   Therapy Time missed   Time missed? No

## 2025-01-22 NOTE — ASSESSMENT & PLAN NOTE
Presented on 1/4 after syncopal fall.  CTH showed acute subdural hemorrhage along the L cerebral convexity with maximal thickness of 1.9cm.  No midline shift.    Received Keppra x7 days.  Continue to hold AC/AP.  Neurovascular checks Q shift.  Maintain normotension.  Avoid SBP >160.    CTH on 1/20: New small hypodensity within the left frontal subcortical white matter, differential includes age indeterminate infarct, evolving parenchymal contusion among other etiologies.  MRI brain obtained on 1/21 showing evolving bilateral cerebral convexity subdural hematomas containing blood products of varying ages, scattered small volume subarachnoid hemorrhage, no evidence of acute large territory infarct.    2 week follow-up with Neurosurgery on 1/21.  Imaging reviewed.  May consider starting ASA in 2 weeks after discussion with PCP/Cardiology in regards to risk/benefit.  Follow-up with Neurosurgery in additional 4 weeks with [facility].    Primary team following.  PT/OT/SLP.

## 2025-01-22 NOTE — PROGRESS NOTES
"OT Treatment Note       01/22/25 7277   Pain Assessment   Pain Assessment Tool 0-10   Pain Score 5   Pain Location/Orientation Location: Head  (at site of basal cell ca; per nursing reports required cuing throughout night not to scratch)   Pain Onset/Description Frequency: Intermittent   Hospital Pain Intervention(s) Repositioned;Rest;Emotional support   Restrictions/Precautions   Precautions Aspiration;Bed/chair alarms;Cognitive;Fall Risk;Supervision on toilet/commode;Pain  (IV LUE, ortho BP - TEDs and binder PRN, sinus precautions)   Weight Bearing Restrictions No   ROM Restrictions No   Lifestyle   Autonomy \"It'd help me a lot if that was easier,\" re: bed mobility   Eating   Comment DS for taking sips from cup during session   Roll Left and Right   Type of Assistance Needed Physical assistance   Physical Assistance Level 76% or more   Comment increased lethargy, requires max cues to remain alert   Roll Left and Right CARE Score 2   Sit to Lying   Type of Assistance Needed Physical assistance   Physical Assistance Level 76% or more   Comment vc's and assist at trunk, RLE   Sit to Lying CARE Score 2   Lying to Sitting on Side of Bed   Type of Assistance Needed Physical assistance   Physical Assistance Level 76% or more   Comment increased time; lethargic requiring mod vc's to remain alert; assist at trunk and BLE   Lying to Sitting on Side of Bed CARE Score 2   Sit to Stand   Comment ortho BP 88/40 in sitting and symptomatic, lethargic   Reason if not Attempted Safety concerns   Sit to Stand CARE Score 88   Bed-Chair Transfer   Comment ortho BP 88/40 in sitting and symptomatic; lethargic   Reason if not Attempted Safety concerns   Chair/Bed-to-Chair Transfer CARE Score 88   Exercise Tools   Other Exercise Tool 1 attempting to engage pt in bed level therex 2* ortho BP. Pt completes chest press holding 2# weighted ball, x10 reps, then x5 reps but required max vc's to remain alert throughout and not appropriate to " continue. attempting to then engage pt in repeated bed mobility for increased strengthening and IND, but pt again requires max vc's to remain alert despite vc's, tactile cues and playing her enjoyed music.   Cognition   Overall Cognitive Status Impaired   Arousal/Participation Lethargic;Persistent stimuli required;Cooperative   Attention Attends with cues to redirect   Orientation Level Oriented to place;Oriented to person   Memory Decreased short term memory;Decreased recall of recent events;Decreased recall of precautions   Following Commands Follows one step commands with increased time or repetition   Activity Tolerance   Activity Tolerance Patient limited by fatigue   Medical Staff Made Aware RN, NP and MD made aware of ortho BP and lethargy. Ortho BP fogysk=581/40, sitting EOB 88/40, supine end of session 94/40.   Other Comments   Assessment with RN assist, time spent repositioning pt correctly on tortoise pad in bed for pressure relief.   Assessment   Treatment Assessment Pt seen for 50 min OT session. Pt with increased lethargy and per nursing did not sleep last night. Pt requiring increased vc's to max vc's by end of sesison to remain alert despite multimodal cues. Pt with ortho BP and symptomatic as well sitting EOB; per NP attempting to complete bed level activities however unable to continue 2* increased lethargy. OT to continue POC to focus on upright and OOB tolerance, bed mobility, and functional transfers to maximize participation in ADLs, maximize functional IND and safety and decrease caregiver burden prior to d/c.   Prognosis Fair   Problem List Decreased strength;Decreased endurance;Impaired balance;Decreased mobility;Decreased coordination;Decreased cognition;Impaired judgement;Decreased safety awareness;Decreased skin integrity   Barriers to Discharge Inaccessible home environment;Decreased caregiver support   Plan   Treatment/Interventions ADL retraining;Functional transfer  training;Therapeutic exercise;Endurance training;Cognitive reorientation;Patient/family training;Equipment eval/education;Bed mobility;Compensatory technique education;Spoke to nursing;Spoke to MD;Spoke to advanced practitioner   Progress Slow progress, decreased activity tolerance   Discharge Recommendation   Rehab Resource Intensity Level, OT   (pending)   OT Therapy Minutes   OT Time In 0930   OT Time Out 1020   OT Total Time (minutes) 50   OT Mode of treatment - Individual (minutes) 50   OT Mode of treatment - Concurrent (minutes) 0   OT Mode of treatment - Group (minutes) 0   OT Mode of treatment - Co-treat (minutes) 0   OT Mode of Treatment - Total time(minutes) 50 minutes   OT Cumulative Minutes 360   Therapy Time missed   Time missed? Yes   Amount of time missed 40   Reason for time missed Extreme fatigue  (RN, MD, NP made aware)   Time(s) multiple attempts made schedule does not allow for reattempt today, but will f/u tomorrow

## 2025-01-22 NOTE — ASSESSMENT & PLAN NOTE
Presented on 1/4 after syncopal fall.  CTH showed acute subarachnoid hemorrhage in the L frontoparietal sulci, L sylvian fissure and suprasellar cistern.     Received Keppra x7 days.  Continue to hold AC/AP.  Neurovascular checks Q shift.  Maintain normotension.  Avoid SBP >160.     MRI brain obtained 1/21 - stable.  2 week follow-up with Neurosurgery on 1/21.  Imaging was reviewed.  May consider starting ASA in 2 weeks after discussion with PCP/Cardiology.  Follow-up with Neurosurgery in additional 4 weeks with [facility].     Primary team following.  PT/OT/SLP.

## 2025-01-22 NOTE — SPEECH THERAPY NOTE
Speech Therapy Update:    Contacted pt's daughter, Courtney, to provide an update on pt's current barriers and progress in ST sessions. SLP educated on current cognitive linguistic deficits to include decreased orientation, STM recall, working memory, problem solving, and insight, along with other areas. Also reviewed fluctuations in pt's level of functioning due to also fluctuating PRIYANKA and lethargy. Discussed poor sleep the last few days to which pt's daughter reported baseline sleep issues which have progressively getting worse. Due to the current severity of deficits, SLP discussed concerns regarding how this will impact pt's independence and anticipated need for increased support on discharge. Pt's daughter, Courtney, had previously spoken with VJ Salgado on this phone call before and had determined option to pursue subacute rehab for additional therapy, care. Pt's daughter receptive to information and reported that pt's cognition has been declining for the last ~2 years but that pt was able to complete all personal/self care independently before. Regarding swallow function, SLP reviewed current diet recommendations of dysphagia level 2 diet (minced, ground, moist) and thin liquids. Discussed pt's ongoing reports of difficulty/pain with mastication due to facial fractures, which lead to modification in diet for now. Reviewed plan to continue to follow pt to ensure safest and least restrictive diet, while achieving adequate nutrition. Pt's daughter again receptive to and demonstrated understanding of all information provided. Pt's daughter encouraged to call the unit with any additional concerns or questions.

## 2025-01-22 NOTE — PROGRESS NOTES
Progress Note - Internal Medicine   Name: Sandra Purvis 89 y.o. female I MRN: [id number]  Unit/Bed#: [facility] 269-02 I Date of Admission: 1/17/2025   Date of Service: 1/22/2025 I Hospital Day: 5    Assessment & Plan  Hypertension  Home regimen: Toprol XL 50mg daily and HCTZ 12.5mg daily.  Currently receiving Toprol XL 75mg BID and Cardizem 120mg daily.  Give midodrine as needed for SBP <90.  Orthostatic + - monitor orthostatics daily.  Keep SBP <160 due to intracranial bleed.    Received hydralazine this morning for SBP of 161.  Changed parameters to give for SBP >180.  Atrial fibrillation with rapid ventricular response (HCC)  Not taking AC at home due to hx of falls.  Continue to hold with recent falls and now subarachnoid/subdural.  Had been taking Toprol XL 50mg daily at home - currently receiving Toprol XL 75mg BID and Cardizem 120mg daily.  Hx of a-fib with RVR while on ARC, recently transferred to be on Cardizem drip.  Monitor routine VS.  Replete electrolytes as needed.  Follow-up with Cardiology as outpatient.  GERD (gastroesophageal reflux disease)  Continue Protonix 40mg daily.  Had not been on Protonix as outpatient.  Basal cell carcinoma (BCC) of scalp  Large scalp mass noted on admission.  Declining intervention as an outpatient.  Follows with Dermatology and LUIS Mejía (Surgical Oncology) as outpatient.  Wound care consulted - wound cleansed/debrided.  Continue with wearing cap during the day to avoid further patient manipulation.  Malignant melanoma of arm, left (HCC)  Recent surgical excision on 12/19.  Follows with LUIS Mejía (Surg/Onc) as outpatient.  Had declined further treatment.  SDH (subdural hematoma) (HCC)  Presented on 1/4 after syncopal fall.  CTH showed acute subdural hemorrhage along the L cerebral convexity with maximal thickness of 1.9cm.  No midline shift.    Received Keppra x7 days.  Continue to hold AC/AP.  Neurovascular checks Q shift.  Maintain normotension.  Avoid  SBP >160.    CTH on 1/20: New small hypodensity within the left frontal subcortical white matter, differential includes age indeterminate infarct, evolving parenchymal contusion among other etiologies.  MRI brain obtained on 1/21 showing evolving bilateral cerebral convexity subdural hematomas containing blood products of varying ages, scattered small volume subarachnoid hemorrhage, no evidence of acute large territory infarct.    2 week follow-up with Neurosurgery on 1/21.  Imaging reviewed.  May consider starting ASA in 2 weeks after discussion with PCP/Cardiology in regards to risk/benefit.  Follow-up with Neurosurgery in additional 4 weeks with [facility].    Primary team following.  PT/OT/SLP.  SAH (subarachnoid hemorrhage) (HCC)  Presented on 1/4 after syncopal fall.  CTH showed acute subarachnoid hemorrhage in the L frontoparietal sulci, L sylvian fissure and suprasellar cistern.     Received Keppra x7 days.  Continue to hold AC/AP.  Neurovascular checks Q shift.  Maintain normotension.  Avoid SBP >160.     MRI brain obtained 1/21 - stable.  2 week follow-up with Neurosurgery on 1/21.  Imaging was reviewed.  May consider starting ASA in 2 weeks after discussion with PCP/Cardiology.  Follow-up with Neurosurgery in additional 4 weeks with [facility].     Primary team following.  PT/OT/SLP.  Closed extensive facial fractures (HCC)  S/p fall on 1/4.  CT facial bones showed comminuted and mildly displaced fractures of the lateral and anterior walls of the R maxilla, lateral, and inferior wall of the R orbit, wall fracture appears to involve the inferior orbital foramen with associated small extraconal soft tissue edema, nondisplaced fractures of the zygomatic arch and the temporal bone at the TMJ, diffuse opacification of R maxillary sinus and R nasal cavity with blood products and air.    OMFS evaluated in acute setting - declined surgical intervention.  Received IV Unasyn for 7 days.  Facial sinus precautions.    Follow-up  with OMFS on discharge.  Chest pain  Noted to have elevated troponins on admission to acute setting.  [place] to be due to trauma/brain bleed vs demand ischemia due to paroxysmal a-fib and syncope.  Cardiology recommended starting ASA 81mg daily when cleared by Neurosurgery.  Per Neurosurgery - ok to start ASA in 2 weeks (2/4) after patient/family discussion of risks/benefits with PCP/Cardiologist.  Recommend establishing with Cardiology on discharge.  Orthostatic hypotension  Presented with syncope on 1/4.  Significant orthostasis noted in acute setting.  Continue midodrine as needed  Apply abdominal binder/TEDs as needed.  Encourage PO hydration.  Continue to monitor orthostatics closely.  Syncope  Presented on 1/4 s/p syncopal fall.  EKG showed atrial fibrillation with RVR on admission.  ECHO on 1/6 showed EF 70%, G2DD.  Noted to have significant orthostasis in acute setting.  Started on midodrine 2.5mg BID.  Continue to monitor orthostatics.  Encourage PO hydration.  Apply abdominal binder/TEDs as needed.    Follow-up/establish with Cardiology as outpatient.  Anemia  Hgb currently 10.1.  Baseline Hgb 9-10.  No active s/s of bleeding.  Vitamin B12 1125 on 1/13 - no need for supplementation.  Folate 21.1 on 1/13 - no need for supplementation.  Iron panel on 1/13 - Iron Sat 12%, TIBC 295, Iron 34, and Ferritin 98.    Continue to trend routine CBC.  Leukocytosis  WBC count currently 13.06.  Chronically elevated.  Unclear etiology, may be directly related to reactive changes from SDH and SAH as well as fracture.  Did recently complete a course of Unasyn and Augmentin.  No s/s of active infection.  Continue to trend routine CBC.  Acute on chronic diastolic congestive heart failure (HCC)  ECHO on 1/6 showed EF 70%, G2DD.  Monitor volume status - currently euvolemic.  Monitor I&Os, daily weights.  Insomnia  Continue melatonin 6mg at HS, Remeron 7.5mg at HS, and trazodone 25mg at HS PRN.  Continue sleep logs.    VTE  Pharmacologic Prophylaxis:   Pharmacologic: Enoxaparin (Lovenox)  Mechanical VTE Prophylaxis in Place: Yes - sequential compression devices.    Current Length of Stay: 5 day(s)    Current Patient Status: Inpatient Rehab     Discharge Plan: As per primary team.    Code Status: Level 3 - DNAR and DNI    Subjective:   Pt examined while pt sitting in bed in pt room.  Complaints of feeling fatigued.  States that she did not sleep well last night.  Sleeps better at home.  Had been newly started on Remeron a few nights ago.  Complaints of lightheadedness this morning while working with OT.  Had been noted to be orthostatic.  Received hydralazine PRN this morning.  Will change parameters at this time.  Encouraged to hydrate more and she states that she does not drink much anymore due to not being allowed to have straws.  Denies any fevers, chills, SOB, palpitations, or CP.  Does have a slight headache above the R eye.      Objective:     Vitals:   Temp (24hrs), Av.6 °F (37 °C), Min:98.2 °F (36.8 °C), Max:98.8 °F (37.1 °C)    Temp:  [98.2 °F (36.8 °C)-98.8 °F (37.1 °C)] 98.7 °F (37.1 °C)  HR:  [58-92] 92  Resp:  [18] 18  BP: ()/(45-73) 141/65  SpO2:  [95 %-98 %] 98 %  Body mass index is 16.02 kg/m².     Review of Systems   Constitutional:  Positive for fatigue. Negative for appetite change, chills and fever.   HENT:  Negative for trouble swallowing.    Eyes:  Negative for visual disturbance.   Respiratory:  Negative for cough, shortness of breath, wheezing and stridor.    Cardiovascular:  Negative for chest pain, palpitations and leg swelling.   Gastrointestinal:  Negative for abdominal distention, abdominal pain, constipation, diarrhea, nausea and vomiting.        LBM 1/20   Genitourinary:  Negative for difficulty urinating.   Musculoskeletal:  Negative for arthralgias, back pain and gait problem.   Neurological:  Positive for light-headedness (occurred during therapy along with orthostasis) and headaches (frontal  headache above the R eye, mild, started this morning). Negative for dizziness, weakness and numbness.   Psychiatric/Behavioral:  Positive for sleep disturbance. Negative for dysphoric mood. The patient is not nervous/anxious.    All other systems reviewed and are negative.       Input and Output Summary (last 24 hours):       Intake/Output Summary (Last 24 hours) at 1/22/2025 0954  Last data filed at 1/22/2025 0801  Gross per 24 hour   Intake 240 ml   Output 300 ml   Net -60 ml       Physical Exam:     Physical Exam  Vitals and nursing note reviewed.   Constitutional:       General: She is not in acute distress.     Appearance: Normal appearance. She is not ill-appearing.   HENT:      Head: Normocephalic and atraumatic.   Cardiovascular:      Rate and Rhythm: Normal rate. Rhythm irregularly irregular.      Pulses: Normal pulses.      Heart sounds: Murmur heard.      Systolic murmur is present with a grade of 2/6.      No friction rub.   Pulmonary:      Effort: Pulmonary effort is normal. No respiratory distress.      Breath sounds: Normal breath sounds. No wheezing or rhonchi.   Abdominal:      General: Abdomen is flat. Bowel sounds are normal. There is no distension.      Palpations: Abdomen is soft. There is no mass.      Tenderness: There is no abdominal tenderness. There is no guarding or rebound.      Hernia: No hernia is present.   Musculoskeletal:      Cervical back: Normal range of motion and neck supple. No tenderness.      Right lower leg: No edema.      Left lower leg: No edema.   Skin:     General: Skin is warm and dry.      Findings: Bruising (R facial ecchymosis) present.   Neurological:      Mental Status: She is alert and oriented to person, place, and time.   Psychiatric:         Mood and Affect: Mood normal.         Behavior: Behavior normal.         Additional Data:     Labs:    Results from last 7 days   Lab Units 01/20/25  0425   WBC Thousand/uL 13.06*   HEMOGLOBIN g/dL 10.1*   HEMATOCRIT %  30.5*   PLATELETS Thousands/uL 302   SEGS PCT % 85*   LYMPHO PCT % 7*   MONO PCT % 5   EOS PCT % 1     Results from last 7 days   Lab Units 01/20/25  0425   SODIUM mmol/L 136   POTASSIUM mmol/L 3.5   CHLORIDE mmol/L 101   CO2 mmol/L 27   BUN mg/dL 14   CREATININE mg/dL 0.85   ANION GAP mmol/L 8   CALCIUM mg/dL 9.0   ALBUMIN g/dL 3.3*   TOTAL BILIRUBIN mg/dL 0.52   ALK PHOS U/L 68   ALT U/L 22   AST U/L 16   GLUCOSE RANDOM mg/dL 103                       Labs reviewed    Imaging:    Imaging reviewed    Recent Cultures (last 7 days):           Last 24 Hours Medication List:   Current Facility-Administered Medications   Medication Dose Route Frequency Provider Last Rate    acetaminophen  975 mg Oral Q8H ELIZABETH Autumn Chapman PA-C      bisacodyl  10 mg Rectal Daily PRN Autumn Chapman PA-C      calcium carbonate  500 mg Oral Daily PRN Autumn Chapman PA-C      diltiazem  120 mg Oral Daily Autumn Chapman PA-C      enoxaparin  30 mg Subcutaneous Q24H Autumn Chapman PA-C      hydrALAZINE  10 mg Oral Q8H PRN Eduardo Anton MD      melatonin  6 mg Oral HS Sheri Pope MD      metoprolol succinate  75 mg Oral BID Autumn Chapman PA-C      midodrine  5 mg Oral TID PRN Autumn Chapman PA-C      mirtazapine  7.5 mg Oral HS Sheri Pope MD      ondansetron  4 mg Oral Q6H PRN Autumn Chapman PA-C      oxyCODONE  2.5 mg Oral Q6H PRN Autumn Chapman PA-C      Or    oxyCODONE  5 mg Oral Q6H PRN Autumn Chapman PA-C      pantoprazole  40 mg Oral Early Morning Autumn Chapman PA-C      polyethylene glycol  17 g Oral Daily Autumn Chapman PA-C      senna-docusate sodium  1 tablet Oral HS Autumn Chapman PA-C      traZODone  50 mg Oral HS PRN Sheri Pope MD          M*Modal software was used to dictate this note.  It may contain errors with dictating incorrect words or incorrect spelling. Please contact the provider directly with any questions.

## 2025-01-22 NOTE — PROGRESS NOTES
Progress Note - PMR   Name: Sandra Purvis 89 y.o. female I MRN: [id number]  Unit/Bed#: -02 I Date of Admission: 1/17/2025   Date of Service: 1/22/2025 I Hospital Day: 5     Assessment & Plan  Hypertension  Cw metoprolol succinate 75 mg twice daily  Cardizem 120 mg XR daily, d/c amlodipine  Wears compression stockings, abdominal binder  Atrial fibrillation with rapid ventricular response (HCC)  Initially presented with sinus bradycardia and evaluated by cardiology and was in controlled atrial fibrillation without bradycardia  Not on anticoagulation prior to admission and not indicated at this point due to the SDH and SAH and overall remains high risk due to risk of falls and given her advanced age  No recent follow-up with cardiology as an outpatient and generally sees her primary care physician for management and was on Toprol 50 mg daily at home which has been changed well and in the hospital  Tachycardic in the hospital and given IV fluids and medication changes include addition of midodrine and changes in the metoprolol dosing  Recommended for aspirin but would likely not be a good long-term candidate for anticoagulation  Cardio rec: continue with cardizem 120 daily and Toprol-XL 75 mg twice daily 1/16  Stage 3 chronic kidney disease, unspecified whether stage 3a or 3b CKD (HCC)  Lab Results   Component Value Date    EGFR 60 01/20/2025    EGFR 64 01/17/2025    EGFR 56 01/16/2025    CREATININE 0.85 01/20/2025    CREATININE 0.81 01/17/2025    CREATININE 0.90 01/16/2025   At baseline  GERD (gastroesophageal reflux disease)  Continue protonix  Basal cell carcinoma (BCC) of scalp  Large scalp mass noted on admission.  Also follows with dermatology as an outpatient but declining any interventions  Some localized spot bleeding from the edges noted  Patient scratched at it and created wound, hair net applied  Malignant melanoma of arm, left (HCC)  Large mass on the left forearm that is melanoma  Follows with  dermatology as an outpatient last seen in December 2024 but not interested in any treatments  SDH (subdural hematoma) (HCC)  Patient presents with fall and initial CTh on 1/4 showing an acute subdural hemorrhage along the left cerebral convexity with a maximum thickness of 1.9 cm with no midline shift as well as an acute subarachnoid hemorrhage in the left frontoparietal sulci, left sylvian fissure and suprasellar cistern  Repeat CT head on 1/5 was stable; repeat 1/14 w/ interval evolution no new/evolving hemorrage   Completed 7 day course of keppra for seizure ppx  F/u w/ NSGY for repeat CTH ~1/27 1/20 Repeat imaging ordered for f/o tomorrow (1/21) at 1pm  F/u as needed; no neurosurgical intervention needed  SAH (subarachnoid hemorrhage) (HCC)  Subarachnoid hemorrhage noted on initial CT on 1/4 and stable on 1/5;   CTH1/14  Interval evolution of recent subdural and subarachnoid hemorrhage as detailed above. No definitive new or enlarging intracranial hemorrhage.  Continue same management for the subarachnoid hemorrhage as for the subdural hematoma, please see that separate section for overall management plans  Closed extensive facial fractures (HCC)  Secondary to fall  CT of the head on 1/4 also showed comminuted and mildly displaced fractures of the lateral and anterior walls of the right maxilla, lateral and inferior walls of the right orbit, right inferior orbital wall fractures appear to involve the inferior orbital foramen with associated small extraconal soft tissue edema and focus of air.  There is no herniation of extraocular muscles and a nondisplaced fracture of the zygomatic arch as well as the temporal bone at the TMJ.  There is diffuse opacification of the right maxillary sinus and right nasal cavity with blood products and air  Seen by oral maxillofacial surgery team and surgical intervention was declined by patient  Completed 7-day course of Unasyn/Augmentin  Sinus precautions  Follow-up with OMFS  as an outpatient  Chest pain  Troponins obtained on initial evaluation of the patient in the emergency room and cardiology was consulted for evaluation  Despite elevated troponins there was nonischemic findings on her EKG and felt to be potentially related to a nonischemic troponin elevation in the setting of trauma and brain bleed versus demand ischemia due to her paroxysmal atrial fibrillation and syncopal episode  Not a candidate for cardiology for ischemic evaluation due to advanced age, frailty and recent subarachnoid hemorrhage/subdural hemorrhage  No new regional wall motion abnormalities noted on echocardiogram on 1/6/2025  Recommended to start aspirin 81 mg when cleared by neurosurgery with CT head  Follow-up with cardiology as an outpatient  Orthostatic hypotension  Blood pressures ranging in physical therapy as low as 70/40 and cardiology was evaluating the patient  Symptomatic with lightheadedness and presyncope and had received gentle fluids with some improvement  BALJIT stockings PRN as well and encouragement of fluid intake  Repeat orthostatics improved but still getting lightheaded at times which was felt to be the initiating factor in her syncopal episode that led to the fall and injury.  1/17 IM held midodrine, no significant orthostatic hypotension currently observed may need to introduce at 2.5 mg TID if develops episodes at a later time, continue to monitor orthostatic Bps   1/19 bp dropped on standing, came back up on sitting  Syncope  Presented with syncopal episode preceded by lightheadedness that led to the fall which caused the sudden dural hemorrhage and subarachnoid hemorrhage  Also found to be significantly orthostatic which was likely the etiology and was placed on telemetry with no evidence of bradycardia or pauses  Limited p.o. intake and fluid intake and suspected orthostatic hypotension as the cause  Anemia  Most recent hemoglobin of 9.4 1/17; which is stable  Continue to follow  "biweekly CBC or sooner if clinically indicated  Will need repeat scan if significant drops or concern for acute bleeds  1/20 10.1  Leukocytosis  Most recent WBC count 12.08 and actually trending down from its maximum of 17.59  Unclear etiology, may be directly related to reactive changes from the SDH and SAH as well as fractures  Continue to monitor and if considerable changes may repeat scans given the fractures in the facial bones  Was on a course of Unasyn/Augmentin for 7 days  IM: Infectious workup negative  Received IV fluids, discontinued with evidence of fluid overload CXR  Stable off abx d/c 1/15  12.08 1/17---> 13.06 1/20  Acute on chronic diastolic congestive heart failure (HCC)  Wt Readings from Last 3 Encounters:   01/22/25 46.4 kg (102 lb 4.7 oz)   01/12/25 53.5 kg (118 lb)   01/10/25 51.1 kg (112 lb 9.6 oz)     TTE 1/6/2025 showed EF 70%, grade 2 DD, mildly dilated RV, mild LA, mild AI, mild TR, estimated PA pressure 50 mmHg, trivial pericardial effusion  Not on diuretics prior to admission  Cw furosemide 20 mg; monitor for volume status   Daily weights, monitor I/O     Insomnia  Inc melatonin to 6mg HS, trazodone 25mg HS PRN----> increased to 50  Sleep log   Continue remeron, melatonin    History of Present Illness   Sandra Purvis is a 89 y.o. female w/ PMHx of afib, GERD, HTN, who initially presented to [facility] on 1/4/[facility] after a syncopan episode and collapse at home. CT had showed \"comminuted and mildly displaced fractures of the lateral and anterior walls of the right maxilla, lateral and inferior walls of the right orbit, right inferior orbital wall fracture appears to involve the inferior orbital foramen associated with small extraconal soft tissue edema and focus of air. There is also a nondisplaced fracture of the zygomatic arch and the temporal bone at the TMJ and diffuse opacification of the right maxillary sinus and right nasal cavity with " "blood products and air. Additionally there was an acute subdural hemorrhage along the left cerebral convexity with a maximal thickness of 1.9 cm with no midline shift and an acute subarachnoid hemorrhage in the left frontal parietal sulci, left sylvian fissure as well as suprasellar cistern.\" OMFS was consulted as well as neurosurgery. Patient and family declined any acute intervention at this time. Patient was started on Keppra x 7 days. Patient was treated with 7-day course of Unasyn/Augmentin. Her hospital stay was also complicated with A-fib with RVR and cardiology was consulted. Patient then had orthostatic hypotension and started on midodrine. Patient initially transferred to [facility] 1/12/2025. While at [facility] patient with hypertensive episodes to SBP >190 and Afib rates 120-130s. Patient became more symptomatic from her A-fib and was transferred to acute care 1/14. She had X ray w/ pulmonary edema was given lasix 1/14-1/15 with good response. Patient was seen by cardiology. Patient was placed on Cardizem drip and now on PO Cardizem. Her rates have improved. Sandra Purvis was admitted to the [facility] on 01/17/25     Chief Complaint: f/u ambulatory dysfunction    Interval: Patient seen and examined at bedside. No events overnight.  Pt reports some leg pain and shaking Last BM 1/20. Not sleeping well at night per sleeping log. Reports pain that she is not sure if it is in her chest or stomach    Review of Systems   Respiratory:  Negative for shortness of breath.    Cardiovascular:  Positive for chest pain. Negative for palpitations and leg swelling.   Gastrointestinal:  Negative for abdominal pain, diarrhea, nausea and vomiting.   Musculoskeletal:         Right leg pain       Objective   Functional Update:  Physical Therapy Occupational Therapy Speech Therapy   Weight Bearing Status: Full Weight Bearing  Transfers: Total Assistance  Bed Mobility: Maximum Assistance  Discharge Recommendations: Home with:  DC Home " with:: 24 Hour Assisteance   Eating: Supervision  Grooming: Supervision  Bathing: Maximum Assistance  Bathing: Maximum Assistance  Upper Body Dressing: Minimal Assistance  Lower Body Dressing: Maximum Assistance  Toileting: Total Assistance  Tub/Shower Transfer: Total Assistance (would require TA at current level of function)  Toilet Transfer: Maximum Assistance  Cognition: Exceptions to WNL  Cognition: Decreased Memory, Decreased Executive Functions, Decreased Attention, Decreased Comprehension  Orientation: Person, Place   Mode of Communication: Verbal  Cognition: Exceptions to WNL  Cognition: Decreased Memory, Decreased Executive Functions, Decreased Attention, Decreased Comprehension, Decreased Safety  Orientation: Person, Place, Time, Situation  Swallowing: Exceptions to WNL  Swallowing: Oral Dysphagia, Aspiration Risk  Diet Recommendations: Level 2/Mechanically Altered, Thin  Discharge Recommendations: Home with: (pending progress)  DC Home with:: 24 Hour Supervision, 24 Hour Assisteance, Family Support         Temp:  [98.2 °F (36.8 °C)-98.8 °F (37.1 °C)] 98.7 °F (37.1 °C)  HR:  [58-92] 92  Resp:  [18] 18  BP: ()/(45-73) 141/65  SpO2:  [95 %-98 %] 98 %    Physical Exam  Constitutional:       Comments: More alert than yesterday, slow to respond  frail   HENT:      Nose: Nose normal.      Mouth/Throat:      Mouth: Mucous membranes are dry.      Comments: Encouraged to drink  Cardiovascular:      Rate and Rhythm: Normal rate and regular rhythm.   Pulmonary:      Effort: Pulmonary effort is normal.      Breath sounds: Normal breath sounds.   Abdominal:      General: Abdomen is flat.      Comments: Bowel sounds slightly hypoactive   Musculoskeletal:         General: Normal range of motion.      Comments: Negative for ankle clonus  Right leg shakes but pt is able to stop it   Skin:     General: Skin is warm.      Findings: Bruising present.      Comments: Bruising on face and arms  Wound on scalp has been  cleaned   Psychiatric:         Mood and Affect: Mood normal.           Scheduled Meds:  Current Facility-Administered Medications   Medication Dose Route Frequency Provider Last Rate    acetaminophen  975 mg Oral Q8H [facility] Autumn Chapman PA-C      bisacodyl  10 mg Rectal Daily PRN Autumn Chapman PA-C      calcium carbonate  500 mg Oral Daily PRN Autumn Chapman PA-C      diltiazem  120 mg Oral Daily Autumn Chapman PA-C      enoxaparin  30 mg Subcutaneous Q24H Autumn Chapman PA-C      hydrALAZINE  10 mg Oral Q8H PRN LUIS Monsalve      melatonin  6 mg Oral HS Sheri Pope MD      metoprolol succinate  75 mg Oral BID Autumn Chapman PA-C      midodrine  5 mg Oral TID PRN Autumn Chapman PA-C      mirtazapine  7.5 mg Oral HS Sheri Pope MD      ondansetron  4 mg Oral Q6H PRN Autumn Chapman PA-C      oxyCODONE  2.5 mg Oral Q6H PRN Autumn Chapman PA-C      Or    oxyCODONE  5 mg Oral Q6H PRN Autumn Chapman PA-C      pantoprazole  40 mg Oral Early Morning Autumn Chapman PA-C      polyethylene glycol  17 g Oral Daily Autumn Chapman PA-C      senna-docusate sodium  1 tablet Oral HS Autumn Chapman PA-C      traZODone  50 mg Oral HS PRN Sheri Pope MD           Lab Results: I have reviewed the following results:  Results from last 7 days   Lab Units 01/20/25 0425 01/17/25 0530 01/16/25  0527   HEMOGLOBIN g/dL 10.1* 9.4* 10.0*   HEMATOCRIT % 30.5* 28.6* 31.7*   WBC Thousand/uL 13.06* 12.08* 12.88*   PLATELETS Thousands/uL 302 267 290     Results from last 7 days   Lab Units 01/20/25 0425 01/17/25  0530 01/16/25  0527   BUN mg/dL 14 15 17   SODIUM mmol/L 136 133* 134*   POTASSIUM mmol/L 3.5 3.4* 3.6   CHLORIDE mmol/L 101 97 95*   CREATININE mg/dL 0.85 0.81 0.90   AST U/L 16  --   --    ALT U/L 22  --   --               Autumn Chapman PA-C  Physical Medicine and Rehabilitation   01/22/25

## 2025-01-22 NOTE — ASSESSMENT & PLAN NOTE
Wt Readings from Last 3 Encounters:   01/22/25 46.4 kg (102 lb 4.7 oz)   01/12/25 53.5 kg (118 lb)   01/10/25 51.1 kg (112 lb 9.6 oz)     TTE 1/6/2025 showed EF 70%, grade 2 DD, mildly dilated RV, mild LA, mild AI, mild TR, estimated PA pressure 50 mmHg, trivial pericardial effusion  Not on diuretics prior to admission  Cw furosemide 20 mg; monitor for volume status   Daily weights, monitor I/O

## 2025-01-22 NOTE — PROGRESS NOTES
PT LTGS updated this date as pt progress to date is limited.   2 week goals set from this date with goals to have A at Dc and WC goals added.   Plan for family meeting to discuss pt status, support at DC, and medical status needs pending progress.        01/22/25 1300   Rehab Team Goals   Transfer Team Goal Patient will require assist with transfers with least restrictive device upon completion of rehab program   Locomotion Team Goal Patient will require assist with locomotion with least restrictive device upon completion of rehab program  (possible short distances, mainly WC anticipated at this time.)   PT Transfer Goal   Roll left and right Goal 03. Partial/moderate assistance - [place] does less than half the effort. [place] lifts or holds trunk or limbs and provides more than half the effort.   Sit to lying Goal 03. Partial/moderate assistance - [place] does less than half the effort. [place] lifts or holds trunk or limbs and provides more than half the effort.   Lying to sitting on side of bed Goal 03. Partial/moderate assistance - [place] does less than half the effort. [place] lifts or holds trunk or limbs and provides more than half the effort.   Sit to stand Goal 03. Partial/moderate assistance - [place] does less than half the effort. [place] lifts or holds trunk or limbs and provides more than half the effort.   Chair/bed-to-chair transfer Goal 03. Partial/moderate assistance - [place] does less than half the effort. [place] lifts or holds trunk or limbs and provides more than half the effort.   Car Transfer Goal 02. Substantial/maximal assistance - [place] does MORE THAN HALF the effort. [place] lifts or holds trunk or limbs and provides more than half the effort.   Status Target goal - two weeks   Locomotion Goal   Walk 10 feet Goal 03. Partial/moderate assistance - [place] does less than half the effort. [place] lifts or holds trunk or limbs and provides more than half the effort.   Walk 50 feet with 2 turns Goal  88. Not attempted due to medical condition or safety concerns   Walk 150 feet Goal 88. Not attempted due to medical condition or safety concerns   Walking 10 feet on uneven surface 88. Not attempted due to medical condition or safety concerns   Walking Goal Status Ongoing;Target goal - two weeks   Type of Wheelchair Used 1. Manual   Target Wheel Distance- Level 50 ft   Wheel 50 feet with 2 turns Goal 03. Partial/moderate assistance - [place] does less than half the effort. [place] lifts or holds trunk or limbs and provides more than half the effort.   Wheel 150 feet Goal 02. Substantial/maximal assistance - [place] does MORE THAN HALF the effort. [place] lifts or holds trunk or limbs and provides more than half the effort.   Wheelchair Goal Status Ongoing;Target goal - two weeks   Stairs Goal   1 step or curb goal 02. Substantial/maximal assistance - [place] does MORE THAN HALF the effort. [place] lifts or holds trunk or limbs and provides more than half the effort.   4 steps Goal 02. Substantial/maximal assistance - [place] does MORE THAN HALF the effort. [place] lifts or holds trunk or limbs and provides more than half the effort.   Status Ongoing;Target goal - two weeks   Object Retrieval Goal   Picking up object Goal 88. Not attempted due to medical condition or safety concerns   Small Object Picked Up should have support at MI.

## 2025-01-22 NOTE — ASSESSMENT & PLAN NOTE
Noted to have elevated troponins on admission to acute setting.  [place] to be due to trauma/brain bleed vs demand ischemia due to paroxysmal a-fib and syncope.  Cardiology recommended starting ASA 81mg daily when cleared by Neurosurgery.  Per Neurosurgery - ok to start ASA in 2 weeks (2/4) after patient/family discussion of risks/benefits with PCP/Cardiologist.  Recommend establishing with Cardiology on discharge.

## 2025-01-22 NOTE — ASSESSMENT & PLAN NOTE
Large scalp mass noted on admission.  Also follows with dermatology as an outpatient but declining any interventions  Some localized spot bleeding from the edges noted  Patient scratched at it and created wound, hair net applied

## 2025-01-22 NOTE — PLAN OF CARE
Problem: Prexisting or High Potential for Compromised Skin Integrity  Goal: Skin integrity is maintained or improved  Description: INTERVENTIONS:  - Identify patients at risk for skin breakdown  - Assess and monitor skin integrity  - Assess and monitor nutrition and hydration status  - Monitor labs   - Assess for incontinence   - Turn and reposition patient  - Assist with mobility/ambulation  - Relieve pressure over bony prominences  - Avoid friction and shearing  - Provide appropriate hygiene as needed including keeping skin clean and dry  - Evaluate need for skin moisturizer/barrier cream  - Collaborate with interdisciplinary team   - Patient/family teaching  - Consider wound care consult   Outcome: Progressing     Problem: SAFETY ADULT  Goal: Patient will remain free of falls  Description: INTERVENTIONS:  - Educate patient/family on patient safety including physical limitations  - Instruct patient to call for assistance with activity   - Consult OT/PT to assist with strengthening/mobility   - Keep Call bell within reach  - Keep bed low and locked with side rails adjusted as appropriate  - Keep care items and personal belongings within reach  - Initiate and maintain comfort rounds  - Make Fall Risk Sign visible to staff  - Offer Toileting every  Hours, in advance of need  - Initiate/Maintain alarm  - Obtain necessary fall risk management equipment:   - Apply yellow socks and bracelet for high fall risk patients  - Consider moving patient to room near nurses station  Outcome: Progressing

## 2025-01-22 NOTE — ASSESSMENT & PLAN NOTE
Large scalp mass noted on admission.  Declining intervention as an outpatient.  Follows with Dermatology and LUIS Mejía (Surgical Oncology) as outpatient.  Wound care consulted - wound cleansed/debrided.  Continue with wearing cap during the day to avoid further patient manipulation.

## 2025-01-22 NOTE — ASSESSMENT & PLAN NOTE
Home regimen: Toprol XL 50mg daily and HCTZ 12.5mg daily.  Currently receiving Toprol XL 75mg BID and Cardizem 120mg daily.  Give midodrine as needed for SBP <90.  Orthostatic + - monitor orthostatics daily.  Keep SBP <160 due to intracranial bleed.    Received hydralazine this morning for SBP of 161.  Changed parameters to give for SBP >180.

## 2025-01-22 NOTE — ASSESSMENT & PLAN NOTE
Continue melatonin 6mg at HS, Remeron 7.5mg at HS, and trazodone 25mg at HS PRN.  Continue sleep logs.

## 2025-01-22 NOTE — ASSESSMENT & PLAN NOTE
Patient presents with fall and initial CTh on 1/4 showing an acute subdural hemorrhage along the left cerebral convexity with a maximum thickness of 1.9 cm with no midline shift as well as an acute subarachnoid hemorrhage in the left frontoparietal sulci, left sylvian fissure and suprasellar cistern  Repeat CT head on 1/5 was stable; repeat 1/14 w/ interval evolution no new/evolving hemorrage   Completed 7 day course of keppra for seizure ppx  F/u w/ NSGY for repeat CTH ~1/27 1/20 Repeat imaging ordered for f/o tomorrow (1/21) at 1pm  F/u as needed; no neurosurgical intervention needed

## 2025-01-22 NOTE — PHYSICAL THERAPY NOTE
Pt admitted to [facility] 1/12/25, PT unable to complete PT eval due to poorly controled BP, pt was placed on med hold then ultimately returned to acute  for further workup/monitoring.     Concern for [facility] stay due to questionable support at DC. May need to assess DC plan/options when medically stable.

## 2025-01-22 NOTE — OCCUPATIONAL THERAPY NOTE
Contacted pts dtr Courtney ([phone]) to discuss CLOF and DC planning. Educ dtr that pt at this time req Ax2 for all transfers and ADLs. Educ that if goal is for pt to return home it is anticipated she will require physical assistance to ensure safety. Dtr reporting that she is unable to quit her job and her father is not physically able to assist as he has health issues of his own. Discussed with dtr options of STR which she reports that would be the best case at this time as they are unable to assist at this time. Therapist will discuss with team regarding STR decision. Dtr in agreement to have phone call with therapy team regarding pts progress and process for STR on Mon 1/27/25 11am. Dtr with no further questions at this time.       -Naomi Syed MS, OTR/L, CSRS

## 2025-01-22 NOTE — TEAM CONFERENCE
Acute RehabilitationTeam Conference Note  Date: 1/22/2025   Time: 1:22 PM       Patient Name:  Sandra Purvis       Medical Record Number: [id number]   YOB: 1934  Sex: Female          Room/Bed:  [facility] 269/[facility] 269-02  Payor Info:  Payor: MEDICARE / Plan: MEDICARE A AND B / Product Type: Medicare A & B Fee for Service /      Admitting Diagnosis: Subdural hematoma (HCC) [S06.5XAA]   Admit Date/Time:  1/17/2025  1:14 PM  Admission Comments: No comment available     Primary Diagnosis:  <principal problem not specified>  Principal Problem: <principal problem not specified>    Patient Active Problem List    Diagnosis Date Noted    Insomnia 01/18/2025    Hypokalemia 01/17/2025    Hypertensive urgency 01/14/2025    Age-related cognitive decline 01/14/2025    SIRS (systemic inflammatory response syndrome) ([facility]) 01/14/2025    Frailty syndrome in geriatric patient 01/14/2025    Ambulatory dysfunction 01/14/2025    At risk for delirium 01/14/2025    Bilateral hearing loss 01/14/2025    Anemia 01/12/2025    Leukocytosis 01/12/2025    Acute on chronic diastolic congestive heart failure (HCC) 01/12/2025    Syncope 01/08/2025    Chest pain 01/07/2025    Orthostatic hypotension 01/07/2025    Fall 01/06/2025    SDH (subdural hematoma) ([facility]) 01/04/2025    SAH (subarachnoid hemorrhage) ([facility]) 01/04/2025    Closed extensive facial fractures ([facility]) 01/04/2025    Primary insomnia 11/21/2024    Basal cell carcinoma (BCC) of scalp 08/01/2023    Malignant melanoma of arm, left (HCC) 08/01/2023    Scalp lesion 07/25/2023    Arm skin lesion, left 07/25/2023    Irritant contact dermatitis due to other agents 04/26/2022    COVID-19 01/14/2022    GERD (gastroesophageal reflux disease)     Stage 3 chronic kidney disease, unspecified whether stage 3a or 3b CKD ([facility]) 12/14/2021    Atrial fibrillation with rapid ventricular response ([facility])     Preop examination 11/22/2021    Esophagus disorder 10/19/2021    Ureteral stone 10/15/2021     Bacteremia 10/15/2021    Mild protein-calorie malnutrition (HCC) 05/04/2021    Hypertension 09/22/2015       Physical Therapy:    Weight Bearing Status: Full Weight Bearing  Transfers: Total Assistance  Bed Mobility: Maximum Assistance  Discharge Recommendations: Home with:  DC Home with:: 24 Hour Assisteance    Pt admitted to [facility] after fall S/P facial Fxs and SDH.   Pt was sent back to acute due to inc Bps.  Now re admitted which she has not been performing well due to pain/ lethagic/ orthostatic Bps/ lightheadedness/ and wants to go back to bed.  Pt is striving to perform but greatly limited mostly by fatigue and trouble keeping eyes open.  Currently working on sit pivot xfers which needs 2 people for safety, also 2 people Max for bed mobility.   Pt greatly limited by weakness, motor planning, balance, lethargic, dec BPs, skin integrity. Pt will need 24hr assistance and only has  at home to help, unsure if she will be able to get back home without extra help. Pt will benefit from cont skilled inpatient rehab to decrease burden of care and try to reach LTGs ,  questionable about D/C back home the way pt is presenting      Occupational Therapy:  Eating: Supervision  Grooming: Supervision  Bathing: Maximum Assistance  Bathing: Maximum Assistance  Upper Body Dressing: Minimal Assistance  Lower Body Dressing: Maximum Assistance  Toileting: Total Assistance  Tub/Shower Transfer: Total Assistance (would require TA at current level of function)  Toilet Transfer: Maximum Assistance  Cognition: Exceptions to WNL  Cognition: Decreased Memory, Decreased Executive Functions, Decreased Attention, Decreased Comprehension  Orientation: Person, Place  Discharge Recommendations: Other (pending)       01/21/2025    Pt continues to present with impairments in activity tolerance, endurance, standing balance/tolerance, sitting balance/tolerance, UE strength, FMC, memory, insight, safety , judgement , attention , sequencing ,  sensation , and task initiation . Additional functional barriers include fatigue, pain, decreased caregiver support, risk for falls, and home environment, poor activity tolerance, decreased upright tolerance, BP management. Pt demonstrates improvements in participation, functional transfers, sitting balance, bed mobility and dressing. Pt will continue to benefit from skilled OT services to address above mentioned barriers and maximize functional independence in baseline areas of occupation, utilizing the following interventions: ADL retraining, DME/adaptive equipment assessment , functional transfer training, cognitive retraining, family training, neuromuscular re-education , standing balance retraining, sitting balance retraining, activity tolerance/edurance training, UB strengthening, compensatory technique education, and energy conservation education. OT D/C recommendation is pending progress.          Speech Therapy:  Mode of Communication: Verbal  Cognition: Exceptions to WNL  Cognition: Decreased Memory, Decreased Executive Functions, Decreased Attention, Decreased Comprehension, Decreased Safety  Orientation: Person, Place, Time, Situation  Swallowing: Exceptions to WNL  Swallowing: Oral Dysphagia, Aspiration Risk  Diet Recommendations: Level 2/Mechanically Altered, Thin  Discharge Recommendations: Home with: (pending progress)  DC Home with:: 24 Hour Supervision, 24 Hour Assisteance, Family Support  Pt is being followed for cognitive linguistic and dysphagia therapy sessions  where pt is making slower progress this week. Pt completed the SLUMS on initial evaluation with scores correlating to overall severe neurocognitive deficits. During recent sessions, continued cognitive barriers which present include: decreased ST memory, working memory, attention, processing, sequencing, problem solving, organization of thoughts and insight, impacting pt's overall safety, functional cognitive communication skills and  functional mobility. Pt's fluctuating PRIYANKA and fatigue are also barriers to her progress at this time. The following interventions are used to target these barriers, including visual orientation checklist, verbal problem solving task, verbal working memory tasks, visual memory recall tasks, drawing conclusions activities, written sequencing tasks, verbal sequencing tasks, categorization tasks , picture problem solving activities, verbal reasoning tasks, verbal review of current medications, written review of medications, verbal health management tasks, functional reading tasks , and family education/training.     Pt was [facility] to the [facility] on a dysphagia level 3 diet and thin liquids, however, most recently, pt was changed to a dysphagia level 2 diet with thin liquids in order to increase pt comfort and to decrease level of pain with mastication. Continued dysphagia barriers which present include the following risks for aspiration such as: prolonged mastication, reduced bolus formation, decreased transfers of solids, min oral residual and pain/discomfort with mastication of harder solids. In order to address the noted barriers, skilled SLP services will address this by targeting the following interventions: diet modification, safe swallow strategies, and family education/training.    Current diet is dysphagia level 2, thins      Current level of functioning:   Eating: set up  Comprehension: min A  Expression: min A  Social Interaction: supervision  Executive functions: max A  Memory: max A    Family training/education has not yet been initiated but will plan to make contact with pt's family this week to provide an update. Although early in pt's rehab stay, anticipating that pt will be recommended for increased level of support and assistance on discharge. This week, focus for continued services to target assisting in determining most appropriate diet for pt at this time, along with plan to focus on STM recall,  "functional problem solving and safety. At this time, pt is recommended for further skilled ST focusing on cognitive linguistic skills in order to maximize functional independence and safety on discharge, as well as to assist in determining safest and least restrictive diet on discharge.     Nursing Notes:  Appetite: Fair  Diet Type: Dysphagia II, 2 gram sodium diet, Thin Liquids, Other (Specify) (no dairy)                                                                     Pain Location/Orientation: Location: Head (at site of basal cell ca; per nursing reports required cuing throughout night not to scratch)  Pain Score: 0                       Hospital Pain Intervention(s): Repositioned, Rest, Emotional support          Sandra Purvis is a 89 y.o. female w/ PMHx of afib, GERD, HTN, who initially presented to [facility] on 1/4/2025 after a syncopan episode and collapse at home.  CT had showed \"comminuted and mildly displaced fractures of the lateral and anterior walls of the right maxilla, lateral and inferior walls of the right orbit, right inferior orbital wall fracture appears to involve the inferior orbital foramen associated with small extraconal soft tissue edema and focus of air. There is also a nondisplaced fracture of the zygomatic arch and the temporal bone at the TMJ and diffuse opacification of the right maxillary sinus and right nasal cavity with blood products and air. Additionally there was an acute subdural hemorrhage along the left cerebral convexity with a maximal thickness of 1.9 cm with no midline shift and an acute subarachnoid hemorrhage in the left frontal parietal sulci, left sylvian fissure as well as suprasellar cistern.\"  OMFS was consulted as well as neurosurgery.  Patient and family declined any acute intervention at this time.  Patient was started on Keppra x 7 days.  Patient was treated with 7-day course of Unasyn/Augmentin.  Her hospital " stay was also complicated with A-fib with RVR and cardiology was consulted.  Patient then had orthostatic hypotension and started on midodrine.  Patient initially transferred to [facility] 1/12/2025.  While at [facility] patient with hypertensive episodes to SBP >190 and Afib rates 120-130s.  Patient became more symptomatic from her A-fib and was transferred to acute care 1/14. She had X ray w/ pulmonary edema was given lasix 1/14-1/15 with good response.  Patient was seen by cardiology. Patient was placed on Cardizem drip and now on PO Cardizem. Her rates have improved. Sandra Purvis was admitted to the [facility] on 01/17/25       Pain managed with Tylenol 975 mg q8hrs and oxy 2.5 mg/5mg q6hrs prn. HTN managed with cardizem cd 120 mg daily, toprol-xl 75 mg BID and hydralazine 10 mg q8hrs prn. Bowel regimen managed with senokot 8.6-50 mg daily, Miralax 17 g packet daily,  and dulcolax rectal supp. 10 mg daily prn. DVT prophylaxis is Lovenox 30 mg q24hrs. Pulmonary edema managed with lasix 20 mg daily. Insomnia managed with melatonin 6 mg, Remeron 7.5 mg, and prn trazodone 50 mg. Orthostatic hypotension managed with midodrine 5 mg TID prn. Protonix 40 mg for GERD.        This week we will continue to monitor vital signs and lab values. We will manage her pain with above orders so that the pt can participate in her therapy sessions to her optimal ability.  We will teach pt how to conserve energy so that she may perform ADLs independently as much as she can.  We will educate the pt on the importance of repositioning and off-loading pressure to help lower the risk of skin breakdown.  We will prevent falls by keeping personal items and call bell within reach, we will maintain hourly rounding.     Case Management:     Discharge Planning  Living Arrangements: Lives w/ Spouse/significant other  Support Systems: Family members, Spouse/significant other, Friends/neighbors, Son, Daughter  Assistance Needed: to be detemined  Type of  Current Residence: Private residence  Current Home Care Services: No  Patient re admitted to [facility] on 1/17/25 after inpatient hospital stay for SDH and SAH, right orbital fracture, after fall from syncopal episode.WEnt back to inpatient for PAF. Patient lives with  in ranch home with 2STE. Patient independent PTA. Patient has a cleaning lady, daughter who lives near [facility] purchases food.  is 89 years old, disposition TBD.    Is the patient actively participating in therapies? Yes   List any modifications to the treatment plan: 900 program 60 minute sessions    Barriers Interventions   SAH Neurosx following, CT head, MRI,    Facial fractures Sinus precautions, education   HTN Medication management, monitoring   Orthostatic hypotension BP monitoring, TEDS, Binder, medication titration IM, consider Kreg bed pending response to medication changes, tilt n space   Afib Medication management,    hyponatremia Lab work, IM management,    insomnia Sleep log, medication management, environmental changes, sleep/wake training, possible consult to psych   Wound on scalp Wound consult, local care/cleansing, close monitoring, education/frequent cueing to  not    Incontinent bladder Voiding program, purwick, incontinent brief   Family support Family meeting needed, community supports, d/c planning   Generalized weakness There ex   Sitting balance Core strength, dynamic balance training, functional reach, tilt n space seating   Decreased mobility Tilt n space, ROHO, frequent repositioning by staff, tortoise mattress   Steps to enter LE strengthening, ramp resource   Weakness/Endurance LE strengthening, endurance training, standing frame trial    Cognition (short term memory, orientation, problem solving, alertness) Reorientation, cognitive retraining, functional problem solving, will focus on family training upcoming week, increase level of support at d/c   Dysphagia  Level II, thin liquids, diet modification,  education on choice of softer diet, speech therapy strategies, out of bed for meals, supervision for meals, aspiration precautions      Is the patient making expected progress toward goals? Limited due to medical management including hypotension and significant cognitive deficits  List any update or changes to goals: see below for downgraded goals     Medical Goals: Patient will be able to manage medical conditions and comorbid conditions with medications and follow up upon completion of rehab program     Weekly Team Goals:   Rehab Team Goals  ADL Team Goal: Patient will require supervision with ADLs with least restrictive device upon completion of rehab program  Bowel/Bladder Team Goal: Patient will return to premorbid level for bladder/bowel management upon completion of rehab program  Transfer Team Goal: Patient will require supervision with transfers with least restrictive device upon completion of rehab program  Locomotion Team Goal: Patient will require supervision with locomotion with least restrictive device upon completion of rehab program  Cognitive Team Goal: Patient will require assist for basic cognitive tasks upon completion of rehab program   * Downgrade goals due to limited progress, cognition, and severe fatigue/endurance. New goals min A for ADLs, transfers. Ambulation focus on w/c mobility min A, stairs Mod A. No change to SLP goals.   Discussion: Pt currently at max/total for all ADL's assist x2 for LB dressing and toileting. Focus this week on transfers and toileting to one person assist. Improving sitting balance fair (-) and out of bed tolerance to 4-6 hours. Goal this week for sleep wake/cycle to stabilize, set up family meeting and stabilize BP. Physical therapy max A sit pivot transfers currently standing when BP stable. Unable to implement mobility this week due to medical complications. This week focus on LE strengthening and increase standing. Currently min A for comprehension and  expression. Problem solving and memory max A. This week plan to determine appropriate diet t for safety. Speak with family for education. Continue following for swallow assessment to solidify diet recommendations without s/s of aspiration.     Anticipated Discharge Date:  Tentative d/c date of 2/4/25 potential need for d/c to SNF pending family support and training.   Health and Wellness Goal: Attend [facility] again.   [facility] Team Members Present:     The following team members are supervising care for this patient and were present during this Weekly Team Conference.    Physician: Dr. Niko MD  : Mckayla Webster  Registered Nurse: Chelsey Caceres, RN, BSN, CRRN  Physical Therapist: Sendy Dorantes, PT  Occupational Therapist: Naomi Syed MS, OTR/L  Speech Therapist: Angelita Daugherty MS, CCC-SLP

## 2025-01-22 NOTE — PROGRESS NOTES
01/22/25 1100   Pain Assessment   Pain Assessment Tool 0-10   Pain Score 5   Pain Location/Orientation Location: Back   Hospital Pain Intervention(s) Repositioned;Rest   Restrictions/Precautions   Precautions Aspiration;Bed/chair alarms;Cognitive;Fall Risk;Supervision on toilet/commode;Pain  (sinus precautions)   Comprehension   Comprehension (FIM) 3 - Understands basic info/conversation 50-74% of time   Expression   Expression (FIM) 4 - Expresses basic info/needs 75-90% of time   Social Interaction   Social Interaction (FIM) 4 - Needs redirecting for appropriate language or to initiate interaction   Problem Solving   Problem solving (FIM) 2 - Needs direction more than ½ time to initiate, plan or complete simple tasks   Memory   Memory (FIM) 2 - Recognizes, recalls/performs 25-49%   Speech/Language/Cognition Assessment   Treatment Assessment Pt participated in skilled ST session focusing on cognitive linguistic skills. Pt noted to be extremely fatigued and with fluctuating PRIYANKA. Pt frequently closing her eyes during session but did promptly respond to verbal stimuli. Pt was repositioned upright, along with lights on in order to maximize environment for increased alertness. Pt was oriented to person, year and place to begin session but required moderate to max verbal cuing to orient to month and situation. Engaged in a functional problem solving task, pt determined causes of situations when verbally provided (basic level) by providing an appropriate response in 3/8 trials, requiring max to total assist through verbal cues for remaining trials. During this time, pt with significantly reduced mental flexibility and more concrete thinking. Pt cont to close eyes during and between trials but again woke to repetition of stimuli and provided verbal responses until towards the end of activity where her responses became incomplete as her speech trailed off. Brief rest break from structured tasks was provided to increase  alertness by playing her preferred music and engaged in convo about herself. Pt observed to increase in PRIYANKA slightly, where she did not require as much constant stimuli. Engaged in a categorization task, pt was verbally presented with Fo3 objects where she accurately determined the category for 11/15 trials, improving to 13/15 accuracy when provided with moderate verbal and semantic cues, along with increased time for processing. Based on pt's current level of functioning, anticipating pt will require increased level of support on discharge, including assistance with all IADLs and potentially 24/hr supervision. At this time, pt is recommended for further skilled ST focusing on cognitive linguistic skills in order to maximize functional independence and safety on discharge.   Eating   Type of Assistance Needed Set-up / clean-up;Supervision   Physical Assistance Level No physical assistance   Eating CARE Score 4   Swallow Assessment   Swallow Treatment Assessment Daily Dysphagia Tx Note      Patient Name: Sandra Purvis     Today's Date: 1/22/2025        Current Risks for Dysphagia & Aspiration: Weak cough;General debilitation;Cognitive deficit;Reduced alertness; facial fractures     Current Symptoms/Concerns: difficulty chewing     Current diet:soft/level 3 diet and thin liquids      Premorbid diet::regular diet and thin liquids      Positioning: upright in bed     Items administered:Consistencies Administered: thin liquids and dysphagia level 2 diet items  Materials administered included egg salad, diced peaches, chicken broth, and thin juice     Total amount of meal consumed:   75% of meal, 360cc thins           Summary:     Pt is presenting with s/s suggestive of mild oral and pharyngeal dysphagia this session. Pt seen during lunch meal this date with focus on assessment of recent diet modification to dysphagia level 2 diet. Pt assessed with diced peaches, egg salad and thin liquids taken by cup. Provided  "assisted with tray set up but able to self feed. Oral care completed after meal at set up level.     Oral Stage:  Pt demo functional bolus retrieval and oral containment. Mastication continues to be mildly prolonged but did appear effective for breakdown with items this date. Pt reported increased comfort when chewing these items in comparison to more advanced diet items from previous sessions. Continues with consistent piecemeal deglutition. Mildly decreased bolus formation of solids. Transfers of solids remain slower and minimally incomplete, resulting in trace to min oral residual between bites, which pt cleared independently. No overt pocketing but trace food residual removed during oral care. Oral stage appeared WFL with thin liquids.     Pharyngeal Stage:  Swallow initiation appeared overall timely, however, occasionally pt presented with suspected swallow delay when taking multiple tsp of soup as the meal progressed. Hyolaryngeal elevation present to palpation. Throat clear x1 and cough x2 observed with thin liquids taken in rapid, consecutive tsps of soup towards end of session. Cough noted to be weak. As pt's swallows of thins are highly audible, it was noted that pt was not observed to complete a swallow during these times before taking another tsp. Likely this resulted in decreased oral control or premature spillage. No further overt s/s observed during meal.     Esophageal:  Some belching observed after meal completion.      After meal completion, reviewed pt's ease of mastication with recent diet modification to dysphagia level 2 to which pt reported a notable improvement in ease and comfort but did verbalize that she prefers items with \"more texture.\" SLP reviewed mutually discussed rationale for the diet downgrade made in our last session to which pt then did appear to recognize that this was due to difficulty with mastication of level 3 food items. Will continue on current diet at this time but " monitor appropriateness based on impact of pt's PRIYANKA and medical status.  Increasing pt to have FULL supervision with meals due to concern of fatigue during meals and maintaining PRIYANKA.           Recommendations:  Diet: mechanically altered/level 2 diet and thin liquids-no straw  Meds: whole with puree  Strategies: upright posture, only feed when fully alert, slow rate of feeding, small bites/sips, no straws, quiet environment (tv off, limit talking, door closed, etc.), and alternating bites and sips     Sinus Precautions:       -no straws       -no blowing nose       -no closed mouth/nose sneezing       -reduce straining     FULL supervision w/ meals.  Results reviewed with:  patient, RN, and MD   Aspiration precautions posted.     F/u ST tx: Pt is currently recommended for further skilled SLP services targeting dysphagia therapy in order to maximize oral and pharyngeal swallow skills, while safely supporting PO intake, as well as to improve independent carryover of safe swallow strategies.       Plan:      Continue dysphagia level 2, thins-no straw      Follow up to closely monitor diet tolerance-modify if needed   SLP Therapy Minutes   SLP Time In 1100   SLP Time Out 1220   SLP Total Time (minutes) 80   SLP Mode of treatment - Individual (minutes) 80   SLP Mode of treatment - Concurrent (minutes) 0   SLP Mode of treatment - Group (minutes) 0   SLP Mode of treatment - Co-treat (minutes) 0   SLP Mode of Treatment - Total time(minutes) 80 minutes   SLP Cumulative Minutes 220   Therapy Time missed   Time missed? No

## 2025-01-22 NOTE — PLAN OF CARE
Problem: Prexisting or High Potential for Compromised Skin Integrity  Goal: Skin integrity is maintained or improved  Description: INTERVENTIONS:  - Identify patients at risk for skin breakdown  - Assess and monitor skin integrity  - Assess and monitor nutrition and hydration status  - Monitor labs   - Assess for incontinence   - Turn and reposition patient  - Assist with mobility/ambulation  - Relieve pressure over bony prominences  - Avoid friction and shearing  - Provide appropriate hygiene as needed including keeping skin clean and dry  - Evaluate need for skin moisturizer/barrier cream  - Collaborate with interdisciplinary team   - Patient/family teaching  - Consider wound care consult   Outcome: Progressing     Problem: SAFETY ADULT  Goal: Patient will remain free of falls  Description: INTERVENTIONS:  - Educate patient/family on patient safety including physical limitations  - Instruct patient to call for assistance with activity   - Consult OT/PT to assist with strengthening/mobility   - Keep Call bell within reach  - Keep bed low and locked with side rails adjusted as appropriate  - Keep care items and personal belongings within reach  - Initiate and maintain comfort rounds  - Make Fall Risk Sign visible to staff  - Apply yellow socks and bracelet for high fall risk patients  - Consider moving patient to room near nurses station  Outcome: Progressing

## 2025-01-22 NOTE — PROGRESS NOTES
OT LTGS downgraded this date as pt progress to date is limited.   2 wk goals set from this date with goals to have assistance at DC.      01/22/25 1400   Tub/Shower Transfer Goal   Method   (N/A SB)   Bathing Goal   Shower/bathe self Goal 03. Partial/moderate assistance - [place] does less than half the effort. [place] lifts or holds trunk or limbs and provides more than half the effort.   Lower Body Dressing Goal   Lower body dressing Goal 03. Partial/moderate assistance - [place] does less than half the effort. [place] lifts or holds trunk or limbs and provides more than half the effort.   Putting on/taking off footwear Goal 01. Dependent - [place] does ALL of the effort. Patient does none of the effort to complete the activity. Or, the assistance of 2 or more helpers is required for the patient to complete the activity.   Toileting Transfer Goal   Toilet transfer Goal 03. Partial/moderate assistance - [place] does less than half the effort. [place] lifts or holds trunk or limbs and provides more than half the effort.   Toileting Goal   Toileting hygiene Goal 03. Partial/moderate assistance - [place] does less than half the effort. [place] lifts or holds trunk or limbs and provides more than half the effort.

## 2025-01-23 LAB
ANION GAP SERPL CALCULATED.3IONS-SCNC: 11 MMOL/L (ref 4–13)
BASOPHILS # BLD AUTO: 0.07 THOUSANDS/ΜL (ref 0–0.1)
BASOPHILS NFR BLD AUTO: 1 % (ref 0–1)
BUN SERPL-MCNC: 12 MG/DL (ref 5–25)
CALCIUM SERPL-MCNC: 9.3 MG/DL (ref 8.4–10.2)
CHLORIDE SERPL-SCNC: 103 MMOL/L (ref 96–108)
CO2 SERPL-SCNC: 26 MMOL/L (ref 21–32)
CREAT SERPL-MCNC: 0.83 MG/DL (ref 0.6–1.3)
EOSINOPHIL # BLD AUTO: 0.15 THOUSAND/ΜL (ref 0–0.61)
EOSINOPHIL NFR BLD AUTO: 1 % (ref 0–6)
ERYTHROCYTE [DISTWIDTH] IN BLOOD BY AUTOMATED COUNT: 16.4 % (ref 11.6–15.1)
GFR SERPL CREATININE-BSD FRML MDRD: 62 ML/MIN/1.73SQ M
GLUCOSE P FAST SERPL-MCNC: 108 MG/DL (ref 65–99)
GLUCOSE SERPL-MCNC: 108 MG/DL (ref 65–140)
HCT VFR BLD AUTO: 32.3 % (ref 34.8–46.1)
HGB BLD-MCNC: 10.4 G/DL (ref 11.5–15.4)
IMM GRANULOCYTES # BLD AUTO: 0.06 THOUSAND/UL (ref 0–0.2)
IMM GRANULOCYTES NFR BLD AUTO: 1 % (ref 0–2)
LYMPHOCYTES # BLD AUTO: 0.83 THOUSANDS/ΜL (ref 0.6–4.47)
LYMPHOCYTES NFR BLD AUTO: 8 % (ref 14–44)
MCH RBC QN AUTO: 28.6 PG (ref 26.8–34.3)
MCHC RBC AUTO-ENTMCNC: 32.2 G/DL (ref 31.4–37.4)
MCV RBC AUTO: 89 FL (ref 82–98)
MONOCYTES # BLD AUTO: 0.55 THOUSAND/ΜL (ref 0.17–1.22)
MONOCYTES NFR BLD AUTO: 5 % (ref 4–12)
NEUTROPHILS # BLD AUTO: 8.84 THOUSANDS/ΜL (ref 1.85–7.62)
NEUTS SEG NFR BLD AUTO: 84 % (ref 43–75)
NRBC BLD AUTO-RTO: 0 /100 WBCS
PLATELET # BLD AUTO: 294 THOUSANDS/UL (ref 149–390)
PMV BLD AUTO: 9.6 FL (ref 8.9–12.7)
POTASSIUM SERPL-SCNC: 3.5 MMOL/L (ref 3.5–5.3)
RBC # BLD AUTO: 3.64 MILLION/UL (ref 3.81–5.12)
SODIUM SERPL-SCNC: 140 MMOL/L (ref 135–147)
WBC # BLD AUTO: 10.5 THOUSAND/UL (ref 4.31–10.16)

## 2025-01-23 PROCEDURE — 97530 THERAPEUTIC ACTIVITIES: CPT

## 2025-01-23 PROCEDURE — 99232 SBSQ HOSP IP/OBS MODERATE 35: CPT | Performed by: INTERNAL MEDICINE

## 2025-01-23 PROCEDURE — 97535 SELF CARE MNGMENT TRAINING: CPT

## 2025-01-23 PROCEDURE — 97110 THERAPEUTIC EXERCISES: CPT

## 2025-01-23 PROCEDURE — 80048 BASIC METABOLIC PNL TOTAL CA: CPT | Performed by: NURSE PRACTITIONER

## 2025-01-23 PROCEDURE — 85025 COMPLETE CBC W/AUTO DIFF WBC: CPT | Performed by: NURSE PRACTITIONER

## 2025-01-23 RX ORDER — DOCUSATE SODIUM 100 MG/1
100 CAPSULE, LIQUID FILLED ORAL 2 TIMES DAILY PRN
Status: DISCONTINUED | OUTPATIENT
Start: 2025-01-23 | End: 2025-02-03 | Stop reason: HOSPADM

## 2025-01-23 RX ADMIN — PANTOPRAZOLE SODIUM 40 MG: 40 TABLET, DELAYED RELEASE ORAL at 05:52

## 2025-01-23 RX ADMIN — ACETAMINOPHEN 975 MG: 325 TABLET, FILM COATED ORAL at 21:01

## 2025-01-23 RX ADMIN — METOPROLOL SUCCINATE 75 MG: 50 TABLET, EXTENDED RELEASE ORAL at 07:53

## 2025-01-23 RX ADMIN — POLYETHYLENE GLYCOL 3350 17 G: 17 POWDER, FOR SOLUTION ORAL at 08:08

## 2025-01-23 RX ADMIN — ENOXAPARIN SODIUM 30 MG: 30 INJECTION SUBCUTANEOUS at 21:01

## 2025-01-23 RX ADMIN — ACETAMINOPHEN 975 MG: 325 TABLET, FILM COATED ORAL at 05:52

## 2025-01-23 RX ADMIN — DILTIAZEM HYDROCHLORIDE 120 MG: 120 CAPSULE, COATED, EXTENDED RELEASE ORAL at 07:54

## 2025-01-23 RX ADMIN — ACETAMINOPHEN 975 MG: 325 TABLET, FILM COATED ORAL at 15:13

## 2025-01-23 RX ADMIN — MELATONIN TAB 3 MG 6 MG: 3 TAB at 21:01

## 2025-01-23 RX ADMIN — METOPROLOL SUCCINATE 75 MG: 50 TABLET, EXTENDED RELEASE ORAL at 21:01

## 2025-01-23 RX ADMIN — MIRTAZAPINE 7.5 MG: 7.5 TABLET, FILM COATED ORAL at 21:01

## 2025-01-23 NOTE — PROGRESS NOTES
Progress Note - PMR   Name: Sandra Purvis 89 y.o. female I MRN: [id number]  Unit/Bed#: -02 I Date of Admission: 1/17/2025   Date of Service: 1/23/2025 I Hospital Day: 6     Assessment & Plan  Hypertension  Cw metoprolol succinate 75 mg twice daily  Cardizem 120 mg XR daily, d/c amlodipine  Wears compression stockings, abdominal binder  Atrial fibrillation with rapid ventricular response (HCC)  Initially presented with sinus bradycardia and evaluated by cardiology and was in controlled atrial fibrillation without bradycardia  Not on anticoagulation prior to admission and not indicated at this point due to the SDH and SAH and overall remains high risk due to risk of falls and given her advanced age  No recent follow-up with cardiology as an outpatient and generally sees her primary care physician for management and was on Toprol 50 mg daily at home which has been changed well and in the hospital  Tachycardic in the hospital and given IV fluids and medication changes include addition of midodrine and changes in the metoprolol dosing  Recommended for aspirin but would likely not be a good long-term candidate for anticoagulation  Cardio rec: continue with cardizem 120 daily and Toprol-XL 75 mg twice daily 1/16  Stage 3 chronic kidney disease, unspecified whether stage 3a or 3b CKD (HCC)  Lab Results   Component Value Date    EGFR 62 01/23/2025    EGFR 60 01/20/2025    EGFR 64 01/17/2025    CREATININE 0.83 01/23/2025    CREATININE 0.85 01/20/2025    CREATININE 0.81 01/17/2025   At baseline  GERD (gastroesophageal reflux disease)  Continue protonix  Basal cell carcinoma (BCC) of scalp  Large scalp mass noted on admission.  Also follows with dermatology as an outpatient but declining any interventions  Some localized spot bleeding from the edges noted  Patient scratched at it and created wound, hair net applied  Malignant melanoma of arm, left (HCC)  Large mass on the left forearm that is melanoma  Follows with  dermatology as an outpatient last seen in December 2024 but not interested in any treatments  SDH (subdural hematoma) (HCC)  Patient presents with fall and initial CTh on 1/4 showing an acute subdural hemorrhage along the left cerebral convexity with a maximum thickness of 1.9 cm with no midline shift as well as an acute subarachnoid hemorrhage in the left frontoparietal sulci, left sylvian fissure and suprasellar cistern  Repeat CT head on 1/5 was stable; repeat 1/14 w/ interval evolution no new/evolving hemorrage   Completed 7 day course of keppra for seizure ppx  F/u w/ NSGY for repeat CTH ~1/27 1/20 Repeat imaging ordered for f/o tomorrow (1/21) at 1pm  F/u as needed; no neurosurgical intervention needed  SAH (subarachnoid hemorrhage) (HCC)  Subarachnoid hemorrhage noted on initial CT on 1/4 and stable on 1/5;   CTH1/14  Interval evolution of recent subdural and subarachnoid hemorrhage as detailed above. No definitive new or enlarging intracranial hemorrhage.  Continue same management for the subarachnoid hemorrhage as for the subdural hematoma, please see that separate section for overall management plans  Closed extensive facial fractures (HCC)  Secondary to fall  CT of the head on 1/4 also showed comminuted and mildly displaced fractures of the lateral and anterior walls of the right maxilla, lateral and inferior walls of the right orbit, right inferior orbital wall fractures appear to involve the inferior orbital foramen with associated small extraconal soft tissue edema and focus of air.  There is no herniation of extraocular muscles and a nondisplaced fracture of the zygomatic arch as well as the temporal bone at the TMJ.  There is diffuse opacification of the right maxillary sinus and right nasal cavity with blood products and air  Seen by oral maxillofacial surgery team and surgical intervention was declined by patient  Completed 7-day course of Unasyn/Augmentin  Sinus precautions  Follow-up with OMFS  as an outpatient  Chest pain  Troponins obtained on initial evaluation of the patient in the emergency room and cardiology was consulted for evaluation  Despite elevated troponins there was nonischemic findings on her EKG and felt to be potentially related to a nonischemic troponin elevation in the setting of trauma and brain bleed versus demand ischemia due to her paroxysmal atrial fibrillation and syncopal episode  Not a candidate for cardiology for ischemic evaluation due to advanced age, frailty and recent subarachnoid hemorrhage/subdural hemorrhage  No new regional wall motion abnormalities noted on echocardiogram on 1/6/2025  Recommended to start aspirin 81 mg when cleared by neurosurgery with CT head  Follow-up with cardiology as an outpatient  Orthostatic hypotension  Blood pressures ranging in physical therapy as low as 70/40 and cardiology was evaluating the patient  Symptomatic with lightheadedness and presyncope and had received gentle fluids with some improvement  BALJIT stockings PRN as well and encouragement of fluid intake  Repeat orthostatics improved but still getting lightheaded at times which was felt to be the initiating factor in her syncopal episode that led to the fall and injury.  1/17 IM held midodrine, no significant orthostatic hypotension currently observed may need to introduce at 2.5 mg TID if develops episodes at a later time, continue to monitor orthostatic Bps   1/19 bp dropped on standing, came back up on sitting  Syncope  Presented with syncopal episode preceded by lightheadedness that led to the fall which caused the sudden dural hemorrhage and subarachnoid hemorrhage  Also found to be significantly orthostatic which was likely the etiology and was placed on telemetry with no evidence of bradycardia or pauses  Limited p.o. intake and fluid intake and suspected orthostatic hypotension as the cause  Anemia  Most recent hemoglobin of 9.4 1/17; which is stable  Continue to follow  "biweekly CBC or sooner if clinically indicated  Will need repeat scan if significant drops or concern for acute bleeds  1/20 10.1, 1/23 10.4  Leukocytosis  Most recent WBC count 12.08 and actually trending down from its maximum of 17.59  Unclear etiology, may be directly related to reactive changes from the SDH and SAH as well as fractures  Continue to monitor and if considerable changes may repeat scans given the fractures in the facial bones  Was on a course of Unasyn/Augmentin for 7 days  IM: Infectious workup negative  Received IV fluids, discontinued with evidence of fluid overload CXR  Stable off abx d/c 1/15  12.08 1/17---> 13.06 1/20----> 10.5 1/23  Acute on chronic diastolic congestive heart failure (HCC)  Wt Readings from Last 3 Encounters:   01/22/25 46.4 kg (102 lb 4.7 oz)   01/12/25 53.5 kg (118 lb)   01/10/25 51.1 kg (112 lb 9.6 oz)     TTE 1/6/2025 showed EF 70%, grade 2 DD, mildly dilated RV, mild LA, mild AI, mild TR, estimated PA pressure 50 mmHg, trivial pericardial effusion  Not on diuretics prior to admission  Cw furosemide 20 mg; monitor for volume status   Daily weights, monitor I/O     Insomnia  Inc melatonin to 6mg HS, trazodone 25mg HS PRN----> increased to 50---> 75 mg  Sleep log   Continue remeron 7.5, melatonin 6 mg  1/23 Psych consulted    History of Present Illness   Sandra Purvis is a 89 y.o. female w/ PMHx of afib, GERD, HTN, who initially presented to [facility] on 1/4/20[facility] after a syncopan episode and collapse at home. CT had showed \"comminuted and mildly displaced fractures of the lateral and anterior walls of the right maxilla, lateral and inferior walls of the right orbit, right inferior orbital wall fracture appears to involve the inferior orbital foramen associated with small extraconal soft tissue edema and focus of air. There is also a nondisplaced fracture of the zygomatic arch and the temporal bone at the TMJ and diffuse " "opacification of the right maxillary sinus and right nasal cavity with blood products and air. Additionally there was an acute subdural hemorrhage along the left cerebral convexity with a maximal thickness of 1.9 cm with no midline shift and an acute subarachnoid hemorrhage in the left frontal parietal sulci, left sylvian fissure as well as suprasellar cistern.\" OMFS was consulted as well as neurosurgery. Patient and family declined any acute intervention at this time. Patient was started on Keppra x 7 days. Patient was treated with 7-day course of Unasyn/Augmentin. Her hospital stay was also complicated with A-fib with RVR and cardiology was consulted. Patient then had orthostatic hypotension and started on midodrine. Patient initially transferred to [facility] 1/12/2025. While at [facility] patient with hypertensive episodes to SBP >190 and Afib rates 120-130s. Patient became more symptomatic from her A-fib and was transferred to acute care 1/14. She had X ray w/ pulmonary edema was given lasix 1/14-1/15 with good response. Patient was seen by cardiology. Patient was placed on Cardizem drip and now on PO Cardizem. Her rates have improved. Sandra Purvis was admitted to the [facility] on 01/17/25     Chief Complaint: f/u ambulatory dysfunction    Interval: Patient seen and examined in bed during OT. No events overnight.  Reports 4/10 pain Last BM 1/23 x 3. Pt reports that she felt she slept better last night. Sleeping log reports 2 hours of sleep which is the same as yesterday.    Review of Systems   Constitutional:  Negative for fever.   Respiratory:  Positive for shortness of breath.         Reports some SOB during therapy relieved by rest   Cardiovascular:  Negative for chest pain, palpitations and leg swelling.   Gastrointestinal:  Positive for abdominal pain. Negative for constipation, diarrhea, nausea and vomiting.        Reports some abdominal pain after eating occasionally       Objective   Functional Update:  Physical " Therapy Occupational Therapy Speech Therapy   Weight Bearing Status: Full Weight Bearing  Transfers: Total Assistance  Bed Mobility: Maximum Assistance  Discharge Recommendations: Home with:  DC Home with:: 24 Hour Assisteance   Eating: Supervision  Grooming: Supervision  Bathing: Maximum Assistance  Bathing: Maximum Assistance  Upper Body Dressing: Minimal Assistance  Lower Body Dressing: Maximum Assistance  Toileting: Total Assistance  Tub/Shower Transfer: Total Assistance (would require TA at current level of function)  Toilet Transfer: Maximum Assistance  Cognition: Exceptions to WNL  Cognition: Decreased Memory, Decreased Executive Functions, Decreased Attention, Decreased Comprehension  Orientation: Person, Place   Mode of Communication: Verbal  Cognition: Exceptions to WNL  Cognition: Decreased Memory, Decreased Executive Functions, Decreased Attention, Decreased Comprehension, Decreased Safety  Orientation: Person, Place, Time, Situation  Swallowing: Exceptions to WNL  Swallowing: Oral Dysphagia, Aspiration Risk  Diet Recommendations: Level 2/Mechanically Altered, Thin  Discharge Recommendations: Home with: (pending progress)  DC Home with:: 24 Hour Supervision, 24 Hour Assisteance, Family Support         Temp:  [96.6 °F (35.9 °C)-98.7 °F (37.1 °C)] 98.7 °F (37.1 °C)  HR:  [57-75] 75  Resp:  [17-18] 18  BP: ()/(58-68) 148/68  SpO2:  [96 %-98 %] 96 %    Physical Exam  Constitutional:       General: She is not in acute distress.     Comments: Frail and tired   HENT:      Head: Normocephalic.      Comments: Patient has on surgical cap and dressing on scalp wound     Mouth/Throat:      Mouth: Mucous membranes are moist.   Cardiovascular:      Rate and Rhythm: Normal rate and regular rhythm.   Pulmonary:      Effort: Pulmonary effort is normal.      Breath sounds: Normal breath sounds.   Abdominal:      General: Bowel sounds are normal.   Musculoskeletal:         General: Normal range of motion.   Skin:      General: Skin is warm and dry.      Comments: Bruising present on face and arms  Wound on scalp   Neurological:      Mental Status: She is alert. Mental status is at baseline.      Comments: Patient is more responsive and oriented today   Psychiatric:         Mood and Affect: Mood normal.         Behavior: Behavior normal.           Scheduled Meds:  Current Facility-Administered Medications   Medication Dose Route Frequency Provider Last Rate    acetaminophen  975 mg Oral Q8H [facility] Autumn Chapman PA-C      bisacodyl  10 mg Rectal Daily PRN Autumn Chapman PA-C      calcium carbonate  500 mg Oral Daily PRN Autumn Chapman PA-C      diltiazem  120 mg Oral Daily Autumn Chapman PA-C      docusate sodium  100 mg Oral BID PRN Sheri Pope MD      enoxaparin  30 mg Subcutaneous Q24H Autumn Chapman PA-C      hydrALAZINE  10 mg Oral Q8H PRN LUIS Monsalve      melatonin  6 mg Oral HS Sheri Pope MD      metoprolol succinate  75 mg Oral BID Autumn Chapman PA-C      midodrine  5 mg Oral TID PRN Autumn Chapman PA-C      mirtazapine  7.5 mg Oral HS Sheri Pope MD      ondansetron  4 mg Oral Q6H PRN Autumn Chapman PA-C      oxyCODONE  2.5 mg Oral Q6H PRN Autumn Chapman PA-C      Or    oxyCODONE  5 mg Oral Q6H PRN Autumn Chapman PA-C      pantoprazole  40 mg Oral Early Morning Autumn Chapman PA-C      traZODone  75 mg Oral HS PRN Sheri Pope MD           Lab Results: I have reviewed the following results:  Results from last 7 days   Lab Units 01/23/25  0554 01/20/25  0425 01/17/25  0530   HEMOGLOBIN g/dL 10.4* 10.1* 9.4*   HEMATOCRIT % 32.3* 30.5* 28.6*   WBC Thousand/uL 10.50* 13.06* 12.08*   PLATELETS Thousands/uL 294 302 267     Results from last 7 days   Lab Units 01/23/25  0554 01/20/25  0425 01/17/25  0530   BUN mg/dL 12 14 15   SODIUM mmol/L 140 136 133*   POTASSIUM mmol/L 3.5 3.5 3.4*   CHLORIDE mmol/L 103 101 97   CREATININE mg/dL 0.83 0.85  0.81   AST U/L  --  16  --    ALT U/L  --  22  --               Autumn Chapman PA-C  Physical Medicine and Rehabilitation   01/23/25

## 2025-01-23 NOTE — CONSULTS
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 performed [facility], was performed utilizing techniques to minimize radiation dose exposure, including the use of iterative reconstruction and automated exposure control. IMAGE QUALITY: Examination quality is mildly degraded by motion artifact. A repeat CT of the superior portion of the brain was performed. The examination is of overall diagnostic quality. FINDINGS: PARENCHYMA:No intracranial mass, mass effect or midline shift. No CT signs of acute infarction.  No acute parenchymal hemorrhage. There is however ongoing extra-axial hemorrhage which is detailed below. VENTRICLES AND EXTRA-AXIAL SPACES: Stable size and configuration of the ventricular system which is within normal limits for patient's age. There are bifrontal extra-axial collections which are predominantly hypodense but contain areas of hyperdensity compatible with acute on chronic subdural hematomas. As compared to prior some of the more acute/hyperdense blood products have evolved and resolved. There is a region of approximately 1.0 cm thick hyperdense subdural blood products along the anterior left frontal convexity (4:31 and 2:32), similar in configuration to prior but slightly decreased in both size and attenuation reflecting interval evolution. Trace amount of  hyperdense blood products along the right frontal convexity (2:23 and 4:22). Small amount of subdural hemorrhage along the posterior falx and pooling along the left tentorium cerebelli is stable from prior. Accounting for the motion artifact the small volume subarachnoid hemorrhage involving the bilateral frontoparietal convexities is stable in extent but decreased in attenuation reflecting interval evolution. VISUALIZED ORBITS: Bilateral lens replacement surgery.  No acute abnormality involving the visualized orbits. PARANASAL SINUSES: Partially visualized recent appearing right maxillary sinus wall and inferior right orbital floor fractures and resulting hemorrhagic opacification of the right maxillary sinus is similar in appearance to prior. CALVARIUM AND EXTRACRANIAL SOFT TISSUES: Nondisplaced fractures of the right zygomatic arch and the right temporal bone to the level of the TMJ. Large left parietal scalp hematoma, similar in appearance to prior.     Impression: Interval evolution of recent subdural and subarachnoid hemorrhage as detailed above. No definitive new or enlarging intracranial hemorrhage. The study was marked in EPIC for immediate notification. Workstation performed: [id number]     CT head wo contrast  Result Date: 1/7/2025  Narrative: CT BRAIN - WITHOUT CONTRAST INDICATION:   Altered mental status COMPARISON:  None. TECHNIQUE:  CT examination of the brain was performed.  Multiplanar 2D reformatted images were created from the source data. Radiation dose length product (DLP) for this visit:  861.13 mGy-cm .  This examination, like all CT scans performed in the [facility], was performed utilizing techniques to minimize radiation dose exposure, including the use of iterative  reconstruction and automated exposure control. IMAGE QUALITY:  Diagnostic. FINDINGS: PARENCHYMA: Left frontoparietal convexity subdural hematoma is stable in size, measuring up to 9 mm in thickness. Collection  is progressively less hyperdense consistent with expected evolution of blood products. Trace linear density along the right frontal convexity could represent a tiny focus of subdural or subarachnoid hemorrhage. Tiny foci of subdural blood along the falx bilaterally, stable. Stable mild subdural hemorrhage along the left posterior falx and tentorium when compared to the most recent prior exam. Scattered foci of subarachnoid hemorrhage over the frontoparietal convexities is stable. No midline shift. Intact basal cisterns.. VENTRICLES AND EXTRA-AXIAL SPACES: No intraventricular hemorrhage or hydrocephalus. VISUALIZED ORBITS: Intact globes. No retrobulbar hematoma. PARANASAL SINUSES: Blood products in the right maxillary sinus. CALVARIUM AND EXTRACRANIAL SOFT TISSUES: Right zygomaticomaxillary complex and orbital floor fracture. Stable hematoma along the left parietal scalp.     Impression: Evolving blood products in the left convexity subdural hematoma without evidence of new hemorrhage. Subdural and subarachnoid hemorrhage also stable when compared to the most recent prior exam. Workstation performed: [id number]     Echo complete w/ contrast if indicated  Result Date: 1/6/2025  Narrative:   Left Ventricle: Left ventricular cavity size is normal. Wall thickness is mildly increased. The left ventricular ejection fraction is 70%. Systolic function is vigorous. Wall motion is normal. Diastolic function is moderately abnormal, consistent with grade II (pseudonormal) relaxation.   Right Ventricle: Systolic function is mildly reduced.   Left Atrium: The atrium is mildly dilated.   Aortic Valve: There is mild regurgitation.   Mitral Valve: There is mild annular calcification.   Tricuspid Valve: There is mild regurgitation. The right ventricular systolic pressure is moderately elevated. The estimated right ventricular systolic pressure is 50.00 mmHg.   Pericardium: There is a trivial pericardial effusion posterior to the heart.      CT head wo contrast  Result Date: 1/5/2025  Narrative: CT BRAIN - WITHOUT CONTRAST INDICATION:   F/u SDH/SAH. COMPARISON: 1/4/2025. TECHNIQUE:  CT examination of the brain was performed.  Multiplanar 2D reformatted images were created from the source data. Radiation dose length product (DLP) for this visit:  1079.77 mGy-cm .  This examination, like all CT scans performed in the [facility], was performed utilizing techniques to minimize radiation dose exposure, including the use of iterative reconstruction and automated exposure control. IMAGE QUALITY:  Diagnostic. FINDINGS: PARENCHYMA: Acute subarachnoid and subdural hemorrhage is again identified. Subdural hemorrhage is most prominent along the left frontal parietal vertex, similar to the prior examination. Subarachnoid hemorrhage is seen peripherally within the left hemisphere but also in the parafalcine region and within the right parietal lobe. There has been some redistribution of the parafalcine hemorrhage now slightly more posteriorly and inferiorly located along the falx and tentorium. No midline shift. No signs of acute ischemia. VENTRICLES: Minimal hyperdensity within the atria of the right lateral ventricle may represent redistribution of subarachnoid hemorrhage. No obstructive hydrocephalus. VISUALIZED ORBITS: Normal visualized orbits. PARANASAL SINUSES: There is hemorrhage filling the right maxillary sinus and a small amount of hemorrhage layering posteriorly within the left maxillary sinus. CALVARIUM AND EXTRACRANIAL SOFT TISSUES: Multiple facial fractures are identified including fractures of the right orbit, maxilla and zygomatic arch which appears similar to the prior examination. Lobulated soft tissue mass in the extracranial soft tissues in the left parietal region measures 4.8 cm in oblique AP diameter. This is unchanged from the prior examination having a ulcerated appearance centrally.     Impression: Acute subdural and  subarachnoid hemorrhage with some redistribution compared to the prior examination. Subdural hemorrhages largest in the left frontal lobe with mild mass effect upon the adjacent brain parenchyma. No midline shift. No new hemorrhage. Multiple right facial fractures are identified with hemorrhage filling the right maxillary sinus and a small amount of hemorrhage layering within the left maxillary sinus. There is an extracranial ulcerated soft tissue mass in the left parietal soft tissues. Workstation performed: [id number]     CT cervical spine without contrast  Result Date: 1/4/2025  Narrative: CT CERVICAL SPINE - WITHOUT CONTRAST INDICATION:   fall, head injury. COMPARISON:  None. TECHNIQUE:  CT examination of the cervical spine was performed without intravenous contrast.  Contiguous axial images were obtained. Multiplanar 2D reformatted images were created from the source data. Radiation dose length product (DLP) for this visit:  284 mGy-cm .  This examination, like all CT scans performed in the [facility], was performed utilizing techniques to minimize radiation dose exposure, including the use of iterative reconstruction and automated exposure control. IMAGE QUALITY:  Diagnostic. FINDINGS: ALIGNMENT: 2 mm degenerative anterolisthesis of C6 on C7 VERTEBRAE:  No fracture. DEGENERATIVE CHANGES: Mild to moderate multilevel cervical degenerative changes are noted without critical central canal stenosis. PREVERTEBRAL AND PARASPINAL SOFT TISSUES: No prevertebral or paravertebral soft tissue hematoma. Atherosclerotic calcifications of the carotid arteries. THORACIC INLET: Fused anterior first and second ribs bilaterally. Biapical pleural thickening and parenchymal scarring. Partially imaged diffuse gaseous distention of the thoracic esophagus.     Impression: 1. No cervical spine fracture or traumatic malalignment. 2. Partially imaged diffuse gaseous distention of the esophagus. Further evaluation with  chest CT is recommended to evaluate the distal esophagus. I personally discussed this study with YUMI LAL on 1/4/2025 6:56 PM. Workstation performed: [id number]     CT facial bones without contrast  Result Date: 1/4/2025  Narrative: CT FACIAL BONES WITHOUT INTRAVENOUS CONTRAST INDICATION:   fall, head injury. COMPARISON: None. TECHNIQUE:  Axial CT images were obtained through the facial bones with additional sagittal and coronal reconstructions. Radiation dose length product (DLP) for this visit:  353 mGy-cm .  This examination, like all CT scans performed in the [facility], was performed utilizing techniques to minimize radiation dose exposure, including the use of iterative reconstruction and automated exposure control. IMAGE QUALITY:  Diagnostic. FINDINGS: FACIAL BONES: Comminuted and mildly displaced fractures of the lateral and anterior walls of right maxilla, lateral and inferior walls of right orbit. Nondisplaced fractures of zygomatic arch and the temporal bone at the TMJ (9/94). Probable diastases of  the nasofrontal suture (606/69). ORBITS: Right inferior orbital wall fracture that appears to involve the inferior orbital foramen. Mild soft tissue edema and small focus of air adjacent to the inferior rectus muscle. No herniation of extraocular muscles. No retro-orbital hematoma. SINUSES: Diffuse opacification of right maxillary sinus and right nasal cavity with blood products and air. Small air-fluid level in the left maxillary sinus. SOFT TISSUES: Mild right periorbital soft tissue swelling.     Impression: 1. Comminuted and mildly displaced fractures of the lateral and anterior walls of right maxilla, lateral and inferior walls of right orbit. 2. Right inferior orbital wall fracture appears to involve the inferior orbital foramen with associated small extraconal soft tissue edema and focus of air. No herniation of extraocular muscles. 3. Nondisplaced fractures of zygomatic arch and  "the temporal bone at the TMJ. 4. Diffuse opacification of right maxillary sinus and right nasal cavity with blood products and air. I personally discussed this study with YUMI LAL on 1/4/2025 6:56 PM. Workstation performed: [id number]     CT head without contrast  Result Date: 1/4/2025  Narrative: CT BRAIN - WITHOUT CONTRAST INDICATION:   fall, head injury.  Patient brought by EMS from home. Patient reports she was seated, stood and began to walk when she developed sudden onset dizziness, syncope with fall and head strike. Hematoma to R eye, dried blood to b/l nare, upper lip swelling. Patient complains of facial pain. \"Feeling unwell\". Denies headache, dizziness, paresthesia at present. No thinners COMPARISON: CT head dated August 31, 2015 TECHNIQUE:  CT examination of the brain was performed.  Multiplanar 2D reformatted images were created from the source data. Radiation dose length product (DLP) for this visit:  814 mGy-cm .  This examination, like all CT scans performed in the [facility], was performed utilizing techniques to minimize radiation dose exposure, including the use of iterative reconstruction and automated exposure control. IMAGE QUALITY:  Diagnostic. FINDINGS: PARENCHYMA/EXTRA-AXIAL SPACES: - Acute subdural hemorrhage along the left cerebral convexity with maximal thickness of 1.9 cm along the frontal lobe (2/31). There is extension of the hematoma along left anterior temporal lobe. - Acute subarachnoid hemorrhage in the left frontoparietal sulci, left sylvian fissure and suprasellar cistern. - No midline shift. VENTRICLES AND EXTRA-AXIAL SPACES: Extra-axial hemorrhage as described. VISUALIZED ORBITS: Please refer to the separately dictated CT facial bones report. PARANASAL SINUSES: Please refer to the separately dictated CT facial bones report. CALVARIUM AND EXTRACRANIAL SOFT TISSUES: Moderate left parietal soft tissue swelling/laceration. No calvarial fracture. Please refer " to the separately dictated CT facial bones report for traumatic findings in the face.     Impression: 1. Acute subdural hemorrhage along the left cerebral convexity with maximal thickness of 1.9 cm. No midline shift. 2. Acute subarachnoid hemorrhage in the left frontoparietal sulci, left sylvian fissure and suprasellar cistern. 3. Please refer to the separately dictated CT facial bones report for traumatic findings in the face. I personally discussed this study with YUMI LAL on 1/4/2025 6:56 PM. Workstation performed: [id number]     XR chest 1 view portable  Result Date: 1/4/2025  Narrative: XR CHEST PORTABLE INDICATION: syncopal. COMPARISON: CXR 8/31/2015. Abdomen CT 10/10/2021. FINDINGS: Clear lungs. No pneumothorax or pleural effusion. Moderate cardiomegaly. Distended air-filled esophagus. Bones are unremarkable for age. Normal upper abdomen.     Impression: No acute disease. Distended air-filled esophagus. Moderate cardiomegaly. Workstation performed: [id number]     EKG: [id number] on 1/14/2025    Code Status: Level 3 - DNAR and DNI  Advance Directive and Living Will:       Power of :      Suicide/Homicide Risk Assessment:  Risk of Harm to Self:  The following ratings are based on assessment at the time of the interview  Demographic risk factors include: , elderly (75 or older)   Historical Risk Factors include: chronic anxiety symptoms  Recent Specific Risk Factors include: current anxiety symptoms, health problems, chronic health problems  Protective Factors: no current suicidal ideation, ability to adapt to change, being a parent, being , compliant with medications, connection to community, connection to own children, having a sense of purpose or meaning in life, medical compliance, personal beliefs, personal beliefs about the meaning and value of life, Mu-ism beliefs discouraging suicide, resiliency, responsibilities and duties to others, restricted access to lethal means, strong  relationships, supportive family  Weapons:  Guns at home, locked, no access to these, potentially not getting discharged home . The following steps have been taken to ensure weapons are properly secured: locked, secured  Based on today's assessment, Sandra presents the following risk of harm to self: low    Risk of Harm to Others:  The following ratings are based on assessment at the time of the interview  Demographic Risk Factors include: none.  Historical Risk Factors include: none.  Recent Specific Risk Factors include: none.  Protective Factors: no current homicidal ideation, ability to adapt to change, being a parent, being , compliant with medications, connection to community, connection to own children, contact with caregivers, cultural beliefs, medical compliance, moral system, Faith beliefs, resilience, responsibilities and duties to others, restricted access to lethal means, strong relationships, support system, supportive family  Weapons:  Guns at home, locked, no access to these, potentially not getting discharged home . The following steps have been taken to ensure weapons are properly secured: locked, secured  Based on today's assessment, Sandra presents the following risk of harm to others: minimal Brooke Mendelson, DO 01/23/25  Psychiatry Resident, PGY-I    This note was completed in part utilizing M-Modal Fluency Direct Software. Grammatical, translation, syntax errors, random word insertions, spelling mistakes, and incomplete sentences may be an occasional consequence of this system secondary to software limitations with voice recognition, ambient noise, and hardware issues. If you have any questions or concerns about the content, text, or information contained within the body of this dictation, please contact the provider for clarification.

## 2025-01-23 NOTE — ASSESSMENT & PLAN NOTE
Noted to have elevated troponins on admission to acute setting.  [place] to be due to trauma/brain bleed vs demand ischemia due to paroxysmal a-fib and syncope.  Cardiology recommended starting ASA 81mg daily when cleared by Neurosurgery.  Per Neurosurgery - ok to start ASA in 2 weeks (2/4) after patient/family discussion of risks/benefits with PCP/Cardiologist.  Recommend establishing with Cardiology on discharge.

## 2025-01-23 NOTE — ASSESSMENT & PLAN NOTE
Inc melatonin to 6mg HS, trazodone 25mg HS PRN----> increased to 50---> 75 mg  Sleep log   Continue remeron 7.5, melatonin 6 mg  1/23 Psych consulted

## 2025-01-23 NOTE — PLAN OF CARE
Problem: CARDIOVASCULAR - ADULT  Goal: Maintains optimal cardiac output and hemodynamic stability  Description: INTERVENTIONS:  - Monitor I/O, vital signs and rhythm  - Monitor for S/S and trends of decreased cardiac output  - Administer and titrate ordered vasoactive medications to optimize hemodynamic stability  - Assess quality of pulses, skin color and temperature  - Assess for signs of decreased coronary artery perfusion  - Instruct patient to report change in severity of symptoms  Outcome: Progressing     Problem: SKIN/TISSUE INTEGRITY - ADULT  Goal: Pressure injury heals and does not worsen  Description: Interventions:  - Implement low air loss mattress or specialty surface (Criteria met)  - Apply silicone foam dressing  - Instruct/assist with weight shifting every 120 minutes when in chair   - Limit chair time to 4 hour intervals  - Use special pressure reducing interventions such as waffle cushion when in chair   - Apply fecal or urinary incontinence containment device   - Perform passive or active ROM every 4 hours  - Turn and reposition patient & offload bony prominences every 2 hours   - Utilize friction reducing device or surface for transfers   - Consider consults to  interdisciplinary teams such as pt ot  - Use incontinent care products after each incontinent episode such as hydroguard  - Consider nutrition services referral as needed  Outcome: Progressing     Problem: MOBILITY - ADULT  Goal: Maintain or return to baseline ADL function  Description: INTERVENTIONS:  -  Assess patient's ability to carry out ADLs; assess patient's baseline for ADL function and identify physical deficits which impact ability to perform ADLs (bathing, care of mouth/teeth, toileting, grooming, dressing, etc.)  - Assess/evaluate cause of self-care deficits   - Assess range of motion  - Assess patient's mobility; develop plan if impaired  - Assess patient's need for assistive devices and provide as appropriate  - Encourage  maximum independence but intervene and supervise when necessary  - Involve family in performance of ADLs  - Assess for home care needs following discharge   - Consider OT consult to assist with ADL evaluation and planning for discharge  - Provide patient education as appropriate  Outcome: Progressing

## 2025-01-23 NOTE — ASSESSMENT & PLAN NOTE
Hgb currently 10.4.  Baseline Hgb 9-10.  No active s/s of bleeding.  Vitamin B12 1125 on 1/13 - no need for supplementation.  Folate 21.1 on 1/13 - no need for supplementation.  Iron panel on 1/13 - Iron Sat 12%, TIBC 295, Iron 34, and Ferritin 98.    Continue to trend routine CBC.

## 2025-01-23 NOTE — CASE MANAGEMENT
CM called patient's daughter Courtney, introduced myself, discussed discharge disposition. Courtney stated patient  cannot go home with her father, he cannot assist the patient, he is having his own medical issues, and she live 3 hours away, works full time. CM offered to email snf list to her, Courtney agreeable to this. CM searched facilities in [facility] within 20 miles from patient's home. And printed up the list. CM emailed full snf list , and search results snf list with explanations to [email or web address]. CM went in to speak with patient, and she was asleep. CM will continue to follow.

## 2025-01-23 NOTE — PROGRESS NOTES
01/23/25 0830   Pain Assessment   Pain Assessment Tool 0-10   Pain Score No Pain   Restrictions/Precautions   Precautions Aspiration;Bed/chair alarms;Cognitive;Fall Risk;Supervision on toilet/commode;Pain  (IV LUE, ortho BPs, sinus precautions)   Weight Bearing Restrictions No   ROM Restrictions No   Braces or Orthoses   (TEDs, ABD binder)   Oral Hygiene   Type of Assistance Needed Supervision   Physical Assistance Level No physical assistance   Comment CS seated EOB to brush teeth   Oral Hygiene CARE Score 4   Shower/Bathe Self   Type of Assistance Needed Physical assistance;Verbal cues;Adaptive equipment   Physical Assistance Level 51%-75%   Comment Pt engaged in sponge bath bed-level today for safety 2* low BP and c/o dizziness while seated EOB that did not resolve and pt requesting to lay back down. Pt able to wash 6/10 parts, washing UB and B/L upper legs while HOB elevated. A for thoroughness washing perri area. A to wash B/L lower legs/feet, and rear while in sidelying with maxA to roll and using bedrail.   Shower/Bathe Self CARE Score 2   Bathing   Assessed Bath Style Sponge Bath   Able to Gather/Transport No   Able to Adjust Water Temperature No   Able to Wash/Rinse/Dry (body part) Left Arm;Right Arm;L Upper Leg;R Upper Leg;Chest;Abdomen   Limitations Noted in Balance;Endurance;Problem Solving;Safety;ROM;Sequencing;Strength;Timeliness   Positioning Seated;Supine   Adaptive Equipment   (bedrail)   Tub/Shower Transfer   Reason Not Assessed Sponge Bath;Safety;Medical   Upper Body Dressing   Type of Assistance Needed Physical assistance;Verbal cues   Physical Assistance Level 25% or less   Comment seated upright in bed with A to manage shirt down trunk as pt pulled trunk forward using B/L bedrails   Upper Body Dressing CARE Score 3   Lower Body Dressing   Type of Assistance Needed Physical assistance;Verbal cues;Adaptive equipment   Physical Assistance Level 51%-75%   Comment completed bed-level today 2* c/o  max dizziness while seated EOB and needing to lay back down. HOB elevated so pt in long sit position in bed, pt able to assist with threading LEs through pull-up and pants, with TA to complete CM over hips via rolling to altenating sides with MaxA to roll and UE support on bedrails   Lower Body Dressing CARE Score 2   Putting On/Taking Off Footwear   Type of Assistance Needed Physical assistance   Physical Assistance Level Total assistance   Comment TA to doff/don socks, and don B/L knee-high TEDs   Putting On/Taking Off Footwear CARE Score 1   Dressing/Undressing Clothing   Remove UB Clothes Pullover Shirt   Don UB Clothes Pullover Shirt   Remove LB Clothes Socks   Don LB Clothes Pants;Undergarment;Socks;TEDs   Limitations Noted In Balance;Endurance;Problem Solving;Safety;Sequencing;Strength;ROM;Timeliness   Adaptive Equipment   (bedrail)   Positioning In Bed;Supported Sit   Roll Left and Right   Type of Assistance Needed Physical assistance;Verbal cues;Adaptive equipment   Physical Assistance Level 76% or more   Comment MaxA to either side, [place] A for correct hand placement on bedrails to maximize pt performance   Roll Left and Right CARE Score 2   Sit to Lying   Type of Assistance Needed Physical assistance;Verbal cues   Physical Assistance Level 76% or more   Comment A for trunk and BLE mgmt   Sit to Lying CARE Score 2   Lying to Sitting on Side of Bed   Type of Assistance Needed Physical assistance;Verbal cues   Physical Assistance Level 76% or more   Comment increased time, verbal and tactile cues for hand placement and technique, A for trunk and BLE mgmt, as well as A to scoot to EOB   Lying to Sitting on Side of Bed CARE Score 2   Sit to Stand   Reason if not Attempted Safety concerns   Sit to Stand CARE Score 88   Bed-Chair Transfer   Reason if not Attempted Safety concerns   Chair/Bed-to-Chair Transfer CARE Score 88   Toileting Hygiene   Comment offered toileting, pt declined   Toilet Transfer   Comment  offered toileting, pt declined   Exercise Tools   Exercise Tools Yes   Other Exercise Tool 1 Seated upright in supported sit in bed 2* low BP/dizziness, [id number] each of alternating UE chest press and bicep curls using 1# DB for increased BUE strength during bed mobility, STS, and transfers. Pt tolerated fair at slow pace with frequent vc's to remain alert and for technique. Rest breaks required between sets to manage fatigue.   Cognition   Overall Cognitive Status Impaired   Arousal/Participation Lethargic;Cooperative   Attention Attends with cues to redirect   Memory Decreased short term memory;Decreased recall of recent events;Decreased recall of precautions   Following Commands Follows one step commands with increased time or repetition   Activity Tolerance   Activity Tolerance Patient limited by fatigue  (fatigue, low BP, and c/o dizziness)   Medical Staff Made Aware Dr. Pope present briefly during OT session and aware of pt fatigue and dizziness/ BP   Assessment   Treatment Assessment Pt seen for 90min skilled OT session focused on ADL retraining (bed-level sponge bath today), repetitive bed mobility and rolling, oral care EOB, monitoring BP, and UE strengthening for increased independence w/ADLs/IADLs and decreased caregiver burden. See detailed descriptions of fxl performance above. Pt tolerated session fair, continuing to present lethargic, easily falling asleep during session without constant conversation/stimuli. Pt cont to c/o poor sleep, with fluctuating BP (see vitals section) and c/o dizziness seated EOB. Attempted to complete ADL seated EOB twice, with pt c/o dizziness that did not resolve after several minutes and pt needing to lay back down in bed. DU encouraged pt to drink fluids t/o session, per Dr. Pope. Cont OT POC: upright/OOB tolerance, ADL retraining, bed mobility, fxl transfer training, and UE strengthening.   Prognosis Fair   Problem List Decreased strength;Decreased endurance;Impaired  balance;Decreased mobility;Decreased cognition;Impaired judgement;Decreased safety awareness;Decreased skin integrity   Plan   Treatment/Interventions ADL retraining;Functional transfer training;Therapeutic exercise;Endurance training;Cognitive reorientation;Patient/family training;Equipment eval/education;Bed mobility;Compensatory technique education;Spoke to MD;Spoke to nursing   Progress Slow progress, decreased activity tolerance   Discharge Recommendation   Rehab Resource Intensity Level, OT   (STR)   OT Therapy Minutes   OT Time In 0830   OT Time Out 1000   OT Total Time (minutes) 90   OT Mode of treatment - Individual (minutes) 90   OT Mode of treatment - Concurrent (minutes) 0   OT Mode of treatment - Group (minutes) 0   OT Mode of treatment - Co-treat (minutes) 0   OT Mode of Treatment - Total time(minutes) 90 minutes   OT Cumulative Minutes 450   Therapy Time missed   Time missed? No

## 2025-01-23 NOTE — ASSESSMENT & PLAN NOTE
Most recent WBC count 12.08 and actually trending down from its maximum of 17.59  Unclear etiology, may be directly related to reactive changes from the SDH and SAH as well as fractures  Continue to monitor and if considerable changes may repeat scans given the fractures in the facial bones  Was on a course of Unasyn/Augmentin for 7 days  IM: Infectious workup negative  Received IV fluids, discontinued with evidence of fluid overload CXR  Stable off abx d/c 1/15  12.08 1/17---> 13.06 1/20----> 10.5 1/23

## 2025-01-23 NOTE — ASSESSMENT & PLAN NOTE
Home regimen: Toprol XL 50mg daily and HCTZ 12.5mg daily.  Currently receiving Toprol XL 75mg BID and Cardizem 120mg daily.  Give midodrine as needed for SBP <90.  Orthostatic + - monitor orthostatics daily.  Keep SBP <160 due to intracranial bleed.   VF shock x 1 @200J

## 2025-01-23 NOTE — PROGRESS NOTES
Progress Note - Internal Medicine   Name: Sandra Purvis 89 y.o. female I MRN: [id number]  Unit/Bed#: [facility] 269-02 I Date of Admission: 1/17/2025   Date of Service: 1/23/2025 I Hospital Day: 6    Assessment & Plan  Hypertension  Home regimen: Toprol XL 50mg daily and HCTZ 12.5mg daily.  Currently receiving Toprol XL 75mg BID and Cardizem 120mg daily.  Give midodrine as needed for SBP <90.  Orthostatic + - monitor orthostatics daily.  Keep SBP <160 due to intracranial bleed.  Atrial fibrillation with rapid ventricular response (HCC)  Not taking AC at home due to hx of falls.  Continue to hold with recent falls and now subarachnoid/subdural.  Had been taking Toprol XL 50mg daily at home - currently receiving Toprol XL 75mg BID and Cardizem 120mg daily.  Hx of a-fib with RVR while on ARC, recently transferred to be on Cardizem drip.  Monitor routine VS.  Replete electrolytes as needed.  Follow-up with Cardiology as outpatient.  GERD (gastroesophageal reflux disease)  Continue Protonix 40mg daily.  Had not been on Protonix as outpatient.  Basal cell carcinoma (BCC) of scalp  Large scalp mass noted on admission.  Declining intervention as an outpatient.  Follows with Dermatology and LUIS Mejía (Surgical Oncology) as outpatient.  Wound care consulted - wound cleansed/debrided.  Continue with wearing cap during the day to avoid further patient manipulation.  Malignant melanoma of arm, left (HCC)  Recent surgical excision on 12/19.  Follows with LUIS Mejía (Surg/Onc) as outpatient.  Had declined further treatment.  SDH (subdural hematoma) (HCC)  Presented on 1/4 after syncopal fall.  CTH showed acute subdural hemorrhage along the L cerebral convexity with maximal thickness of 1.9cm.  No midline shift.    Received Keppra x7 days.  Continue to hold AC/AP.  Neurovascular checks Q shift.  Maintain normotension.  Avoid SBP >160.    CTH on 1/20: New small hypodensity within the left frontal subcortical white matter,  differential includes age indeterminate infarct, evolving parenchymal contusion among other etiologies.  MRI brain obtained on 1/21 showing evolving bilateral cerebral convexity subdural hematomas containing blood products of varying ages, scattered small volume subarachnoid hemorrhage, no evidence of acute large territory infarct.    2 week follow-up with Neurosurgery on 1/21.  Imaging reviewed.  May consider starting ASA in 2 weeks after discussion with PCP/Cardiology in regards to risk/benefit.  Follow-up with Neurosurgery in additional 4 weeks with [facility].    Primary team following.  PT/OT/SLP.  SAH (subarachnoid hemorrhage) (HCC)  Presented on 1/4 after syncopal fall.  CTH showed acute subarachnoid hemorrhage in the L frontoparietal sulci, L sylvian fissure and suprasellar cistern.     Received Keppra x7 days.  Continue to hold AC/AP.  Neurovascular checks Q shift.  Maintain normotension.  Avoid SBP >160.     MRI brain obtained 1/21 - stable.  2 week follow-up with Neurosurgery on 1/21.  Imaging was reviewed.  May consider starting ASA in 2 weeks after discussion with PCP/Cardiology.  Follow-up with Neurosurgery in additional 4 weeks with [facility].     Primary team following.  PT/OT/SLP.  Closed extensive facial fractures (HCC)  S/p fall on 1/4.  CT facial bones showed comminuted and mildly displaced fractures of the lateral and anterior walls of the R maxilla, lateral, and inferior wall of the R orbit, wall fracture appears to involve the inferior orbital foramen with associated small extraconal soft tissue edema, nondisplaced fractures of the zygomatic arch and the temporal bone at the TMJ, diffuse opacification of R maxillary sinus and R nasal cavity with blood products and air.    OMFS evaluated in acute setting - declined surgical intervention.  Received IV Unasyn for 7 days.  Facial sinus precautions.    Follow-up with OMFS on discharge.  Chest pain  Noted to have elevated troponins on admission to acute  setting.  Felt to be due to trauma/brain bleed vs demand ischemia due to paroxysmal a-fib and syncope.  Cardiology recommended starting ASA 81mg daily when cleared by Neurosurgery.  Per Neurosurgery - ok to start ASA in 2 weeks (2/4) after patient/family discussion of risks/benefits with PCP/Cardiologist.  Recommend establishing with Cardiology on discharge.  Orthostatic hypotension  Presented with syncope on 1/4.  Significant orthostasis noted in acute setting.  Continue midodrine as needed  Apply abdominal binder/TEDs as needed.  Encourage PO hydration.  Continue to monitor orthostatics closely.  Syncope  Presented on 1/4 s/p syncopal fall.  EKG showed atrial fibrillation with RVR on admission.  ECHO on 1/6 showed EF 70%, G2DD.  Noted to have significant orthostasis in acute setting.  Started on midodrine 2.5mg BID.  Continue to monitor orthostatics.  Encourage PO hydration.  Apply abdominal binder/TEDs as needed.    Follow-up/establish with Cardiology as outpatient.  Anemia  Hgb currently 10.4.  Baseline Hgb 9-10.  No active s/s of bleeding.  Vitamin B12 1125 on 1/13 - no need for supplementation.  Folate 21.1 on 1/13 - no need for supplementation.  Iron panel on 1/13 - Iron Sat 12%, TIBC 295, Iron 34, and Ferritin 98.    Continue to trend routine CBC.  Leukocytosis  Improving, WBC count currently 10.50 from 13.06.  Chronically elevated.  Unclear etiology, may be directly related to reactive changes from SDH and SAH as well as fracture.  Did recently complete a course of Unasyn and Augmentin.  No s/s of active infection.  Continue to trend routine CBC.  Acute on chronic diastolic congestive heart failure (HCC)  ECHO on 1/6 showed EF 70%, G2DD.  Monitor volume status - currently euvolemic.  Monitor I&Os, daily weights.  Insomnia  Continue melatonin 6mg at HS, Remeron 7.5mg at HS, and trazodone 25mg at HS PRN.  Continue sleep logs.    VTE Pharmacologic Prophylaxis:   Pharmacologic: Enoxaparin (Lovenox)  Mechanical  "VTE Prophylaxis in Place: Yes - sequential compression devices.    Current Length of Stay: 6 day(s)    Current Patient Status: Inpatient Rehab     Discharge Plan: As per primary team.    Code Status: Level 3 - DNAR and DNI    Subjective:   Pt examined while pt sitting in bed in pt room.  States that she feels great today.  Denies any fevers, chills, SOB, palpitations, or CP.  Denies any headaches.  States that her thighs do ache slightly after working with therapy.  Did have lightheadedness during therapy this morning but states that it wasn't as severe.  Has been drinking more water.  Disoriented when it comes to time.  Has no other concerns or complaints at this time.    Objective:     Vitals:   Temp (24hrs), Av °F (36.7 °C), Min:96.6 °F (35.9 °C), Max:98.7 °F (37.1 °C)    Temp:  [96.6 °F (35.9 °C)-98.7 °F (37.1 °C)] 98.7 °F (37.1 °C)  HR:  [57-75] 75  Resp:  [17-18] 18  BP: ()/(58-68) 148/68  SpO2:  [96 %-98 %] 96 %  Body mass index is 16.02 kg/m².     Review of Systems   Constitutional:  Negative for appetite change, chills, fatigue and fever.   HENT:  Negative for trouble swallowing.    Eyes:  Negative for visual disturbance.   Respiratory:  Negative for cough, shortness of breath, wheezing and stridor.    Cardiovascular:  Negative for chest pain, palpitations and leg swelling.   Gastrointestinal:  Negative for abdominal distention, abdominal pain, constipation, diarrhea, nausea and vomiting.        LBM    Genitourinary:  Negative for difficulty urinating.   Musculoskeletal:  Positive for myalgias (B/L thighs ache after working with therapy this morning). Negative for arthralgias, back pain and gait problem.   Neurological:  Positive for light-headedness (occurred during therapy this morning, not \"as severe as yesterday\"). Negative for dizziness, weakness and headaches.   Psychiatric/Behavioral:  Positive for confusion. Negative for dysphoric mood and sleep disturbance. The patient is not " nervous/anxious.    All other systems reviewed and are negative.       Input and Output Summary (last 24 hours):       Intake/Output Summary (Last 24 hours) at 1/23/2025 1042  Last data filed at 1/23/2025 0828  Gross per 24 hour   Intake 480 ml   Output --   Net 480 ml       Physical Exam:     Physical Exam  Vitals and nursing note reviewed.   Constitutional:       General: She is not in acute distress.     Appearance: Normal appearance. She is not ill-appearing.   HENT:      Head: Normocephalic and atraumatic.   Cardiovascular:      Rate and Rhythm: Normal rate. Rhythm irregularly irregular.      Pulses: Normal pulses.      Heart sounds: Murmur heard.      Systolic murmur is present with a grade of 1/6.      No friction rub.   Pulmonary:      Effort: Pulmonary effort is normal. No respiratory distress.      Breath sounds: Normal breath sounds. No wheezing or rhonchi.   Abdominal:      General: Abdomen is flat. Bowel sounds are normal. There is no distension.      Palpations: Abdomen is soft. There is no mass.      Tenderness: There is no abdominal tenderness. There is no guarding or rebound.      Hernia: No hernia is present.   Musculoskeletal:      Cervical back: Normal range of motion and neck supple. No tenderness.      Right lower leg: No edema.      Left lower leg: No edema.   Skin:     General: Skin is warm and dry.      Findings: Bruising (R sided facial ecchymosis) present.   Neurological:      Mental Status: She is alert and oriented to person, place, and time.   Psychiatric:         Mood and Affect: Mood normal.         Behavior: Behavior normal.         Additional Data:     Labs:    Results from last 7 days   Lab Units 01/23/25  0554   WBC Thousand/uL 10.50*   HEMOGLOBIN g/dL 10.4*   HEMATOCRIT % 32.3*   PLATELETS Thousands/uL 294   SEGS PCT % 84*   LYMPHO PCT % 8*   MONO PCT % 5   EOS PCT % 1     Results from last 7 days   Lab Units 01/23/25  0554 01/20/25  0425   SODIUM mmol/L 140 136   POTASSIUM  mmol/L 3.5 3.5   CHLORIDE mmol/L 103 101   CO2 mmol/L 26 27   BUN mg/dL 12 14   CREATININE mg/dL 0.83 0.85   ANION GAP mmol/L 11 8   CALCIUM mg/dL 9.3 9.0   ALBUMIN g/dL  --  3.3*   TOTAL BILIRUBIN mg/dL  --  0.52   ALK PHOS U/L  --  68   ALT U/L  --  22   AST U/L  --  16   GLUCOSE RANDOM mg/dL 108 103                       Labs reviewed    Imaging:    Imaging reviewed    Recent Cultures (last 7 days):           Last 24 Hours Medication List:   Current Facility-Administered Medications   Medication Dose Route Frequency Provider Last Rate    acetaminophen  975 mg Oral Q8H ELIZABETH Autumn Chapman PA-C      bisacodyl  10 mg Rectal Daily PRN Autumn Chapman PA-C      calcium carbonate  500 mg Oral Daily PRN Autumn Chapman PA-C      diltiazem  120 mg Oral Daily Autumn Chapman PA-C      docusate sodium  100 mg Oral BID PRN Sheri Pope MD      enoxaparin  30 mg Subcutaneous Q24H Autumn Chapman PA-C      hydrALAZINE  10 mg Oral Q8H PRN LUIS Monsalve      melatonin  6 mg Oral HS Sheri Pope MD      metoprolol succinate  75 mg Oral BID Autumn Chapman PA-C      midodrine  5 mg Oral TID PRN Autumn Chapman PA-C      mirtazapine  7.5 mg Oral HS Sheri Pope MD      ondansetron  4 mg Oral Q6H PRN Autumn Chapman PA-C      oxyCODONE  2.5 mg Oral Q6H PRN Autumn Chapman PA-C      Or    oxyCODONE  5 mg Oral Q6H PRN Autumn Chapman PA-C      pantoprazole  40 mg Oral Early Morning Autumn Chapman PA-C      traZODone  75 mg Oral HS PRN Sheri Pope MD          M*Modal software was used to dictate this note.  It may contain errors with dictating incorrect words or incorrect spelling. Please contact the provider directly with any questions.

## 2025-01-23 NOTE — PROGRESS NOTES
01/23/25 1230   Restrictions/Precautions   Precautions Aspiration;Bed/chair alarms;Cognitive;Fall Risk;Supervision on toilet/commode  (IV LUE,  ortho BPs,  Sinus precautions)   Braces or Orthoses   (TEDs and binder)   Roll Left and Right   Type of Assistance Needed Physical assistance   Physical Assistance Level 26%-50%   Roll Left and Right CARE Score 3   Lying to Sitting on Side of Bed   Type of Assistance Needed Physical assistance   Physical Assistance Level 51%-75%   Comment for trunk,  pt did initiate moving legs to edge   Lying to Sitting on Side of Bed CARE Score 2   Sit to Stand   Type of Assistance Needed Physical assistance   Physical Assistance Level 51%-75%   Comment no device,  pt placed arms around therapist   Sit to Stand CARE Score 2   Bed-Chair Transfer   Type of Assistance Needed Physical assistance   Physical Assistance Level Total assistance   Comment Max A no device std pivot with pt holding therapist   Chair/Bed-to-Chair Transfer CARE Score 1   Walk 10 Feet   Reason if not Attempted Safety concerns   Walk 10 Feet CARE Score 88   Walk 50 Feet with Two Turns   Reason if not Attempted Safety concerns   Walk 50 Feet with Two Turns CARE Score 88   Walk 150 Feet   Reason if not Attempted Safety concerns   Walk 150 Feet CARE Score 88   Walking 10 Feet on Uneven Surfaces   Reason if not Attempted Safety concerns   Walking 10 Feet on Uneven Surfaces CARE Score 88   Ambulation   Primary Mode of Locomotion Prior to Admission Walk   Distance Walked (feet) 5 ft   Assist Device Roller Walker   Walk Assist Level Chair Follow;Maximum Assist   Findings stood from Nustep seat to RW and amb straight path 5 feet, then baltazar WC up to sit-  strong post bias   no overt knee buckle but but knees flexed,   Wheel 50 Feet with Two Turns   Comment need to assess   Wheel 150 Feet   Comment need to assess   Therapeutic Interventions   Strengthening Seated edge of bed LAQ and marching and hip abd isometric prior to  standing and pivots   Other Repeated sit-stands and working on standing balance tryin to get body wt forward   Equipment Use   NuStep 5mins total all extremities for warm up and attempts to get BP up   Assessment   Treatment Assessment 60 min skilled PT session,  pts BP did drop from sit to stand but did stabalize in low 100s and did not continue to drop further.  Donned smaller TEDs as pt had medium but were loose so got small.  Performed seated TE , then Nustep, pt was performing std pivots with hands on therapist.  Worked on stand balance (pt continues to have post lean but with repeated trials she was starting to find balance point.  Pt did get inc lightheadedness with standing bouts though - lowest BP was 97/52.   Pt did agree to sit in tilt in space to be out of bed for a little until next session.  Cont skilled PT toward LTGS   Problem List Decreased strength;Decreased endurance;Impaired balance;Decreased mobility;Decreased cognition;Impaired judgement;Decreased safety awareness;Decreased skin integrity   Barriers to Discharge Inaccessible home environment;Decreased caregiver support   Plan   Progress Slow progress, decreased activity tolerance   PT Therapy Minutes   PT Time In 1230   PT Time Out 1330   PT Total Time (minutes) 60   PT Mode of treatment - Individual (minutes) 60   PT Mode of treatment - Concurrent (minutes) 0   PT Mode of treatment - Group (minutes) 0   PT Mode of treatment - Co-treat (minutes) 0   PT Mode of Treatment - Total time(minutes) 60 minutes   PT Cumulative Minutes 345   Therapy Time missed   Time missed? No

## 2025-01-23 NOTE — PROGRESS NOTES
01/23/25 1430   Pain Assessment   Pain Assessment Tool 0-10   Pain Score 4   Pain Location/Orientation Location: Back  (from sitting in chair)   Restrictions/Precautions   Precautions Aspiration;Bed/chair alarms;Cognitive;Fall Risk  (LUE IV,  Ortho BP, Sinus precaution)   Braces or Orthoses   (TEDs and Binder)   Sit to Stand   Type of Assistance Needed Physical assistance   Physical Assistance Level 76% or more   Comment pt kept letting feet slide out,  needed blocking of legs to prevent feet sliding out   Sit to Stand CARE Score 2   Bed-Chair Transfer   Type of Assistance Needed Physical assistance   Physical Assistance Level 51%-75%   Comment Stand pivot with pts hands on therapist   Chair/Bed-to-Chair Transfer CARE Score 2   Therapeutic Interventions   Strengthening Supine- heelslides AROM,  Bridges ,  ankle pumps , glute sets,  10x3-  educated and showed pt HEP packet for when she gets anxious in her room   Assessment   Treatment Assessment 30 min skilled PT pt was in tilt in space getting antsie - she was out of bed for over an hour,  transferd back to bed, pt very tired but did perform supine ex with eyes closed.  Cont skilled PT toward LTGs   Problem List Decreased strength;Decreased endurance;Impaired balance;Decreased mobility;Decreased cognition;Impaired judgement;Decreased safety awareness;Decreased skin integrity   Barriers to Discharge Inaccessible home environment;Decreased caregiver support   Plan   Progress Slow progress, decreased activity tolerance   PT Therapy Minutes   PT Time In 1430   PT Time Out 1500   PT Total Time (minutes) 30   PT Mode of treatment - Individual (minutes) 30   PT Mode of treatment - Concurrent (minutes) 0   PT Mode of treatment - Group (minutes) 0   PT Mode of treatment - Co-treat (minutes) 0   PT Mode of Treatment - Total time(minutes) 30 minutes   PT Cumulative Minutes 375   Therapy Time missed   Time missed? No

## 2025-01-23 NOTE — ASSESSMENT & PLAN NOTE
Most recent hemoglobin of 9.4 1/17; which is stable  Continue to follow biweekly CBC or sooner if clinically indicated  Will need repeat scan if significant drops or concern for acute bleeds  1/20 10.1, 1/23 10.4

## 2025-01-23 NOTE — PLAN OF CARE
Problem: SAFETY ADULT  Goal: Patient will remain free of falls  Description: INTERVENTIONS:  - Educate patient/family on patient safety including physical limitations  - Instruct patient to call for assistance with activity   - Consult OT/PT to assist with strengthening/mobility   - Keep Call bell within reach  - Keep bed low and locked with side rails adjusted as appropriate  - Keep care items and personal belongings within reach  - Initiate and maintain comfort rounds  - Make Fall Risk Sign visible to staff  - Offer Toileting every 4 Hours, in advance of need  - Initiate/Maintain bed alarm  - Obtain necessary fall risk management equipment:   - Apply yellow socks and bracelet for high fall risk patients  - Consider moving patient to room near nurses station  Outcome: Progressing

## 2025-01-23 NOTE — ASSESSMENT & PLAN NOTE
Lab Results   Component Value Date    EGFR 62 01/23/2025    EGFR 60 01/20/2025    EGFR 64 01/17/2025    CREATININE 0.83 01/23/2025    CREATININE 0.85 01/20/2025    CREATININE 0.81 01/17/2025   At baseline

## 2025-01-24 PROCEDURE — 92526 ORAL FUNCTION THERAPY: CPT

## 2025-01-24 PROCEDURE — 97530 THERAPEUTIC ACTIVITIES: CPT

## 2025-01-24 PROCEDURE — 99233 SBSQ HOSP IP/OBS HIGH 50: CPT | Performed by: INTERNAL MEDICINE

## 2025-01-24 PROCEDURE — 97116 GAIT TRAINING THERAPY: CPT

## 2025-01-24 PROCEDURE — 97129 THER IVNTJ 1ST 15 MIN: CPT

## 2025-01-24 PROCEDURE — 97110 THERAPEUTIC EXERCISES: CPT

## 2025-01-24 PROCEDURE — 99222 1ST HOSP IP/OBS MODERATE 55: CPT | Performed by: PSYCHIATRY & NEUROLOGY

## 2025-01-24 PROCEDURE — 97535 SELF CARE MNGMENT TRAINING: CPT

## 2025-01-24 PROCEDURE — 99232 SBSQ HOSP IP/OBS MODERATE 35: CPT | Performed by: INTERNAL MEDICINE

## 2025-01-24 RX ORDER — CEPHALEXIN 500 MG/1
500 CAPSULE ORAL EVERY 8 HOURS SCHEDULED
Status: COMPLETED | OUTPATIENT
Start: 2025-01-24 | End: 2025-01-28

## 2025-01-24 RX ORDER — SACCHAROMYCES BOULARDII 250 MG
250 CAPSULE ORAL 2 TIMES DAILY
Status: DISPENSED | OUTPATIENT
Start: 2025-01-24 | End: 2025-01-30

## 2025-01-24 RX ORDER — TRAZODONE HYDROCHLORIDE 50 MG/1
75 TABLET, FILM COATED ORAL
Status: DISCONTINUED | OUTPATIENT
Start: 2025-01-24 | End: 2025-01-30

## 2025-01-24 RX ORDER — GABAPENTIN 100 MG/1
100 CAPSULE ORAL
Status: DISCONTINUED | OUTPATIENT
Start: 2025-01-24 | End: 2025-02-03 | Stop reason: HOSPADM

## 2025-01-24 RX ADMIN — MELATONIN TAB 3 MG 6 MG: 3 TAB at 21:32

## 2025-01-24 RX ADMIN — GABAPENTIN 100 MG: 100 CAPSULE ORAL at 21:33

## 2025-01-24 RX ADMIN — ENOXAPARIN SODIUM 30 MG: 30 INJECTION SUBCUTANEOUS at 21:32

## 2025-01-24 RX ADMIN — CEPHALEXIN 500 MG: 500 CAPSULE ORAL at 14:48

## 2025-01-24 RX ADMIN — CEPHALEXIN 500 MG: 500 CAPSULE ORAL at 09:19

## 2025-01-24 RX ADMIN — DILTIAZEM HYDROCHLORIDE 120 MG: 120 CAPSULE, COATED, EXTENDED RELEASE ORAL at 08:57

## 2025-01-24 RX ADMIN — TRAZODONE HYDROCHLORIDE 75 MG: 50 TABLET ORAL at 21:33

## 2025-01-24 RX ADMIN — METOPROLOL SUCCINATE 75 MG: 50 TABLET, EXTENDED RELEASE ORAL at 21:33

## 2025-01-24 RX ADMIN — MIRTAZAPINE 7.5 MG: 7.5 TABLET, FILM COATED ORAL at 21:33

## 2025-01-24 RX ADMIN — METOPROLOL SUCCINATE 75 MG: 50 TABLET, EXTENDED RELEASE ORAL at 08:57

## 2025-01-24 RX ADMIN — ACETAMINOPHEN 975 MG: 325 TABLET, FILM COATED ORAL at 14:47

## 2025-01-24 RX ADMIN — ACETAMINOPHEN 975 MG: 325 TABLET, FILM COATED ORAL at 21:33

## 2025-01-24 RX ADMIN — ACETAMINOPHEN 975 MG: 325 TABLET, FILM COATED ORAL at 05:39

## 2025-01-24 RX ADMIN — Medication 250 MG: at 09:19

## 2025-01-24 RX ADMIN — CEPHALEXIN 500 MG: 500 CAPSULE ORAL at 21:33

## 2025-01-24 RX ADMIN — PANTOPRAZOLE SODIUM 40 MG: 40 TABLET, DELAYED RELEASE ORAL at 05:39

## 2025-01-24 NOTE — PROGRESS NOTES
01/24/25 0900   Pain Assessment   Pain Score No Pain   Restrictions/Precautions   Precautions Aspiration;Bed/chair alarms;Cognitive;Fall Risk;Supervision on toilet/commode  (LUE IV,  Ortho BP, Sinus precaution)   Weight Bearing Restrictions No   ROM Restrictions No   Putting On/Taking Off Footwear   Type of Assistance Needed Physical assistance   Physical Assistance Level Total assistance   Comment TA sneakers   Putting On/Taking Off Footwear CARE Score 1   Sit to Stand   Type of Assistance Needed Physical assistance   Physical Assistance Level Total assistance   Comment attempted repetitive STS, req Ax2 2* dec standing balance P ability to maintain LE stable on floor. max vc to lean forward and maintain upright posture   Sit to Stand CARE Score 1   Toileting Hygiene   Type of Assistance Needed Physical assistance   Physical Assistance Level Total assistance   Comment Ax2 STS with use of bedrail   Toileting Hygiene CARE Score 1   Toilet Transfer   Type of Assistance Needed Physical assistance   Physical Assistance Level Total assistance   Comment Ax2 swap out   Toilet Transfer CARE Score 1   Cognition   Overall Cognitive Status Impaired   Arousal/Participation Lethargic;Cooperative   Attention Attends with cues to redirect   Orientation Level Oriented to person;Oriented to time;Oriented to place   Memory Decreased short term memory;Decreased recall of recent events;Decreased recall of precautions   Following Commands Follows one step commands with increased time or repetition   Activity Tolerance   Activity Tolerance Patient limited by fatigue   Assessment   Treatment Assessment Engaged pt in 30mins of skilled OT services with focus on repetitive STS and toilet transer. /57 HR62 with TEDS/binder.Pt cont to req Ax2 for all transfers. Pt with fatigue and req vc for upright posture. Cont OT POC with focus on activity tolerance, transfers and trial STS lift for RN staff transfers.   Prognosis Fair   Problem  List Decreased strength;Decreased endurance;Impaired balance;Decreased mobility;Decreased cognition;Impaired judgement;Decreased safety awareness;Decreased skin integrity   Barriers to Discharge Inaccessible home environment;Decreased caregiver support   Plan   Treatment/Interventions ADL retraining;Functional transfer training;Therapeutic exercise;Endurance training;Patient/family training;Bed mobility;Compensatory technique education   Progress Slow progress, decreased activity tolerance   Discharge Recommendation   Rehab Resource Intensity Level, OT   (SNF)   OT Therapy Minutes   OT Time In 0900   OT Time Out 0930   OT Total Time (minutes) 30   OT Mode of treatment - Individual (minutes) 30   OT Mode of treatment - Concurrent (minutes) 0   OT Mode of treatment - Group (minutes) 0   OT Mode of treatment - Co-treat (minutes) 0   OT Mode of Treatment - Total time(minutes) 30 minutes   OT Cumulative Minutes 480   Therapy Time missed   Time missed? No

## 2025-01-24 NOTE — WOUND OSTOMY CARE
Progress Note - Wound   Sandra Purvis 89 y.o. female MRN: [id number]  Unit/Bed#: [facility] 261-01 Encounter: [id number]    Rehab team requesting re-assessment of head wound for possible needed debridement.    Assessment:   Focused assessment of head wound performed.  Patient agreeable to visit and trimming of hair.    Left head malignant wound--beefy red friable wound with dark edges and scant serosanguinous drainage.  Alix-wound intact.  No foul odor or purulent drainage noted on exam today.  Debridement is not indicated for this wound.      Nursing team reporting patient frequently removing bouffant cap and stockinette, also bouffant is not keeping dressing in place (too loose).  Obtained surgical cap with ties from the OR to help keep dressing in place and prevent patient trauma from touching the wound.      Recommendations:  Left head--cleanse/soak with normal saline, pat dry.  Cover wound with Xeroform and ABD.  Secure with surgical cap.  Change dressing daily and as needed.  Trim surrounding hair as needed to keep from getting matted into wound.    Wound care team to follow.    Assessment findings and recommendations discussed with rehab CRNP.    Wound 01/12/25 Malignant  Head (Active)   Wound Image   01/24/25 1436   Wound Description Beefy red 01/24/25 1436   Alix-wound Assessment Intact 01/24/25 1436   Wound Length (cm) 5 cm 01/24/25 1436   Wound Width (cm) 6 cm 01/24/25 1436   Wound Surface Area (cm^2) 30 cm^2 01/24/25 1436   Drainage Amount Scant 01/24/25 1436   Drainage Description Serosanguineous 01/24/25 1436   Treatments Cleansed;Site care 01/24/25 1436   Dressing Dermagran gauze;ABD 01/24/25 1436   Dressing Changed Changed 01/24/25 1436   Patient Tolerance Tolerated well 01/24/25 1436   Dressing Status Clean;Dry;Intact 01/24/25 1436       Theodora ABRDALESN, RN, CWON

## 2025-01-24 NOTE — PROGRESS NOTES
Progress Note - Internal Medicine   Name: Sandra Purvis 89 y.o. female I MRN: [id number]  Unit/Bed#: [facility] 269-02 I Date of Admission: 1/17/2025   Date of Service: 1/24/2025 I Hospital Day: 7    Assessment & Plan  Hypertension  Home regimen: Toprol XL 50mg daily and HCTZ 12.5mg daily.  Currently receiving Toprol XL 75mg BID and Cardizem 120mg daily.  Give midodrine as needed for SBP <90.  Orthostatic + Monitor orthostatics daily.  Keep SBP <160 due to intracranial bleed.  Atrial fibrillation with rapid ventricular response (HCC)  Not taking AC at home due to hx of falls.  Continue to hold with recent falls and now subarachnoid/subdural.  Had been taking Toprol XL 50mg daily at home - currently receiving Toprol XL 75mg BID and Cardizem 120mg daily.  Hx of a-fib with RVR while on ARC, recently transferred to be on Cardizem drip.  Monitor routine VS.  Replete electrolytes as needed.  Follow-up with Cardiology as outpatient.  GERD (gastroesophageal reflux disease)  Continue Protonix 40mg daily.  Had not been on Protonix as outpatient.  Basal cell carcinoma (BCC) of scalp  Large scalp mass noted on admission.  Declining intervention as an outpatient.  Follows with Dermatology and LUIS Mejía (Surgical Oncology) as outpatient.  Wound care consulted - wound cleansed/debrided.  Continue with wearing cap during the day to avoid further patient manipulation.    Foul odor and drainage noted today - start on PO Keflex 500mg Q8.  Wound care to follow-up today.  Malignant melanoma of arm, left (HCC)  Recent surgical excision on 12/19.  Follows with LUIS Mejía (Surg/Onc) as outpatient.  Had declined further treatment.  SDH (subdural hematoma) (HCC)  Presented on 1/4 after syncopal fall.  CTH showed acute subdural hemorrhage along the L cerebral convexity with maximal thickness of 1.9cm.  No midline shift.    Received Keppra x7 days.  Continue to hold AC/AP.  Neurovascular checks Q shift.  Maintain normotension.   Avoid SBP >160.    CTH on 1/20: New small hypodensity within the left frontal subcortical white matter, differential includes age indeterminate infarct, evolving parenchymal contusion among other etiologies.  MRI brain obtained on 1/21 showing evolving bilateral cerebral convexity subdural hematomas containing blood products of varying ages, scattered small volume subarachnoid hemorrhage, no evidence of acute large territory infarct.    2 week follow-up with Neurosurgery on 1/21.  Imaging reviewed.  May consider starting ASA in 2 weeks after discussion with PCP/Cardiology in regards to risk/benefit.  Follow-up with Neurosurgery in additional 4 weeks with [facility].    Primary team following.  PT/OT/SLP.  SAH (subarachnoid hemorrhage) (HCC)  Presented on 1/4 after syncopal fall.  CTH showed acute subarachnoid hemorrhage in the L frontoparietal sulci, L sylvian fissure and suprasellar cistern.     Received Keppra x7 days.  Continue to hold AC/AP.  Neurovascular checks Q shift.  Maintain normotension.  Avoid SBP >160.     MRI brain obtained 1/21 - stable.  2 week follow-up with Neurosurgery on 1/21.  Imaging was reviewed.  May consider starting ASA in 2 weeks after discussion with PCP/Cardiology.  Follow-up with Neurosurgery in additional 4 weeks with [facility]TH.     Primary team following.  PT/OT/SLP.  Closed extensive facial fractures (HCC)  S/p fall on 1/4.  CT facial bones showed comminuted and mildly displaced fractures of the lateral and anterior walls of the R maxilla, lateral, and inferior wall of the R orbit, wall fracture appears to involve the inferior orbital foramen with associated small extraconal soft tissue edema, nondisplaced fractures of the zygomatic arch and the temporal bone at the TMJ, diffuse opacification of R maxillary sinus and R nasal cavity with blood products and air.    OMFS evaluated in acute setting - declined surgical intervention.  Received IV Unasyn for 7 days.  Facial sinus  precautions.    Follow-up with OMFS on discharge.  Chest pain  Noted to have elevated troponins on admission to acute setting.  [place] to be due to trauma/brain bleed vs demand ischemia due to paroxysmal a-fib and syncope.  Cardiology recommended starting ASA 81mg daily when cleared by Neurosurgery.  Per Neurosurgery - ok to start ASA in 2 weeks (2/4) after patient/family discussion of risks/benefits with PCP/Cardiologist.  Recommend establishing with Cardiology on discharge.  Orthostatic hypotension  Presented with syncope on 1/4.  Significant orthostasis noted in acute setting.  Continue midodrine as needed  Apply abdominal binder/TEDs as needed.  Encourage PO hydration.  Continue to monitor orthostatics closely.  Syncope  Presented on 1/4 s/p syncopal fall.  EKG showed atrial fibrillation with RVR on admission.  ECHO on 1/6 showed EF 70%, G2DD.  Noted to have significant orthostasis in acute setting.  Started on midodrine 2.5mg BID.  Continue to monitor orthostatics.  Encourage PO hydration.  Apply abdominal binder/TEDs as needed.    Follow-up/establish with Cardiology as outpatient.  Anemia  Hgb currently 10.4.  Baseline Hgb 9-10.  No active s/s of bleeding.  Vitamin B12 1125 on 1/13 - no need for supplementation.  Folate 21.1 on 1/13 - no need for supplementation.  Iron panel on 1/13 - Iron Sat 12%, TIBC 295, Iron 34, and Ferritin 98.    Continue to trend routine CBC.  Leukocytosis  Improving, WBC count currently 10.50 from 13.06.  Chronically elevated.  Unclear etiology, may be directly related to reactive changes from SDH and SAH as well as fracture.  Did recently complete a course of Unasyn and Augmentin.  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  Continue to trend routine CBC.  Acute on chronic diastolic congestive heart failure (HCC)  ECHO on 1/6 showed EF 70%, G2DD.  Monitor volume status - currently euvolemic.  Monitor I&Os, daily weights.  Insomnia  Continue melatonin 6mg at HS,  Remeron 7.5mg at HS, and trazodone 25mg at HS PRN.  Continue sleep logs.    VTE Pharmacologic Prophylaxis:   Pharmacologic: Enoxaparin (Lovenox)  Mechanical VTE Prophylaxis in Place: Yes - sequential compression devices.    Current Length of Stay: 7 day(s)    Current Patient Status: Inpatient Rehab     Discharge Plan: As per primary team.    Code Status: Level 3 - DNAR and DNI    Subjective:   Pt examined while pt lying in bed in pt room.  Did not sleep overnight.  Currently very tired and attempting to nap while in bed.  Encouraged to stay awake during the day as much as possible.  Complaints of lightheadedness when getting OOB.  Denies any fevers, chills, SOB, palpitations, or CP.  Denies any pain currently.  Had a BM yesterday without any issues.  Has no other concerns or complaints at this time.    Objective:     Vitals:   Temp (24hrs), Av.5 °F (36.9 °C), Min:98.1 °F (36.7 °C), Max:99.1 °F (37.3 °C)    Temp:  [98.1 °F (36.7 °C)-99.1 °F (37.3 °C)] 99.1 °F (37.3 °C)  HR:  [57-79] 79  Resp:  [18] 18  BP: ()/(46-71) 120/57  SpO2:  [97 %-98 %] 98 %  Body mass index is 15.88 kg/m².     Review of Systems   Constitutional:  Positive for fatigue. Negative for appetite change, chills and fever.   HENT:  Negative for trouble swallowing.    Eyes:  Negative for visual disturbance.   Respiratory:  Negative for cough, shortness of breath, wheezing and stridor.    Cardiovascular:  Negative for chest pain, palpitations and leg swelling.   Gastrointestinal:  Negative for abdominal distention, abdominal pain, constipation, diarrhea, nausea and vomiting.        LBM    Genitourinary:  Negative for difficulty urinating.   Musculoskeletal:  Negative for arthralgias, back pain and gait problem.   Neurological:  Positive for light-headedness (occurs when getting OOB). Negative for dizziness, weakness, numbness and headaches.   Psychiatric/Behavioral:  Positive for sleep disturbance (did not sleep overnight and has been  sleeping during the day). Negative for dysphoric mood. The patient is not nervous/anxious.    All other systems reviewed and are negative.       Input and Output Summary (last 24 hours):       Intake/Output Summary (Last 24 hours) at 1/24/2025 0923  Last data filed at 1/24/2025 0619  Gross per 24 hour   Intake 620 ml   Output --   Net 620 ml       Physical Exam:     Physical Exam  Vitals and nursing note reviewed.   Constitutional:       General: She is not in acute distress.     Appearance: Normal appearance. She is not ill-appearing.      Comments: Thin.   HENT:      Head: Normocephalic and atraumatic.   Cardiovascular:      Rate and Rhythm: Normal rate. Rhythm irregularly irregular.      Pulses: Normal pulses.      Heart sounds: Murmur heard.      Systolic murmur is present with a grade of 1/6.      No friction rub.   Pulmonary:      Effort: Pulmonary effort is normal. No respiratory distress.      Breath sounds: Normal breath sounds. No wheezing or rhonchi.   Abdominal:      General: Abdomen is flat. Bowel sounds are normal. There is no distension.      Palpations: Abdomen is soft. There is no mass.      Tenderness: There is no abdominal tenderness. There is no guarding or rebound.      Hernia: No hernia is present.   Musculoskeletal:      Cervical back: Normal range of motion and neck supple. No tenderness.      Right lower leg: No edema.      Left lower leg: No edema.   Skin:     General: Skin is warm and dry.      Findings: Bruising (R sided facial bruising) and lesion (Foul odor noted from scalp lesion) present.   Neurological:      Mental Status: She is alert and oriented to person, place, and time.   Psychiatric:         Mood and Affect: Mood normal.         Behavior: Behavior normal.         Additional Data:     Labs:    Results from last 7 days   Lab Units 01/23/25  0554   WBC Thousand/uL 10.50*   HEMOGLOBIN g/dL 10.4*   HEMATOCRIT % 32.3*   PLATELETS Thousands/uL 294   SEGS PCT % 84*   LYMPHO PCT % 8*    MONO PCT % 5   EOS PCT % 1     Results from last 7 days   Lab Units 01/23/25  0554 01/20/25  0425   SODIUM mmol/L 140 136   POTASSIUM mmol/L 3.5 3.5   CHLORIDE mmol/L 103 101   CO2 mmol/L 26 27   BUN mg/dL 12 14   CREATININE mg/dL 0.83 0.85   ANION GAP mmol/L 11 8   CALCIUM mg/dL 9.3 9.0   ALBUMIN g/dL  --  3.3*   TOTAL BILIRUBIN mg/dL  --  0.52   ALK PHOS U/L  --  68   ALT U/L  --  22   AST U/L  --  16   GLUCOSE RANDOM mg/dL 108 103                       Labs reviewed    Imaging:    Imaging reviewed    Recent Cultures (last 7 days):           Last 24 Hours Medication List:   Current Facility-Administered Medications   Medication Dose Route Frequency Provider Last Rate    acetaminophen  975 mg Oral Q8H [facility] Autumn Chapman PA-C      bisacodyl  10 mg Rectal Daily PRN Autumn Chapman PA-C      calcium carbonate  500 mg Oral Daily PRN Autumn Chapman PA-C      cephalexin  500 mg Oral Q8H [facility] LUIS Monsalve      diltiazem  120 mg Oral Daily Autumn Chapman PA-C      docusate sodium  100 mg Oral BID PRN Sheri Pope MD      enoxaparin  30 mg Subcutaneous Q24H Autumn Chapman PA-C      hydrALAZINE  10 mg Oral Q8H PRN LUIS Monsalve      melatonin  6 mg Oral HS Sheri Pope MD      metoprolol succinate  75 mg Oral BID Autumn Chapman PA-C      midodrine  5 mg Oral TID PRN Autumn Chapman PA-C      mirtazapine  7.5 mg Oral HS Sheri Pope MD      ondansetron  4 mg Oral Q6H PRN Autumn Chapman PA-C      oxyCODONE  2.5 mg Oral Q6H PRN Autumn Chapman PA-C      Or    oxyCODONE  5 mg Oral Q6H PRN Autumn Chapman PA-C      pantoprazole  40 mg Oral Early Morning Autumn Chapman PA-C      saccharomyces boulardii  250 mg Oral BID LUIS Monsalve      traZODone  75 mg Oral HS PRN Sheri Pope MD          M*Modal software was used to dictate this note.  It may contain errors with dictating incorrect words or incorrect spelling. Please  contact the provider directly with any questions.

## 2025-01-24 NOTE — ASSESSMENT & PLAN NOTE
Improving, WBC count currently 10.50 from 13.06.  Chronically elevated.  Unclear etiology, may be directly related to reactive changes from SDH and SAH as well as fracture.  Did recently complete a course of Unasyn and Augmentin.  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  Continue to trend routine CBC.

## 2025-01-24 NOTE — ASSESSMENT & PLAN NOTE
Wt Readings from Last 3 Encounters:   01/24/25 46 kg (101 lb 6.6 oz)   01/17/25 51.1 kg (112 lb 9.6 oz)   01/12/25 53.5 kg (118 lb)     TTE 1/6/2025 showed EF 70%, grade 2 DD, mildly dilated RV, mild LA, mild AI, mild TR, estimated PA pressure 50 mmHg, trivial pericardial effusion  Not on diuretics prior to admission  Cw furosemide 20 mg; monitor for volume status   Daily weights, monitor I/O

## 2025-01-24 NOTE — PLAN OF CARE
Problem: Prexisting or High Potential for Compromised Skin Integrity  Goal: Skin integrity is maintained or improved  Description: INTERVENTIONS:  - Identify patients at risk for skin breakdown  - Assess and monitor skin integrity  - Assess and monitor nutrition and hydration status  - Monitor labs   - Assess for incontinence   - Turn and reposition patient  - Assist with mobility/ambulation  - Relieve pressure over bony prominences  - Avoid friction and shearing  - Provide appropriate hygiene as needed including keeping skin clean and dry  - Evaluate need for skin moisturizer/barrier cream  - Collaborate with interdisciplinary team   - Patient/family teaching  - Consider wound care consult   Outcome: Progressing     Problem: METABOLIC, FLUID AND ELECTROLYTES - ADULT  Goal: Electrolytes maintained within normal limits  Description: INTERVENTIONS:  - Monitor labs and assess patient for signs and symptoms of electrolyte imbalances  - Administer electrolyte replacement as ordered  - Monitor response to electrolyte replacements, including repeat lab results as appropriate  - Instruct patient on fluid and nutrition as appropriate  Outcome: Progressing  Goal: Fluid balance maintained  Description: INTERVENTIONS:  - Monitor labs   - Monitor I/O and WT  - Instruct patient on fluid and nutrition as appropriate  - Assess for signs & symptoms of volume excess or deficit  Outcome: Progressing     Problem: SKIN/TISSUE INTEGRITY - ADULT  Goal: Skin Integrity remains intact(Skin Breakdown Prevention)  Description: Assess:  -Perform [place] assessment every  -Clean and moisturize skin every   -Inspect skin when repositioning, toileting, and assisting with ADLS  -Assess under medical devices such as  every   -Assess extremities for adequate circulation and sensation     Bed Management:  -Have minimal linens on bed & keep smooth, unwrinkled  -Change linens as needed when moist or perspiring  -Avoid sitting or lying in one position  for more than  hours while in bed  -Keep HOB at degrees     Toileting:  -Offer bedside commode  -Assess for incontinence every   -Use incontinent care products after each incontinent episode such as     Activity:  -Mobilize patient  times a day  -Encourage activity and walks on unit  -Encourage or provide ROM exercises   -Turn and reposition patient every  Hours  -Use appropriate equipment to lift or move patient in bed  -Instruct/ Assist with weight shifting every  when out of bed in chair  -Consider limitation of chair time  hour intervals    Skin Care:  -Avoid use of baby powder, tape, friction and shearing, hot water or constrictive clothing  -Relieve pressure over bony prominences using   -Do not massage red bony areas    Next Steps:  -Teach patient strategies to minimize risks such as    -Consider consults to  interdisciplinary teams such as   Outcome: Progressing     Problem: MUSCULOSKELETAL - ADULT  Goal: Maintain proper alignment of affected body part  Description: INTERVENTIONS:  - Support, maintain and protect limb and body alignment  - Provide patient/ family with appropriate education  Outcome: Progressing

## 2025-01-24 NOTE — ASSESSMENT & PLAN NOTE
Large scalp mass noted on admission.  Declining intervention as an outpatient.  Follows with Dermatology and LUIS Mejía (Surgical Oncology) as outpatient.  Wound care consulted - wound cleansed/debrided.  Continue with wearing cap during the day to avoid further patient manipulation.    Foul odor and drainage noted today - start on PO Keflex 500mg Q8.  Wound care to follow-up today.

## 2025-01-24 NOTE — PROGRESS NOTES
"   25 0820   Pain Assessment   Pain Assessment Tool 0-10   Pain Score No Pain   Restrictions/Precautions   Precautions Aspiration;Bed/chair alarms;Cognitive;Fall Risk;Supervision on toilet/commode   Comprehension   Comprehension (FIM) 3 - Understands basic info/conversation 50-74% of time   Expression   Expression (FIM) 4 - Expresses basic info/needs 75-90% of time   Social Interaction   Social Interaction (FIM) 5 - Interacts appropriately with others 90% of time   Problem Solving   Problem solving (FIM) 2 - Needs direction more than ½ time to initiate, plan or complete simple tasks   Memory   Memory (FIM) 2 - Recognizes, recalls/performs 25-49%   Speech/Language/Cognition Assessmetn   Treatment Assessment In addition to completing dysphagia tx session, SLP engaging in rapport building w/ pt as current SLP is new to pt's care. Pt was noted to recall reasoning for hospitalization, as well as recalling current name of place, but directive cues for city. Pt was not oriented to month but was accurate to state year. LT biographical recall was functional in recall as well. Pt was noted to state an off the cuff comment to SLP, \"last night I was at a  and then a wedding, so I'm not too hungry because I ate there.\" This did prompt review orientation, to where pt did further explain that it was in a dream which prompted review of orientation information. Otherwise, engaged pt in divergent naming tasks given food items which pt did can, as well as cook to where pt was able to spontaneously names 3 out of 5 items per each category, noting perseveration of responses elicited. SLP did provide probing questions to elicit more items which could be canned w/ semantic cues accordingly. It was suspected that pt was internally distracted at times as well due to abdominal binder placed as she was OOB in WC. SLP encouraging pt about the need to use the binder when OOB given low BP's. SLP did reposition pt in tilt in space WC " reclining back some, which pt did state some relief. Currently, pt will continue to benefit from ongoing skilled SLP service targeting functional cognitive skills in hopes for decreasing burden of care over time.   Eating   Type of Assistance Needed Set-up / clean-up;Supervision;Verbal cues   Physical Assistance Level No physical assistance   Eating CARE Score 4   Swallow Assessment   Swallow Treatment Assessment   Daily Dysphagia Tx Note     Patient Name: Sandra Purvis    Today's Date: 1/24/2025      Current Risks for Dysphagia & Aspiration: Weak cough;General debilitation;Cognitive deficit;Reduced alertness; facial fractures     Current Symptoms/Concerns: difficulty chewing     Current diet:soft/level 3 diet and thin liquids      Premorbid diet::regular diet and thin liquids      Positioning: pt upright in tilt in space WC    Items administered:Consistencies Administered: thin liquids, puree, and mixed consistency  Materials administered included : cream of wheat, applesauce, cheerios moistened in lactaid, thins by cup    Total amount of meal consumed:   75% of meal and 180cc of thin liquids       Oral stage:mild  Lip closure: functional   Anterior spillage: none  Mastication: slower to ensure breakdown of mixed consistency  Bolus formation: mildly disorganized w/ mixed consistency  Bolus control: appeared prompt w/ liquids  Transfer: delayed w/ puree and mixed textures  Oral residue: trace amounts but pt clearing independently  Pocketing: none         Pharyngeal stage:minimal-mild  Swallow promptness: delayed more so w/ food items but prompter w/ liquids  Hyolaryngeal elevation: present but decreased upon palpation  Wet voice:none   Throat clear: none  Cough: none  Secondary swallows: more consistent w/ mixed textures  Audible swallows: increased w/ liquids by cup       Esophageal stage:no overt sxs observed w/ meal today        Summary:     Pt presenting with mild oral and minimal-mild pharyngeal dysphagia  today. Symptoms or concerns included slower bolus manipulation/transfer of puree items but overall bolus formulation was adequate to where no overt oral residual was observed. When trialing moistened cheerios, mastication was noted to be slower to where formulation was minimally disorganized due to piecemeal transit. However pt was able to clear trace oral residual w/ mixed textures w/ increased time as well as use of lingual sweeps. Today, bolus control/transfer of thins appeared to be timely. Swallow initiation was delayed across textures to where HLE was present but decreased when palpated. Pt did have double swallows observed w/ mixed consistencies due to piecemeal transfers. Highly audible swallows were noted w/ thin liquids by cup. Of note, post meal, RN present to provide medications which pt did take 1 pill whole at a time w/ water w/o increased aspiration sxs.     Pt requesting to complete oral care s/p meal, which SLP setup pt w/ completing traditional oral care w/o removal of any PO nor over aspiration sxs.     Recommendations:  Diet: mechanically altered/level 2 diet and thin liquids-no straw  Meds: whole with puree  Strategies: upright posture, only feed when fully alert, slow rate of feeding, small bites/sips, no straws, quiet environment (tv off, limit talking, door closed, etc.), and alternating bites and sips     Sinus Precautions:       -no straws       -no blowing nose       -no closed mouth/nose sneezing       -reduce straining     FULL supervision w/ meals.  Results reviewed with: patient, RN- Millicent  Aspiration precautions posted.     F/u ST tx: Pt is currently recommended for further skilled SLP services targeting dysphagia therapy in order to maximize oral and pharyngeal swallow skills, while safely supporting PO intake, as well as to improve independent carryover of safe swallow strategies.       Plan:      Continue dysphagia level 2, thins-no straw      Follow up to closely monitor diet  tolerance      Trial level 3 items under SLP supervision only   Swallow Assessment Prognosis   Prognosis Fair   Prognosis Considerations Age;Co-morbidities;Medical diagnosis;Medical prognosis;Severity of impairments;New learning ability;Ability to carry over;Previous level of function   SLP Therapy Minutes   SLP Time In 0820   SLP Time Out 0900   SLP Total Time (minutes) 40   SLP Mode of treatment - Individual (minutes) 40   SLP Mode of treatment - Concurrent (minutes) 0   SLP Mode of treatment - Group (minutes) 0   SLP Mode of treatment - Co-treat (minutes) 0   SLP Mode of Treatment - Total time(minutes) 40 minutes   SLP Cumulative Minutes 260   Therapy Time missed   Time missed? No

## 2025-01-24 NOTE — PROGRESS NOTES
01/24/25 1030   Restrictions/Precautions   Precautions Aspiration;Bed/chair alarms;Cognitive;Fall Risk;Supervision on toilet/commode  (LUE IV,  ortho BP,  sinus precautions)   Braces or Orthoses   (TEDs and binder)   Assessment   Treatment Assessment 20 min PT session focused on trial of sit-stand pivot lift that NSG could use  since pt is fluctuatiing so much and can be Max A.  At this point pt needed to have BM so set up lift and transfered to [facility] which pt did have BM.  Pt with eyes closed and very tired , checked BP which was 90/40 ,  she was able to talk t/o process but did use lift to get back to bed and BP marques to 106/56, at this point pt very trired and was unable to tolerate more therapy.  Did perform rolling and positioning for optimal pressure relief for buttocks.  Cont skilled PT toward LTGs   Problem List Decreased strength;Decreased endurance;Impaired balance;Decreased mobility;Decreased cognition;Impaired judgement;Decreased safety awareness;Decreased skin integrity   Barriers to Discharge Inaccessible home environment;Decreased caregiver support  (D/C to SNF)   Plan   Progress Slow progress, decreased activity tolerance   PT Therapy Minutes   PT Time In 1030   PT Time Out 1050   PT Total Time (minutes) 20   PT Mode of treatment - Individual (minutes) 20   PT Mode of treatment - Concurrent (minutes) 0   PT Mode of treatment - Group (minutes) 0   PT Mode of treatment - Co-treat (minutes) 0   PT Mode of Treatment - Total time(minutes) 20 minutes   PT Cumulative Minutes 395   Therapy Time missed   Time missed? No

## 2025-01-24 NOTE — PLAN OF CARE
Problem: SAFETY ADULT  Goal: Maintain or return to baseline ADL function  Description: INTERVENTIONS:  -  Assess patient's ability to carry out ADLs; assess patient's baseline for ADL function and identify physical deficits which impact ability to perform ADLs (bathing, care of mouth/teeth, toileting, grooming, dressing, etc.)  - Assess/evaluate cause of self-care deficits   - Assess range of motion  - Assess patient's mobility; develop plan if impaired  - Assess patient's need for assistive devices and provide as appropriate  - Encourage maximum independence but intervene and supervise when necessary  - Involve family in performance of ADLs  - Assess for home care needs following discharge   - Consider OT consult to assist with ADL evaluation and planning for discharge  - Provide patient education as appropriate  Outcome: Progressing     Problem: CARDIOVASCULAR - ADULT  Goal: Absence of cardiac dysrhythmias or at baseline rhythm  Description: INTERVENTIONS:  - Continuous cardiac monitoring, vital signs, obtain 12 lead EKG if ordered  - Administer antiarrhythmic and heart rate control medications as ordered  - Monitor electrolytes and administer replacement therapy as ordered  Outcome: Progressing     Problem: Nutrition/Hydration-ADULT  Goal: Nutrient/Hydration intake appropriate for improving, restoring or maintaining nutritional needs  Description: Monitor and assess patient's nutrition/hydration status for malnutrition. Collaborate with interdisciplinary team and initiate plan and interventions as ordered.  Monitor patient's weight and dietary intake as ordered or per policy. Utilize nutrition screening tool and intervene as necessary. Determine patient's food preferences and provide high-protein, high-caloric foods as appropriate.     INTERVENTIONS:  - Monitor oral intake, urinary output, labs, and treatment plans  - Assess nutrition and hydration status and recommend course of action  - Evaluate amount of  meals eaten  - Assist patient with eating if necessary   - Allow adequate time for meals  - Recommend/ encourage appropriate diets, oral nutritional supplements, and vitamin/mineral supplements  - Order, calculate, and assess calorie counts as needed  - Recommend, monitor, and adjust tube feedings and TPN/PPN based on assessed needs  - Assess need for intravenous fluids  - Provide specific nutrition/hydration education as appropriate  - Include patient/family/caregiver in decisions related to nutrition  Outcome: Progressing

## 2025-01-24 NOTE — PROGRESS NOTES
01/24/25 1415   Pain Assessment   Pain Assessment Tool 0-10   Pain Score No Pain   Restrictions/Precautions   Precautions Bed/chair alarms;Cognitive;Fall Risk;Supervision on toilet/commode  (LUE IV,  Sinus precaution, Ortho BP)   Braces or Orthoses   (TEDs and Binder)   Sit to Stand   Type of Assistance Needed Physical assistance   Physical Assistance Level 26%-50%   Comment Mod A sit-stand at RW-  After repeated times and cues for forward flex Min A   Sit to Stand CARE Score 3   Bed-Chair Transfer   Type of Assistance Needed Physical assistance   Physical Assistance Level 26%-50%   Comment Mod A std pivot with RW-  watch for Poste bias,  also poor walker management   Chair/Bed-to-Chair Transfer CARE Score 3   Walk 10 Feet   Type of Assistance Needed Physical assistance   Physical Assistance Level Total assistance   Comment Mod A with WC follow   Walk 10 Feet CARE Score 1   Walk 50 Feet with Two Turns   Reason if not Attempted Safety concerns   Walk 50 Feet with Two Turns CARE Score 88   Walk 150 Feet   Reason if not Attempted Safety concerns   Walk 150 Feet CARE Score 88   Walking 10 Feet on Uneven Surfaces   Reason if not Attempted Safety concerns   Walking 10 Feet on Uneven Surfaces CARE Score 88   Ambulation   Primary Mode of Locomotion Prior to Admission Walk   Distance Walked (feet) 25 ft  (25x2)   Assist Device Roller Walker   Gait Pattern Inconsistant Nicole;Slow Nicole;Decreased foot clearance;R foot drag;L foot drag;Retropulsion;Poor UE WB   Walk Assist Level Moderate Assist;Chair Follow   Findings cues to lean forward down into walker more,  cues to keep legs straight Very close WC follow   Does the patient walk? 2. Yes   Wheel 50 Feet with Two Turns   Comment (S)  Trial over the weekend   Wheel 150 Feet   Comment (S)  Trial over the weekend   Therapeutic Interventions   Strengthening 6 sit-stands from bedside to RW  then when standing at RW worked on finding balance point,   Also standing march  10x2   Balance static std balance at ,  also worked on sitting balance at edge of bed leaning forward to grab therapist hands   Assessment   Treatment Assessment 30 min skilled PT session focused on out of bed xfer,  sitting balance at edge of bed with VCs for posture and balance and guidance for more flexion,  perofrmed sit-stands and std balance / marching , then pt performed 2 amb bouts at 25 feet which is best she has done since admission.   Problem List Decreased strength;Decreased endurance;Impaired balance;Decreased mobility;Decreased cognition;Impaired judgement;Decreased safety awareness;Decreased skin integrity   Barriers to Discharge Inaccessible home environment;Decreased caregiver support   Plan   Progress Progressing toward goals   PT Therapy Minutes   PT Time In 1445   PT Time Out 1515   PT Total Time (minutes) 30   PT Mode of treatment - Individual (minutes) 30   PT Mode of treatment - Concurrent (minutes) 0   PT Mode of treatment - Group (minutes) 0   PT Mode of treatment - Co-treat (minutes) 0   PT Mode of Treatment - Total time(minutes) 30 minutes   PT Cumulative Minutes 470   Therapy Time missed   Time missed? No

## 2025-01-24 NOTE — ASSESSMENT & PLAN NOTE
Blood pressures ranging in physical therapy as low as 70/40 and cardiology was evaluating the patient  Symptomatic with lightheadedness and presyncope and had received gentle fluids with some improvement  BALJIT stockings PRN as well and encouragement of fluid intake  Repeat orthostatics improved but still getting lightheaded at times which was felt to be the initiating factor in her syncopal episode that led to the fall and injury.  1/17 IM held midodrine, no significant orthostatic hypotension currently observed may need to introduce at 2.5 mg TID if develops episodes at a later time, continue to monitor orthostatic Bps   1/19 bp dropped on standing, came back up on sitting  Midodrine 5 mg BID PRN

## 2025-01-24 NOTE — PROGRESS NOTES
01/24/25 1145   Pain Assessment   Pain Score No Pain   Restrictions/Precautions   Precautions Aspiration;Bed/chair alarms;Cognitive;Fall Risk;Supervision on toilet/commode  (LUE IV,  ortho BP,  sinus precautions)   Weight Bearing Restrictions No   ROM Restrictions No   Eating   Comment PCA Valeria to provide SUP for lunch   Putting On/Taking Off Footwear   Type of Assistance Needed Physical assistance   Physical Assistance Level Total assistance   Comment TA socks, pt already had TEDS on   Putting On/Taking Off Footwear CARE Score 1   Lying to Sitting on Side of Bed   Type of Assistance Needed Physical assistance   Physical Assistance Level 51%-75%   Comment for trunk,  pt dioes initiate moving BLEs to edge   Lying to Sitting on Side of Bed CARE Score 2   Bed-Chair Transfer   Type of Assistance Needed Physical assistance   Physical Assistance Level 76% or more   Comment maxAx1 for sit pivot bed>tilt in space   Chair/Bed-to-Chair Transfer CARE Score 2   Exercise Tools   Other Exercise Tool 1 in bed with HOB elevated engaged pt in UE TE with 1# 2x15 to cont to inc fx strength. pt toelrated well with rest break. req vc and redirection for correct exercise technique   Cognition   Overall Cognitive Status Impaired   Arousal/Participation Lethargic;Cooperative   Attention Attends with cues to redirect   Orientation Level Oriented to person;Oriented to time;Oriented to place   Memory Decreased short term memory;Decreased recall of recent events;Decreased recall of precautions   Following Commands Follows one step commands with increased time or repetition   Activity Tolerance   Activity Tolerance Patient limited by fatigue   Assessment   Treatment Assessment Engaged pt in 20mins of skilled OT services with focus on UE TE and trnasfer OOB to sit upright for lunch and OOB tolerance. Pt completed UE TE in bed with vc to stay awake as pt tends to close eyes. Req maxA for sit pivot transfer and use of gait belt for safety. Pt  limited by fatigue, activity tolerance and  initiation. Cont OT POC with focus on sitting balance, UE TE, transfers, activity tolerance, sit to stands to inc fx peformance.   Prognosis Fair   Problem List Decreased strength;Decreased endurance;Impaired balance;Decreased mobility;Decreased cognition;Impaired judgement;Decreased safety awareness;Decreased skin integrity   Barriers to Discharge Inaccessible home environment;Decreased caregiver support   Plan   Treatment/Interventions ADL retraining;Functional transfer training;Therapeutic exercise;Endurance training;Patient/family training;Bed mobility;Compensatory technique education   Progress Slow progress, decreased activity tolerance   Discharge Recommendation   Rehab Resource Intensity Level, OT   (CM emailed SNF list to daughter)   OT Therapy Minutes   OT Time In 1145   OT Time Out 1205   OT Total Time (minutes) 20   OT Mode of treatment - Individual (minutes) 20   OT Mode of treatment - Concurrent (minutes) 0   OT Mode of treatment - Group (minutes) 0   OT Mode of treatment - Co-treat (minutes) 0   OT Mode of Treatment - Total time(minutes) 20 minutes   OT Cumulative Minutes 500   Therapy Time missed   Time missed? No

## 2025-01-24 NOTE — ASSESSMENT & PLAN NOTE
Sandra Purvis is a 89 y.o. overtly appearing  female, with no known past psychiatric history, and past medical history of hypertension, atrial fibrillation, CKD3, GERD, insomnia, recent subdural hematoma and recent subarachnoid hemorrhage post fall admitted to [facility] from 1/4-1/12 who was admitted to [facility] on 1/12/2025 for acute rehab services. Psychiatric consultation was requested for management of poor sleep and concern for PTSD.     Plan:   Discussed with primary team, with the following recommendations:    Admission labs reviewed.  Patient does not meet criteria for involuntary inpatient psychiatric admission.  Recommend no changes in psychiatric medication at this time as patient is being seen in an acute setting  Currently on Melatonin 6 mg and Remeron 7.5 mg qHS, and Trazodone 75 mg qHS as needed  Consider starting Gabapentin 100 mg qHS as needed if patient continues to report insomnia and anxiety at night  Do not recommend starting anticholinergic medication or PTSD indicated medication such as prazosin due to history of orthostatic hypotension and age  Observation level: Routine  Collaborate with collaterals for baseline assessment and disposition as indicated  Psychiatry will sign off at this time. Please contact our service via [employer] chat with any additional questions or concerns. If contacting after hours, please call or Epic secure chat the on-call team ([facility]: [phone]) with any questions or concerns.    Risks, benefits and possible side effects of Medications: No new medications at this time.

## 2025-01-24 NOTE — ASSESSMENT & PLAN NOTE
Large scalp mass noted on admission.  Also follows with dermatology as an outpatient but declining any interventions  Some localized spot bleeding from the edges noted  Patient scratched at it and created wound, hair net applied  1/24 slight smell of wound, Keflex ordered per IM, and wound care consulted

## 2025-01-24 NOTE — ASSESSMENT & PLAN NOTE
Secondary to fall  CT of the head on 1/4 also showed comminuted and mildly displaced fractures of the lateral and anterior walls of the right maxilla, lateral and inferior walls of the right orbit, right inferior orbital wall fractures appear to involve the inferior orbital foramen with associated small extraconal soft tissue edema and focus of air.  There is no herniation of extraocular muscles and a nondisplaced fracture of the zygomatic arch as well as the temporal bone at the TMJ.  There is diffuse opacification of the right maxillary sinus and right nasal cavity with blood products and air  Seen by oral maxillofacial surgery team and surgical intervention was declined by patient  Completed 7-day course of Unasyn/Augmentin  Sinus precautions (no straws)  Follow-up with OMFS as an outpatient

## 2025-01-24 NOTE — ASSESSMENT & PLAN NOTE
Patient presents with fall and initial CTh on 1/4 showing an acute subdural hemorrhage along the left cerebral convexity with a maximum thickness of 1.9 cm with no midline shift as well as an acute subarachnoid hemorrhage in the left frontoparietal sulci, left sylvian fissure and suprasellar cistern  Repeat CT head on 1/5 was stable; repeat 1/14 w/ interval evolution no new/evolving hemorrage   Completed 7 day course of keppra for seizure ppx  F/u w/ NSGY for repeat CTH ~1/27 1/20 Repeat imaging ordered for f/o tomorrow (1/21) at 1pm  F/u as needed; no neurosurgical intervention needed  MRI brain 1/22 improvement in SDH/SAH

## 2025-01-24 NOTE — ASSESSMENT & PLAN NOTE
Home regimen: Toprol XL 50mg daily and HCTZ 12.5mg daily.  Currently receiving Toprol XL 75mg BID and Cardizem 120mg daily.  Give midodrine as needed for SBP <90.  Orthostatic + Monitor orthostatics daily.  Keep SBP <160 due to intracranial bleed.

## 2025-01-24 NOTE — ASSESSMENT & PLAN NOTE
Noted to have elevated troponins on admission to acute setting.  [place] to be due to trauma/brain bleed vs demand ischemia due to paroxysmal a-fib and syncope.  Cardiology recommended starting ASA 81mg daily when cleared by Neurosurgery.  Per Neurosurgery - ok to start ASA in 2 weeks (2/4) after patient/family discussion of risks/benefits with PCP/Cardiologist.  Recommend establishing with Cardiology on discharge.

## 2025-01-24 NOTE — PROGRESS NOTES
01/24/25 1230   Pain Assessment   Pain Assessment Tool 0-10   Pain Score No Pain   Restrictions/Precautions   Precautions Aspiration;Bed/chair alarms;Cognitive;Fall Risk;Supervision on toilet/commode  (LUE IV,  Sinus precaution,  Ortho BP)   Sit to Lying   Type of Assistance Needed Physical assistance   Physical Assistance Level 51%-75%   Comment for legs and trunk   Sit to Lying CARE Score 2   Sit to Stand   Type of Assistance Needed Physical assistance   Physical Assistance Level 51%-75%   Comment Cues and assist for ant flexion,  watch for feet sliding out on floor   Sit to Stand CARE Score 2   Bed-Chair Transfer   Type of Assistance Needed Physical assistance   Physical Assistance Level 51%-75%   Comment Std pivot with RW , and also perforemd 1 without having pt place hands on therapist shoulders   (watch post leaning,  needs cues to keep legs straight,  poor walker management,   Chair/Bed-to-Chair Transfer CARE Score 2   Walk 10 Feet   Type of Assistance Needed Physical assistance   Physical Assistance Level Total assistance   Comment chair follow   Walk 10 Feet CARE Score 1   Walk 50 Feet with Two Turns   Reason if not Attempted Safety concerns   Walk 50 Feet with Two Turns CARE Score 88   Walk 150 Feet   Reason if not Attempted Safety concerns   Walk 150 Feet CARE Score 88   Ambulation   Primary Mode of Locomotion Prior to Admission Walk   Distance Walked (feet) 17 ft  (17x1   13x1 with chair follow)   Walk Assist Level Chair Follow;Maximum Assist   Findings Amb with RW,  pt self advanced legs,  needed Mod / Max for balance to prevent post leaning,  and walker advancement,  2nd walk pt needed less assistance , balance felt better   Equipment Use   NuStep 4mins for warm up   Assessment   Treatment Assessment 45 min skilled PT session,  pt was already in tili in space,  agreed for therapy,  pt performed Nustep for 4 mins,  BP was 130/60,  Pt was able to perform 2 ambulation bouts this session which is great  improvement.  Pt also did std pivot with RW-  needs to work on ant flexion and standing balance, fatigued after this so transferd pt back to bed.  Cont skilled PT toward LTGS   Problem List Decreased strength;Decreased endurance;Impaired balance;Decreased mobility;Decreased cognition;Impaired judgement;Decreased safety awareness;Decreased skin integrity   Barriers to Discharge Inaccessible home environment;Decreased caregiver support   Plan   Progress Slow progress, decreased activity tolerance   PT Therapy Minutes   PT Time In 1230   PT Time Out 1315   PT Total Time (minutes) 45   PT Mode of treatment - Individual (minutes) 45   PT Mode of treatment - Concurrent (minutes) 0   PT Mode of treatment - Group (minutes) 0   PT Mode of treatment - Co-treat (minutes) 0   PT Mode of Treatment - Total time(minutes) 45 minutes   PT Cumulative Minutes 440   Therapy Time missed   Time missed? No

## 2025-01-24 NOTE — PROGRESS NOTES
Progress Note - PMR   Name: Sandra Purvis 89 y.o. female I MRN: [id number]  Unit/Bed#: -02 I Date of Admission: 1/17/2025   Date of Service: 1/24/2025 I Hospital Day: 7     Assessment & Plan  Hypertension  Cw metoprolol succinate 75 mg twice daily  Cardizem 120 mg XR daily, d/c amlodipine  Wears compression stockings, abdominal binder  Atrial fibrillation with rapid ventricular response (HCC)  Initially presented with sinus bradycardia and evaluated by cardiology and was in controlled atrial fibrillation without bradycardia  Not on anticoagulation prior to admission and not indicated at this point due to the SDH and SAH and overall remains high risk due to risk of falls and given her advanced age  No recent follow-up with cardiology as an outpatient and generally sees her primary care physician for management and was on Toprol 50 mg daily at home which has been changed well and in the hospital  Tachycardic in the hospital and given IV fluids and medication changes include addition of midodrine and changes in the metoprolol dosing  Recommended for aspirin but would likely not be a good long-term candidate for anticoagulation  Cardio rec: continue with cardizem 120 daily and Toprol-XL 75 mg twice daily 1/16  Stage 3 chronic kidney disease, unspecified whether stage 3a or 3b CKD (HCC)  Lab Results   Component Value Date    EGFR 62 01/23/2025    EGFR 60 01/20/2025    EGFR 64 01/17/2025    CREATININE 0.83 01/23/2025    CREATININE 0.85 01/20/2025    CREATININE 0.81 01/17/2025   At baseline  GERD (gastroesophageal reflux disease)  Continue protonix  Basal cell carcinoma (BCC) of scalp  Large scalp mass noted on admission.  Also follows with dermatology as an outpatient but declining any interventions  Some localized spot bleeding from the edges noted  Patient scratched at it and created wound, hair net applied  1/24 slight smell of wound, Keflex ordered per IM, and wound care consulted  Malignant melanoma of  arm, left (HCC)  Large mass on the left forearm that is melanoma  Follows with dermatology as an outpatient last seen in December 2024 but not interested in any treatments  SDH (subdural hematoma) (HCC)  Patient presents with fall and initial CTh on 1/4 showing an acute subdural hemorrhage along the left cerebral convexity with a maximum thickness of 1.9 cm with no midline shift as well as an acute subarachnoid hemorrhage in the left frontoparietal sulci, left sylvian fissure and suprasellar cistern  Repeat CT head on 1/5 was stable; repeat 1/14 w/ interval evolution no new/evolving hemorrage   Completed 7 day course of keppra for seizure ppx  F/u w/ NSGY for repeat CTH ~1/27 1/20 Repeat imaging ordered for f/o tomorrow (1/21) at 1pm  F/u as needed; no neurosurgical intervention needed  MRI brain 1/22 improvement in SDH/SAH  SAH (subarachnoid hemorrhage) (HCC)  Subarachnoid hemorrhage noted on initial CT on 1/4 and stable on 1/5;   CTH1/14  Interval evolution of recent subdural and subarachnoid hemorrhage as detailed above. No definitive new or enlarging intracranial hemorrhage.  Continue same management for the subarachnoid hemorrhage as for the subdural hematoma, please see that separate section for overall management plans  Closed extensive facial fractures (HCC)  Secondary to fall  CT of the head on 1/4 also showed comminuted and mildly displaced fractures of the lateral and anterior walls of the right maxilla, lateral and inferior walls of the right orbit, right inferior orbital wall fractures appear to involve the inferior orbital foramen with associated small extraconal soft tissue edema and focus of air.  There is no herniation of extraocular muscles and a nondisplaced fracture of the zygomatic arch as well as the temporal bone at the TMJ.  There is diffuse opacification of the right maxillary sinus and right nasal cavity with blood products and air  Seen by oral maxillofacial surgery team and surgical  intervention was declined by patient  Completed 7-day course of Unasyn/Augmentin  Sinus precautions (no straws)  Follow-up with FS as an outpatient  Chest pain  Troponins obtained on initial evaluation of the patient in the emergency room and cardiology was consulted for evaluation  Despite elevated troponins there was nonischemic findings on her EKG and felt to be potentially related to a nonischemic troponin elevation in the setting of trauma and brain bleed versus demand ischemia due to her paroxysmal atrial fibrillation and syncopal episode  Not a candidate for cardiology for ischemic evaluation due to advanced age, frailty and recent subarachnoid hemorrhage/subdural hemorrhage  No new regional wall motion abnormalities noted on echocardiogram on 1/6/2025  Recommended to start aspirin 81 mg when cleared by neurosurgery with CT head  Follow-up with cardiology as an outpatient  Orthostatic hypotension  Blood pressures ranging in physical therapy as low as 70/40 and cardiology was evaluating the patient  Symptomatic with lightheadedness and presyncope and had received gentle fluids with some improvement  BALJIT stockings PRN as well and encouragement of fluid intake  Repeat orthostatics improved but still getting lightheaded at times which was felt to be the initiating factor in her syncopal episode that led to the fall and injury.  1/17 IM held midodrine, no significant orthostatic hypotension currently observed may need to introduce at 2.5 mg TID if develops episodes at a later time, continue to monitor orthostatic Bps   1/19 bp dropped on standing, came back up on sitting  Midodrine 5 mg BID PRN  Syncope  Presented with syncopal episode preceded by lightheadedness that led to the fall which caused the sudden dural hemorrhage and subarachnoid hemorrhage  Also found to be significantly orthostatic which was likely the etiology and was placed on telemetry with no evidence of bradycardia or pauses  Limited p.o.  "intake and fluid intake and suspected orthostatic hypotension as the cause  Anemia  Most recent hemoglobin of 9.4 1/17; which is stable  Continue to follow biweekly CBC or sooner if clinically indicated  Will need repeat scan if significant drops or concern for acute bleeds  1/20 10.1, 1/23 10.4  Leukocytosis  Most recent WBC count 12.08 and actually trending down from its maximum of 17.59  Unclear etiology, may be directly related to reactive changes from the SDH and SAH as well as fractures  Continue to monitor and if considerable changes may repeat scans given the fractures in the facial bones  Was on a course of Unasyn/Augmentin for 7 days  IM: Infectious workup negative  Received IV fluids, discontinued with evidence of fluid overload CXR  Stable off abx d/c 1/15  12.08 1/17---> 13.06 1/20----> 10.5 1/23  Acute on chronic diastolic congestive heart failure (HCC)  Wt Readings from Last 3 Encounters:   01/24/25 46 kg (101 lb 6.6 oz)   01/17/25 51.1 kg (112 lb 9.6 oz)   01/12/25 53.5 kg (118 lb)     TTE 1/6/2025 showed EF 70%, grade 2 DD, mildly dilated RV, mild LA, mild AI, mild TR, estimated PA pressure 50 mmHg, trivial pericardial effusion  Not on diuretics prior to admission  Cw furosemide 20 mg; monitor for volume status   Daily weights, monitor I/O     Insomnia  Inc melatonin to 6mg HS, trazodone 25mg HS PRN----> increased to 50---> 75 mg  Sleep log   Continue remeron 7.5, melatonin 6 mg  1/23 Psych consulted    History of Present Illness   Sandra Purvis is a 89 y.o. female w/ PMHx of afib, GERD, HTN, who initially presented to [facility] on 1/4/2025 after a syncopan episode and collapse at home. CT had showed \"comminuted and mildly displaced fractures of the lateral and anterior walls of the right maxilla, lateral and inferior walls of the right orbit, right inferior orbital wall fracture appears to involve the inferior orbital foramen associated with small " "extraconal soft tissue edema and focus of air. There is also a nondisplaced fracture of the zygomatic arch and the temporal bone at the TMJ and diffuse opacification of the right maxillary sinus and right nasal cavity with blood products and air. Additionally there was an acute subdural hemorrhage along the left cerebral convexity with a maximal thickness of 1.9 cm with no midline shift and an acute subarachnoid hemorrhage in the left frontal parietal sulci, left sylvian fissure as well as suprasellar cistern.\" OMFS was consulted as well as neurosurgery. Patient and family declined any acute intervention at this time. Patient was started on Keppra x 7 days. Patient was treated with 7-day course of Unasyn/Augmentin. Her hospital stay was also complicated with A-fib with RVR and cardiology was consulted. Patient then had orthostatic hypotension and started on midodrine. Patient initially transferred to [facility] 1/12/2025. While at [facility] patient with hypertensive episodes to SBP >190 and Afib rates 120-130s. Patient became more symptomatic from her A-fib and was transferred to acute care 1/14. She had X ray w/ pulmonary edema was given lasix 1/14-1/15 with good response. Patient was seen by cardiology. Patient was placed on Cardizem drip and now on PO Cardizem. Her rates have improved. Sandra Purvis was admitted to the [facility] on 01/17/25     Chief Complaint: f/u ambulatory dysfunction    Interval: Patient seen and examined in therapy. No events overnight.  Reports overall feeling well. Last BM 1/24. Pt states she slept a little better last night. Per sleep log patient is still not sleeping at night.    Review of Systems   Respiratory:  Negative for shortness of breath.    Cardiovascular:  Positive for chest pain (binder on and could be adding to discomfort). Negative for palpitations and leg swelling.   Gastrointestinal:  Negative for abdominal pain, constipation, diarrhea, nausea and vomiting.       Objective "   Functional Update:  Physical Therapy Occupational Therapy Speech Therapy   Weight Bearing Status: Full Weight Bearing  Transfers: Total Assistance  Bed Mobility: Maximum Assistance  Discharge Recommendations: Home with:  DC Home with:: 24 Hour Assisteance   Eating: Supervision  Grooming: Supervision  Bathing: Maximum Assistance  Bathing: Maximum Assistance  Upper Body Dressing: Minimal Assistance  Lower Body Dressing: Maximum Assistance  Toileting: Total Assistance  Tub/Shower Transfer: Total Assistance (would require TA at current level of function)  Toilet Transfer: Maximum Assistance  Cognition: Exceptions to WNL  Cognition: Decreased Memory, Decreased Executive Functions, Decreased Attention, Decreased Comprehension  Orientation: Person, Place   Mode of Communication: Verbal  Cognition: Exceptions to WNL  Cognition: Decreased Memory, Decreased Executive Functions, Decreased Attention, Decreased Comprehension, Decreased Safety  Orientation: Person, Place, Time, Situation  Swallowing: Exceptions to WNL  Swallowing: Oral Dysphagia, Aspiration Risk  Diet Recommendations: Level 2/Mechanically Altered, Thin  Discharge Recommendations: Home with: (pending progress)  DC Home with:: 24 Hour Supervision, 24 Hour Assisteance, Family Support         Temp:  [98.1 °F (36.7 °C)-99.1 °F (37.3 °C)] 99.1 °F (37.3 °C)  HR:  [57-79] 79  Resp:  [18] 18  BP: ()/(46-71) 120/57  SpO2:  [97 %-98 %] 98 %    Physical Exam  Constitutional:       General: She is not in acute distress.     Comments: Frail   HENT:      Head: Normocephalic and atraumatic.      Mouth/Throat:      Mouth: Mucous membranes are moist.   Cardiovascular:      Rate and Rhythm: Normal rate and regular rhythm.   Pulmonary:      Effort: Pulmonary effort is normal.      Breath sounds: Normal breath sounds.      Comments: Wearing binder  Abdominal:      General: Bowel sounds are normal.   Musculoskeletal:         General: Normal range of motion.      Right lower  leg: No edema.      Left lower leg: No edema.   Skin:     General: Skin is warm and dry.      Findings: Bruising (on face and arms) present.      Comments: Wound on scalp has a slight odor  Pt wearing surgical cap and gauze to cover   Neurological:      Mental Status: She is alert. Mental status is at baseline.      Comments: Pt is more alert today and faster to answer questions   Psychiatric:         Mood and Affect: Mood normal.         Behavior: Behavior normal.           Scheduled Meds:  Current Facility-Administered Medications   Medication Dose Route Frequency Provider Last Rate    acetaminophen  975 mg Oral Q8H [facility] Autumn Chapman PA-C      bisacodyl  10 mg Rectal Daily PRN Autumn Chapman PA-C      calcium carbonate  500 mg Oral Daily PRN Autumn Chapman PA-C      cephalexin  500 mg Oral Q8H [facility] LUIS Monsalve      diltiazem  120 mg Oral Daily Autumn Chapman PA-C      docusate sodium  100 mg Oral BID PRN Sheri Pope MD      enoxaparin  30 mg Subcutaneous Q24H Autumn Chapman PA-C      hydrALAZINE  10 mg Oral Q8H PRN LUIS Monsalve      melatonin  6 mg Oral HS Sheri Pope MD      metoprolol succinate  75 mg Oral BID Autumn Chapman PA-C      midodrine  5 mg Oral TID PRN Autumn Chapman PA-C      mirtazapine  7.5 mg Oral HS Sheri Pope MD      ondansetron  4 mg Oral Q6H PRN Autumn Chapman PA-C      oxyCODONE  2.5 mg Oral Q6H PRN Autumn Chapman PA-C      Or    oxyCODONE  5 mg Oral Q6H PRN Autumn Chapman PA-C      pantoprazole  40 mg Oral Early Morning Autumn Chapman PA-C      saccharomyces boulardii  250 mg Oral BID LUIS Monsalve      traZODone  75 mg Oral HS PRDARIUSZ Pope MD           Lab Results: I have reviewed the following results:  Results from last 7 days   Lab Units 01/23/25  0554 01/20/25  0425   HEMOGLOBIN g/dL 10.4* 10.1*   HEMATOCRIT % 32.3* 30.5*   WBC Thousand/uL 10.50* 13.06*   PLATELETS  Thousands/uL 294 302     Results from last 7 days   Lab Units 01/23/25  0554 01/20/25  0425   BUN mg/dL 12 14   SODIUM mmol/L 140 136   POTASSIUM mmol/L 3.5 3.5   CHLORIDE mmol/L 103 101   CREATININE mg/dL 0.83 0.85   AST U/L  --  16   ALT U/L  --  22              Autumn Chapman PA-C  Physical Medicine and Rehabilitation   01/24/25

## 2025-01-25 PROCEDURE — 99233 SBSQ HOSP IP/OBS HIGH 50: CPT

## 2025-01-25 PROCEDURE — 97530 THERAPEUTIC ACTIVITIES: CPT

## 2025-01-25 RX ADMIN — CEPHALEXIN 500 MG: 500 CAPSULE ORAL at 21:17

## 2025-01-25 RX ADMIN — CEPHALEXIN 500 MG: 500 CAPSULE ORAL at 14:18

## 2025-01-25 RX ADMIN — GABAPENTIN 100 MG: 100 CAPSULE ORAL at 21:17

## 2025-01-25 RX ADMIN — ACETAMINOPHEN 975 MG: 325 TABLET, FILM COATED ORAL at 14:18

## 2025-01-25 RX ADMIN — CEPHALEXIN 500 MG: 500 CAPSULE ORAL at 05:44

## 2025-01-25 RX ADMIN — MIDODRINE HYDROCHLORIDE 5 MG: 5 TABLET ORAL at 10:18

## 2025-01-25 RX ADMIN — ACETAMINOPHEN 975 MG: 325 TABLET, FILM COATED ORAL at 21:17

## 2025-01-25 RX ADMIN — MIRTAZAPINE 7.5 MG: 7.5 TABLET, FILM COATED ORAL at 21:17

## 2025-01-25 RX ADMIN — PANTOPRAZOLE SODIUM 40 MG: 40 TABLET, DELAYED RELEASE ORAL at 05:44

## 2025-01-25 RX ADMIN — ACETAMINOPHEN 975 MG: 325 TABLET, FILM COATED ORAL at 05:44

## 2025-01-25 RX ADMIN — Medication 250 MG: at 18:04

## 2025-01-25 RX ADMIN — METOPROLOL SUCCINATE 75 MG: 50 TABLET, EXTENDED RELEASE ORAL at 21:20

## 2025-01-25 RX ADMIN — MELATONIN TAB 3 MG 6 MG: 3 TAB at 21:17

## 2025-01-25 RX ADMIN — ENOXAPARIN SODIUM 30 MG: 30 INJECTION SUBCUTANEOUS at 21:16

## 2025-01-25 RX ADMIN — Medication 250 MG: at 09:29

## 2025-01-25 RX ADMIN — TRAZODONE HYDROCHLORIDE 75 MG: 50 TABLET ORAL at 21:17

## 2025-01-25 NOTE — NURSING NOTE
Working with lpn this am patient's blood pressure very low was put in [place] bp did come up Paz the pa was here this am was made aware. Patient was seen for OT she did get patient to stand for a few seconds otherwise patient was in bed

## 2025-01-25 NOTE — PROGRESS NOTES
"Progress Note - PMR   Name: Sandra Purvis 89 y.o. female I MRN: [id number]  Unit/Bed#: -01 I Date of Admission: 1/17/2025   Date of Service: 1/25/2025 I Hospital Day: 8     Assessment & Plan  Hypertension  Cw metoprolol succinate 75 mg twice daily  Cardizem 120 mg XR daily, d/c amlodipine  Wears compression stockings, abdominal binder  Atrial fibrillation with rapid ventricular response (HCC)  Initially presented with sinus bradycardia and evaluated by cardiology and was in controlled atrial fibrillation without bradycardia  Not on anticoagulation prior to admission and not indicated at this point due to the SDH and SAH and overall remains high risk due to risk of falls and given her advanced age  No recent follow-up with cardiology as an outpatient and generally sees her primary care physician for management and was on Toprol 50 mg daily at home which has been changed well and in the hospital  Tachycardic in the hospital and given IV fluids and medication changes include addition of midodrine and changes in the metoprolol dosing  Recommended for aspirin but would likely not be a good long-term candidate for anticoagulation  Cardio rec: continue with cardizem 120 daily and Toprol-XL 75 mg twice daily 1/16  Stage 3 chronic kidney disease, unspecified whether stage 3a or 3b CKD (HCC)  Lab Results   Component Value Date    EGFR 62 01/23/2025    EGFR 60 01/20/2025    EGFR 64 01/17/2025    CREATININE 0.83 01/23/2025    CREATININE 0.85 01/20/2025    CREATININE 0.81 01/17/2025   At baseline  GERD (gastroesophageal reflux disease)  Continue protonix  Basal cell carcinoma (BCC) of scalp  Large scalp mass noted on admission.  Also follows with dermatology as an outpatient but declining any interventions  Some localized spot bleeding from the edges noted  Patient scratched at it and created wound, hair net applied  1/24 slight smell of wound: Keflex ordered per IM  Wound care recommendations: \"Left " "head--cleanse/soak with normal saline, pat dry. Cover wound with Xeroform and ABD. Secure with surgical cap. Change dressing daily and as needed. Trim surrounding hair as needed to keep from getting matted into wound\".   Hair could be trimmed more but family refused   Malignant melanoma of arm, left (HCC)  Large mass on the left forearm that is melanoma  Follows with dermatology as an outpatient last seen in December 2024 but not interested in any treatments  SDH (subdural hematoma) (HCC)  Patient presents with fall and initial CTh on 1/4 showing an acute subdural hemorrhage along the left cerebral convexity with a maximum thickness of 1.9 cm with no midline shift as well as an acute subarachnoid hemorrhage in the left frontoparietal sulci, left sylvian fissure and suprasellar cistern  Repeat CT head on 1/5 was stable; repeat 1/14 w/ interval evolution no new/evolving hemorrage   Completed 7 day course of keppra for seizure ppx  F/u w/ NSGY for repeat CTH ~1/27 1/20 Repeat imaging ordered for f/o tomorrow (1/21) at 1pm  F/u as needed; no neurosurgical intervention needed  MRI brain 1/22 improvement in SDH/SAH  SAH (subarachnoid hemorrhage) (HCC)  Subarachnoid hemorrhage noted on initial CT on 1/4 and stable on 1/5;   CTH1/14  Interval evolution of recent subdural and subarachnoid hemorrhage as detailed above. No definitive new or enlarging intracranial hemorrhage.  Continue same management for the subarachnoid hemorrhage as for the subdural hematoma, please see that separate section for overall management plans  Closed extensive facial fractures (HCC)  Secondary to fall  CT of the head on 1/4 also showed comminuted and mildly displaced fractures of the lateral and anterior walls of the right maxilla, lateral and inferior walls of the right orbit, right inferior orbital wall fractures appear to involve the inferior orbital foramen with associated small extraconal soft tissue edema and focus of air.  There is no " herniation of extraocular muscles and a nondisplaced fracture of the zygomatic arch as well as the temporal bone at the TMJ.  There is diffuse opacification of the right maxillary sinus and right nasal cavity with blood products and air  Seen by oral maxillofacial surgery team and surgical intervention was declined by patient  Completed 7-day course of Unasyn/Augmentin  Sinus precautions (no straws)  Follow-up with OMFS as an outpatient  Chest pain  Troponins obtained on initial evaluation of the patient in the emergency room and cardiology was consulted for evaluation  Despite elevated troponins there was nonischemic findings on her EKG and felt to be potentially related to a nonischemic troponin elevation in the setting of trauma and brain bleed versus demand ischemia due to her paroxysmal atrial fibrillation and syncopal episode  Not a candidate for cardiology for ischemic evaluation due to advanced age, frailty and recent subarachnoid hemorrhage/subdural hemorrhage  No new regional wall motion abnormalities noted on echocardiogram on 1/6/2025  Recommended to start aspirin 81 mg when cleared by neurosurgery with CT head  Follow-up with cardiology as an outpatient  Orthostatic hypotension  Blood pressures ranging in physical therapy as low as 70/40 and cardiology was evaluating the patient  Symptomatic with lightheadedness and presyncope and had received gentle fluids with some improvement  BALJIT stockings PRN as well and encouragement of fluid intake  Repeat orthostatics improved but still getting lightheaded at times which was felt to be the initiating factor in her syncopal episode that led to the fall and injury.  1/17 IM held midodrine, no significant orthostatic hypotension currently observed may need to introduce at 2.5 mg TID if develops episodes at a later time, continue to monitor orthostatic Bps   1/19 bp dropped on standing, came back up on sitting  Midodrine 5 mg BID PRN  Syncope  Presented with  syncopal episode preceded by lightheadedness that led to the fall which caused the sudden dural hemorrhage and subarachnoid hemorrhage  Also found to be significantly orthostatic which was likely the etiology and was placed on telemetry with no evidence of bradycardia or pauses  Limited p.o. intake and fluid intake and suspected orthostatic hypotension as the cause  Anemia  Most recent hemoglobin of 9.4 1/17; which is stable  Continue to follow biweekly CBC or sooner if clinically indicated  Will need repeat scan if significant drops or concern for acute bleeds  1/20 10.1, 1/23 10.4  Leukocytosis  Most recent WBC count 12.08 and actually trending down from its maximum of 17.59  Unclear etiology, may be directly related to reactive changes from the SDH and SAH as well as fractures  Continue to monitor and if considerable changes may repeat scans given the fractures in the facial bones  Was on a course of Unasyn/Augmentin for 7 days  IM: Infectious workup negative  Received IV fluids, discontinued with evidence of fluid overload CXR  Stable off abx d/c 1/15  12.08 1/17---> 13.06 1/20----> 10.5 1/23  Acute on chronic diastolic congestive heart failure (HCC)  Wt Readings from Last 3 Encounters:   01/25/25 45.6 kg (100 lb 8.5 oz)   01/12/25 53.5 kg (118 lb)   01/10/25 51.1 kg (112 lb 9.6 oz)     TTE 1/6/2025 showed EF 70%, grade 2 DD, mildly dilated RV, mild LA, mild AI, mild TR, estimated PA pressure 50 mmHg, trivial pericardial effusion  Not on diuretics prior to admission  Cw furosemide 20 mg; monitor for volume status   Daily weights, monitor I/O     Insomnia  Inc melatonin to 6mg HS, trazodone 25mg HS PRN----> increased to 50---> 75 mg  Sleep log   Continue remeron 7.5, melatonin 6 mg  1/23 Psych consulted    Subjective   Sandra BRAR Hyun is a 89 y.o. female w/ PMHx of afib, GERD, HTN, who initially presented to [facility] on 1/4/2025 after a syncopan episode and collapse  "at home. CT had showed \"comminuted and mildly displaced fractures of the lateral and anterior walls of the right maxilla, lateral and inferior walls of the right orbit, right inferior orbital wall fracture appears to involve the inferior orbital foramen associated with small extraconal soft tissue edema and focus of air. There is also a nondisplaced fracture of the zygomatic arch and the temporal bone at the TMJ and diffuse opacification of the right maxillary sinus and right nasal cavity with blood products and air. Additionally there was an acute subdural hemorrhage along the left cerebral convexity with a maximal thickness of 1.9 cm with no midline shift and an acute subarachnoid hemorrhage in the left frontal parietal sulci, left sylvian fissure as well as suprasellar cistern.\" OMFS was consulted as well as neurosurgery. Patient and family declined any acute intervention at this time. Patient was started on Keppra x 7 days. Patient was treated with 7-day course of Unasyn/Augmentin. Her hospital stay was also complicated with A-fib with RVR and cardiology was consulted. Patient then had orthostatic hypotension and started on midodrine. Patient initially transferred to [facility] 1/12/2025. While at [facility] patient with hypertensive episodes to SBP >190 and Afib rates 120-130s. Patient became more symptomatic from her A-fib and was transferred to acute care 1/14. She had X ray w/ pulmonary edema was given lasix 1/14-1/15 with good response. Patient was seen by cardiology. Patient was placed on Cardizem drip and now on PO Cardizem. Her rates have improved. Sandra Purvis was admitted to the [facility] on 01/17/25     Chief Complaint: f/u ambulatory dysfunction    Interval:   Seen and evaluated patient at bedside. She endorses some mild back pain but otherwise denies N/V, abdominal pain, SOB, chest pain, lightheadedness and dizziness.   Her blood pressure was on the lower end of normal while laying down, received 5 mg " midodrine. Her BP then improved with 102/44 (sitting to standing) and 110/53 laying down. She remained asymptomatic   Examined scalp mass: appears stable with some serosanguineous drainage- wound care evaluated yesterday- debridement not indicated   Seen by psychiatry yesterday, recommended no changes in psychiatric medication   Incontinent of bowel and bladder   Last BM 1/24   Continue current treatment plan     Objective :  Temp:  [97.3 °F (36.3 °C)-98.8 °F (37.1 °C)] 98.1 °F (36.7 °C)  HR:  [55-64] 64  BP: (132-167)/(67-74) 153/67  Resp:  [16-20] 20  SpO2:  [98 %-99 %] 99 %  O2 Device: None (Room air)    Functional Update:  Physical Therapy Occupational Therapy Speech Therapy   Weight Bearing Status: Full Weight Bearing  Transfers: Total Assistance  Bed Mobility: Maximum Assistance  Discharge Recommendations: Home with:  DC Home with:: 24 Hour Assisteance   Eating: Supervision  Grooming: Supervision  Bathing: Maximum Assistance  Bathing: Maximum Assistance  Upper Body Dressing: Minimal Assistance  Lower Body Dressing: Maximum Assistance  Toileting: Total Assistance  Tub/Shower Transfer: Total Assistance (would require TA at current level of function)  Toilet Transfer: Maximum Assistance  Cognition: Exceptions to WNL  Cognition: Decreased Memory, Decreased Executive Functions, Decreased Attention, Decreased Comprehension  Orientation: Person, Place   Mode of Communication: Verbal  Cognition: Exceptions to WNL  Cognition: Decreased Memory, Decreased Executive Functions, Decreased Attention, Decreased Comprehension, Decreased Safety  Orientation: Person, Place, Time, Situation  Swallowing: Exceptions to WNL  Swallowing: Oral Dysphagia, Aspiration Risk  Diet Recommendations: Level 2/Mechanically Altered, Thin  Discharge Recommendations: Home with: (pending progress)  DC Home with:: 24 Hour Supervision, 24 Hour Assisteance, Family Support         Physical Exam  Vitals and nursing note reviewed.   Constitutional:        General: She is not in acute distress.     Appearance: She is not toxic-appearing or diaphoretic.      Comments: Frail   HENT:      Head: Normocephalic and atraumatic.      Mouth/Throat:      Mouth: Mucous membranes are moist.   Cardiovascular:      Rate and Rhythm: Normal rate and regular rhythm.      Pulses:           Radial pulses are 2+ on the right side.      Comments: HR 66 bpm   Pulmonary:      Effort: Pulmonary effort is normal. No respiratory distress.   Abdominal:      General: Bowel sounds are normal. There is no distension.      Palpations: Abdomen is soft.      Tenderness: There is no abdominal tenderness. There is no guarding.   Musculoskeletal:         General: Normal range of motion.      Right lower leg: No edema.      Left lower leg: No edema.   Skin:     General: Skin is warm and dry.      Findings: Bruising (on face and arms) present.      Comments: Scalp mass: beefy red friable wound with dark edges and scant serosanguinous drainage. Slight malodor    Neurological:      General: No focal deficit present.      Mental Status: She is alert.      Motor: Weakness (generalized) present.      Comments: Answers questions appropriately    Psychiatric:         Mood and Affect: Mood normal.         Behavior: Behavior normal.           Scheduled Meds:  Current Facility-Administered Medications   Medication Dose Route Frequency Provider Last Rate    acetaminophen  975 mg Oral Q8H [facility] Autumn Chapman PA-C      bisacodyl  10 mg Rectal Daily PRN Autumn Chapman PA-C      calcium carbonate  500 mg Oral Daily PRN Autumn Chapman PA-C      cephalexin  500 mg Oral Q8H [facility] LUIS Monsalve      diltiazem  120 mg Oral Daily Autumn Chapman PA-C      docusate sodium  100 mg Oral BID PRN Sheri Pope MD      enoxaparin  30 mg Subcutaneous Q24H Autumn Chapman PA-C      gabapentin  100 mg Oral HS Sheri Pope MD      hydrALAZINE  10 mg Oral Q8H PRN LUIS Monsalve       melatonin  6 mg Oral HS Sheri Pope MD      metoprolol succinate  75 mg Oral BID Autumn Chapman PA-C      midodrine  5 mg Oral TID PRN Autumn Chapman PA-C      mirtazapine  7.5 mg Oral HS Sheri Pope MD      ondansetron  4 mg Oral Q6H PRN Autumn Chapman PA-C      oxyCODONE  2.5 mg Oral Q6H PRN Autumn Chapman PA-C      Or    oxyCODONE  5 mg Oral Q6H PRN Autumn Chapman PA-C      pantoprazole  40 mg Oral Early Morning Autumn Chapman PA-C      saccharomyces boulardii  250 mg Oral BID LUIS Monsalve      traZODone  75 mg Oral HS Sheri Pope MD           Lab Results: I have reviewed the following results:  Results from last 7 days   Lab Units 01/23/25  0554 01/20/25  0425   HEMOGLOBIN g/dL 10.4* 10.1*   HEMATOCRIT % 32.3* 30.5*   WBC Thousand/uL 10.50* 13.06*   PLATELETS Thousands/uL 294 302     Results from last 7 days   Lab Units 01/23/25  0554 01/20/25  0425   BUN mg/dL 12 14   SODIUM mmol/L 140 136   POTASSIUM mmol/L 3.5 3.5   CHLORIDE mmol/L 103 101   CREATININE mg/dL 0.83 0.85   AST U/L  --  16   ALT U/L  --  22            Yumiko Pittman PA-C  Physical Medicine and Rehabilitation  [facility]

## 2025-01-25 NOTE — PLAN OF CARE
Problem: PAIN - ADULT  Goal: Verbalizes/displays adequate comfort level or baseline comfort level  Description: Interventions:  - Encourage patient to monitor pain and request assistance  - Assess pain using appropriate pain scale  - Administer analgesics based on type and severity of pain and evaluate response  - Implement non-pharmacological measures as appropriate and evaluate response  - Consider cultural and social influences on pain and pain management  - Notify physician/advanced practitioner if interventions unsuccessful or patient reports new pain  Outcome: Progressing     Problem: INFECTION - ADULT  Goal: Absence or prevention of progression during hospitalization  Description: INTERVENTIONS:  - Assess and monitor for signs and symptoms of infection  - Monitor lab/diagnostic results  - Monitor all insertion sites, i.e. indwelling lines, tubes, and drains  - Monitor endotracheal if appropriate and nasal secretions for changes in amount and color  - [employer] appropriate cooling/warming therapies per order  - Administer medications as ordered  - Instruct and encourage patient and family to use good hand hygiene technique  - Identify and instruct in appropriate isolation precautions for identified infection/condition  Outcome: Progressing

## 2025-01-25 NOTE — ASSESSMENT & PLAN NOTE
"Large scalp mass noted on admission.  Also follows with dermatology as an outpatient but declining any interventions  Some localized spot bleeding from the edges noted  Patient scratched at it and created wound, hair net applied  1/24 slight smell of wound: Keflex ordered per IM  Wound care recommendations: \"Left head--cleanse/soak with normal saline, pat dry. Cover wound with Xeroform and ABD. Secure with surgical cap. Change dressing daily and as needed. Trim surrounding hair as needed to keep from getting matted into wound\".   Hair could be trimmed more but family refused   "

## 2025-01-25 NOTE — ASSESSMENT & PLAN NOTE
Wt Readings from Last 3 Encounters:   01/25/25 45.6 kg (100 lb 8.5 oz)   01/12/25 53.5 kg (118 lb)   01/10/25 51.1 kg (112 lb 9.6 oz)     TTE 1/6/2025 showed EF 70%, grade 2 DD, mildly dilated RV, mild LA, mild AI, mild TR, estimated PA pressure 50 mmHg, trivial pericardial effusion  Not on diuretics prior to admission  Cw furosemide 20 mg; monitor for volume status   Daily weights, monitor I/O

## 2025-01-25 NOTE — PLAN OF CARE
Problem: Prexisting or High Potential for Compromised Skin Integrity  Goal: Skin integrity is maintained or improved  Description: INTERVENTIONS:  - Identify patients at risk for skin breakdown  - Assess and monitor skin integrity  - Assess and monitor nutrition and hydration status  - Monitor labs   - Assess for incontinence   - Turn and reposition patient  - Assist with mobility/ambulation  - Relieve pressure over bony prominences  - Avoid friction and shearing  - Provide appropriate hygiene as needed including keeping skin clean and dry  - Evaluate need for skin moisturizer/barrier cream  - Collaborate with interdisciplinary team   - Patient/family teaching  - Consider wound care consult   Outcome: Progressing     Problem: INFECTION - ADULT  Goal: Absence or prevention of progression during hospitalization  Description: INTERVENTIONS:  - Assess and monitor for signs and symptoms of infection  - Monitor lab/diagnostic results  - Monitor all insertion sites, i.e. indwelling lines, tubes, and drains  - Monitor endotracheal if appropriate and nasal secretions for changes in amount and color  - [place] appropriate cooling/warming therapies per order  - Administer medications as ordered  - Instruct and encourage patient and family to use good hand hygiene technique  - Identify and instruct in appropriate isolation precautions for identified infection/condition  Outcome: Progressing     Problem: CARDIOVASCULAR - ADULT  Goal: Maintains optimal cardiac output and hemodynamic stability  Description: INTERVENTIONS:  - Monitor I/O, vital signs and rhythm  - Monitor for S/S and trends of decreased cardiac output  - Administer and titrate ordered vasoactive medications to optimize hemodynamic stability  - Assess quality of pulses, skin color and temperature  - Assess for signs of decreased coronary artery perfusion  - Instruct patient to report change in severity of symptoms  Outcome: Progressing

## 2025-01-25 NOTE — PROGRESS NOTES
"OT TREATMENT       01/25/25 1000   Pain Assessment   Pain Assessment Tool 0-10   Pain Score No Pain   Restrictions/Precautions   Precautions Bed/chair alarms;Cognitive;Fall Risk;Supervision on toilet/commode   Weight Bearing Restrictions No   ROM Restrictions No   Lifestyle   Autonomy \"My body feels like it wants to move and do the right thing\"   Sit to Lying   Type of Assistance Needed Physical assistance   Physical Assistance Level 26%-50%   Comment A at trunk, incidental A for BLE   Sit to Lying CARE Score 3   Lying to Sitting on Side of Bed   Type of Assistance Needed Physical assistance   Physical Assistance Level 51%-75%   Comment verbal cuing and Min A for BLE management, Max A at trunk 2/2 retropulsion and impaired strength   Lying to Sitting on Side of Bed CARE Score 2   Sit to Stand   Type of Assistance Needed Physical assistance   Physical Assistance Level 26%-50%   Comment performed x2 from EOB to RW. verbal and tactile cuing for posture and forward trunk flexion   Sit to Stand CARE Score 3   Cognition   Overall Cognitive Status Impaired   Arousal/Participation Cooperative;Lethargic   Attention Attends with cues to redirect   Orientation Level Oriented to person;Oriented to place;Oriented to situation   Memory Decreased short term memory;Decreased recall of recent events;Decreased recall of precautions   Following Commands Follows one step commands with increased time or repetition   Additional Activities   Additional Activities Other (Comment)   Additional Activities Comments Upright tolerance: seated at EOB pt maintained short sit with UE support  of RW anteriorly ~10 mins throughout session.   Activity Tolerance   Activity Tolerance Patient limited by fatigue   Medical Staff Made Aware Ortho BP supine:118/48, seated: 94/48 seated after sit to stands: 103/51 Priya bland PA made aware   Assessment   Treatment Assessment Pt participated in skilled OT session with focus on upright tolerance. Pt tolerated " treatment well, pt remained supine in bed with all immediate needs met, call bell accessible, bed alarm secured . Pt will continue to benefit from skilled OT services to ensure safe discharge. Continue plan of care with focus on upright tolerance, functional transfers and ADL retraining.   Prognosis Fair   Problem List Decreased strength;Decreased endurance;Impaired balance;Decreased mobility;Decreased cognition;Decreased safety awareness;Decreased skin integrity   Barriers to Discharge Inaccessible home environment;None   Plan   Treatment/Interventions ADL retraining;Functional transfer training;Therapeutic exercise;Endurance training;Cognitive reorientation;Patient/family training;Equipment eval/education;Bed mobility;Compensatory technique education;Continued evaluation   Progress Slow progress, decreased activity tolerance   Discharge Recommendation   Rehab Resource Intensity Level, OT   (pending)   OT Therapy Minutes   OT Time In 1000   OT Time Out 1030   OT Total Time (minutes) 30   OT Mode of treatment - Individual (minutes) 30   OT Mode of treatment - Concurrent (minutes) 0   OT Mode of treatment - Group (minutes) 0   OT Mode of treatment - Co-treat (minutes) 0   OT Mode of Treatment - Total time(minutes) 30 minutes   OT Cumulative Minutes 530   Therapy Time missed   Time missed? No

## 2025-01-26 PROCEDURE — 97530 THERAPEUTIC ACTIVITIES: CPT

## 2025-01-26 PROCEDURE — 97535 SELF CARE MNGMENT TRAINING: CPT

## 2025-01-26 PROCEDURE — 97116 GAIT TRAINING THERAPY: CPT

## 2025-01-26 PROCEDURE — 97110 THERAPEUTIC EXERCISES: CPT

## 2025-01-26 RX ORDER — METOPROLOL SUCCINATE 50 MG/1
50 TABLET, EXTENDED RELEASE ORAL 2 TIMES DAILY
Status: DISCONTINUED | OUTPATIENT
Start: 2025-01-26 | End: 2025-01-28

## 2025-01-26 RX ADMIN — PANTOPRAZOLE SODIUM 40 MG: 40 TABLET, DELAYED RELEASE ORAL at 05:28

## 2025-01-26 RX ADMIN — ENOXAPARIN SODIUM 30 MG: 30 INJECTION SUBCUTANEOUS at 22:09

## 2025-01-26 RX ADMIN — Medication 250 MG: at 17:22

## 2025-01-26 RX ADMIN — ACETAMINOPHEN 975 MG: 325 TABLET, FILM COATED ORAL at 05:28

## 2025-01-26 RX ADMIN — MIDODRINE HYDROCHLORIDE 5 MG: 5 TABLET ORAL at 10:01

## 2025-01-26 RX ADMIN — GABAPENTIN 100 MG: 100 CAPSULE ORAL at 22:09

## 2025-01-26 RX ADMIN — MIRTAZAPINE 7.5 MG: 7.5 TABLET, FILM COATED ORAL at 22:10

## 2025-01-26 RX ADMIN — TRAZODONE HYDROCHLORIDE 75 MG: 50 TABLET ORAL at 22:10

## 2025-01-26 RX ADMIN — CEPHALEXIN 500 MG: 500 CAPSULE ORAL at 14:28

## 2025-01-26 RX ADMIN — MELATONIN TAB 3 MG 6 MG: 3 TAB at 22:09

## 2025-01-26 RX ADMIN — ACETAMINOPHEN 975 MG: 325 TABLET, FILM COATED ORAL at 14:29

## 2025-01-26 RX ADMIN — Medication 250 MG: at 08:43

## 2025-01-26 RX ADMIN — CEPHALEXIN 500 MG: 500 CAPSULE ORAL at 05:28

## 2025-01-26 RX ADMIN — ACETAMINOPHEN 975 MG: 325 TABLET, FILM COATED ORAL at 22:09

## 2025-01-26 RX ADMIN — METOPROLOL SUCCINATE 50 MG: 50 TABLET, EXTENDED RELEASE ORAL at 22:09

## 2025-01-26 RX ADMIN — CEPHALEXIN 500 MG: 500 CAPSULE ORAL at 22:10

## 2025-01-26 NOTE — PROGRESS NOTES
"   01/26/25 0700   Pain Assessment   Pain Assessment Tool 0-10   Pain Score 3   Pain Location/Orientation Orientation: Lower;Location: Back   Pain Onset/Description Frequency: Intermittent   Effect of Pain on Daily Activities Tolerated with rest breaks   Patient's Stated Pain Goal No pain   Hospital Pain Intervention(s) Repositioned;Rest   Multiple Pain Sites No   Restrictions/Precautions   Precautions Bed/chair alarms;Cognitive;Fall Risk;Pain;Supervision on toilet/commode  (Sinus precautions; SUP for meals)   Weight Bearing Restrictions No   ROM Restrictions No   Braces or Orthoses   (Teds and binder)   Lifestyle   Autonomy \"It feels good getting washed up.\"   Eating   Comment PCA present to provide SUP for breakfast meal.   Oral Hygiene   Comment Pt preference to complete oral hygiene routine following breakfast meal.   Grooming   Able To Initiate Tasks;Wash/Dry Face;Wash/Dry Hands   Limitation Noted In Timeliness;Strength   Findings SUP while seated OOB in tilt-in-space at sink.   Shower/Bathe Self   Type of Assistance Needed Physical assistance   Physical Assistance Level 51%-75%   Comment Due to BPs remaining WNL's this AM, performed SB ADL seated OOB at sink with pt demo ability to bathe 6/10 parts. Assistance to bathe lower portions of LE's while seated. STS to sink top req A for steadying and assist to bathe perri and buttocks area.   Shower/Bathe Self CARE Score 2   Bathing   Assessed Bath Style Sponge Bath   Able to Gather/Transport No   Able to Wash/Rinse/Dry (body part) Left Arm;Right Arm;L Upper Leg;R Upper Leg;Chest;Abdomen   Limitations Noted in Balance;Endurance;ROM;Strength;Timeliness;Safety   Positioning Seated;Standing   Tub/Shower Transfer   Reason Not Assessed Sponge Bath;Safety;Medical   Upper Body Dressing   Type of Assistance Needed Physical assistance   Physical Assistance Level 26%-50%   Comment Seated OOB pt able to thread UE's into long sleeve shirt req some assist to thread OH and manage " shirt down posteriorly.   Upper Body Dressing CARE Score 3   Lower Body Dressing   Type of Assistance Needed Physical assistance   Physical Assistance Level 51%-75%   Comment Seated OOB pt able to lift LE's into loose fitting pants. STS to sink top req TA to manage Depends and complete CM up over hips.   Lower Body Dressing CARE Score 2   Putting On/Taking Off Footwear   Type of Assistance Needed Physical assistance   Physical Assistance Level Total assistance   Comment TA to manage B/L jdoy stockings and slipper socks   Putting On/Taking Off Footwear CARE Score 1   Lying to Sitting on Side of Bed   Type of Assistance Needed Physical assistance   Physical Assistance Level 51%-75%   Comment Assist to manage LE's to EOB and achieve upright position   Lying to Sitting on Side of Bed CARE Score 2   Sit to Stand   Type of Assistance Needed Physical assistance   Physical Assistance Level 51%-75%   Comment Practiced multiple STS's - Initial modA STS to sink and RW; Luis Eduardo by end of session; Retropulsive   Sit to Stand CARE Score 2   Bed-Chair Transfer   Type of Assistance Needed Physical assistance   Physical Assistance Level 76% or more   Comment maxA sit pivot transfer EOB - tilt in space   Chair/Bed-to-Chair Transfer CARE Score 2   Cognition   Overall Cognitive Status Impaired   Arousal/Participation Alert;Cooperative   Attention Attends with cues to redirect   Orientation Level Oriented X4   Memory Decreased short term memory;Decreased recall of recent events;Decreased recall of precautions   Following Commands Follows one step commands with increased time or repetition   Activity Tolerance   Activity Tolerance Patient tolerated treatment well   Medical Staff Made Aware Ortho BP's completed: 151/67 (supine), 141/61 (seated); 132/66 following ADL; 107/52 following STS transfer's   Assessment   Treatment Assessment Pt participated in 90 min skilled OT Tx session with focus on ADL performance, OOB tolerance, fxnl STS  transfer's, and improving overall activity tolerance. See above for further Tx details. Pt tolerated session well, BP's monitored and remained WFL's (see above). Complaints of dizziness following stance, however, resolves within 1 min of seated rest break. Overall improvement noted with ADL performance, fxnl STS's, and OOB tolerance. Pt remained OOB in tilt-in-space recliner following session, +chair alarm and call bell within reach. PCA present for breakfast meal. Continue POC with focus on progressing towards OT goals.   Prognosis Fair   Problem List Decreased strength;Decreased range of motion;Decreased endurance;Impaired balance;Decreased mobility;Decreased cognition   Plan   Treatment/Interventions ADL retraining;Functional transfer training;Therapeutic exercise;Endurance training;Bed mobility   Progress Slow progress, decreased activity tolerance   Discharge Recommendation   Rehab Resource Intensity Level, OT   (Pending - possible SNF)   Equipment Recommended   (TBD)   OT Therapy Minutes   OT Time In 0700   OT Time Out 0830   OT Total Time (minutes) 90   OT Mode of treatment - Individual (minutes) 90   OT Mode of treatment - Concurrent (minutes) 0   OT Mode of treatment - Group (minutes) 0   OT Mode of treatment - Co-treat (minutes) 0   OT Mode of Treatment - Total time(minutes) 90 minutes   OT Cumulative Minutes 620   Therapy Time missed   Time missed? No

## 2025-01-26 NOTE — PROGRESS NOTES
01/26/25 1030   Pain Assessment   Pain Assessment Tool 0-10   Pain Score 4   Pain Location/Orientation Orientation: Left;Location: Knee   Pain Onset/Description Frequency: Intermittent   Effect of Pain on Daily Activities tolerating well   Patient's Stated Pain Goal No pain   Hospital Pain Intervention(s) Rest   Restrictions/Precautions   Precautions Bed/chair alarms;Cognitive;Fall Risk;Pain;Supervision on toilet/commode  (sinus precaution and Supervision for meals)   Weight Bearing Restrictions No   ROM Restrictions No   Braces or Orthoses   (TEDs and binder)   Cognition   Overall Cognitive Status Impaired   Arousal/Participation Alert;Cooperative   Attention Attends with cues to redirect   Orientation Level Oriented X4   Memory Decreased short term memory;Decreased recall of recent events;Decreased recall of precautions   Following Commands Follows one step commands with increased time or repetition   Roll Left and Right   Type of Assistance Needed Physical assistance   Physical Assistance Level 26%-50%   Roll Left and Right CARE Score 3   Sit to Lying   Type of Assistance Needed Physical assistance   Physical Assistance Level 26%-50%   Sit to Lying CARE Score 3   Lying to Sitting on Side of Bed   Type of Assistance Needed Physical assistance   Physical Assistance Level 51%-75%   Lying to Sitting on Side of Bed CARE Score 2   Sit to Stand   Type of Assistance Needed Physical assistance   Physical Assistance Level 51%-75%   Comment Mod Assist initially then Min Assist at the end of therapy session using RW.   Sit to Stand CARE Score 2   Bed-Chair Transfer   Type of Assistance Needed Physical assistance   Physical Assistance Level 51%-75%   Comment Mod Assist using RW   Chair/Bed-to-Chair Transfer CARE Score 2   Walk 10 Feet   Type of Assistance Needed Physical assistance   Physical Assistance Level Total assistance   Comment Ambulated 45 feet x 2 with Mod Assist using RW and with CF of another person for safety.    Walk 10 Feet CARE Score 1   Walk 50 Feet with Two Turns   Reason if not Attempted Safety concerns   Walk 50 Feet with Two Turns CARE Score 88   Walk 150 Feet   Reason if not Attempted Safety concerns   Walk 150 Feet CARE Score 88   Walking 10 Feet on Uneven Surfaces   Reason if not Attempted Safety concerns   Walking 10 Feet on Uneven Surfaces CARE Score 88   Ambulation   Does the patient walk? 2. Yes   Wheel 50 Feet with Two Turns   Comment Patient was too tired to try wheelchair propulsion today after she ambulated. She needed rest breaks in between activity due to fatigue.   Reason if not Attempted Safety concerns   Wheel 50 Feet with Two Turns CARE Score 88   Wheel 150 Feet   Comment Patient was too tired to try wheelchair propulsion today after she ambulated. She needed rest breaks in between activity due to fatigue.   Reason if not Attempted Safety concerns   Wheel 150 Feet CARE Score 88   Curb or Single Stair   Comment Unable to perform due to c/o  dizziness with her BP 90/62 mmHg and nurse Eliza is aware about her BP. She was wearing her TEDs and abdominal binder.   Reason if not Attempted Medical concerns   1 Step (Curb) CARE Score 88   4 Steps   Reason if not Attempted Medical concerns   4 Steps CARE Score 88   12 Steps   Reason if not Attempted Medical concerns   12 Steps CARE Score 88   Picking Up Object   Comment Unable to perform due to c/o dizziness with her BP 90/62 mmHg and nurse Eliza is aware about her BP. She was wearing her TEDs and abdominal binder.   Reason if not Attempted Medical concerns   Picking Up Object CARE Score 88   Therapeutic Interventions   Strengthening Seated ther ex on BLE's using 2# ankle weights for LAQ, hip flexion, hip abduction/adduction with knees extended, ankle DF/PF and hip adduction with ball squeezes for 3 x 10 reps each. Rest breaks given in between exercises.   Assessment   Treatment Assessment Patient tolerated therapy well with rest breaks in between due to  fatigue. She needed Mod Assist with bed mobility. Mod Assist initially with sit to stand transfers but Min Assist towards the end of therapy session. She needed Mod Assist with SPT using FWW due to initial posterior leaning. She ambulated 45 feet x 2 with Mod Assist using RW and with close CF of another person for safety. She was unable to perform wheelchair propulsion today due to c/o fatigue and dizziness with her BP 90/62 mmHg and nurse Eliza is aware of her BP during therapy session. She was wearing TEDs and abdominal binder the entire therapy session. Continue with current PT POC.   PT Barriers   Physical Impairment Decreased strength;Decreased range of motion;Decreased endurance;Impaired balance;Decreased mobility;Decreased cognition;Pain   Functional Limitation Car transfers;Ramp negotiation;Stair negotiation;Standing;Transfers;Walking;Wheelchair management   Plan   Treatment/Interventions Functional transfer training;LE strengthening/ROM;Therapeutic exercise;Endurance training;Bed mobility;Gait training;Spoke to nursing   Progress Slow progress, decreased activity tolerance   Discharge Recommendation   Equipment Recommended Walker   PT Therapy Minutes   PT Time In 1030   PT Time Out 1130   PT Total Time (minutes) 60   PT Mode of treatment - Individual (minutes) 60   PT Mode of treatment - Concurrent (minutes) 0   PT Mode of treatment - Group (minutes) 0   PT Mode of treatment - Co-treat (minutes) 0   PT Mode of Treatment - Total time(minutes) 60 minutes   PT Cumulative Minutes 530   Therapy Time missed   Time missed? No

## 2025-01-26 NOTE — QUICK NOTE
PMR Quick Note    Reviewed chart today. Last BM 1/24 still. Asymptomatic. Has been hypotensive with SBP consistently in the 90s, but asymptomatic. No drop in therapies. Abdominal binder/TEDs. Tolerating therapy. Has not been able to get diltiazem or toprol in the AM the past two days due to hypotension. BP when supine tends to be high. Discussed with nursing, will try to decrease  toprol to see if that helps with AM BP. Monitor HR closely. May be able to decrease back to home dose of Toprol XL 50mg daily.     Given midodrine this AM. Otherwise continue current plan of care.     Ashley de Padua, MD  Physical Medicine and Rehabilitation

## 2025-01-26 NOTE — NURSING NOTE
SBP range this a.m. 90 - 108.  Kendra and tulio on OOB.  Held 0800 doses of  Cardizem and Metoprolol per parameters.  Midodrine prn given this a.m.  Patient asymptomatic.  Tolerating therapies.  MD aware.  See new orders to decrease Metoprolol starting this p.m.  Will continue to monitor.

## 2025-01-27 LAB
ANION GAP SERPL CALCULATED.3IONS-SCNC: 7 MMOL/L (ref 4–13)
BASOPHILS # BLD AUTO: 0.07 THOUSANDS/ΜL (ref 0–0.1)
BASOPHILS NFR BLD AUTO: 1 % (ref 0–1)
BUN SERPL-MCNC: 19 MG/DL (ref 5–25)
CALCIUM SERPL-MCNC: 9.4 MG/DL (ref 8.4–10.2)
CHLORIDE SERPL-SCNC: 103 MMOL/L (ref 96–108)
CO2 SERPL-SCNC: 30 MMOL/L (ref 21–32)
CREAT SERPL-MCNC: 0.84 MG/DL (ref 0.6–1.3)
EOSINOPHIL # BLD AUTO: 0.12 THOUSAND/ΜL (ref 0–0.61)
EOSINOPHIL NFR BLD AUTO: 1 % (ref 0–6)
ERYTHROCYTE [DISTWIDTH] IN BLOOD BY AUTOMATED COUNT: 16.9 % (ref 11.6–15.1)
GFR SERPL CREATININE-BSD FRML MDRD: 61 ML/MIN/1.73SQ M
GLUCOSE SERPL-MCNC: 83 MG/DL (ref 65–140)
HCT VFR BLD AUTO: 31 % (ref 34.8–46.1)
HGB BLD-MCNC: 9.9 G/DL (ref 11.5–15.4)
IMM GRANULOCYTES # BLD AUTO: 0.03 THOUSAND/UL (ref 0–0.2)
IMM GRANULOCYTES NFR BLD AUTO: 0 % (ref 0–2)
LYMPHOCYTES # BLD AUTO: 1.32 THOUSANDS/ΜL (ref 0.6–4.47)
LYMPHOCYTES NFR BLD AUTO: 11 % (ref 14–44)
MCH RBC QN AUTO: 29.1 PG (ref 26.8–34.3)
MCHC RBC AUTO-ENTMCNC: 31.9 G/DL (ref 31.4–37.4)
MCV RBC AUTO: 91 FL (ref 82–98)
MONOCYTES # BLD AUTO: 0.67 THOUSAND/ΜL (ref 0.17–1.22)
MONOCYTES NFR BLD AUTO: 6 % (ref 4–12)
NEUTROPHILS # BLD AUTO: 9.57 THOUSANDS/ΜL (ref 1.85–7.62)
NEUTS SEG NFR BLD AUTO: 81 % (ref 43–75)
NRBC BLD AUTO-RTO: 0 /100 WBCS
PLATELET # BLD AUTO: 222 THOUSANDS/UL (ref 149–390)
PMV BLD AUTO: 10.1 FL (ref 8.9–12.7)
POTASSIUM SERPL-SCNC: 3.6 MMOL/L (ref 3.5–5.3)
RBC # BLD AUTO: 3.4 MILLION/UL (ref 3.81–5.12)
SODIUM SERPL-SCNC: 140 MMOL/L (ref 135–147)
WBC # BLD AUTO: 11.78 THOUSAND/UL (ref 4.31–10.16)

## 2025-01-27 PROCEDURE — 92526 ORAL FUNCTION THERAPY: CPT

## 2025-01-27 PROCEDURE — 99233 SBSQ HOSP IP/OBS HIGH 50: CPT | Performed by: INTERNAL MEDICINE

## 2025-01-27 PROCEDURE — 97535 SELF CARE MNGMENT TRAINING: CPT

## 2025-01-27 PROCEDURE — 97129 THER IVNTJ 1ST 15 MIN: CPT

## 2025-01-27 PROCEDURE — 80048 BASIC METABOLIC PNL TOTAL CA: CPT | Performed by: NURSE PRACTITIONER

## 2025-01-27 PROCEDURE — 97542 WHEELCHAIR MNGMENT TRAINING: CPT

## 2025-01-27 PROCEDURE — 97110 THERAPEUTIC EXERCISES: CPT

## 2025-01-27 PROCEDURE — 97530 THERAPEUTIC ACTIVITIES: CPT

## 2025-01-27 PROCEDURE — 97130 THER IVNTJ EA ADDL 15 MIN: CPT

## 2025-01-27 PROCEDURE — 85025 COMPLETE CBC W/AUTO DIFF WBC: CPT | Performed by: NURSE PRACTITIONER

## 2025-01-27 RX ORDER — BISACODYL 10 MG
10 SUPPOSITORY, RECTAL RECTAL DAILY PRN
Status: DISCONTINUED | OUTPATIENT
Start: 2025-01-27 | End: 2025-02-03 | Stop reason: HOSPADM

## 2025-01-27 RX ADMIN — ACETAMINOPHEN 975 MG: 325 TABLET, FILM COATED ORAL at 21:01

## 2025-01-27 RX ADMIN — METOPROLOL SUCCINATE 50 MG: 50 TABLET, EXTENDED RELEASE ORAL at 08:54

## 2025-01-27 RX ADMIN — Medication 250 MG: at 08:54

## 2025-01-27 RX ADMIN — TRAZODONE HYDROCHLORIDE 75 MG: 50 TABLET ORAL at 21:01

## 2025-01-27 RX ADMIN — Medication 250 MG: at 17:24

## 2025-01-27 RX ADMIN — CEPHALEXIN 500 MG: 500 CAPSULE ORAL at 14:09

## 2025-01-27 RX ADMIN — GABAPENTIN 100 MG: 100 CAPSULE ORAL at 21:02

## 2025-01-27 RX ADMIN — ENOXAPARIN SODIUM 30 MG: 30 INJECTION SUBCUTANEOUS at 20:53

## 2025-01-27 RX ADMIN — DILTIAZEM HYDROCHLORIDE 120 MG: 120 CAPSULE, COATED, EXTENDED RELEASE ORAL at 08:54

## 2025-01-27 RX ADMIN — MIRTAZAPINE 7.5 MG: 7.5 TABLET, FILM COATED ORAL at 21:01

## 2025-01-27 RX ADMIN — METOPROLOL SUCCINATE 50 MG: 50 TABLET, EXTENDED RELEASE ORAL at 20:53

## 2025-01-27 RX ADMIN — MIDODRINE HYDROCHLORIDE 5 MG: 5 TABLET ORAL at 10:15

## 2025-01-27 RX ADMIN — PANTOPRAZOLE SODIUM 40 MG: 40 TABLET, DELAYED RELEASE ORAL at 05:04

## 2025-01-27 RX ADMIN — CEPHALEXIN 500 MG: 500 CAPSULE ORAL at 21:01

## 2025-01-27 RX ADMIN — ACETAMINOPHEN 975 MG: 325 TABLET, FILM COATED ORAL at 14:09

## 2025-01-27 RX ADMIN — MELATONIN TAB 3 MG 6 MG: 3 TAB at 21:01

## 2025-01-27 RX ADMIN — CEPHALEXIN 500 MG: 500 CAPSULE ORAL at 05:04

## 2025-01-27 RX ADMIN — ACETAMINOPHEN 975 MG: 325 TABLET, FILM COATED ORAL at 05:04

## 2025-01-27 NOTE — ASSESSMENT & PLAN NOTE
WBC count currently 11.78.  Chronically elevated.  Unclear etiology, may be directly related to reactive changes from SDH and SAH as well as fracture.  Did recently complete a course of Unasyn and Augmentin.  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  Continue to trend routine CBC.

## 2025-01-27 NOTE — ASSESSMENT & PLAN NOTE
Most recent WBC count 12.08 and actually trending down from its maximum of 17.59  Unclear etiology, may be directly related to reactive changes from the SDH and SAH as well as fractures  Continue to monitor and if considerable changes may repeat scans given the fractures in the facial bones  Was on a course of Unasyn/Augmentin for 7 days  IM: Infectious workup negative  Received IV fluids, discontinued with evidence of fluid overload CXR  Stable off abx d/c 1/15  12.08 1/17---> 13.06 1/20----> 10.5 1/23 ----> 11.78 1/27

## 2025-01-27 NOTE — PROGRESS NOTES
"   01/27/25 1300   Pain Assessment   Pain Assessment Tool 0-10   Pain Score No Pain   Restrictions/Precautions   Precautions Aspiration;Bed/chair alarms;Cognitive;Fall Risk;Pain;Supervision on toilet/commode   Comprehension   Comprehension (FIM) 4 - Understands basic info/conversation 75-90% of time   Expression   Expression (FIM) 4 - Expresses basic info/needs 75-90% of time   Social Interaction   Social Interaction (FIM) 5 - Interacts appropriately with others 90% of time   Problem Solving   Problem solving (FIM) 3 - Solves basic problmes 50-74% of time   Memory   Memory (FIM) 2 - Recognizes, recalls/performs 25-49%   Speech/Language/Cognition Assessmetn   Treatment Assessment In addition to dysphagia tx session today, SLP able to engage pt in cognitive tx session. Today, pt was noted to fluctuate w/ overall orientation toward time of day. Pt was insightful that it was lunchtime as engaged in meal, but at end of session overall, pt thinking it was nighttime. Even throughout dysphagia tx session, when discussing potential for food choices, pt was noted to fluctuate between wanting to trial more \"normal\" foods vs then stating concerns about harder foods. However, as sinus precautions have been now discontinued, it is suspected that introducing more solid foods can possibly increase food choices, etc. Pt was agreeable w/ plan at end of session.     Otherwise, SLP engaged pt in completing verbal problem solving task in which SLP gave pt a problem and then 4 possible solutions to the problem where pt was 8/11 accurate in ability to ID the BEST solutions given FO4 items. It was noted that pt was mildly tangential in verbal responses at times, which then made pt get off the topic at hand. However, as SLP able to provide repetition give problem and solutions, pt was able to then increase ability to determine the BEST solutions provided when given increased time. At this time, pt will continue to benefit from skilled SLP " services to maximize functional cognitive linguistic skills in hopes for decreasing caregiver burden over time.   Eating   Type of Assistance Needed Set-up / clean-up;Supervision;Verbal cues   Physical Assistance Level No physical assistance   Eating CARE Score 4   Swallow Assessment   Swallow Treatment Assessment   Daily Dysphagia Tx Note     Patient Name: Sandra Purvis    Today's Date: 1/27/2025      Current Risks for Dysphagia & Aspiration: Weak cough;General debilitation;Cognitive deficit;Reduced alertness; facial fractures     Current Symptoms/Concerns: difficulty chewing     Current diet: mechanical soft/level 2 diet; pureed vegetables and thin liquids      Premorbid diet::regular diet and thin liquids      Positioning: Pt was repositioned in bed which was placed in chair mode    Items administered:Consistencies Administered: thin liquids, puree, mechanical soft solids, and mixed consistency  Materials administered included: minced chicken, pureed broccoli, canned peaches, thins by cup/straw    Total amount of meal consumed:   75% of meal and 360cc of thins      Oral stage:mild  Lip closure: functional  Anterior spillage: none  Mastication: slower and more effortful given level 2 items  Bolus formation: minimally decreased   Bolus control: overall timely but decreased w/ larger sips  Transfer: mildly delayed   Oral residue: minimal amounts w/ minced chicken/canned peaches  Pocketing: none         Pharyngeal stage:minimal- mild  Swallow promptness: mild delay w/ level 2 items, but somewhat prompter w/ puree/thins  Hyolaryngeal elevation: present but still decreased when palpated   Wet voice: none  Throat clear: none  Cough: delayed x1 at end of meal w/ thins by straw  Secondary swallows: more consistent w/ level 2 items  Audible swallows: increased w/ thin liquids today       Esophageal stage:No overt sxs observed given meal        Summary:     Pt presenting with mild oral and minimal-mild pharyngeal  dysphagia today. Symptoms or concerns included slower and more effortful breakdown of minced chicken vs other textures today. Manipulation of puree items was mildly delayed in transfers but bolus formulation is functional w/ puree. Due to increased mastication given softer solids, bolus formulation is more decreased which lead to minimal oral residual but pt using puree or liquid wash to clear which was successful. As for bolus control/transfer of thin liquids, overall was observed to be prompt and fairly timely w/ cup and straw sips, but when taking larger consecutive sips, decreased control suspected. Swallow more delayed w/ soft solids > puree/thins. HLE is present but still decreased when palpated. Pt w/ double swallows of level 2 items primarily observed. Highly audible swallows were elicited w/ thins today. While no overt signs/sxs of aspiration noted throughout meal, pt did have cough x1 on later sips of thins by straw, suspected due to decreased control/coordination.     Of note, Dr. Pope came during ST session to update that [facility] has DISCONTINUED SINUS PRECAUTIONS. Pt is allowed straws.         Recommendations:  Diet: mechanically altered/level 2 diet and thin liquids   Meds: whole with puree  Strategies: upright posture, only feed when fully alert, slow rate of feeding, small bites/sips, quiet environment (tv off, limit talking, door closed, etc.), and alternating bites and sips, OOB preferred     Sinus Precautions NOW DISCONTINUED PER OMFS     FULL supervision w/ meals.  Results reviewed with: patient, RN-MD Eliza- Dr. Pope  Aspiration precautions posted.     F/u ST tx: Pt is currently recommended for further skilled SLP services targeting dysphagia therapy in order to maximize oral and pharyngeal swallow skills, while safely supporting PO intake, as well as to improve independent carryover of safe swallow strategies.       Plan:      Continue dysphagia level 2, thins      Follow up to closely monitor  diet tolerance      Trials of diet upgrade under SLP supervision only   Swallow Assessment Prognosis   Prognosis Fair   Prognosis Considerations Age;Co-morbidities;Medical diagnosis;Medical prognosis;Severity of impairments;New learning ability;Ability to carry over   SLP Therapy Minutes   SLP Time In 1300   SLP Time Out 1400   SLP Total Time (minutes) 60   SLP Mode of treatment - Individual (minutes) 60   SLP Mode of treatment - Concurrent (minutes) 0   SLP Mode of treatment - Group (minutes) 0   SLP Mode of treatment - Co-treat (minutes) 0   SLP Mode of Treatment - Total time(minutes) 60 minutes   SLP Cumulative Minutes 320   Therapy Time missed   Time missed? No

## 2025-01-27 NOTE — ASSESSMENT & PLAN NOTE
Lab Results   Component Value Date    EGFR 61 01/27/2025    EGFR 62 01/23/2025    EGFR 60 01/20/2025    CREATININE 0.84 01/27/2025    CREATININE 0.83 01/23/2025    CREATININE 0.85 01/20/2025   At baseline

## 2025-01-27 NOTE — ASSESSMENT & PLAN NOTE
Presented on 1/4 s/p syncopal fall.  EKG showed atrial fibrillation with RVR on admission.  ECHO on 1/6 showed EF 70%, G2DD.  Noted to have significant orthostasis in acute setting.  Continue to monitor orthostatics.  Encourage PO hydration.  Apply abdominal binder/TEDs as needed.    Follow-up/establish with Cardiology as outpatient.

## 2025-01-27 NOTE — PLAN OF CARE
Problem: PAIN - ADULT  Goal: Verbalizes/displays adequate comfort level or baseline comfort level  Description: Interventions:  - Encourage patient to monitor pain and request assistance  - Assess pain using appropriate pain scale  - Administer analgesics based on type and severity of pain and evaluate response  - Implement non-pharmacological measures as appropriate and evaluate response  - Consider cultural and social influences on pain and pain management  - Notify physician/advanced practitioner if interventions unsuccessful or patient reports new pain  Outcome: Progressing     Problem: SKIN/TISSUE INTEGRITY - ADULT  Goal: Skin Integrity remains intact(Skin Breakdown Prevention)  Description: Assess:  -Perform Ronn assessment every day  -Clean and moisturize skin every day  -Inspect skin when repositioning, toileting, and assisting with ADLS  -Assess extremities for adequate circulation and sensation     Bed Management:  -Have minimal linens on bed & keep smooth, unwrinkled  -Change linens as needed when moist or perspiring  -Avoid sitting or lying in one position for more than 2 hours while in bed  -Keep HOB at 30 degrees     Toileting:  -Offer bedside commode  -Assess for incontinence every day  -Use incontinent care products after each incontinent episode such as brief    Activity:  -Mobilize patient 3 times a day  -Encourage activity and walks on unit  -Encourage or provide ROM exercises   -Turn and reposition patient every 2 Hours  -Use appropriate equipment to lift or move patient in bed  -Instruct/ Assist with weight shifting every 15 min when out of bed in chair  -Consider limitation of chair time 2 hour intervals    Skin Care:  -Avoid use of baby powder, tape, friction and shearing, hot water or constrictive clothing  -Relieve pressure over bony prominences using pillows  -Do not massage red bony areas    Next Steps:  -Teach patient strategies to minimize risks such as turning and repositioning    -Consider consults to  interdisciplinary teams such as PT/OT  Outcome: Progressing

## 2025-01-27 NOTE — PROGRESS NOTES
Progress Note - Internal Medicine   Name: Sandra Purvis 89 y.o. female I MRN: [id number]  Unit/Bed#: [facility] [place] 261-01 I Date of Admission: 1/17/2025   Date of Service: 1/27/2025 I Hospital Day: 10    Assessment & Plan  Hypertension  Home regimen: Toprol XL 50mg daily and HCTZ 12.5mg daily.  Currently receiving Toprol XL 50mg BID and Cardizem 120mg daily.  Give midodrine as needed for SBP <90.  Orthostatic + Monitor orthostatics daily.  Keep SBP <160 due to intracranial bleed.  Atrial fibrillation with rapid ventricular response (HCC)  Not taking AC at home due to hx of falls.  Continue to hold with recent falls and now subarachnoid/subdural.  Had been taking Toprol XL 50mg daily at home - currently receiving Toprol XL 50mg BID and Cardizem 120mg daily.  Hx of a-fib with RVR while on ARC, recently transferred to be on Cardizem drip.  Monitor routine VS.  Replete electrolytes as needed.  Follow-up with Cardiology as outpatient.  GERD (gastroesophageal reflux disease)  Continue Protonix 40mg daily.  Had not been on Protonix as outpatient.  Basal cell carcinoma (BCC) of scalp  Large scalp mass noted on admission.  Declining intervention as an outpatient.  Follows with Dermatology and LUIS Mejía (Surgical Oncology) as outpatient.  Wound care consulted - wound cleansed/debrided.  Continue with wearing cap during the day to avoid further patient manipulation.    Foul odor and drainage noted on 1/24 - started on Keflex 500mg Q8.  Continue for 5 days.  Wound care recommending Xeroform and ABD.  Change dressing daily.  Malignant melanoma of arm, left (HCC)  Recent surgical excision on 12/19.  Follows with LUIS Mejía (Surg/Onc) as outpatient.  Had declined further treatment.  SDH (subdural hematoma) (HCC)  Presented on 1/4 after syncopal fall.  CTH showed acute subdural hemorrhage along the L cerebral convexity with maximal thickness of 1.9cm.  No midline shift.    Received Keppra x7 days.  Continue to hold  AC/AP.  Neurovascular checks Q shift.  Maintain normotension.  Avoid SBP >160.    CTH on 1/20: New small hypodensity within the left frontal subcortical white matter, differential includes age indeterminate infarct, evolving parenchymal contusion among other etiologies.  MRI brain obtained on 1/21 showing evolving bilateral cerebral convexity subdural hematomas containing blood products of varying ages, scattered small volume subarachnoid hemorrhage, no evidence of acute large territory infarct.    2 week follow-up with Neurosurgery on 1/21.  Imaging reviewed.  May consider starting ASA in 2 weeks after discussion with PCP/Cardiology in regards to risk/benefit.  Follow-up with Neurosurgery in additional 4 weeks with [facility].    Primary team following.  PT/OT/SLP.  SAH (subarachnoid hemorrhage) (HCC)  Presented on 1/4 after syncopal fall.  CTH showed acute subarachnoid hemorrhage in the L frontoparietal sulci, L sylvian fissure and suprasellar cistern.     Received Keppra x7 days.  Continue to hold AC/AP.  Neurovascular checks Q shift.  Maintain normotension.  Avoid SBP >160.     MRI brain obtained 1/21 - stable.  2 week follow-up with Neurosurgery on 1/21.  Imaging was reviewed.  May consider starting ASA in 2 weeks after discussion with PCP/Cardiology.  Follow-up with Neurosurgery in additional 4 weeks with [facility].     Primary team following.  PT/OT/SLP.  Closed extensive facial fractures (HCC)  S/p fall on 1/4.  CT facial bones showed comminuted and mildly displaced fractures of the lateral and anterior walls of the R maxilla, lateral, and inferior wall of the R orbit, wall fracture appears to involve the inferior orbital foramen with associated small extraconal soft tissue edema, nondisplaced fractures of the zygomatic arch and the temporal bone at the TMJ, diffuse opacification of R maxillary sinus and R nasal cavity with blood products and air.    OMFS evaluated in acute setting - declined surgical  intervention.  Received IV Unasyn for 7 days.  Facial sinus precautions.    Follow-up with OMFS on discharge.  Chest pain  Noted to have elevated troponins on admission to acute setting.  Freeman to be due to trauma/brain bleed vs demand ischemia due to paroxysmal a-fib and syncope.  Cardiology recommended starting ASA 81mg daily when cleared by Neurosurgery.  Per Neurosurgery - ok to start ASA in 2 weeks (2/4) after patient/family discussion of risks/benefits with PCP/Cardiologist.  Recommend establishing with Cardiology on discharge.  Orthostatic hypotension  Presented with syncope on 1/4.  Significant orthostasis noted in acute setting.  Continue midodrine as needed  Apply abdominal binder/TEDs as needed.  Encourage PO hydration.  Continue to monitor orthostatics closely.  Syncope  Presented on 1/4 s/p syncopal fall.  EKG showed atrial fibrillation with RVR on admission.  ECHO on 1/6 showed EF 70%, G2DD.  Noted to have significant orthostasis in acute setting.  Continue to monitor orthostatics.  Encourage PO hydration.  Apply abdominal binder/TEDs as needed.    Follow-up/establish with Cardiology as outpatient.  Anemia  Hgb currently 9.9.  Baseline Hgb 9-10.  No active s/s of bleeding.  Vitamin B12 1125 on 1/13 - no need for supplementation.  Folate 21.1 on 1/13 - no need for supplementation.  Iron panel on 1/13 - Iron Sat 12%, TIBC 295, Iron 34, and Ferritin 98.    Continue to trend routine CBC.  Leukocytosis  WBC count currently 11.78.  Chronically elevated.  Unclear etiology, may be directly related to reactive changes from SDH and SAH as well as fracture.  Did recently complete a course of Unasyn and Augmentin.  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  Continue to trend routine CBC.  Acute on chronic diastolic congestive heart failure (HCC)  ECHO on 1/6 showed EF 70%, G2DD.  Monitor volume status - currently euvolemic.  Monitor I&Os, daily weights.  Insomnia  Continue melatonin 6mg at HS,  Remeron 7.5mg at HS, and trazodone 75mg at HS.  Continue sleep logs.    VTE Pharmacologic Prophylaxis:   Pharmacologic: Enoxaparin (Lovenox)  Mechanical VTE Prophylaxis in Place: Yes - sequential compression devices.    Current Length of Stay: 10 day(s)    Current Patient Status: Inpatient Rehab     Discharge Plan: As per primary team.    Code Status: Level 3 - DNAR and DNI    Subjective:   Pt examined while pt sitting in WC in pt room.  Complaints of lightheadedness this morning and was noted to be orthostatic.  PO fluid intake has been poor - encouraged to drink more water and pt is agreeable.  Denies any fevers, chills, headaches, SOB, palpitations, CP, or abdominal pain.  Had a BM this morning.  Currently has no other concerns or complaints at this time besides feeling a little bit fatigued.    Objective:     Vitals:   Temp (24hrs), Av.3 °F (36.8 °C), Min:98.3 °F (36.8 °C), Max:98.4 °F (36.9 °C)    Temp:  [98.3 °F (36.8 °C)-98.4 °F (36.9 °C)] 98.3 °F (36.8 °C)  HR:  [62-78] 78  Resp:  [18] 18  BP: ()/(60-80) 142/80  SpO2:  [95 %-97 %] 95 %  Body mass index is 15.64 kg/m².     Review of Systems   Constitutional:  Positive for fatigue. Negative for appetite change, chills and fever.   HENT:  Negative for trouble swallowing.    Eyes:  Negative for visual disturbance.   Respiratory:  Negative for cough, shortness of breath, wheezing and stridor.    Cardiovascular:  Negative for chest pain, palpitations and leg swelling.   Gastrointestinal:  Negative for abdominal distention, abdominal pain, constipation, diarrhea, nausea and vomiting.        LBM    Genitourinary:  Negative for difficulty urinating.   Musculoskeletal:  Negative for arthralgias, back pain and gait problem.   Neurological:  Positive for light-headedness (occurred this morning when getting OOB). Negative for dizziness, weakness, numbness and headaches.   Psychiatric/Behavioral:  Positive for sleep disturbance. Negative for dysphoric mood.  The patient is not nervous/anxious.    All other systems reviewed and are negative.       Input and Output Summary (last 24 hours):       Intake/Output Summary (Last 24 hours) at 1/27/2025 0910  Last data filed at 1/27/2025 0657  Gross per 24 hour   Intake 200 ml   Output 800 ml   Net -600 ml       Physical Exam:     Physical Exam  Vitals and nursing note reviewed.   Constitutional:       General: She is not in acute distress.     Appearance: Normal appearance. She is not ill-appearing.      Comments: Thin.   HENT:      Head: Normocephalic and atraumatic.   Cardiovascular:      Rate and Rhythm: Normal rate. Rhythm irregularly irregular.      Pulses: Normal pulses.      Heart sounds: Murmur heard.      Systolic murmur is present with a grade of 1/6.      No friction rub.   Pulmonary:      Effort: Pulmonary effort is normal. No respiratory distress.      Breath sounds: Normal breath sounds. No wheezing or rhonchi.   Abdominal:      General: Abdomen is flat. Bowel sounds are normal. There is no distension.      Palpations: Abdomen is soft. There is no mass.      Tenderness: There is no abdominal tenderness. There is no guarding or rebound.      Hernia: No hernia is present.   Musculoskeletal:      Cervical back: Normal range of motion and neck supple.      Right lower leg: No edema.      Left lower leg: No edema.   Skin:     General: Skin is warm and dry.      Capillary Refill: Capillary refill takes less than 2 seconds.      Findings: Bruising (R sided facial bruising) present.   Neurological:      Mental Status: She is alert and oriented to person, place, and time.   Psychiatric:         Mood and Affect: Mood normal.         Behavior: Behavior normal.         Additional Data:     Labs:    Results from last 7 days   Lab Units 01/27/25  0453   WBC Thousand/uL 11.78*   HEMOGLOBIN g/dL 9.9*   HEMATOCRIT % 31.0*   PLATELETS Thousands/uL 222   SEGS PCT % 81*   LYMPHO PCT % 11*   MONO PCT % 6   EOS PCT % 1     Results from  last 7 days   Lab Units 01/27/25  0453   SODIUM mmol/L 140   POTASSIUM mmol/L 3.6   CHLORIDE mmol/L 103   CO2 mmol/L 30   BUN mg/dL 19   CREATININE mg/dL 0.84   ANION GAP mmol/L 7   CALCIUM mg/dL 9.4   GLUCOSE RANDOM mg/dL 83                       Labs reviewed    Imaging:    Imaging reviewed    Recent Cultures (last 7 days):           Last 24 Hours Medication List:   Current Facility-Administered Medications   Medication Dose Route Frequency Provider Last Rate    acetaminophen  975 mg Oral Q8H ELIZABETH Autumn Chapman PA-C      bisacodyl  10 mg Rectal Daily PRN Autumn Chapman PA-C      calcium carbonate  500 mg Oral Daily PRN Autumn Chapman PA-C      cephalexin  500 mg Oral Q8H Count includes the [facility] LUIS Monsalve      diltiazem  120 mg Oral Daily Autumn Chapman PA-C      docusate sodium  100 mg Oral BID PRN Sheri Pope MD      enoxaparin  30 mg Subcutaneous Q24H Autumn Chapman PA-C      gabapentin  100 mg Oral HS Sheri Pope MD      hydrALAZINE  10 mg Oral Q8H PRN LUIS Monsalve      melatonin  6 mg Oral HS Sheri Pope MD      metoprolol succinate  50 mg Oral BID Ashley Depadua, MD      midodrine  5 mg Oral TID PRN Autumn Chapman PA-C      mirtazapine  7.5 mg Oral HS Sheri Pope MD      ondansetron  4 mg Oral Q6H PRN Autumn Chapman PA-C      oxyCODONE  2.5 mg Oral Q6H PRN Autumn Chapman PA-C      Or    oxyCODONE  5 mg Oral Q6H PRN Autumn Chapman PA-C      pantoprazole  40 mg Oral Early Morning Autumn Chapman PA-C      saccharomyces boulardii  250 mg Oral BID LUIS Monsalve      traZODone  75 mg Oral HS Sheri Pope MD          M*Modal software was used to dictate this note.  It may contain errors with dictating incorrect words or incorrect spelling. Please contact the provider directly with any questions.

## 2025-01-27 NOTE — ASSESSMENT & PLAN NOTE
Noted to have elevated troponins on admission to acute setting.  Havana to be due to trauma/brain bleed vs demand ischemia due to paroxysmal a-fib and syncope.  Cardiology recommended starting ASA 81mg daily when cleared by Neurosurgery.  Per Neurosurgery - ok to start ASA in 2 weeks (2/4) after patient/family discussion of risks/benefits with PCP/Cardiologist.  Recommend establishing with Cardiology on discharge.

## 2025-01-27 NOTE — ASSESSMENT & PLAN NOTE
Home regimen: Toprol XL 50mg daily and HCTZ 12.5mg daily.  Currently receiving Toprol XL 50mg BID and Cardizem 120mg daily.  Give midodrine as needed for SBP <90.  Orthostatic + Monitor orthostatics daily.  Keep SBP <160 due to intracranial bleed.

## 2025-01-27 NOTE — ASSESSMENT & PLAN NOTE
Continue melatonin 6mg at HS, Remeron 7.5mg at HS, and trazodone 75mg at HS.  Continue sleep logs.

## 2025-01-27 NOTE — ASSESSMENT & PLAN NOTE
Cw metoprolol succinate 75 mg twice daily  Cardizem 120 mg XR daily, d/c amlodipine  Wears compression stockings, abdominal binder  Metoprolol decreased to 50 mg 1/27

## 2025-01-27 NOTE — ASSESSMENT & PLAN NOTE
Not taking AC at home due to hx of falls.  Continue to hold with recent falls and now subarachnoid/subdural.  Had been taking Toprol XL 50mg daily at home - currently receiving Toprol XL 50mg BID and Cardizem 120mg daily.  Hx of a-fib with RVR while on ARC, recently transferred to be on Cardizem drip.  Monitor routine VS.  Replete electrolytes as needed.  Follow-up with Cardiology as outpatient.

## 2025-01-27 NOTE — ASSESSMENT & PLAN NOTE
Hgb currently 9.9.  Baseline Hgb 9-10.  No active s/s of bleeding.  Vitamin B12 1125 on 1/13 - no need for supplementation.  Folate 21.1 on 1/13 - no need for supplementation.  Iron panel on 1/13 - Iron Sat 12%, TIBC 295, Iron 34, and Ferritin 98.    Continue to trend routine CBC.

## 2025-01-27 NOTE — PROGRESS NOTES
01/27/25 1110   Assessment   Treatment Assessment Family meeting with pt, spouse and daughter on the phone (Courtney). Discussed pt status and functional progress to date as well as recommendation for continued rehab at SNF level as pt requires prolonged recovery. In agreement spouse cannot care for pt at home, daughter provided 3 choices, educated on blanket referral, and CM would reach out once responses come in. List sent to CM via [employer] chat. Currently all questions answered and family satisfied with current level of care. Total meeting time 11-1130am.   PT Therapy Minutes   PT Time In 1110   PT Time Out 1120   PT Total Time (minutes) 10   PT Mode of treatment - Individual (minutes) 10   PT Mode of treatment - Concurrent (minutes) 0   PT Mode of treatment - Group (minutes) 0   PT Mode of treatment - Co-treat (minutes) 0   PT Mode of Treatment - Total time(minutes) 10 minutes   PT Cumulative Minutes 540

## 2025-01-27 NOTE — PROGRESS NOTES
Progress Note - PMR   Name: Sandra Purvis 89 y.o. female I MRN: [id number]  Unit/Bed#: -01 I Date of Admission: 1/17/2025   Date of Service: 1/27/2025 I Hospital Day: 10     Assessment & Plan  Hypertension  Cw metoprolol succinate 75 mg twice daily  Cardizem 120 mg XR daily, d/c amlodipine  Wears compression stockings, abdominal binder  Metoprolol decreased to 50 mg 1/27  Atrial fibrillation with rapid ventricular response (HCC)  Initially presented with sinus bradycardia and evaluated by cardiology and was in controlled atrial fibrillation without bradycardia  Not on anticoagulation prior to admission and not indicated at this point due to the SDH and SAH and overall remains high risk due to risk of falls and given her advanced age  No recent follow-up with cardiology as an outpatient and generally sees her primary care physician for management and was on Toprol 50 mg daily at home which has been changed well and in the hospital  Tachycardic in the hospital and given IV fluids and medication changes include addition of midodrine and changes in the metoprolol dosing  Recommended for aspirin but would likely not be a good long-term candidate for anticoagulation  Cardio rec: continue with cardizem 120 daily and Toprol-XL 75 mg twice daily 1/16  Stage 3 chronic kidney disease, unspecified whether stage 3a or 3b CKD (HCC)  Lab Results   Component Value Date    EGFR 61 01/27/2025    EGFR 62 01/23/2025    EGFR 60 01/20/2025    CREATININE 0.84 01/27/2025    CREATININE 0.83 01/23/2025    CREATININE 0.85 01/20/2025   At baseline  GERD (gastroesophageal reflux disease)  Continue protonix  Basal cell carcinoma (BCC) of scalp  Large scalp mass noted on admission.  Also follows with dermatology as an outpatient but declining any interventions  Some localized spot bleeding from the edges noted  Patient scratched at it and created wound, hair net applied  1/24 slight smell of wound: Keflex ordered per IM  Wound care  "recommendations: \"Left head--cleanse/soak with normal saline, pat dry. Cover wound with Xeroform and ABD. Secure with surgical cap. Change dressing daily and as needed. Trim surrounding hair as needed to keep from getting matted into wound\".   Hair could be trimmed more but family refused   Malignant melanoma of arm, left (HCC)  Large mass on the left forearm that is melanoma  Follows with dermatology as an outpatient last seen in December 2024 but not interested in any treatments  SDH (subdural hematoma) (HCC)  Patient presents with fall and initial CTh on 1/4 showing an acute subdural hemorrhage along the left cerebral convexity with a maximum thickness of 1.9 cm with no midline shift as well as an acute subarachnoid hemorrhage in the left frontoparietal sulci, left sylvian fissure and suprasellar cistern  Repeat CT head on 1/5 was stable; repeat 1/14 w/ interval evolution no new/evolving hemorrage   Completed 7 day course of keppra for seizure ppx  F/u w/ NSGY for repeat CTH ~1/27 1/20 Repeat imaging ordered for f/o tomorrow (1/21) at 1pm  F/u as needed; no neurosurgical intervention needed  MRI brain 1/22 improvement in SDH/SAH  SAH (subarachnoid hemorrhage) (HCC)  Subarachnoid hemorrhage noted on initial CT on 1/4 and stable on 1/5;   CTH1/14  Interval evolution of recent subdural and subarachnoid hemorrhage as detailed above. No definitive new or enlarging intracranial hemorrhage.  Continue same management for the subarachnoid hemorrhage as for the subdural hematoma, please see that separate section for overall management plans  Closed extensive facial fractures (HCC)  Secondary to fall  CT of the head on 1/4 also showed comminuted and mildly displaced fractures of the lateral and anterior walls of the right maxilla, lateral and inferior walls of the right orbit, right inferior orbital wall fractures appear to involve the inferior orbital foramen with associated small extraconal soft tissue edema and focus of " air.  There is no herniation of extraocular muscles and a nondisplaced fracture of the zygomatic arch as well as the temporal bone at the TMJ.  There is diffuse opacification of the right maxillary sinus and right nasal cavity with blood products and air  Seen by oral maxillofacial surgery team and surgical intervention was declined by patient  Completed 7-day course of Unasyn/Augmentin  Sinus precautions (no straws)  Follow-up with OMFS as an outpatient  Chest pain  Troponins obtained on initial evaluation of the patient in the emergency room and cardiology was consulted for evaluation  Despite elevated troponins there was nonischemic findings on her EKG and felt to be potentially related to a nonischemic troponin elevation in the setting of trauma and brain bleed versus demand ischemia due to her paroxysmal atrial fibrillation and syncopal episode  Not a candidate for cardiology for ischemic evaluation due to advanced age, frailty and recent subarachnoid hemorrhage/subdural hemorrhage  No new regional wall motion abnormalities noted on echocardiogram on 1/6/2025  Recommended to start aspirin 81 mg when cleared by neurosurgery with CT head  Follow-up with cardiology as an outpatient  Orthostatic hypotension  Blood pressures ranging in physical therapy as low as 70/40 and cardiology was evaluating the patient  Symptomatic with lightheadedness and presyncope and had received gentle fluids with some improvement  BALJIT stockings PRN as well and encouragement of fluid intake  Repeat orthostatics improved but still getting lightheaded at times which was felt to be the initiating factor in her syncopal episode that led to the fall and injury.  1/17 IM held midodrine, no significant orthostatic hypotension currently observed may need to introduce at 2.5 mg TID if develops episodes at a later time, continue to monitor orthostatic Bps   1/19 bp dropped on standing, came back up on sitting  Midodrine 5 mg BID PRN  1/26  metoprolol decreased to 50 mg  Syncope  Presented with syncopal episode preceded by lightheadedness that led to the fall which caused the sudden dural hemorrhage and subarachnoid hemorrhage  Also found to be significantly orthostatic which was likely the etiology and was placed on telemetry with no evidence of bradycardia or pauses  Limited p.o. intake and fluid intake and suspected orthostatic hypotension as the cause  Anemia  Most recent hemoglobin of 9.4 1/17; which is stable  Continue to follow biweekly CBC or sooner if clinically indicated  Will need repeat scan if significant drops or concern for acute bleeds  1/20 10.1, 1/23 10.4, 1/27 9.9 hgb  Leukocytosis  Most recent WBC count 12.08 and actually trending down from its maximum of 17.59  Unclear etiology, may be directly related to reactive changes from the SDH and SAH as well as fractures  Continue to monitor and if considerable changes may repeat scans given the fractures in the facial bones  Was on a course of Unasyn/Augmentin for 7 days  IM: Infectious workup negative  Received IV fluids, discontinued with evidence of fluid overload CXR  Stable off abx d/c 1/15  12.08 1/17---> 13.06 1/20----> 10.5 1/23 ----> 11.78 1/27  Acute on chronic diastolic congestive heart failure (HCC)  Wt Readings from Last 3 Encounters:   01/26/25 45.3 kg (99 lb 13.9 oz)   01/17/25 51.1 kg (112 lb 9.6 oz)   01/12/25 53.5 kg (118 lb)     TTE 1/6/2025 showed EF 70%, grade 2 DD, mildly dilated RV, mild LA, mild AI, mild TR, estimated PA pressure 50 mmHg, trivial pericardial effusion  Not on diuretics prior to admission  Cw furosemide 20 mg; monitor for volume status   Daily weights, monitor I/O     Insomnia  Inc melatonin to 6mg HS, trazodone 25mg HS PRN----> increased to 50---> 75 mg  Sleep log   Continue remeron 7.5, melatonin 6 mg  1/23 Psych consulted- no recommendations at this time    History of Present Illness   Sandra Purvis is a 89 y.o. female w/ PMHx of afib, GERD,  "HTN, who initially presented to [facility] on 1/4/2025 after a syncopan episode and collapse at home. CT had showed \"comminuted and mildly displaced fractures of the lateral and anterior walls of the right maxilla, lateral and inferior walls of the right orbit, right inferior orbital wall fracture appears to involve the inferior orbital foramen associated with small extraconal soft tissue edema and focus of air. There is also a nondisplaced fracture of the zygomatic arch and the temporal bone at the TMJ and diffuse opacification of the right maxillary sinus and right nasal cavity with blood products and air. Additionally there was an acute subdural hemorrhage along the left cerebral convexity with a maximal thickness of 1.9 cm with no midline shift and an acute subarachnoid hemorrhage in the left frontal parietal sulci, left sylvian fissure as well as suprasellar cistern.\" OMFS was consulted as well as neurosurgery. Patient and family declined any acute intervention at this time. Patient was started on Keppra x 7 days. Patient was treated with 7-day course of Unasyn/Augmentin. Her hospital stay was also complicated with A-fib with RVR and cardiology was consulted. Patient then had orthostatic hypotension and started on midodrine. Patient initially transferred to [facility] 1/12/2025. While at [facility] patient with hypertensive episodes to SBP >190 and Afib rates 120-130s. Patient became more symptomatic from her A-fib and was transferred to acute care 1/14. She had X ray w/ pulmonary edema was given lasix 1/14-1/15 with good response. Patient was seen by cardiology. Patient was placed on Cardizem drip and now on PO Cardizem. Her rates have improved. Sandra Purvis was admitted to the [facility] on 01/17/25     Chief Complaint: f/u ambulatory dysfunction    Interval: Patient seen and examined in therapy. No events overnight. Over the weekend SBP ranged from  and Metoprolol was " "decreased from 75 to 50 mg.Reports overall feeling well. Last BM 1/27. Sleeping \"so/so\" at night per sleep log not much of a difference. Denies any pain today and states she felt good when she woke up this morning. Family meeting performed at 11 AM with daughter on phone and  at bedside.    Review of Systems   Respiratory:  Negative for shortness of breath.    Cardiovascular:  Negative for chest pain, palpitations and leg swelling.   Gastrointestinal:  Negative for abdominal pain, constipation, diarrhea, nausea and vomiting.       Objective   Functional Update:  Physical Therapy Occupational Therapy Speech Therapy   Weight Bearing Status: Full Weight Bearing  Transfers: Total Assistance  Bed Mobility: Maximum Assistance  Discharge Recommendations: Home with:  DC Home with:: 24 Hour Assisteance   Eating: Supervision  Grooming: Supervision  Bathing: Maximum Assistance  Bathing: Maximum Assistance  Upper Body Dressing: Minimal Assistance  Lower Body Dressing: Maximum Assistance  Toileting: Total Assistance  Tub/Shower Transfer: Total Assistance (would require TA at current level of function)  Toilet Transfer: Maximum Assistance  Cognition: Exceptions to WNL  Cognition: Decreased Memory, Decreased Executive Functions, Decreased Attention, Decreased Comprehension  Orientation: Person, Place   Mode of Communication: Verbal  Cognition: Exceptions to WNL  Cognition: Decreased Memory, Decreased Executive Functions, Decreased Attention, Decreased Comprehension, Decreased Safety  Orientation: Person, Place, Time, Situation  Swallowing: Exceptions to WNL  Swallowing: Oral Dysphagia, Aspiration Risk  Diet Recommendations: Level 2/Mechanically Altered, Thin  Discharge Recommendations: Home with: (pending progress)  DC Home with:: 24 Hour Supervision, 24 Hour Assisteance, Family Support         Temp:  [98.3 °F (36.8 °C)-98.4 °F (36.9 °C)] 98.3 °F (36.8 °C)  HR:  [62-78] 70  Resp:  [18] 18  BP: (100-168)/(60-80) " 100/60  SpO2:  [95 %-97 %] 95 %    Physical Exam  Constitutional:       General: She is not in acute distress.     Comments: Frail   HENT:      Head: Normocephalic and atraumatic.      Mouth/Throat:      Mouth: Mucous membranes are moist.   Cardiovascular:      Rate and Rhythm: Normal rate and regular rhythm.   Pulmonary:      Effort: Pulmonary effort is normal.      Breath sounds: Normal breath sounds.   Abdominal:      General: Bowel sounds are normal.   Musculoskeletal:         General: Normal range of motion.   Skin:     General: Skin is warm and dry.      Findings: Bruising (on face and down arms) present.      Comments: Wound on scalp covered with gauze and hair net, clean and dry  Wearing abdominal binder   Neurological:      Mental Status: She is alert. Mental status is at baseline.      Comments: More alert today   Psychiatric:         Mood and Affect: Mood normal.         Behavior: Behavior normal.      Comments: Positive mood today           Scheduled Meds:  Current Facility-Administered Medications   Medication Dose Route Frequency Provider Last Rate    acetaminophen  975 mg Oral Q8H [facility] Autumn Chapman PA-C      bisacodyl  10 mg Rectal Daily PRN Autumn Chapman PA-C      calcium carbonate  500 mg Oral Daily PRN Autumn Chapman PA-C      cephalexin  500 mg Oral Q8H [facility] LUIS Monsalve      diltiazem  120 mg Oral Daily Autumn Chapman PA-C      docusate sodium  100 mg Oral BID PRN Sehri Pope MD      enoxaparin  30 mg Subcutaneous Q24H Autumn Chapman PA-C      gabapentin  100 mg Oral HS Sheri Pope MD      hydrALAZINE  10 mg Oral Q8H PRN LUIS Monsalve      melatonin  6 mg Oral HS Sheri Pope MD      metoprolol succinate  50 mg Oral BID Ashley Depadua, MD      midodrine  5 mg Oral TID PRN Autumn Chapman PA-C      mirtazapine  7.5 mg Oral HS Sheri Pope MD      ondansetron  4 mg Oral Q6H PRN Autumn Champan PA-C      oxyCODONE  2.5 mg  Oral Q6H PRN Autumn Chapman PA-C      Or    oxyCODONE  5 mg Oral Q6H PRN Autumn Chapman PA-C      pantoprazole  40 mg Oral Early Morning Autumn Chapman PA-C      saccharomyces boulardii  250 mg Oral BID LUIS Monsalve      traZODone  75 mg Oral HS Sheri Pope MD           Lab Results: I have reviewed the following results:  Results from last 7 days   Lab Units 01/27/25  0453 01/23/25  0554   HEMOGLOBIN g/dL 9.9* 10.4*   HEMATOCRIT % 31.0* 32.3*   WBC Thousand/uL 11.78* 10.50*   PLATELETS Thousands/uL 222 294     Results from last 7 days   Lab Units 01/27/25  0453 01/23/25  0554   BUN mg/dL 19 12   SODIUM mmol/L 140 140   POTASSIUM mmol/L 3.6 3.5   CHLORIDE mmol/L 103 103   CREATININE mg/dL 0.84 0.83              Autumn Chapman PA-C  Physical Medicine and Rehabilitation   01/27/25

## 2025-01-27 NOTE — PROGRESS NOTES
01/27/25 1000   Pain Assessment   Pain Score No Pain   Restrictions/Precautions   Precautions Bed/chair alarms;Cognitive;Fall Risk;Supervision on toilet/commode  (sinus prec, SUP for meals)   Weight Bearing Restrictions No   ROM Restrictions No   Lying to Sitting on Side of Bed   Type of Assistance Needed Physical assistance   Physical Assistance Level 51%-75%   Comment BLE and trunk mngmnt, P sitting balance EOB   Lying to Sitting on Side of Bed CARE Score 2   Bed-Chair Transfer   Type of Assistance Needed Physical assistance   Physical Assistance Level 51%-75%   Comment maxA sit pivot transfer   Chair/Bed-to-Chair Transfer CARE Score 2   Toileting Hygiene   Type of Assistance Needed Physical assistance   Physical Assistance Level Total assistance   Comment req Ax1 to maintain standing and balance while 2nd person assist with hygiene/CM   Toileting Hygiene CARE Score 1   Toilet Transfer   Type of Assistance Needed Physical assistance   Physical Assistance Level Total assistance   Comment Ax2 si tpivot for safety   Toilet Transfer CARE Score 1   Exercise Tools   Other Exercise Tool 1 seated engaged pt in BUE TE with 1# 2x15 to cont to inc fx strength for improved STS/SPT and overall activity tolerance. pt toelrated well with rest break and req vc to maintian aroused   Cognition   Overall Cognitive Status Impaired   Arousal/Participation Alert;Cooperative   Attention Attends with cues to redirect   Orientation Level Oriented X4   Memory Decreased short term memory;Decreased recall of recent events;Decreased recall of precautions   Following Commands Follows one step commands with increased time or repetition   Activity Tolerance   Activity Tolerance Patient tolerated treatment well   Assessment   Treatment Assessment Engaged pt in 70mins of skilled OT services with focus on bed mobility, toileting, UE TE and family phone call. Pt cont to req Ax2 foe safe transfers during toileting.  uanble to assist at [place],  discussed STR option which family was in agreement. Cont OT POC and DC pending SNF bed avilability.   OT Family training done with: Family phone call with dtr Courtney (via phone) pts  Darryl, pt, Dr. Pope and PT Sendy and this OT. Discussed with dtr pts CLOF of req Ax2 for safe transfers and ADLs. Discussed at this time reommend STR for cont therapy to inc fx performance as pt cot to req Ax2 and  is unable to physically assist at this time. Dtr rabia berry, providing hterpists wit 3 choices for STR which were gathred by PT Paige nd will be sent to CM Ariane Wilkinson to send out referrals. Dtr with no further questions at this time. DC pending SNF bed availability.   Prognosis Fair   Problem List Decreased strength;Decreased range of motion;Decreased endurance;Impaired balance;Decreased mobility;Decreased cognition   Barriers to Discharge Inaccessible home environment;None   Barriers to Discharge Comments SNF   Plan   Treatment/Interventions ADL retraining;Functional transfer training;Therapeutic exercise;Endurance training;Patient/family training;Bed mobility;Compensatory technique education   Progress Slow progress, decreased activity tolerance   Discharge Recommendation   Rehab Resource Intensity Level, OT   ([facility])   OT Therapy Minutes   OT Time In 1000   OT Time Out 1110   OT Total Time (minutes) 70   OT Mode of treatment - Individual (minutes) 70   OT Mode of treatment - Concurrent (minutes) 0   OT Mode of treatment - Group (minutes) 0   OT Mode of treatment - Co-treat (minutes) 0   OT Mode of Treatment - Total time(minutes) 70 minutes   OT Cumulative Minutes 690   Therapy Time missed   Time missed? No

## 2025-01-27 NOTE — ASSESSMENT & PLAN NOTE
Blood pressures ranging in physical therapy as low as 70/40 and cardiology was evaluating the patient  Symptomatic with lightheadedness and presyncope and had received gentle fluids with some improvement  BALJIT stockings PRN as well and encouragement of fluid intake  Repeat orthostatics improved but still getting lightheaded at times which was felt to be the initiating factor in her syncopal episode that led to the fall and injury.  1/17 IM held midodrine, no significant orthostatic hypotension currently observed may need to introduce at 2.5 mg TID if develops episodes at a later time, continue to monitor orthostatic Bps   1/19 bp dropped on standing, came back up on sitting  Midodrine 5 mg BID PRN  1/26 metoprolol decreased to 50 mg

## 2025-01-27 NOTE — ASSESSMENT & PLAN NOTE
Large scalp mass noted on admission.  Declining intervention as an outpatient.  Follows with Dermatology and LUIS Mejía (Surgical Oncology) as outpatient.  Wound care consulted - wound cleansed/debrided.  Continue with wearing cap during the day to avoid further patient manipulation.    Foul odor and drainage noted on 1/24 - started on Keflex 500mg Q8.  Continue for 5 days.  Wound care recommending Xeroform and ABD.  Change dressing daily.

## 2025-01-27 NOTE — PROGRESS NOTES
"   01/27/25 1430   Pain Assessment   Pain Assessment Tool 0-10   Pain Score No Pain   Restrictions/Precautions   Precautions Aspiration;Bed/chair alarms;Cognitive;Fall Risk;Pain;Supervision on toilet/commode   General   Change In Medical/Functional Status (S)  cleared for use of straws and sinus prec discontinued per NP   Cognition   Overall Cognitive Status Impaired   Arousal/Participation Cooperative;Lethargic   Attention Attends with cues to redirect   Orientation Level Oriented X4   Memory Decreased short term memory;Decreased recall of recent events;Decreased recall of precautions   Following Commands Follows one step commands with increased time or repetition   Subjective   Subjective \"I ate good now I may have to go to the BR\"   Roll Left and Right   Type of Assistance Needed Physical assistance   Physical Assistance Level 25% or less   Comment min A with use of bed rail.   Roll Left and Right CARE Score 3   Sit to Lying   Type of Assistance Needed Physical assistance   Physical Assistance Level 26%-50%   Comment min/mod A  - can lift B LE it given time, needs A for trunk repositioning.   Sit to Lying CARE Score 3   Lying to Sitting on Side of Bed   Type of Assistance Needed Physical assistance   Physical Assistance Level 51%-75%   Comment mod/max to A B LE to EOB and A for trunk lifting from L sidelying.   Lying to Sitting on Side of Bed CARE Score 2   Sit to Stand   Type of Assistance Needed Physical assistance   Physical Assistance Level 26%-50%   Comment mod A (can be min A pulling up at bed rail for swap out)   Sit to Stand CARE Score 3   Bed-Chair Transfer   Type of Assistance Needed Physical assistance   Physical Assistance Level 51%-75%   Comment mainly max A sit pivot no AD.   Chair/Bed-to-Chair Transfer CARE Score 2   Wheel 50 Feet with Two Turns   Type of Assistance Needed Physical assistance   Physical Assistance Level 26%-50%   Comment mod A.   Wheel 50 Feet with Two Turns CARE Score 3 "   Wheelchair mobility   Method Right upper extremity;Left upper extremity;Right lower extremity;Left lower extremity   Assistance Provided For Locking Brakes   Distance Level Surface (feet) 75 ft   Findings WC mobility inititaed. cues for technique, turns, and brake mgmt   Does the patient use a wheelchair? 1. Yes   Type of Wheelchair Used 1. Manual   Toilet Transfer   Type of Assistance Needed Physical assistance   Physical Assistance Level Total assistance   Comment x2 A for BSC swap out at bed rail - x2 attempts this session.   Toilet Transfer CARE Score 1   Toilet Transfer   Surface Assessed Bedside Commode   Findings total A for pant mgmt. Asking to go x2 trials this session, both no output.   Therapeutic Interventions   Strengthening seated B LE TE: APs, LAQ, manual resistance leg press, ball squeeze, manual resistance hip abd, seated march 2x20 each.   Balance several stand for swap out at bed rail, and stood from bed to standing scale for wt   Assessment   Treatment Assessment Pt seen for 70 min skilled PT intervention, in bed upon entry, arousable, A for pant donning in supine. BP taken supine  seated , teds and binder in place. Numerous stands to toileting, getting weight t/o session, mainly pulling up to stand, min/mod A. Trialed self propulsion in standard WC, transfer to tilt n space due to dec BP 98/47. Use of BSC x2 during session, requested to return to supine at end of session to rest. Pt perservates on she wont eat as much anyore so she can do therapy - prolonged and extensive education on importance of inc intake including fluids to get strength and have energy. Questionable understanding. Continue per POC, await SNF bed availability.   Barriers to Discharge Comments STR given need for physical A at [place].   Plan   Progress Slow progress, decreased activity tolerance   PT Therapy Minutes   PT Time In 1430   PT Time Out 1540   PT Total Time (minutes) 70   PT Mode of treatment -  Individual (minutes) 70   PT Mode of treatment - Concurrent (minutes) 0   PT Mode of treatment - Group (minutes) 0   PT Mode of treatment - Co-treat (minutes) 0   PT Mode of Treatment - Total time(minutes) 70 minutes   PT Cumulative Minutes 610   Therapy Time missed   Time missed? No

## 2025-01-27 NOTE — ASSESSMENT & PLAN NOTE
Wt Readings from Last 3 Encounters:   01/26/25 45.3 kg (99 lb 13.9 oz)   01/17/25 51.1 kg (112 lb 9.6 oz)   01/12/25 53.5 kg (118 lb)     TTE 1/6/2025 showed EF 70%, grade 2 DD, mildly dilated RV, mild LA, mild AI, mild TR, estimated PA pressure 50 mmHg, trivial pericardial effusion  Not on diuretics prior to admission  Cw furosemide 20 mg; monitor for volume status   Daily weights, monitor I/O

## 2025-01-27 NOTE — ASSESSMENT & PLAN NOTE
Most recent hemoglobin of 9.4 1/17; which is stable  Continue to follow biweekly CBC or sooner if clinically indicated  Will need repeat scan if significant drops or concern for acute bleeds  1/20 10.1, 1/23 10.4, 1/27 9.9 hgb

## 2025-01-27 NOTE — ASSESSMENT & PLAN NOTE
Inc melatonin to 6mg HS, trazodone 25mg HS PRN----> increased to 50---> 75 mg  Sleep log   Continue remeron 7.5, melatonin 6 mg  1/23 Psych consulted- no recommendations at this time

## 2025-01-28 PROCEDURE — 99232 SBSQ HOSP IP/OBS MODERATE 35: CPT | Performed by: NURSE PRACTITIONER

## 2025-01-28 PROCEDURE — 97130 THER IVNTJ EA ADDL 15 MIN: CPT

## 2025-01-28 PROCEDURE — 97530 THERAPEUTIC ACTIVITIES: CPT

## 2025-01-28 PROCEDURE — 99232 SBSQ HOSP IP/OBS MODERATE 35: CPT | Performed by: INTERNAL MEDICINE

## 2025-01-28 PROCEDURE — 97129 THER IVNTJ 1ST 15 MIN: CPT

## 2025-01-28 PROCEDURE — 92526 ORAL FUNCTION THERAPY: CPT

## 2025-01-28 RX ORDER — METOPROLOL SUCCINATE 25 MG/1
25 TABLET, EXTENDED RELEASE ORAL 2 TIMES DAILY
Status: DISCONTINUED | OUTPATIENT
Start: 2025-01-28 | End: 2025-02-03 | Stop reason: HOSPADM

## 2025-01-28 RX ORDER — MIDODRINE HYDROCHLORIDE 5 MG/1
5 TABLET ORAL
Status: DISCONTINUED | OUTPATIENT
Start: 2025-01-29 | End: 2025-02-03 | Stop reason: HOSPADM

## 2025-01-28 RX ORDER — DILTIAZEM HYDROCHLORIDE 120 MG/1
120 CAPSULE, COATED, EXTENDED RELEASE ORAL
Status: DISCONTINUED | OUTPATIENT
Start: 2025-01-29 | End: 2025-02-03 | Stop reason: HOSPADM

## 2025-01-28 RX ADMIN — Medication 250 MG: at 17:52

## 2025-01-28 RX ADMIN — MIRTAZAPINE 7.5 MG: 7.5 TABLET, FILM COATED ORAL at 21:07

## 2025-01-28 RX ADMIN — ENOXAPARIN SODIUM 30 MG: 30 INJECTION SUBCUTANEOUS at 21:07

## 2025-01-28 RX ADMIN — DILTIAZEM HYDROCHLORIDE 120 MG: 120 CAPSULE, COATED, EXTENDED RELEASE ORAL at 09:06

## 2025-01-28 RX ADMIN — Medication 250 MG: at 09:06

## 2025-01-28 RX ADMIN — TRAZODONE HYDROCHLORIDE 75 MG: 50 TABLET ORAL at 21:07

## 2025-01-28 RX ADMIN — GABAPENTIN 100 MG: 100 CAPSULE ORAL at 21:07

## 2025-01-28 RX ADMIN — ACETAMINOPHEN 975 MG: 325 TABLET, FILM COATED ORAL at 21:07

## 2025-01-28 RX ADMIN — CEPHALEXIN 500 MG: 500 CAPSULE ORAL at 21:07

## 2025-01-28 RX ADMIN — PANTOPRAZOLE SODIUM 40 MG: 40 TABLET, DELAYED RELEASE ORAL at 05:39

## 2025-01-28 RX ADMIN — CEPHALEXIN 500 MG: 500 CAPSULE ORAL at 05:39

## 2025-01-28 RX ADMIN — ACETAMINOPHEN 975 MG: 325 TABLET, FILM COATED ORAL at 14:23

## 2025-01-28 RX ADMIN — ACETAMINOPHEN 975 MG: 325 TABLET, FILM COATED ORAL at 05:39

## 2025-01-28 RX ADMIN — MELATONIN TAB 3 MG 6 MG: 3 TAB at 21:07

## 2025-01-28 RX ADMIN — MIDODRINE HYDROCHLORIDE 5 MG: 5 TABLET ORAL at 10:43

## 2025-01-28 RX ADMIN — SODIUM CHLORIDE 500 ML: 0.9 INJECTION, SOLUTION INTRAVENOUS at 09:56

## 2025-01-28 RX ADMIN — METOPROLOL SUCCINATE 25 MG: 25 TABLET, EXTENDED RELEASE ORAL at 21:07

## 2025-01-28 RX ADMIN — CEPHALEXIN 500 MG: 500 CAPSULE ORAL at 14:23

## 2025-01-28 RX ADMIN — METOPROLOL SUCCINATE 50 MG: 50 TABLET, EXTENDED RELEASE ORAL at 09:06

## 2025-01-28 NOTE — NURSING NOTE
Patient BP was high initially this am >180 no orthostatics noted ABD binder removed. After 1hr 30 mins of being OOB , patient was tx from  to BSC for bowel movement, soft BM no straining noted, pt became orthostatic SBP in the 80's . Patient returned to bed, SBP remained in the 90's. Anum SMITH made aware. IVF and  midodrine ordered. Patient's  visiting and updated in plan of care. Patient ate 75% lunch with 240ml of water. Patient BP stabilized 's. Therapy on hold and pt in bed with frequent repositioning. Using call bell appropriately.

## 2025-01-28 NOTE — CASE MANAGEMENT
CM NOTE      Referrals sent via [facility] to subacute rehab, including facilities that are family's top 3 choices of: [facility].     Informed by team that pt would be medically stable for d/c end of this week, and could d/c to subacute end of this week versus beginning of next week pending bed availability.

## 2025-01-28 NOTE — ASSESSMENT & PLAN NOTE
Large scalp mass noted on admission.  Declining intervention as an outpatient.  Follows with Dermatology and LUIS Mejía (Surgical Oncology) as outpatient.  Wound care consulted - wound cleansed/debrided.  Continue with wearing cap during the day to avoid further patient manipulation.    Foul odor and drainage noted on 1/24 - started on Keflex 500mg Q8.  Complete Keflex today for 5 day course.  Wound care recommending Xeroform and ABD.  Change dressing daily.

## 2025-01-28 NOTE — PROGRESS NOTES
Progress Note - Internal Medicine   Name: Sandra Purvis 89 y.o. female I MRN: [id number]  Unit/Bed#: [facility] 261-01 I Date of Admission: 1/17/2025   Date of Service: 1/28/2025 I Hospital Day: 11    Assessment & Plan  Hypertension  Home regimen: Toprol XL 50mg daily and HCTZ 12.5mg daily.  Currently receiving Toprol XL 50mg BID and Cardizem 120mg daily.  Give midodrine as needed for SBP <90.  Orthostatic + Monitor orthostatics daily.  Keep SBP <160 due to intracranial bleed.  Atrial fibrillation with rapid ventricular response (HCC)  Not taking AC at home due to hx of falls.  Continue to hold with recent falls and now subarachnoid/subdural.  Had been taking Toprol XL 50mg daily at home - currently receiving Toprol XL 50mg BID and Cardizem 120mg daily.  Hx of a-fib with RVR while on ARC, recently transferred to be on Cardizem drip.  Monitor routine VS.  Replete electrolytes as needed.  Follow-up with Cardiology as outpatient.  GERD (gastroesophageal reflux disease)  Continue Protonix 40mg daily.  Had not been on Protonix as outpatient.  Basal cell carcinoma (BCC) of scalp  Large scalp mass noted on admission.  Declining intervention as an outpatient.  Follows with Dermatology and LUIS Mejía (Surgical Oncology) as outpatient.  Wound care consulted - wound cleansed/debrided.  Continue with wearing cap during the day to avoid further patient manipulation.    Foul odor and drainage noted on 1/24 - started on Keflex 500mg Q8.  Complete Keflex today for 5 day course.  Wound care recommending Xeroform and ABD.  Change dressing daily.  Malignant melanoma of arm, left (HCC)  Recent surgical excision on 12/19.  Follows with LUIS Mejía (Surg/Onc) as outpatient.  Had declined further treatment.  SDH (subdural hematoma) (HCC)  Presented on 1/4 after syncopal fall.  CTH showed acute subdural hemorrhage along the L cerebral convexity with maximal thickness of 1.9cm.  No midline shift.    Received Keppra x7  days.  Continue to hold AC/AP.  Neurovascular checks Q shift.  Maintain normotension.  Avoid SBP >160.    CTH on 1/20: New small hypodensity within the left frontal subcortical white matter, differential includes age indeterminate infarct, evolving parenchymal contusion among other etiologies.  MRI brain obtained on 1/21 showing evolving bilateral cerebral convexity subdural hematomas containing blood products of varying ages, scattered small volume subarachnoid hemorrhage, no evidence of acute large territory infarct.    2 week follow-up with Neurosurgery on 1/21.  Imaging reviewed.  May consider starting ASA in 2 weeks after discussion with PCP/Cardiology in regards to risk/benefit.  Follow-up with Neurosurgery in additional 4 weeks with [facility].    Primary team following.  PT/OT/SLP.  SAH (subarachnoid hemorrhage) (HCC)  Presented on 1/4 after syncopal fall.  CTH showed acute subarachnoid hemorrhage in the L frontoparietal sulci, L sylvian fissure and suprasellar cistern.     Received Keppra x7 days.  Continue to hold AC/AP.  Neurovascular checks Q shift.  Maintain normotension.  Avoid SBP >160.     MRI brain obtained 1/21 - stable.  2 week follow-up with Neurosurgery on 1/21.  Imaging was reviewed.  May consider starting ASA in 2 weeks after discussion with PCP/Cardiology.  Follow-up with Neurosurgery in additional 4 weeks with [facility].     Primary team following.  PT/OT/SLP.  Closed extensive facial fractures (HCC)  S/p fall on 1/4.  CT facial bones showed comminuted and mildly displaced fractures of the lateral and anterior walls of the R maxilla, lateral, and inferior wall of the R orbit, wall fracture appears to involve the inferior orbital foramen with associated small extraconal soft tissue edema, nondisplaced fractures of the zygomatic arch and the temporal bone at the TMJ, diffuse opacification of R maxillary sinus and R nasal cavity with blood products and air.    OMFS evaluated in acute setting - declined  surgical intervention.  Received IV Unasyn for 7 days.  No longer needs to follow sinus precautions per OMFS.    Follow-up with OMFS on discharge.  Chest pain  Noted to have elevated troponins on admission to acute setting.  Warrior to be due to trauma/brain bleed vs demand ischemia due to paroxysmal a-fib and syncope.  Cardiology recommended starting ASA 81mg daily when cleared by Neurosurgery.  Per Neurosurgery - ok to start ASA in 2 weeks (2/4) after patient/family discussion of risks/benefits with PCP/Cardiologist.  Recommend establishing with Cardiology on discharge.  Orthostatic hypotension  Presented with syncope on 1/4.  Significant orthostasis noted in acute setting.  Continue midodrine as needed  Apply abdominal binder/TEDs as needed.  Encourage PO hydration.  Continue to monitor orthostatics closely.    Give 500cc 0.9% NSS IV fluid bolus today.  Syncope  Presented on 1/4 s/p syncopal fall.  EKG showed atrial fibrillation with RVR on admission.  ECHO on 1/6 showed EF 70%, G2DD.  Noted to have significant orthostasis in acute setting.  Continue to monitor orthostatics.  Encourage PO hydration.  Apply abdominal binder/TEDs as needed.    Follow-up/establish with Cardiology as outpatient.  Anemia  Hgb currently 9.9.  Baseline Hgb 9-10.  No active s/s of bleeding.  Vitamin B12 1125 on 1/13 - no need for supplementation.  Folate 21.1 on 1/13 - no need for supplementation.  Iron panel on 1/13 - Iron Sat 12%, TIBC 295, Iron 34, and Ferritin 98.    Continue to trend routine CBC.  Leukocytosis  WBC count currently 11.78.  Chronically elevated.  Unclear etiology, may be directly related to reactive changes from SDH and SAH as well as fracture.  Did recently complete a course of Unasyn and Augmentin.  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  Continue to trend routine CBC.  Acute on chronic diastolic congestive heart failure (HCC)  ECHO on 1/6 showed EF 70%, G2DD.  Monitor volume status - currently  euvolemic.  Monitor I&Os, daily weights.  Insomnia  Continue melatonin 6mg at HS, Remeron 7.5mg at HS, and trazodone 75mg at HS.  Continue sleep logs.    VTE Pharmacologic Prophylaxis:   Pharmacologic: Enoxaparin (Lovenox)  Mechanical VTE Prophylaxis in Place: Yes - sequential compression devices.    Current Length of Stay: 11 day(s)    Current Patient Status: Inpatient Rehab     Discharge Plan: As per primary team.    Code Status: Level 3 - DNAR and DNI    Subjective:   Pt examined while pt lying in bed in pt room.  Complaints of lightheadedness this morning with therapies.  Denies any headaches.  Denies any fevers, chills, SOB, palpitations, or CP.  Had a BMx2 today.  Encouraged to drink more fluids today.  Will give additional IVF due to labile blood pressures this morning.    Objective:     Vitals:   Temp (24hrs), Av °F (36.7 °C), Min:97.7 °F (36.5 °C), Max:98.2 °F (36.8 °C)    Temp:  [97.7 °F (36.5 °C)-98.2 °F (36.8 °C)] 97.7 °F (36.5 °C)  HR:  [66-76] 73  Resp:  [18] 18  BP: (100-184)/(60-77) 169/73  SpO2:  [96 %] 96 %  Body mass index is 15.23 kg/m².     Review of Systems   Constitutional:  Negative for appetite change, chills, fatigue and fever.   HENT:  Negative for trouble swallowing.    Eyes:  Negative for visual disturbance.   Respiratory:  Negative for cough, shortness of breath, wheezing and stridor.    Cardiovascular:  Negative for chest pain, palpitations and leg swelling.   Gastrointestinal:  Negative for abdominal distention, abdominal pain, constipation, diarrhea, nausea and vomiting.        LBM 1/28 x2   Genitourinary:  Negative for difficulty urinating.   Musculoskeletal:  Negative for arthralgias, back pain and gait problem.   Neurological:  Positive for light-headedness (occurred during therapies this morning). Negative for dizziness, weakness, numbness and headaches.   Psychiatric/Behavioral:  Negative for dysphoric mood and sleep disturbance. The patient is not nervous/anxious.    All  other systems reviewed and are negative.       Input and Output Summary (last 24 hours):       Intake/Output Summary (Last 24 hours) at 1/28/2025 0917  Last data filed at 1/28/2025 0901  Gross per 24 hour   Intake 720 ml   Output 188 ml   Net 532 ml       Physical Exam:     Physical Exam  Vitals and nursing note reviewed.   Constitutional:       General: She is not in acute distress.     Appearance: Normal appearance. She is not ill-appearing.      Comments: Thin.   HENT:      Head: Normocephalic and atraumatic.   Cardiovascular:      Rate and Rhythm: Normal rate. Rhythm irregularly irregular.      Pulses: Normal pulses.      Heart sounds: Murmur heard.      Systolic murmur is present with a grade of 1/6.      No friction rub.   Pulmonary:      Effort: Pulmonary effort is normal. No respiratory distress.      Breath sounds: Normal breath sounds. No wheezing or rhonchi.   Abdominal:      General: Abdomen is flat. Bowel sounds are normal. There is no distension.      Palpations: Abdomen is soft. There is no mass.      Tenderness: There is no abdominal tenderness. There is no guarding or rebound.      Hernia: No hernia is present.   Musculoskeletal:      Cervical back: Normal range of motion and neck supple. No tenderness.      Right lower leg: No edema.      Left lower leg: No edema.   Skin:     General: Skin is warm and dry.      Findings: Bruising (R sided facial bruising) present.   Neurological:      Mental Status: She is alert and oriented to person, place, and time.   Psychiatric:         Mood and Affect: Mood normal.         Behavior: Behavior normal.         Additional Data:     Labs:    Results from last 7 days   Lab Units 01/27/25  0453   WBC Thousand/uL 11.78*   HEMOGLOBIN g/dL 9.9*   HEMATOCRIT % 31.0*   PLATELETS Thousands/uL 222   SEGS PCT % 81*   LYMPHO PCT % 11*   MONO PCT % 6   EOS PCT % 1     Results from last 7 days   Lab Units 01/27/25  0453   SODIUM mmol/L 140   POTASSIUM mmol/L 3.6   CHLORIDE  mmol/L 103   CO2 mmol/L 30   BUN mg/dL 19   CREATININE mg/dL 0.84   ANION GAP mmol/L 7   CALCIUM mg/dL 9.4   GLUCOSE RANDOM mg/dL 83                       Labs reviewed    Imaging:    Imaging reviewed    Recent Cultures (last 7 days):           Last 24 Hours Medication List:   Current Facility-Administered Medications   Medication Dose Route Frequency Provider Last Rate    acetaminophen  975 mg Oral Q8H [facility] Autumn Chapman PA-C      bisacodyl  10 mg Rectal Daily PRN Sheri Pope MD      calcium carbonate  500 mg Oral Daily PRN Autumn Chapman PA-C      cephalexin  500 mg Oral Q8H [facility] LUIS Monsalve      diltiazem  120 mg Oral Daily Autumn Chapman PA-C      docusate sodium  100 mg Oral BID PRN Sheri Pope MD      enoxaparin  30 mg Subcutaneous Q24H Autumn Chapman PA-C      gabapentin  100 mg Oral HS Sheri Pope MD      hydrALAZINE  10 mg Oral Q8H PRN LUIS Monsalve      melatonin  6 mg Oral HS Sheri Pope MD      metoprolol succinate  50 mg Oral BID Ashley Depadua, MD      midodrine  5 mg Oral TID PRN Autumn Chapman PA-C      mirtazapine  7.5 mg Oral HS Sheri Pope MD      ondansetron  4 mg Oral Q6H PRN Autumn Chapman PA-C      oxyCODONE  2.5 mg Oral Q6H PRN Autumn Chapman PA-C      Or    oxyCODONE  5 mg Oral Q6H PRN Autumn Chapman PA-C      pantoprazole  40 mg Oral Early Morning Autumn Chapman PA-C      saccharomyces boulardii  250 mg Oral BID LUIS Monsalve      traZODone  75 mg Oral HS Sheri Pope MD          M*Modal software was used to dictate this note.  It may contain errors with dictating incorrect words or incorrect spelling. Please contact the provider directly with any questions.

## 2025-01-28 NOTE — PLAN OF CARE
Problem: Potential for Falls  Goal: Patient will remain free of falls  Description: INTERVENTIONS:  - Educate patient/family on patient safety including physical limitations  - Instruct patient to call for assistance with activity   - Consult OT/PT to assist with strengthening/mobility   - Keep Call bell within reach  - Keep bed low and locked with side rails adjusted as appropriate  - Keep care items and personal belongings within reach  - Initiate and maintain comfort rounds  - Make Fall Risk Sign visible to staff  - Offer Toileting every 2 Hours, in advance of need  - Initiate/Maintain bed and chair alarm  - Obtain necessary fall risk management equipment: call light within reach, bed in lowest position, side rails up and functioning  - Apply yellow socks and bracelet for high fall risk patients  - Consider moving patient to room near nurses station  Outcome: Progressing     Problem: Nutrition/Hydration-ADULT  Goal: Nutrient/Hydration intake appropriate for improving, restoring or maintaining nutritional needs  Description: Monitor and assess patient's nutrition/hydration status for malnutrition. Collaborate with interdisciplinary team and initiate plan and interventions as ordered.  Monitor patient's weight and dietary intake as ordered or per policy. Utilize nutrition screening tool and intervene as necessary. Determine patient's food preferences and provide high-protein, high-caloric foods as appropriate.     INTERVENTIONS:  - Monitor oral intake, urinary output, labs, and treatment plans  - Assess nutrition and hydration status and recommend course of action  - Evaluate amount of meals eaten  - Assist patient with eating if necessary   - Allow adequate time for meals  - Recommend/ encourage appropriate diets, oral nutritional supplements, and vitamin/mineral supplements  - Order, calculate, and assess calorie counts as needed  - Recommend, monitor, and adjust tube feedings and TPN/PPN based on assessed  needs  - Assess need for intravenous fluids  - Provide specific nutrition/hydration education as appropriate  - Include patient/family/caregiver in decisions related to nutrition  Outcome: Progressing

## 2025-01-28 NOTE — ASSESSMENT & PLAN NOTE
S/p fall on 1/4.  CT facial bones showed comminuted and mildly displaced fractures of the lateral and anterior walls of the R maxilla, lateral, and inferior wall of the R orbit, wall fracture appears to involve the inferior orbital foramen with associated small extraconal soft tissue edema, nondisplaced fractures of the zygomatic arch and the temporal bone at the TMJ, diffuse opacification of R maxillary sinus and R nasal cavity with blood products and air.    OMFS evaluated in acute setting - declined surgical intervention.  Received IV Unasyn for 7 days.  No longer needs to follow sinus precautions per OMFS.    Follow-up with OMFS on discharge.

## 2025-01-28 NOTE — PROGRESS NOTES
01/28/25 1130   Therapy Time missed   Time missed? Yes   Amount of time missed 75   Reason for time missed Medical hold   Time(s) multiple attempts made   (Pts BP very low into 80s even supine,  pt placed on med hold)

## 2025-01-28 NOTE — PROGRESS NOTES
01/28/25 1105   Therapy Time missed   Time missed? Yes   Amount of time missed 60   Reason for time missed Medical hold  (low BP)     Following update from LUIS Rodas pt now on medhold. OT at bedside reporting med hold status to pts  who reports that the pt has been asleep since he arrived this morning. OT to continue POC if pt remains appropriate for intensive OT services.

## 2025-01-28 NOTE — CASE MANAGEMENT
CM NOTE      Called pt's dtr Courtney regarding d/c plan for subacute rehab. CM to email her locations that are available via [employer], later this afternoon when they become available: [email or web address]     CM will then call her tomorrow to touch base on her preferred location after she reviews this list.

## 2025-01-28 NOTE — PROGRESS NOTES
Progress Note - PMR   Name: Sandra Purvis 89 y.o. female I MRN: [id number]  Unit/Bed#: -01 I Date of Admission: 1/17/2025   Date of Service: 1/28/2025 I Hospital Day: 11     Assessment & Plan  Hypertension  Cw metoprolol succinate 75 mg twice daily  Cardizem 120 mg XR daily, d/c amlodipine  Wears compression stockings, abdominal binder  Metoprolol decreased to 50 mg 1/27 1/28 IV hydration, midodrine daily per IM due to hypotension  Atrial fibrillation with rapid ventricular response (HCC)  Initially presented with sinus bradycardia and evaluated by cardiology and was in controlled atrial fibrillation without bradycardia  Not on anticoagulation prior to admission and not indicated at this point due to the SDH and SAH and overall remains high risk due to risk of falls and given her advanced age  No recent follow-up with cardiology as an outpatient and generally sees her primary care physician for management and was on Toprol 50 mg daily at home which has been changed well and in the hospital  Tachycardic in the hospital and given IV fluids and medication changes include addition of midodrine and changes in the metoprolol dosing  Recommended for aspirin but would likely not be a good long-term candidate for anticoagulation  Cardio rec: continue with cardizem 120 daily and Toprol-XL 75 mg twice daily 1/16  Stage 3 chronic kidney disease, unspecified whether stage 3a or 3b CKD (HCC)  Lab Results   Component Value Date    EGFR 61 01/27/2025    EGFR 62 01/23/2025    EGFR 60 01/20/2025    CREATININE 0.84 01/27/2025    CREATININE 0.83 01/23/2025    CREATININE 0.85 01/20/2025   At baseline  GERD (gastroesophageal reflux disease)  Continue protonix  Basal cell carcinoma (BCC) of scalp  Large scalp mass noted on admission.  Also follows with dermatology as an outpatient but declining any interventions  Some localized spot bleeding from the edges noted  Patient scratched at it and created wound, hair net  "applied  1/24 slight smell of wound: Keflex ordered per IM  Wound care recommendations: \"Left head--cleanse/soak with normal saline, pat dry. Cover wound with Xeroform and ABD. Secure with surgical cap. Change dressing daily and as needed. Trim surrounding hair as needed to keep from getting matted into wound\".   Hair could be trimmed more but family refused   Malignant melanoma of arm, left (HCC)  Large mass on the left forearm that is melanoma  Follows with dermatology as an outpatient last seen in December 2024 but not interested in any treatments  SDH (subdural hematoma) (HCC)  Patient presents with fall and initial CTh on 1/4 showing an acute subdural hemorrhage along the left cerebral convexity with a maximum thickness of 1.9 cm with no midline shift as well as an acute subarachnoid hemorrhage in the left frontoparietal sulci, left sylvian fissure and suprasellar cistern  Repeat CT head on 1/5 was stable; repeat 1/14 w/ interval evolution no new/evolving hemorrage   Completed 7 day course of keppra for seizure ppx  F/u w/ NSGY for repeat CTH ~1/27 1/20 Repeat imaging ordered for f/o tomorrow (1/21) at 1pm  F/u as needed; no neurosurgical intervention needed  MRI brain 1/22 improvement in SDH/SAH  SAH (subarachnoid hemorrhage) (HCC)  Subarachnoid hemorrhage noted on initial CT on 1/4 and stable on 1/5;   CTH1/14  Interval evolution of recent subdural and subarachnoid hemorrhage as detailed above. No definitive new or enlarging intracranial hemorrhage.  Continue same management for the subarachnoid hemorrhage as for the subdural hematoma, please see that separate section for overall management plans  Closed extensive facial fractures (HCC)  Secondary to fall  CT of the head on 1/4 also showed comminuted and mildly displaced fractures of the lateral and anterior walls of the right maxilla, lateral and inferior walls of the right orbit, right inferior orbital wall fractures appear to involve the inferior orbital " foramen with associated small extraconal soft tissue edema and focus of air.  There is no herniation of extraocular muscles and a nondisplaced fracture of the zygomatic arch as well as the temporal bone at the TMJ.  There is diffuse opacification of the right maxillary sinus and right nasal cavity with blood products and air  Seen by oral maxillofacial surgery team and surgical intervention was declined by patient  Completed 7-day course of Unasyn/Augmentin  Follow-up with OMFS as an outpatient  Completed sinus precautions (can use straws now)  Chest pain  Troponins obtained on initial evaluation of the patient in the emergency room and cardiology was consulted for evaluation  Despite elevated troponins there was nonischemic findings on her EKG and felt to be potentially related to a nonischemic troponin elevation in the setting of trauma and brain bleed versus demand ischemia due to her paroxysmal atrial fibrillation and syncopal episode  Not a candidate for cardiology for ischemic evaluation due to advanced age, frailty and recent subarachnoid hemorrhage/subdural hemorrhage  No new regional wall motion abnormalities noted on echocardiogram on 1/6/2025  Recommended to start aspirin 81 mg when cleared by neurosurgery with CT head  Follow-up with cardiology as an outpatient  Orthostatic hypotension  Blood pressures ranging in physical therapy as low as 70/40 and cardiology was evaluating the patient  Symptomatic with lightheadedness and presyncope and had received gentle fluids with some improvement  BALJIT stockings PRN as well and encouragement of fluid intake  Repeat orthostatics improved but still getting lightheaded at times which was felt to be the initiating factor in her syncopal episode that led to the fall and injury.  1/17 IM held midodrine, no significant orthostatic hypotension currently observed may need to introduce at 2.5 mg TID if develops episodes at a later time, continue to monitor orthostatic Bps    1/19 bp dropped on standing, came back up on sitting  Midodrine 5 mg BID PRN  1/26 metoprolol decreased to 50 mg  Syncope  Presented with syncopal episode preceded by lightheadedness that led to the fall which caused the sudden dural hemorrhage and subarachnoid hemorrhage  Also found to be significantly orthostatic which was likely the etiology and was placed on telemetry with no evidence of bradycardia or pauses  Limited p.o. intake and fluid intake and suspected orthostatic hypotension as the cause  Anemia  Most recent hemoglobin of 9.4 1/17; which is stable  Continue to follow biweekly CBC or sooner if clinically indicated  Will need repeat scan if significant drops or concern for acute bleeds  1/20 10.1, 1/23 10.4, 1/27 9.9 hgb  Leukocytosis  Most recent WBC count 12.08 and actually trending down from its maximum of 17.59  Unclear etiology, may be directly related to reactive changes from the SDH and SAH as well as fractures  Continue to monitor and if considerable changes may repeat scans given the fractures in the facial bones  Was on a course of Unasyn/Augmentin for 7 days  IM: Infectious workup negative  Received IV fluids, discontinued with evidence of fluid overload CXR  Stable off abx d/c 1/15  12.08 1/17---> 13.06 1/20----> 10.5 1/23 ----> 11.78 1/27  Acute on chronic diastolic congestive heart failure (HCC)  Wt Readings from Last 3 Encounters:   01/28/25 44.1 kg (97 lb 3.6 oz)   01/17/25 51.1 kg (112 lb 9.6 oz)   01/12/25 53.5 kg (118 lb)     TTE 1/6/2025 showed EF 70%, grade 2 DD, mildly dilated RV, mild LA, mild AI, mild TR, estimated PA pressure 50 mmHg, trivial pericardial effusion  Not on diuretics prior to admission  Cw furosemide 20 mg; monitor for volume status   Daily weights, monitor I/O     Insomnia  Inc melatonin to 6mg HS, trazodone 25mg HS PRN----> increased to 50---> 75 mg  Sleep log   Continue remeron 7.5, melatonin 6 mg  1/23 Psych consulted- no recommendations at this time    History  "of Present Illness   Sandra Purvis is a 89 y.o. female w/ PMHx of afib, GERD, HTN, who initially presented to [facility] on 1/4/2025 after a syncopan episode and collapse at home. CT had showed \"comminuted and mildly displaced fractures of the lateral and anterior walls of the right maxilla, lateral and inferior walls of the right orbit, right inferior orbital wall fracture appears to involve the inferior orbital foramen associated with small extraconal soft tissue edema and focus of air. There is also a nondisplaced fracture of the zygomatic arch and the temporal bone at the TMJ and diffuse opacification of the right maxillary sinus and right nasal cavity with blood products and air. Additionally there was an acute subdural hemorrhage along the left cerebral convexity with a maximal thickness of 1.9 cm with no midline shift and an acute subarachnoid hemorrhage in the left frontal parietal sulci, left sylvian fissure as well as suprasellar cistern.\" OMFS was consulted as well as neurosurgery. Patient and family declined any acute intervention at this time. Patient was started on Keppra x 7 days. Patient was treated with 7-day course of Unasyn/Augmentin. Her hospital stay was also complicated with A-fib with RVR and cardiology was consulted. Patient then had orthostatic hypotension and started on midodrine. Patient initially transferred to [facility] 1/12/2025. While at [facility] patient with hypertensive episodes to SBP >190 and Afib rates 120-130s. Patient became more symptomatic from her A-fib and was transferred to acute care 1/14. She had X ray w/ pulmonary edema was given lasix 1/14-1/15 with good response. Patient was seen by cardiology. Patient was placed on Cardizem drip and now on PO Cardizem. Her rates have improved. Sandra Purvis was admitted to the [facility] on 01/17/25     Chief Complaint: f/u ambulatory dysfunction    Interval: Patient seen and examined at bedside " "today while receiving IV fluids. No events overnight.  Reports overall feeling \"completely relaxed\". Last BM 1/27. Sleeping the same at night [facility] sleep log.    - sinus precautions cleared    Review of Systems   Respiratory:  Negative for shortness of breath.    Cardiovascular:  Negative for chest pain, palpitations and leg swelling.   Gastrointestinal:  Negative for abdominal pain, constipation, diarrhea, nausea and vomiting.       Objective   Functional Update:  Physical Therapy Occupational Therapy Speech Therapy   Weight Bearing Status: Full Weight Bearing  Transfers: Moderate Assistance  Bed Mobility: Maximum Assistance  Amulation Distance (ft): 25 feet  Ambulation: Total Assistance (Mod A and needs chair follow, not consistant)  Discharge Recommendations: Skilled Nursing Facility  DC Home with:: 24 Hour Assisteance   Eating: Supervision  Grooming: Supervision  Bathing: Maximum Assistance  Bathing: Maximum Assistance  Upper Body Dressing: Minimal Assistance  Lower Body Dressing: Maximum Assistance  Toileting: Total Assistance  Tub/Shower Transfer: Total Assistance (would require TA at current level of function)  Toilet Transfer: Maximum Assistance  Cognition: Exceptions to WNL  Cognition: Decreased Memory, Decreased Executive Functions, Decreased Attention, Decreased Comprehension  Orientation: Person, Place   Mode of Communication: Verbal  Cognition: Exceptions to WNL  Cognition: Decreased Memory, Decreased Executive Functions, Decreased Attention, Decreased Comprehension  Orientation: Person, Situation  Swallowing: Exceptions to WNL  Swallowing: Oral Dysphagia, Pharyngeal Dysphagia, Aspiration Risk  Diet Recommendations: Level 2/Mechanically Altered, Thin  Discharge Recommendations:  (pending pt progress but likely subacute level of care)  DC Home with:: 24 Hour Supervision, 24 Hour Assisteance, Family Support         Temp:  [97.7 °F (36.5 °C)-98.2 °F (36.8 °C)] 97.7 °F (36.5 °C)  HR:  [66-76] 73  Resp:  " [18] 18  BP: ()/(39-77) 96/49  SpO2:  [96 %] 96 %    Physical Exam  Constitutional:       General: She is not in acute distress.     Comments: Patient falls asleep between questions   HENT:      Head: Normocephalic and atraumatic.      Mouth/Throat:      Mouth: Mucous membranes are moist.   Cardiovascular:      Rate and Rhythm: Normal rate and regular rhythm.   Pulmonary:      Effort: Pulmonary effort is normal.      Breath sounds: Normal breath sounds.   Abdominal:      General: Bowel sounds are normal.   Musculoskeletal:         General: Normal range of motion.   Skin:     General: Skin is warm and dry.      Findings: Bruising (on face and arms) and lesion (Head wound covered with surgical cap and dressing) present.   Neurological:      Mental Status: Mental status is at baseline.   Psychiatric:         Mood and Affect: Mood normal.         Behavior: Behavior normal.           Scheduled Meds:  Current Facility-Administered Medications   Medication Dose Route Frequency Provider Last Rate    acetaminophen  975 mg Oral Q8H [facility] Autumn Chapman PA-C      bisacodyl  10 mg Rectal Daily PRN Sheri Pope MD      calcium carbonate  500 mg Oral Daily PRN Autumn Chapman PA-C      cephalexin  500 mg Oral Q8H [facility] LUIS Monsalve      diltiazem  120 mg Oral Daily Autumn Chapman PA-C      docusate sodium  100 mg Oral BID PRN Sheir Pope MD      enoxaparin  30 mg Subcutaneous Q24H Autumn Chapman PA-C      gabapentin  100 mg Oral HS Sheri Pope MD      hydrALAZINE  10 mg Oral Q8H PRN LUIS Monsalve      melatonin  6 mg Oral HS Sheri Pope MD      metoprolol succinate  50 mg Oral BID Ashley Depadua, MD      midodrine  5 mg Oral TID PRN Autumn Chapman PA-C      [START ON 1/29/2025] midodrine  5 mg Oral Daily Before Breakfast LUIS Monsalve      mirtazapine  7.5 mg Oral HS Sheri Pope MD      ondansetron  4 mg Oral Q6H PRN Autumn Chapman PA-C       oxyCODONE  2.5 mg Oral Q6H PRN Autumn Chapman PA-C      Or    oxyCODONE  5 mg Oral Q6H PRN Autumn Chapman PA-C      pantoprazole  40 mg Oral Early Morning Autumn Chapman PA-C      saccharomyces boulardii  250 mg Oral BID LUIS Monsalve      traZODone  75 mg Oral HS Sheri Pope MD           Lab Results: I have reviewed the following results:  Results from last 7 days   Lab Units 01/27/25  0453 01/23/25  0554   HEMOGLOBIN g/dL 9.9* 10.4*   HEMATOCRIT % 31.0* 32.3*   WBC Thousand/uL 11.78* 10.50*   PLATELETS Thousands/uL 222 294     Results from last 7 days   Lab Units 01/27/25  0453 01/23/25  0554   BUN mg/dL 19 12   SODIUM mmol/L 140 140   POTASSIUM mmol/L 3.6 3.5   CHLORIDE mmol/L 103 103   CREATININE mg/dL 0.84 0.83              Autumn Chapman PA-C  Physical Medicine and Rehabilitation   01/28/25

## 2025-01-28 NOTE — ASSESSMENT & PLAN NOTE
Noted to have elevated troponins on admission to acute setting.  Helen to be due to trauma/brain bleed vs demand ischemia due to paroxysmal a-fib and syncope.  Cardiology recommended starting ASA 81mg daily when cleared by Neurosurgery.  Per Neurosurgery - ok to start ASA in 2 weeks (2/4) after patient/family discussion of risks/benefits with PCP/Cardiologist.  Recommend establishing with Cardiology on discharge.

## 2025-01-28 NOTE — ASSESSMENT & PLAN NOTE
Cw metoprolol succinate 75 mg twice daily  Cardizem 120 mg XR daily, d/c amlodipine  Wears compression stockings, abdominal binder  Metoprolol decreased to 50 mg 1/27 1/28 IV hydration, midodrine daily per IM due to hypotension

## 2025-01-28 NOTE — ASSESSMENT & PLAN NOTE
Secondary to fall  CT of the head on 1/4 also showed comminuted and mildly displaced fractures of the lateral and anterior walls of the right maxilla, lateral and inferior walls of the right orbit, right inferior orbital wall fractures appear to involve the inferior orbital foramen with associated small extraconal soft tissue edema and focus of air.  There is no herniation of extraocular muscles and a nondisplaced fracture of the zygomatic arch as well as the temporal bone at the TMJ.  There is diffuse opacification of the right maxillary sinus and right nasal cavity with blood products and air  Seen by oral maxillofacial surgery team and surgical intervention was declined by patient  Completed 7-day course of Unasyn/Augmentin  Follow-up with OMFS as an outpatient  Completed sinus precautions (can use straws now)

## 2025-01-28 NOTE — PROGRESS NOTES
01/28/25 0900   Restrictions/Precautions   Precautions Aspiration;Bed/chair alarms;Cognitive;Fall Risk;Supervision on toilet/commode  (ortho bp)   Sit to Stand   Type of Assistance Needed Physical assistance   Physical Assistance Level 51%-75%   Comment std pivot no device   Sit to Stand CARE Score 2   Bed-Chair Transfer   Type of Assistance Needed Physical assistance   Physical Assistance Level 51%-75%   Comment std pivot no device  very flexed and strong post bias   Chair/Bed-to-Chair Transfer CARE Score 2   Assessment   Treatment Assessment 15min skilled PT sesion, pt was on commode so assisted NSG - performed stand at bedrail for hygiene and swap out-  took 2 people to stand safely,  pt once sitting in chair was closing eyes and was leaning back but was arousable,  checked BP and was 84/39  then with proper position in tilit in space 96/44   , then transfered back to bed because pt needed to still go to bathroom but was unsafe to use commode with low BP so needed to use bed pan.  Will re attempt to get rest of session later.   Problem List Decreased strength;Decreased range of motion;Decreased endurance;Impaired balance;Decreased mobility;Decreased cognition   Barriers to Discharge Inaccessible home environment;None   Plan   Progress Slow progress, decreased activity tolerance   PT Therapy Minutes   PT Time In 0900   PT Time Out 0915   PT Total Time (minutes) 15   PT Mode of treatment - Individual (minutes) 15   PT Mode of treatment - Concurrent (minutes) 0   PT Mode of treatment - Group (minutes) 0   PT Mode of treatment - Co-treat (minutes) 0   PT Mode of Treatment - Total time(minutes) 15 minutes   PT Cumulative Minutes 625   Therapy Time missed   Time missed? No

## 2025-01-28 NOTE — PROGRESS NOTES
01/28/25 0800   Pain Assessment   Pain Assessment Tool 0-10   Pain Score No Pain   Restrictions/Precautions   Precautions Aspiration;Bed/chair alarms;Cognitive;Fall Risk;Supervision on toilet/commode   Comprehension   Comprehension (FIM) 3 - Understands basic info/conversation 50-74% of time   Expression   Expression (FIM) 4 - Expresses basic info/needs 75-90% of time   Social Interaction   Social Interaction (FIM) 5 - Interacts appropriately with others 90% of time   Problem Solving   Problem solving (FIM) 2 - Needs direction more than ½ time to initiate, plan or complete simple tasks   Memory   Memory (FIM) 2 - Recognizes, recalls/performs 25-49%   Speech/Language/Cognition Assessmetn   Treatment Assessment In addition to dysphagia tx session, SLP was able to target cognitive skills. Today, initiated in review of orientation information to where pt was more accurate given recall of place/city, situation and only month/year, needing cues for [place] and date. Pt did appear to be more oriented this time of day vs in yesterday's session when held later in day.    SLP targeting functional cognitive task by presenting pt w/ written sequencing task provided 4 steps. It was noted that SLP did have to provide pt w/ increased repetition of verbal directions to complete task, to where still pt did have difficulty in overall initiation to task at hand. SLP did have pt orally read the steps in completing each task provided which did increase pt's ability to complete sequencing given each task. While pt did not complete any tasks on own, needing moderate verbal cues to complete, pt was then able to demonstrate 16/16 accuracy given task. Overall tasks did require increased processing time in completing this activity as well.     Last task completed in session was a verbal ST/working memory task, in addition to categorization and organization skills. SLP provided pt w/ 4 words and pt was to identify the word which would not  "belong. Pt verbalizing self limiting behaviors given this task as to pt continued to perseverate on being \"tired\" throughout attempts given task. It is suspected that pt was internally distracted given need to toilet which pt did not effectively communicate w/ SLP about needs to toilet. However, question if requesting toileting was a possible avoidance towards engaging in task. It was noted that pt had significant decreased recall of words lists, as well as exhibiting increased difficulty in ability to identify the target words which did not belong suspecting some decreased mental flexibility. At this time, pt will continue to benefit from ongoing skilled SLP services targeting functional cognitive skills in hopes for decreasing caregiver burden over time.    Eating   Type of Assistance Needed Set-up / clean-up;Supervision;Verbal cues   Physical Assistance Level No physical assistance   Eating CARE Score 4   Swallow Assessment   Swallow Treatment Assessment Daily Dysphagia Tx Note     Patient Name: Sandra Purvis    Today's Date: 1/28/2025      Current Risks for Dysphagia & Aspiration: Weak cough;General debilitation;Cognitive deficit;Reduced alertness; facial fractures     Current Symptoms/Concerns: difficulty chewing     Current diet: mechanical soft/level 2 diet; pureed vegetables and thin liquids      Premorbid diet::regular diet and thin liquids      Positioning: Pt OOB in tilt in space WC    Items administered:Consistencies Administered: thin liquids and soft solids  Materials administered included scrambled eggs, pancakes, thins by straw    Total amount of meal consumed:   50% of meal and 360cc of thins      Oral stage:mild  Lip closure: functional around utensils, straw  Anterior spillage: none  Mastication: slower and more effortful in breakdown  Bolus formation: decreased w/ dry soft solids  Bolus control: appeared to be functional  Transfer: delayed  Oral residue: mild amounts w/ dry soft " solids  Pocketing: trace--removed after oral care         Pharyngeal stage:minimal-mild  Swallow promptness: increased delay w/ soft solids but slightly prompter w/ thins  Hyolaryngeal elevation: present upon palpation but still decreased excursion  Wet voice: none  Throat clear: none  Cough: none  Secondary swallows: increased w/ soft solids  Audible swallows: present w/ thins       Esophageal stage:intermittent belching noted after thins        Summary:     Pt presenting with mild oral and minimal-mild pharyngeal dysphagia today. Symptoms or concerns included slower and more effortful mastication given soft solids to where pt did have more decreased bolus formulation given dry soft solids (eggs) vs moistened soft solids (pancakes). Pt did need verbal cues to allow increased time to fully clear textures between bites as well as cues for use of liquid wash as well to clear any oral residual. Of note, pt did remove all oral residual upon completing traditional oral care. As for bolus control/transit of thin liquids by straw, it did appear to be prompt overall. Swallow initiation was delayed w/ increased delay given soft solids vs thins. HLE remains present but still decreased upon palpation. Double swallows were elicited more w/ soft solids > liquids. Highly audible swallows were noted w/ thin liquids. It was noted today that pt did have belching when using thins w/ liquid wash. However, no increased aspiration sxs were observed throughout meal today.     After completing traditional oral care, pt did remove mild amounts of eggs from bilaterally. Otherwise, no further signs/sxs of aspiration noted after completing oral care.          Recommendations:  Diet: mechanically altered/level 2 diet and thin liquids   Meds: whole with puree  Strategies: upright posture, only feed when fully alert, slow rate of feeding, small bites/sips, quiet environment (tv off, limit talking, door closed, etc.), and alternating bites and  sips, OOB preferred     Sinus Precautions NOW DISCONTINUED PER OMFS     FULL supervision w/ meals.  Results reviewed with: patient, RN-MD Eliza- Dr. Pope  Aspiration precautions posted.     F/u ST tx: Pt is currently recommended for further skilled SLP services targeting dysphagia therapy in order to maximize oral and pharyngeal swallow skills, while safely supporting PO intake, as well as to improve independent carryover of safe swallow strategies.       Plan:      Continue dysphagia level 2, thins      Follow up to closely monitor diet tolerance      Trials of diet upgrade under SLP supervision only   Swallow Assessment Prognosis   Prognosis Good   Prognosis Considerations Age;Co-morbidities;Medical diagnosis;Medical prognosis;Previous level of function;Severity of impairments;New learning ability;Ability to carry over   SLP Therapy Minutes   SLP Time In 0800   SLP Time Out 0900   SLP Total Time (minutes) 60   SLP Mode of treatment - Individual (minutes) 60   SLP Mode of treatment - Concurrent (minutes) 0   SLP Mode of treatment - Group (minutes) 0   SLP Mode of treatment - Co-treat (minutes) 0   SLP Mode of Treatment - Total time(minutes) 60 minutes   SLP Cumulative Minutes 380   Therapy Time missed   Time missed? No

## 2025-01-28 NOTE — ASSESSMENT & PLAN NOTE
Wt Readings from Last 3 Encounters:   01/28/25 44.1 kg (97 lb 3.6 oz)   01/17/25 51.1 kg (112 lb 9.6 oz)   01/12/25 53.5 kg (118 lb)     TTE 1/6/2025 showed EF 70%, grade 2 DD, mildly dilated RV, mild LA, mild AI, mild TR, estimated PA pressure 50 mmHg, trivial pericardial effusion  Not on diuretics prior to admission  Cw furosemide 20 mg; monitor for volume status   Daily weights, monitor I/O

## 2025-01-28 NOTE — ASSESSMENT & PLAN NOTE
Presented with syncope on 1/4.  Significant orthostasis noted in acute setting.  Continue midodrine as needed  Apply abdominal binder/TEDs as needed.  Encourage PO hydration.  Continue to monitor orthostatics closely.    Give 500cc 0.9% NSS IV fluid bolus today.

## 2025-01-29 PROCEDURE — 92526 ORAL FUNCTION THERAPY: CPT

## 2025-01-29 PROCEDURE — 97530 THERAPEUTIC ACTIVITIES: CPT

## 2025-01-29 PROCEDURE — 97535 SELF CARE MNGMENT TRAINING: CPT

## 2025-01-29 PROCEDURE — 97542 WHEELCHAIR MNGMENT TRAINING: CPT

## 2025-01-29 PROCEDURE — 99233 SBSQ HOSP IP/OBS HIGH 50: CPT | Performed by: INTERNAL MEDICINE

## 2025-01-29 PROCEDURE — 97110 THERAPEUTIC EXERCISES: CPT

## 2025-01-29 PROCEDURE — 99232 SBSQ HOSP IP/OBS MODERATE 35: CPT | Performed by: INTERNAL MEDICINE

## 2025-01-29 RX ADMIN — METOPROLOL SUCCINATE 25 MG: 25 TABLET, EXTENDED RELEASE ORAL at 08:55

## 2025-01-29 RX ADMIN — MELATONIN TAB 3 MG 6 MG: 3 TAB at 21:49

## 2025-01-29 RX ADMIN — DILTIAZEM HYDROCHLORIDE 120 MG: 120 CAPSULE, COATED, EXTENDED RELEASE ORAL at 21:50

## 2025-01-29 RX ADMIN — Medication 250 MG: at 08:55

## 2025-01-29 RX ADMIN — TRAZODONE HYDROCHLORIDE 75 MG: 50 TABLET ORAL at 21:49

## 2025-01-29 RX ADMIN — ACETAMINOPHEN 975 MG: 325 TABLET, FILM COATED ORAL at 05:42

## 2025-01-29 RX ADMIN — MIDODRINE HYDROCHLORIDE 5 MG: 5 TABLET ORAL at 06:13

## 2025-01-29 RX ADMIN — METOPROLOL SUCCINATE 25 MG: 25 TABLET, EXTENDED RELEASE ORAL at 21:50

## 2025-01-29 RX ADMIN — ENOXAPARIN SODIUM 30 MG: 30 INJECTION SUBCUTANEOUS at 21:49

## 2025-01-29 RX ADMIN — GABAPENTIN 100 MG: 100 CAPSULE ORAL at 21:50

## 2025-01-29 RX ADMIN — ACETAMINOPHEN 975 MG: 325 TABLET, FILM COATED ORAL at 14:31

## 2025-01-29 RX ADMIN — Medication 250 MG: at 18:22

## 2025-01-29 RX ADMIN — MIRTAZAPINE 7.5 MG: 7.5 TABLET, FILM COATED ORAL at 21:49

## 2025-01-29 RX ADMIN — ACETAMINOPHEN 975 MG: 325 TABLET, FILM COATED ORAL at 21:50

## 2025-01-29 RX ADMIN — PANTOPRAZOLE SODIUM 40 MG: 40 TABLET, DELAYED RELEASE ORAL at 05:42

## 2025-01-29 NOTE — ASSESSMENT & PLAN NOTE
Blood pressures ranging in physical therapy as low as 70/40 and cardiology was evaluating the patient  Symptomatic with lightheadedness and presyncope and had received gentle fluids with some improvement  BALJIT stockings PRN as well and encouragement of fluid intake  Repeat orthostatics improved but still getting lightheaded at times which was felt to be the initiating factor in her syncopal episode that led to the fall and injury.  1/17 IM held midodrine, no significant orthostatic hypotension currently observed may need to introduce at 2.5 mg TID if develops episodes at a later time, continue to monitor orthostatic Bps   1/19 bp dropped on standing, came back up on sitting  1/26 metoprolol decreased to 50 mg  25 mg 1/28  Midodrine 5 mg standing

## 2025-01-29 NOTE — PROGRESS NOTES
"   01/29/25 1010   Pain Assessment   Pain Assessment Tool 0-10   Pain Score No Pain   Restrictions/Precautions   Precautions Aspiration;Bed/chair alarms;Cognitive;Fall Risk;Supervision on toilet/commode  (BP varies very high to very low at times)   Weight Bearing Restrictions No   ROM Restrictions No   Braces or Orthoses   (TEDs and ABD binder as needed, sinus prec DC'd)   Lifestyle   Autonomy \"You think I did a good job?\"   Eating   Type of Assistance Needed Supervision   Comment Pt drank 2/3 of ensure clear while seated upright in tilt in space   Eating CARE Score 4   Oral Hygiene   Type of Assistance Needed Supervision   Comment requesting to brush teeth first during ADL routine so positioned upright in bed, pt able to manipulate toothpaste and toothbrush w/ extra time, rinses w/ mouthwash and spits into emesis basin w/o hands on assist. sup for safety 2' aspiration precautions and sup for mealtimes   Oral Hygiene CARE Score 4   Shower/Bathe Self   Type of Assistance Needed Physical assistance   Physical Assistance Level 76% or more   Comment Pt washes face postioned upright in bed. Assist for LB and groin/buttocks bed level via rolling 2'safety concerns. Pt then moves to EOB w/ assist and plan was to have pt SB top half, BP stable, d/t fatigue and dec sitting balance EOB pt requried BUE on bed to maintain sitting balance, in addition to Luis Eduardo. Assist to wash UB while seated d/t dec sitting balance.   Shower/Bathe Self CARE Score 2   Bathing   Assessed Bath Style Sponge Bath   Tub/Shower Transfer   Reason Not Assessed Safety;Sponge Bath;Balance;Endurance   Upper Body Dressing   Type of Assistance Needed Physical assistance   Physical Assistance Level 76% or more   Comment maxA for sitting balance EOB and MaxA for shirt don/doff   Upper Body Dressing CARE Score 2   Lower Body Dressing   Type of Assistance Needed Physical assistance   Physical Assistance Level Total assistance   Comment TA bed level 2' safety " concerns   Lower Body Dressing CARE Score 1   Putting On/Taking Off Footwear   Type of Assistance Needed Physical assistance   Physical Assistance Level Total assistance   Comment TA TEDs and  socks EOB   Putting On/Taking Off Footwear CARE Score 1   Roll Left and Right   Type of Assistance Needed Physical assistance   Physical Assistance Level 25% or less   Comment cues for positioning, HOB flat use of bed rails   Roll Left and Right CARE Score 3   Lying to Sitting on Side of Bed   Type of Assistance Needed Physical assistance   Physical Assistance Level 51%-75%   Comment use of bed rail and HOB elevated, extra time   Lying to Sitting on Side of Bed CARE Score 2   Sit to Stand   Type of Assistance Needed Physical assistance   Physical Assistance Level 51%-75%   Comment STS at EOB   Sit to Stand CARE Score 2   Bed-Chair Transfer   Type of Assistance Needed Physical assistance   Physical Assistance Level 76% or more   Comment maxA sit pivot from EOB>tilt in space towards R   Chair/Bed-to-Chair Transfer CARE Score 2   Neuromuscular Education   Comments fxnl reach sitting upright in tilt in space, reach antieriorly outside of DANIEL to place pegs on board in pattern, pt would retrieve pegs from OT w/ LUE, RUE used on armrest to additionally support pt in upright position. noted mild dymetria w/ reaching for pegs. Overall toelrated well. Completed to challenge trunk control w/ fxnl reach activity and overall activity tolerance.   Cognition   Overall Cognitive Status Impaired   Arousal/Participation Alert;Cooperative   Attention Attends with cues to redirect   Orientation Level Oriented X4   Following Commands Follows one step commands with increased time or repetition   Comments pleasant and eager to participate today   Activity Tolerance   Activity Tolerance Patient tolerated treatment well   Other Comments   Assessment BPs WFL today, TEDs and binder OOB. BP as high as 140/62, refer to vitals flow sheet for data    Assessment   Treatment Assessment OT session focusingon ADL retraining at SB level, LB bed level 2' recent low Bps and poor activity tolerance. Pt able to participate today. Trialed sit pivot required maxA, but overll completed all tasks at Ax1 level w/ extensive intervention and activity modifications for safety. OT to continue POC.   Prognosis Fair   Problem List Decreased strength;Decreased range of motion;Decreased endurance;Impaired balance;Decreased mobility;Decreased coordination;Decreased cognition;Decreased safety awareness   Plan   Treatment/Interventions ADL retraining;Functional transfer training;Therapeutic exercise;Endurance training;Cognitive reorientation;Patient/family training;Equipment eval/education;Compensatory technique education;Continued evaluation;Spoke to nursing;Spoke to advanced practitioner   Progress Progressing toward goals   OT Therapy Minutes   OT Time In 1010   OT Time Out 1140   OT Total Time (minutes) 90   OT Mode of treatment - Individual (minutes) 90   OT Mode of treatment - Concurrent (minutes) 0   OT Mode of treatment - Group (minutes) 0   OT Mode of treatment - Co-treat (minutes) 0   OT Mode of Treatment - Total time(minutes) 90 minutes   OT Cumulative Minutes 780   Therapy Time missed   Time missed? No

## 2025-01-29 NOTE — ASSESSMENT & PLAN NOTE
Noted to have elevated troponins on admission to acute setting.  [place] to be due to trauma/brain bleed vs demand ischemia due to paroxysmal a-fib and syncope.  Cardiology recommended starting ASA 81mg daily when cleared by Neurosurgery.  Per Neurosurgery - ok to start ASA in 2 weeks (2/4) after patient/family discussion of risks/benefits with PCP/Cardiologist.  Recommend establishing with Cardiology on discharge.

## 2025-01-29 NOTE — PROGRESS NOTES
Progress Note - Internal Medicine   Name: Sandra Purvis 89 y.o. female I MRN: [id number]  Unit/Bed#: [facility] 261-01 I Date of Admission: 1/17/2025   Date of Service: 1/29/2025 I Hospital Day: 12    Assessment & Plan  Hypertension  Home regimen: Toprol XL 50mg daily and HCTZ 12.5mg daily.  Currently receiving Toprol XL 50mg BID and Cardizem 120mg daily.  Give midodrine as needed for SBP <90.  Orthostatic + Monitor orthostatics daily.  Keep SBP <160 due to intracranial bleed.  Atrial fibrillation with rapid ventricular response (HCC)  Not taking AC at home due to hx of falls.  Continue to hold with recent falls and now subarachnoid/subdural.  Had been taking Toprol XL 50mg daily at home - currently receiving Toprol XL 50mg BID and Cardizem 120mg daily.  Hx of a-fib with RVR while on ARC, recently transferred to be on Cardizem drip.  Monitor routine VS.  Replete electrolytes as needed.  Follow-up with Cardiology as outpatient.  GERD (gastroesophageal reflux disease)  Continue Protonix 40mg daily.  Had not been on Protonix as outpatient.  Basal cell carcinoma (BCC) of scalp  Large scalp mass noted on admission.  Declining intervention as an outpatient.  Follows with Dermatology and LUIS Mejía (Surgical Oncology) as outpatient.  Wound care consulted - wound cleansed/debrided.  Continue with wearing cap during the day to avoid further patient manipulation.    Foul odor and drainage noted on 1/24 - completed 5 day course of Keflex.  Wound care recommending Xeroform and ABD.  Change dressing daily.  Malignant melanoma of arm, left (HCC)  Recent surgical excision on 12/19.  Follows with LUIS Mejía (Surg/Onc) as outpatient.  Had declined further treatment.  SDH (subdural hematoma) (HCC)  Presented on 1/4 after syncopal fall.  CTH showed acute subdural hemorrhage along the L cerebral convexity with maximal thickness of 1.9cm.  No midline shift.    Received Keppra x7 days.  Continue to hold AC/AP.  Neurovascular  checks Q shift.  Maintain normotension.  Avoid SBP >160.    CTH on 1/20: New small hypodensity within the left frontal subcortical white matter, differential includes age indeterminate infarct, evolving parenchymal contusion among other etiologies.  MRI brain obtained on 1/21 showing evolving bilateral cerebral convexity subdural hematomas containing blood products of varying ages, scattered small volume subarachnoid hemorrhage, no evidence of acute large territory infarct.    2 week follow-up with Neurosurgery on 1/21.  Imaging reviewed.  May consider starting ASA in 2 weeks after discussion with PCP/Cardiology in regards to risk/benefit.  Follow-up with Neurosurgery in additional 4 weeks with [facility].    Primary team following.  PT/OT/SLP.  SAH (subarachnoid hemorrhage) (HCC)  Presented on 1/4 after syncopal fall.  CTH showed acute subarachnoid hemorrhage in the L frontoparietal sulci, L sylvian fissure and suprasellar cistern.     Received Keppra x7 days.  Continue to hold AC/AP.  Neurovascular checks Q shift.  Maintain normotension.  Avoid SBP >160.     MRI brain obtained 1/21 - stable.  2 week follow-up with Neurosurgery on 1/21.  Imaging was reviewed.  May consider starting ASA in 2 weeks after discussion with PCP/Cardiology.  Follow-up with Neurosurgery in additional 4 weeks with [facility].     Primary team following.  PT/OT/SLP.  Closed extensive facial fractures (HCC)  S/p fall on 1/4.  CT facial bones showed comminuted and mildly displaced fractures of the lateral and anterior walls of the R maxilla, lateral, and inferior wall of the R orbit, wall fracture appears to involve the inferior orbital foramen with associated small extraconal soft tissue edema, nondisplaced fractures of the zygomatic arch and the temporal bone at the TMJ, diffuse opacification of R maxillary sinus and R nasal cavity with blood products and air.    OMFS evaluated in acute setting - declined surgical intervention.  Received IV Unasyn for 7  days.  No longer needs to follow sinus precautions per OMFS.    Follow-up with OMFS on discharge.  Chest pain  Noted to have elevated troponins on admission to acute setting.  [place] to be due to trauma/brain bleed vs demand ischemia due to paroxysmal a-fib and syncope.  Cardiology recommended starting ASA 81mg daily when cleared by Neurosurgery.  Per Neurosurgery - ok to start ASA in 2 weeks (2/4) after patient/family discussion of risks/benefits with PCP/Cardiologist.  Recommend establishing with Cardiology on discharge.  Orthostatic hypotension  Presented with syncope on 1/4.  Significant orthostasis noted in acute setting.  Continue midodrine PRN and midodrine 5mg daily in AM.  Apply abdominal binder/TEDs as needed.  Encourage PO hydration.  Continue to monitor orthostatics closely.    Given 500cc 0.9% NSS IV fluid bolus on 1/28.  Syncope  Presented on 1/4 s/p syncopal fall.  EKG showed atrial fibrillation with RVR on admission.  ECHO on 1/6 showed EF 70%, G2DD.  Noted to have significant orthostasis in acute setting.  Continue to monitor orthostatics.  Encourage PO hydration.  Apply abdominal binder/TEDs as needed.    Follow-up/establish with Cardiology as outpatient.  Anemia  Hgb currently 9.9.  Baseline Hgb 9-10.  No active s/s of bleeding.  Vitamin B12 1125 on 1/13 - no need for supplementation.  Folate 21.1 on 1/13 - no need for supplementation.  Iron panel on 1/13 - Iron Sat 12%, TIBC 295, Iron 34, and Ferritin 98.    Continue to trend routine CBC.  Leukocytosis  WBC count currently 11.78.  Chronically elevated.  Unclear etiology, may be directly related to reactive changes from SDH and SAH as well as fracture.  Did recently complete a course of Unasyn and Augmentin.  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  Completed 5 day course.  Continue to trend routine CBC.  Acute on chronic diastolic congestive heart failure (HCC)  ECHO on 1/6 showed EF 70%, G2DD.  Monitor volume status -  currently euvolemic.  Monitor I&Os, daily weights.  Insomnia  Continue melatonin 6mg at HS, Remeron 7.5mg at HS, and trazodone 75mg at HS.  Continue sleep logs.    VTE Pharmacologic Prophylaxis:   Pharmacologic: Enoxaparin (Lovenox)  Mechanical VTE Prophylaxis in Place: Yes - sequential compression devices.    Current Length of Stay: 12 day(s)    Current Patient Status: Inpatient Rehab     Discharge Plan: As per primary team.    Code Status: Level 3 - DNAR and DNI    Subjective:   Pt examined while pt sitting in bed in pt room.  Currently has no complaints.  Feels really good today.  Denies any headaches, lightheadedness, dizziness, SOB, or CP.  Slept well last night.  LBM was yesterday.    Objective:     Vitals:   Temp (24hrs), Av.7 °F (36.5 °C), Min:97 °F (36.1 °C), Max:98.5 °F (36.9 °C)    Temp:  [97 °F (36.1 °C)-98.5 °F (36.9 °C)] 97 °F (36.1 °C)  HR:  [58-64] 62  Resp:  [17] 17  BP: (108-166)/(51-71) 166/71  SpO2:  [97 %-98 %] 98 %  Body mass index is 15.37 kg/m².     Review of Systems   Constitutional:  Negative for chills, fatigue and fever.   HENT:  Negative for trouble swallowing.    Eyes:  Negative for visual disturbance.   Respiratory:  Negative for cough, shortness of breath, wheezing and stridor.    Cardiovascular:  Negative for chest pain, palpitations and leg swelling.   Gastrointestinal:  Negative for abdominal distention, abdominal pain, constipation, diarrhea, nausea and vomiting.        LBM    Genitourinary:  Negative for difficulty urinating.   Musculoskeletal:  Negative for arthralgias, back pain and gait problem.   Neurological:  Negative for dizziness, weakness, light-headedness, numbness and headaches.   Psychiatric/Behavioral:  Negative for dysphoric mood and sleep disturbance. The patient is not nervous/anxious.    All other systems reviewed and are negative.       Input and Output Summary (last 24 hours):       Intake/Output Summary (Last 24 hours) at 2025 1047  Last data  filed at 1/29/2025 1002  Gross per 24 hour   Intake 1020 ml   Output 1100 ml   Net -80 ml       Physical Exam:     Physical Exam  Vitals and nursing note reviewed.   Constitutional:       General: She is not in acute distress.     Appearance: Normal appearance. She is not ill-appearing.      Comments: Thin.   HENT:      Head: Normocephalic and atraumatic.   Cardiovascular:      Rate and Rhythm: Normal rate. Rhythm irregularly irregular.      Pulses: Normal pulses.      Heart sounds: Murmur heard.      Systolic murmur is present with a grade of 1/6.      No friction rub.   Pulmonary:      Effort: Pulmonary effort is normal. No respiratory distress.      Breath sounds: Normal breath sounds. No wheezing or rhonchi.   Abdominal:      General: Abdomen is flat. Bowel sounds are normal. There is no distension.      Palpations: Abdomen is soft. There is no mass.      Tenderness: There is no abdominal tenderness. There is no guarding or rebound.      Hernia: No hernia is present.   Musculoskeletal:      Cervical back: Normal range of motion and neck supple. No tenderness.      Right lower leg: No edema.      Left lower leg: No edema.   Skin:     General: Skin is warm and dry.      Findings: Bruising (R sided facial swelling) present.   Neurological:      Mental Status: She is alert and oriented to person, place, and time.   Psychiatric:         Mood and Affect: Mood normal.         Behavior: Behavior normal.         Additional Data:     Labs:    Results from last 7 days   Lab Units 01/27/25  0453   WBC Thousand/uL 11.78*   HEMOGLOBIN g/dL 9.9*   HEMATOCRIT % 31.0*   PLATELETS Thousands/uL 222   SEGS PCT % 81*   LYMPHO PCT % 11*   MONO PCT % 6   EOS PCT % 1     Results from last 7 days   Lab Units 01/27/25  0453   SODIUM mmol/L 140   POTASSIUM mmol/L 3.6   CHLORIDE mmol/L 103   CO2 mmol/L 30   BUN mg/dL 19   CREATININE mg/dL 0.84   ANION GAP mmol/L 7   CALCIUM mg/dL 9.4   GLUCOSE RANDOM mg/dL 83                       Labs  reviewed    Imaging:    Imaging reviewed    Recent Cultures (last 7 days):           Last 24 Hours Medication List:   Current Facility-Administered Medications   Medication Dose Route Frequency Provider Last Rate    acetaminophen  975 mg Oral Q8H ELIZABETH Autumn Chapman PA-C      bisacodyl  10 mg Rectal Daily PRN Sheri Pope MD      calcium carbonate  500 mg Oral Daily PRN Autumn Chapman PA-C      diltiazem  120 mg Oral HS LUIS Monsalve      docusate sodium  100 mg Oral BID PRN Sheri Pope MD      enoxaparin  30 mg Subcutaneous Q24H Autumn Chapman PA-C      gabapentin  100 mg Oral HS Sheri Pope MD      hydrALAZINE  10 mg Oral Q8H PRN LUIS Monsalve      melatonin  6 mg Oral HS Sheri Pope MD      metoprolol succinate  25 mg Oral BID LUIS Monsalve      midodrine  5 mg Oral TID PRN Autumn Chapman PA-C      midodrine  5 mg Oral Daily Before Breakfast LUIS Monsalve      mirtazapine  7.5 mg Oral HS Sheri Pope MD      ondansetron  4 mg Oral Q6H PRN Autumn Chapman PA-C      oxyCODONE  2.5 mg Oral Q6H PRN Autumn Chapman PA-C      Or    oxyCODONE  5 mg Oral Q6H PRN Autumn Chapman PA-C      pantoprazole  40 mg Oral Early Morning Autumn Chapman PA-C      saccharomyces boulardii  250 mg Oral BID LUIS Monsalve      traZODone  75 mg Oral HS Sheri Pope MD          M*[employer]digest software was used to dictate this note.  It may contain errors with dictating incorrect words or incorrect spelling. Please contact the provider directly with any questions.

## 2025-01-29 NOTE — PROGRESS NOTES
"   01/29/25 1400   Pain Assessment   Pain Assessment Tool 0-10   Pain Score No Pain   Restrictions/Precautions   Precautions Aspiration;Bed/chair alarms;Cognitive;Fall Risk;Supervision on toilet/commode   Cognition   Overall Cognitive Status Impaired   Arousal/Participation Cooperative;Lethargic   Attention Attends with cues to redirect   Orientation Level Oriented to person;Oriented to place;Oriented to time;Oriented to situation   Memory Decreased short term memory;Decreased recall of recent events;Decreased recall of precautions   Following Commands Follows one step commands with increased time or repetition   Subjective   Subjective \"pallavi been up all day, I need to lay down.\"   Roll Left and Right   Type of Assistance Needed Physical assistance   Physical Assistance Level 25% or less   Roll Left and Right CARE Score 3   Sit to Lying   Type of Assistance Needed Physical assistance   Physical Assistance Level 26%-50%   Comment A for repositioning, encouraged to lift legs into bed.   Sit to Lying CARE Score 3   Sit to Stand   Type of Assistance Needed Physical assistance   Physical Assistance Level 26%-50%   Comment mod A to stand to RW cues and A for fwd wt shift.   Sit to Stand CARE Score 3   Bed-Chair Transfer   Type of Assistance Needed Physical assistance   Physical Assistance Level 76% or more   Comment max A stand pivot with RW for trial .   Chair/Bed-to-Chair Transfer CARE Score 2   Walk 50 Feet with Two Turns   Type of Assistance Needed Physical assistance   Physical Assistance Level Total assistance   Walk 50 Feet with Two Turns CARE Score 1   Ambulation   Primary Mode of Locomotion Prior to Admission Walk   Distance Walked (feet) 80 ft   Assist Device Roller Walker   Walk Assist Level Moderate Assist;Maximum Assist;Chair Follow   Findings initially max A for fwd wt shift, pt noted to have self correct posture x2 efforts t/o amb, +CF (A od 2nd person)   Does the patient walk? 2. Yes   Wheel 50 Feet with " Two Turns   Type of Assistance Needed Physical assistance   Physical Assistance Level 51%-75%   Comment needed all cues and Hand over hand A for WC mobility efforts and turns.   Wheel 50 Feet with Two Turns CARE Score 2   Wheelchair mobility   Distance Level Surface (feet) 75 ft   Does the patient use a wheelchair? 1. Yes   Type of Wheelchair Used 1. Manual   Toilet Transfer   Type of Assistance Needed Physical assistance   Physical Assistance Level 76% or more   Comment max A with RW to stand for swap out to BSC.   Toilet Transfer CARE Score 2   Therapeutic Interventions   Strengthening seated B LE:TE Df/PF, ball squeeze, yellow tband hip abd x20-30 as rachel, seated heel slides 3x30 sec with 30 sec rest breaks.   Assessment   Treatment Assessment Pt seen for 60 min skilled PT intervention, despite asking to get into bed upon entry, pt got OOB with OT around 10:45 and has been up since. BP taken seated with teds and binder 108/53, 125/56 post amb with x2 A. Reviewed WC propulsion, max A. Assisted pt with use of BSC and breif change due to incontinence, amt charted. Fluids encouraged and pt education provided to let nursing know ASAP if she is wet. Pt overall demonstrated good progress with standing and amb, returned to supine at end of session. Continue strength and conditioning, awaiting SNF bed availability.   Plan   Progress Slow progress, decreased activity tolerance   PT Therapy Minutes   PT Time In 1400   PT Time Out 1500   PT Total Time (minutes) 60   PT Mode of treatment - Individual (minutes) 60   PT Mode of treatment - Concurrent (minutes) 0   PT Mode of treatment - Group (minutes) 0   PT Mode of treatment - Co-treat (minutes) 0   PT Mode of Treatment - Total time(minutes) 60 minutes   PT Cumulative Minutes 685

## 2025-01-29 NOTE — PLAN OF CARE
Problem: Prexisting or High Potential for Compromised Skin Integrity  Goal: Skin integrity is maintained or improved  Description: INTERVENTIONS:  - Identify patients at risk for skin breakdown  - Assess and monitor skin integrity  - Assess and monitor nutrition and hydration status  - Monitor labs   - Assess for incontinence   - Turn and reposition patient  - Assist with mobility/ambulation  - Relieve pressure over bony prominences  - Avoid friction and shearing  - Provide appropriate hygiene as needed including keeping skin clean and dry  - Evaluate need for skin moisturizer/barrier cream  - Collaborate with interdisciplinary team   - Patient/family teaching  - Consider wound care consult   Outcome: Progressing     Problem: CARDIOVASCULAR - ADULT  Goal: Maintains optimal cardiac output and hemodynamic stability  Description: INTERVENTIONS:  - Monitor I/O, vital signs and rhythm  - Monitor for S/S and trends of decreased cardiac output  - Administer and titrate ordered vasoactive medications to optimize hemodynamic stability  - Assess quality of pulses, skin color and temperature  - Assess for signs of decreased coronary artery perfusion  - Instruct patient to report change in severity of symptoms  Outcome: Progressing     Problem: Nutrition/Hydration-ADULT  Goal: Nutrient/Hydration intake appropriate for improving, restoring or maintaining nutritional needs  Description: Monitor and assess patient's nutrition/hydration status for malnutrition. Collaborate with interdisciplinary team and initiate plan and interventions as ordered.  Monitor patient's weight and dietary intake as ordered or per policy. Utilize nutrition screening tool and intervene as necessary. Determine patient's food preferences and provide high-protein, high-caloric foods as appropriate.     INTERVENTIONS:  - Monitor oral intake, urinary output, labs, and treatment plans  - Assess nutrition and hydration status and recommend course of action  -  Evaluate amount of meals eaten  - Assist patient with eating if necessary   - Allow adequate time for meals  - Recommend/ encourage appropriate diets, oral nutritional supplements, and vitamin/mineral supplements  - Order, calculate, and assess calorie counts as needed  - Recommend, monitor, and adjust tube feedings and TPN/PPN based on assessed needs  - Assess need for intravenous fluids  - Provide specific nutrition/hydration education as appropriate  - Include patient/family/caregiver in decisions related to nutrition  Outcome: Progressing

## 2025-01-29 NOTE — CASE MANAGEMENT
CM NOTE      Met with pt and reviewed plan for subacute rehab early next week.     CM spoke with pt's dtr Courtney, who has chosen [facility] for continued subacute rehab. Reserved on aidin, inquired about bed availability for early next week, waiting to hear back. Will follow up with team regarding transport options of w/c van versus need for stretcher/BLS. Will update pt's dtr by Friday when details of d/c are confirmed.

## 2025-01-29 NOTE — ASSESSMENT & PLAN NOTE
Presented with syncope on 1/4.  Significant orthostasis noted in acute setting.  Continue midodrine PRN and midodrine 5mg daily in AM.  Apply abdominal binder/TEDs as needed.  Encourage PO hydration.  Continue to monitor orthostatics closely.    Given 500cc 0.9% NSS IV fluid bolus on 1/28.

## 2025-01-29 NOTE — TEAM CONFERENCE
Acute RehabilitationTeam Conference Note  Date: 1/29/2025   Time: 12:41 PM       Patient Name:  Sandra Purvis       Medical Record Number: [id number]   YOB: 1934  Sex: Female          Room/Bed:  [facility] 261/[facility] 261-01  Payor Info:  Payor: MEDICARE / Plan: MEDICARE A AND B / Product Type: Medicare A & B Fee for Service /      Admitting Diagnosis: Subdural hematoma (HCC) [S06.5XAA]   Admit Date/Time:  1/17/2025  1:14 PM  Admission Comments: No comment available     Primary Diagnosis:  <principal problem not specified>  Principal Problem: <principal problem not specified>    Patient Active Problem List    Diagnosis Date Noted    Insomnia 01/18/2025    Hypokalemia 01/17/2025    Hypertensive urgency 01/14/2025    Age-related cognitive decline 01/14/2025    SIRS (systemic inflammatory response syndrome) ([facility]) 01/14/2025    Frailty syndrome in geriatric patient 01/14/2025    Ambulatory dysfunction 01/14/2025    At risk for delirium 01/14/2025    Bilateral hearing loss 01/14/2025    Anemia 01/12/2025    Leukocytosis 01/12/2025    Acute on chronic diastolic congestive heart failure (HCC) 01/12/2025    Syncope 01/08/2025    Chest pain 01/07/2025    Orthostatic hypotension 01/07/2025    Fall 01/06/2025    SDH (subdural hematoma) ([facility]) 01/04/2025    SAH (subarachnoid hemorrhage) ([facility]) 01/04/2025    Closed extensive facial fractures ([facility]) 01/04/2025    Primary insomnia 11/21/2024    Basal cell carcinoma (BCC) of scalp 08/01/2023    Malignant melanoma of arm, left (HCC) 08/01/2023    Scalp lesion 07/25/2023    Arm skin lesion, left 07/25/2023    Irritant contact dermatitis due to other agents 04/26/2022    COVID-19 01/14/2022    GERD (gastroesophageal reflux disease)     Stage 3 chronic kidney disease, unspecified whether stage 3a or 3b CKD ([facility]) 12/14/2021    Atrial fibrillation with rapid ventricular response ([facility])     Preop examination 11/22/2021    Esophagus disorder 10/19/2021    Ureteral stone 10/15/2021     Bacteremia 10/15/2021    Mild protein-calorie malnutrition (HCC) 05/04/2021    Hypertension 09/22/2015       Physical Therapy:    Weight Bearing Status: Full Weight Bearing  Transfers: Moderate Assistance  Bed Mobility: Maximum Assistance  Amulation Distance (ft): 25 feet  Ambulation: Total Assistance (Mod A and needs chair follow, not consistant)  Discharge Recommendations: Skilled Nursing Facility  DC Home with:: 24 Hour Assisteance    Pt admitted to [facility] after fall S/P facial Fxs and SDH.   Pt was sent back to acute due to inc Bps.  Now re admitted which she has not been performing well due to pain/ lethagic/ orthostatic Bps/ lightheadedness/ and wants to go back to bed.  Pt is striving to perform but greatly limited mostly by fatigue and trouble keeping eyes open.  Currently working on sit pivot xfers which needs 2 people for safety, also 2 people Max for bed mobility.   Pt greatly limited by weakness, motor planning, balance, lethargic, dec BPs, skin integrity. Pt will need 24hr assistance and only has  at home to help, unsure if she will be able to get back home without extra help. Pt will benefit from cont skilled inpatient rehab to decrease burden of care and try to reach LTGs ,  questionable about D/C back home the way pt is presenting    1-28-25  Pt has been making some progress with functional mobility with sit-stands/ bed mobility/ and transfers but still fluctuates from Min/ Max assist depending on fatigue/ blood pressure/ pain/ agitation.  Rehab team spoke with family and they can not provide this assistance so will be D/C to SNF.  Pt will still benefit from cont skilled inpatient rehab for decrease burden of care and maximize functional mobility.    Occupational Therapy:  Eating: Supervision  Grooming: Supervision  Bathing: Maximum Assistance  Bathing: Maximum Assistance  Upper Body Dressing: Minimal Assistance  Lower Body Dressing: Maximum Assistance  Toileting: Total Assistance, Assist of  2  Tub/Shower Transfer: Total Assistance, Assist of 2  Toilet Transfer: Maximum Assistance, Assist of 2, Total Assistance  Cognition: Exceptions to WNL  Cognition: Decreased Memory, Decreased Executive Functions, Decreased Attention, Decreased Comprehension, Behavioral Considerations, Decreased Safety  Orientation: Person, Place  Discharge Recommendations:  (snf pending)       01/28/2025    Pt continues to present with impairments in activity tolerance, endurance, standing balance/tolerance, sitting balance/tolerance, UE strength, FMC, memory, insight, safety , judgement , attention , sequencing , sensation , and task initiation . Additional functional barriers include fatigue, pain, decreased caregiver support, risk for falls, and home environment, poor activity tolerance, decreased upright tolerance, BP management. Pt demonstrates improvements in participation, functional transfers, sitting balance, bed mobility and dressing. Pt will continue to benefit from skilled OT services to address above mentioned barriers and maximize functional independence in baseline areas of occupation, utilizing the following interventions: ADL retraining, DME/adaptive equipment assessment , functional transfer training, cognitive retraining, family training, neuromuscular re-education , standing balance retraining, sitting balance retraining, activity tolerance/edurance training, UB strengthening, compensatory technique education, and energy conservation education. OT D/C recommendation is subacute rehab once medically stable and facility accepting.      01/21/2025    Pt continues to present with impairments in activity tolerance, endurance, standing balance/tolerance, sitting balance/tolerance, UE strength, FMC, memory, insight, safety , judgement , attention , sequencing , sensation , and task initiation . Additional functional barriers include fatigue, pain, decreased caregiver support, risk for falls, and home environment, poor  activity tolerance, decreased upright tolerance, BP management. Pt demonstrates improvements in participation, functional transfers, sitting balance, bed mobility and dressing. Pt will continue to benefit from skilled OT services to address above mentioned barriers and maximize functional independence in baseline areas of occupation, utilizing the following interventions: ADL retraining, DME/adaptive equipment assessment , functional transfer training, cognitive retraining, family training, neuromuscular re-education , standing balance retraining, sitting balance retraining, activity tolerance/edurance training, UB strengthening, compensatory technique education, and energy conservation education. OT D/C recommendation is pending progress.          Speech Therapy:  Mode of Communication: Verbal  Cognition: Exceptions to WNL  Cognition: Decreased Memory, Decreased Executive Functions, Decreased Attention, Decreased Comprehension  Orientation: Person, Situation  Swallowing: Exceptions to WNL  Swallowing: Oral Dysphagia, Pharyngeal Dysphagia, Aspiration Risk  Diet Recommendations: Level 2/Mechanically Altered, Thin  Discharge Recommendations:  (pending pt progress but likely subacute level of care)  DC Home with:: 24 Hour Supervision, 24 Hour Assisteance, Family Support  Pt is being followed for cognitive linguistic and dysphagia therapy sessions  where pt is making slower progress this week. Pt completed the SLUMS on initial evaluation with scores correlating to overall severe neurocognitive deficits. During recent sessions, continued cognitive barriers which present include: decreased ST memory, working memory, attention, processing, sequencing, problem solving, organization of thoughts and insight, impacting pt's overall safety, functional cognitive communication skills and functional mobility. Pt's fluctuating PRIYANKA and fatigue are also barriers to her progress at this time. The following interventions are used to  target these barriers, including visual orientation checklist, verbal problem solving task, verbal working memory tasks, visual memory recall tasks, drawing conclusions activities, written sequencing tasks, verbal sequencing tasks, categorization tasks , picture problem solving activities, verbal reasoning tasks, verbal review of current medications, written review of medications, verbal health management tasks, functional reading tasks , and family education/training.     Pt was admitted to the [facility] on a dysphagia level 3 diet and thin liquids, however, most recently, pt was changed to a dysphagia level 2 diet with thin liquids in order to increase pt comfort and to decrease level of pain with mastication. Continued dysphagia barriers which present include the following risks for aspiration such as: prolonged mastication, reduced bolus formation, decreased transfers of solids, min oral residual and pain/discomfort with mastication of harder solids. In order to address the noted barriers, skilled SLP services will address this by targeting the following interventions: diet modification, safe swallow strategies, and family education/training.    Current diet is dysphagia level 2, thins      Current level of functioning:   Eating: set up  Comprehension: min A  Expression: min A  Social Interaction: supervision  Executive functions: max A  Memory: max A    Family training/education has not yet been initiated but will plan to make contact with pt's family this week to provide an update. Although early in pt's rehab stay, anticipating that pt will be recommended for increased level of support and assistance on discharge. This week, focus for continued services to target assisting in determining most appropriate diet for pt at this time, along with plan to focus on STM recall, functional problem solving and safety. At this time, pt is recommended for further skilled ST focusing on cognitive linguistic skills in order to  maximize functional independence and safety on discharge, as well as to assist in determining safest and least restrictive diet on discharge.     Update from week: 1/28/2025. Pt is being followed for cognitive linguistic tx and dysphagia tx  sessions to where pt is making slower and steady progress this week.     Continued cognitive barriers which present include: decreased attention, ST memory recall , problem solving, reasoning, sequencing, organization of thoughts, judgement, slower processing, and insight, which still impacts pt's overall safety, functional cognitive communication skills as well as functional mobility. The following interventions are used to target these barriers, including ongoing orientation review, ST memory strategies, verbal problem solving task, verbal working memory tasks, drawing conclusions activities, categorization tasks , and family education/training.     Continued dysphagia barriers which present include the following risks for aspiration such as: poor positioning, decreased cognition, ineffective mastication, delay in oral transit, delay in swallow initiation, pharyngeal weakness upon swallowing, multiple swallows due to pharyngeal weakness, and audible swallows w/ liquids . In order to address the noted barriers, skilled SLP services will address this by targeting the following interventions: proper positioning, diet modification, safe swallow strategies, and family education/training.    Current diet is dysphagia level 2 diet but pureed vegetables per pt preference w/ thin liquids. Pt current is FULL supervision for meals. Of note, SINUS PRECAUTIONS have been discontinued by OMFS, where pt is allowed to use straws. Will also try to initiate level 3 diet items w/ pt as result of this change as well.     Current level of functioning:   Eating: FULL supervision  Comprehension: min-mod A  Expression: min A  Social Interaction: supervision  Executive functions: mod-max A  Memory: max  "A    Family training/education has been initiated with pt's dtr via phone to where current swallow and cognitive skills were discussed and addressed w/ dtr. Due to cognitive deficits, recommendations for pt is for increased supervision/assistance in I ADL tasks, which dtr is not local and per report spouse would not be able to increase this assistance at discharge. Dtr wishing to pursue subacute level of care for pt where continued ST services would be recommended.       This week, focus for continued services to target trials of diet advancement to level 3 items, tolerance of thin liquids now w/ straw, in addition to ongoing review of orientation, ST memory skills, basic problem solving, reasoning, sequencing, insight.  At this time, pt will continue to benefit from skilled cognitive linguistic tx and dysphagia tx  session to maximize overall functional independence given overall cognitive linguistic skills and swallow abilities  in attempts to decreased caregiver burden over time.       Nursing Notes:  Appetite: Fair  Diet Type: Dysphagia II, Thin Liquids                                                                     Pain Location/Orientation: Other (Comment)  Pain Score: 0                       Hospital Pain Intervention(s): Medication (See MAR), Rest          Sandra Purvis is a 89 y.o. female w/ PMHx of afib, GERD, HTN, who initially presented to [facility] on 1/4/2025 after a syncopan episode and collapse at home.  CT had showed \"comminuted and mildly displaced fractures of the lateral and anterior walls of the right maxilla, lateral and inferior walls of the right orbit, right inferior orbital wall fracture appears to involve the inferior orbital foramen associated with small extraconal soft tissue edema and focus of air. There is also a nondisplaced fracture of the zygomatic arch and the temporal bone at the TMJ and diffuse opacification of the right " "maxillary sinus and right nasal cavity with blood products and air. Additionally there was an acute subdural hemorrhage along the left cerebral convexity with a maximal thickness of 1.9 cm with no midline shift and an acute subarachnoid hemorrhage in the left frontal parietal sulci, left sylvian fissure as well as suprasellar cistern.\"  OMFS was consulted as well as neurosurgery.  Patient and family declined any acute intervention at this time.  Patient was started on Keppra x 7 days.  Patient was treated with 7-day course of Unasyn/Augmentin.  Her hospital stay was also complicated with A-fib with RVR and cardiology was consulted.  Patient then had orthostatic hypotension and started on midodrine.  Patient initially transferred to [facility] 1/12/2025.  While at [facility] patient with hypertensive episodes to SBP >190 and Afib rates 120-130s.  Patient became more symptomatic from her A-fib and was transferred to acute care 1/14. She had X ray w/ pulmonary edema was given lasix 1/14-1/15 with good response.  Patient was seen by cardiology. Patient was placed on Cardizem drip and now on PO Cardizem. Her rates have improved. Sandra Purvis was admitted to the [facility] on 01/17/25       Pain managed with Tylenol 975 mg q8hrs and oxy 2.5 mg/5mg q6hrs prn. HTN managed with cardizem cd 120 mg daily at bedtime, toprol-xl 25 mg BID and hydralazine 10 mg q8hrs prn. Bowel regimen managed with senokot 8.6-50 mg daily, Miralax 17 g packet daily,  and dulcolax rectal supp. 10 mg daily prn. DVT prophylaxis is Lovenox 30 mg q24hrs. Pulmonary edema managed with lasix 20 mg daily. Insomnia managed with melatonin 6 mg, Remeron 7.5 mg, and prn trazodone 50 mg. Orthostatic hypotension managed with midodrine 5 mg TID prn. Protonix 40 mg for GERD.        This week we will continue to monitor vital signs and lab values. We will manage her pain with above orders so that the pt can participate in her therapy sessions to her optimal ability.  We will " teach pt how to conserve energy so that she may perform ADLs independently as much as she can.  We will educate the pt on the importance of repositioning and off-loading pressure to help lower the risk of skin breakdown.  We will prevent falls by keeping personal items and call bell within reach, we will maintain hourly rounding.     Case Management:     Discharge Planning  Living Arrangements: Lives w/ Spouse/significant other  Support Systems: Family members, Spouse/significant other, Friends/neighbors, Son, Daughter  Assistance Needed: to be detemined  Type of Current Residence: Private residence  Current Home Care Services: No  CM continues to assist with d/c planning, after family meeting it was determined recommendation will be for subacute rehab, with goal to transition early next week. CM working with family on choices for subacute rehab    Is the patient actively participating in therapies? yes  List any modifications to the treatment plan: none, pt is on 900 minute program    Barriers Interventions   Blood pressure - hypotensive and hypertension IV fluids, medication management, teds/binder PRN, encourage fluids   Head wound  Local wound care, antibiotic course completed, sx cap    insomnia Sleep log, medication management, psych consult did occur    Episodes of urinary incontinence  Use of purewick at night , will plan for toileting program to be initiated at night    Aspiration risk  - on level 2 and thin diet SLP consult, pt has been progressed from full supervision to distant supervision.      Impaired cognition - orientation, short term memory, executive functioning Ongoing reorientation review, verbal short term memory activities, problem solving/reasoning activities, written sequencing activities, family meeting/education   Generalized weakness and impaired activity tolerance Therapeutic exercise, w/c propulsion, endurance training   Impaired standing balance/tolerance Device training - w/c and  walker, gait retraining,      Is the patient making expected progress toward goals? yes  List any update or changes to goals: goals had been downgraded to assist levels last week    Medical Goals: Patient will be medically stable for discharge to [facility] upon completion of rehab program and Patient will be able to manage medical conditions and comorbid conditions with medications and follow up upon completion of rehab program    Weekly Team Goals:   Rehab Team Goals  ADL Team Goal: Patient will require supervision with ADLs with least restrictive device upon completion of rehab program  Bowel/Bladder Team Goal: Patient will return to premorbid level for bladder/bowel management upon completion of rehab program  Transfer Team Goal: Patient will require assist with transfers with least restrictive device upon completion of rehab program  Locomotion Team Goal: Patient will require assist with locomotion with least restrictive device upon completion of rehab program (possible short distances, mainly WC anticipated at this time.)  Cognitive Team Goal: Patient will require assist for basic cognitive tasks upon completion of rehab program    Discussion: Currently pt is performing at mod/max assist level for ADLs. Mobility levels: bed mobility at mod assist ;  functional transfers at mod/max assist for stand pivot no device; functional mobility max 45 feet with RW and assist x2; Stairs: unable to be completed yet. Speech therapy performance: level 2 and thin, potential to upgrade to level 3 this  week; cognition overall mod/max assist. Pt has been making slow progress towards goals. Pt continues to require skilled intervention to address the above stated barriers. Medical modifications/plans include medication management assessment for BP. Current plan for d/c is for subacute rehab, goal to d/c early next week.       Anticipated Discharge Date:  pending subacute rehab  [facility] Team Members Present:  The  following team members are supervising care for this patient and were present during this Weekly Team Conference.    Physician: Dr. Niko MD  : Cleo Ervin MS, OTR/L  Registered Nurse: Amy Rodriguez RN  Physical Therapist: Sendy Dorantes, PT  Occupational Therapist: Naomi Syed MS, OTR/L  Speech Therapist: Jazzy Laguerre MA, CCC-SLP

## 2025-01-29 NOTE — ASSESSMENT & PLAN NOTE
NSGY target goal <160  Cw metoprolol succinate 75 mg twice daily  Cardizem 120 mg XR daily, d/c amlodipine  Wears compression stockings, abdominal binder  Metoprolol decreased to 50 mg 1/27, 25 mg 1/28 1/28 IV hydration, midodrine daily per IM due to hypotension  BP stabilized after SBP 110s

## 2025-01-29 NOTE — ASSESSMENT & PLAN NOTE
Wt Readings from Last 3 Encounters:   01/29/25 44.5 kg (98 lb 1.7 oz)   01/17/25 51.1 kg (112 lb 9.6 oz)   01/12/25 53.5 kg (118 lb)     TTE 1/6/2025 showed EF 70%, grade 2 DD, mildly dilated RV, mild LA, mild AI, mild TR, estimated PA pressure 50 mmHg, trivial pericardial effusion  Not on diuretics prior to admission  Cw furosemide 20 mg; monitor for volume status   Daily weights, monitor I/O

## 2025-01-29 NOTE — PROGRESS NOTES
01/29/25 1210   Pain Assessment   Pain Assessment Tool 0-10   Pain Score No Pain   Restrictions/Precautions   Precautions Aspiration;Bed/chair alarms;Cognitive;Fall Risk;Supervision on toilet/commode   Eating   Type of Assistance Needed Set-up / clean-up;Supervision   Physical Assistance Level No physical assistance   Eating CARE Score 4   Swallow Assessment   Swallow Treatment Assessment   Daily Dysphagia Tx Note     Patient Name: Sandra Purvis    Today's Date: 1/29/2025      Current Risks for Dysphagia & Aspiration: Weak cough;General debilitation;Cognitive deficit;Reduced alertness; facial fractures     Current Symptoms/Concerns: difficulty chewing     Current diet: mechanical soft/level 2 diet; pureed vegetables and thin liquids      Premorbid diet::regular diet and thin liquids       Positioning: Pt OOB in tilt in space WC    Items administered:Consistencies Administered: thin liquids, puree, and soft solids  Materials administered included: chopped fish, pureed broccoli, canned peaches, thins by cup/straw    Total amount of meal consumed:   75% of meal and 240cc of thins      Oral stage:minimal-mild  Lip closure: functional around utensils, cup/straw  Anterior spillage: none  Mastication: slower but improved effort in breakdown  Bolus formation: improved from last session  Bolus control: overall timely w/ single sips, likely decreased w/ larger consecutive sips  Transfer: delayed w/ softer solids vs puree/thins  Oral residue: trace amounts today  Pocketing: none         Pharyngeal stage:minimal-mild  Swallow promptness: more delayed w/ soft solids vs puree/thins  Hyolaryngeal elevation: present but still minimally decreased upon palpation  Wet voice: none  Throat clear: none  Cough: cough x1 after larger consecutive sip of thins  Secondary swallows: intermittent w/ soft solids  Audible swallows: present w/ thins       Esophageal stage:belches intermittently noted w/ larger sips of  liquids        Summary:     Pt presenting with minimal-mildoropharyngeal dysphagia today. Symptoms or concerns included still slower mastication of soft solids, but overall breakdown is effective to where bolus formulation w/ textures is cohesive, noting trace amounts of residual. Manipulation/transfer of puree was minimally delayed but formulation is functional and no overt residual observed. Lip seal and bolus retrieval was noted to be functional across meal to where no anterior loss was observed. As for bolus control of thins, smaller sips appear to be better controlled vs larger consecutive sips. Swallow initiation remains more delayed w/ soft solids vs puree/thins. HLE is present but still decreased upon palpation. Double swallows more so elicited w/ soft solids and continues w/ highly audible swallows w/ thins. Today, cough x1 elicited near end of meal w/ larger consecutive sip of thins, which pt was aware of and demonstrated smaller sips for remainder of meal to where no further signs/sxs of aspiration were noted.    Traditional oral care completed after meal w/ removal of trace amounts of soft solids today. No overt aspiration sxs observed w/ oral care.       Recommendations:  Diet: mechanically altered/level 2 diet and thin liquids   Meds: whole with puree  Strategies: upright posture, only feed when fully alert, slow rate of feeding, small bites/sips, quiet environment (tv off, limit talking, door closed, etc.), and alternating bites and sips, OOB preferred     Sinus Precautions NOW DISCONTINUED PER OMFS     NOW DISTANT supervision w/ meals.  Results reviewed with: patient, pt's spouse, CHACHA-Kenzie, MD- Dr. Pope, primary OT/PT  Aspiration precautions posted.     F/u ST tx: Pt is currently recommended for further skilled SLP services targeting dysphagia therapy in order to maximize oral and pharyngeal swallow skills, while safely supporting PO intake, as well as to improve independent carryover of safe  swallow strategies.       Plan:      Continue dysphagia level 2, thins      Follow up to closely monitor diet tolerance      Trials of diet upgrade under SLP supervision only-->hopeful advancement to level 3 diet in next sessions   Swallow Assessment Prognosis   Prognosis Good   Prognosis Considerations Age;Co-morbidities;Medical diagnosis;Medical prognosis;New learning ability;Ability to carry over;Severity of impairments   SLP Therapy Minutes   SLP Time In 1210   SLP Time Out 1240   SLP Total Time (minutes) 30   SLP Mode of treatment - Individual (minutes) 30   SLP Mode of treatment - Concurrent (minutes) 0   SLP Mode of treatment - Group (minutes) 0   SLP Mode of treatment - Co-treat (minutes) 0   SLP Mode of Treatment - Total time(minutes) 30 minutes   SLP Cumulative Minutes 410   Therapy Time missed   Time missed? No

## 2025-01-29 NOTE — ASSESSMENT & PLAN NOTE
"Large scalp mass noted on admission.  Also follows with dermatology as an outpatient but declining any interventions  Some localized spot bleeding from the edges noted  Patient scratched at it and created wound, hair net applied  1/24 slight smell of wound: Keflex ordered per IM  Finished course  Wound care recommendations: \"Left head--cleanse/soak with normal saline, pat dry. Cover wound with Xeroform and ABD. Secure with surgical cap. Change dressing daily and as needed. Trim surrounding hair as needed to keep from getting matted into wound\".   Hair could be trimmed more but family refused   "

## 2025-01-29 NOTE — ASSESSMENT & PLAN NOTE
Initially presented with sinus bradycardia and evaluated by cardiology and was in controlled atrial fibrillation without bradycardia  Not on anticoagulation prior to admission and not indicated at this point due to the SDH and SAH and overall remains high risk due to risk of falls and given her advanced age  No recent follow-up with cardiology as an outpatient and generally sees her primary care physician for management and was on Toprol 50 mg daily at home which has been changed well and in the hospital  Tachycardic in the hospital and given IV fluids and medication changes include addition of midodrine and changes in the metoprolol dosing  Recommended for aspirin but would likely not be a good long-term candidate for anticoagulation  Cardio rec: continue with cardizem 120 daily and Toprol-XL 75 mg twice daily 1/16  Metoprolol 25 mg BID

## 2025-01-29 NOTE — PROGRESS NOTES
Progress Note - PMR   Name: Sandra Purvis 89 y.o. female I MRN: [id number]  Unit/Bed#: -01 I Date of Admission: 1/17/2025   Date of Service: 1/29/2025 I Hospital Day: 12     Assessment & Plan  Hypertension  NSGY target goal <160  Cw metoprolol succinate 75 mg twice daily  Cardizem 120 mg XR daily, d/c amlodipine  Wears compression stockings, abdominal binder  Metoprolol decreased to 50 mg 1/27, 25 mg 1/28 1/28 IV hydration, midodrine daily per IM due to hypotension  BP stabilized after SBP 110s  Atrial fibrillation with rapid ventricular response (HCC)  Initially presented with sinus bradycardia and evaluated by cardiology and was in controlled atrial fibrillation without bradycardia  Not on anticoagulation prior to admission and not indicated at this point due to the SDH and SAH and overall remains high risk due to risk of falls and given her advanced age  No recent follow-up with cardiology as an outpatient and generally sees her primary care physician for management and was on Toprol 50 mg daily at home which has been changed well and in the hospital  Tachycardic in the hospital and given IV fluids and medication changes include addition of midodrine and changes in the metoprolol dosing  Recommended for aspirin but would likely not be a good long-term candidate for anticoagulation  Cardio rec: continue with cardizem 120 daily and Toprol-XL 75 mg twice daily 1/16  Metoprolol 25 mg BID  Stage 3 chronic kidney disease, unspecified whether stage 3a or 3b CKD (HCC)  Lab Results   Component Value Date    EGFR 61 01/27/2025    EGFR 62 01/23/2025    EGFR 60 01/20/2025    CREATININE 0.84 01/27/2025    CREATININE 0.83 01/23/2025    CREATININE 0.85 01/20/2025   At baseline  GERD (gastroesophageal reflux disease)  Continue protonix  Basal cell carcinoma (BCC) of scalp  Large scalp mass noted on admission.  Also follows with dermatology as an outpatient but declining any interventions  Some localized spot  "bleeding from the edges noted  Patient scratched at it and created wound, hair net applied  1/24 slight smell of wound: Keflex ordered per IM  Finished course  Wound care recommendations: \"Left head--cleanse/soak with normal saline, pat dry. Cover wound with Xeroform and ABD. Secure with surgical cap. Change dressing daily and as needed. Trim surrounding hair as needed to keep from getting matted into wound\".   Hair could be trimmed more but family refused   Malignant melanoma of arm, left (HCC)  Large mass on the left forearm that is melanoma  Follows with dermatology as an outpatient last seen in December 2024 but not interested in any treatments  SDH (subdural hematoma) (HCC)  Patient presents with fall and initial CTh on 1/4 showing an acute subdural hemorrhage along the left cerebral convexity with a maximum thickness of 1.9 cm with no midline shift as well as an acute subarachnoid hemorrhage in the left frontoparietal sulci, left sylvian fissure and suprasellar cistern  Repeat CT head on 1/5 was stable; repeat 1/14 w/ interval evolution no new/evolving hemorrage   Completed 7 day course of keppra for seizure ppx  F/u w/ NSGY for repeat CTH ~1/27 1/20 Repeat imaging ordered for f/o tomorrow (1/21) at 1pm  F/u as needed; no neurosurgical intervention needed  MRI brain 1/22 improvement in SDH/SAH  SAH (subarachnoid hemorrhage) (HCC)  Subarachnoid hemorrhage noted on initial CT on 1/4 and stable on 1/5;   CTH1/14  Interval evolution of recent subdural and subarachnoid hemorrhage as detailed above. No definitive new or enlarging intracranial hemorrhage.  Continue same management for the subarachnoid hemorrhage as for the subdural hematoma, please see that separate section for overall management plans  Closed extensive facial fractures (HCC)  Secondary to fall  CT of the head on 1/4 also showed comminuted and mildly displaced fractures of the lateral and anterior walls of the right maxilla, lateral and inferior " walls of the right orbit, right inferior orbital wall fractures appear to involve the inferior orbital foramen with associated small extraconal soft tissue edema and focus of air.  There is no herniation of extraocular muscles and a nondisplaced fracture of the zygomatic arch as well as the temporal bone at the TMJ.  There is diffuse opacification of the right maxillary sinus and right nasal cavity with blood products and air  Seen by oral maxillofacial surgery team and surgical intervention was declined by patient  Completed 7-day course of Unasyn/Augmentin  Follow-up with OMFS as an outpatient  Completed sinus precautions (can use straws now)  Chest pain  Troponins obtained on initial evaluation of the patient in the emergency room and cardiology was consulted for evaluation  Despite elevated troponins there was nonischemic findings on her EKG and felt to be potentially related to a nonischemic troponin elevation in the setting of trauma and brain bleed versus demand ischemia due to her paroxysmal atrial fibrillation and syncopal episode  Not a candidate for cardiology for ischemic evaluation due to advanced age, frailty and recent subarachnoid hemorrhage/subdural hemorrhage  No new regional wall motion abnormalities noted on echocardiogram on 1/6/2025  Recommended to start aspirin 81 mg when cleared by neurosurgery with CT head  Follow-up with cardiology as an outpatient  Orthostatic hypotension  Blood pressures ranging in physical therapy as low as 70/40 and cardiology was evaluating the patient  Symptomatic with lightheadedness and presyncope and had received gentle fluids with some improvement  BALJIT stockings PRN as well and encouragement of fluid intake  Repeat orthostatics improved but still getting lightheaded at times which was felt to be the initiating factor in her syncopal episode that led to the fall and injury.  1/17 IM held midodrine, no significant orthostatic hypotension currently observed may need  to introduce at 2.5 mg TID if develops episodes at a later time, continue to monitor orthostatic Bps   1/19 bp dropped on standing, came back up on sitting  1/26 metoprolol decreased to 50 mg  25 mg 1/28  Midodrine 5 mg standing  Syncope  Presented with syncopal episode preceded by lightheadedness that led to the fall which caused the sudden dural hemorrhage and subarachnoid hemorrhage  Also found to be significantly orthostatic which was likely the etiology and was placed on telemetry with no evidence of bradycardia or pauses  Limited p.o. intake and fluid intake and suspected orthostatic hypotension as the cause  Anemia  Most recent hemoglobin of 9.4 1/17; which is stable  Continue to follow biweekly CBC or sooner if clinically indicated  Will need repeat scan if significant drops or concern for acute bleeds  1/20 10.1, 1/23 10.4, 1/27 9.9 hgb  Leukocytosis  Most recent WBC count 12.08 and actually trending down from its maximum of 17.59  Unclear etiology, may be directly related to reactive changes from the SDH and SAH as well as fractures  Continue to monitor and if considerable changes may repeat scans given the fractures in the facial bones  Was on a course of Unasyn/Augmentin for 7 days  IM: Infectious workup negative  Received IV fluids, discontinued with evidence of fluid overload CXR  Stable off abx d/c 1/15  12.08 1/17---> 13.06 1/20----> 10.5 1/23 ----> 11.78 1/27  Acute on chronic diastolic congestive heart failure (HCC)  Wt Readings from Last 3 Encounters:   01/29/25 44.5 kg (98 lb 1.7 oz)   01/17/25 51.1 kg (112 lb 9.6 oz)   01/12/25 53.5 kg (118 lb)     TTE 1/6/2025 showed EF 70%, grade 2 DD, mildly dilated RV, mild LA, mild AI, mild TR, estimated PA pressure 50 mmHg, trivial pericardial effusion  Not on diuretics prior to admission  Cw furosemide 20 mg; monitor for volume status   Daily weights, monitor I/O     Insomnia  Inc melatonin to 6mg HS, trazodone 25mg HS PRN----> increased to 50---> 75  "mg  Sleep log   Continue remeron 7.5, melatonin 6 mg  1/23 Psych consulted- no recommendations at this time    History of Present Illness   Sandra Purvis is a 89 y.o. female w/ PMHx of afib, GERD, HTN, who initially presented to [facility] on 1/4/2025 after a syncopan episode and collapse at home. CT had showed \"comminuted and mildly displaced fractures of the lateral and anterior walls of the right maxilla, lateral and inferior walls of the right orbit, right inferior orbital wall fracture appears to involve the inferior orbital foramen associated with small extraconal soft tissue edema and focus of air. There is also a nondisplaced fracture of the zygomatic arch and the temporal bone at the TMJ and diffuse opacification of the right maxillary sinus and right nasal cavity with blood products and air. Additionally there was an acute subdural hemorrhage along the left cerebral convexity with a maximal thickness of 1.9 cm with no midline shift and an acute subarachnoid hemorrhage in the left frontal parietal sulci, left sylvian fissure as well as suprasellar cistern.\" OMFS was consulted as well as neurosurgery. Patient and family declined any acute intervention at this time. Patient was started on Keppra x 7 days. Patient was treated with 7-day course of Unasyn/Augmentin. Her hospital stay was also complicated with A-fib with RVR and cardiology was consulted. Patient then had orthostatic hypotension and started on midodrine. Patient initially transferred to [facility] 1/12/2025. While at [facility] patient with hypertensive episodes to SBP >190 and Afib rates 120-130s. Patient became more symptomatic from her A-fib and was transferred to acute care 1/14. She had X ray w/ pulmonary edema was given lasix 1/14-1/15 with good response. Patient was seen by cardiology. Patient was placed on Cardizem drip and now on PO Cardizem. Her rates have improved. Sandra Purvis was admitted to " [facility] on 01/17/25     Chief Complaint: f/u ambulatory dysfunction    Interval: Patient seen and examined in bed this morning. No events overnight.  Reports overall feeling well and said to nuring she could \"jump out of bed\". Last BM 1/27. Still struggling to sleep at night    Review of Systems   Respiratory:  Negative for shortness of breath.    Cardiovascular:  Negative for chest pain, palpitations and leg swelling.   Gastrointestinal:  Negative for abdominal pain, constipation, diarrhea, nausea and vomiting.       Objective   Functional Update:  Physical Therapy Occupational Therapy Speech Therapy   Weight Bearing Status: Full Weight Bearing  Transfers: Moderate Assistance  Bed Mobility: Maximum Assistance  Amulation Distance (ft): 25 feet  Ambulation: Total Assistance (Mod A and needs chair follow, not consistant)  Discharge Recommendations: Skilled Nursing Facility  DC Home with:: 24 Hour Assisteance   Eating: Supervision  Grooming: Supervision  Bathing: Maximum Assistance  Bathing: Maximum Assistance  Upper Body Dressing: Minimal Assistance  Lower Body Dressing: Maximum Assistance  Toileting: Total Assistance, Assist of 2  Tub/Shower Transfer: Total Assistance, Assist of 2  Toilet Transfer: Maximum Assistance, Assist of 2, Total Assistance  Cognition: Exceptions to WNL  Cognition: Decreased Memory, Decreased Executive Functions, Decreased Attention, Decreased Comprehension, Behavioral Considerations, Decreased Safety  Orientation: Person, Place   Mode of Communication: Verbal  Cognition: Exceptions to WNL  Cognition: Decreased Memory, Decreased Executive Functions, Decreased Attention, Decreased Comprehension  Orientation: Person, Situation  Swallowing: Exceptions to WNL  Swallowing: Oral Dysphagia, Pharyngeal Dysphagia, Aspiration Risk  Diet Recommendations: Level 2/Mechanically Altered, Thin  Discharge Recommendations:  (pending pt progress but likely subacute level of care)  DC Home with:: 24 Hour " Supervision, 24 Hour Assisteance, Family Support         Temp:  [97 °F (36.1 °C)-98.5 °F (36.9 °C)] 97 °F (36.1 °C)  HR:  [56-64] 62  Resp:  [17] 17  BP: ()/(41-71) 166/71  SpO2:  [97 %-98 %] 98 %    Physical Exam  Constitutional:       General: She is not in acute distress.     Comments: Frail   HENT:      Head: Normocephalic and atraumatic.      Mouth/Throat:      Mouth: Mucous membranes are moist.   Cardiovascular:      Rate and Rhythm: Normal rate and regular rhythm.   Pulmonary:      Effort: Pulmonary effort is normal.      Breath sounds: Normal breath sounds.   Abdominal:      General: Bowel sounds are normal.   Musculoskeletal:         General: Normal range of motion.   Skin:     General: Skin is warm and dry.      Findings: Bruising (on face and along arms) and lesion (scalp lesion is drier and being cleaned by nursing) present.   Neurological:      Mental Status: She is alert. Mental status is at baseline.      Comments: More alert and smiling today   Psychiatric:         Mood and Affect: Mood normal.         Behavior: Behavior normal.           Scheduled Meds:  Current Facility-Administered Medications   Medication Dose Route Frequency Provider Last Rate    acetaminophen  975 mg Oral Q8H ELIZABETH Autumn Chapman PA-C      bisacodyl  10 mg Rectal Daily PRN Sheri Pope MD      calcium carbonate  500 mg Oral Daily PRN Autumn Chapman PA-C      diltiazem  120 mg Oral HS LUIS Monsalve      docusate sodium  100 mg Oral BID PRN Sheri Pope MD      enoxaparin  30 mg Subcutaneous Q24H Autumn Chapman PA-C      gabapentin  100 mg Oral HS Sheri Pope MD      hydrALAZINE  10 mg Oral Q8H PRN LUIS Monsalve      melatonin  6 mg Oral HS Sheri Pope MD      metoprolol succinate  25 mg Oral BID LUIS Monsalve      midodrine  5 mg Oral TID PRN Autumn Chapman PA-C      midodrine  5 mg Oral Daily Before Breakfast LUIS Monsalve      mirtazapine   7.5 mg Oral HS Sheri Pope MD      ondansetron  4 mg Oral Q6H PRN Autumn Chapman PA-C      oxyCODONE  2.5 mg Oral Q6H PRN Autumn Chapman PA-C      Or    oxyCODONE  5 mg Oral Q6H PRN Autumn Chapman PA-C      pantoprazole  40 mg Oral Early Morning Autumn Chapman PA-C      saccharomyces boulardii  250 mg Oral BID LUIS Monsalve      traZODone  75 mg Oral HS Sheri Pope MD           Lab Results: I have reviewed the following results:  Results from last 7 days   Lab Units 01/27/25  0453 01/23/25  0554   HEMOGLOBIN g/dL 9.9* 10.4*   HEMATOCRIT % 31.0* 32.3*   WBC Thousand/uL 11.78* 10.50*   PLATELETS Thousands/uL 222 294     Results from last 7 days   Lab Units 01/27/25  0453 01/23/25  0554   BUN mg/dL 19 12   SODIUM mmol/L 140 140   POTASSIUM mmol/L 3.6 3.5   CHLORIDE mmol/L 103 103   CREATININE mg/dL 0.84 0.83              Autumn Chapman PA-C  Physical Medicine and Rehabilitation   01/29/25

## 2025-01-29 NOTE — ASSESSMENT & PLAN NOTE
Large scalp mass noted on admission.  Declining intervention as an outpatient.  Follows with Dermatology and LUIS Mejía (Surgical Oncology) as outpatient.  Wound care consulted - wound cleansed/debrided.  Continue with wearing cap during the day to avoid further patient manipulation.    Foul odor and drainage noted on 1/24 - completed 5 day course of Keflex.  Wound care recommending Xeroform and ABD.  Change dressing daily.

## 2025-01-29 NOTE — ASSESSMENT & PLAN NOTE
WBC count currently 11.78.  Chronically elevated.  Unclear etiology, may be directly related to reactive changes from SDH and SAH as well as fracture.  Did recently complete a course of Unasyn and Augmentin.  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  Completed 5 day course.  Continue to trend routine CBC.

## 2025-01-30 LAB
ANION GAP SERPL CALCULATED.3IONS-SCNC: 6 MMOL/L (ref 4–13)
BASOPHILS # BLD AUTO: 0.06 THOUSANDS/ΜL (ref 0–0.1)
BASOPHILS NFR BLD AUTO: 1 % (ref 0–1)
BUN SERPL-MCNC: 19 MG/DL (ref 5–25)
CALCIUM SERPL-MCNC: 9.1 MG/DL (ref 8.4–10.2)
CHLORIDE SERPL-SCNC: 107 MMOL/L (ref 96–108)
CO2 SERPL-SCNC: 28 MMOL/L (ref 21–32)
CREAT SERPL-MCNC: 0.87 MG/DL (ref 0.6–1.3)
EOSINOPHIL # BLD AUTO: 0.11 THOUSAND/ΜL (ref 0–0.61)
EOSINOPHIL NFR BLD AUTO: 1 % (ref 0–6)
ERYTHROCYTE [DISTWIDTH] IN BLOOD BY AUTOMATED COUNT: 17 % (ref 11.6–15.1)
GFR SERPL CREATININE-BSD FRML MDRD: 58 ML/MIN/1.73SQ M
GLUCOSE P FAST SERPL-MCNC: 93 MG/DL (ref 65–99)
GLUCOSE SERPL-MCNC: 93 MG/DL (ref 65–140)
HCT VFR BLD AUTO: 29.7 % (ref 34.8–46.1)
HGB BLD-MCNC: 9.6 G/DL (ref 11.5–15.4)
IMM GRANULOCYTES # BLD AUTO: 0.02 THOUSAND/UL (ref 0–0.2)
IMM GRANULOCYTES NFR BLD AUTO: 0 % (ref 0–2)
LYMPHOCYTES # BLD AUTO: 1.41 THOUSANDS/ΜL (ref 0.6–4.47)
LYMPHOCYTES NFR BLD AUTO: 17 % (ref 14–44)
MCH RBC QN AUTO: 29.9 PG (ref 26.8–34.3)
MCHC RBC AUTO-ENTMCNC: 32.3 G/DL (ref 31.4–37.4)
MCV RBC AUTO: 93 FL (ref 82–98)
MONOCYTES # BLD AUTO: 0.48 THOUSAND/ΜL (ref 0.17–1.22)
MONOCYTES NFR BLD AUTO: 6 % (ref 4–12)
NEUTROPHILS # BLD AUTO: 6.07 THOUSANDS/ΜL (ref 1.85–7.62)
NEUTS SEG NFR BLD AUTO: 75 % (ref 43–75)
NRBC BLD AUTO-RTO: 0 /100 WBCS
PLATELET # BLD AUTO: 187 THOUSANDS/UL (ref 149–390)
PMV BLD AUTO: 10.6 FL (ref 8.9–12.7)
POTASSIUM SERPL-SCNC: 4.1 MMOL/L (ref 3.5–5.3)
RBC # BLD AUTO: 3.21 MILLION/UL (ref 3.81–5.12)
SODIUM SERPL-SCNC: 141 MMOL/L (ref 135–147)
WBC # BLD AUTO: 8.15 THOUSAND/UL (ref 4.31–10.16)

## 2025-01-30 PROCEDURE — 97130 THER IVNTJ EA ADDL 15 MIN: CPT

## 2025-01-30 PROCEDURE — 97129 THER IVNTJ 1ST 15 MIN: CPT

## 2025-01-30 PROCEDURE — 97110 THERAPEUTIC EXERCISES: CPT

## 2025-01-30 PROCEDURE — 97116 GAIT TRAINING THERAPY: CPT

## 2025-01-30 PROCEDURE — 99232 SBSQ HOSP IP/OBS MODERATE 35: CPT | Performed by: INTERNAL MEDICINE

## 2025-01-30 PROCEDURE — 97535 SELF CARE MNGMENT TRAINING: CPT

## 2025-01-30 PROCEDURE — 85025 COMPLETE CBC W/AUTO DIFF WBC: CPT | Performed by: NURSE PRACTITIONER

## 2025-01-30 PROCEDURE — 92526 ORAL FUNCTION THERAPY: CPT

## 2025-01-30 PROCEDURE — 97530 THERAPEUTIC ACTIVITIES: CPT

## 2025-01-30 PROCEDURE — 80048 BASIC METABOLIC PNL TOTAL CA: CPT | Performed by: NURSE PRACTITIONER

## 2025-01-30 RX ORDER — TRAZODONE HYDROCHLORIDE 100 MG/1
100 TABLET ORAL
Status: DISCONTINUED | OUTPATIENT
Start: 2025-01-30 | End: 2025-02-03 | Stop reason: HOSPADM

## 2025-01-30 RX ORDER — ACETAMINOPHEN 325 MG/1
650 TABLET ORAL EVERY 6 HOURS PRN
Status: DISCONTINUED | OUTPATIENT
Start: 2025-01-30 | End: 2025-02-03 | Stop reason: HOSPADM

## 2025-01-30 RX ADMIN — TRAZODONE HYDROCHLORIDE 100 MG: 100 TABLET ORAL at 21:10

## 2025-01-30 RX ADMIN — ACETAMINOPHEN 975 MG: 325 TABLET, FILM COATED ORAL at 05:13

## 2025-01-30 RX ADMIN — MIRTAZAPINE 7.5 MG: 7.5 TABLET, FILM COATED ORAL at 21:10

## 2025-01-30 RX ADMIN — ENOXAPARIN SODIUM 30 MG: 30 INJECTION SUBCUTANEOUS at 21:10

## 2025-01-30 RX ADMIN — METOPROLOL SUCCINATE 25 MG: 25 TABLET, EXTENDED RELEASE ORAL at 09:46

## 2025-01-30 RX ADMIN — GABAPENTIN 100 MG: 100 CAPSULE ORAL at 21:10

## 2025-01-30 RX ADMIN — PANTOPRAZOLE SODIUM 40 MG: 40 TABLET, DELAYED RELEASE ORAL at 05:13

## 2025-01-30 RX ADMIN — DILTIAZEM HYDROCHLORIDE 120 MG: 120 CAPSULE, COATED, EXTENDED RELEASE ORAL at 21:10

## 2025-01-30 RX ADMIN — METOPROLOL SUCCINATE 25 MG: 25 TABLET, EXTENDED RELEASE ORAL at 21:10

## 2025-01-30 RX ADMIN — MELATONIN TAB 3 MG 6 MG: 3 TAB at 21:10

## 2025-01-30 NOTE — CASE MANAGEMENT
CM NOTE        call received from Arabella at [facility], they have had cases of covid/flu that have required isolation rooms and therefore as of now there are not current beds available for Monday/Tuesday. She offered their sister facility, [facility] in [place], and stated if family choose they could then transition from [facility] to [facility]. CM to touch base with Arabella tomorrow to see if there are any updates in bed status, and then will contact pt's dtr regarding choices above.     Spoke with team this AM, BLS transport will need to be reserved for transition to subacute 2* medical status and pt unable to tolerate w/c van.

## 2025-01-30 NOTE — ASSESSMENT & PLAN NOTE
Improving, WBC count currently 8.15.  Chronically elevated.  Did recently complete a course of Unasyn and Augmentin.  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  Completed 5 day course.  Continue to trend routine CBC.

## 2025-01-30 NOTE — ASSESSMENT & PLAN NOTE
Lab Results   Component Value Date    EGFR 58 01/30/2025    EGFR 61 01/27/2025    EGFR 62 01/23/2025    CREATININE 0.87 01/30/2025    CREATININE 0.84 01/27/2025    CREATININE 0.83 01/23/2025   At baseline

## 2025-01-30 NOTE — PROGRESS NOTES
01/30/25 1250   Pain Assessment   Pain Assessment Tool 0-10   Pain Score No Pain   Restrictions/Precautions   Precautions Aspiration;Bed/chair alarms;Cognitive;Fall Risk;Supervision on toilet/commode   Comprehension   Comprehension (FIM) 4 - Understands basic info/conversation 75-90% of time   Expression   Expression (FIM) 5 - Needs help/cues only RARELY (< 10% of the time)   Social Interaction   Social Interaction (FIM) 5 - Interacts appropriately with others 90% of time   Problem Solving   Problem solving (FIM) 3 - Solves basic problmes 50-74% of time   Memory   Memory (FIM) 3 - Recognizes, recalls/performs 50-74%   Speech/Language/Cognition Assessment   Treatment Assessment Pt participated in skilled SLP session focusing on cognitive linguistic skills following dysphagia session. Targeting LTM recall, pt demonstrated notable improvement in recall of biographical info and info related to her family. Pt accurately recalled the names of both children, their spouses, and their work history. Pt also recalled the names of 7/7 grandchildren, where they live and the names of her great grandchildren. Of note, pt also recalled that her  was coming to visit this afternoon as he then arrived towards the end of our session. Pt demo memories from this past Evelio, providing details about hosting at her home. Overall, pt is presenting with improvement in LTM recall and orientation, along with STM recall of daily events but does still present with overall cognitive linguistic deficits. SLP provided a brief update to pt's , Darryl, regarding improvements observed in LTM recall, overall PRIYANKA and processing/response times. Overall, pt is demonstrating improvement in daily recall and recall of routines, but continues to benefit from verbal cues for new learning/mobility and safety. At this time, pt is recommended for further skilled ST focusing on cognitive linguistic skills in order to maximize functional  independence and safety on discharge. Will plan to target more structured tasks in future sessions.   Eating   Type of Assistance Needed Set-up / clean-up   Physical Assistance Level No physical assistance   Eating CARE Score 5   Swallow Assessment   Swallow Treatment Assessment Daily Dysphagia Tx Note      Patient Name: Sandra Purvis     Today's Date: 1/30/2025        Current Risks for Dysphagia & Aspiration: Weak cough;General debilitation;Cognitive deficit;Reduced alertness; facial fractures     Current Symptoms/Concerns: difficulty chewing     Current diet: dysphagia level 3 and thin liquids      Premorbid diet::regular diet and thin liquids       Positioning: upright with bed in chair mode     Items administered:Consistencies Administered: thin liquids, soft solids  Materials administered included: chopped roast beef, pureed carrots, soup and juice     Total amount of meal consumed:   75% of meal and 120cc of thins        Oral stage:minimal (with current diet)  Lip closure: functional around utensils, cup/straw; effective bolus retrieval  Anterior spillage: none  Mastication: more timely this meal and appeared effective for breakdown; consistent piecemeal deglutition  Bolus formation: improvement with solids; functional this meal  Bolus control: appeared functional  Transfer: overall prompter with liquids and softer solids  Oral residue: trace amounts  Pocketing: none           Pharyngeal stage:minimal  Swallow promptness: appeared more timely this meal  Hyolaryngeal elevation: present to palpation  Wet voice: none  Throat clear: none  Cough: none  Secondary swallows: observed with thins and softer solids  Audible swallows: present with thins        Esophageal stage: No overt s/s observed today.           Summary:  Pt presenting with minimal oropharyngeal dysphagia today. Session focused on follow up since diet advancement this morning.     Pt demo improvement in overall timing of mastication, along with  effectiveness of bolus breakdown but did continue to use piecemeal deglutition throughout meal. Bolus formation was functional this meal, including with puree and softer solids. Prompter transfers of solids and thins was noted with nearly complete oral clearance as trace amounts of oral residual were present between bites but no pocketing observed. Swallow initiation appeared more timely this meal with times of occasional suspected delay with softer solids but this may have appeared more so due to piecemeal deglutition and transfers. Secondary swallows throughout meal. No overt s/s aspiration observed this meal. Reviewed pt's performance with meal to which pt denied any difficulties and stated enjoyment of softer, bite sized solids.     Pt's  present for end of session. SLP provided  a brief update on diet advancement to dysphagia level 3, including types of textures and preparation of chopped, softer items with moistening agents. Pt's  receptive to all info.         Recommendations:  Diet: dysphagia level 3 diet, thin liquids  Meds: whole with puree  Strategies: upright posture, only feed when fully alert, slow rate of feeding, small bites/sips, quiet environment (tv off, limit talking, door closed, etc.), and alternating bites and sips, OOB preferred        DISTANT supervision w/ meals.  Results reviewed with: patient, patient's  CHACHA Andrews  Aspiration precautions posted.     F/u ST tx: Pt is currently recommended for further skilled SLP services targeting dysphagia therapy in order to maximize oral and pharyngeal swallow skills, while safely supporting PO intake, as well as to improve independent carryover of safe swallow strategies.       Plan:      Continue dysphagia level 3, thins      Follow up to monitor diet tolerance      Initiate regular diet trials as appropriate with SLP   SLP Therapy Minutes   SLP Time In 1250   SLP Time Out 1330   SLP Total Time (minutes) 40   SLP Mode  of treatment - Individual (minutes) 40   SLP Mode of treatment - Concurrent (minutes) 0   SLP Mode of treatment - Group (minutes) 0   SLP Mode of treatment - Co-treat (minutes) 0   SLP Mode of Treatment - Total time(minutes) 40 minutes   SLP Cumulative Minutes 510   Therapy Time missed   Time missed? No

## 2025-01-30 NOTE — PROGRESS NOTES
Progress Note - PMR   Name: Sandra Purvis 89 y.o. female I MRN: [id number]  Unit/Bed#: -01 I Date of Admission: 1/17/2025   Date of Service: 1/30/2025 I Hospital Day: 13     Assessment & Plan  Hypertension  NSGY target goal <160  Cw metoprolol succinate 75 mg twice daily  Cardizem 120 mg XR daily, d/c amlodipine  Wears compression stockings, abdominal binder  Metoprolol decreased to 50 mg 1/27, 25 mg 1/28 1/28 IV hydration, midodrine daily per IM due to hypotension  BP stabilized after SBP 110s  Atrial fibrillation with rapid ventricular response (HCC)  Initially presented with sinus bradycardia and evaluated by cardiology and was in controlled atrial fibrillation without bradycardia  Not on anticoagulation prior to admission and not indicated at this point due to the SDH and SAH and overall remains high risk due to risk of falls and given her advanced age  No recent follow-up with cardiology as an outpatient and generally sees her primary care physician for management and was on Toprol 50 mg daily at home which has been changed well and in the hospital  Tachycardic in the hospital and given IV fluids and medication changes include addition of midodrine and changes in the metoprolol dosing  Recommended for aspirin but would likely not be a good long-term candidate for anticoagulation  Cardio rec: continue with cardizem 120 daily and Toprol-XL 75 mg twice daily 1/16  Metoprolol 25 mg BID  Stage 3 chronic kidney disease, unspecified whether stage 3a or 3b CKD (HCC)  Lab Results   Component Value Date    EGFR 58 01/30/2025    EGFR 61 01/27/2025    EGFR 62 01/23/2025    CREATININE 0.87 01/30/2025    CREATININE 0.84 01/27/2025    CREATININE 0.83 01/23/2025   At baseline  GERD (gastroesophageal reflux disease)  Continue protonix  Basal cell carcinoma (BCC) of scalp  Large scalp mass noted on admission.  Also follows with dermatology as an outpatient but declining any interventions  Some localized spot  "bleeding from the edges noted  Patient scratched at it and created wound, hair net applied  1/24 slight smell of wound: Keflex ordered per IM  Finished course  Wound care recommendations: \"Left head--cleanse/soak with normal saline, pat dry. Cover wound with Xeroform and ABD. Secure with surgical cap. Change dressing daily and as needed. Trim surrounding hair as needed to keep from getting matted into wound\".   Hair could be trimmed more but family refused   Malignant melanoma of arm, left (HCC)  Large mass on the left forearm that is melanoma  Follows with dermatology as an outpatient last seen in December 2024 but not interested in any treatments  SDH (subdural hematoma) (HCC)  Patient presents with fall and initial CTh on 1/4 showing an acute subdural hemorrhage along the left cerebral convexity with a maximum thickness of 1.9 cm with no midline shift as well as an acute subarachnoid hemorrhage in the left frontoparietal sulci, left sylvian fissure and suprasellar cistern  Repeat CT head on 1/5 was stable; repeat 1/14 w/ interval evolution no new/evolving hemorrage   Completed 7 day course of keppra for seizure ppx  F/u w/ NSGY for repeat CTH ~1/27 1/20 Repeat imaging ordered for f/o tomorrow (1/21) at 1pm  F/u as needed; no neurosurgical intervention needed  MRI brain 1/22 improvement in SDH/SAH  SAH (subarachnoid hemorrhage) (HCC)  Subarachnoid hemorrhage noted on initial CT on 1/4 and stable on 1/5;   CTH1/14  Interval evolution of recent subdural and subarachnoid hemorrhage as detailed above. No definitive new or enlarging intracranial hemorrhage.  Continue same management for the subarachnoid hemorrhage as for the subdural hematoma, please see that separate section for overall management plans  Closed extensive facial fractures (HCC)  Secondary to fall  CT of the head on 1/4 also showed comminuted and mildly displaced fractures of the lateral and anterior walls of the right maxilla, lateral and inferior " walls of the right orbit, right inferior orbital wall fractures appear to involve the inferior orbital foramen with associated small extraconal soft tissue edema and focus of air.  There is no herniation of extraocular muscles and a nondisplaced fracture of the zygomatic arch as well as the temporal bone at the TMJ.  There is diffuse opacification of the right maxillary sinus and right nasal cavity with blood products and air  Seen by oral maxillofacial surgery team and surgical intervention was declined by patient  Completed 7-day course of Unasyn/Augmentin  Follow-up with OMFS as an outpatient  Completed sinus precautions (can use straws now)  Chest pain  Troponins obtained on initial evaluation of the patient in the emergency room and cardiology was consulted for evaluation  Despite elevated troponins there was nonischemic findings on her EKG and felt to be potentially related to a nonischemic troponin elevation in the setting of trauma and brain bleed versus demand ischemia due to her paroxysmal atrial fibrillation and syncopal episode  Not a candidate for cardiology for ischemic evaluation due to advanced age, frailty and recent subarachnoid hemorrhage/subdural hemorrhage  No new regional wall motion abnormalities noted on echocardiogram on 1/6/2025  Recommended to start aspirin 81 mg when cleared by neurosurgery with CT head  Follow-up with cardiology as an outpatient  Orthostatic hypotension  Blood pressures ranging in physical therapy as low as 70/40 and cardiology was evaluating the patient  Symptomatic with lightheadedness and presyncope and had received gentle fluids with some improvement  BALJIT stockings PRN as well and encouragement of fluid intake  Repeat orthostatics improved but still getting lightheaded at times which was felt to be the initiating factor in her syncopal episode that led to the fall and injury.  1/17 IM held midodrine, no significant orthostatic hypotension currently observed may need  to introduce at 2.5 mg TID if develops episodes at a later time, continue to monitor orthostatic Bps   1/19 bp dropped on standing, came back up on sitting  1/26 metoprolol decreased to 50 mg  25 mg 1/28  Midodrine 5 mg standing  Syncope  Presented with syncopal episode preceded by lightheadedness that led to the fall which caused the sudden dural hemorrhage and subarachnoid hemorrhage  Also found to be significantly orthostatic which was likely the etiology and was placed on telemetry with no evidence of bradycardia or pauses  Limited p.o. intake and fluid intake and suspected orthostatic hypotension as the cause  Anemia  Most recent hemoglobin of 9.4 1/17; which is stable  Continue to follow biweekly CBC or sooner if clinically indicated  Will need repeat scan if significant drops or concern for acute bleeds  1/20 10.1, 1/23 10.4, 1/27 9.9 hgb, 1/29 9.6  Leukocytosis  Most recent WBC count 12.08 and actually trending down from its maximum of 17.59  Unclear etiology, may be directly related to reactive changes from the SDH and SAH as well as fractures  Continue to monitor and if considerable changes may repeat scans given the fractures in the facial bones  Was on a course of Unasyn/Augmentin for 7 days  IM: Infectious workup negative  Received IV fluids, discontinued with evidence of fluid overload CXR  Stable off abx d/c 1/15  12.08 1/17---> 13.06 1/20----> 10.5 1/23 ----> 11.78 1/27--->1/30 8.15  Acute on chronic diastolic congestive heart failure (HCC)  Wt Readings from Last 3 Encounters:   01/30/25 43.9 kg (96 lb 12.5 oz)   01/17/25 51.1 kg (112 lb 9.6 oz)   01/12/25 53.5 kg (118 lb)     TTE 1/6/2025 showed EF 70%, grade 2 DD, mildly dilated RV, mild LA, mild AI, mild TR, estimated PA pressure 50 mmHg, trivial pericardial effusion  Not on diuretics prior to admission  Cw furosemide 20 mg; monitor for volume status   Daily weights, monitor I/O     Insomnia  Inc melatonin to 6mg HS, trazodone 25mg HS PRN---->  "increased to 50---> 75 mg  Sleep log   Continue remeron 7.5, melatonin 6 mg  1/23 Psych consulted- no recommendations at this time    History of Present Illness   Sandra Purvis is a 89 y.o. female w/ PMHx of afib, GERD, HTN, who initially presented to [facility] on 1/4/2025 after a syncopan episode and collapse at home. CT had showed \"comminuted and mildly displaced fractures of the lateral and anterior walls of the right maxilla, lateral and inferior walls of the right orbit, right inferior orbital wall fracture appears to involve the inferior orbital foramen associated with small extraconal soft tissue edema and focus of air. There is also a nondisplaced fracture of the zygomatic arch and the temporal bone at the TMJ and diffuse opacification of the right maxillary sinus and right nasal cavity with blood products and air. Additionally there was an acute subdural hemorrhage along the left cerebral convexity with a maximal thickness of 1.9 cm with no midline shift and an acute subarachnoid hemorrhage in the left frontal parietal sulci, left sylvian fissure as well as suprasellar cistern.\" OMFS was consulted as well as neurosurgery. Patient and family declined any acute intervention at this time. Patient was started on Keppra x 7 days. Patient was treated with 7-day course of Unasyn/Augmentin. Her hospital stay was also complicated with A-fib with RVR and cardiology was consulted. Patient then had orthostatic hypotension and started on midodrine. Patient initially transferred to [facility] 1/12/2025. While at [facility] patient with hypertensive episodes to SBP >190 and Afib rates 120-130s. Patient became more symptomatic from her A-fib and was transferred to acute care 1/14. She had X ray w/ pulmonary edema was given lasix 1/14-1/15 with good response. Patient was seen by cardiology. Patient was placed on Cardizem drip and now on PO Cardizem. Her rates have improved. Sandra BRAR" Huyn was admitted to the [facility] on 01/17/25     Chief Complaint: f/u ambulatory dysfunction    Interval: Patient seen and examined in [facility] . No events overnight.  Reports overall feeling well but tired. Last BM 1/28. Per nursing, patient was up until 1 am and per nursing log slept around 2 hours.     Completed 5 day course of antibiotics   Q2 voiding at night  Diet upgraded to level 3    Review of Systems   Respiratory:  Negative for shortness of breath.    Cardiovascular:  Negative for chest pain, palpitations and leg swelling.   Gastrointestinal:  Negative for abdominal pain, constipation, diarrhea, nausea and vomiting.       Objective   Functional Update:  Physical Therapy Occupational Therapy Speech Therapy   Weight Bearing Status: Full Weight Bearing  Transfers: Moderate Assistance  Bed Mobility: Maximum Assistance  Amulation Distance (ft): 25 feet  Ambulation: Total Assistance (Mod A and needs chair follow, not consistant)  Discharge Recommendations: Skilled Nursing Facility  DC Home with:: 24 Hour Assisteance   Eating: Supervision  Grooming: Supervision  Bathing: Maximum Assistance  Bathing: Maximum Assistance  Upper Body Dressing: Minimal Assistance  Lower Body Dressing: Maximum Assistance  Toileting: Total Assistance, Assist of 2  Tub/Shower Transfer: Total Assistance, Assist of 2  Toilet Transfer: Maximum Assistance, Assist of 2, Total Assistance  Cognition: Exceptions to WNL  Cognition: Decreased Memory, Decreased Executive Functions, Decreased Attention, Decreased Comprehension, Behavioral Considerations, Decreased Safety  Orientation: Person, Place   Mode of Communication: Verbal  Cognition: Exceptions to WNL  Cognition: Decreased Memory, Decreased Executive Functions, Decreased Attention, Decreased Comprehension  Orientation: Person, Situation  Swallowing: Exceptions to WNL  Swallowing: Oral Dysphagia, Pharyngeal Dysphagia, Aspiration Risk  Diet Recommendations: Level 2/Mechanically Altered,  Thin  Discharge Recommendations:  (pending pt progress but likely subacute level of care)  DC Home with:: 24 Hour Supervision, 24 Hour Assisteance, Family Support         Temp:  [97.5 °F (36.4 °C)-98 °F (36.7 °C)] 97.5 °F (36.4 °C)  HR:  [69-82] 81  Resp:  [18] 18  BP: (115-165)/(55-73) 153/68  SpO2:  [96 %-97 %] 97 %    Physical Exam  Constitutional:       General: She is not in acute distress.     Comments: Frail, tired   HENT:      Head: Normocephalic and atraumatic.      Mouth/Throat:      Mouth: Mucous membranes are moist.   Cardiovascular:      Rate and Rhythm: Normal rate and regular rhythm.   Pulmonary:      Effort: Pulmonary effort is normal.      Breath sounds: Normal breath sounds.   Abdominal:      General: Bowel sounds are normal.   Musculoskeletal:         General: Normal range of motion.   Skin:     General: Skin is warm and dry.      Findings: Bruising (on face and arms) present.      Comments: Blood seen on pillow from basal cell wound   Neurological:      Mental Status: Mental status is at baseline.   Psychiatric:         Mood and Affect: Mood normal.         Behavior: Behavior normal.      Comments: Asked if we usually work at night; redirected that it was daytime (not much light gets into the room from the window)           Scheduled Meds:  Current Facility-Administered Medications   Medication Dose Route Frequency Provider Last Rate    acetaminophen  975 mg Oral Q8H [facility] Autumn Chapman PA-C      bisacodyl  10 mg Rectal Daily PRN Sheri Pope MD      calcium carbonate  500 mg Oral Daily PRN Autumn Chapman PA-C      diltiazem  120 mg Oral HS LUIS Monsalve      docusate sodium  100 mg Oral BID PRN Sheri Pope MD      enoxaparin  30 mg Subcutaneous Q24H Autumn Chapman PA-C      gabapentin  100 mg Oral HS Sheri Pope MD      hydrALAZINE  10 mg Oral Q8H PRN LUIS Monsalve      melatonin  6 mg Oral HS Sheri Pope MD      metoprolol succinate  25 mg  Oral BID LUIS Monsalve      midodrine  5 mg Oral TID PRN Autumn Chapman PA-C      midodrine  5 mg Oral Daily Before Breakfast LUIS Monsalve      mirtazapine  7.5 mg Oral HS Sheri Pope MD      ondansetron  4 mg Oral Q6H PRN Autumn Chapman PA-C      oxyCODONE  2.5 mg Oral Q6H PRN Autumn Chapman PA-C      Or    oxyCODONE  5 mg Oral Q6H PRN Autumn Chapman PA-C      pantoprazole  40 mg Oral Early Morning Autumn Chapman PA-C      traZODone  75 mg Oral HS Sheri Pope MD           Lab Results: I have reviewed the following results:  Results from last 7 days   Lab Units 01/30/25  0516 01/27/25  0453   HEMOGLOBIN g/dL 9.6* 9.9*   HEMATOCRIT % 29.7* 31.0*   WBC Thousand/uL 8.15 11.78*   PLATELETS Thousands/uL 187 222     Results from last 7 days   Lab Units 01/30/25  0516 01/27/25  0453   BUN mg/dL 19 19   SODIUM mmol/L 141 140   POTASSIUM mmol/L 4.1 3.6   CHLORIDE mmol/L 107 103   CREATININE mg/dL 0.87 0.84              Autumn Chapman PA-C  Physical Medicine and Rehabilitation   01/30/25

## 2025-01-30 NOTE — ASSESSMENT & PLAN NOTE
Hgb currently 9.6.  Baseline Hgb 9-10.  No active s/s of bleeding.  Vitamin B12 1125 on 1/13 - no need for supplementation.  Folate 21.1 on 1/13 - no need for supplementation.  Iron panel on 1/13 - Iron Sat 12%, TIBC 295, Iron 34, and Ferritin 98.    Continue to trend routine CBC.

## 2025-01-30 NOTE — ASSESSMENT & PLAN NOTE
Most recent hemoglobin of 9.4 1/17; which is stable  Continue to follow biweekly CBC or sooner if clinically indicated  Will need repeat scan if significant drops or concern for acute bleeds  1/20 10.1, 1/23 10.4, 1/27 9.9 hgb, 1/29 9.6

## 2025-01-30 NOTE — PLAN OF CARE
Problem: SKIN/TISSUE INTEGRITY - ADULT  Goal: Skin Integrity remains intact(Skin Breakdown Prevention)  Description: Assess:  -Perform Ronn assessment every shift  -Clean and moisturize skin every incontinent episode and as needed  -Inspect skin when repositioning, toileting, and assisting with ADLS  -Assess extremities for adequate circulation and sensation     Bed Management:  -Have minimal linens on bed & keep smooth, unwrinkled  -Change linens as needed when moist or perspiring  -Avoid sitting or lying in one position for more than 4 hours while in bed  -Keep HOB at 30 degrees     Toileting:  -Offer bedside commode  -Assess for incontinence every 2 hours  -Use incontinent care products after each incontinent episode such as hydroguard    Activity:  -Mobilize patient 2 times a day  -Encourage activity and walks on unit  -Encourage or provide ROM exercises   -Turn and reposition patient every 1 Hours  -Use appropriate equipment to lift or move patient in bed  -Instruct/ Assist with weight shifting every 2 when out of bed in chair  -Consider limitation of chair time 2 hour intervals    Skin Care:  -Avoid use of baby powder, tape, friction and shearing, hot water or constrictive clothing  -Relieve pressure over bony prominences using tortoise  -Do not massage red bony areas    Next Steps:  -Teach patient strategies to minimize risks such as friction and shearing   -Consider consults to  interdisciplinary teams such as pt ot  Outcome: Progressing     Problem: Nutrition/Hydration-ADULT  Goal: Nutrient/Hydration intake appropriate for improving, restoring or maintaining nutritional needs  Description: Monitor and assess patient's nutrition/hydration status for malnutrition. Collaborate with interdisciplinary team and initiate plan and interventions as ordered.  Monitor patient's weight and dietary intake as ordered or per policy. Utilize nutrition screening tool and intervene as necessary. Determine patient's food  preferences and provide high-protein, high-caloric foods as appropriate.     INTERVENTIONS:  - Monitor oral intake, urinary output, labs, and treatment plans  - Assess nutrition and hydration status and recommend course of action  - Evaluate amount of meals eaten  - Assist patient with eating if necessary   - Allow adequate time for meals  - Recommend/ encourage appropriate diets, oral nutritional supplements, and vitamin/mineral supplements  - Order, calculate, and assess calorie counts as needed  - Recommend, monitor, and adjust tube feedings and TPN/PPN based on assessed needs  - Assess need for intravenous fluids  - Provide specific nutrition/hydration education as appropriate  - Include patient/family/caregiver in decisions related to nutrition  Outcome: Progressing

## 2025-01-30 NOTE — PROGRESS NOTES
"   01/30/25 1015   Pain Assessment   Pain Assessment Tool 0-10   Pain Score No Pain   Restrictions/Precautions   Precautions Aspiration;Bed/chair alarms;Cognitive;Fall Risk;Supervision on toilet/commode   Lifestyle   Autonomy \"We got a lot done but I think you got more done!\"   Grooming   Findings Assist to comb hair 2' head wound   Shower/Bathe Self   Type of Assistance Needed Physical assistance   Physical Assistance Level 26%-50%   Comment modA for SB bed level 2' safety concerns w/ BP and to inc pt IND w/ task 2' dec trunk control. HOB elevated, able to wash UB, Assist for lower legs. Assist for groin/buttocks via rolling   Shower/Bathe Self CARE Score 3   Bathing   Assessed Bath Style Sponge Bath   Upper Body Dressing   Type of Assistance Needed Physical assistance   Physical Assistance Level 25% or less   Comment Luis Eduardo bed level HOB elevated, pulls self forward using bed rail   Upper Body Dressing CARE Score 3   Lower Body Dressing   Type of Assistance Needed Physical assistance   Physical Assistance Level 76% or more   Comment maxA bed level 2' safety concerns   Lower Body Dressing CARE Score 2   Putting On/Taking Off Footwear   Type of Assistance Needed Physical assistance   Physical Assistance Level Total assistance   Comment TA TEDs and  socks   Putting On/Taking Off Footwear CARE Score 1   Sit to Lying   Type of Assistance Needed Physical assistance   Physical Assistance Level 76% or more   Comment maxA 2' fatigue   Sit to Lying CARE Score 2   Lying to Sitting on Side of Bed   Type of Assistance Needed Physical assistance   Physical Assistance Level 51%-75%   Comment w/ HOB elevated, used of bed rail and extra time/ciues   Lying to Sitting on Side of Bed CARE Score 2   Sit to Stand   Type of Assistance Needed Physical assistance   Physical Assistance Level 26%-50%   Comment modA at bed rail   Sit to Stand CARE Score 3   Bed-Chair Transfer   Type of Assistance Needed Physical assistance   Physical " Assistance Level 76% or more   Comment maxA x1 sit pivot   Chair/Bed-to-Chair Transfer CARE Score 2   Toileting Hygiene   Type of Assistance Needed Physical assistance   Physical Assistance Level Total assistance   Comment Ax2, in stance at bed rail, TA of 1 and standby of second person for safety   Toileting Hygiene CARE Score 1   Toilet Transfer   Type of Assistance Needed Physical assistance   Physical Assistance Level Total assistance   Comment Ax2 for safety, swap out at bed rail tilt in space <>bsc, also was max ax1 sit pivot EOB>BSC on L side   Toilet Transfer CARE Score 1   Cognition   Overall Cognitive Status Impaired   Arousal/Participation Alert;Cooperative   Following Commands Follows one step commands with increased time or repetition   Activity Tolerance   Activity Tolerance Patient limited by fatigue   Other Comments   Assessment Bp monitored t/o. Highest reading 103/49 and lowest 98/49. Mild lightheadedness when lying to EOB. During second attempt on BSC pt w/ sudden fatigue, assisted back to bed 2' concerns for dropping BP, pt remained alert t/o. BP supine 103/49.   Assessment   Treatment Assessment OT session focusing on ADL retraining, BSC at bedside trials, transfers. Pt tolerated well, BP held consistently ~100/~49 w/ position changes. TEDS and ABD binder while OOB. Pt remains pleasant and highly motivated. She is in good spirits following having a good day yesterday. OT to continue POC at this time, remains subacute pending.   Prognosis Fair   Problem List Decreased strength;Decreased range of motion;Decreased endurance;Impaired balance;Decreased mobility;Decreased coordination;Decreased cognition;Impaired judgement;Decreased skin integrity;Pain   Plan   Treatment/Interventions ADL retraining;Functional transfer training;Therapeutic exercise;Endurance training;Cognitive reorientation;Patient/family training;Equipment eval/education;Compensatory technique education;Continued evaluation;Spoke to  nursing   Progress Slow progress, decreased activity tolerance   OT Therapy Minutes   OT Time In 1015   OT Time Out 1130   OT Total Time (minutes) 75   OT Mode of treatment - Individual (minutes) 75   OT Mode of treatment - Concurrent (minutes) 0   OT Mode of treatment - Group (minutes) 0   OT Mode of treatment - Co-treat (minutes) 0   OT Mode of Treatment - Total time(minutes) 75 minutes   OT Cumulative Minutes 855   Therapy Time missed   Time missed? No

## 2025-01-30 NOTE — ASSESSMENT & PLAN NOTE
Wt Readings from Last 3 Encounters:   01/30/25 43.9 kg (96 lb 12.5 oz)   01/17/25 51.1 kg (112 lb 9.6 oz)   01/12/25 53.5 kg (118 lb)     TTE 1/6/2025 showed EF 70%, grade 2 DD, mildly dilated RV, mild LA, mild AI, mild TR, estimated PA pressure 50 mmHg, trivial pericardial effusion  Not on diuretics prior to admission  Cw furosemide 20 mg; monitor for volume status   Daily weights, monitor I/O

## 2025-01-30 NOTE — ASSESSMENT & PLAN NOTE
Noted to have elevated troponins on admission to acute setting.  [place] to be due to trauma/brain bleed vs demand ischemia due to paroxysmal a-fib and syncope.  Cardiology recommended starting ASA 81mg daily when cleared by Neurosurgery.  Per Neurosurgery - ok to start ASA in 2 weeks (2/4) after patient/family discussion of risks/benefits with PCP/Cardiologist.  Recommend establishing with Cardiology on discharge.

## 2025-01-30 NOTE — WOUND OSTOMY CARE
Progress Note - Wound   Sandra Purvis 89 y.o. female MRN: [id number]  Unit/Bed#: [facility] 261-01 Encounter: [id number]      Seen for weekly wound assessment   Assessment:   Patient requesting we leave her scalp wound open to air. Described to her how there is some dry bloody drainage with hair on wound.IT wound be beneficial to cleanse and reapply xeroform and Abd. Pt agreeable   1)Left scalp malignant wound cleansed and redressed   2)Bilateral buttocks and sacrum assessed and dry and intact    Wound 01/12/25 Malignant  Head (Active)   Wound Image   01/30/25 1234   Wound Description Beefy red;Bleeding;Drainage 01/30/25 1234   Alix-wound Assessment Dry 01/30/25 1234   Wound Length (cm) 5 cm 01/24/25 1436   Wound Width (cm) 6 cm 01/24/25 1436   Wound Surface Area (cm^2) 30 cm^2 01/24/25 1436   Drainage Amount Scant 01/30/25 1234   Drainage Description Bloody;Clear 01/30/25 1234   Treatments Cleansed;Site care 01/30/25 1234   Dressing ABD;Xeroform 01/30/25 1234   Dressing Changed Changed 01/30/25 1234   Patient Tolerance Tolerated well 01/30/25 1234   Dressing Status Clean;Dry;Intact 01/30/25 1234       Skin care plans:  1-Hydraguard/Silicone Cream to bilateral sacrum, buttock, and heels BID and PRN  2-Elevate heels to offload pressure.  3-Ehob cushion in chair when out of bed.  4-Moisturize skin daily with skin nourishing cream.  5-Turn/reposition q2h or when medically stable for pressure re-distribution on skin.   6-Cleanse right arm skin tear with NSS apply 3M daily  7-Left head--cleanse/soak with normal saline, pat dry.  Cover wound with Xeroform and ABD.  Secure with surgical cap.  Change dressing daily and as needed.  Trim surrounding hair as needed to keep from getting matted into wound.    Call or Secure Chat  with any questions  Wound Care will continue to follow weekly    Courtney BARDALESN RN CWON

## 2025-01-30 NOTE — PROGRESS NOTES
Progress Note - Internal Medicine   Name: Sandra Purvis 89 y.o. female I MRN: [id number]  [facility] 261-01 I Date of Admission: 1/17/2025   Date of Service: 1/30/2025 I Hospital Day: 13    Assessment & Plan  Hypertension  Home regimen: Toprol XL 50mg daily and HCTZ 12.5mg daily.  Currently receiving Toprol XL 50mg BID and Cardizem 120mg daily.  Give midodrine as needed for SBP <90.  Orthostatic + Monitor orthostatics daily.  Keep SBP <160 due to intracranial bleed.  Atrial fibrillation with rapid ventricular response (HCC)  Not taking AC at home due to hx of falls.  Continue to hold with recent falls and now subarachnoid/subdural.  Had been taking Toprol XL 50mg daily at home - currently receiving Toprol XL 50mg BID and Cardizem 120mg daily.  Hx of a-fib with RVR while on ARC, recently transferred to be on Cardizem drip.  Monitor routine VS.  Replete electrolytes as needed.  Follow-up with Cardiology as outpatient.  GERD (gastroesophageal reflux disease)  Continue Protonix 40mg daily.  Had not been on Protonix as outpatient.  Basal cell carcinoma (BCC) of scalp  Large scalp mass noted on admission.  Declining intervention as an outpatient.  Follows with Dermatology and LUIS Mejía (Surgical Oncology) as outpatient.  Wound care consulted - wound cleansed/debrided.  Continue with wearing cap during the day to avoid further patient manipulation.    Foul odor and drainage noted on 1/24 - completed 5 day course of Keflex.  Wound care recommending Xeroform and ABD.  Change dressing daily.  Malignant melanoma of arm, left (HCC)  Recent surgical excision on 12/19.  Follows with LUIS Mejía (Surg/Onc) as outpatient.  Had declined further treatment.  SDH (subdural hematoma) (HCC)  Presented on 1/4 after syncopal fall.  CTH showed acute subdural hemorrhage along the L cerebral convexity with maximal thickness of 1.9cm.  No midline shift.    Received Keppra x7 days.  Continue to hold AC/AP.  Neurovascular  checks Q shift.  Maintain normotension.  Avoid SBP >160.    CTH on 1/20: New small hypodensity within the left frontal subcortical white matter, differential includes age indeterminate infarct, evolving parenchymal contusion among other etiologies.  MRI brain obtained on 1/21 showing evolving bilateral cerebral convexity subdural hematomas containing blood products of varying ages, scattered small volume subarachnoid hemorrhage, no evidence of acute large territory infarct.    2 week follow-up with Neurosurgery on 1/21.  Imaging reviewed.  May consider starting ASA in 2 weeks after discussion with PCP/Cardiology in regards to risk/benefit.  Follow-up with Neurosurgery in additional 4 weeks with [facility].    Primary team following.  PT/OT/SLP.  SAH (subarachnoid hemorrhage) (HCC)  Presented on 1/4 after syncopal fall.  CTH showed acute subarachnoid hemorrhage in the L frontoparietal sulci, L sylvian fissure and suprasellar cistern.     Received Keppra x7 days.  Continue to hold AC/AP.  Neurovascular checks Q shift.  Maintain normotension.  Avoid SBP >160.     MRI brain obtained 1/21 - stable.  2 week follow-up with Neurosurgery on 1/21.  Imaging was reviewed.  May consider starting ASA in 2 weeks after discussion with PCP/Cardiology.  Follow-up with Neurosurgery in additional 4 weeks with CTH.     Primary team following.  PT/OT/SLP.  Closed extensive facial fractures (HCC)  S/p fall on 1/4.  CT facial bones showed comminuted and mildly displaced fractures of the lateral and anterior walls of the R maxilla, lateral, and inferior wall of the R orbit, wall fracture appears to involve the inferior orbital foramen with associated small extraconal soft tissue edema, nondisplaced fractures of the zygomatic arch and the temporal bone at the TMJ, diffuse opacification of R maxillary sinus and R nasal cavity with blood products and air.    OMFS evaluated in acute setting - declined surgical intervention.  Received IV Unasyn for 7  days.  No longer needs to follow sinus precautions per OMFS.    Follow-up with OMFS on discharge.  Chest pain  Noted to have elevated troponins on admission to acute setting.  [place] to be due to trauma/brain bleed vs demand ischemia due to paroxysmal a-fib and syncope.  Cardiology recommended starting ASA 81mg daily when cleared by Neurosurgery.  Per Neurosurgery - ok to start ASA in 2 weeks (2/4) after patient/family discussion of risks/benefits with PCP/Cardiologist.  Recommend establishing with Cardiology on discharge.  Orthostatic hypotension  Presented with syncope on 1/4.  Significant orthostasis noted in acute setting.  Continue midodrine PRN and midodrine 5mg daily in AM.  Apply abdominal binder/TEDs as needed.  Encourage PO hydration.  Continue to monitor orthostatics closely.    Given 500cc 0.9% NSS IV fluid bolus on 1/28.  Syncope  Presented on 1/4 s/p syncopal fall.  EKG showed atrial fibrillation with RVR on admission.  ECHO on 1/6 showed EF 70%, G2DD.  Noted to have significant orthostasis in acute setting.  Continue to monitor orthostatics.  Encourage PO hydration.  Apply abdominal binder/TEDs as needed.    Follow-up/establish with Cardiology as outpatient.  Anemia  Hgb currently 9.6.  Baseline Hgb 9-10.  No active s/s of bleeding.  Vitamin B12 1125 on 1/13 - no need for supplementation.  Folate 21.1 on 1/13 - no need for supplementation.  Iron panel on 1/13 - Iron Sat 12%, TIBC 295, Iron 34, and Ferritin 98.    Continue to trend routine CBC.  Leukocytosis  Improving, WBC count currently 8.15.  Chronically elevated.  Did recently complete a course of Unasyn and Augmentin.  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  Completed 5 day course.  Continue to trend routine CBC.  Acute on chronic diastolic congestive heart failure (HCC)  ECHO on 1/6 showed EF 70%, G2DD.  Monitor volume status - currently euvolemic.  Monitor I&Os, daily weights.  Insomnia  Continue melatonin 6mg at HS,  Remeron 7.5mg at HS, and trazodone 75mg at HS.  Continue sleep logs.    VTE Pharmacologic Prophylaxis:   Pharmacologic: Enoxaparin (Lovenox)  Mechanical VTE Prophylaxis in Place: Yes - sequential compression devices.    Current Length of Stay: 13 day(s)    Current Patient Status: Inpatient Rehab     Discharge Plan: As per primary team.    Code Status: Level 3 - DNAR and DNI    Subjective:   Pt examined while pt lying in bed in pt room.  Complaints of lightheadedness while sitting on the commode this morning.  Encouraged to take her time with transfers and to continue to hydrate.  Denies any fevers, chills, SOB, palpitations, or CP.  Denies any abdominal pain.  Slept well last night.  Has no other concerns or complaints at this time.    Objective:     Vitals:   Temp (24hrs), Av.8 °F (36.6 °C), Min:97.5 °F (36.4 °C), Max:98 °F (36.7 °C)    Temp:  [97.5 °F (36.4 °C)-98 °F (36.7 °C)] 97.5 °F (36.4 °C)  HR:  [69-82] 70  Resp:  [18] 18  BP: (101-165)/(53-73) 163/73  SpO2:  [96 %-97 %] 97 %  Body mass index is 15.16 kg/m².     Review of Systems   Constitutional:  Negative for appetite change, chills, fatigue and fever.   HENT:  Negative for trouble swallowing.    Eyes:  Negative for visual disturbance.   Respiratory:  Negative for cough, shortness of breath, wheezing and stridor.    Cardiovascular:  Negative for chest pain, palpitations and leg swelling.   Gastrointestinal:  Negative for abdominal distention, abdominal pain, constipation, diarrhea, nausea and vomiting.        LBM    Genitourinary:  Negative for difficulty urinating.   Musculoskeletal:  Negative for arthralgias, back pain and gait problem.   Neurological:  Positive for light-headedness (occurred while sitting on the commode this morning). Negative for dizziness, weakness, numbness and headaches.   Psychiatric/Behavioral:  Negative for dysphoric mood and sleep disturbance. The patient is not nervous/anxious.    All other systems reviewed and are  negative.       Input and Output Summary (last 24 hours):       Intake/Output Summary (Last 24 hours) at 1/30/2025 0909  Last data filed at 1/29/2025 1715  Gross per 24 hour   Intake 320 ml   Output 1231 ml   Net -911 ml       Physical Exam:     Physical Exam  Vitals and nursing note reviewed.   Constitutional:       General: She is not in acute distress.     Appearance: Normal appearance. She is not ill-appearing.      Comments: Thin.   HENT:      Head: Normocephalic and atraumatic.   Cardiovascular:      Rate and Rhythm: Normal rate and regular rhythm.      Pulses: Normal pulses.      Heart sounds: Murmur heard.      Systolic murmur is present with a grade of 1/6.      No friction rub.   Pulmonary:      Effort: Pulmonary effort is normal. No respiratory distress.      Breath sounds: Normal breath sounds. No wheezing or rhonchi.   Abdominal:      General: Abdomen is flat. Bowel sounds are normal. There is no distension.      Palpations: Abdomen is soft. There is no mass.      Tenderness: There is no abdominal tenderness. There is no guarding or rebound.      Hernia: No hernia is present.   Musculoskeletal:      Cervical back: Normal range of motion and neck supple. No tenderness.      Right lower leg: No edema.      Left lower leg: No edema.   Skin:     General: Skin is warm and dry.   Neurological:      Mental Status: She is alert and oriented to person, place, and time.   Psychiatric:         Mood and Affect: Mood normal.         Behavior: Behavior normal.         Additional Data:     Labs:    Results from last 7 days   Lab Units 01/30/25  0516   WBC Thousand/uL 8.15   HEMOGLOBIN g/dL 9.6*   HEMATOCRIT % 29.7*   PLATELETS Thousands/uL 187   SEGS PCT % 75   LYMPHO PCT % 17   MONO PCT % 6   EOS PCT % 1     Results from last 7 days   Lab Units 01/30/25  0516   SODIUM mmol/L 141   POTASSIUM mmol/L 4.1   CHLORIDE mmol/L 107   CO2 mmol/L 28   BUN mg/dL 19   CREATININE mg/dL 0.87   ANION GAP mmol/L 6   CALCIUM mg/dL 9.1    GLUCOSE RANDOM mg/dL 93                       Labs reviewed    Imaging:    Imaging reviewed    Recent Cultures (last 7 days):           Last 24 Hours Medication List:   Current Facility-Administered Medications   Medication Dose Route Frequency Provider Last Rate    acetaminophen  975 mg Oral Q8H ELIZABETH Autumn Chapman PA-C      bisacodyl  10 mg Rectal Daily PRN Sheri Pope MD      calcium carbonate  500 mg Oral Daily PRN Autumn Chapman PA-C      diltiazem  120 mg Oral HS LUIS Monsalve      docusate sodium  100 mg Oral BID PRN Sheri Pope MD      enoxaparin  30 mg Subcutaneous Q24H Autumn Chapman PA-C      gabapentin  100 mg Oral HS Sheri Pope MD      hydrALAZINE  10 mg Oral Q8H PRN LUIS Monsalve      melatonin  6 mg Oral HS Sheri Pope MD      metoprolol succinate  25 mg Oral BID LUIS Monsalve      midodrine  5 mg Oral TID PRN Autumn Chapman PA-C      midodrine  5 mg Oral Daily Before Breakfast LUIS Monsalve      mirtazapine  7.5 mg Oral HS Sheri Pope MD      ondansetron  4 mg Oral Q6H PRN Autumn Chapman PA-C      oxyCODONE  2.5 mg Oral Q6H PRN Autumn Chapman PA-C      Or    oxyCODONE  5 mg Oral Q6H PRN Autumn Chapman PA-C      pantoprazole  40 mg Oral Early Morning Autumn Chapman PA-C      traZODone  75 mg Oral HS Sheri Pope MD          M*Modal software was used to dictate this note.  It may contain errors with dictating incorrect words or incorrect spelling. Please contact the provider directly with any questions.

## 2025-01-30 NOTE — ASSESSMENT & PLAN NOTE
Most recent WBC count 12.08 and actually trending down from its maximum of 17.59  Unclear etiology, may be directly related to reactive changes from the SDH and SAH as well as fractures  Continue to monitor and if considerable changes may repeat scans given the fractures in the facial bones  Was on a course of Unasyn/Augmentin for 7 days  IM: Infectious workup negative  Received IV fluids, discontinued with evidence of fluid overload CXR  Stable off abx d/c 1/15  12.08 1/17---> 13.06 1/20----> 10.5 1/23 ----> 11.78 1/27--->1/30 8.15

## 2025-01-30 NOTE — PROGRESS NOTES
01/30/25 1330   Pain Assessment   Pain Assessment Tool 0-10   Pain Score No Pain   Restrictions/Precautions   Precautions Aspiration;Bed/chair alarms;Cognitive;Fall Risk;Supervision on toilet/commode   Braces or Orthoses   (TEDs and abd binder)   Lying to Sitting on Side of Bed   Type of Assistance Needed Physical assistance   Physical Assistance Level 26%-50%   Comment Improved from previous-  Mod A for trunk,  pt managed legs   Lying to Sitting on Side of Bed CARE Score 3   Sit to Stand   Type of Assistance Needed Physical assistance   Physical Assistance Level 26%-50%   Comment Min / Mod to stand at walker - post leaning ,needs cues to lean forward and hand placement   Sit to Stand CARE Score 3   Bed-Chair Transfer   Type of Assistance Needed Physical assistance   Physical Assistance Level 51%-75%   Comment Mod A at best but can be max when faituged,  poor motor planning walker,  poor balance   Chair/Bed-to-Chair Transfer CARE Score 2   Walk 10 Feet   Type of Assistance Needed Physical assistance   Physical Assistance Level 26%-50%   Comment Mod A-  Amb from bed to WC across room without follow   Walk 10 Feet CARE Score 3   Walk 50 Feet with Two Turns   Type of Assistance Needed Physical assistance   Comment Mod A with chair follow   Walk 50 Feet with Two Turns CARE Score -   Walk 150 Feet   Type of Assistance Needed Physical assistance   Physical Assistance Level Total assistance   Comment Mod A with chair follow   Walk 150 Feet CARE Score 1   Ambulation   Primary Mode of Locomotion Prior to Admission Walk   Distance Walked (feet) 150 ft  (35)   Assist Device Roller Walker   Gait Pattern Inconsistant Nicole;Slow Nicole;Decreased foot clearance;Retropulsion;Narrow DANIEL;Scissoring   Walk Assist Level Moderate Assist;Chair Follow   Findings Note this distance was first time performed in 2 weeks-  Use WC follow,  cues for tall posture and big steps and lean forward into walker -  scissor with turns,  also  therapist needed to guide walker at times   Does the patient walk? 2. Yes   4 Steps   Reason if not Attempted Medical concerns   4 Steps CARE Score 88   12 Steps   Reason if not Attempted Medical concerns   12 Steps CARE Score 88   Therapeutic Interventions   Strengthening Repeated sit-stands and working on pushing from chair and reaching back,   Repeated stand pivot xfers with RW WC to mat,   Seated LAQ 3#,  Marching AROM 10x3   Other Bp started at 164/74   and after walk 113/54   Assessment   Treatment Assessment 60 min skilled PT session pt did better with bed mobility today getting in and out..  BP dropped still but stayed stable in lower zone t/o session .  pt ambulated further distance today showing improvement.  Pt continues to show poor balance upon initial standing but gets better after primed and moving.  Pt fatigued quickly t/o session but was willing to work on reapeated sit-stands and stand pivots with RW and walking.  Pt will benefit from cont therapy to maximize functional mobility and dec burden of care   Problem List Decreased strength;Decreased range of motion;Decreased endurance;Impaired balance;Decreased mobility;Decreased coordination;Decreased cognition;Impaired judgement;Decreased skin integrity;Pain   Barriers to Discharge Inaccessible home environment;None   Plan   Progress Slow progress, decreased activity tolerance   PT Therapy Minutes   PT Time In 1330   PT Time Out 1430   PT Total Time (minutes) 60   PT Mode of treatment - Individual (minutes) 60   PT Mode of treatment - Concurrent (minutes) 0   PT Mode of treatment - Group (minutes) 0   PT Mode of treatment - Co-treat (minutes) 0   PT Mode of Treatment - Total time(minutes) 60 minutes   PT Cumulative Minutes 745   Therapy Time missed   Time missed? No

## 2025-01-30 NOTE — PROGRESS NOTES
01/30/25 0900   Pain Assessment   Pain Assessment Tool 0-10   Pain Score No Pain   Restrictions/Precautions   Precautions Aspiration;Bed/chair alarms;Cognitive;Fall Risk;Supervision on toilet/commode   Comprehension   Comprehension (FIM) 4 - Understands basic info/conversation 75-90% of time   Expression   Expression (FIM) 4 - Expresses basic info/needs 75-90% of time   Social Interaction   Social Interaction (FIM) 5 - Interacts appropriately with others 90% of time   Problem Solving   Problem solving (FIM) 3 - Solves basic problmes 50-74% of time   Memory   Memory (FIM) 3 - Recognizes, recalls/performs 50-74%   Speech/Language/Cognition Assessment   Treatment Assessment Pt seen for skilled SLP session focusing on cognitive linguistic skills during more functional tasks. When SLP entered pt's room, pt identified need to be changed as she was soiled. Pt asking questions about no longer having the pure wick to which staff educated on staff getting pt to the commode more frequently, as well as attempting to train her bladder to know when she needs to use the bathroom/alerting staff to this. As staff assisted pt in changing and hygiene, pt demonstrated functional sequencing and ability to follow simple commands, however, did continue to benefit from verbal cues for initiation. In discussion with pt regarding recent events and orientation as this SLP as not seen pt in ~1 week, pt presenting with improvement in orientation and STM recall of recent events, as she recalled an improvement in her physical functioning during yesterday's sessions. Pt also appearing more clear in discussion about her current dysphagia diet, plan to trial diet items and potential for diet upgrade. Following dysphagia session (see below), pt appropriately requested oral hygiene completion, along with need for toileting. Pt was assisted OOB to commode where she again benefited from verbal cues for initiation of bed mobility and sitting EOB. Pt  then required verbal  cues and physical assist to complete transfer from bed to commode, as well as verbal and tactile cues for appropriate hand placement with RW. Overall, pt is demonstrating improvement in daily recall and recall of routines, but continues to benefit from verbal cues for new learning/mobility and safety. At this time, pt is recommended for further skilled ST focusing on cognitive linguistic skills in order to maximize functional independence and safety on discharge.   Eating   Type of Assistance Needed Set-up / clean-up;Supervision   Physical Assistance Level No physical assistance   Eating CARE Score 4   Swallow Assessment   Swallow Treatment Assessment Daily Dysphagia Tx Note      Patient Name: Sandra Purvis     Today's Date: 1/30/2025        Current Risks for Dysphagia & Aspiration: Weak cough;General debilitation;Cognitive deficit;Reduced alertness; facial fractures     Current Symptoms/Concerns: difficulty chewing     Current diet: mechanical soft/level 2 diet; pureed vegetables and thin liquids      Premorbid diet::regular diet and thin liquids       Positioning: upright with bed in chair mode     Items administered:Consistencies Administered: thin liquids, soft solids  Materials administered included: chopped sausage, scrambled eggs, pancakes with syrup, juice and tea     Total amount of meal consumed:   75% of meal and 300cc of thins        Oral stage:minimal-mild  Lip closure: functional around utensils, cup/straw  Anterior spillage: none  Mastication: remains slower but appeared effective for breakdown; consistent piecemeal deglutition  Bolus formation: mildly reduced with solids but functional  Bolus control: appeared functional today  Transfer: prompter with thins; slower with piecemeal deglutition with soft solids  Oral residue: trace amounts today  Pocketing: very trace amounts removed with oral care           Pharyngeal stage:minimal  Swallow promptness: suspected to be mildly  delayed with soft solids but functional today  Hyolaryngeal elevation: present to palpation  Wet voice: none  Throat clear: none  Cough: none  Secondary swallows: with larger sips of thins; occasionally with soft solids  Audible swallows: present w/ thins        Esophageal stage: No overt s/s observed today.           Summary:     Pt presenting with minimal-mild oral dysphagia and minimal pharyngeal dysphagia today. Session focused on a full dysphagia level 3 trial tray to assess potential for diet advancement.    Symptoms or concerns included continued slower but more effective mastication of soft solids as pt reported no pain or discomfort with trials of level 3 items. Pt also continues with piecemeal deglutition throughout meal, noting times of slower but functional transfers. Bolus formation minimally reduced but functional throughout meal with effective oral clearance, noting only minimal oral residual between swallows. Pt cleared fully when taking sips of liquids between. Swallow initiation appeared mildly delayed at times, likely due to some increased time needed for oral prep/processing but did appear functional also. Oral and pharyngeal swallow skills with thin liquids appeared functional this meal, noting pt to take smaller sips, even when taking consecutive sips. No overt s/s aspiration this meal.     Discussed advancing diet as pt reported that she was thrilled with these types of foods presented today, while she also presented with improvement in PRIYANKA and endurance. Pt in agreement to diet advancement to dysphagia level 3 diet.         Recommendations:  Diet: UPGRADE to dysphagia level 3 diet, thin liquids  Meds: whole with puree  Strategies: upright posture, only feed when fully alert, slow rate of feeding, small bites/sips, quiet environment (tv off, limit talking, door closed, etc.), and alternating bites and sips, OOB preferred        DISTANT supervision w/ meals.  Results reviewed with: patient, RN  Dr. Niko Reyes, YASMEEN Yates, therapy manager Mckayla  Aspiration precautions posted.     F/u ST tx: Pt is currently recommended for further skilled SLP services targeting dysphagia therapy in order to maximize oral and pharyngeal swallow skills, while safely supporting PO intake, as well as to improve independent carryover of safe swallow strategies.       Plan:      Upgrade dysphagia level 3, thins      Follow up to closely monitor diet tolerance      Initiate regular diet trials as appropriate with SLP   SLP Therapy Minutes   SLP Time In 0900   SLP Time Out 1000   SLP Total Time (minutes) 60   SLP Mode of treatment - Individual (minutes) 60   SLP Mode of treatment - Concurrent (minutes) 0   SLP Mode of treatment - Group (minutes) 0   SLP Mode of treatment - Co-treat (minutes) 0   SLP Mode of Treatment - Total time(minutes) 60 minutes   SLP Cumulative Minutes 470   Therapy Time missed   Time missed? No

## 2025-01-31 PROCEDURE — 97116 GAIT TRAINING THERAPY: CPT

## 2025-01-31 PROCEDURE — 92526 ORAL FUNCTION THERAPY: CPT

## 2025-01-31 PROCEDURE — 97535 SELF CARE MNGMENT TRAINING: CPT

## 2025-01-31 PROCEDURE — 97110 THERAPEUTIC EXERCISES: CPT

## 2025-01-31 PROCEDURE — 99232 SBSQ HOSP IP/OBS MODERATE 35: CPT | Performed by: INTERNAL MEDICINE

## 2025-01-31 PROCEDURE — 97129 THER IVNTJ 1ST 15 MIN: CPT

## 2025-01-31 RX ORDER — TRAZODONE HYDROCHLORIDE 100 MG/1
100 TABLET ORAL
Start: 2025-01-31 | End: 2025-02-07

## 2025-01-31 RX ORDER — MIRTAZAPINE 7.5 MG/1
7.5 TABLET, FILM COATED ORAL
Start: 2025-01-31

## 2025-01-31 RX ORDER — ACETAMINOPHEN 325 MG/1
650 TABLET ORAL EVERY 6 HOURS PRN
Start: 2025-01-31

## 2025-01-31 RX ORDER — HYDRALAZINE HYDROCHLORIDE 10 MG/1
10 TABLET, FILM COATED ORAL EVERY 8 HOURS PRN
Start: 2025-01-31

## 2025-01-31 RX ORDER — METOPROLOL SUCCINATE 25 MG/1
25 TABLET, EXTENDED RELEASE ORAL 2 TIMES DAILY
Start: 2025-01-31

## 2025-01-31 RX ORDER — MIDODRINE HYDROCHLORIDE 5 MG/1
5 TABLET ORAL
Start: 2025-02-01

## 2025-01-31 RX ORDER — GABAPENTIN 100 MG/1
100 CAPSULE ORAL
Start: 2025-01-31

## 2025-01-31 RX ORDER — DILTIAZEM HYDROCHLORIDE 120 MG/1
120 CAPSULE, COATED, EXTENDED RELEASE ORAL
Start: 2025-01-31 | End: 2025-02-07

## 2025-01-31 RX ORDER — DOCUSATE SODIUM 100 MG/1
100 CAPSULE, LIQUID FILLED ORAL 2 TIMES DAILY PRN
Start: 2025-01-31

## 2025-01-31 RX ORDER — ENOXAPARIN SODIUM 100 MG/ML
30 INJECTION SUBCUTANEOUS EVERY 24 HOURS
Start: 2025-01-31

## 2025-01-31 RX ORDER — MIDODRINE HYDROCHLORIDE 5 MG/1
5 TABLET ORAL 3 TIMES DAILY PRN
Start: 2025-01-31

## 2025-01-31 RX ADMIN — ENOXAPARIN SODIUM 30 MG: 30 INJECTION SUBCUTANEOUS at 22:13

## 2025-01-31 RX ADMIN — MIRTAZAPINE 7.5 MG: 7.5 TABLET, FILM COATED ORAL at 22:12

## 2025-01-31 RX ADMIN — METOPROLOL SUCCINATE 25 MG: 25 TABLET, EXTENDED RELEASE ORAL at 22:09

## 2025-01-31 RX ADMIN — PANTOPRAZOLE SODIUM 40 MG: 40 TABLET, DELAYED RELEASE ORAL at 06:14

## 2025-01-31 RX ADMIN — MIDODRINE HYDROCHLORIDE 5 MG: 5 TABLET ORAL at 06:20

## 2025-01-31 RX ADMIN — MELATONIN TAB 3 MG 6 MG: 3 TAB at 22:09

## 2025-01-31 RX ADMIN — METOPROLOL SUCCINATE 25 MG: 25 TABLET, EXTENDED RELEASE ORAL at 08:48

## 2025-01-31 RX ADMIN — DOCUSATE SODIUM 100 MG: 100 CAPSULE, LIQUID FILLED ORAL at 22:12

## 2025-01-31 RX ADMIN — DOCUSATE SODIUM 100 MG: 100 CAPSULE, LIQUID FILLED ORAL at 11:42

## 2025-01-31 RX ADMIN — DILTIAZEM HYDROCHLORIDE 120 MG: 120 CAPSULE, COATED, EXTENDED RELEASE ORAL at 22:11

## 2025-01-31 RX ADMIN — GABAPENTIN 100 MG: 100 CAPSULE ORAL at 22:13

## 2025-01-31 RX ADMIN — TRAZODONE HYDROCHLORIDE 100 MG: 100 TABLET ORAL at 22:12

## 2025-01-31 NOTE — PROGRESS NOTES
01/31/25 1330   Pain Assessment   Pain Assessment Tool 0-10   Pain Score No Pain   Powers-Baker FACES Pain Rating 0   Restrictions/Precautions   Precautions Aspiration;Bed/chair alarms;Cognitive;Fall Risk;Supervision on toilet/commode  (Ortho BPs)   Braces or Orthoses   (TEDs and binder for low BP)   Sit to Lying   Type of Assistance Needed Physical assistance   Physical Assistance Level 26%-50%   Sit to Lying CARE Score 3   Sit to Stand   Type of Assistance Needed Physical assistance   Physical Assistance Level 26%-50%   Comment Min at best but can require Mod A for balance and lift assist   Sit to Stand CARE Score 3   Bed-Chair Transfer   Type of Assistance Needed Physical assistance   Physical Assistance Level 26%-50%   Comment Min/ Mod with RW   Chair/Bed-to-Chair Transfer CARE Score 3   Walk 10 Feet   Type of Assistance Needed Physical assistance   Physical Assistance Level 26%-50%   Comment Mod A for balance and walker management - did perform 3 shorter distance walking without chair follow   Walk 10 Feet CARE Score 3   Walk 50 Feet with Two Turns   Type of Assistance Needed Physical assistance   Physical Assistance Level 51%-75%   Comment Mod / Max A with RW  needs balance assist, cues to not lean back and stand tall, assist with RW at times with turns   Walk 50 Feet with Two Turns CARE Score 2   Walk 150 Feet   Reason if not Attempted Safety concerns   Walk 150 Feet CARE Score 88   Walking 10 Feet on Uneven Surfaces   Reason if not Attempted Safety concerns   Walking 10 Feet on Uneven Surfaces CARE Score 88   Ambulation   Primary Mode of Locomotion Prior to Admission Walk   Distance Walked (feet) 50 ft  (50x2  135x1  10x1)   Does the patient walk? 2. Yes   Wheel 50 Feet with Two Turns   Comment (S)  wasnt a focus-  can perform over weekend   Wheel 150 Feet   Comment wasnt a focus   Curb or Single Stair   Style negotiated Single stair   Type of Assistance Needed Physical assistance   Physical Assistance  Level 26%-50%   Comment Mod A with bilat HRs  worked on bottom step   1 Step (Curb) CARE Score 3   4 Steps   Type of Assistance Needed Physical assistance   Physical Assistance Level 26%-50%   Comment (S)  Mod A up/down bottom step with bilat HRs-  did not go up to top due to BP concerns and pt hasnt ever done steps   4 Steps CARE Score 3   12 Steps   Reason if not Attempted Medical concerns   12 Steps CARE Score 88   Therapeutic Interventions   Strengthening Seated LAQ 3#,  Hip flex march 3#,  Hip abd yellow Tband 10x3   Equipment Use   NuStep could perform over weekend   Other Comments   Comments (S)  BP post first walk was 92/50,  but then inc to 110/48 when sitting rest   Assessment   Treatment Assessment 60 min skilled PT session focused on amb bouts  in begining of session performed shorter distance with chair to chair then at end of session performed longer one.  Seems that 1 long amb bout tires pt for rest of session.  Pt continues to need A with walker managemet and cues for posture and balance.  Pt today did bottom step 4x  with bilat HR for first time.  Pt can continue to work on sit-stands, amb bouts with BP checks/ LE strengthening   Problem List Decreased strength;Decreased endurance;Impaired balance;Decreased mobility;Decreased coordination;Decreased cognition;Impaired judgement;Decreased skin integrity   Barriers to Discharge Inaccessible home environment;Decreased caregiver support   Plan   Progress Slow progress, decreased activity tolerance   PT Therapy Minutes   PT Time In 1330   PT Time Out 1430   PT Total Time (minutes) 60   PT Mode of treatment - Individual (minutes) 60   PT Mode of treatment - Concurrent (minutes) 0   PT Mode of treatment - Group (minutes) 0   PT Mode of treatment - Co-treat (minutes) 0   PT Mode of Treatment - Total time(minutes) 60 minutes   PT Cumulative Minutes 805   Therapy Time missed   Time missed? No

## 2025-01-31 NOTE — PROGRESS NOTES
"OT TREATMENT       01/31/25 1005   Pain Assessment   Pain Assessment Tool 0-10   Pain Score No Pain   Restrictions/Precautions   Precautions Aspiration;Bed/chair alarms;Cognitive;Fall Risk;Supervision on toilet/commode   Weight Bearing Restrictions No   ROM Restrictions No   Lifestyle   Autonomy \"What therapy am I doing now?\"   Sit to Lying   Type of Assistance Needed Physical assistance   Physical Assistance Level 51%-75%   Comment with use of bedrail, A to manage BLE incidental at trunk   Sit to Lying CARE Score 2   Sit to Stand   Type of Assistance Needed Physical assistance   Physical Assistance Level 26%-50%   Comment Mod A progressing to MIn A to RW, verbal cuing for hand placement   Sit to Stand CARE Score 3   Bed-Chair Transfer   Type of Assistance Needed Physical assistance   Physical Assistance Level 26%-50%   Comment short distance ambulatory transfer with use of RW   Chair/Bed-to-Chair Transfer CARE Score 3   Toileting Hygiene   Type of Assistance Needed Physical assistance   Physical Assistance Level 26%-50%   Comment Pt completed CM prior to toileting in stance with use of RW and CGA for standing balance. Hygiene after urination completed seated. CM post toileting in stance requiring Mod A to complete secondary to fatigue and impaired balance.   Toileting Hygiene CARE Score 3   Toilet Transfer   Type of Assistance Needed Physical assistance   Physical Assistance Level 26%-50%   Comment short distance ambulatory transfer with use of RW   Toilet Transfer CARE Score 3   Cognition   Overall Cognitive Status Impaired   Arousal/Participation Alert;Cooperative   Attention Attends with cues to redirect   Orientation Level Oriented X4   Memory Decreased short term memory;Decreased recall of recent events;Decreased recall of precautions   Following Commands Follows one step commands with increased time or repetition   Activity Tolerance   Activity Tolerance Patient tolerated treatment well   Assessment "   Treatment Assessment Pt participated in skilled OT session with focus on ADL retraining. Pt tolerated treatment well, pt remained supine in bed with all immediate needs met, call bell accessible, bed alarm secured . Pt will continue to benefit from skilled OT services to ensure safe discharge. Continue plan of care with focus on endurance, functional transfers, ADL retraining.   Prognosis Fair   Problem List Decreased strength;Decreased endurance;Impaired balance;Decreased mobility;Decreased coordination;Decreased cognition;Impaired judgement;Decreased skin integrity   Barriers to Discharge Inaccessible home environment;Decreased caregiver support   Plan   Treatment/Interventions ADL retraining;Functional transfer training;Therapeutic exercise;Endurance training;Cognitive reorientation;Patient/family training;Equipment eval/education;Bed mobility;Compensatory technique education;Continued evaluation   Progress Slow progress, decreased activity tolerance   Discharge Recommendation   Rehab Resource Intensity Level, OT   (SNF)   OT Therapy Minutes   OT Time In 1005   OT Time Out 1035   OT Total Time (minutes) 30   Therapy Time missed   Time missed? No

## 2025-01-31 NOTE — CASE MANAGEMENT
LEONID NOTE      Spoke with Arabella from [facility], they do have a bed available for pt on Monday. CM called pt's dtr Courtney and informed her, she is in agreement. Also met with pt and notified tx team of plan. Requesting roundtrip transport BLS 2* pt's blood pressure and cognitive deficits, team identifies she is not safe to sit up in standard w/c or be left unattended. Roundtrip transport requested for 1 pm Monday 2/3. Will follow up Monday morning with team and pt's dtr when time is confirmed.     [facility] contact info for d/c: Phone [phone] Fax:  [phone]     Also emailed pt's dtr list of home health aide agencies and assisted living facilities, for future reference: [email or web address]

## 2025-01-31 NOTE — ASSESSMENT & PLAN NOTE
Inc melatonin to 6mg HS, trazodone 25mg HS PRN----> increased to 50---> 75 mg  Sleep log   Continue remeron 7.5, melatonin 6 mg  1/23 Psych consulted- no recommendations at this time  1/31 slept 4 hours on trazadone 100 mg and melatonin 6 mg

## 2025-01-31 NOTE — ASSESSMENT & PLAN NOTE
02/15/17 1100   Group 3   Start Time 1300   Stop Time 1345   Length (min) 45 min   Group Name Group Therapy   Focus of Group Process Group   Attendance Late   Mood Other   Affect Tense   Behavior/Socialization Outspoken   Thought Process Tangential;Ruminating;Circumstantial   Patient Response Hyperverbal   Task Performance Follows directions   Group Notes Pt ruminated, spoke tangentially about past experiences with alcohol and drug addiction. He stated that he recently had become suicidal after having found out he wsa not invited to his son's wedding. Pt shared that he is able to help others by giving them advice but can rarely help himself. Pt shared that he will attend meetings after d/c because he believes that support is important. He additionally stated that he is homeless and searching for a place to live. He stated that he refuses to live in a shelter, would rather search for an apartment.      Continue melatonin 6mg at HS, Remeron 7.5mg at HS, and trazodone 100mg at HS.  Continue sleep logs.

## 2025-01-31 NOTE — PLAN OF CARE
Problem: SAFETY ADULT  Goal: Patient will remain free of falls  Description: INTERVENTIONS:  - Educate patient/family on patient safety including physical limitations  - Instruct patient to call for assistance with activity   - Consult OT/PT to assist with strengthening/mobility   - Keep Call bell within reach  - Keep bed low and locked with side rails adjusted as appropriate  - Keep care items and personal belongings within reach  - Initiate and maintain comfort rounds  - Make Fall Risk Sign visible to staff  - Offer Toileting every  Hours, in advance of need  - Initiate/Maintain alarm  - Obtain necessary fall risk management equipment:   - Apply yellow socks and bracelet for high fall risk patients  - Consider moving patient to room near nurses station  Outcome: Progressing     Problem: DISCHARGE PLANNING  Goal: Discharge to home or other facility with appropriate resources  Description: INTERVENTIONS:  - Identify barriers to discharge w/patient and caregiver  - Arrange for needed discharge resources and transportation as appropriate  - Identify discharge learning needs (meds, wound care, etc.)  - Arrange for interpretive services to assist at discharge as needed  - Refer to Case Management Department for coordinating discharge planning if the patient needs post-hospital services based on physician/advanced practitioner order or complex needs related to functional status, cognitive ability, or social support system  Outcome: Progressing     Problem: CARDIOVASCULAR - ADULT  Goal: Maintains optimal cardiac output and hemodynamic stability  Description: INTERVENTIONS:  - Monitor I/O, vital signs and rhythm  - Monitor for S/S and trends of decreased cardiac output  - Administer and titrate ordered vasoactive medications to optimize hemodynamic stability  - Assess quality of pulses, skin color and temperature  - Assess for signs of decreased coronary artery perfusion  - Instruct patient to report change in severity  of symptoms  Outcome: Progressing

## 2025-01-31 NOTE — PROGRESS NOTES
"OT TREATMENT        01/31/25 0700   Pain Assessment   Pain Assessment Tool 0-10   Pain Score No Pain   Restrictions/Precautions   Precautions Aspiration;Bed/chair alarms;Cognitive;Fall Risk;Supervision on toilet/commode   Weight Bearing Restrictions No   ROM Restrictions No   Lifestyle   Autonomy \"I used to get ready in the morning and think nothing of it!\"   Oral Hygiene   Type of Assistance Needed Supervision   Physical Assistance Level No physical assistance   Comment seated in WC at sink   Oral Hygiene CARE Score 4   Grooming   Able To Initiate Tasks;Wash/Dry Face;Brush/Clean Teeth   Limitation Noted In Problem Solving;Strength;Timeliness   Findings seated in WC at sink   Shower/Bathe Self   Type of Assistance Needed Physical assistance   Physical Assistance Level 26%-50%   Comment Pt completed sponge bath seated in WC at sink to wash UB, BLE and feet via functional reach. In stance at sink pt able to wash perineal area and initiate washing of buttocks, A to complete thoroughly secondary to fatigue and imapired balance, Min A for standing balance throughout.   Shower/Bathe Self CARE Score 3   Bathing   Assessed Bath Style Sponge Bath   Tub/Shower Transfer   Reason Not Assessed Sponge Bath   Upper Body Dressing   Type of Assistance Needed Physical assistance   Physical Assistance Level 25% or less   Comment seated in WC to don [place] sweater, pt required A to complete acceleration down over trunk   Upper Body Dressing CARE Score 3   Lower Body Dressing   Type of Assistance Needed Physical assistance   Physical Assistance Level 25% or less   Comment seated in WC pt completed threading of BLE via functional reach. In stance with UE support of sink pt accelerated pants over hips with Min A to complete, Min A throughout for standing balance.   Lower Body Dressing CARE Score 3   Putting On/Taking Off Footwear   Type of Assistance Needed Physical assistance   Physical Assistance Level 51%-75%   Comment TA to don TEDs. " Supervision and extended time to don B  socks via functional reach seated in WC   Putting On/Taking Off Footwear CARE Score 2   Lying to Sitting on Side of Bed   Type of Assistance Needed Physical assistance   Physical Assistance Level 26%-50%   Comment HOB elevated with use of bedrail, A at trunk and extended time for BLE management   Lying to Sitting on Side of Bed CARE Score 3   Sit to Stand   Type of Assistance Needed Physical assistance   Physical Assistance Level 26%-50%   Comment Mod A progressing to MIn A to RW, verbal cuing for hand placement   Sit to Stand CARE Score 3   Bed-Chair Transfer   Type of Assistance Needed Physical assistance   Physical Assistance Level 26%-50%   Comment SPT with RW   Chair/Bed-to-Chair Transfer CARE Score 3   Cognition   Overall Cognitive Status Impaired   Arousal/Participation Alert;Cooperative   Attention Attends with cues to redirect   Orientation Level Oriented X4   Memory Decreased short term memory;Decreased recall of recent events;Decreased recall of precautions   Following Commands Follows one step commands with increased time or repetition   Activity Tolerance   Activity Tolerance Patient tolerated treatment well   Medical Staff Made Aware BP WNL throughout ADL session   Assessment   Treatment Assessment Pt participated in skilled OT session with focus on ADL retraining. Pt tolerated treatment well, pt remained seated in tilt in space  with all immediate needs met, call bell accessible, chair alarm secured . Pt will continue to benefit from skilled OT services to ensure safe discharge. Continue plan of care with focus on endurance, functional transfers, ADL retraining.   Prognosis Fair   Problem List Decreased strength;Decreased endurance;Impaired balance;Decreased mobility;Decreased coordination;Decreased cognition;Impaired judgement;Decreased skin integrity   Barriers to Discharge Decreased caregiver support;Inaccessible home environment   Plan    Treatment/Interventions ADL retraining;Functional transfer training;Therapeutic exercise;Endurance training;Cognitive reorientation;Patient/family training;Equipment eval/education;Bed mobility;Compensatory technique education;Continued evaluation   Progress Slow progress, decreased activity tolerance   Discharge Recommendation   Rehab Resource Intensity Level, OT   (SNF)   OT Therapy Minutes   OT Time In 0700   OT Time Out 0835   OT Total Time (minutes) 95   OT Mode of treatment - Individual (minutes) 95   OT Mode of treatment - Concurrent (minutes) 0   OT Mode of treatment - Group (minutes) 0   OT Mode of treatment - Co-treat (minutes) 0   OT Mode of Treatment - Total time(minutes) 95 minutes   OT Cumulative Minutes 950   Therapy Time missed   Time missed? No

## 2025-01-31 NOTE — PROGRESS NOTES
Progress Note - PMR   Name: Sandra Purvis 89 y.o. female I MRN: [id number]  Unit/Bed#: -01 I Date of Admission: 1/17/2025   Date of Service: 1/31/2025 I Hospital Day: 14     Assessment & Plan  Hypertension  NSGY target goal <160  Cw metoprolol succinate 75 mg twice daily  Cardizem 120 mg XR daily, d/c amlodipine  Wears compression stockings, abdominal binder  Metoprolol decreased to 50 mg 1/27, 25 mg 1/28 1/28 IV hydration, midodrine daily per IM due to hypotension  BP stabilized after SBP 110s  Atrial fibrillation with rapid ventricular response (HCC)  Initially presented with sinus bradycardia and evaluated by cardiology and was in controlled atrial fibrillation without bradycardia  Not on anticoagulation prior to admission and not indicated at this point due to the SDH and SAH and overall remains high risk due to risk of falls and given her advanced age  No recent follow-up with cardiology as an outpatient and generally sees her primary care physician for management and was on Toprol 50 mg daily at home which has been changed well and in the hospital  Tachycardic in the hospital and given IV fluids and medication changes include addition of midodrine and changes in the metoprolol dosing  Recommended for aspirin but would likely not be a good long-term candidate for anticoagulation  Cardio rec: continue with cardizem 120 daily and Toprol-XL 75 mg twice daily 1/16  Metoprolol 25 mg BID  Stage 3 chronic kidney disease, unspecified whether stage 3a or 3b CKD (HCC)  Lab Results   Component Value Date    EGFR 58 01/30/2025    EGFR 61 01/27/2025    EGFR 62 01/23/2025    CREATININE 0.87 01/30/2025    CREATININE 0.84 01/27/2025    CREATININE 0.83 01/23/2025   At baseline  GERD (gastroesophageal reflux disease)  Continue protonix  Basal cell carcinoma (BCC) of scalp  Large scalp mass noted on admission.  Also follows with dermatology as an outpatient but declining any interventions  Some localized spot  "bleeding from the edges noted  Patient scratched at it and created wound, hair net applied  1/24 slight smell of wound: Keflex ordered per IM  Finished course  Wound care recommendations: \"Left head--cleanse/soak with normal saline, pat dry. Cover wound with Xeroform and ABD. Secure with surgical cap. Change dressing daily and as needed. Trim surrounding hair as needed to keep from getting matted into wound\".   Hair could be trimmed more but family refused   Malignant melanoma of arm, left (HCC)  Large mass on the left forearm that is melanoma  Follows with dermatology as an outpatient last seen in December 2024 but not interested in any treatments  SDH (subdural hematoma) (HCC)  Patient presents with fall and initial CTh on 1/4 showing an acute subdural hemorrhage along the left cerebral convexity with a maximum thickness of 1.9 cm with no midline shift as well as an acute subarachnoid hemorrhage in the left frontoparietal sulci, left sylvian fissure and suprasellar cistern  Repeat CT head on 1/5 was stable; repeat 1/14 w/ interval evolution no new/evolving hemorrage   Completed 7 day course of keppra for seizure ppx  F/u w/ NSGY for repeat CTH ~1/27 1/20 Repeat imaging ordered for f/o tomorrow (1/21) at 1pm  F/u as needed; no neurosurgical intervention needed  MRI brain 1/22 improvement in SDH/SAH  SAH (subarachnoid hemorrhage) (HCC)  Subarachnoid hemorrhage noted on initial CT on 1/4 and stable on 1/5;   CTH1/14  Interval evolution of recent subdural and subarachnoid hemorrhage as detailed above. No definitive new or enlarging intracranial hemorrhage.  Continue same management for the subarachnoid hemorrhage as for the subdural hematoma, please see that separate section for overall management plans  Closed extensive facial fractures (HCC)  Secondary to fall  CT of the head on 1/4 also showed comminuted and mildly displaced fractures of the lateral and anterior walls of the right maxilla, lateral and inferior " walls of the right orbit, right inferior orbital wall fractures appear to involve the inferior orbital foramen with associated small extraconal soft tissue edema and focus of air.  There is no herniation of extraocular muscles and a nondisplaced fracture of the zygomatic arch as well as the temporal bone at the TMJ.  There is diffuse opacification of the right maxillary sinus and right nasal cavity with blood products and air  Seen by oral maxillofacial surgery team and surgical intervention was declined by patient  Completed 7-day course of Unasyn/Augmentin  Follow-up with OMFS as an outpatient  Completed sinus precautions (can use straws now)  Chest pain  Troponins obtained on initial evaluation of the patient in the emergency room and cardiology was consulted for evaluation  Despite elevated troponins there was nonischemic findings on her EKG and felt to be potentially related to a nonischemic troponin elevation in the setting of trauma and brain bleed versus demand ischemia due to her paroxysmal atrial fibrillation and syncopal episode  Not a candidate for cardiology for ischemic evaluation due to advanced age, frailty and recent subarachnoid hemorrhage/subdural hemorrhage  No new regional wall motion abnormalities noted on echocardiogram on 1/6/2025  Recommended to start aspirin 81 mg when cleared by neurosurgery with CT head  Follow-up with cardiology as an outpatient  Orthostatic hypotension  Blood pressures ranging in physical therapy as low as 70/40 and cardiology was evaluating the patient  Symptomatic with lightheadedness and presyncope and had received gentle fluids with some improvement  BALJIT stockings PRN as well and encouragement of fluid intake  Repeat orthostatics improved but still getting lightheaded at times which was felt to be the initiating factor in her syncopal episode that led to the fall and injury.  1/17 IM held midodrine, no significant orthostatic hypotension currently observed may need  to introduce at 2.5 mg TID if develops episodes at a later time, continue to monitor orthostatic Bps   1/19 bp dropped on standing, came back up on sitting  1/26 metoprolol decreased to 50 mg  25 mg 1/28  Midodrine 5 mg standing  Syncope  Presented with syncopal episode preceded by lightheadedness that led to the fall which caused the sudden dural hemorrhage and subarachnoid hemorrhage  Also found to be significantly orthostatic which was likely the etiology and was placed on telemetry with no evidence of bradycardia or pauses  Limited p.o. intake and fluid intake and suspected orthostatic hypotension as the cause  Anemia  Most recent hemoglobin of 9.4 1/17; which is stable  Continue to follow biweekly CBC or sooner if clinically indicated  Will need repeat scan if significant drops or concern for acute bleeds  1/20 10.1, 1/23 10.4, 1/27 9.9 hgb, 1/29 9.6  Leukocytosis  Most recent WBC count 12.08 and actually trending down from its maximum of 17.59  Unclear etiology, may be directly related to reactive changes from the SDH and SAH as well as fractures  Continue to monitor and if considerable changes may repeat scans given the fractures in the facial bones  Was on a course of Unasyn/Augmentin for 7 days  IM: Infectious workup negative  Received IV fluids, discontinued with evidence of fluid overload CXR  Stable off abx d/c 1/15  12.08 1/17---> 13.06 1/20----> 10.5 1/23 ----> 11.78 1/27--->1/30 8.15  Acute on chronic diastolic congestive heart failure (HCC)  Wt Readings from Last 3 Encounters:   01/31/25 42.9 kg (94 lb 9.2 oz)   01/17/25 51.1 kg (112 lb 9.6 oz)   01/12/25 53.5 kg (118 lb)     TTE 1/6/2025 showed EF 70%, grade 2 DD, mildly dilated RV, mild LA, mild AI, mild TR, estimated PA pressure 50 mmHg, trivial pericardial effusion  Not on diuretics prior to admission  Cw furosemide 20 mg; monitor for volume status   Daily weights, monitor I/O     Insomnia  Inc melatonin to 6mg HS, trazodone 25mg HS PRN---->  "increased to 50---> 75 mg  Sleep log   Continue remeron 7.5, melatonin 6 mg  1/23 Psych consulted- no recommendations at this time  1/31 slept 4 hours on trazadone 100 mg and melatonin 6 mg    History of Present Illness   Sandra Purvis is a 89 y.o. female w/ PMHx of afib, GERD, HTN, who initially presented to [facility] on 1/4/2025 after a syncopan episode and collapse at home. CT had showed \"comminuted and mildly displaced fractures of the lateral and anterior walls of the right maxilla, lateral and inferior walls of the right orbit, right inferior orbital wall fracture appears to involve the inferior orbital foramen associated with small extraconal soft tissue edema and focus of air. There is also a nondisplaced fracture of the zygomatic arch and the temporal bone at the TMJ and diffuse opacification of the right maxillary sinus and right nasal cavity with blood products and air. Additionally there was an acute subdural hemorrhage along the left cerebral convexity with a maximal thickness of 1.9 cm with no midline shift and an acute subarachnoid hemorrhage in the left frontal parietal sulci, left sylvian fissure as well as suprasellar cistern.\" OMFS was consulted as well as neurosurgery. Patient and family declined any acute intervention at this time. Patient was started on Keppra x 7 days. Patient was treated with 7-day course of Unasyn/Augmentin. Her hospital stay was also complicated with A-fib with RVR and cardiology was consulted. Patient then had orthostatic hypotension and started on midodrine. Patient initially transferred to [facility] 1/12/2025. While at [facility] patient with hypertensive episodes to SBP >190 and Afib rates 120-130s. Patient became more symptomatic from her A-fib and was transferred to acute care 1/14. She had X ray w/ pulmonary edema was given lasix 1/14-1/15 with good response. Patient was seen by cardiology. Patient was placed on Cardizem drip and " now on PO Cardizem. Her rates have improved. Sandra Purvis was admitted to the [facility] on 01/17/25     Chief Complaint: f/u ambulatory dysfunction    Interval: Patient seen and examined in recliner eating breakfast with speech therapy. No events overnight.  Reports overall feeling tired but very happy she was able to dress herself this morning. Last BM 1/28. Patient reported some constipation and encouraged to ask nurse for PRN bowel medication for constipation. Slept 4 hours last night per sleep log, 2 hours more than she has been.    Review of Systems   Constitutional:  Positive for fatigue.   Respiratory:  Negative for shortness of breath.    Cardiovascular:  Negative for chest pain, palpitations and leg swelling.   Gastrointestinal:  Positive for constipation. Negative for abdominal pain, diarrhea, nausea and vomiting.       Objective   Functional Update:  Physical Therapy Occupational Therapy Speech Therapy   Weight Bearing Status: Full Weight Bearing  Transfers: Moderate Assistance  Bed Mobility: Maximum Assistance  Amulation Distance (ft): 25 feet  Ambulation: Total Assistance (Mod A and needs chair follow, not consistant)  Discharge Recommendations: Skilled Nursing Facility  DC Home with:: 24 Hour Assisteance   Eating: Supervision  Grooming: Supervision  Bathing: Maximum Assistance  Bathing: Maximum Assistance  Upper Body Dressing: Minimal Assistance  Lower Body Dressing: Maximum Assistance  Toileting: Total Assistance, Assist of 2  Tub/Shower Transfer: Total Assistance, Assist of 2  Toilet Transfer: Maximum Assistance, Assist of 2, Total Assistance  Cognition: Exceptions to WNL  Cognition: Decreased Memory, Decreased Executive Functions, Decreased Attention, Decreased Comprehension, Behavioral Considerations, Decreased Safety  Orientation: Person, Place   Mode of Communication: Verbal  Cognition: Exceptions to WNL  Cognition: Decreased Memory, Decreased Executive Functions, Decreased Attention, Decreased  Comprehension  Orientation: Person, Situation  Swallowing: Exceptions to WNL  Swallowing: Oral Dysphagia, Pharyngeal Dysphagia, Aspiration Risk  Diet Recommendations: Level 2/Mechanically Altered, Thin  Discharge Recommendations:  (pending pt progress but likely subacute level of care)  DC Home with:: 24 Hour Supervision, 24 Hour Assisteance, Family Support         Temp:  [97.8 °F (36.6 °C)-98.2 °F (36.8 °C)] 97.8 °F (36.6 °C)  HR:  [62-81] 62  Resp:  [18] 18  BP: (113-164)/(54-74) 164/66  SpO2:  [95 %-98 %] 95 %    Physical Exam  Constitutional:       General: She is not in acute distress.     Comments: Frail   HENT:      Head: Normocephalic and atraumatic.      Mouth/Throat:      Mouth: Mucous membranes are moist.      Pharynx: Oropharynx is clear.   Cardiovascular:      Rate and Rhythm: Normal rate and regular rhythm.   Pulmonary:      Effort: Pulmonary effort is normal.      Breath sounds: Normal breath sounds.   Abdominal:      General: Bowel sounds are normal.   Musculoskeletal:         General: Normal range of motion.      Right lower leg: No edema.      Left lower leg: No edema.   Skin:     General: Skin is warm and dry.      Findings: Bruising (on face and arms) present.   Neurological:      Mental Status: She is alert. Mental status is at baseline.   Psychiatric:         Mood and Affect: Mood normal.         Behavior: Behavior normal.         Scheduled Meds:  Current Facility-Administered Medications   Medication Dose Route Frequency Provider Last Rate    acetaminophen  650 mg Oral Q6H PRN Sheri Pope MD      bisacodyl  10 mg Rectal Daily PRN Sheri Pope MD      calcium carbonate  500 mg Oral Daily PRN Autumn Chapman PA-C      diltiazem  120 mg Oral HS LUIS Monsalve      docusate sodium  100 mg Oral BID PRN Sheri Pope MD      enoxaparin  30 mg Subcutaneous Q24H Autumn Chapman PA-C      gabapentin  100 mg Oral HS Sheri Pope MD      hydrALAZINE  10 mg Oral Q8H PRN Clark  LUIS Mcmillan      melatonin  6 mg Oral HS Sheri Pope MD      metoprolol succinate  25 mg Oral BID LUIS Monsalve      midodrine  5 mg Oral TID PRN Autumn Chapman PA-C      midodrine  5 mg Oral Daily Before Breakfast LUIS Monsalve      mirtazapine  7.5 mg Oral HS Sheri Pope MD      ondansetron  4 mg Oral Q6H PRN Autumn Chapman PA-C      pantoprazole  40 mg Oral Early Morning Autumn Chapman PA-C      traZODone  100 mg Oral HS Sheri Pope MD           Lab Results: I have reviewed the following results:  Results from last 7 days   Lab Units 01/30/25  0516 01/27/25  0453   HEMOGLOBIN g/dL 9.6* 9.9*   HEMATOCRIT % 29.7* 31.0*   WBC Thousand/uL 8.15 11.78*   PLATELETS Thousands/uL 187 222     Results from last 7 days   Lab Units 01/30/25  0516 01/27/25  0453   BUN mg/dL 19 19   SODIUM mmol/L 141 140   POTASSIUM mmol/L 4.1 3.6   CHLORIDE mmol/L 107 103   CREATININE mg/dL 0.87 0.84              Autumn Chapman PA-C  Physical Medicine and Rehabilitation   01/31/25

## 2025-01-31 NOTE — PLAN OF CARE
Problem: PAIN - ADULT  Goal: Verbalizes/displays adequate comfort level or baseline comfort level  Description: Interventions:  - Encourage patient to monitor pain and request assistance  - Assess pain using appropriate pain scale  - Administer analgesics based on type and severity of pain and evaluate response  - Implement non-pharmacological measures as appropriate and evaluate response  - Consider cultural and social influences on pain and pain management  - Notify physician/advanced practitioner if interventions unsuccessful or patient reports new pain  Outcome: Progressing     Problem: SAFETY ADULT  Goal: Patient will remain free of falls  Description: INTERVENTIONS:  - Educate patient/family on patient safety including physical limitations  - Instruct patient to call for assistance with activity   - Consult OT/PT to assist with strengthening/mobility   - Keep Call bell within reach  - Keep bed low and locked with side rails adjusted as appropriate  - Keep care items and personal belongings within reach  - Initiate and maintain comfort rounds  - Make Fall Risk Sign visible to staff  - Offer Toileting every 2 Hours, in advance of need  - Initiate/Maintain bed and chair alarm  - Apply yellow socks and bracelet for high fall risk patients  - Consider moving patient to room near nurses station  Outcome: Progressing     Problem: MUSCULOSKELETAL - ADULT  Goal: Maintain or return mobility to safest level of function  Description: INTERVENTIONS:  - Assess patient's ability to carry out ADLs; assess patient's baseline for ADL function and identify physical deficits which impact ability to perform ADLs (bathing, care of mouth/teeth, toileting, grooming, dressing, etc.)  - Assess/evaluate cause of self-care deficits   - Assess range of motion  - Assess patient's mobility  - Assess patient's need for assistive devices and provide as appropriate  - Encourage maximum independence but intervene and supervise when  necessary  - Involve family in performance of ADLs  - Assess for home care needs following discharge   - Consider OT consult to assist with ADL evaluation and planning for discharge  - Provide patient education as appropriate  Outcome: Progressing

## 2025-01-31 NOTE — PROGRESS NOTES
Progress Note - Internal Medicine   Name: Sandra Purvis 89 y.o. female I MRN: [id number]  Unit/Bed#: [facility] 261-01 I Date of Admission: 1/17/2025   Date of Service: 1/31/2025 I Hospital Day: 14    Assessment & Plan  Hypertension  Home regimen: Toprol XL 50mg daily and HCTZ 12.5mg daily.  Currently receiving Toprol XL 50mg BID and Cardizem 120mg daily.  Give midodrine as needed for SBP <90.  Orthostatic + Monitor orthostatics daily.  Keep SBP <160 due to intracranial bleed.  Atrial fibrillation with rapid ventricular response (HCC)  Not taking AC at home due to hx of falls.  Continue to hold with recent falls and now subarachnoid/subdural.  Had been taking Toprol XL 50mg daily at home - currently receiving Toprol XL 50mg BID and Cardizem 120mg daily.  Hx of a-fib with RVR while on ARC, recently transferred to be on Cardizem drip.  Monitor routine VS.  Replete electrolytes as needed.  Follow-up with Cardiology as outpatient.  GERD (gastroesophageal reflux disease)  Continue Protonix 40mg daily.  Had not been on Protonix as outpatient.  Basal cell carcinoma (BCC) of scalp  Large scalp mass noted on admission.  Declining intervention as an outpatient.  Follows with Dermatology and LUIS Mejía (Surgical Oncology) as outpatient.  Wound care consulted - wound cleansed/debrided.  Continue with wearing cap during the day to avoid further patient manipulation.    Foul odor and drainage noted on 1/24 - completed 5 day course of Keflex.  Wound care recommending Xeroform and ABD.  Change dressing daily.  Malignant melanoma of arm, left (HCC)  Recent surgical excision on 12/19.  Follows with LUIS Mejía (Surg/Onc) as outpatient.  Had declined further treatment.  SDH (subdural hematoma) (HCC)  Presented on 1/4 after syncopal fall.  CTH showed acute subdural hemorrhage along the L cerebral convexity with maximal thickness of 1.9cm.  No midline shift.    Received Keppra x7 days.  Continue to hold AC/AP.  Neurovascular  checks Q shift.  Maintain normotension.  Avoid SBP >160.    CTH on 1/20: New small hypodensity within the left frontal subcortical white matter, differential includes age indeterminate infarct, evolving parenchymal contusion among other etiologies.  MRI brain obtained on 1/21 showing evolving bilateral cerebral convexity subdural hematomas containing blood products of varying ages, scattered small volume subarachnoid hemorrhage, no evidence of acute large territory infarct.    2 week follow-up with Neurosurgery on 1/21.  Imaging reviewed.  May consider starting ASA in 2 weeks after discussion with PCP/Cardiology in regards to risk/benefit.  Follow-up with Neurosurgery in additional 4 weeks with [facility].    Primary team following.  PT/OT/SLP.  SAH (subarachnoid hemorrhage) (HCC)  Presented on 1/4 after syncopal fall.  CTH showed acute subarachnoid hemorrhage in the L frontoparietal sulci, L sylvian fissure and suprasellar cistern.     Received Keppra x7 days.  Continue to hold AC/AP.  Neurovascular checks Q shift.  Maintain normotension.  Avoid SBP >160.     MRI brain obtained 1/21 - stable.  2 week follow-up with Neurosurgery on 1/21.  Imaging was reviewed.  May consider starting ASA in 2 weeks after discussion with PCP/Cardiology.  Follow-up with Neurosurgery in additional 4 weeks with [facility].     Primary team following.  PT/OT/SLP.  Closed extensive facial fractures (HCC)  S/p fall on 1/4.  CT facial bones showed comminuted and mildly displaced fractures of the lateral and anterior walls of the R maxilla, lateral, and inferior wall of the R orbit, wall fracture appears to involve the inferior orbital foramen with associated small extraconal soft tissue edema, nondisplaced fractures of the zygomatic arch and the temporal bone at the TMJ, diffuse opacification of R maxillary sinus and R nasal cavity with blood products and air.    OMFS evaluated in acute setting - declined surgical intervention.  Received IV Unasyn for 7  days.  No longer needs to follow sinus precautions per OMFS.    Follow-up with OMFS on discharge.  Chest pain  Noted to have elevated troponins on admission to acute setting.  [place] to be due to trauma/brain bleed vs demand ischemia due to paroxysmal a-fib and syncope.  Cardiology recommended starting ASA 81mg daily when cleared by Neurosurgery.  Per Neurosurgery - ok to start ASA in 2 weeks (2/4) after patient/family discussion of risks/benefits with PCP/Cardiologist.  Recommend establishing with Cardiology on discharge.  Orthostatic hypotension  Presented with syncope on 1/4.  Significant orthostasis noted in acute setting.  Continue midodrine PRN and midodrine 5mg daily in AM.  Apply abdominal binder/TEDs as needed.  Encourage PO hydration.  Continue to monitor orthostatics closely.    Given 500cc 0.9% NSS IV fluid bolus on 1/28.  Syncope  Presented on 1/4 s/p syncopal fall.  EKG showed atrial fibrillation with RVR on admission.  ECHO on 1/6 showed EF 70%, G2DD.  Noted to have significant orthostasis in acute setting.  Continue to monitor orthostatics.  Encourage PO hydration.  Apply abdominal binder/TEDs as needed.    Follow-up/establish with Cardiology as outpatient.  Anemia  Hgb currently 9.6.  Baseline Hgb 9-10.  No active s/s of bleeding.  Vitamin B12 1125 on 1/13 - no need for supplementation.  Folate 21.1 on 1/13 - no need for supplementation.  Iron panel on 1/13 - Iron Sat 12%, TIBC 295, Iron 34, and Ferritin 98.  Continue to trend routine CBC.  Leukocytosis  Improving, WBC count currently 8.15.  Chronically elevated.  Did recently complete a course of Unasyn and Augmentin.  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  Completed 5 day course.  Continue to trend routine CBC.  Acute on chronic diastolic congestive heart failure (HCC)  ECHO on 1/6 showed EF 70%, G2DD.  Monitor volume status - currently euvolemic.  Monitor I&Os, daily weights.  Insomnia  Continue melatonin 6mg at HS,  Remeron 7.5mg at HS, and trazodone 100mg at HS.  Continue sleep logs.    VTE Pharmacologic Prophylaxis:   Pharmacologic: Enoxaparin (Lovenox)  Mechanical VTE Prophylaxis in Place: Yes - sequential compression devices.    Current Length of Stay: 14 day(s)    Current Patient Status: Inpatient Rehab     Discharge Plan: As per primary team.    Code Status: Level 3 - DNAR and DNI    Subjective:   Pt examined while pt lying in bed in pt room.  Had just completed therapy this morning.  States that she felt great during her session.  Denies any lightheadedness or dizziness.  Denies any SOB, palpitations, or CP.  Encouraged to continue to hydrate more and is agreeable.  States that she slept very well last night.    Objective:     Vitals:   Temp (24hrs), Av.9 °F (36.6 °C), Min:97.8 °F (36.6 °C), Max:98.2 °F (36.8 °C)    Temp:  [97.8 °F (36.6 °C)-98.2 °F (36.8 °C)] 97.8 °F (36.6 °C)  HR:  [62-81] 62  Resp:  [18] 18  BP: (113-164)/(54-74) 164/66  SpO2:  [95 %-98 %] 95 %  Body mass index is 14.81 kg/m².     Review of Systems   Constitutional:  Negative for appetite change, chills, fatigue and fever.   HENT:  Negative for trouble swallowing.    Eyes:  Negative for visual disturbance.   Respiratory:  Negative for cough, shortness of breath, wheezing and stridor.    Cardiovascular:  Negative for chest pain, palpitations and leg swelling.   Gastrointestinal:  Negative for abdominal distention, abdominal pain, constipation, diarrhea, nausea and vomiting.        LBM    Genitourinary:  Negative for difficulty urinating.   Musculoskeletal:  Negative for arthralgias, back pain and gait problem.   Neurological:  Negative for dizziness, weakness, light-headedness, numbness and headaches.   Psychiatric/Behavioral:  Negative for dysphoric mood and sleep disturbance. The patient is not nervous/anxious.    All other systems reviewed and are negative.       Input and Output Summary (last 24 hours):       Intake/Output Summary (Last 24  hours) at 1/31/2025 0856  Last data filed at 1/31/2025 0613  Gross per 24 hour   Intake 900 ml   Output 876 ml   Net 24 ml       Physical Exam:     Physical Exam  Vitals and nursing note reviewed.   Constitutional:       General: She is not in acute distress.     Appearance: Normal appearance. She is not ill-appearing.      Comments: Thin.   HENT:      Head: Normocephalic and atraumatic.   Cardiovascular:      Rate and Rhythm: Normal rate and regular rhythm.      Pulses: Normal pulses.      Heart sounds: Murmur heard.      Systolic murmur is present with a grade of 1/6.      No friction rub.   Pulmonary:      Effort: Pulmonary effort is normal. No respiratory distress.      Breath sounds: Normal breath sounds. No wheezing or rhonchi.   Abdominal:      General: Abdomen is flat. Bowel sounds are normal. There is no distension.      Palpations: Abdomen is soft. There is no mass.      Tenderness: There is no abdominal tenderness. There is no guarding or rebound.      Hernia: No hernia is present.   Musculoskeletal:      Cervical back: Normal range of motion and neck supple. No tenderness.      Right lower leg: No edema.      Left lower leg: No edema.   Skin:     General: Skin is warm and dry.      Findings: Bruising (R facial bruising) present.   Neurological:      Mental Status: She is alert and oriented to person, place, and time.   Psychiatric:         Mood and Affect: Mood normal.         Behavior: Behavior normal.         Additional Data:     Labs:    Results from last 7 days   Lab Units 01/30/25  0516   WBC Thousand/uL 8.15   HEMOGLOBIN g/dL 9.6*   HEMATOCRIT % 29.7*   PLATELETS Thousands/uL 187   SEGS PCT % 75   LYMPHO PCT % 17   MONO PCT % 6   EOS PCT % 1     Results from last 7 days   Lab Units 01/30/25  0516   SODIUM mmol/L 141   POTASSIUM mmol/L 4.1   CHLORIDE mmol/L 107   CO2 mmol/L 28   BUN mg/dL 19   CREATININE mg/dL 0.87   ANION GAP mmol/L 6   CALCIUM mg/dL 9.1   GLUCOSE RANDOM mg/dL 93                        Labs reviewed    Imaging:    Imaging reviewed    Recent Cultures (last 7 days):           Last 24 Hours Medication List:   Current Facility-Administered Medications   Medication Dose Route Frequency Provider Last Rate    acetaminophen  650 mg Oral Q6H PRN Sheri Pope MD      bisacodyl  10 mg Rectal Daily PRN Sheri Pope MD      calcium carbonate  500 mg Oral Daily PRN Autumn Chapman PA-C      diltiazem  120 mg Oral HS LUIS Monsalve      docusate sodium  100 mg Oral BID PRN Sheri Pope MD      enoxaparin  30 mg Subcutaneous Q24H Autumn Chapman PA-C      gabapentin  100 mg Oral HS Sheri Pope MD      hydrALAZINE  10 mg Oral Q8H PRN LUIS Monsalve      melatonin  6 mg Oral HS Sheri Pope MD      metoprolol succinate  25 mg Oral BID LUIS Monsalve      midodrine  5 mg Oral TID PRN Autumn Chapman PA-C      midodrine  5 mg Oral Daily Before Breakfast LIUS Monsalve      mirtazapine  7.5 mg Oral HS Sheri Pope MD      ondansetron  4 mg Oral Q6H PRN Autumn Chapman PA-C      pantoprazole  40 mg Oral Early Morning Autumn Chapman PA-C      traZODone  100 mg Oral HS Sheri Pope MD          M*Modal software was used to dictate this note.  It may contain errors with dictating incorrect words or incorrect spelling. Please contact the provider directly with any questions.

## 2025-01-31 NOTE — ASSESSMENT & PLAN NOTE
Noted to have elevated troponins on admission to acute setting.  [place] to be due to trauma/brain bleed vs demand ischemia due to paroxysmal a-fib and syncope.  Cardiology recommended starting ASA 81mg daily when cleared by Neurosurgery.  Per Neurosurgery - ok to start ASA in 2 weeks (2/4) after patient/family discussion of risks/benefits with PCP/Cardiologist.  Recommend establishing with Cardiology on discharge.

## 2025-01-31 NOTE — ASSESSMENT & PLAN NOTE
Wt Readings from Last 3 Encounters:   01/31/25 42.9 kg (94 lb 9.2 oz)   01/17/25 51.1 kg (112 lb 9.6 oz)   01/12/25 53.5 kg (118 lb)     TTE 1/6/2025 showed EF 70%, grade 2 DD, mildly dilated RV, mild LA, mild AI, mild TR, estimated PA pressure 50 mmHg, trivial pericardial effusion  Not on diuretics prior to admission  Cw furosemide 20 mg; monitor for volume status   Daily weights, monitor I/O

## 2025-01-31 NOTE — PROGRESS NOTES
01/31/25 0850   Pain Assessment   Pain Assessment Tool 0-10   Pain Score No Pain   Restrictions/Precautions   Precautions Aspiration;Bed/chair alarms;Cognitive;Fall Risk;Supervision on toilet/commode   Comprehension   Comprehension (FIM) 4 - Understands basic info/conversation 75-90% of time   Expression   Expression (FIM) 5 - Needs help/cues only RARELY (< 10% of the time)   Social Interaction   Social Interaction (FIM) 5 - Interacts appropriately with others 90% of time   Problem Solving   Problem solving (FIM) 3 - Solves basic problmes 50-74% of time   Memory   Memory (FIM) 3 - Recognizes, recalls/performs 50-74%   Speech/Language/Cognition Assessment   Treatment Assessment Pt participated in skilled ST session focusing on cognitive linguistic skills. Pt was awake and alert when SLP entered pt's room and demonstrated recall of events from earlier this AM of completing her IADL. Pt also recalled main events from yesterday, including walking with PT and completing ST session. Engaged in more structured tasks, pt completed a category exclusion task when verbally presented with Fo4 words. Pt correctly identified words which did not belong the category of others in 8/10 trials. Errors occurred at beginning of trials as pt then demo notable improvement in problem solving as task continued. Additionally, pt required increased repetition of stimuli for beginning trials but again then did not require repetition of stimuli as task progressed. Pt then engaged in a basic problem solving task in which she provided possible causes to given situations in 5/10 trials. Of note, some of pt's responses were not necessarily errored, but pt benefited from follow up questions and verbal cues to provide more complete responses, increasing to 9/10 accuracy. At this time, pt continues to make slow but notable progress towards goals and is recommended for further skilled ST focusing on cognitive linguistic skills in order to maximize  functional independence and safety on discharge.   Eating   Type of Assistance Needed Set-up / clean-up   Physical Assistance Level No physical assistance   Eating CARE Score 5   Swallow Assessment   Swallow Treatment Assessment Daily Dysphagia Tx Note      Patient Name: Sandra Purvis     Today's Date: 1/31/2025        Current Risks for Dysphagia & Aspiration: Weak cough;General debilitation;Cognitive deficit;Reduced alertness; facial fractures     Current Symptoms/Concerns: difficulty chewing     Current diet: dysphagia level 3 and thin liquids      Premorbid diet::regular diet and thin liquids       Positioning: upright in highback WC     Items administered:Consistencies Administered: thin liquids, soft solids  Materials administered included: scrambled eggs, chopped sausage, tea, juice     Total amount of meal consumed:   100% of meal and 300cc of thins        Summary:  Pt presenting with minimal to functional oral dysphagia and functional pharyngeal swallow skills this session. Session focused on follow up assessment of recent diet upgrade.     Pt assessed during breakfast meal with level 3 items of scrambled eggs and chopped sausage. Provided set up assist, pt self fed but did not times of increased rate of intake. Pt demo functional bolus retrieval and oral containment. Mastication was minimally to mildly prolonged with softer solids with consistent piecemeal deglutition. Of note, pt with increased rate of intake at times, which appeared to contribute to times of prolonged oral prep, though pt appeared to manage well. Pt reported only minimal teeth sensitivity with new diet but denied any true pain. Pt demo functional bolus formation of solids and frequent but timely transfers of softer solids. Mild oral residual present between bites but pt fully cleared by end of meal-no overt pocketing. Timely transfers of thins by straw and cup. Swallows also appeared more timely this session across both solids and  liquids, including when taking single sips of liquids and multiple, consecutive sips. Audible swallows continue, primarily with thins. No overt s/s aspiration this session.     Overall, pt appears to be tolerating recent diet upgrade to dysphagia level and thins and denies any current difficulties or concerns.       Recommendations:  Diet: dysphagia level 3 diet, thin liquids  Meds: whole with puree  Strategies: upright posture, only feed when fully alert, slow rate of feeding, small bites/sips, quiet environment (tv off, limit talking, door closed, etc.), and alternating bites and sips, OOB preferred        DISTANT supervision w/ meals.  Results reviewed with: patient, RN Yanique, PMR Dr. Pope  Aspiration precautions posted.     F/u ST tx: Pt is currently recommended for further skilled SLP services targeting dysphagia therapy in order to maximize oral and pharyngeal swallow skills, while safely supporting PO intake, as well as to improve independent carryover of safe swallow strategies.       Plan:      Continue dysphagia level 3, thins      Initiate regular diet trials as appropriate with SLP   SLP Therapy Minutes   SLP Time In 0850   SLP Time Out 0930   SLP Total Time (minutes) 40   SLP Mode of treatment - Individual (minutes) 40   SLP Mode of treatment - Concurrent (minutes) 0   SLP Mode of treatment - Group (minutes) 0   SLP Mode of treatment - Co-treat (minutes) 0   SLP Mode of Treatment - Total time(minutes) 40 minutes   SLP Cumulative Minutes 550   Therapy Time missed   Time missed? No

## 2025-02-01 RX ADMIN — PANTOPRAZOLE SODIUM 40 MG: 40 TABLET, DELAYED RELEASE ORAL at 06:35

## 2025-02-01 RX ADMIN — DILTIAZEM HYDROCHLORIDE 120 MG: 120 CAPSULE, COATED, EXTENDED RELEASE ORAL at 21:33

## 2025-02-01 RX ADMIN — TRAZODONE HYDROCHLORIDE 100 MG: 100 TABLET ORAL at 21:33

## 2025-02-01 RX ADMIN — METOPROLOL SUCCINATE 25 MG: 25 TABLET, EXTENDED RELEASE ORAL at 21:31

## 2025-02-01 RX ADMIN — MELATONIN TAB 3 MG 6 MG: 3 TAB at 21:30

## 2025-02-01 RX ADMIN — BISACODYL 10 MG: 10 SUPPOSITORY RECTAL at 07:36

## 2025-02-01 RX ADMIN — DOCUSATE SODIUM 100 MG: 100 CAPSULE, LIQUID FILLED ORAL at 16:58

## 2025-02-01 RX ADMIN — GABAPENTIN 100 MG: 100 CAPSULE ORAL at 21:31

## 2025-02-01 RX ADMIN — ENOXAPARIN SODIUM 30 MG: 30 INJECTION SUBCUTANEOUS at 21:36

## 2025-02-01 RX ADMIN — MIDODRINE HYDROCHLORIDE 5 MG: 5 TABLET ORAL at 06:35

## 2025-02-01 RX ADMIN — MIRTAZAPINE 7.5 MG: 7.5 TABLET, FILM COATED ORAL at 21:33

## 2025-02-01 NOTE — NURSING NOTE
Pt alert and oriented times four.  Denies any pain.  She did not sleep much last night.  She took her head dressing off.  Some serosanguenous drainage on the pillow case.  Dressing changed.  Call bell within reach, continue to monitor.

## 2025-02-01 NOTE — PLAN OF CARE
Problem: SAFETY ADULT  Goal: Patient will remain free of falls  Description: INTERVENTIONS:  - Educate patient/family on patient safety including physical limitations  - Instruct patient to call for assistance with activity   - Consult OT/PT to assist with strengthening/mobility   - Keep Call bell within reach  - Keep bed low and locked with side rails adjusted as appropriate  - Keep care items and personal belongings within reach  - Initiate and maintain comfort rounds  - Make Fall Risk Sign visible to staff  - Offer Toileting every 2-3 Hours, in advance of need  - Initiate/Maintain bed and chair alarm  - Apply yellow socks and bracelet for high fall risk patients  - Consider moving patient to room near nurses station  Outcome: Progressing

## 2025-02-02 LAB — GLUCOSE SERPL-MCNC: 106 MG/DL (ref 65–140)

## 2025-02-02 PROCEDURE — 97530 THERAPEUTIC ACTIVITIES: CPT

## 2025-02-02 PROCEDURE — 97110 THERAPEUTIC EXERCISES: CPT

## 2025-02-02 PROCEDURE — 92526 ORAL FUNCTION THERAPY: CPT

## 2025-02-02 PROCEDURE — 97535 SELF CARE MNGMENT TRAINING: CPT

## 2025-02-02 PROCEDURE — 82948 REAGENT STRIP/BLOOD GLUCOSE: CPT

## 2025-02-02 RX ADMIN — ENOXAPARIN SODIUM 30 MG: 30 INJECTION SUBCUTANEOUS at 21:26

## 2025-02-02 RX ADMIN — MELATONIN TAB 3 MG 6 MG: 3 TAB at 21:41

## 2025-02-02 RX ADMIN — MIDODRINE HYDROCHLORIDE 5 MG: 5 TABLET ORAL at 09:09

## 2025-02-02 RX ADMIN — DILTIAZEM HYDROCHLORIDE 120 MG: 120 CAPSULE, COATED, EXTENDED RELEASE ORAL at 21:24

## 2025-02-02 RX ADMIN — PANTOPRAZOLE SODIUM 40 MG: 40 TABLET, DELAYED RELEASE ORAL at 06:40

## 2025-02-02 RX ADMIN — MIDODRINE HYDROCHLORIDE 5 MG: 5 TABLET ORAL at 12:42

## 2025-02-02 RX ADMIN — METOPROLOL SUCCINATE 25 MG: 25 TABLET, EXTENDED RELEASE ORAL at 20:12

## 2025-02-02 RX ADMIN — ONDANSETRON 4 MG: 4 TABLET, ORALLY DISINTEGRATING ORAL at 18:17

## 2025-02-02 NOTE — NURSING NOTE
Patient in speech therapy session eating lunch. Per SPT felt she was something was stuck and started coughing. She wanted to stand. Upon standing turned pale and became unresponsive for a few seconds. Patient was returned to bed by SPT and rehab aid.  BP 92/55, POC blood glucose 106. Patient continued to cough, +phlegm. Reports right sided discomfort that wrapped around to abdomen. Midodrine administered. BP check q15 minutes. Patient in bed resting comfortably.      1345- patient with PT- hypotensive, 86/63, patient refused therapy; resting in bed. Repeat /75.    1700- Patient unable to eat dinner, drank 60 mL chicken broth and got an upset stomach. Administered prn zofran.

## 2025-02-02 NOTE — PROGRESS NOTES
02/02/25 1330   Pain Assessment   Pain Assessment Tool 0-10   Pain Score 7   Pain Location/Orientation Orientation: Right;Location: Abdomen   Pain Onset/Description Frequency: Intermittent   Effect of Pain on Daily Activities limits mobility   Patient's Stated Pain Goal No pain   Hospital Pain Intervention(s) Repositioned;Rest   Restrictions/Precautions   Precautions Aspiration;Bed/chair alarms;Cognitive;Fall Risk;Supervision on toilet/commode;Pain  (ortho BP)   Weight Bearing Restrictions No   Cognition   Arousal/Participation Arousable   Subjective   Subjective Pt seen laying supine in bed. Pt reported feeling better s/p orthostatic episode with ST   Sit to Lying   Type of Assistance Needed Physical assistance   Physical Assistance Level 51%-75%   Comment max A at trunk and LE   Sit to Lying CARE Score 2   Lying to Sitting on Side of Bed   Type of Assistance Needed Physical assistance   Physical Assistance Level 51%-75%   Comment max A at trunk and LE   Lying to Sitting on Side of Bed CARE Score 2   Sit to Stand   Comment Pt declined getting OOB   Reason if not Attempted Safety concerns   Sit to Stand CARE Score 88   Bed-Chair Transfer   Comment Pt declined getting OOB   Reason if not Attempted Safety concerns   Chair/Bed-to-Chair Transfer CARE Score 88   Walk 10 Feet   Reason if not Attempted Safety concerns   Walk 10 Feet CARE Score 88   Walk 50 Feet with Two Turns   Reason if not Attempted Safety concerns   Walk 50 Feet with Two Turns CARE Score 88   Walk 150 Feet   Reason if not Attempted Safety concerns   Walk 150 Feet CARE Score 88   Wheel 50 Feet with Two Turns   Comment Pt declined getting OOB   Therapeutic Interventions   Strengthening Supine AAROM TE: SLR, hip abd/add, knee flx, ankle DF/PF 10x3; <25% active participation   Flexibility Gentle supine PROM to b/l HS and calves   Assessment   Treatment Assessment Pt seen laying supine in bed s/p orthostatic episode with ST. Pt's BP laying supine in bed  was 150/64. Per nursing, pt was administered midodrine. Pt had repeated cough and mucus expulsion and reported R abdominal pain, initially 7/10 but relieved throughout session. Pt initially agreeable to get OOB. Supine <> sit with max A, completed 2x due to pt becoming lightheaded and requesting to return to supine position. BP sitting EOB was 154/68 initially but then 86/63 with ABD and BALJIT hose donned. Pt returned to supine and positioned in bd for comfort. BP supine in bed was 123/75 then 166/70. Pt agreeable to supine TE with about 25% active participation for strengthening and flexibility. Pt limited by orthostatic hypotension, coughing, mucus production, and pain today. Plan is for continued rehab until d/c 2/3/24.   PT Barriers   Physical Impairment Decreased strength;Decreased endurance;Impaired balance;Decreased mobility;Decreased cognition;Decreased safety awareness;Pain   Functional Limitation Ramp negotiation;Car transfers;Stair negotiation;Standing;Transfers;Walking;Wheelchair management   Plan   Treatment/Interventions Functional transfer training;LE strengthening/ROM;Elevations;Therapeutic exercise;Endurance training;Patient/family training;Bed mobility;Gait training   Progress Slow progress, decreased activity tolerance   PT Therapy Minutes   PT Time In 1330   PT Time Out 1430   PT Total Time (minutes) 60   PT Mode of treatment - Individual (minutes) 60   PT Mode of treatment - Concurrent (minutes) 0   PT Mode of treatment - Group (minutes) 0   PT Mode of treatment - Co-treat (minutes) 0   PT Mode of Treatment - Total time(minutes) 60 minutes   PT Cumulative Minutes 865   Therapy Time missed   Time missed? Yes   Amount of time missed 30   Reason for time missed   (Missed time due to orthostatic hypotension and poor activity tolerance)

## 2025-02-02 NOTE — PROGRESS NOTES
"   02/02/25 0900   Pain Assessment   Pain Score No Pain   Restrictions/Precautions   Precautions Aspiration;Bed/chair alarms;Cognitive;Fall Risk;Supervision on toilet/commode  (orthp BP)   Lifestyle   Autonomy \"I like to do it myself\"   Oral Hygiene   Type of Assistance Needed Set-up / clean-up   Comment seated at sink   Oral Hygiene CARE Score 5   Shower/Bathe Self   Type of Assistance Needed Physical assistance   Physical Assistance Level 26%-50%   Comment completed SB this session seated in wc. pt able to wash UB/LB. in stance req TA for perri/buttock   Shower/Bathe Self CARE Score 3   Upper Body Dressing   Type of Assistance Needed Supervision   Comment seated able to don [place] shirt   Upper Body Dressing CARE Score 4   Lower Body Dressing   Type of Assistance Needed Physical assistance   Physical Assistance Level 25% or less   Comment pt able to bring LE up on wc and thread undergarment/pants. in stance req TA for CM 2* dec stanidng balance/unilateral release   Lower Body Dressing CARE Score 3   Putting On/Taking Off Footwear   Type of Assistance Needed Physical assistance   Physical Assistance Level 25% or less   Comment pt able to don TEDS, TA for sneakers and laces   Putting On/Taking Off Footwear CARE Score 3   Lying to Sitting on Side of Bed   Type of Assistance Needed Physical assistance   Physical Assistance Level 26%-50%   Lying to Sitting on Side of Bed CARE Score 3   Sit to Stand   Type of Assistance Needed Physical assistance   Physical Assistance Level 25% or less   Comment Luis Eduardo with Rw P carryover of hand placement and req vc   Sit to Stand CARE Score 3   Bed-Chair Transfer   Type of Assistance Needed Physical assistance   Physical Assistance Level 26%-50%   Comment modA SPT with RW   Chair/Bed-to-Chair Transfer CARE Score 3   Toileting Hygiene   Type of Assistance Needed Physical assistance   Physical Assistance Level Total assistance   Comment in stance with RW pt can help support self. req TA for " hygiene/Cm   Toileting Hygiene CARE Score 1   Toilet Transfer   Type of Assistance Needed Physical assistance   Physical Assistance Level 26%-50%   Comment modA SPT with RW   Toilet Transfer CARE Score 3   Exercise Tools   Other Exercise Tool 1 seated engaged pt in UE TE with 1# 2x15 to cont to inc fx strength. pt tolerated well with rest break in between. 2* fatigue.   Cognition   Overall Cognitive Status Impaired   Arousal/Participation Alert;Cooperative   Attention Attends with cues to redirect   Orientation Level Oriented X4   Memory Decreased short term memory;Decreased recall of recent events;Decreased recall of precautions   Following Commands Follows one step commands with increased time or repetition   Activity Tolerance   Activity Tolerance Patient tolerated treatment well   Medical Staff Made Aware ortho BP beginning of session with CHACHA Chanel (no TEDS) supine 104/51; sit 103/50 Hr77; stand 78/42 Hr81. pt c/o dizziness and laid pack into bed. CHACHA Chanel provided midodrine. reassesed BP post ADL seated with TEDS 103/50 HR77   Assessment   Treatment Assessment Engaged pt in 90mins of skilled OT services with focus on DC ADL and UE TE. Pt is making steady gains and overall compelting ADLs/transfers at modA/maxA. Req maxA for buttock hygiene/CM. Demonstrates improved SPT with RW at modA level. Pt at this time cont to req physical assistance. Pt to cont further rehab and DC to STR on 2/3/24.   Prognosis Fair   Problem List Decreased strength;Decreased endurance;Impaired balance;Decreased mobility;Decreased coordination;Decreased cognition;Impaired judgement;Decreased skin integrity   Barriers to Discharge None   Barriers to Discharge Comments DC STR   Plan   Progress Slow progress, decreased activity tolerance   Discharge Recommendation   Rehab Resource Intensity Level, OT   (STR)   OT Therapy Minutes   OT Time In 0900   OT Time Out 1030   OT Total Time (minutes) 90   OT Mode of treatment - Individual  (minutes) 90   OT Mode of treatment - Concurrent (minutes) 0   OT Mode of treatment - Group (minutes) 0   OT Mode of treatment - Co-treat (minutes) 0   OT Mode of Treatment - Total time(minutes) 90 minutes   OT Cumulative Minutes 1040   Therapy Time missed   Time missed? No

## 2025-02-02 NOTE — PROGRESS NOTES
"   02/02/25 1200   Pain Assessment   Pain Assessment Tool 0-10   Pain Score No Pain   Restrictions/Precautions   Precautions Aspiration;Bed/chair alarms;Cognitive;Fall Risk;Pain;Supervision on toilet/commode   Eating   Type of Assistance Needed Set-up / clean-up;Supervision;Verbal cues   Physical Assistance Level No physical assistance   Eating CARE Score 4   Swallow Assessment   Swallow Treatment Assessment   Daily Dysphagia Tx Note     Patient Name: Sandra Purvis    Today's Date: 2/2/2025      Current Risks for Dysphagia & Aspiration: Weak cough;General debilitation;Cognitive deficit;Reduced alertness; facial fractures     Current Symptoms/Concerns: difficulty chewing     Current diet: dysphagia level 3 and thin liquids      Premorbid diet::regular diet and thin liquids      Positioning: pt was fully upright in tilt in space WC    Items administered:Consistencies Administered: thin liquids, puree, and mechanical soft solids  Materials administered included: minced turkey, stuffing, pureed green beans, canned pears, thins by straw    Total amount of meal consumed:   50% of meal and 120cc of thins      Oral stage:minimal  Lip closure: functional  Anterior spillage: none  Mastication: slower but effective to breakdown  Bolus formation: minimally decreased w/ soft solids  Bolus control: appeared to be timely  Transfer: minimal delay; at times piecemeal transfers  Oral residue: trace w/ softer solids  Pocketing: none         Pharyngeal stage:minimal  Swallow promptness: minimal delay across textures today  Hyolaryngeal elevation: present and more effort noted when palpated  Wet voice: none  Throat clear: none  Cough: none  Secondary swallows: more consistent w/ sofer solids  Audible swallows: present w/ thins       Esophageal stage: Pt was noted to have belching near end of meal after taking liquids. However at very end of meal pt began to c/o something \"sticking\" however where pt was pointing was towards R lower " flank area. Then pt pointed to upper stomach area as well. Pt was bringing up increased mucous but never had any regurgitation of food items during this event.        Summary:     Pt presenting with minimal oral and pharyngeal dysphagia today. Symptoms or concerns included slower but effective mastication of softer solids throughout meal today. Bolus formulation was minimally decreased when taking piecemeal bites, which did lead to trace oral residual. SLP did have to provide pt verbal cues for pacing today as faster rate if intake noted. Also, verbal cues needed for alternating solids/liquids. Bolus control of thins today was suspected to be timely. Manipulation of puree was minimally delayed but w/o oral residual. Lip seal and bolus retrieval was functional to where no anterior loss was observed. Swallow initiation was minimally delayed across textures, but HLE is present and when palpated appeared more functional. Double swallows noted w/ softer solids primarily. Pt does continue w/ audible swallows given thin liquids. Of note, near end of meal and after thin liquids, pt would not to have a belch but today, pt was expelling mucous.    With the events of regurgitation, pt was stating that something was sticking pointing towards R lower flank area, but then also pointing to upper stomach. Pt requested to stand to aid in pushing down the thing stuck. However, when standing, pt became hypotensive and was almost unresponsive. Rehab aid and SLP placing pt back into WC, RN notified and tilted pt's WC towards reclined position. Pt was communicative and following directions, answering questions. Pt was transferred back into bed, where RN check pt's BP, blood sugar.     Also to note, RN did report to SLP how the meat in yesterday's session was to dry and big chunks, to where it was requested to have the meat chopped up finer. The texture which was received today during meal was the texture of level 2 meat. Of note, pt is  to be transitioning to next level of care tomorrow (2/3) in which continued recommendations will be made for skilled ST services to follow pt to ensure safety in swallow function.        Recommendations:  Diet: dysphagia level 3 diet, pureed meats w/ thin liquids  Meds: whole with puree  Strategies: upright posture, only feed when fully alert, slow rate of feeding, small bites/sips, quiet environment (tv off, limit talking, door closed, etc.), and alternating bites and sips, OOB preferred        DISTANT supervision w/ meals.  Results reviewed with: patient, pt's spouse, CHACHA Chanel  Aspiration precautions posted.     F/u ST tx: Pt is currently recommended for further skilled SLP services targeting dysphagia therapy in order to maximize oral and pharyngeal swallow skills, while safely supporting PO intake, as well as to improve independent carryover of safe swallow strategies.       Plan:      Continue dysphagia level 3, thins      Initiate regular diet trials as appropriate with SLP   Swallow Assessment Prognosis   Prognosis Good   Prognosis Considerations Age;Co-morbidities;Medical prognosis;Medical diagnosis;Severity of impairments;New learning ability;Ability to carry over   SLP Therapy Minutes   SLP Time In 1200   SLP Time Out 1230   SLP Total Time (minutes) 30   SLP Mode of treatment - Individual (minutes) 30   SLP Mode of treatment - Concurrent (minutes) 0   SLP Mode of treatment - Group (minutes) 0   SLP Mode of treatment - Co-treat (minutes) 0   SLP Mode of Treatment - Total time(minutes) 30 minutes   SLP Cumulative Minutes 580   Therapy Time missed   Time missed? No

## 2025-02-03 ENCOUNTER — APPOINTMENT (EMERGENCY)
Dept: RADIOLOGY | Facility: HOSPITAL | Age: OVER 89
DRG: 393 | End: 2025-02-03
Payer: MEDICARE

## 2025-02-03 ENCOUNTER — APPOINTMENT (INPATIENT)
Dept: RADIOLOGY | Facility: HOSPITAL | Age: OVER 89
DRG: 393 | End: 2025-02-03
Payer: MEDICARE

## 2025-02-03 ENCOUNTER — HOSPITAL ENCOUNTER (EMERGENCY)
Facility: HOSPITAL | Age: OVER 89
DRG: 393 | End: 2025-02-03
Attending: EMERGENCY MEDICINE
Payer: MEDICARE

## 2025-02-03 ENCOUNTER — APPOINTMENT (EMERGENCY)
Dept: CT IMAGING | Facility: HOSPITAL | Age: OVER 89
DRG: 393 | End: 2025-02-03
Payer: MEDICARE

## 2025-02-03 ENCOUNTER — ANESTHESIA EVENT (INPATIENT)
Dept: GASTROENTEROLOGY | Facility: HOSPITAL | Age: OVER 89
DRG: 949 | End: 2025-02-03
Payer: MEDICARE

## 2025-02-03 ENCOUNTER — HOSPITAL ENCOUNTER (INPATIENT)
Facility: HOSPITAL | Age: OVER 89
LOS: 4 days | DRG: 393 | End: 2025-02-07
Attending: INTERNAL MEDICINE | Admitting: INTERNAL MEDICINE
Payer: MEDICARE

## 2025-02-03 VITALS
HEART RATE: 103 BPM | SYSTOLIC BLOOD PRESSURE: 184 MMHG | WEIGHT: 97.44 LBS | TEMPERATURE: 99.1 F | RESPIRATION RATE: 20 BRPM | OXYGEN SATURATION: 97 % | BODY MASS INDEX: 15.26 KG/M2 | DIASTOLIC BLOOD PRESSURE: 89 MMHG

## 2025-02-03 VITALS
HEIGHT: 67 IN | DIASTOLIC BLOOD PRESSURE: 78 MMHG | BODY MASS INDEX: 15.29 KG/M2 | SYSTOLIC BLOOD PRESSURE: 187 MMHG | TEMPERATURE: 98.3 F | HEART RATE: 63 BPM | WEIGHT: 97.4 LBS | OXYGEN SATURATION: 99 % | RESPIRATION RATE: 16 BRPM

## 2025-02-03 DIAGNOSIS — R55 SYNCOPE: ICD-10-CM

## 2025-02-03 DIAGNOSIS — R10.13 EPIGASTRIC PAIN: ICD-10-CM

## 2025-02-03 DIAGNOSIS — R35.0 INCREASED URINARY FREQUENCY: ICD-10-CM

## 2025-02-03 DIAGNOSIS — T18.128D FOOD IMPACTION OF ESOPHAGUS, SUBSEQUENT ENCOUNTER: Primary | ICD-10-CM
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
## 2025-02-03 DIAGNOSIS — K21.9 GERD (GASTROESOPHAGEAL REFLUX DISEASE): ICD-10-CM

## 2025-02-03 DIAGNOSIS — N10 ACUTE PYELONEPHRITIS: ICD-10-CM

## 2025-02-03 DIAGNOSIS — R07.9 CHEST PAIN: Primary | ICD-10-CM

## 2025-02-03 DIAGNOSIS — G47.00 INSOMNIA: ICD-10-CM
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
## 2025-02-03 PROBLEM — R93.5 ABNORMAL CT OF THE ABDOMEN: Status: ACTIVE | Noted: 2025-02-03

## 2025-02-03 PROBLEM — K11.7 DISTURBANCE OF SALIVARY SECRETION: Status: ACTIVE | Noted: 2025-02-03

## 2025-02-03 PROBLEM — J96.01 ACUTE HYPOXIC RESPIRATORY FAILURE (HCC): Status: ACTIVE | Noted: 2025-02-03

## 2025-02-03 PROBLEM — N30.00 ACUTE CYSTITIS: Status: ACTIVE | Noted: 2025-02-03

## 2025-02-03 PROBLEM — R10.9 ABDOMINAL PAIN: Status: ACTIVE | Noted: 2025-02-03

## 2025-02-03 PROBLEM — T18.128A FOOD IMPACTION OF ESOPHAGUS: Status: ACTIVE | Noted: 2025-02-03

## 2025-02-03 PROBLEM — Z91.89 BEHAVIOR SAFETY RISK: Status: ACTIVE | Noted: 2025-02-03

## 2025-02-03 PROBLEM — R13.19 ESOPHAGEAL DYSPHAGIA: Status: ACTIVE | Noted: 2025-02-03

## 2025-02-03 LAB
2HR DELTA HS TROPONIN: 6 NG/L
ALBUMIN SERPL BCG-MCNC: 3.9 G/DL (ref 3.5–5)
ALP SERPL-CCNC: 93 U/L (ref 34–104)
ALT SERPL W P-5'-P-CCNC: 20 U/L (ref 7–52)
ANION GAP SERPL CALCULATED.3IONS-SCNC: 11 MMOL/L (ref 4–13)
ANION GAP SERPL CALCULATED.3IONS-SCNC: 9 MMOL/L (ref 4–13)
ANISOCYTOSIS BLD QL SMEAR: PRESENT
AST SERPL W P-5'-P-CCNC: 16 U/L (ref 13–39)
ATRIAL RATE: 100 BPM
ATRIAL RATE: 105 BPM
ATRIAL RATE: 109 BPM
BACTERIA UR QL AUTO: ABNORMAL /HPF
BASE EX.OXY STD BLDV CALC-SCNC: 30.6 % (ref 60–80)
BASE EXCESS BLDV CALC-SCNC: 1.7 MMOL/L
BASOPHILS # BLD AUTO: 0.04 THOUSANDS/ΜL (ref 0–0.1)
BASOPHILS # BLD MANUAL: 0 THOUSAND/UL (ref 0–0.1)
BASOPHILS NFR BLD AUTO: 0 % (ref 0–1)
BASOPHILS NFR MAR MANUAL: 0 % (ref 0–1)
BILIRUB SERPL-MCNC: 0.5 MG/DL (ref 0.2–1)
BILIRUB UR QL STRIP: NEGATIVE
BUN SERPL-MCNC: 20 MG/DL (ref 5–25)
BUN SERPL-MCNC: 23 MG/DL (ref 5–25)
CA-I BLD-SCNC: 1.16 MMOL/L (ref 1.12–1.32)
CALCIUM SERPL-MCNC: 10.2 MG/DL (ref 8.4–10.2)
CALCIUM SERPL-MCNC: 9.9 MG/DL (ref 8.4–10.2)
CARDIAC TROPONIN I PNL SERPL HS: 28 NG/L (ref ?–50)
CARDIAC TROPONIN I PNL SERPL HS: 34 NG/L (ref ?–50)
CHLORIDE SERPL-SCNC: 103 MMOL/L (ref 96–108)
CHLORIDE SERPL-SCNC: 107 MMOL/L (ref 96–108)
CLARITY UR: ABNORMAL
CO2 SERPL-SCNC: 25 MMOL/L (ref 21–32)
CO2 SERPL-SCNC: 25 MMOL/L (ref 21–32)
COLOR UR: YELLOW
CREAT SERPL-MCNC: 0.96 MG/DL (ref 0.6–1.3)
CREAT SERPL-MCNC: 0.96 MG/DL (ref 0.6–1.3)
EOSINOPHIL # BLD AUTO: 0 THOUSAND/ΜL (ref 0–0.61)
EOSINOPHIL # BLD MANUAL: 0 THOUSAND/UL (ref 0–0.4)
EOSINOPHIL NFR BLD AUTO: 0 % (ref 0–6)
EOSINOPHIL NFR BLD MANUAL: 0 % (ref 0–6)
ERYTHROCYTE [DISTWIDTH] IN BLOOD BY AUTOMATED COUNT: 17.2 % (ref 11.6–15.1)
ERYTHROCYTE [DISTWIDTH] IN BLOOD BY AUTOMATED COUNT: 17.6 % (ref 11.6–15.1)
FLUAV AG UPPER RESP QL IA.RAPID: NEGATIVE
FLUBV AG UPPER RESP QL IA.RAPID: NEGATIVE
GFR SERPL CREATININE-BSD FRML MDRD: 52 ML/MIN/1.73SQ M
GFR SERPL CREATININE-BSD FRML MDRD: 52 ML/MIN/1.73SQ M
GLUCOSE SERPL-MCNC: 115 MG/DL (ref 65–140)
GLUCOSE SERPL-MCNC: 139 MG/DL (ref 65–140)
GLUCOSE SERPL-MCNC: 148 MG/DL (ref 65–140)
GLUCOSE UR STRIP-MCNC: NEGATIVE MG/DL
HCO3 BLDV-SCNC: 27.1 MMOL/L (ref 24–30)
HCT VFR BLD AUTO: 34.7 % (ref 34.8–46.1)
HCT VFR BLD AUTO: 37.1 % (ref 34.8–46.1)
HGB BLD-MCNC: 11.3 G/DL (ref 11.5–15.4)
HGB BLD-MCNC: 11.7 G/DL (ref 11.5–15.4)
HGB UR QL STRIP.AUTO: 150
IMM GRANULOCYTES # BLD AUTO: 0.05 THOUSAND/UL (ref 0–0.2)
IMM GRANULOCYTES NFR BLD AUTO: 0 % (ref 0–2)
KETONES UR STRIP-MCNC: NEGATIVE MG/DL
LEUKOCYTE ESTERASE UR QL STRIP: 500
LYMPHOCYTES # BLD AUTO: 0.91 THOUSAND/UL (ref 0.6–4.47)
LYMPHOCYTES # BLD AUTO: 0.95 THOUSANDS/ΜL (ref 0.6–4.47)
LYMPHOCYTES # BLD AUTO: 5 % (ref 14–44)
LYMPHOCYTES NFR BLD AUTO: 6 % (ref 14–44)
MAGNESIUM SERPL-MCNC: 2 MG/DL (ref 1.9–2.7)
MCH RBC QN AUTO: 29 PG (ref 26.8–34.3)
MCH RBC QN AUTO: 29.7 PG (ref 26.8–34.3)
MCHC RBC AUTO-ENTMCNC: 31.5 G/DL (ref 31.4–37.4)
MCHC RBC AUTO-ENTMCNC: 32.6 G/DL (ref 31.4–37.4)
MCV RBC AUTO: 91 FL (ref 82–98)
MCV RBC AUTO: 92 FL (ref 82–98)
MONOCYTES # BLD AUTO: 0.15 THOUSAND/UL (ref 0–1.22)
MONOCYTES # BLD AUTO: 1.1 THOUSAND/ΜL (ref 0.17–1.22)
MONOCYTES NFR BLD AUTO: 7 % (ref 4–12)
MONOCYTES NFR BLD: 1 % (ref 4–12)
NEUTROPHILS # BLD AUTO: 13.35 THOUSANDS/ΜL (ref 1.85–7.62)
NEUTROPHILS # BLD MANUAL: 14.08 THOUSAND/UL (ref 1.85–7.62)
NEUTS SEG NFR BLD AUTO: 87 % (ref 43–75)
NEUTS SEG NFR BLD AUTO: 93 % (ref 43–75)
NITRITE UR QL STRIP: POSITIVE
NON-SQ EPI CELLS URNS QL MICRO: ABNORMAL /HPF
NRBC BLD AUTO-RTO: 0 /100 WBCS
O2 CT BLDV-SCNC: 5.4 ML/DL
P AXIS: 68 DEGREES
P AXIS: 69 DEGREES
P AXIS: 76 DEGREES
PCO2 BLDV: 45.8 MM HG (ref 42–50)
PH BLDV: 7.39 [PH] (ref 7.3–7.4)
PH UR STRIP.AUTO: 6 [PH]
PHOSPHATE SERPL-MCNC: 3.2 MG/DL (ref 2.3–4.1)
PLATELET # BLD AUTO: 191 THOUSANDS/UL (ref 149–390)
PLATELET # BLD AUTO: 202 THOUSANDS/UL (ref 149–390)
PLATELET BLD QL SMEAR: ADEQUATE
PMV BLD AUTO: 10.4 FL (ref 8.9–12.7)
PMV BLD AUTO: 10.7 FL (ref 8.9–12.7)
PO2 BLDV: 20.5 MM HG (ref 35–45)
POIKILOCYTOSIS BLD QL SMEAR: PRESENT
POTASSIUM SERPL-SCNC: 4.1 MMOL/L (ref 3.5–5.3)
POTASSIUM SERPL-SCNC: 4.6 MMOL/L (ref 3.5–5.3)
PR INTERVAL: 218 MS
PR INTERVAL: 220 MS
PR INTERVAL: 232 MS
PROT SERPL-MCNC: 7.1 G/DL (ref 6.4–8.4)
PROT UR STRIP-MCNC: ABNORMAL MG/DL
QRS AXIS: -16 DEGREES
QRS AXIS: -56 DEGREES
QRS AXIS: -64 DEGREES
QRSD INTERVAL: 74 MS
QRSD INTERVAL: 76 MS
QRSD INTERVAL: 84 MS
QT INTERVAL: 328 MS
QT INTERVAL: 344 MS
QT INTERVAL: 346 MS
QTC INTERVAL: 442 MS
QTC INTERVAL: 447 MS
QTC INTERVAL: 455 MS
RBC # BLD AUTO: 3.81 MILLION/UL (ref 3.81–5.12)
RBC # BLD AUTO: 4.03 MILLION/UL (ref 3.81–5.12)
RBC #/AREA URNS AUTO: ABNORMAL /HPF
RBC MORPH BLD: PRESENT
SARS-COV+SARS-COV-2 AG RESP QL IA.RAPID: NEGATIVE
SODIUM SERPL-SCNC: 139 MMOL/L (ref 135–147)
SODIUM SERPL-SCNC: 141 MMOL/L (ref 135–147)
SP GR UR STRIP.AUTO: 1.01 (ref 1–1.04)
T WAVE AXIS: 37 DEGREES
T WAVE AXIS: 73 DEGREES
T WAVE AXIS: 76 DEGREES
T4 FREE SERPL-MCNC: 0.98 NG/DL (ref 0.61–1.12)
TSH SERPL DL<=0.05 MIU/L-ACNC: 0.25 UIU/ML (ref 0.45–4.5)
UROBILINOGEN UA: NEGATIVE MG/DL
VARIANT LYMPHS # BLD AUTO: 1 %
VENTRICULAR RATE: 100 BPM
VENTRICULAR RATE: 105 BPM
VENTRICULAR RATE: 109 BPM
WBC # BLD AUTO: 15.14 THOUSAND/UL (ref 4.31–10.16)
WBC # BLD AUTO: 15.49 THOUSAND/UL (ref 4.31–10.16)
WBC #/AREA URNS AUTO: ABNORMAL /HPF

## 2025-02-03 PROCEDURE — 80053 COMPREHEN METABOLIC PANEL: CPT

## 2025-02-03 PROCEDURE — 82330 ASSAY OF CALCIUM: CPT

## 2025-02-03 PROCEDURE — NC001 PR NO CHARGE: Performed by: PHYSICAL MEDICINE & REHABILITATION

## 2025-02-03 PROCEDURE — 93005 ELECTROCARDIOGRAM TRACING: CPT

## 2025-02-03 PROCEDURE — 99239 HOSP IP/OBS DSCHRG MGMT >30: CPT | Performed by: PHYSICAL MEDICINE & REHABILITATION

## 2025-02-03 PROCEDURE — 71045 X-RAY EXAM CHEST 1 VIEW: CPT

## 2025-02-03 PROCEDURE — 85007 BL SMEAR W/DIFF WBC COUNT: CPT

## 2025-02-03 PROCEDURE — 82805 BLOOD GASES W/O2 SATURATION: CPT

## 2025-02-03 PROCEDURE — 87186 SC STD MICRODIL/AGAR DIL: CPT | Performed by: NURSE PRACTITIONER

## 2025-02-03 PROCEDURE — NC001 PR NO CHARGE: Performed by: ANESTHESIOLOGY

## 2025-02-03 PROCEDURE — 81003 URINALYSIS AUTO W/O SCOPE: CPT | Performed by: NURSE PRACTITIONER

## 2025-02-03 PROCEDURE — 96365 THER/PROPH/DIAG IV INF INIT: CPT

## 2025-02-03 PROCEDURE — 94002 VENT MGMT INPAT INIT DAY: CPT

## 2025-02-03 PROCEDURE — 71260 CT THORAX DX C+: CPT

## 2025-02-03 PROCEDURE — 87804 INFLUENZA ASSAY W/OPTIC: CPT

## 2025-02-03 PROCEDURE — 92526 ORAL FUNCTION THERAPY: CPT

## 2025-02-03 PROCEDURE — 0DJ08ZZ INSPECTION OF UPPER INTESTINAL TRACT, VIA NATURAL OR ARTIFICIAL OPENING ENDOSCOPIC: ICD-10-PCS | Performed by: INTERNAL MEDICINE

## 2025-02-03 PROCEDURE — 87086 URINE CULTURE/COLONY COUNT: CPT | Performed by: NURSE PRACTITIONER

## 2025-02-03 PROCEDURE — 93010 ELECTROCARDIOGRAM REPORT: CPT | Performed by: INTERNAL MEDICINE

## 2025-02-03 PROCEDURE — 99233 SBSQ HOSP IP/OBS HIGH 50: CPT | Performed by: FAMILY MEDICINE

## 2025-02-03 PROCEDURE — 74177 CT ABD & PELVIS W/CONTRAST: CPT

## 2025-02-03 PROCEDURE — 96361 HYDRATE IV INFUSION ADD-ON: CPT

## 2025-02-03 PROCEDURE — 43241 EGD TUBE/CATH INSERTION: CPT | Performed by: INTERNAL MEDICINE

## 2025-02-03 PROCEDURE — 87077 CULTURE AEROBIC IDENTIFY: CPT | Performed by: NURSE PRACTITIONER

## 2025-02-03 PROCEDURE — 99223 1ST HOSP IP/OBS HIGH 75: CPT

## 2025-02-03 PROCEDURE — 83735 ASSAY OF MAGNESIUM: CPT

## 2025-02-03 PROCEDURE — 74018 RADEX ABDOMEN 1 VIEW: CPT

## 2025-02-03 PROCEDURE — 97129 THER IVNTJ 1ST 15 MIN: CPT

## 2025-02-03 PROCEDURE — 85027 COMPLETE CBC AUTOMATED: CPT

## 2025-02-03 PROCEDURE — 80048 BASIC METABOLIC PNL TOTAL CA: CPT

## 2025-02-03 PROCEDURE — 84484 ASSAY OF TROPONIN QUANT: CPT

## 2025-02-03 PROCEDURE — 70450 CT HEAD/BRAIN W/O DYE: CPT

## 2025-02-03 PROCEDURE — 84443 ASSAY THYROID STIM HORMONE: CPT

## 2025-02-03 PROCEDURE — 99285 EMERGENCY DEPT VISIT HI MDM: CPT

## 2025-02-03 PROCEDURE — 84100 ASSAY OF PHOSPHORUS: CPT

## 2025-02-03 PROCEDURE — 94760 N-INVAS EAR/PLS OXIMETRY 1: CPT

## 2025-02-03 PROCEDURE — 84439 ASSAY OF FREE THYROXINE: CPT

## 2025-02-03 PROCEDURE — 36415 COLL VENOUS BLD VENIPUNCTURE: CPT

## 2025-02-03 PROCEDURE — 85025 COMPLETE CBC W/AUTO DIFF WBC: CPT

## 2025-02-03 PROCEDURE — 81001 URINALYSIS AUTO W/SCOPE: CPT | Performed by: NURSE PRACTITIONER

## 2025-02-03 PROCEDURE — 87811 SARS-COV-2 COVID19 W/OPTIC: CPT

## 2025-02-03 PROCEDURE — 82948 REAGENT STRIP/BLOOD GLUCOSE: CPT

## 2025-02-03 PROCEDURE — 96375 TX/PRO/DX INJ NEW DRUG ADDON: CPT

## 2025-02-03 RX ORDER — BISACODYL 10 MG
10 SUPPOSITORY, RECTAL RECTAL DAILY PRN
Status: DISCONTINUED | OUTPATIENT
Start: 2025-02-03 | End: 2025-02-07 | Stop reason: HOSPADM

## 2025-02-03 RX ORDER — LIDOCAINE HYDROCHLORIDE 10 MG/ML
INJECTION, SOLUTION EPIDURAL; INFILTRATION; INTRACAUDAL; PERINEURAL AS NEEDED
Status: DISCONTINUED | OUTPATIENT
Start: 2025-02-03 | End: 2025-02-03

## 2025-02-03 RX ORDER — CHLORHEXIDINE GLUCONATE ORAL RINSE 1.2 MG/ML
15 SOLUTION DENTAL EVERY 12 HOURS SCHEDULED
Status: DISCONTINUED | OUTPATIENT
Start: 2025-02-03 | End: 2025-02-07 | Stop reason: HOSPADM

## 2025-02-03 RX ORDER — CEFTRIAXONE 1 G/50ML
1000 INJECTION, SOLUTION INTRAVENOUS ONCE
Status: COMPLETED | OUTPATIENT
Start: 2025-02-03 | End: 2025-02-03

## 2025-02-03 RX ORDER — HYDRALAZINE HYDROCHLORIDE 20 MG/ML
5 INJECTION INTRAMUSCULAR; INTRAVENOUS EVERY 6 HOURS PRN
Status: DISCONTINUED | OUTPATIENT
Start: 2025-02-03 | End: 2025-02-07 | Stop reason: HOSPADM

## 2025-02-03 RX ORDER — ONDANSETRON 2 MG/ML
4 INJECTION INTRAMUSCULAR; INTRAVENOUS ONCE AS NEEDED
Status: CANCELLED | OUTPATIENT
Start: 2025-02-03

## 2025-02-03 RX ORDER — DILTIAZEM HYDROCHLORIDE 5 MG/ML
15 INJECTION INTRAVENOUS ONCE
Status: DISCONTINUED | OUTPATIENT
Start: 2025-02-03 | End: 2025-02-03

## 2025-02-03 RX ORDER — PROPOFOL 10 MG/ML
INJECTION, EMULSION INTRAVENOUS AS NEEDED
Status: DISCONTINUED | OUTPATIENT
Start: 2025-02-03 | End: 2025-02-03

## 2025-02-03 RX ORDER — SODIUM CHLORIDE, SODIUM GLUCONATE, SODIUM ACETATE, POTASSIUM CHLORIDE, MAGNESIUM CHLORIDE, SODIUM PHOSPHATE, DIBASIC, AND POTASSIUM PHOSPHATE .53; .5; .37; .037; .03; .012; .00082 G/100ML; G/100ML; G/100ML; G/100ML; G/100ML; G/100ML; G/100ML
50 INJECTION, SOLUTION INTRAVENOUS CONTINUOUS
Status: DISCONTINUED | OUTPATIENT
Start: 2025-02-03 | End: 2025-02-04

## 2025-02-03 RX ORDER — ALBUTEROL SULFATE 0.83 MG/ML
2.5 SOLUTION RESPIRATORY (INHALATION) ONCE AS NEEDED
Status: CANCELLED | OUTPATIENT
Start: 2025-02-03

## 2025-02-03 RX ORDER — SACCHAROMYCES BOULARDII 250 MG
250 CAPSULE ORAL 2 TIMES DAILY
Status: DISCONTINUED | OUTPATIENT
Start: 2025-02-03 | End: 2025-02-03 | Stop reason: HOSPADM

## 2025-02-03 RX ORDER — CHLORHEXIDINE GLUCONATE ORAL RINSE 1.2 MG/ML
15 SOLUTION DENTAL EVERY 12 HOURS SCHEDULED
Status: DISCONTINUED | OUTPATIENT
Start: 2025-02-03 | End: 2025-02-03

## 2025-02-03 RX ORDER — METOCLOPRAMIDE HYDROCHLORIDE 5 MG/ML
INJECTION INTRAMUSCULAR; INTRAVENOUS AS NEEDED
Status: DISCONTINUED | OUTPATIENT
Start: 2025-02-03 | End: 2025-02-03

## 2025-02-03 RX ORDER — SODIUM CHLORIDE, SODIUM LACTATE, POTASSIUM CHLORIDE, CALCIUM CHLORIDE 600; 310; 30; 20 MG/100ML; MG/100ML; MG/100ML; MG/100ML
INJECTION, SOLUTION INTRAVENOUS CONTINUOUS PRN
Status: DISCONTINUED | OUTPATIENT
Start: 2025-02-03 | End: 2025-02-03

## 2025-02-03 RX ORDER — SUCCINYLCHOLINE/SOD CL,ISO/PF 100 MG/5ML
SYRINGE (ML) INTRAVENOUS AS NEEDED
Status: DISCONTINUED | OUTPATIENT
Start: 2025-02-03 | End: 2025-02-03

## 2025-02-03 RX ORDER — HEPARIN SODIUM 5000 [USP'U]/ML
5000 INJECTION, SOLUTION INTRAVENOUS; SUBCUTANEOUS EVERY 8 HOURS SCHEDULED
Status: DISCONTINUED | OUTPATIENT
Start: 2025-02-03 | End: 2025-02-07 | Stop reason: HOSPADM

## 2025-02-03 RX ORDER — PROPOFOL 10 MG/ML
5-50 INJECTION, EMULSION INTRAVENOUS
Status: DISCONTINUED | OUTPATIENT
Start: 2025-02-03 | End: 2025-02-03 | Stop reason: HOSPADM

## 2025-02-03 RX ORDER — METOPROLOL TARTRATE 1 MG/ML
5 INJECTION, SOLUTION INTRAVENOUS EVERY 6 HOURS
Status: DISCONTINUED | OUTPATIENT
Start: 2025-02-03 | End: 2025-02-04

## 2025-02-03 RX ORDER — DILTIAZEM HYDROCHLORIDE 5 MG/ML
10 INJECTION INTRAVENOUS 2 TIMES DAILY
Status: DISCONTINUED | OUTPATIENT
Start: 2025-02-03 | End: 2025-02-04

## 2025-02-03 RX ORDER — PROPOFOL 10 MG/ML
INJECTION, EMULSION INTRAVENOUS CONTINUOUS PRN
Status: DISCONTINUED | OUTPATIENT
Start: 2025-02-03 | End: 2025-02-03

## 2025-02-03 RX ORDER — PROPOFOL 10 MG/ML
INJECTION, EMULSION INTRAVENOUS
Status: COMPLETED
Start: 2025-02-03 | End: 2025-02-03

## 2025-02-03 RX ORDER — PANTOPRAZOLE SODIUM 40 MG/10ML
40 INJECTION, POWDER, LYOPHILIZED, FOR SOLUTION INTRAVENOUS
Status: DISCONTINUED | OUTPATIENT
Start: 2025-02-04 | End: 2025-02-04

## 2025-02-03 RX ORDER — FAMOTIDINE 10 MG/ML
20 INJECTION, SOLUTION INTRAVENOUS ONCE
Status: COMPLETED | OUTPATIENT
Start: 2025-02-03 | End: 2025-02-03

## 2025-02-03 RX ORDER — SACCHAROMYCES BOULARDII 250 MG
250 CAPSULE ORAL 2 TIMES DAILY
Start: 2025-02-03

## 2025-02-03 RX ORDER — FENTANYL CITRATE 50 UG/ML
25 INJECTION, SOLUTION INTRAMUSCULAR; INTRAVENOUS
Refills: 0 | Status: DISCONTINUED | OUTPATIENT
Start: 2025-02-03 | End: 2025-02-04

## 2025-02-03 RX ORDER — CEPHALEXIN 500 MG/1
500 CAPSULE ORAL EVERY 8 HOURS SCHEDULED
Status: DISCONTINUED | OUTPATIENT
Start: 2025-02-03 | End: 2025-02-03

## 2025-02-03 RX ORDER — CEFTRIAXONE 1 G/50ML
1000 INJECTION, SOLUTION INTRAVENOUS EVERY 24 HOURS
Start: 2025-02-04 | End: 2025-02-07

## 2025-02-03 RX ORDER — SODIUM CHLORIDE, SODIUM LACTATE, POTASSIUM CHLORIDE, CALCIUM CHLORIDE 600; 310; 30; 20 MG/100ML; MG/100ML; MG/100ML; MG/100ML
125 INJECTION, SOLUTION INTRAVENOUS CONTINUOUS
Status: DISCONTINUED | OUTPATIENT
Start: 2025-02-03 | End: 2025-02-03 | Stop reason: HOSPADM

## 2025-02-03 RX ORDER — ONDANSETRON 2 MG/ML
4 INJECTION INTRAMUSCULAR; INTRAVENOUS ONCE
Status: COMPLETED | OUTPATIENT
Start: 2025-02-03 | End: 2025-02-03

## 2025-02-03 RX ORDER — CEFTRIAXONE 1 G/50ML
1000 INJECTION, SOLUTION INTRAVENOUS EVERY 24 HOURS
Status: DISCONTINUED | OUTPATIENT
Start: 2025-02-04 | End: 2025-02-03 | Stop reason: HOSPADM

## 2025-02-03 RX ORDER — HYDROMORPHONE HCL/PF 1 MG/ML
0.5 SYRINGE (ML) INJECTION
Refills: 0 | Status: CANCELLED | OUTPATIENT
Start: 2025-02-03

## 2025-02-03 RX ORDER — PROPOFOL 10 MG/ML
5-50 INJECTION, EMULSION INTRAVENOUS
Status: DISCONTINUED | OUTPATIENT
Start: 2025-02-03 | End: 2025-02-04

## 2025-02-03 RX ORDER — METOCLOPRAMIDE HYDROCHLORIDE 5 MG/ML
5 INJECTION INTRAMUSCULAR; INTRAVENOUS EVERY 6 HOURS SCHEDULED
Status: DISCONTINUED | OUTPATIENT
Start: 2025-02-04 | End: 2025-02-05

## 2025-02-03 RX ORDER — FENTANYL CITRATE/PF 50 MCG/ML
25 SYRINGE (ML) INJECTION
Refills: 0 | Status: CANCELLED | OUTPATIENT
Start: 2025-02-03

## 2025-02-03 RX ADMIN — Medication 100 MG: at 15:42

## 2025-02-03 RX ADMIN — PROPOFOL 30 MCG/KG/MIN: 10 INJECTION, EMULSION INTRAVENOUS at 20:17

## 2025-02-03 RX ADMIN — FAMOTIDINE 20 MG: 10 INJECTION, SOLUTION INTRAVENOUS at 06:32

## 2025-02-03 RX ADMIN — DILTIAZEM HYDROCHLORIDE 10 MG: 5 INJECTION INTRAVENOUS at 23:22

## 2025-02-03 RX ADMIN — HEPARIN SODIUM 5000 UNITS: 5000 INJECTION INTRAVENOUS; SUBCUTANEOUS at 21:40

## 2025-02-03 RX ADMIN — PROPOFOL 25 MCG/KG/MIN: 10 INJECTION, EMULSION INTRAVENOUS at 21:30

## 2025-02-03 RX ADMIN — METOPROLOL SUCCINATE 25 MG: 25 TABLET, EXTENDED RELEASE ORAL at 08:28

## 2025-02-03 RX ADMIN — PROPOFOL 30 MCG/KG/MIN: 10 INJECTION, EMULSION INTRAVENOUS at 20:18

## 2025-02-03 RX ADMIN — SODIUM CHLORIDE 1000 ML: 0.9 INJECTION, SOLUTION INTRAVENOUS at 03:13

## 2025-02-03 RX ADMIN — CEFTRIAXONE 1000 MG: 1 INJECTION, SOLUTION INTRAVENOUS at 07:02

## 2025-02-03 RX ADMIN — ONDANSETRON 4 MG: 2 INJECTION INTRAMUSCULAR; INTRAVENOUS at 03:14

## 2025-02-03 RX ADMIN — METOCLOPRAMIDE 5 MG: 5 INJECTION, SOLUTION INTRAMUSCULAR; INTRAVENOUS at 23:34

## 2025-02-03 RX ADMIN — LIDOCAINE HYDROCHLORIDE 50 MG: 10 SOLUTION INTRAVENOUS at 15:41

## 2025-02-03 RX ADMIN — IOHEXOL 65 ML: 350 INJECTION, SOLUTION INTRAVENOUS at 03:16

## 2025-02-03 RX ADMIN — SODIUM CHLORIDE, SODIUM LACTATE, POTASSIUM CHLORIDE, AND CALCIUM CHLORIDE: .6; .31; .03; .02 INJECTION, SOLUTION INTRAVENOUS at 15:27

## 2025-02-03 RX ADMIN — SODIUM CHLORIDE, SODIUM GLUCONATE, SODIUM ACETATE, POTASSIUM CHLORIDE, MAGNESIUM CHLORIDE, SODIUM PHOSPHATE, DIBASIC, AND POTASSIUM PHOSPHATE 50 ML/HR: .53; .5; .37; .037; .03; .012; .00082 INJECTION, SOLUTION INTRAVENOUS at 23:34

## 2025-02-03 RX ADMIN — CHLORHEXIDINE GLUCONATE 15 ML: 1.2 SOLUTION ORAL at 21:40

## 2025-02-03 RX ADMIN — METOROPROLOL TARTRATE 5 MG: 5 INJECTION, SOLUTION INTRAVENOUS at 22:30

## 2025-02-03 RX ADMIN — PROPOFOL 50 MCG/KG/MIN: 10 INJECTION, EMULSION INTRAVENOUS at 15:54

## 2025-02-03 RX ADMIN — PROPOFOL 50 MG: 10 INJECTION, EMULSION INTRAVENOUS at 15:41

## 2025-02-03 RX ADMIN — METOCLOPRAMIDE 10 MG: 5 INJECTION, SOLUTION INTRAMUSCULAR; INTRAVENOUS at 16:58

## 2025-02-03 NOTE — PERIOPERATIVE NURSING NOTE
Patient will remain intubated and be transferred to PACU with respiratory, anesthesia, and RN at bedside with cardiac monitor and portable respiratory monitoring maintained.

## 2025-02-03 NOTE — ASSESSMENT & PLAN NOTE
Blood pressures ranging in physical therapy as low as 70/40 and cardiology was evaluating the patient  Symptomatic with lightheadedness and presyncope and had received gentle fluids with some improvement  BALJIT stockings PRN as well and encouragement of fluid intake  Repeat orthostatics improved but still getting lightheaded at times which was felt to be the initiating factor in her syncopal episode that led to the fall and injury.  1/17 IM held midodrine, no significant orthostatic hypotension currently observed may need to introduce at 2.5 mg TID if develops episodes at a later time, continue to monitor orthostatic Bps   1/19 bp dropped on standing, came back up on sitting  1/26 metoprolol decreased to 50 mg  25 mg 1/28  Midodrine 5 mg standing ordered by IM

## 2025-02-03 NOTE — ASSESSMENT & PLAN NOTE
Lab Results   Component Value Date    EGFR 52 02/03/2025    EGFR 58 01/30/2025    EGFR 61 01/27/2025    CREATININE 0.96 02/03/2025    CREATININE 0.87 01/30/2025    CREATININE 0.84 01/27/2025

## 2025-02-03 NOTE — ASSESSMENT & PLAN NOTE
Acutely worsening confusion and behavior change on 2/3/25   CTH: No new changes.  Trace posterior bifrontal subarachnoid hemorrhage likely subacute and stable to slightly decreased allowing for some degree of redistribution. 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 Chronic microangiopathy.  Patient also much more confused and attempting multiple times today to get out of bed on her own setting off alarms  Etiology: Likely secondary to Cystitis   Virtual 1:1 ordered, however not working  In person 1:1 ordered afterwards  Continue patient near nurses station  Re-orient frequently

## 2025-02-03 NOTE — DISCHARGE SUMMARY
"Discharge Summary   Sandra Purvis 89 y.o. female MRN: [id number]  Unit/Bed#: [facility] 261-01 Encounter: [id number]    Admission Date: 1/17/2025     Discharge Date: 2/3/25    Discharge Diagnosis:  Acute severe esophageal dysphagia/dysmotility  Acute cystitis  Mental status change      HPI: Per Dr. Pope: Sandra Purvis is a 89 y.o. female w/ PMHx of afib, GERD, HTN, who initially presented to [facility] on 1/4/2025 after a syncopan episode and collapse at home.  CT had showed \"comminuted and mildly displaced fractures of the lateral and anterior walls of the right maxilla, lateral and inferior walls of the right orbit, right inferior orbital wall fracture appears to involve the inferior orbital foramen associated with small extraconal soft tissue edema and focus of air. There is also a nondisplaced fracture of the zygomatic arch and the temporal bone at the TMJ and diffuse opacification of the right maxillary sinus and right nasal cavity with blood products and air. Additionally there was an acute subdural hemorrhage along the left cerebral convexity with a maximal thickness of 1.9 cm with no midline shift and an acute subarachnoid hemorrhage in the left frontal parietal sulci, left sylvian fissure as well as suprasellar cistern.\"  OMFS was consulted as well as neurosurgery.  Patient and family declined any acute intervention at this time.  Patient was started on Keppra x 7 days.  Patient was treated with 7-day course of Unasyn/Augmentin.  Her hospital stay was also complicated with A-fib with RVR and cardiology was consulted.  Patient then had orthostatic hypotension and started on midodrine.  Patient initially transferred to [facility] 1/12/2025.  While at [facility] patient with hypertensive episodes to SBP >190 and Afib rates 120-130s.  Patient became more symptomatic from her A-fib and was transferred to acute care 1/14. She had X ray w/ pulmonary edema was given lasix " 1/14-1/15 with good response.  Patient was seen by cardiology. Patient was placed on Cardizem drip and now on PO Cardizem. Her rates have improved. She was admitted to [facility] for acute inpatient rehabilitation on 1/17/25.  Acutely on 2/3/25, patient sustained difficulty swallowing, associated with abdominal pain followed by mental status changes.  She was sent to the [facility] for further work-up.  CTH without new findings, showing trace SAH and stable chronic SDH.  CT chest/abd/pelvis completed showing increased esophageal dilatation, debris suggestive of dysmotility or severe GERD;  Also seen was possible cystitis.  Patient returned to acute rehab.  Therapies on HOLD.  Family was contracted.  UA completed.  Empiric antibiotics started with IV Ceftriaxone by internal medicine. SLP and GI evaluated patient for persistent symptoms.  Consent obtained from her family for EGD - attempted but not thoroughly completed due to large food bolus per GI.  Patient required intubation periprocedure.  Patient was transferred to [place] intubated for another attempt in AM by GI.  Family is aware of plan.         Problem List/Comorbidities:    Please see progress note from today for all other problems.        Disposition:  [place] [facility] Critical [facility] Unit    Planned Readmission: No    Discharge Statement   I spent 45 minutes discharging the patient. This time was spent on the day of discharge. I had direct contact with the patient on the day of discharge. Greater than 50% of the total time was spent examining patient, answering all patient questions, arranging and discussing plan of care with patient as well as directly providing post-discharge instructions. Additional time then spent on discharge activities.    Discharge Medications:  See after visit summary for reconciled discharge medications provided to patient and family.      Facility Administered Medications Prior to Discharge:    Current  Facility-Administered Medications   Medication Dose Route Frequency Provider Last Rate    acetaminophen  650 mg Oral Q6H PRN Sheri Pope MD      bisacodyl  10 mg Rectal Daily PRN Sheri Pope MD      calcium carbonate  500 mg Oral Daily PRN Autumn Chapman PA-C      [START ON 2/4/2025] cefTRIAXone  1,000 mg Intravenous Q24H LUIS Monsalve      diltiazem  120 mg Oral HS LUIS Monsalve      docusate sodium  100 mg Oral BID PRN Sheri Pope MD      [Held by provider] enoxaparin  30 mg Subcutaneous Q24H Autumn Chapman PA-C      gabapentin  100 mg Oral HS Sheri Pope MD      hydrALAZINE  10 mg Oral Q8H PRN LUIS Monsalve      lactated ringers  125 mL/hr Intravenous Continuous Mira Motta MD      melatonin  6 mg Oral HS Sheri Pope MD      metoprolol succinate  25 mg Oral BID LUIS Monsalve      midodrine  5 mg Oral TID PRN Autumn Chapman PA-C      midodrine  5 mg Oral Daily Before Breakfast LUIS Monsalve      mirtazapine  7.5 mg Oral HS Sheri Pope MD      ondansetron  4 mg Oral Q6H PRN Autumn Chapman PA-C      pantoprazole  40 mg Oral Early Morning Autumn Chapman PA-C      saccharomyces boulardii  250 mg Oral BID LUIS Monsalve      traZODone  100 mg Oral HS Sheri Pope MD       Facility-Administered Medications Ordered in Other Encounters   Medication Dose Route Frequency Provider Last Rate    lactated ringers   Intravenous Continuous PRN Trinity Hernan      lidocaine (PF)   Intravenous PRN Trinity Hernan      phenylephrine   Intravenous PRN Trinity Hernan      propofol   Intravenous Continuous PRN Trinity Hernan 50 mcg/kg/min (02/03/25 1554)    propofol   Intravenous PRN Trinity Hernan      Succinylcholine Chloride   Intravenous PRN Trinity Wallacehart

## 2025-02-03 NOTE — ASSESSMENT & PLAN NOTE
Presented with syncope on 1/4.  Significant orthostasis noted in acute setting.  Follow-up per inpatient team

## 2025-02-03 NOTE — ASSESSMENT & PLAN NOTE
Most recent WBC count 12.08 and actually trending down from its maximum of 17.59  Unclear etiology, may be directly related to reactive changes from the SDH and SAH as well as fractures  Continue to monitor and if considerable changes may repeat scans given the fractures in the facial bones  Was on a course of Unasyn/Augmentin for 7 days  IM: Infectious workup negative  Received IV fluids, discontinued with evidence of fluid overload CXR  Stable off abx d/c 1/15  12.08 1/17---> 13.06 1/20----> 10.5 1/23 ----> 11.78 1/27--->1/30 8.15 > 2/5/25 WBC = 15.14 K.

## 2025-02-03 NOTE — PERIOPERATIVE NURSING NOTE
Patient arrived to PACU intubated and on mechanical ventilation, VSS, patient to be transferred to ICU.

## 2025-02-03 NOTE — NURSING NOTE
"Pt returned from ED @ 0700 this am, disoriented to place and time. \"I was on a nice vacation last night, it`s close to [place]\". Reorientation offered. Pt  attempting to get OOB to eat, reminded of NPO status. Irritable and asking for food, frequent reorientation required. Observed spitting up large amounts of clear mucus. UA obtained via straight cath. Seen by GI .Daughter and spouse at bedside and aware of all new orders and plan. Pt on virtual observation for safety, awaiting EGD @ 1430.  "

## 2025-02-03 NOTE — ASSESSMENT & PLAN NOTE
Sent to ER on 2/2 for abdominal pain, hypotension.  CT C/A/P showed diffuse chronic dilation of the esophagus with progressive or transiently increased distention, esophagus filled with fluid and debris - may be sequela of severe gastroesophageal reflux superimposed on chronic dysmotility.  Trace ascites.  Pericholecystic fluid vs thick edematous gallbladder wall, findings nonspecific given presence of ascites.  Continue Protonix 40mg daily.  Currently producing large amounts of mucus - GI consulted.  Continue NPO until GI consult.  Also more confused today - will obtain UA.

## 2025-02-03 NOTE — ASSESSMENT & PLAN NOTE
Presented on 1/4 after syncopal fall.  CTH showed acute subarachnoid hemorrhage in the L frontoparietal sulci, L sylvian fissure and suprasellar cistern.     Received Keppra x7 days.  Continue to hold AC/AP.  Neurovascular checks Q shift.  Maintain normotension.  Avoid SBP >160.     MRI brain obtained 1/21 - stable.  2 week follow-up with Neurosurgery on 1/21.  Imaging was reviewed.  May consider starting ASA in 2 weeks after discussion with PCP/Cardiology.  Follow-up with Neurosurgery in additional 4 weeks with [facility].    CTH obtained on 2/3 in ER - showed stability/slightly decreased hemorrhage.     Primary team following.  PT/OT/SLP.

## 2025-02-03 NOTE — PROGRESS NOTES
02/03/25 1551   Therapy Time missed   Time missed? Yes   Amount of time missed 90   Reason for time missed Medical hold   Time(s) multiple attempts made DC held due to change in GI/BP issues. Med hold placed for further workup.

## 2025-02-03 NOTE — ASSESSMENT & PLAN NOTE
Troponins obtained initial evaluation in the ER.  Despite elevated troponins there was nonischemic findings on her EKG and felt to be potentially related to a nonischemic troponin elevation in the setting of trauma and brain bleed versus demand ischemia due to her paroxysmal atrial fibrillation and syncopal episode.  Candidate for cardiology for ischemic evaluation due to her advanced age, frailty and recent subarachnoid hemorrhage/subdural hemorrhage.

## 2025-02-03 NOTE — H&P
H&P - Critical Care/ICU   Name: Sandra Purvis 89 y.o. female I MRN: [id number]  Unit/Bed#: ICU 10 I Date of Admission: 2/3/2025   Date of Service: 2/3/2025 I Hospital Day: 0       Assessment & Plan  GERD (gastroesophageal reflux disease)  Continue home protonix 40mg daily - changed to IV given  esophageal impaction  Hold home TUMS  Primary insomnia  Hold home melatonin/trazodone given intubation/sedation/NPO  Food impaction of esophagus  S/P EGD on 2/3  Plan:  Plan for repeat EGD on 2/4 with GI   Start reglan 5mg q6  Keep OG tube to suction  KUB on arrival with OG malpositioned. Advanced 10cm, repeat KUB at midnight  Maintain NPO. Start isolyte at 50cc/hr while NPO  GI consult placed, appreciate eval and recs  UTI (urinary tract infection)  Recently treated with CTX from 1/24- 1/28 for foul smelling BCC wound on scalp   No prior urine culture data  No hx of ESBL/MRSA  This is not the cause of patient's critical illness     Plan:  Continue CTX for 3 days total. Last dose 2/5  Monitor UOP  Urinary retention protocol in place  Monitor fever and WBC count  Acute hypoxic respiratory failure (HCC)  Continue mechanical ventilation given food bolus impaction with repeat EGD on 2/4. Currently on ACVC 16/380/6/40%  VBG on these settings WNL  Daily SAT/SBT   Monitor on telemetry   Monitor puls ox and maintain SPo2 >92%  VAP bundle in place  SQH 5000 q8 for DVT ppx  Appreciate RT care  Atrial fibrillation (HCC)  Home meds: Cardizem 120mg q24 and Toprol XL 25mg daily     Plan  Start IV lopressor 5q6 and diltiazem 10mg BID IV given NPO   AC: Risk benefit discussion with cardiology/ICU team.family  Rhroa1stgs score is 5  NSGY cleared patient for for AP therapy starting 2/4  Monitor off AC/AP for now. Likely the benefits of holding AC/AP outweigh the risks of resumption. Plan to discuss with family today  Monitor on telemetry   HTN (hypertension)  Start IV lopressor 5q6 and diltiazem 10mg BID IV given NPO and cannot take home  cardizem/toprol xl  PRN IV hydralazine for BSP >160  Orthostatic hypotension  Hold home midodrine given NPO  Start maintenance fluids while NPO  Disposition: Critical care    History of Present Illness   Sandra Purvis is a 89 y.o. female with PMH of recent SAF/SDH, A fib, HTN, GERD, who presents to the ICU for food bolus impaction s/p unsuccessful removal via OG tube under endoscopic guidance.      Recent syncopal episode at home resulting in multiple R facial fractures, L SDH and L SAH in L frontal lobe. These issues were managed medically. She also had a fib RVR and orthostatic hypotension. She was transferred to [facility] on 1/12/25 and she had HTN/a fib that she was symptomatic from and transferred to acute care on 1/14. She had pulmonary edema and was diuresed from 1/14-1/15 and started on Cardizem with improvement and returned to [facility] on 1/17/25. She went to [facility] on 1/17/25 and was doing well until 2/3 when she had difficulty swallowing/abdominal pain and AMS. CTH stable but CT C/A/P showing significant esophageal dilation/debris and cystitis. UA with innumerable bacteria. She was started on CTX and EGD completed noting Bezoar throughout esophagus and cardia/fundus of stomach without successful retrieval or mobilization. An OG was placed under endoscopic guidance and she was left intubated and transferred to [facility] with plans for EGD and attempt at disimpaction on 2/4. She arrives on minimal vent settings, adequately sedated with propofol. She followed commands briskly in b/l UE and LE on sedation breaks.     I confirmed code status on patient's arrival with patient's daughter Courtney. I answered her questions and she expressed great appreciation for care.     History obtained from chart review and unobtainable from patient due to intubation.  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      Comment:  Last Assessed: 9/8/15  No date: Hypertension  No date: PAF (paroxysmal atrial fibrillation) (HCC)      Comment:  Last Assessed:2/3/16  No date: Wrist fracture      Comment:  Resolved:9/8/15 Past Surgical History:  No date: CATARACT EXTRACTION      Comment:  Last Assessed:9/8/15  10/17/2021: FL RETROGRADE PYELOGRAM  No date: LYMPHADENECTOMY      Comment:  Last Assessed:9/8/15  11/24/2021: MS CYSTO/URETERO W/LITHOTRIPSY &INDWELL STENT INSRT; Right      Comment:  Procedure: CYSTOSCOPY URETEROSCOPY WITH LITHOTRIPSY                HOLMIUM LASER, RETROGRADE PYELOGRAM AND INSERTION STENT                URETERAL;  Surgeon: David rS MD;  Location: [facility];  Service: Urology  10/17/2021: MS CYSTOURETHROSCOPY W/URETERAL CATHETERIZATION; Right      Comment:  Procedure: CYSTOSCOPY RETROGRADE PYELOGRAM WITH                INSERTION STENT URETERAL;  Surgeon: Benjie Tinoco MD;                Location: [facility];  Service: Urology  No date: TONSILLECTOMY      Comment:  Last Assessed:9/8/15  No date: WRIST SURGERY      Comment:  Last Assessed:9/8/15   Current Outpatient Medications   Medication Instructions    acetaminophen (TYLENOL) 650 mg, Oral, Every 6 hours PRN    bisacodyl (DULCOLAX) 10 mg, Rectal, Daily PRN    calcium carbonate (TUMS) 500 mg, Oral, Daily PRN    [START ON 2/4/2025] cefTRIAXone (ROCEPHIN) 1,000 mg, Intravenous, Every 24 hours    DAILY MULTIPLE VITAMINS/IRON PO 1 tablet, Daily    diltiazem (CARDIZEM CD) 120 mg, Oral, Daily at bedtime    docusate sodium (COLACE) 100 mg, Oral, 2 times daily PRN    enoxaparin (LOVENOX) 30 mg, Subcutaneous, Every 24 hours    gabapentin (NEURONTIN) 100 mg, Oral, Daily at bedtime    hydrALAZINE (APRESOLINE) 10 mg, Oral, Every 8 hours PRN    melatonin 6 mg,  Oral, Daily at bedtime    metoprolol succinate (TOPROL-XL) 25 mg, Oral, 2 times daily    midodrine (PROAMATINE) 5 mg, Oral, Daily before breakfast    midodrine (PROAMATINE) 5 mg, Oral, 3 times daily PRN    mirtazapine (REMERON) 7.5 mg, Oral, Daily at bedtime    ondansetron (ZOFRAN-ODT) 4 mg, Oral, Every 6 hours PRN    pantoprazole (PROTONIX) 40 mg, Oral, Daily (early morning)    saccharomyces boulardii (FLORASTOR) 250 mg, Oral, 2 times daily    traZODone (DESYREL) 100 mg, Oral, Daily at bedtime    Allergies   Allergen Reactions    Lactose - Food Allergy GI Intolerance    Penicillins Other (See Comments)     Unsure of reaction       Social History     Tobacco Use    Smoking status: Never     Passive exposure: Never    Smokeless tobacco: Never   Vaping Use    Vaping status: Never Used   Substance Use Topics    Alcohol use: Never    Drug use: Never    Family History   Problem Relation Age of Onset    No Known Problems Family           Objective :                   Vitals I/O      Most Recent Min/Max in 24hrs   Temp 98.9 °F (37.2 °C) Temp  Min: 97.9 °F (36.6 °C)  Max: 99.1 °F (37.3 °C)   Pulse 70 Pulse  Min: 58  Max: 113   Resp 18 Resp  Min: 16  Max: 30   /57 BP  Min: 95/46  Max: 202/74   O2 Sat 99 % SpO2  Min: 96 %  Max: 100 %      Intake/Output Summary (Last 24 hours) at 2/3/2025 2354  Last data filed at 2/3/2025 2343  Gross per 24 hour   Intake 53.07 ml   Output --   Net 53.07 ml       Diet NPO    Invasive Monitoring           Physical Exam   Physical Exam  Vitals and nursing note reviewed.   Eyes:      General: Vision grossly intact.      Extraocular Movements: Extraocular movements intact.      Conjunctiva/sclera: Conjunctivae normal.      Pupils: Pupils are equal, round, and reactive to light.   Skin:     General: Skin is warm.   HENT:      Head: Normocephalic and atraumatic.      Right Ear: Tympanic membrane normal.      Left Ear: Tympanic membrane normal.      Mouth/Throat:      Mouth: Mucous membranes are  moist.   Neck:   no midline tenderness Cardiovascular:      Rate and Rhythm: Normal rate and regular rhythm.      Heart sounds: Normal heart sounds.   Musculoskeletal:         General: Normal range of motion.      Right lower leg: No edema.      Left lower leg: No edema.   Abdominal: General: Bowel sounds are normal. There is no distension.      Palpations: Abdomen is soft.      Tenderness: There is no abdominal tenderness.      Comments: OG in place on suction , no output currently   Constitutional:       General: She is not in acute distress.     Appearance: She is well-developed and well-nourished. She is not ill-appearing.      Interventions: She is sedated, intubated and restrained.   Pulmonary:      Effort: Pulmonary effort is normal. She is intubated.      Breath sounds: Normal breath sounds.   Psychiatric:         Mood and Affect: Mood and affect normal.   Neurological:      General: No focal deficit present.      Mental Status: She is oriented to person, place and time. She is CAM ICU negative.      Cranial Nerves: No cranial nerve deficit, dysarthria or facial asymmetry.      Sensory: Sensation is intact.      Motor: gross motor function is at baseline for patient. Strength full and intact in all extremities.      Comments: Sedated on 25 mcg of proprofol  Wakes up to voice and painful stimuli and moves all 4 extremities    Will perform formal SAT with nursing           Diagnostic Studies        Lab Results: I have reviewed the following results:     Medications:  Scheduled PRN   [START ON 2/4/2025] cefTRIAXone, 1,000 mg, Q24H  chlorhexidine, 15 mL, Q12H ELIZABETH  diltiazem, 10 mg, BID  heparin (porcine), 5,000 Units, Q8H ELIZABETH  [START ON 2/4/2025] metoclopramide, 5 mg, Q6H ELIZABETH  metoprolol, 5 mg, Q6H  [START ON 2/4/2025] pantoprazole, 40 mg, Q24H ELIZABETH      bisacodyl, 10 mg, Daily PRN  fentaNYL, 25 mcg, Q1H PRN  hydrALAZINE, 5 mg, Q6H PRN       Continuous    multi-electrolyte, 50 mL/hr, Last Rate: 50 mL/hr (02/03/25  4725)  propofol, 5-50 mcg/kg/min, Last Rate: 25 mcg/kg/min (02/03/25 2241)         Labs:   CBC    Recent Labs     02/03/25 0224 02/03/25 2017   WBC 15.14* 15.49*   HGB 11.3* 11.7   HCT 34.7* 37.1    191     BMP    Recent Labs     02/03/25 0224 02/03/25 2017   SODIUM 139 141   K 4.1 4.6    107   CO2 25 25   AGAP 11 9   BUN 23 20   CREATININE 0.96 0.96   CALCIUM 10.2 9.9       Coags    No recent results     Additional Electrolytes  Recent Labs     02/03/25 2017   MG 2.0   PHOS 3.2   CAIONIZED 1.16          Blood Gas    No recent results  Recent Labs     02/03/25  2016   PHVEN 7.390   IZI1IDX 45.8   PO2VEN 20.5*   TZI9FVU 27.1   BEVEN 1.7   [id number] 30.6*    LFTs  Recent Labs     02/03/25 0224   ALT 20   AST 16   ALKPHOS 93   ALB 3.9   TBILI 0.50       Infectious  No recent results  Glucose  Recent Labs     02/03/25 0224 02/03/25 2017   GLUC 148* 115

## 2025-02-03 NOTE — PROGRESS NOTES
Progress Note - PMR   Name: Sandra Purvis 89 y.o. female I MRN: [id number]  Unit/Bed#: -01 I Date of Admission: 1/17/2025   Date of Service: 2/3/2025 I Hospital Day: 17     Assessment & Plan  Hypertension  NSGY target goal <160  Cw metoprolol succinate 75 mg twice daily  Cardizem 120 mg XR daily, d/c amlodipine  Wears compression stockings, abdominal binder  Metoprolol decreased to 50 mg 1/27, 25 mg 1/28 due to orthostasis  1/28 IV hydration, midodrine daily per IM due to hypotension  BP stabilized after SBP 110s  Atrial fibrillation with rapid ventricular response (HCC)  Initially presented with sinus bradycardia and evaluated by cardiology and was in controlled atrial fibrillation without bradycardia  Not on anticoagulation prior to admission and not indicated at this point due to the SDH and SAH and overall remains high risk due to risk of falls and given her advanced age  No recent follow-up with cardiology as an outpatient and generally sees her primary care physician for management and was on Toprol 50 mg daily at home which has been changed well and in the hospital  Tachycardic in the hospital and given IV fluids and medication changes include addition of midodrine and changes in the metoprolol dosing  Recommended for aspirin but would likely not be a good long-term candidate for anticoagulation  Cardio rec: continue with cardizem 120 daily and Toprol-XL 75 mg twice daily 1/16  Metoprolol 25 mg BID  Stage 3 chronic kidney disease, unspecified whether stage 3a or 3b CKD (HCC)  Lab Results   Component Value Date    EGFR 52 02/03/2025    EGFR 58 01/30/2025    EGFR 61 01/27/2025    CREATININE 0.96 02/03/2025    CREATININE 0.87 01/30/2025    CREATININE 0.84 01/27/2025   At baseline  GERD (gastroesophageal reflux disease)  Continue protonix  Basal cell carcinoma (BCC) of scalp  Large scalp mass noted on admission.  Also follows with dermatology as an outpatient but declining any interventions  Some  "localized spot bleeding from the edges noted  Patient scratched at it and created wound, hair net applied  1/24 slight smell of wound: Keflex ordered per IM  Finished course  Wound care recommendations: \"Left head--cleanse/soak with normal saline, pat dry. Cover wound with Xeroform and ABD. Secure with surgical cap. Change dressing daily and as needed. Trim surrounding hair as needed to keep from getting matted into wound\".   Hair could be trimmed more but family refused   Malignant melanoma of arm, left (HCC)  Large mass on the left forearm that is melanoma  Follows with dermatology as an outpatient last seen in December 2024 but not interested in any treatments  SDH (subdural hematoma) (HCC)  Patient presents with fall and initial CTh on 1/4 showing an acute subdural hemorrhage along the left cerebral convexity with a maximum thickness of 1.9 cm with no midline shift as well as an acute subarachnoid hemorrhage in the left frontoparietal sulci, left sylvian fissure and suprasellar cistern  Repeat CT head on 1/5 was stable; repeat 1/14 w/ interval evolution no new/evolving hemorrage   Completed 7 day course of keppra for seizure ppx  F/u w/ NSGY for repeat CTH ~1/27 1/20 Repeat imaging ordered for f/o tomorrow (1/21) at 1pm  F/u as needed; no neurosurgical intervention needed  MRI brain 1/22 improvement in SDH/SAH  SAH (subarachnoid hemorrhage) (HCC)  Subarachnoid hemorrhage noted on initial CT on 1/4 and stable on 1/5;   CTH1/14  Interval evolution of recent subdural and subarachnoid hemorrhage as detailed above. No definitive new or enlarging intracranial hemorrhage.  Continue same management for the subarachnoid hemorrhage as for the subdural hematoma, please see that separate section for overall management plans  Closed extensive facial fractures (HCC)  Secondary to fall  CT of the head on 1/4 also showed comminuted and mildly displaced fractures of the lateral and anterior walls of the right maxilla, lateral " and inferior walls of the right orbit, right inferior orbital wall fractures appear to involve the inferior orbital foramen with associated small extraconal soft tissue edema and focus of air.  There is no herniation of extraocular muscles and a nondisplaced fracture of the zygomatic arch as well as the temporal bone at the TMJ.  There is diffuse opacification of the right maxillary sinus and right nasal cavity with blood products and air  Seen by oral maxillofacial surgery team and surgical intervention was declined by patient  Completed 7-day course of Unasyn/Augmentin  Follow-up with OMFS as an outpatient  Completed sinus precautions (can use straws now)  Chest pain  Troponins obtained on initial evaluation of the patient in the emergency room and cardiology was consulted for evaluation  Despite elevated troponins there was nonischemic findings on her EKG and felt to be potentially related to a nonischemic troponin elevation in the setting of trauma and brain bleed versus demand ischemia due to her paroxysmal atrial fibrillation and syncopal episode  Not a candidate for cardiology for ischemic evaluation due to advanced age, frailty and recent subarachnoid hemorrhage/subdural hemorrhage  No new regional wall motion abnormalities noted on echocardiogram on 1/6/2025  Recommended to start aspirin 81 mg when cleared by neurosurgery with CT head  Follow-up with cardiology as an outpatient  Orthostatic hypotension  Blood pressures ranging in physical therapy as low as 70/40 and cardiology was evaluating the patient  Symptomatic with lightheadedness and presyncope and had received gentle fluids with some improvement  BALJIT stockings PRN as well and encouragement of fluid intake  Repeat orthostatics improved but still getting lightheaded at times which was felt to be the initiating factor in her syncopal episode that led to the fall and injury.  1/17 IM held midodrine, no significant orthostatic hypotension currently  observed may need to introduce at 2.5 mg TID if develops episodes at a later time, continue to monitor orthostatic Bps   1/19 bp dropped on standing, came back up on sitting  1/26 metoprolol decreased to 50 mg  25 mg 1/28  Midodrine 5 mg standing ordered by IM  Syncope  Presented with syncopal episode preceded by lightheadedness that led to the fall which caused the sudden dural hemorrhage and subarachnoid hemorrhage  Also found to be significantly orthostatic which was likely the etiology and was placed on telemetry with no evidence of bradycardia or pauses  Limited p.o. intake and fluid intake and suspected orthostatic hypotension as the cause  Anemia  Most recent hemoglobin of 9.4 1/17; which is stable  Continue to follow biweekly CBC or sooner if clinically indicated  Will need repeat scan if significant drops or concern for acute bleeds  1/20 10.1, 1/23 10.4, 1/27 9.9 hgb, 1/29 9.6  Leukocytosis  Most recent WBC count 12.08 and actually trending down from its maximum of 17.59  Unclear etiology, may be directly related to reactive changes from the SDH and SAH as well as fractures  Continue to monitor and if considerable changes may repeat scans given the fractures in the facial bones  Was on a course of Unasyn/Augmentin for 7 days  IM: Infectious workup negative  Received IV fluids, discontinued with evidence of fluid overload CXR  Stable off abx d/c 1/15  12.08 1/17---> 13.06 1/20----> 10.5 1/23 ----> 11.78 1/27--->1/30 8.15 > 2/5/25 WBC = 15.14 K.    Acute on chronic diastolic congestive heart failure (HCC)  Wt Readings from Last 3 Encounters:   02/02/25 44.2 kg (97 lb 6.4 oz)   02/03/25 44.2 kg (97 lb 7.1 oz)   01/12/25 53.5 kg (118 lb)     TTE 1/6/2025 showed EF 70%, grade 2 DD, mildly dilated RV, mild LA, mild AI, mild TR, estimated PA pressure 50 mmHg, trivial pericardial effusion  Not on diuretics prior to admission  Cw furosemide 20 mg; monitor for volume status   Daily weights, monitor I/O      Insomnia  Inc melatonin to 6mg HS, trazodone 25mg HS PRN----> increased to 50---> 75 mg  Sleep log   Continue remeron 7.5, melatonin 6 mg  1/23 Psych consulted- no recommendations at this time  1/31 slept 4 hours on trazadone 100 mg and melatonin 6 mg  Abdominal pain  Abdominal pain   CT Abd Pelvis completed 2/3/25:   Findings suggestive of cystitis. Correlate with clinical findings.  Trace ascites.  Pericholecystic fluid versus thick edematous gallbladder wall. No calcified gallstones. Findings are nonspecific given presence of ascites.  Small right renal upper and lower pole exophytic indeterminate cystic lesions most likely chronic hemorrhagic or proteinaceous cyst. These could be followed up with nonemergent renal ultrasound.  Trace fluid in the endometrial cavity versus mild endometrial thickening. No significant change since October 2021. This could be followed up with nonemergent pelvic ultrasound if clinically warranted.  Additional chronic findings and negatives as above.    UA was performed due to signs of cystitis additionally - abdominal/back pain, persistent WBC elevation, and change in mental status (acute worse).  UA indicitive of UTI.  Empirically on Ceftriaxone per IM.  Time out was performed prior to culture additionally.       Esophageal dysphagia  Patient complained of food being stuck in her throat and below.  CT chest 2/3/25:   Diffuse chronic dilation of the esophagus with progressive or transiently increased distention. Esophagus filled with fluid and debris. Findings may be sequela of severe gastroesophageal reflux superimposed on chronic dysmotility. GE stenosis/stricture is not excluded. Correlate with any prior work-up.   Mild mass effect on bilateral cardiac atria due to the dilated esophagus and pectus excavatum.   Minimal left basilar lower lobe consolidation likely atelectasis, improved since 1/14/2025.   Bilateral pleural effusions, small on the left and trace on the right,  improved since 1/14/2025.   Additional chronic findings and negatives as above.    Plan:  SLP did evaluate - oral phayngeal status unchanged.  Suspecting esophageal cause  GI consulted   EGD today   Family did give consent  Continue Protonix    Acute cystitis  UA was performed due to signs of cystitis additionally (seen on CT) and clinically - abdominal/back pain, persistent WBC elevation, and change in mental status (acute worse).  UA indicitive of UTI.  Empirically on Ceftriaxone per IM.  Time out was performed prior to culture additionally.      Behavior safety risk  Acutely worsening confusion and behavior change on 2/3/25   CTH: No new changes.  Trace posterior bifrontal subarachnoid hemorrhage likely subacute and stable to slightly decreased allowing for some degree of redistribution. 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 Chronic microangiopathy.  Patient also much more confused and attempting multiple times today to get out of bed on her own setting off alarms  Etiology: Likely secondary to Cystitis   Virtual 1:1 ordered, however not working  In person 1:1 ordered afterwards  Continue patient near nurses station  Re-orient frequently        Dx:  Rehab Diagnosis: Impairment of mobility, safety and Activities of Daily Living (ADLs) due to Brain Dysfunction:  02.22  Traumatic, Closed Injury   Etiology: Falls     Subjective   Patient seen face to face.  Overnight events reviewed with staff and perosonally called family ( and daughter Courtney) personally.  Patient with persistent difficulty swallowing today.  GI to eval by EGD.  Patient also much more confused and attempting multiple times today to get out of bed on her own setting off alarms    Objective :  Temp:  [97.8 °F (36.6 °C)-99.1 °F (37.3 °C)] 99.1 °F (37.3 °C)  HR:  [] 99  BP: ()/(60-90) 170/72  Resp:  [18-20] 20  SpO2:  [95 %-98 %] 97 %  O2 Device: None (Room air)    Functional Update:  ARC THERAPY ON HOLD    Physical  Exam  Vitals and nursing note reviewed.   Constitutional:       Appearance: She is well-developed.   HENT:      Head:      Comments: BCC scalp large lesion     Nose: Nose normal.      Mouth/Throat:      Comments: Spitting up mucous  Eyes:      Conjunctiva/sclera: Conjunctivae normal.   Cardiovascular:      Rate and Rhythm: Rhythm irregular.      Pulses: Normal pulses.   Pulmonary:      Effort: Pulmonary effort is normal. No respiratory distress.      Breath sounds: No wheezing or rales.   Abdominal:      General: Bowel sounds are normal.      Tenderness: There is abdominal tenderness (near sternum).   Musculoskeletal:      Cervical back: Neck supple.   Skin:     General: Skin is warm.   Neurological:      Mental Status: She is alert. She is disoriented.   Psychiatric:         Mood and Affect: Mood normal.         Scheduled Meds:  Current Facility-Administered Medications   Medication Dose Route Frequency Provider Last Rate    acetaminophen  650 mg Oral Q6H PRN Sheri Pope MD      bisacodyl  10 mg Rectal Daily PRN Sheri Pope MD      calcium carbonate  500 mg Oral Daily PRN Autumn Chapman PA-C      [START ON 2/4/2025] cefTRIAXone  1,000 mg Intravenous Q24H LUIS Monsalve      diltiazem  120 mg Oral HS LUIS Monsalve      docusate sodium  100 mg Oral BID PRN Sheri Pope MD      [Held by provider] enoxaparin  30 mg Subcutaneous Q24H Autumn Chapman PA-C      gabapentin  100 mg Oral HS Sheri Pope MD      hydrALAZINE  10 mg Oral Q8H PRN LUIS Monsalve      melatonin  6 mg Oral HS Sheri Pope MD      metoprolol succinate  25 mg Oral BID LUIS Monsalve      midodrine  5 mg Oral TID PRN Autumn Chapman PA-C      midodrine  5 mg Oral Daily Before Breakfast LUIS Monsalve      mirtazapine  7.5 mg Oral HS Sheri Pope MD      ondansetron  4 mg Oral Q6H PRN Autumn Chapman PA-C      pantoprazole  40 mg Oral Early Morning Autumn  Dilma Chapman PA-C      saccharomyces boulardii  250 mg Oral BID LUIS Monsalve      traZODone  100 mg Oral HS Sheri Pope MD           Lab Results: I have reviewed the following results:  Results from last 7 days   Lab Units 02/03/25 0224 01/30/25  0516   HEMOGLOBIN g/dL 11.3* 9.6*   HEMATOCRIT % 34.7* 29.7*   WBC Thousand/uL 15.14* 8.15   PLATELETS Thousands/uL 202 187     Results from last 7 days   Lab Units 02/03/25 0224 01/30/25  0516   BUN mg/dL 23 19   SODIUM mmol/L 139 141   POTASSIUM mmol/L 4.1 4.1   CHLORIDE mmol/L 103 107   CREATININE mg/dL 0.96 0.87   AST U/L 16  --    ALT U/L 20  --

## 2025-02-03 NOTE — NURSING NOTE
Pt keeps coughing up mucous.  She's complaining of right sides abdominal pain.  Heart rate irregular , /80 manually.  She took her lopressor and cardezem but I held her other meds because she is having a hard time swallowing.  BP rechecked, 180/80, still complainng of the pain.  Notified doctor, waiting for response.  Call bell within reach, continue to monitor

## 2025-02-03 NOTE — ASSESSMENT & PLAN NOTE
Hgb currently 11.3.  Baseline Hgb 9-10.  No active s/s of bleeding.  Vitamin B12 1125 on 1/13   Folate 21.1 on 1/13  Iron panel on 1/13 - Iron Sat 12%, TIBC 295, Iron 34, and Ferritin 98.  Continue to trend routine CBC.

## 2025-02-03 NOTE — ASSESSMENT & PLAN NOTE
Patient complained of food being stuck in her throat and below.  CT chest 2/3/25:   Diffuse chronic dilation of the esophagus with progressive or transiently increased distention. Esophagus filled with fluid and debris. Findings may be sequela of severe gastroesophageal reflux superimposed on chronic dysmotility. GE stenosis/stricture is not excluded. Correlate with any prior work-up.   Mild mass effect on bilateral cardiac atria due to the dilated esophagus and pectus excavatum.   Minimal left basilar lower lobe consolidation likely atelectasis, improved since 1/14/2025.   Bilateral pleural effusions, small on the left and trace on the right, improved since 1/14/2025.   Additional chronic findings and negatives as above.    Plan:  SLP did evaluate - oral phayngeal status unchanged.  Suspecting esophageal cause  GI consulted   EGD today   Family did give consent  Continue Protonix

## 2025-02-03 NOTE — ASSESSMENT & PLAN NOTE
Presented on 1/4 after syncopal fall.  CTH showed acute subdural hemorrhage along the L cerebral convexity with maximal thickness of 1.9cm.  No midline shift.

## 2025-02-03 NOTE — ASSESSMENT & PLAN NOTE
Home regimen: Toprol XL 50mg daily and HCTZ 12.5mg daily.  Currently receiving Toprol XL 25mg BID and Cardizem 120mg daily.  Give midodrine as needed for SBP <90.  Orthostatic + Monitor orthostatics daily.  Keep SBP <160 due to intracranial bleed.

## 2025-02-03 NOTE — CONSULTS
"Consultation - Gastroenterology   Name: Sandra Purvis 89 y.o. female I MRN: [id number]  Unit/Bed#: [facility] 261-01 I Date of Admission: 1/17/2025   Date of Service: 2/3/2025 I Hospital Day: 17   Consults  Physician Requesting Evaluation: Sheri Pope MD   Reason for Evaluation / Principal Problem: Dysphagia, food impaction    Assessment & Plan  Food impaction of esophagus  Diagnosed with SDH and SAH 1/4/2025 after a fall/syncopal episode.  She was admitted to [facility] ICU and then eventually transferred to [facility] .SLP evaluation yesterday did not note any aspiration but patient did report food getting \"stuck\" in the upper esophagus.  She stood up to try to advance food but then developed hypotensive episode.  Since that time she has been spitting up mucus and particles of food.  She underwent a CT of chest, abdomen yesterday which showed diffuse chronic dilation of the esophagus with progressive or transiently increased distention, esophagus filled with fluid and debris - may be sequela of severe gastroesophageal reflux superimposed on chronic dysmotility. Trace ascites. Pericholecystic fluid vs thick edematous gallbladder wall, findings nonspecific given presence of ascites. when she sips water she does \"gulp\" at times.  Obtain history from patient's daughter and .  No history of any swallowing difficulties in the past.  No GI evaluation.  Conference call obtained with physiatrist,  and myself.  I did review EGD procedure and possible complications including but not limited to infection, bleeding and perforation.   and daughter did consent for EGD.  Will rule out food impaction, stricture, EOE, esophagitis and evaluate for possible motility disorder.  On Lovenox prophylaxis.  Last dose last evening.    -Continue pantoprazole 40 mg daily  -N.p.o.  -EGD today  Esophageal dysphagia  Refer above  Abnormal CT of the abdomen  Refer above  SDH (subdural hematoma) (HCC)  Presented on 1/4 " after syncopal fall.  CTH showed acute subdural hemorrhage along the L cerebral convexity with maximal thickness of 1.9cm.  No midline shift.  Hypertension  Per medical team  Atrial fibrillation with rapid ventricular response ([facility])  Per medical team  Stage 3 chronic kidney disease, unspecified whether stage 3a or 3b CKD ([facility])  Lab Results   Component Value Date    EGFR 52 02/03/2025    EGFR 58 01/30/2025    EGFR 61 01/27/2025    CREATININE 0.96 02/03/2025    CREATININE 0.87 01/30/2025    CREATININE 0.84 01/27/2025     GERD (gastroesophageal reflux disease)  Vague history of GERD in the past.  Currently on pantoprazole 40 mg daily.  SAH (subarachnoid hemorrhage) ([facility])  Secondary to fall.CT of the head on 1/4 also showed comminuted and mildly displaced fractures of the lateral and anterior walls of the right maxilla, lateral and inferior walls of the right orbit, right inferior orbital wall fractures appear to involve the inferior orbital foramen with associated small extraconal soft tissue edema and focus of air.  There is no herniation of extraocular muscles and a nondisplaced fracture of the zygomatic arch as well as the temporal bone at the TMJ.  There is diffuse opacification of the right maxillary sinus and right nasal cavity with blood products and air  Seen by oral maxillofacial surgery team and surgical intervention was declined by patient  Closed extensive facial fractures ([facility])  S/p fall on 1/4.  CT facial bones showed comminuted and mildly displaced fractures of the lateral and anterior walls of the R maxilla, lateral, and inferior wall of the R orbit, wall fracture appears to involve the inferior orbital foramen with associated small extraconal soft tissue edema, nondisplaced fractures of the zygomatic arch and the temporal bone at the TMJ, diffuse opacification of R maxillary sinus and R nasal cavity with blood products and air.  Chest pain  Troponins obtained initial evaluation in the ER.  Despite  elevated troponins there was nonischemic findings on her EKG and felt to be potentially related to a nonischemic troponin elevation in the setting of trauma and brain bleed versus demand ischemia due to her paroxysmal atrial fibrillation and syncopal episode.  Candidate for cardiology for ischemic evaluation due to her advanced age, frailty and recent subarachnoid hemorrhage/subdural hemorrhage.  Orthostatic hypotension  Presented with syncope on 1/4.  Significant orthostasis noted in acute setting.  Follow-up per inpatient team  Syncope  Per medical team  Anemia  Hgb currently 11.3.  Baseline Hgb 9-10.  No active s/s of bleeding.  Vitamin B12 1125 on 1/13   Folate 21.1 on 1/13  Iron panel on 1/13 - Iron Sat 12%, TIBC 295, Iron 34, and Ferritin 98.  Continue to trend routine CBC.  Leukocytosis  WBCs 15.4 today.  Urinalysis sent and she is being treated for a urinary tract infection.  Acute on chronic diastolic congestive heart failure (HCC)  ECHO on 1/6 showed EF 70%, G2DD.   Monitor volume status - currently euvolemic.  Monitor I&Os, daily weights.    Abdominal pain  Refer above  Acute cystitis  Diagnosed with acute cystitis today.  To be started on Rocephin    I discussed the above plan with the primary team and .      History of Present Illness   HPI:  Sandra Purvis is a 89 y.o. female who was recently admitted to [facility] ICU secondary to fall with 3 resultant subdural hematoma, subarachnoid hematoma and facial fractures.  She has a past medical history of HTN, stage III chronic kidney disease, GERD, basal cell carcinoma of scalp, A-fib with rapid ventricular response, malignant melanoma of left arm, SDH, SAH, orthostatic hypotension, acute on chronic diastolic congestive heart failure she was discharged to [facility] with then transferred to medical service due to rapid A-fib, hypertension associated with generalized weakness and lightheadedness.  She was then eventually transferred back to  [facility].  Last evening she was unable to swallow her pills.  Reports difficulty swallowing.  She did have bouts of coughing up mucus.  She did have 2 syncopal episodes 2/1/2025.  Patient continued vomiting and coughing up mucus.  Reports she was evaluated by speech therapy.  She was on a stage III dysphagia diet.  Regular diet trials was recommended as appropriate with SLP.  At the end of her meal she reported \"sticking\" however were patient was pointing was towards right lower flank area and stomach area.  She was bringing up increased mucus but never had any regurgitation of food items during this event.  Patient requested to stand to aid in pushing down the food.  However once standing patient became hypotensive and was almost unresponsive.    During exam today patient was pleasantly confused but alert.  Staff reporting she was more confused today.  She has been spitting up mucus and particles of food since yesterday.  She does tolerate sips of water but does \"gulp\" after she swallows.  Denies any abdominal pain.  No weight loss.    CT C/A/P showed diffuse chronic dilation of the esophagus with progressive or transiently increased distention, esophagus filled with fluid and debris - may be sequela of severe gastroesophageal reflux superimposed on chronic dysmotility. Trace ascites. Pericholecystic fluid vs thick edematous gallbladder wall, findings nonspecific given presence of ascites.     Labs 2/3/2025-CMP normal other than glucose 148, hemoglobin 11.3, MCV normal, platelets 202, TSH 0.2, influenza negative    Review of Systems   Psychiatric/Behavioral:  Positive for confusion.    All other systems reviewed and are negative.    I have reviewed the patient's PMH, PSH, Social History, Family History, Meds, and Allergies    Objective :  Temp:  [97.8 °F (36.6 °C)-99.1 °F (37.3 °C)] 99.1 °F (37.3 °C)  HR:  [] 99  BP: ()/(50-90) 190/90  Resp:  [18-20] 20  SpO2:  [95 %-98 %] 97 %  O2 Device: None (Room " air)    Physical Exam  Vitals reviewed.   Cardiovascular:      Rate and Rhythm: Normal rate. Rhythm irregular.   Pulmonary:      Breath sounds: Normal breath sounds.   Abdominal:      General: Bowel sounds are normal.      Tenderness: There is no abdominal tenderness.   Skin:     General: Skin is warm and dry.   Neurological:      Mental Status: She is alert. She is disoriented.           Lab Results: I have reviewed the following results:

## 2025-02-03 NOTE — ASSESSMENT & PLAN NOTE
"Diagnosed with SDH and SAH 1/4/2025 after a fall/syncopal episode.  She was admitted to [facility] ICU and then eventually transferred to [facility] .SLP evaluation yesterday did not note any aspiration but patient did report food getting \"stuck\" in the upper esophagus.  She stood up to try to advance food but then developed hypotensive episode.  Since that time she has been spitting up mucus and particles of food.  She underwent a CT of chest, abdomen yesterday which showed diffuse chronic dilation of the esophagus with progressive or transiently increased distention, esophagus filled with fluid and debris - may be sequela of severe gastroesophageal reflux superimposed on chronic dysmotility. Trace ascites. Pericholecystic fluid vs thick edematous gallbladder wall, findings nonspecific given presence of ascites. when she sips water she does \"gulp\" at times.  Obtain history from patient's daughter and .  No history of any swallowing difficulties in the past.  No GI evaluation.  Conference call obtained with physiatrist,  and myself.  I did review EGD procedure and possible complications including but not limited to infection, bleeding and perforation.   and daughter did consent for EGD.  Will rule out food impaction, stricture, EOE, esophagitis and evaluate for possible motility disorder.  On Lovenox prophylaxis.  Last dose last evening.    -Continue pantoprazole 40 mg daily  -N.p.o.  -EGD today  "

## 2025-02-03 NOTE — PLAN OF CARE
Problem: SAFETY ADULT  Goal: Patient will remain free of falls  Description: INTERVENTIONS:  - Educate patient/family on patient safety including physical limitations  - Instruct patient to call for assistance with activity   - Consult OT/PT to assist with strengthening/mobility   - Keep Call bell within reach  - Keep bed low and locked with side rails adjusted as appropriate  - Keep care items and personal belongings within reach  - Initiate and maintain comfort rounds  - Make Fall Risk Sign visible to staff  - Offer Toileting every  Hours, in advance of need  - Initiate/Maintain alarm  - Obtain necessary fall risk management equipment:   - Apply yellow socks and bracelet for high fall risk patients  - Consider moving patient to room near nurses station  Outcome: Progressing     Problem: Knowledge Deficit  Goal: Patient/family/caregiver demonstrates understanding of disease process, treatment plan, medications, and discharge instructions  Description: Complete learning assessment and assess knowledge base.  Interventions:  - Provide teaching at level of understanding  - Provide teaching via preferred learning methods  Outcome: Progressing

## 2025-02-03 NOTE — ASSESSMENT & PLAN NOTE
NSGY target goal <160  Cw metoprolol succinate 75 mg twice daily  Cardizem 120 mg XR daily, d/c amlodipine  Wears compression stockings, abdominal binder  Metoprolol decreased to 50 mg 1/27, 25 mg 1/28 due to orthostasis  1/28 IV hydration, midodrine daily per IM due to hypotension  BP stabilized after SBP 110s

## 2025-02-03 NOTE — ASSESSMENT & PLAN NOTE
S/p fall on 1/4.  CT facial bones showed comminuted and mildly displaced fractures of the lateral and anterior walls of the R maxilla, lateral, and inferior wall of the R orbit, wall fracture appears to involve the inferior orbital foramen with associated small extraconal soft tissue edema, nondisplaced fractures of the zygomatic arch and the temporal bone at the TMJ, diffuse opacification of R maxillary sinus and R nasal cavity with blood products and air.

## 2025-02-03 NOTE — ASSESSMENT & PLAN NOTE
Diagnosed with acute cystitis today.  To be started on Rocephin    I discussed the above plan with the primary team and .

## 2025-02-03 NOTE — NURSING NOTE
Dr. Warner came up to evaluate Sandra and ordered a chest Xray.  Pt will be transferred to med surg. Continue to monitor

## 2025-02-03 NOTE — ASSESSMENT & PLAN NOTE
Lab Results   Component Value Date    EGFR 52 02/03/2025    EGFR 58 01/30/2025    EGFR 61 01/27/2025    CREATININE 0.96 02/03/2025    CREATININE 0.87 01/30/2025    CREATININE 0.84 01/27/2025   At baseline

## 2025-02-03 NOTE — ASSESSMENT & PLAN NOTE
Wt Readings from Last 3 Encounters:   02/02/25 44.2 kg (97 lb 6.4 oz)   02/03/25 44.2 kg (97 lb 7.1 oz)   01/12/25 53.5 kg (118 lb)     TTE 1/6/2025 showed EF 70%, grade 2 DD, mildly dilated RV, mild LA, mild AI, mild TR, estimated PA pressure 50 mmHg, trivial pericardial effusion  Not on diuretics prior to admission  Cw furosemide 20 mg; monitor for volume status   Daily weights, monitor I/O

## 2025-02-03 NOTE — ASSESSMENT & PLAN NOTE
UA was performed due to signs of cystitis additionally (seen on CT) and clinically - abdominal/back pain, persistent WBC elevation, and change in mental status (acute worse).  UA indicitive of UTI.  Empirically on Ceftriaxone per IM.  Time out was performed prior to culture additionally.

## 2025-02-03 NOTE — ASSESSMENT & PLAN NOTE
Continue melatonin 6mg at HS, Remeron 7.5mg at HS, and trazodone 100mg at HS.  Continue sleep logs.

## 2025-02-03 NOTE — ANESTHESIA PREPROCEDURE EVALUATION
"Procedure:  EGD    Relevant Problems   CARDIO   (+) Acute on chronic diastolic congestive heart failure (HCC)   (+) Atrial fibrillation with rapid ventricular response (HCC)   (+) Chest pain   (+) Hypertension   (+) Hypertensive urgency      GI/HEPATIC   (+) Esophageal dysphagia   (+) GERD (gastroesophageal reflux disease)      /RENAL   (+) Stage 3 chronic kidney disease, unspecified whether stage 3a or 3b CKD (HCC)      HEMATOLOGY   (+) Anemia      NEURO/PSYCH   (+) SDH (subdural hematoma) (HCC)      Neurology/Sleep   (+) SAH (subarachnoid hemorrhage) (HCC)      Other   (+) Malignant melanoma of arm, left (HCC)        Physical Exam    Airway    Mallampati score: II  TM Distance: >3 FB  Neck ROM: full     Dental   No notable dental hx     Cardiovascular  Cardiovascular exam normal    Pulmonary  Pulmonary exam normal     Other Findings  post-pubertal.      Anesthesia Plan  ASA Score- 3     Anesthesia Type- general with ASA Monitors.         Additional Monitors:     Airway Plan: ETT.           Plan Factors-Exercise tolerance (METS): <4 METS.    Chart reviewed. EKG reviewed.  Existing labs reviewed. Patient summary reviewed.    Patient is not a current smoker. Patient not instructed to abstain from smoking on day of procedure. Patient did not smoke on day of surgery.            Induction- intravenous.    Postoperative Plan-     Perioperative Resuscitation Plan - Level 1 - Full Code.       Informed Consent- Anesthetic plan and risks discussed with patient, spouse, daughter and healthcare power of .  I personally reviewed this patient with the CRNA. Discussed and agreed on the Anesthesia Plan with the CRNA..      NPO Status:  Vitals Value Taken Time   Date of last liquid 02/03/25 02/03/25 1439   Time of last liquid 0800 02/03/25 1439   Date of last solid     Time of last solid           No results found for: \"HGBA1C\"    Lab Results   Component Value Date     (L) 09/02/2015    K 4.1 02/03/2025     " 02/03/2025    CO2 25 02/03/2025    ANIONGAP 9 09/02/2015    BUN 23 02/03/2025    CREATININE 0.96 02/03/2025    GLUCOSE 104 09/02/2015    GLUF 93 01/30/2025    CALCIUM 10.2 02/03/2025    CORRECTEDCA 9.6 01/20/2025    AST 16 02/03/2025    ALT 20 02/03/2025    ALKPHOS 93 02/03/2025    EGFR 52 02/03/2025       Lab Results   Component Value Date    WBC 15.14 (H) 02/03/2025    HGB 11.3 (L) 02/03/2025    HCT 34.7 (L) 02/03/2025    MCV 91 02/03/2025     02/03/2025     Sinus tachycardia with 1st degree A-V block with Premature atrial complexes  Left axis deviation  Inferior infarct (cited on or before 04-Jan-2025)  Abnormal ECG  When compared with ECG of 03-Feb-2025 02:35, (unconfirmed)  No significant change was found  Confirmed by Tamiko Jacob ([id number]) on 2/3/2025 4:38:03 AM     Specimen Collected: 02/03/25 02:35       Jan 2025     Left Ventricle: Left ventricular cavity size is normal. Wall thickness is mildly increased. The left ventricular ejection fraction is 70%. Systolic function is vigorous. Wall motion is normal. Diastolic function is moderately abnormal, consistent with grade II (pseudonormal) relaxation.    Right Ventricle: Systolic function is mildly reduced.    Left Atrium: The atrium is mildly dilated.    Aortic Valve: There is mild regurgitation.    Mitral Valve: There is mild annular calcification.    Tricuspid Valve: There is mild regurgitation. The right ventricular systolic pressure is moderately elevated. The estimated right ventricular systolic pressure is 50.00 mmHg.    Pericardium: There is a trivial pericardial effusion posterior to the heart.

## 2025-02-03 NOTE — ASSESSMENT & PLAN NOTE
Abdominal pain   CT Abd Pelvis completed 2/3/25:   Findings suggestive of cystitis. Correlate with clinical findings.  Trace ascites.  Pericholecystic fluid versus thick edematous gallbladder wall. No calcified gallstones. Findings are nonspecific given presence of ascites.  Small right renal upper and lower pole exophytic indeterminate cystic lesions most likely chronic hemorrhagic or proteinaceous cyst. These could be followed up with nonemergent renal ultrasound.  Trace fluid in the endometrial cavity versus mild endometrial thickening. No significant change since October 2021. This could be followed up with nonemergent pelvic ultrasound if clinically warranted.  Additional chronic findings and negatives as above.    UA was performed due to signs of cystitis additionally - abdominal/back pain, persistent WBC elevation, and change in mental status (acute worse).  UA indicitive of UTI.  Empirically on Ceftriaxone per IM.  Time out was performed prior to culture additionally.

## 2025-02-03 NOTE — ED PROVIDER NOTES
Time reflects when diagnosis was documented in both MDM as applicable and the Disposition within this note       Time User Action Codes Description Comment    2/3/2025  6:50 AM Hermes Shin Add [R07.9] Chest pain     2/3/2025  6:50 AM Hermes Shin Add [R55] Syncope     2/3/2025  6:50 AM Hermes Shin Add [R10.13] Epigastric pain     2/3/2025  6:51 AM Hermes Shin Add [K21.9] GERD (gastroesophageal reflux disease)     2/3/2025  6:56 AM Hermes Shin Add [R35.0] Increased urinary frequency           ED Disposition       ED Disposition   Discharge    Condition   Stable    Date/Time   Mon Feb 3, 2025  6:50 AM    Comment   Sandra Purvis discharge to home/self care.                   Assessment & Plan       Medical Decision Making  89-year-old female presents to ED for evaluation of syncopal episodes, vomiting as seen in HPI.  On physical examination patient tachycardic at 107 bpm.  Afebrile.  Nonhypoxic.  Appears to be at baseline mentation.  Previous documentation demonstrates that patient sundown's.  Patient responds to questions but does demonstrate confusion.  States that she was at her home earlier today.  Wondering when she will go back home.  Has been in the hospital, [facility] for almost a month.  Occasionally retching, coughing up phlegm.  No murmur.  Normal breath sounds.  Generalized abdominal discomfort with palpation.  Appears to be at baseline neurologic functioning.  Obtained workup consisting of CBC, CMP, flu/COVID swab, troponin, TSH with reflex, EKG, Noncon head CT, CT chest abdomen pelvis with IV contrast.  Zofran for nausea and vomiting.  IV fluids.   Differential diagnosis includes but not limited to GERD, syncope, worsening intracranial bleeding, esophageal obstruction, pneumonia, aspiration pneumonia, pneumothorax, tension pneumothorax, MI, ACS, UTI, electrolyte abnormality, viral GI infection    CBC with leukocytosis of 15.14.  CMP without significant change from previous values.  Negative  flu/COVID swab.  Troponin of 28 NG/L at 0 hours.  2-hour troponin 34 NG/L.  EKG without evidence of acute cardiac injury. Tachycardia seen on EKG.  Noncon head CT without significant change from previous Noncon head CT.  CT chest abdomen pelvis with IV contrast returned demonstrating: Diffuse chronic dilation of the esophagus with progressive or transiently increased distention. Esophagus filled with fluid and debris. Findings may be sequela of severe gastroesophageal reflux superimposed on chronic dysmotility. GE stenosis/stricture is not excluded. Correlate with any prior work-up.  Findings suggestive of cystitis. Correlate with clinical findings.    Imaging does show possible cystitis.  Patient later endorses increased urinary frequency recently.  Leukocytosis, tachycardia.  Concern for UTI.  Plan to provide ceftriaxone.  Patient can have UA performed when at [facility].  Obtaining UA will likely take several hours in the ED.  [facility] agreeable to this.  Plan to discharge patient back to [facility].     Amount and/or Complexity of Data Reviewed  Labs: ordered.  Radiology: ordered.    Risk  Prescription drug management.             Medications   ondansetron (ZOFRAN) injection 4 mg (4 mg Intravenous Given 2/3/25 0314)   sodium chloride 0.9 % bolus 1,000 mL (0 mL Intravenous Stopped 2/3/25 0501)   iohexol (OMNIPAQUE) 350 MG/ML injection (MULTI-DOSE) 65 mL (65 mL Intravenous Given 2/3/25 0316)   Famotidine (PF) (PEPCID) injection 20 mg (20 mg Intravenous Given 2/3/25 0632)   cefTRIAXone (ROCEPHIN) IVPB (premix in dextrose) 1,000 mg 50 mL (0 mg Intravenous Stopped 2/3/25 0730)       ED Risk Strat Scores                          SBIRT 20yo+      Flowsheet Row Most Recent Value   Initial Alcohol Screen: US AUDIT-C     1. How often do you have a drink containing alcohol? 0 Filed at: 02/03/2025 0245   2. How many drinks containing alcohol do you have on a typical day you are drinking?  0 Filed at: 02/03/2025 0245   3b. FEMALE Any Age, or  MALE 65+: How often do you have 4 or more drinks on one occassion? 0 Filed at: 02/03/2025 0245   Audit-C Score 0 Filed at: 02/03/2025 0245   ANNA: How many times in the past year have you...    Used an illegal drug or used a prescription medication for non-medical reasons? Never Filed at: 02/03/2025 0245                            History of Present Illness       Chief Complaint   Patient presents with    Syncope     Pt was on [facility] where yesterday she has aspiration at speech therapy.  Pt then had two syncopal episodes during the day on 2/1.  Pt has continued vomiting and is coughing up mucous.        Past Medical History:   Diagnosis Date    Arthritis     Cataract     LastAssessed:9/8/15    Disturbance of salivary secretion 02/03/2025    Esophageal dysphagia 02/03/2025    GERD (gastroesophageal reflux disease)     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    Hypertension     PAF (paroxysmal atrial fibrillation) ([facility])     Last Assessed:2/3/16    Wrist fracture     Resolved:9/8/15      Past Surgical History:   Procedure Laterality Date    CATARACT EXTRACTION      Last Assessed:9/8/15    FL RETROGRADE PYELOGRAM  10/17/2021    LYMPHADENECTOMY      Last Assessed:9/8/15    IL CYSTO/URETERO W/LITHOTRIPSY &INDWELL STENT INSRT Right 11/24/2021    Procedure: CYSTOSCOPY URETEROSCOPY WITH LITHOTRIPSY HOLMIUM LASER, RETROGRADE PYELOGRAM AND INSERTION STENT URETERAL;  Surgeon: David Sr MD;  Location: [facility];  Service: Urology    IL CYSTOURETHROSCOPY W/URETERAL CATHETERIZATION Right 10/17/2021    Procedure: CYSTOSCOPY RETROGRADE PYELOGRAM WITH INSERTION STENT URETERAL;  Surgeon: Benjie Tinoco MD;  Location: [facility];  Service: Urology    TONSILLECTOMY      Last Assessed:9/8/15    WRIST SURGERY      Last Assessed:9/8/15      Family History   Problem Relation Age of Onset    No Known Problems Family       Social History     Tobacco Use    Smoking status: Never     Passive exposure: Never    Smokeless tobacco: Never    Vaping Use    Vaping status: Never Used   Substance Use Topics    Alcohol use: Never    Drug use: Never      E-Cigarette/Vaping    E-Cigarette Use Never User       E-Cigarette/Vaping Substances    Nicotine No     THC No     CBD No     Flavoring No     Other No     Unknown No       I have reviewed and agree with the history as documented.     89-year-old female presents to ED from inpatient rehabilitation unit with reported syncopal episodes.  Patient provides limited history.  Mildly confused.  Patient stating that she was at home earlier this afternoon.      Report from rehabilitation unit states that this past afternoon around 1:30 PM she had a syncopal episode while working with speech therapy.  Patient also aspirated when working with speech therapy. Has been vomiting up mucus the whole afternoon, evening.  Heart rate was noted to be elevated.  [facility]  Admitting team decided to send to ER for further evaluation given chest pain, elevated heart rate, syncopal episode today, aspiration when working with speech therapy.            Review of Systems   Cardiovascular:  Positive for chest pain.   Gastrointestinal:  Positive for nausea and vomiting.   Genitourinary:  Positive for frequency.   Neurological:  Positive for syncope.   All other systems reviewed and are negative.          Objective       ED Triage Vitals   Temperature Pulse Blood Pressure Respirations SpO2 Patient Position - Orthostatic VS   02/03/25 0156 02/03/25 0156 02/03/25 0226 02/03/25 0156 02/03/25 0156 02/03/25 0156   98.3 °F (36.8 °C) (!) 107 156/86 18 98 % Lying      Temp Source Heart Rate Source BP Location FiO2 (%) Pain Score    02/03/25 0156 02/03/25 0156 02/03/25 0226 -- --    Oral Monitor Right arm        Vitals      Date and Time Temp Pulse SpO2 Resp BP Pain Score FACES Pain Rating User   02/03/25 0706 99.1 °F (37.3 °C) 103 97 % 20 184/89 -- -- MH   02/03/25 0500  "-- 113 97 % 20 146/78 -- -- KB   02/03/25 0226 -- -- -- -- 156/86 -- -- TR   02/03/25 0156 98.3 °F (36.8 °C) 107 98 % 18 -- -- -- AM            Physical Exam  Vitals and nursing note reviewed.   Constitutional:       Appearance: She is well-developed. She is ill-appearing. She is not toxic-appearing or diaphoretic.      Comments: Chronically ill-appearing.  Periodically coughing up phlegm, retching   HENT:      Head: Normocephalic and atraumatic.      Nose: Nose normal.   Eyes:      Conjunctiva/sclera: Conjunctivae normal.   Cardiovascular:      Rate and Rhythm: Regular rhythm. Tachycardia present.      Pulses: Normal pulses.      Heart sounds: Normal heart sounds. No murmur heard.     No friction rub. No gallop.   Pulmonary:      Effort: Pulmonary effort is normal. No respiratory distress.      Breath sounds: Normal breath sounds. No stridor. No wheezing, rhonchi or rales.   Abdominal:      Palpations: Abdomen is soft.      Tenderness: There is generalized abdominal tenderness.   Musculoskeletal:         General: No swelling.      Cervical back: Neck supple.   Skin:     General: Skin is warm and dry.      Capillary Refill: Capillary refill takes less than 2 seconds.      Coloration: Skin is not jaundiced.      Findings: No rash.   Neurological:      Mental Status: She is alert.   Psychiatric:         Mood and Affect: Mood normal.         Results Reviewed       Procedure Component Value Units Date/Time    T4, free [[id number]]  (Normal) Collected: 02/03/25 0224    Lab Status: Final result Specimen: Blood from Arm, Left Updated: 02/03/25 1220     Free T4 0.98 ng/dL     Narrative:        \"Therapeutic range for patients medicated with thyroid disorders: 0.61-1.24 ng/dL.\"    HS Troponin I 2hr [[id number]]  (Normal) Collected: 02/03/25 0501    Lab Status: Final result Specimen: Blood from Arm, Right Updated: 02/03/25 0528     hs TnI 2hr 34 ng/L      Delta 2hr hsTnI 6 ng/L     TSH, 3rd generation with Free T4 reflex " [[id number]]  (Abnormal) Collected: 02/03/25 0224    Lab Status: Final result Specimen: Blood from Arm, Left Updated: 02/03/25 0308     TSH 3RD GENERATON 0.247 uIU/mL     Manual Differential(PHLEBS Do Not Order) [[id number]]  (Abnormal) Collected: 02/03/25 0224    Lab Status: Final result Specimen: Blood from Arm, Left Updated: 02/03/25 0306     Segmented % 93 %      Lymphocytes % 5 %      Monocytes % 1 %      Eosinophils % 0 %      Basophils % 0 %      Atypical Lymphocytes % 1 %      Absolute Neutrophils 14.08 Thousand/uL      Absolute Lymphocytes 0.91 Thousand/uL      Absolute Monocytes 0.15 Thousand/uL      Absolute Eosinophils 0.00 Thousand/uL      Absolute Basophils 0.00 Thousand/uL      Total Counted --     RBC Morphology Present     Platelet Estimate Adequate     Anisocytosis Present     Poikilocytes Present    HS Troponin 0hr (reflex protocol) [[id number]]  (Normal) Collected: 02/03/25 0224    Lab Status: Final result Specimen: Blood from Arm, Left Updated: 02/03/25 0259     hs TnI 0hr 28 ng/L     Comprehensive metabolic panel [[id number]]  (Abnormal) Collected: 02/03/25 0224    Lab Status: Final result Specimen: Blood from Arm, Left Updated: 02/03/25 0250     Sodium 139 mmol/L      Potassium 4.1 mmol/L      Chloride 103 mmol/L      CO2 25 mmol/L      ANION GAP 11 mmol/L      BUN 23 mg/dL      Creatinine 0.96 mg/dL      Glucose 148 mg/dL      Calcium 10.2 mg/dL      AST 16 U/L      ALT 20 U/L      Alkaline Phosphatase 93 U/L      Total Protein 7.1 g/dL      Albumin 3.9 g/dL      Total Bilirubin 0.50 mg/dL      eGFR 52 ml/min/1.73sq m     Narrative:      National Kidney Disease Foundation guidelines for Chronic Kidney Disease (CKD):     Stage 1 with normal or high GFR (GFR > 90 mL/min/1.73 square meters)    Stage 2 Mild CKD (GFR = 60-89 mL/min/1.73 square meters)    Stage 3A Moderate CKD (GFR = 45-59 mL/min/1.73 square meters)    Stage 3B Moderate CKD (GFR = 30-44 mL/min/1.73 square meters)    Stage 4 Severe CKD  (GFR = 15-29 mL/min/1.73 square meters)    Stage 5 End Stage CKD (GFR <15 mL/min/1.73 square meters)  Note: GFR calculation is accurate only with a steady state creatinine    FLU/COVID Rapid Antigen (30 min. TAT) - Preferred screening test in ED [[id number]]  (Normal) Collected: 02/03/25 0224    Lab Status: Final result Specimen: Nares from Nose Updated: 02/03/25 0250     SARS COV Rapid Antigen Negative     Influenza A Rapid Antigen Negative     Influenza B Rapid Antigen Negative    Narrative:      This test has been performed using the [employer] Leigh 2 FLU+SARS Antigen test under the Emergency Use Authorization (EUA). This test has been validated by the  and verified by the performing laboratory. The Leigh uses lateral flow immunofluorescent sandwich assay to detect SARS-COV, Influenza A and Influenza B Antigen.     The [employer] Leigh 2 SARS Antigen test does not differentiate between SARS-CoV and SARS-CoV-2.     Negative results are presumptive and may be confirmed with a molecular assay, if necessary, for patient management. Negative results do not rule out SARS-CoV-2 or influenza infection and should not be used as the sole basis for treatment or patient management decisions. A negative test result may occur if the level of antigen in a sample is below the limit of detection of this test.     Positive results are indicative of the presence of viral antigens, but do not rule out bacterial infection or co-infection with other viruses.     All test results should be used as an adjunct to clinical observations and other information available to the provider.    FOR PEDIATRIC PATIENTS - copy/paste COVID Guidelines URL to browser: [email or web address]    CBC and differential [[id number]]  (Abnormal) Collected: 02/03/25 0224    Lab Status: Final result Specimen: Blood from Arm, Left Updated: 02/03/25 0243     WBC 15.14 Thousand/uL      RBC 3.81 Million/uL       Hemoglobin 11.3 g/dL      Hematocrit 34.7 %      MCV 91 fL      MCH 29.7 pg      MCHC 32.6 g/dL      RDW 17.2 %      MPV 10.4 fL      Platelets 202 Thousands/uL     Narrative:      This is an appended report.  These results have been appended to a previously verified report.            CT chest abdomen pelvis w contrast   Final Interpretation by Bean Roland MD (02/03 0555)      CT chest:      Diffuse chronic dilation of the esophagus with progressive or transiently increased distention. Esophagus filled with fluid and debris. Findings may be sequela of severe gastroesophageal reflux superimposed on chronic dysmotility. GE stenosis/stricture    is not excluded. Correlate with any prior work-up.      Mild mass effect on bilateral cardiac atria due to the dilated esophagus and pectus excavatum.      Minimal left basilar lower lobe consolidation likely atelectasis, improved since 1/14/2025.      Bilateral pleural effusions, small on the left and trace on the right, improved since 1/14/2025.      Additional chronic findings and negatives as above.      CT abdomen and pelvis:      Findings suggestive of cystitis. Correlate with clinical findings.      Trace ascites.      Pericholecystic fluid versus thick edematous gallbladder wall. No calcified gallstones. Findings are nonspecific given presence of ascites.      Small right renal upper and lower pole exophytic indeterminate cystic lesions most likely chronic hemorrhagic or proteinaceous cyst. These could be followed up with nonemergent renal ultrasound.      Trace fluid in the endometrial cavity versus mild endometrial thickening. No significant change since October 2021. This could be followed up with nonemergent pelvic ultrasound if clinically warranted.      Additional chronic findings and negatives as above.      The study was marked in EPIC for immediate notification.         Workstation performed: [id number]         CT head without contrast   Final  Interpretation by Bean Roland MD (02/03 0516)      No evidence of acute intracranial process.      Trace posterior bifrontal subarachnoid hemorrhage likely subacute and stable to slightly decreased allowing for some degree of redistribution.      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      Chronic microangiopathy.                  Workstation performed: [id number]             ECG 12 Lead Documentation Only    Date/Time: 2/3/2025 2:35 AM    Performed by: Hermes Shin PA-C  Authorized by: Hermes Shin PA-C    Indications / Diagnosis:  Chest pain  Patient location:  ED  Previous ECG:     Previous ECG:  Compared to current    Similarity:  No change  Interpretation:     Interpretation: abnormal    Rate:     ECG rate:  109    ECG rate assessment: tachycardic    Rhythm:     Rhythm: sinus tachycardia    Ectopy:     Ectopy: PAC    QRS:     QRS axis:  Left    QRS intervals:  Normal  Conduction:     Conduction: abnormal      Abnormal conduction: 1st degree    ST segments:     ST segments:  Normal  T waves:     T waves: normal        ED Medication and Procedure Management   Prior to Admission Medications   Prescriptions Last Dose Informant Patient Reported? Taking?   DAILY MULTIPLE VITAMINS/IRON PO  Self Yes No   Sig: Take 1 tablet by mouth daily   acetaminophen (TYLENOL) 325 mg tablet   No No   Sig: Take 2 tablets (650 mg total) by mouth every 6 (six) hours as needed for mild pain, moderate pain or headaches   bisacodyl (DULCOLAX) 10 mg suppository   No No   Sig: Insert 1 suppository (10 mg total) into the rectum daily as needed for constipation   calcium carbonate (TUMS) 500 mg chewable tablet   No No   Sig: Chew 1 tablet (500 mg total) daily as needed for indigestion or heartburn   diltiazem (CARDIZEM CD) 120 mg 24 hr capsule   No No   Sig: Take 1 capsule (120 mg total) by mouth daily at bedtime   docusate sodium (COLACE) 100 mg capsule   No No   Sig: Take 1 capsule (100  mg total) by mouth 2 (two) times a day as needed for constipation (first line)   enoxaparin (LOVENOX) 30 mg/0.3 mL   No No   Sig: Inject 0.3 mL (30 mg total) under the skin every 24 hours   gabapentin (NEURONTIN) 100 mg capsule   No No   Sig: Take 1 capsule (100 mg total) by mouth daily at bedtime   hydrALAZINE (APRESOLINE) 10 mg tablet   No No   Sig: Take 1 tablet (10 mg total) by mouth every 8 (eight) hours as needed (Give for SBP >180.)   melatonin 3 mg   No No   Sig: Take 2 tablets (6 mg total) by mouth daily at bedtime   metoprolol succinate (TOPROL-XL) 25 mg 24 hr tablet   No No   Sig: Take 1 tablet (25 mg total) by mouth 2 (two) times a day   midodrine (PROAMATINE) 5 mg tablet   No No   Sig: Take 1 tablet (5 mg total) by mouth daily before breakfast   midodrine (PROAMATINE) 5 mg tablet   No No   Sig: Take 1 tablet (5 mg total) by mouth 3 (three) times a day as needed (Give for SBP <90)   mirtazapine (REMERON) 7.5 MG tablet   No No   Sig: Take 1 tablet (7.5 mg total) by mouth daily at bedtime   ondansetron (ZOFRAN-ODT) 4 mg disintegrating tablet   No No   Sig: Take 1 tablet (4 mg total) by mouth every 6 (six) hours as needed for nausea or vomiting   pantoprazole (PROTONIX) 40 mg tablet   No No   Sig: Take 1 tablet (40 mg total) by mouth daily in the early morning   traZODone (DESYREL) 100 mg tablet   No No   Sig: Take 1 tablet (100 mg total) by mouth daily at bedtime      Facility-Administered Medications: None     Discharge Medication List as of 2/3/2025  7:50 AM        CONTINUE these medications which have NOT CHANGED    Details   !! acetaminophen (TYLENOL) 325 mg tablet Take 2 tablets (650 mg total) by mouth every 6 (six) hours as needed for mild pain, moderate pain or headaches, Starting Fri 1/31/2025, No Print      bisacodyl (DULCOLAX) 10 mg suppository Insert 1 suppository (10 mg total) into the rectum daily as needed for constipation, Starting Tue 1/14/2025, No Print      calcium carbonate (TUMS) 500 mg  chewable tablet Chew 1 tablet (500 mg total) daily as needed for indigestion or heartburn, Starting Tue 1/14/2025, No Print      DAILY MULTIPLE VITAMINS/IRON PO Take 1 tablet by mouth daily, Historical Med      !! diltiazem (CARDIZEM CD) 120 mg 24 hr capsule Take 1 capsule (120 mg total) by mouth daily at bedtime, Starting Fri 1/31/2025, No Print      docusate sodium (COLACE) 100 mg capsule Take 1 capsule (100 mg total) by mouth 2 (two) times a day as needed for constipation (first line), Starting Fri 1/31/2025, No Print      !! enoxaparin (LOVENOX) 30 mg/0.3 mL Inject 0.3 mL (30 mg total) under the skin every 24 hours, Starting Fri 1/31/2025, No Print      gabapentin (NEURONTIN) 100 mg capsule Take 1 capsule (100 mg total) by mouth daily at bedtime, Starting Fri 1/31/2025, No Print      hydrALAZINE (APRESOLINE) 10 mg tablet Take 1 tablet (10 mg total) by mouth every 8 (eight) hours as needed (Give for SBP >180.), Starting Fri 1/31/2025, No Print      !! melatonin 3 mg Take 2 tablets (6 mg total) by mouth daily at bedtime, Starting Fri 1/31/2025, No Print      !! metoprolol succinate (TOPROL-XL) 25 mg 24 hr tablet Take 1 tablet (25 mg total) by mouth 2 (two) times a day, Starting Fri 1/31/2025, No Print      !! midodrine (PROAMATINE) 5 mg tablet Take 1 tablet (5 mg total) by mouth daily before breakfast, Starting Sat 2/1/2025, No Print      !! midodrine (PROAMATINE) 5 mg tablet Take 1 tablet (5 mg total) by mouth 3 (three) times a day as needed (Give for SBP <90), Starting Fri 1/31/2025, No Print      mirtazapine (REMERON) 7.5 MG tablet Take 1 tablet (7.5 mg total) by mouth daily at bedtime, Starting Fri 1/31/2025, No Print      ondansetron (ZOFRAN-ODT) 4 mg disintegrating tablet Take 1 tablet (4 mg total) by mouth every 6 (six) hours as needed for nausea or vomiting, Starting Tue 1/14/2025, No Print      pantoprazole (PROTONIX) 40 mg tablet Take 1 tablet (40 mg total) by mouth daily in the early morning, Starting  Wed 1/15/2025, No Print      traZODone (DESYREL) 100 mg tablet Take 1 tablet (100 mg total) by mouth daily at bedtime, Starting Fri 1/31/2025, No Print      !! acetaminophen (TYLENOL) 325 mg tablet Take 3 tablets (975 mg total) by mouth every 8 (eight) hours, Starting Tue 1/14/2025, No Print      !! diltiazem (CARDIZEM CD) 120 mg 24 hr capsule Take 1 capsule (120 mg total) by mouth daily, Starting Sat 1/18/2025, Normal      !! enoxaparin (LOVENOX) 30 mg/0.3 mL Inject 0.3 mL (30 mg total) under the skin every 24 hours, Starting Tue 1/14/2025, No Print      furosemide (LASIX) 20 mg tablet Take 1 tablet (20 mg total) by mouth daily, Starting Sat 1/18/2025, Normal      !! melatonin 3 mg Take 1 tablet (3 mg total) by mouth daily at bedtime, Starting Tue 1/14/2025, No Print      !! metoprolol succinate (TOPROL-XL) 25 mg 24 hr tablet Take 3 tablets (75 mg total) by mouth 2 (two) times a day, Starting Tue 1/14/2025, No Print      polyethylene glycol (MIRALAX) 17 g packet Take 17 g by mouth daily, Starting Wed 1/15/2025, No Print      senna-docusate sodium (SENOKOT S) 8.6-50 mg per tablet Take 1 tablet by mouth daily at bedtime, Starting Tue 1/14/2025, No Print       !! - Potential duplicate medications found. Please discuss with provider.        No discharge procedures on file.  ED SEPSIS DOCUMENTATION   Time reflects when diagnosis was documented in both MDM as applicable and the Disposition within this note       Time User Action Codes Description Comment    2/3/2025  6:50 AM Hermes Shin [R07.9] Chest pain     2/3/2025  6:50 AM Hermes Shin [R55] Syncope     2/3/2025  6:50 AM Hermes Shin [R10.13] Epigastric pain     2/3/2025  6:51 AM Hermes Shin [K21.9] GERD (gastroesophageal reflux disease)     2/3/2025  6:56 AM Hermes Shin Add [R35.0] Increased urinary frequency                  Hermes Shin PA-C  02/04/25 0322

## 2025-02-03 NOTE — PROGRESS NOTES
Progress Note - Internal Medicine   Name: Sandra Purvis 89 y.o. female I MRN: [id number]  Unit/Bed#: [facility] 261-01 I Date of Admission: 1/17/2025   Date of Service: 2/3/2025 I Hospital Day: 17    Assessment & Plan  Hypertension  Home regimen: Toprol XL 50mg daily and HCTZ 12.5mg daily.  Currently receiving Toprol XL 25mg BID and Cardizem 120mg daily.  Give midodrine as needed for SBP <90.  Orthostatic + Monitor orthostatics daily.  Keep SBP <160 due to intracranial bleed.  Atrial fibrillation with rapid ventricular response (HCC)  Not taking AC at home due to hx of falls.  Continue to hold with recent falls and now subarachnoid/subdural.  Had been taking Toprol XL 50mg daily at home - currently receiving Toprol XL 25mg BID and Cardizem 120mg daily.  Hx of a-fib with RVR while on ARC, recently transferred to be on Cardizem drip.  Monitor routine VS.  Replete electrolytes as needed.  Follow-up with Cardiology as outpatient.  GERD (gastroesophageal reflux disease)  Continue Protonix 40mg daily.  Had not been on Protonix as outpatient.  Basal cell carcinoma (BCC) of scalp  Large scalp mass noted on admission.  Declining intervention as an outpatient.  Follows with Dermatology and LUIS Mejía (Surgical Oncology) as outpatient.  Wound care consulted - wound cleansed/debrided.  Continue with wearing cap during the day to avoid further patient manipulation.    Foul odor and drainage noted on 1/24 - completed 5 day course of Keflex.  Wound care recommending Xeroform and ABD.  Change dressing daily.  Malignant melanoma of arm, left (HCC)  Recent surgical excision on 12/19.  Follows with LUIS Mejía (Surg/Onc) as outpatient.  Had declined further treatment.  SDH (subdural hematoma) (HCC)  Presented on 1/4 after syncopal fall.  CTH showed acute subdural hemorrhage along the L cerebral convexity with maximal thickness of 1.9cm.  No midline shift.    Received Keppra x7 days.  Continue to hold AC/AP.  Neurovascular  checks Q shift.  Maintain normotension.  Avoid SBP >160.    CTH on 1/20: New small hypodensity within the left frontal subcortical white matter, differential includes age indeterminate infarct, evolving parenchymal contusion among other etiologies.  MRI brain obtained on 1/21 showing evolving bilateral cerebral convexity subdural hematomas containing blood products of varying ages, scattered small volume subarachnoid hemorrhage, no evidence of acute large territory infarct.    2 week follow-up with Neurosurgery on 1/21.  Imaging reviewed.  May consider starting ASA in 2 weeks after discussion with PCP/Cardiology in regards to risk/benefit.  Follow-up with Neurosurgery in additional 4 weeks with [facility].    CTH obtained on 2/3 in ER - showed stability.    Primary team following.  PT/OT/SLP.  SAH (subarachnoid hemorrhage) (HCC)  Presented on 1/4 after syncopal fall.  CTH showed acute subarachnoid hemorrhage in the L frontoparietal sulci, L sylvian fissure and suprasellar cistern.     Received Keppra x7 days.  Continue to hold AC/AP.  Neurovascular checks Q shift.  Maintain normotension.  Avoid SBP >160.     MRI brain obtained 1/21 - stable.  2 week follow-up with Neurosurgery on 1/21.  Imaging was reviewed.  May consider starting ASA in 2 weeks after discussion with PCP/Cardiology.  Follow-up with Neurosurgery in additional 4 weeks with [facility].    CTH obtained on 2/3 in ER - showed stability/slightly decreased hemorrhage.     Primary team following.  PT/OT/SLP.  Closed extensive facial fractures (HCC)  S/p fall on 1/4.  CT facial bones showed comminuted and mildly displaced fractures of the lateral and anterior walls of the R maxilla, lateral, and inferior wall of the R orbit, wall fracture appears to involve the inferior orbital foramen with associated small extraconal soft tissue edema, nondisplaced fractures of the zygomatic arch and the temporal bone at the TMJ, diffuse opacification of R maxillary sinus and R nasal  cavity with blood products and air.    OMFS evaluated in acute setting - declined surgical intervention.  Received IV Unasyn for 7 days.  No longer needs to follow sinus precautions per OMFS.    Follow-up with OMFS on discharge.  Chest pain  Noted to have elevated troponins on admission to acute setting.  [place] to be due to trauma/brain bleed vs demand ischemia due to paroxysmal a-fib and syncope.  Cardiology recommended starting ASA 81mg daily when cleared by Neurosurgery.  Per Neurosurgery - ok to start ASA in 2 weeks (2/4) after patient/family discussion of risks/benefits with PCP/Cardiologist.  Recommend establishing with Cardiology on discharge.  Orthostatic hypotension  Presented with syncope on 1/4.  Significant orthostasis noted in acute setting.  Continue midodrine PRN and midodrine 5mg daily in AM.  Apply abdominal binder/TEDs as needed.  Encourage PO hydration.  Continue to monitor orthostatics closely.    Given 500cc 0.9% NSS IV fluid bolus on 1/28.    Orthostasis with episode of unresponsiveness on 2/2.  Transferred to ER for further work-up.  Toprol XL decreased to 25mg BID.  Syncope  Presented on 1/4 s/p syncopal fall.  EKG showed atrial fibrillation with RVR on admission.  ECHO on 1/6 showed EF 70%, G2DD.  Noted to have significant orthostasis in acute setting.  Continue to monitor orthostatics.  Encourage PO hydration.  Apply abdominal binder/TEDs as needed.    Follow-up/establish with Cardiology as outpatient.  Anemia  Hgb currently 11.3.  Baseline Hgb 9-10.  No active s/s of bleeding.  Vitamin B12 1125 on 1/13 - no need for supplementation.  Folate 21.1 on 1/13 - no need for supplementation.  Iron panel on 1/13 - Iron Sat 12%, TIBC 295, Iron 34, and Ferritin 98.  Continue to trend routine CBC.  Leukocytosis  WBC count currently 15.14.  Chronically elevated.  Did recently complete a course of Unasyn and Augmentin.  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  Completed 5 day  "course.  Possible aspiration event on 2/2 - consider CXR if WBC count continues to worsen/develops fevers.  Continue to trend routine CBC.  Acute on chronic diastolic congestive heart failure (HCC)  ECHO on 1/6 showed EF 70%, G2DD.  Monitor volume status - currently euvolemic.  Monitor I&Os, daily weights.  Insomnia  Continue melatonin 6mg at HS, Remeron 7.5mg at HS, and trazodone 100mg at HS.  Continue sleep logs.  Abdominal pain  Sent to ER on 2/2 for abdominal pain, hypotension.  CT C/A/P showed diffuse chronic dilation of the esophagus with progressive or transiently increased distention, esophagus filled with fluid and debris - may be sequela of severe gastroesophageal reflux superimposed on chronic dysmotility.  Trace ascites.  Pericholecystic fluid vs thick edematous gallbladder wall, findings nonspecific given presence of ascites.  Continue Protonix 40mg daily.  Currently producing large amounts of mucus - GI consulted.  Continue NPO until GI consult.  Also more confused today - will obtain UA.  Acute cystitis  Increasing confusion and complaints of abdominal pain.  UA on 2/3 + for nitrites, blood, and leukocytes.  Started on IV ceftriaxone today.    VTE Pharmacologic Prophylaxis:   Pharmacologic: Enoxaparin (Lovenox)  Mechanical VTE Prophylaxis in Place: Yes - sequential compression devices.    Current Length of Stay: 17 day(s)    Current Patient Status: Inpatient Rehab     Discharge Plan: As per primary team.    Code Status: Prior    Subjective:   Pt examined while pt lying in bed in pt room.  Oriented to person.  Disoriented to place, situation, or time.  Had abdominal pain overnight but does not remember this.  States that she was at \"[facility].\"  Did have family visiting this weekend from [facility] which she was able to remember.  Denies any nausea or vomiting.  Has been bringing up large amounts of mucus per staff but pt does not recall this.  Currently wants to eat/drink but states that she was " "having difficulty with Speech and felt that they were \"pressuring her\" and that it was making her perform poorly.  Discussed NPO status and that she will be seen by GI later today.  Denies any lightheadedness or headaches today.  Currently denies any urinary complaints but has been experiencing ongoing urinary incontinence.    Objective:     Vitals:   Temp (24hrs), Av.3 °F (36.8 °C), Min:97.8 °F (36.6 °C), Max:99.1 °F (37.3 °C)    Temp:  [97.8 °F (36.6 °C)-99.1 °F (37.3 °C)] 99.1 °F (37.3 °C)  HR:  [] 99  Resp:  [18-20] 20  BP: ()/(50-90) 190/90  SpO2:  [95 %-98 %] 97 %  Body mass index is 15.25 kg/m².     Review of Systems   Constitutional:  Negative for appetite change, chills, fatigue and fever.   HENT:  Negative for trouble swallowing.    Eyes:  Negative for visual disturbance.   Respiratory:  Negative for cough, shortness of breath, wheezing and stridor.    Cardiovascular:  Negative for chest pain, palpitations and leg swelling.   Gastrointestinal:  Positive for abdominal pain (complaints of abdominal pain overnight, denies pain currently). Negative for abdominal distention, constipation, diarrhea, nausea and vomiting.        LBM 2/1, bringing up large amounts of mucus, pt does not recall this   Genitourinary:  Negative for difficulty urinating, dysuria, frequency, hematuria and urgency.   Musculoskeletal:  Negative for arthralgias, back pain and gait problem.   Neurological:  Negative for dizziness, weakness, light-headedness, numbness and headaches.   Psychiatric/Behavioral:  Positive for confusion. Negative for dysphoric mood and sleep disturbance. The patient is not nervous/anxious.    All other systems reviewed and are negative.       Input and Output Summary (last 24 hours):       Intake/Output Summary (Last 24 hours) at 2/3/2025 0917  Last data filed at 2/3/2025 0501  Gross per 24 hour   Intake 1240 ml   Output 250 ml   Net 990 ml       Physical Exam:     Physical Exam  Vitals and nursing " note reviewed.   Constitutional:       General: She is not in acute distress.     Appearance: Normal appearance. She is not ill-appearing.      Comments: Thin.   HENT:      Head: Normocephalic and atraumatic.   Cardiovascular:      Rate and Rhythm: Normal rate. Rhythm irregularly irregular.      Pulses: Normal pulses.      Heart sounds: Murmur heard.      Systolic murmur is present with a grade of 1/6.      No friction rub.   Pulmonary:      Effort: Pulmonary effort is normal. No respiratory distress.      Breath sounds: Normal breath sounds. No wheezing or rhonchi.   Abdominal:      General: Abdomen is flat. Bowel sounds are normal. There is no distension.      Palpations: Abdomen is soft. There is no mass.      Tenderness: There is no abdominal tenderness. There is no guarding or rebound.      Hernia: No hernia is present.   Musculoskeletal:      Cervical back: Normal range of motion and neck supple. No tenderness.      Right lower leg: No edema.      Left lower leg: No edema.   Skin:     General: Skin is warm and dry.   Neurological:      Mental Status: She is alert. She is disoriented.      Comments: Oriented to person.  Disoriented to time, place, and situation.   Psychiatric:         Mood and Affect: Mood normal.         Behavior: Behavior normal.         Additional Data:     Labs:    Results from last 7 days   Lab Units 02/03/25 0224 01/30/25  0516   WBC Thousand/uL 15.14* 8.15   HEMOGLOBIN g/dL 11.3* 9.6*   HEMATOCRIT % 34.7* 29.7*   PLATELETS Thousands/uL 202 187   SEGS PCT %  --  75   LYMPHO PCT % 5* 17   MONO PCT % 1* 6   EOS PCT % 0 1     Results from last 7 days   Lab Units 02/03/25  0224   SODIUM mmol/L 139   POTASSIUM mmol/L 4.1   CHLORIDE mmol/L 103   CO2 mmol/L 25   BUN mg/dL 23   CREATININE mg/dL 0.96   ANION GAP mmol/L 11   CALCIUM mg/dL 10.2   ALBUMIN g/dL 3.9   TOTAL BILIRUBIN mg/dL 0.50   ALK PHOS U/L 93   ALT U/L 20   AST U/L 16   GLUCOSE RANDOM mg/dL 148*         Results from last 7 days    Lab Units 02/03/25  0023 02/02/25  1237   POC GLUCOSE mg/dl 139 106               Labs reviewed    Imaging:    Imaging reviewed    Recent Cultures (last 7 days):           Last 24 Hours Medication List:   Current Facility-Administered Medications   Medication Dose Route Frequency Provider Last Rate    acetaminophen  650 mg Oral Q6H PRN Sheri Pope MD      bisacodyl  10 mg Rectal Daily PRN Sheri Pope MD      calcium carbonate  500 mg Oral Daily PRN Autumn Chapman PA-C      diltiazem  120 mg Oral HS LUIS Monsalve      docusate sodium  100 mg Oral BID PRN Sheri Pope MD      enoxaparin  30 mg Subcutaneous Q24H Autumn Chapman PA-C      gabapentin  100 mg Oral HS Sheri Pope MD      hydrALAZINE  10 mg Oral Q8H PRN LUIS Monsalve      melatonin  6 mg Oral HS Sheri Pope MD      metoprolol succinate  25 mg Oral BID LUIS Monsalve      midodrine  5 mg Oral TID PRN Autumn Chapman PA-C      midodrine  5 mg Oral Daily Before Breakfast LUIS Monsalve      mirtazapine  7.5 mg Oral HS Sheri Pope MD      ondansetron  4 mg Oral Q6H PRN Autumn Chapman PA-C      pantoprazole  40 mg Oral Early Morning Autumn Chapman PA-C      traZODone  100 mg Oral HS Sheri Pope MD          M*Modal software was used to dictate this note.  It may contain errors with dictating incorrect words or incorrect spelling. Please contact the provider directly with any questions.

## 2025-02-03 NOTE — ASSESSMENT & PLAN NOTE
Noted to have elevated troponins on admission to acute setting.  [place] to be due to trauma/brain bleed vs demand ischemia due to paroxysmal a-fib and syncope.  Cardiology recommended starting ASA 81mg daily when cleared by Neurosurgery.  Per Neurosurgery - ok to start ASA in 2 weeks (2/4) after patient/family discussion of risks/benefits with PCP/Cardiologist.  Recommend establishing with Cardiology on discharge.

## 2025-02-03 NOTE — ASSESSMENT & PLAN NOTE
Hgb currently 11.3.  Baseline Hgb 9-10.  No active s/s of bleeding.  Vitamin B12 1125 on 1/13 - no need for supplementation.  Folate 21.1 on 1/13 - no need for supplementation.  Iron panel on 1/13 - Iron Sat 12%, TIBC 295, Iron 34, and Ferritin 98.  Continue to trend routine CBC.

## 2025-02-03 NOTE — PROGRESS NOTES
02/03/25 0900   Therapy Time missed   Time missed? Yes   Amount of time missed 90   Reason for time missed Medical hold   Time(s) multiple attempts made Pt with fluctuating BP and GI concerns.

## 2025-02-03 NOTE — ASSESSMENT & PLAN NOTE
Presented on 1/4 after syncopal fall.  CTH showed acute subdural hemorrhage along the L cerebral convexity with maximal thickness of 1.9cm.  No midline shift.    Received Keppra x7 days.  Continue to hold AC/AP.  Neurovascular checks Q shift.  Maintain normotension.  Avoid SBP >160.    CTH on 1/20: New small hypodensity within the left frontal subcortical white matter, differential includes age indeterminate infarct, evolving parenchymal contusion among other etiologies.  MRI brain obtained on 1/21 showing evolving bilateral cerebral convexity subdural hematomas containing blood products of varying ages, scattered small volume subarachnoid hemorrhage, no evidence of acute large territory infarct.    2 week follow-up with Neurosurgery on 1/21.  Imaging reviewed.  May consider starting ASA in 2 weeks after discussion with PCP/Cardiology in regards to risk/benefit.  Follow-up with Neurosurgery in additional 4 weeks with [facility].    CTH obtained on 2/3 in ER - showed stability.    Primary team following.  PT/OT/SLP.

## 2025-02-03 NOTE — ASSESSMENT & PLAN NOTE
Secondary to fall.CT of the head on 1/4 also showed comminuted and mildly displaced fractures of the lateral and anterior walls of the right maxilla, lateral and inferior walls of the right orbit, right inferior orbital wall fractures appear to involve the inferior orbital foramen with associated small extraconal soft tissue edema and focus of air.  There is no herniation of extraocular muscles and a nondisplaced fracture of the zygomatic arch as well as the temporal bone at the TMJ.  There is diffuse opacification of the right maxillary sinus and right nasal cavity with blood products and air  Seen by oral maxillofacial surgery team and surgical intervention was declined by patient

## 2025-02-03 NOTE — ASSESSMENT & PLAN NOTE
Not taking AC at home due to hx of falls.  Continue to hold with recent falls and now subarachnoid/subdural.  Had been taking Toprol XL 50mg daily at home - currently receiving Toprol XL 25mg BID and Cardizem 120mg daily.  Hx of a-fib with RVR while on ARC, recently transferred to be on Cardizem drip.  Monitor routine VS.  Replete electrolytes as needed.  Follow-up with Cardiology as outpatient.

## 2025-02-03 NOTE — CASE MANAGEMENT
CM NOTE      Informed by MD pt is not medically stable to d/c to subacute rehab today, canceled roundtrip transport and made [place] post acute aware.

## 2025-02-03 NOTE — ASSESSMENT & PLAN NOTE
Increasing confusion and complaints of abdominal pain.  UA on 2/3 + for nitrites, blood, and leukocytes.  Started on IV ceftriaxone today.

## 2025-02-03 NOTE — PROGRESS NOTES
Sandra had an extensive food bolus that was not able to be cleared with endoscopy nor placing OG tube with suction. Therefore for her safety she had endoscopic assisted OG tube placed (we were unable to confirm placement into stomach vs distal esophagus).       She will need to be transferred for higher level of care for ongoing post procedure ventilation. I requested transfer with PACs and I did briefly discuss case with ICU team.      I called Daughter, Courtney and , Darryl and provided update. I informed them that this was a very significant food bolus that was not able to be cleared and that it was not safe to remove the breathing tube. Therefore we placed an orogastric tube in hopes that the stomach would clear and with assistance of the OG tube that the esophageal food contents would clear.   I did discuss with them that I am concerned that Sandra is currently in a frail and volnerable state and at risk of further illness, complications and overall further decompensation. I did discuss with them that at any point if Sandra is on a pathway that would not be consistent with her goals and wishes to speak up and let us know if we are providing care she would not want. I expressed to them that right now it is very reasonable to continue full care and see if esophageal obstruction clears as she is not intubated currently for respiratory failure due to a pulmonary process at this time.

## 2025-02-03 NOTE — ASSESSMENT & PLAN NOTE
Presented with syncope on 1/4.  Significant orthostasis noted in acute setting.  Continue midodrine PRN and midodrine 5mg daily in AM.  Apply abdominal binder/TEDs as needed.  Encourage PO hydration.  Continue to monitor orthostatics closely.    Given 500cc 0.9% NSS IV fluid bolus on 1/28.    Orthostasis with episode of unresponsiveness on 2/2.  Transferred to ER for further work-up.  Toprol XL decreased to 25mg BID.

## 2025-02-03 NOTE — ED NOTES
Pt cleaned of incontinence. Pt washed, linens changed, and gown changed.      Ariane Irvin RN  02/03/25 0791

## 2025-02-03 NOTE — ASSESSMENT & PLAN NOTE
WBC count currently 15.14.  Chronically elevated.  Did recently complete a course of Unasyn and Augmentin.  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  Completed 5 day course.  Possible aspiration event on 2/2 - consider CXR if WBC count continues to worsen/develops fevers.  Continue to trend routine CBC.

## 2025-02-03 NOTE — PROGRESS NOTES
"   02/03/25 0820   Pain Assessment   Pain Assessment Tool [facility] Pain Intervention(s) Repositioned;Rest  (RN notified)   Pain Rating: FLACC (Rest) - Face 1   Pain Rating: FLACC (Rest) - Legs 1   Pain Rating: FLACC (Rest) - Activity 1   Pain Rating: FLACC (Rest) - Cry 1   Pain Rating: FLACC (Rest) - Consolability 2   Score: FLACC (Rest) 6   Pain Rating: FLACC (Activity) - Face 1   Pain Rating: FLACC (Activity) - Legs 1   Pain Rating: FLACC (Activity) - Activity 1   Pain Rating: FLACC (Activity) - Cry 1   Pain Rating: FLACC (Activity) - Consolability 2   Score: FLACC (Activity) 6   Restrictions/Precautions   Precautions Aspiration;Bed/chair alarms;Cognitive;Fall Risk;Supervision on toilet/commode;Pain   Comprehension   Comprehension (FIM) 3 - Understands basic info/conversation 50-74% of time   Expression   Expression (FIM) 4 - Expresses basic info/needs 75-90% of time   Social Interaction   Social Interaction (FIM) 2 - Needs frequent redirection   Problem Solving   Problem solving (FIM) 2 - Bed alarm for safety issues more than half the time   Memory   Memory (FIM) 2 - Recognizes, recalls/performs 25-49%   Speech/Language/Cognition Assessment   Treatment Assessment Pt returned from the ED this AM due to fluctuating medical stability yesterday/night. Pt was determined to be stable and returned to [facility] unit. RN spoke to SLP and requested that SLP see pt with breakfast due to pt's recent difficulties with meals to include yesterday's incident of pt reporting something was \"sticking\" and pointing towards R lower flank and then to upper stomach. Pt also began brining up increased mucous during yesterday's ST session and during the night. When SLP entered pt's room, pt appeared more confused than in previous sessions. As RN and SLP were waiting for BP reading, pt became upset with not having a fresh water, which is different from her baseline. Pt required consistent repetition of plan to first acknowledge high BP " and then to begin her breakfast. Pt also stating that she went on a vacation last night and that she went to [facility]. SLP attempted to re-orient patient where she was oriented to person, month and year but disoriented to place and situation. Pt then stating that she was at home and overall appeared more confused compared to previous session. SLP provided re-orientation info. Pt also with decreased insight into current medical and safety concerns with PO intake (see below for dysphagia note) as she frequently stated that if the staff would just take her to her [facility] group, that she would be fine and would not have any troubles. Of note, pt frequently attempting to get OOB, despite direct verbal instructions and re-education on rationale for remaining in bed this AM as RN team was concerned about fluctuating BPs from high to low and recent events of going unresponsive. Overall, pt appearing more confused and irritable. SLP spoke with internal medicine team, PRM team and nursing staff. When medically stable, pt is recommended for further skilled ST services focusing on cognitive linguistic skills in order to maximize functional safety and to decrease caregiver burden on discharge.   Eating   Type of Assistance Needed Set-up / clean-up;Supervision;Verbal cues   Physical Assistance Level No physical assistance   Eating CARE Score 4   Swallow Assessment   Swallow Treatment Assessment Daily Dysphagia Tx Note      Patient Name: Sandra Purvis     Today's Date: 2/3/2025        Current Risks for Dysphagia & Aspiration: Weak cough;General debilitation;Cognitive deficit;Reduced alertness; facial fractures     Current Symptoms/Concerns: difficulty chewing; esophageal concerns     Current diet: dysphagia level 3 and thin liquids      Premorbid diet::regular diet and thin liquids       Positioning: repositioned upright in bed     Items administered:Consistencies Administered: thin liquids, soft solids  Materials  administered included: scrambled eggs, ground sausage, tea, water     Total amount of meal consumed:   Few bites, few sips        Summary:  Pt presenting with s/s suggestive of pharyngeal vs esophageal dysphagia, but suspecting more so due to esophageal concerns based on new symptoms this session. Session focused on assessment of swallow function since esophageal symptoms began yesterday to include expelling of increased mucous and saliva. Pt unable to be OOB for meal due to medical concerns (fluctuating BP) as per nursing staff, therefore, was repositioning fully upright.    Pt received one small med in a small tsp of puree to begin meal. Functional oral skills during this time, noting mildly delay in swallow and audible swallow but no regurgitation observed. While repositioning pt and explaining current concerns from staff, pt expelled thicker, yellow mucous into basin.     Given pt's meal of ground sausage, scrambled eggs and thin tea and water, pt was eager to eat. Pt took first few bites with minimally slower but functional oral swallow skills and appearing with mild delay in swallow initiation BUT appeared to wince and need great effort when swallowing food. Audible swallows noted and occasional throat clearing. After first few bites, pt took sip of liquid with throat clearing noted. Then resumed eating where pt with loud, audible swallows with perceived air bubbles heard and then began a prolonged coughing episode and eventually regurgitation of food. Pt holding her upper stomach during this time and this episode also took some time for pt to fully clear. Pt denied pain. Pt again attempted sip of tea which swallow was effortful, loud and resulted in strong throat clearing and then coughing with pt eventually spitting sip into napkin as she appeared to regurgitate sip. SLP indicated to stop meal to which pt was then upset. Pt requesting only liquids at this time and with no insight into why SLP stopped the  meal. SLP offered pt a single sip of water to which pt again with throat clearing and then coughing episode occurred. All PO stopped at this time and SLP spoke to pt's care team about current concerns for esophageal dysphagia vs pharyngeal dysphagia. Due to suspected esophageal concerns, pt is also at risk for bottom up aspiration at this time.     Overall, pt is presenting with significant change in swallow function and safety concerns with PO intake this AM. Recommending NPO status with GI consult at this time.          Recommendations:  Diet: NPO  Meds: with small bites puree    Consult with: GI team  Results reviewed with: patient, CHACHA Randle, Internal Medicine LUIS Rodas, PMR Dr. Gomes, PMR YASMEEN Leyva  Aspiration precautions posted.     F/u ST tx: Recommending NPO until seen by GI team. After pt is cleared by GI team, pt will be recommended for further skilled SLP services targeting dysphagia therapy in order to maximize oral and pharyngeal swallow skills, while safely supporting PO intake, as well as to improve independent carryover of safe swallow strategies.        Plan:      NPO      Consult to GI team      Resume SLP services when pt medically cleared, with guidance from GI team   SLP Therapy Minutes   SLP Time In 0820   SLP Time Out 0900   SLP Total Time (minutes) 40   SLP Mode of treatment - Individual (minutes) 40   SLP Mode of treatment - Concurrent (minutes) 0   SLP Mode of treatment - Group (minutes) 0   SLP Mode of treatment - Co-treat (minutes) 0   SLP Mode of Treatment - Total time(minutes) 40 minutes   SLP Cumulative Minutes 620   Therapy Time missed   Time missed? No

## 2025-02-04 ENCOUNTER — APPOINTMENT (INPATIENT)
Dept: GASTROENTEROLOGY | Facility: HOSPITAL | Age: OVER 89
DRG: 393 | End: 2025-02-04
Payer: MEDICARE

## 2025-02-04 ENCOUNTER — TELEPHONE (OUTPATIENT)
Age: OVER 89
End: 2025-02-04

## 2025-02-04 ENCOUNTER — PATIENT OUTREACH (OUTPATIENT)
Dept: CASE MANAGEMENT | Facility: OTHER | Age: OVER 89
End: 2025-02-04

## 2025-02-04 LAB
ALBUMIN SERPL BCG-MCNC: 3.3 G/DL (ref 3.5–5)
ALP SERPL-CCNC: 70 U/L (ref 34–104)
ALT SERPL W P-5'-P-CCNC: 26 U/L (ref 7–52)
ANION GAP SERPL CALCULATED.3IONS-SCNC: 14 MMOL/L (ref 4–13)
AST SERPL W P-5'-P-CCNC: 19 U/L (ref 13–39)
BASOPHILS # BLD AUTO: 0.03 THOUSANDS/ΜL (ref 0–0.1)
BASOPHILS NFR BLD AUTO: 0 % (ref 0–1)
BILIRUB SERPL-MCNC: 0.5 MG/DL (ref 0.2–1)
BUN SERPL-MCNC: 19 MG/DL (ref 5–25)
CA-I BLD-SCNC: 1.07 MMOL/L (ref 1.12–1.32)
CALCIUM ALBUM COR SERPL-MCNC: 8.7 MG/DL (ref 8.3–10.1)
CALCIUM SERPL-MCNC: 8.1 MG/DL (ref 8.4–10.2)
CHLORIDE SERPL-SCNC: 102 MMOL/L (ref 96–108)
CO2 SERPL-SCNC: 23 MMOL/L (ref 21–32)
CREAT SERPL-MCNC: 0.71 MG/DL (ref 0.6–1.3)
EOSINOPHIL # BLD AUTO: 0.01 THOUSAND/ΜL (ref 0–0.61)
EOSINOPHIL NFR BLD AUTO: 0 % (ref 0–6)
ERYTHROCYTE [DISTWIDTH] IN BLOOD BY AUTOMATED COUNT: 17.4 % (ref 11.6–15.1)
GFR SERPL CREATININE-BSD FRML MDRD: 75 ML/MIN/1.73SQ M
GLUCOSE SERPL-MCNC: 90 MG/DL (ref 65–140)
HCT VFR BLD AUTO: 31.7 % (ref 34.8–46.1)
HGB BLD-MCNC: 10.3 G/DL (ref 11.5–15.4)
IMM GRANULOCYTES # BLD AUTO: 0.05 THOUSAND/UL (ref 0–0.2)
IMM GRANULOCYTES NFR BLD AUTO: 0 % (ref 0–2)
LYMPHOCYTES # BLD AUTO: 1.18 THOUSANDS/ΜL (ref 0.6–4.47)
LYMPHOCYTES NFR BLD AUTO: 9 % (ref 14–44)
MAGNESIUM SERPL-MCNC: 2.3 MG/DL (ref 1.9–2.7)
MCH RBC QN AUTO: 29.5 PG (ref 26.8–34.3)
MCHC RBC AUTO-ENTMCNC: 32.5 G/DL (ref 31.4–37.4)
MCV RBC AUTO: 91 FL (ref 82–98)
MONOCYTES # BLD AUTO: 0.85 THOUSAND/ΜL (ref 0.17–1.22)
MONOCYTES NFR BLD AUTO: 7 % (ref 4–12)
NEUTROPHILS # BLD AUTO: 10.48 THOUSANDS/ΜL (ref 1.85–7.62)
NEUTS SEG NFR BLD AUTO: 84 % (ref 43–75)
NRBC BLD AUTO-RTO: 0 /100 WBCS
PHOSPHATE SERPL-MCNC: 2.8 MG/DL (ref 2.3–4.1)
PLATELET # BLD AUTO: 185 THOUSANDS/UL (ref 149–390)
PMV BLD AUTO: 10.7 FL (ref 8.9–12.7)
POTASSIUM SERPL-SCNC: 3.7 MMOL/L (ref 3.5–5.3)
PROT SERPL-MCNC: 5.9 G/DL (ref 6.4–8.4)
RBC # BLD AUTO: 3.49 MILLION/UL (ref 3.81–5.12)
SODIUM SERPL-SCNC: 139 MMOL/L (ref 135–147)
WBC # BLD AUTO: 12.6 THOUSAND/UL (ref 4.31–10.16)

## 2025-02-04 PROCEDURE — 85025 COMPLETE CBC W/AUTO DIFF WBC: CPT

## 2025-02-04 PROCEDURE — NC001 PR NO CHARGE: Performed by: INTERNAL MEDICINE

## 2025-02-04 PROCEDURE — 88342 IMHCHEM/IMCYTCHM 1ST ANTB: CPT | Performed by: STUDENT IN AN ORGANIZED HEALTH CARE EDUCATION/TRAINING PROGRAM

## 2025-02-04 PROCEDURE — 5A1935Z RESPIRATORY VENTILATION, LESS THAN 24 CONSECUTIVE HOURS: ICD-10-PCS | Performed by: INTERNAL MEDICINE

## 2025-02-04 PROCEDURE — 83735 ASSAY OF MAGNESIUM: CPT

## 2025-02-04 PROCEDURE — 82330 ASSAY OF CALCIUM: CPT

## 2025-02-04 PROCEDURE — 0DB18ZX EXCISION OF UPPER ESOPHAGUS, VIA NATURAL OR ARTIFICIAL OPENING ENDOSCOPIC, DIAGNOSTIC: ICD-10-PCS | Performed by: INTERNAL MEDICINE

## 2025-02-04 PROCEDURE — 84100 ASSAY OF PHOSPHORUS: CPT

## 2025-02-04 PROCEDURE — 80053 COMPREHEN METABOLIC PANEL: CPT

## 2025-02-04 PROCEDURE — 99232 SBSQ HOSP IP/OBS MODERATE 35: CPT | Performed by: INTERNAL MEDICINE

## 2025-02-04 PROCEDURE — 94760 N-INVAS EAR/PLS OXIMETRY 1: CPT

## 2025-02-04 PROCEDURE — 0DC58ZZ EXTIRPATION OF MATTER FROM ESOPHAGUS, VIA NATURAL OR ARTIFICIAL OPENING ENDOSCOPIC: ICD-10-PCS | Performed by: INTERNAL MEDICINE

## 2025-02-04 PROCEDURE — 94150 VITAL CAPACITY TEST: CPT

## 2025-02-04 PROCEDURE — 88305 TISSUE EXAM BY PATHOLOGIST: CPT | Performed by: STUDENT IN AN ORGANIZED HEALTH CARE EDUCATION/TRAINING PROGRAM

## 2025-02-04 PROCEDURE — 88312 SPECIAL STAINS GROUP 1: CPT | Performed by: STUDENT IN AN ORGANIZED HEALTH CARE EDUCATION/TRAINING PROGRAM

## 2025-02-04 RX ORDER — CALCIUM GLUCONATE 20 MG/ML
2 INJECTION, SOLUTION INTRAVENOUS ONCE
Status: COMPLETED | OUTPATIENT
Start: 2025-02-04 | End: 2025-02-04

## 2025-02-04 RX ORDER — MIDAZOLAM HYDROCHLORIDE 2 MG/2ML
1 INJECTION, SOLUTION INTRAMUSCULAR; INTRAVENOUS ONCE
Status: COMPLETED | OUTPATIENT
Start: 2025-02-04 | End: 2025-02-04

## 2025-02-04 RX ORDER — FENTANYL CITRATE-0.9 % NACL/PF 10 MCG/ML
25 PLASTIC BAG, INJECTION (ML) INTRAVENOUS CONTINUOUS
Status: DISCONTINUED | OUTPATIENT
Start: 2025-02-04 | End: 2025-02-04

## 2025-02-04 RX ORDER — DILTIAZEM HYDROCHLORIDE 30 MG/1
15 TABLET, FILM COATED ORAL EVERY 6 HOURS SCHEDULED
Status: DISCONTINUED | OUTPATIENT
Start: 2025-02-04 | End: 2025-02-07 | Stop reason: HOSPADM

## 2025-02-04 RX ORDER — METOPROLOL SUCCINATE 25 MG/1
12.5 TABLET, EXTENDED RELEASE ORAL EVERY 12 HOURS SCHEDULED
Status: DISCONTINUED | OUTPATIENT
Start: 2025-02-04 | End: 2025-02-07 | Stop reason: HOSPADM

## 2025-02-04 RX ORDER — DILTIAZEM HYDROCHLORIDE 5 MG/ML
5 INJECTION INTRAVENOUS 2 TIMES DAILY
Status: DISCONTINUED | OUTPATIENT
Start: 2025-02-04 | End: 2025-02-04

## 2025-02-04 RX ORDER — PROPOFOL 10 MG/ML
5-50 INJECTION, EMULSION INTRAVENOUS
Status: DISCONTINUED | OUTPATIENT
Start: 2025-02-04 | End: 2025-02-04

## 2025-02-04 RX ORDER — SODIUM CHLORIDE, SODIUM GLUCONATE, SODIUM ACETATE, POTASSIUM CHLORIDE, MAGNESIUM CHLORIDE, SODIUM PHOSPHATE, DIBASIC, AND POTASSIUM PHOSPHATE .53; .5; .37; .037; .03; .012; .00082 G/100ML; G/100ML; G/100ML; G/100ML; G/100ML; G/100ML; G/100ML
1000 INJECTION, SOLUTION INTRAVENOUS ONCE
Status: COMPLETED | OUTPATIENT
Start: 2025-02-04 | End: 2025-02-04

## 2025-02-04 RX ORDER — PANTOPRAZOLE SODIUM 40 MG/10ML
40 INJECTION, POWDER, LYOPHILIZED, FOR SOLUTION INTRAVENOUS EVERY 12 HOURS SCHEDULED
Status: DISCONTINUED | OUTPATIENT
Start: 2025-02-04 | End: 2025-02-07 | Stop reason: HOSPADM

## 2025-02-04 RX ADMIN — HYDRALAZINE HYDROCHLORIDE 5 MG: 20 INJECTION, SOLUTION INTRAMUSCULAR; INTRAVENOUS at 05:31

## 2025-02-04 RX ADMIN — PROPOFOL 10 MCG/KG/MIN: 10 INJECTION, EMULSION INTRAVENOUS at 09:50

## 2025-02-04 RX ADMIN — METOCLOPRAMIDE 5 MG: 5 INJECTION, SOLUTION INTRAMUSCULAR; INTRAVENOUS at 14:11

## 2025-02-04 RX ADMIN — SODIUM CHLORIDE, SODIUM GLUCONATE, SODIUM ACETATE, POTASSIUM CHLORIDE, MAGNESIUM CHLORIDE, SODIUM PHOSPHATE, DIBASIC, AND POTASSIUM PHOSPHATE 1000 ML: .53; .5; .37; .037; .03; .012; .00082 INJECTION, SOLUTION INTRAVENOUS at 00:38

## 2025-02-04 RX ADMIN — HEPARIN SODIUM 5000 UNITS: 5000 INJECTION INTRAVENOUS; SUBCUTANEOUS at 21:41

## 2025-02-04 RX ADMIN — HEPARIN SODIUM 5000 UNITS: 5000 INJECTION INTRAVENOUS; SUBCUTANEOUS at 05:17

## 2025-02-04 RX ADMIN — DILTIAZEM HYDROCHLORIDE 5 MG: 5 INJECTION INTRAVENOUS at 09:35

## 2025-02-04 RX ADMIN — PROPOFOL 20 MCG/KG/MIN: 10 INJECTION, EMULSION INTRAVENOUS at 01:42

## 2025-02-04 RX ADMIN — METOPROLOL SUCCINATE 12.5 MG: 25 TABLET, EXTENDED RELEASE ORAL at 14:11

## 2025-02-04 RX ADMIN — DILTIAZEM HYDROCHLORIDE 15 MG: 30 TABLET, FILM COATED ORAL at 17:52

## 2025-02-04 RX ADMIN — FENTANYL CITRATE 25 MCG/HR: 0.05 INJECTION, SOLUTION INTRAMUSCULAR; INTRAVENOUS at 04:16

## 2025-02-04 RX ADMIN — METOROPROLOL TARTRATE 5 MG: 5 INJECTION, SOLUTION INTRAVENOUS at 09:35

## 2025-02-04 RX ADMIN — MIDAZOLAM 1 MG: 1 INJECTION INTRAMUSCULAR; INTRAVENOUS at 10:30

## 2025-02-04 RX ADMIN — METOCLOPRAMIDE 5 MG: 5 INJECTION, SOLUTION INTRAMUSCULAR; INTRAVENOUS at 05:17

## 2025-02-04 RX ADMIN — CEFTRIAXONE SODIUM 1000 MG: 10 INJECTION, POWDER, FOR SOLUTION INTRAVENOUS at 06:52

## 2025-02-04 RX ADMIN — PANTOPRAZOLE SODIUM 40 MG: 40 INJECTION, POWDER, FOR SOLUTION INTRAVENOUS at 03:48

## 2025-02-04 RX ADMIN — CHLORHEXIDINE GLUCONATE 15 ML: 1.2 SOLUTION ORAL at 09:35

## 2025-02-04 RX ADMIN — HEPARIN SODIUM 5000 UNITS: 5000 INJECTION INTRAVENOUS; SUBCUTANEOUS at 14:11

## 2025-02-04 RX ADMIN — METOCLOPRAMIDE 5 MG: 5 INJECTION, SOLUTION INTRAMUSCULAR; INTRAVENOUS at 17:51

## 2025-02-04 RX ADMIN — CALCIUM GLUCONATE 2 G: 20 INJECTION, SOLUTION INTRAVENOUS at 14:10

## 2025-02-04 RX ADMIN — MIDAZOLAM 1 MG: 1 INJECTION INTRAMUSCULAR; INTRAVENOUS at 09:51

## 2025-02-04 NOTE — PROGRESS NOTES
Progress Note - Critical Care/ICU   Name: Sandra Purvis 89 y.o. female I MRN: [id number]  Unit/Bed#: ICU 10 I Date of Admission: 2/3/2025   Date of Service: 2/4/2025 I Hospital Day: 1      Assessment & Plan  GERD (gastroesophageal reflux disease)  Continue home protonix 40mg daily - changed to IV given esophageal impaction and increased to BID due to bloody OG tubes   Hold home TUMS  Primary insomnia  Hold home melatonin/trazodone given intubation/sedation/NPO  Food impaction of esophagus  S/P EGD on 2/3  Plan:  Plan for repeat EGD today, 2/4, with GI   Start reglan 5mg q6  Keep OG tube to suction  KUB at 12am showing OG malpositioned. Advanced another 5 cm  Maintain NPO. Start isolyte at 50cc/hr while NPO  GI consult placed, appreciate eval and recs  UTI (urinary tract infection)  Recently treated with CTX from 1/24- 1/28 for foul smelling BCC wound on scalp   No prior urine culture data  No hx of ESBL/MRSA  This is not the cause of patient's critical illness     Plan:  Continue CTX for 3 days total. Last dose 2/5  Monitor UOP  Urinary retention protocol in place  Monitor fever and WBC count  Acute hypoxic respiratory failure (HCC)  Continue mechanical ventilation given food bolus impaction with repeat EGD on 2/4. Currently on ACVC 16/380/6/40%  VBG on these settings WNL  Daily SAT/SBT   Monitor on telemetry   Monitor puls ox and maintain SPo2 >92%  VAP bundle in place  SQH 5000 q8 for DVT ppx  Appreciate RT care  Atrial fibrillation (HCC)  Home meds: Cardizem 120mg q24 and Toprol XL 25mg daily     Plan  Start IV lopressor 5q6 and diltiazem 5mg BID IV given NPO   Hold parameters in place  AC: Risk benefit discussion with cardiology/ICU team.family  Afvib5brwm score is 5  NSGY cleared patient for for AP therapy starting 2/4  Monitor off AC/AP for now. Likely the benefits of holding AC/AP outweigh the risks of resumption. Plan to discuss with family today  Monitor on telemetry   HTN (hypertension)  Start IV  lopressor 5q6 and diltiazem 10mg BID IV given NPO and cannot take home cardizem/toprol xl  PRN IV hydralazine for BSP >160  Orthostatic hypotension  Hold home midodrine given NPO  Start maintenance fluids while NPO  S/p 1L isolyte bolus overnight for hypotension  Disposition: Critical care    ICU Core Measures     Vented Patient  VAP Bundle  VAP bundle ordered     A: Assess, Prevent, and Manage Pain Has pain been assessed? Yes  Need for changes to pain regimen? No   B: Both Spontaneous Awakening Trials (SATs) and Spontaneous Breathing Trials (SBTs) Plan to perform spontaneous awakening trial today? Yes   Plan to perform spontaneous breathing trial today? No secondary to return to OR for likely EGDvs bedside EGD  Obvious barriers to extubation? Yes   C: Choice of Sedation RASS Goal: 0 Alert and Calm  Need for changes to sedation or analgesia regimen? Yes   D: Delirium CAM-ICU: N/A   E: Early Mobility  Plan for early mobility? Yes   F: Family Engagement Plan for family engagement today? Yes       Antibiotic Review: Awaiting culture results.       Prophylaxis:  VTE VTE covered by:  heparin (porcine), Subcutaneous, 5,000 Units at 02/03/25 2140       Stress Ulcer  covered bypantoprazole (PROTONIX) 40 mg tablet [[id number]] (Long-Term Med), pantoprazole (PROTONIX) injection 40 mg [[id number]]         24 Hour Events :   - EGD with unsuccessful esophageal disimpaction  - OG tube with dark sang content and food particles, continued OG to suction and Protonix started  - Given 1L Isolyte bolus for intermittent hypotension   - Midnight KUB with OG tube still proximal to goal, advanced distally an additional 5 cm  Subjective       Objective :                   Vitals I/O      Most Recent Min/Max in 24hrs   Temp 98.9 °F (37.2 °C) Temp  Min: 97.9 °F (36.6 °C)  Max: 99.1 °F (37.3 °C)   Pulse 60 Pulse  Min: 58  Max: 113   Resp (!) 40 Resp  Min: 16  Max: 40   /62 BP  Min: 95/46  Max: 202/74   O2 Sat 99 % SpO2  Min: 96 %  Max: 100  %      Intake/Output Summary (Last 24 hours) at 2/4/2025 0022  Last data filed at 2/4/2025 0002  Gross per 24 hour   Intake 53.07 ml   Output 425 ml   Net -371.93 ml       Diet NPO    Invasive Monitoring           Physical Exam   Physical Exam  Vitals and nursing note reviewed.   Eyes:      General: Vision grossly intact.      Extraocular Movements: Extraocular movements intact.      Conjunctiva/sclera: Conjunctivae normal.      Pupils: Pupils are equal, round, and reactive to light.   Skin:     General: Skin is warm.   HENT:      Head: Normocephalic and atraumatic.      Right Ear: Tympanic membrane normal.      Left Ear: Tympanic membrane normal.      Mouth/Throat:      Mouth: Mucous membranes are moist.   Neck:   no midline tenderness Cardiovascular:      Rate and Rhythm: Normal rate and regular rhythm.      Heart sounds: Normal heart sounds.   Musculoskeletal:         General: Normal range of motion.      Right lower leg: No edema.      Left lower leg: No edema.   Abdominal: General: Bowel sounds are normal. There is no distension.      Palpations: Abdomen is soft.      Tenderness: There is no abdominal tenderness.      Comments: OG in place- food particles/dark sang output    Constitutional:       General: She is not in acute distress.     Appearance: She is well-developed and well-nourished. She is not ill-appearing.      Interventions: She is sedated, intubated and restrained.   Pulmonary:      Effort: Pulmonary effort is normal. She is intubated.      Breath sounds: Normal breath sounds.   Psychiatric:         Mood and Affect: Mood and affect normal.   Neurological:      General: No focal deficit present.      Mental Status: She is oriented to person, place and time. She is CAM ICU negative.      GCS: GCS eye subscore is 4. GCS verbal subscore is 1. GCS motor subscore is 6.      Cranial Nerves: No dysarthria or facial asymmetry.      Motor: gross motor function is at baseline for patient. Strength full and  intact in all extremities.      Comments: 11t          Diagnostic Studies        Lab Results: I have reviewed the following results:     Medications:  Scheduled PRN   cefTRIAXone, 1,000 mg, Q24H  chlorhexidine, 15 mL, Q12H ELIZABETH  diltiazem, 5 mg, BID  heparin (porcine), 5,000 Units, Q8H ELIZABETH  metoclopramide, 5 mg, Q6H ELIZABETH  metoprolol, 5 mg, Q6H  pantoprazole, 40 mg, Q24H ELIZABETH      bisacodyl, 10 mg, Daily PRN  fentaNYL, 25 mcg, Q1H PRN  hydrALAZINE, 5 mg, Q6H PRN       Continuous    multi-electrolyte, 50 mL/hr, Last Rate: 50 mL/hr (02/03/25 2334)  propofol, 5-50 mcg/kg/min, Last Rate: 20 mcg/kg/min (02/04/25 0018)         Labs:   CBC    Recent Labs     02/03/25 0224 02/03/25 2017   WBC 15.14* 15.49*   HGB 11.3* 11.7   HCT 34.7* 37.1    191     BMP    Recent Labs     02/03/25 0224 02/03/25 2017   SODIUM 139 141   K 4.1 4.6    107   CO2 25 25   AGAP 11 9   BUN 23 20   CREATININE 0.96 0.96   CALCIUM 10.2 9.9       Coags    No recent results     Additional Electrolytes  Recent Labs     02/03/25 2017   MG 2.0   PHOS 3.2   CAIONIZED 1.16          Blood Gas    No recent results  Recent Labs     02/03/25 2016   PHVEN 7.390   LSN8UEE 45.8   PO2VEN 20.5*   LSQ8TJX 27.1   BEVEN 1.7   N1XMILN 30.6*    LFTs  Recent Labs     02/03/25 0224   ALT 20   AST 16   ALKPHOS 93   ALB 3.9   TBILI 0.50       Infectious  No recent results  Glucose  Recent Labs     02/03/25 0224 02/03/25 2017   GLUC 148* 115

## 2025-02-04 NOTE — ASSESSMENT & PLAN NOTE
Continue home protonix 40mg daily - changed to IV given esophageal impaction and increased to BID due to bloody OG tubes   Hold home TUMS

## 2025-02-04 NOTE — CONSULTS
Patient MRN: [id number]  Date of Service: 2/4/2025  Referring Provider: Jeanette Morejon PA-C   Provider Creating Note: Mayte Velazco PA-C  PCP: Berhane Page    Reason for Consult: Food impaction of esophagus, subsequent encounter [T18.128D, W44.F3XD]     HISTORY OF PRESENT ILLNESS:  Sandra Purvis is a 89 y.o. female transferred from [facility] yesterday after unsuccessful attempt to treat suspected food impaction. See 2/3/2025 GI consult for details of admission. EGD showed bezoar in the upper/middle/lower esophagus, cardia and fundus of stomach; unsuccessful retrieval attempted. OG tube was passed under endoscopic guidance with intermittent suction and intubation. Reglan 5mg Q6 hours added to increase clearance of stomach and improve GI motility. She was transferred to [facility] for ICU admission and reattempt at retrieval. She is NPO. Unable to obtain history from patient 2/2 intubation. But she is able to nod head appropriately when asked questions. Denies abdominal pain. KUB overnight pending. Vitals stable, remains afebrile.  Discussed plan with patient's daughter by phone - Discussed procedure and possible complications including but not limited to infection, bleeding, and perforation.  Patient's  present with daughter at time of phone call.    Review of Systems: unable to obtain from patient.      Past Medical History:   Diagnosis Date    Arthritis     Cataract     LastAssessed:9/8/15    Disturbance of salivary secretion 02/03/2025    Esophageal dysphagia 02/03/2025    GERD (gastroesophageal reflux disease)     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    Hypertension     PAF (paroxysmal atrial fibrillation) ([facility])     Last Assessed:2/3/16    Wrist fracture     Resolved:9/8/15     Past Surgical History:   Procedure Laterality Date    CATARACT EXTRACTION      Last Assessed:9/8/15    FL RETROGRADE PYELOGRAM  10/17/2021    LYMPHADENECTOMY      Last Assessed:9/8/15    MA CYSTO/URETERO W/LITHOTRIPSY  &INDWELL STENT INSRT Right 11/24/2021    Procedure: CYSTOSCOPY URETEROSCOPY WITH LITHOTRIPSY HOLMIUM LASER, RETROGRADE PYELOGRAM AND INSERTION STENT URETERAL;  Surgeon: David Sr MD;  Location: [facility];  Service: Urology    WV CYSTOURETHROSCOPY W/URETERAL CATHETERIZATION Right 10/17/2021    Procedure: CYSTOSCOPY RETROGRADE PYELOGRAM WITH INSERTION STENT URETERAL;  Surgeon: Benjie Tinoco MD;  Location: [facility];  Service: Urology    TONSILLECTOMY      Last Assessed:9/8/15    WRIST SURGERY      Last Assessed:9/8/15     Allergies   Allergen Reactions    Lactose - Food Allergy GI Intolerance    Penicillins Other (See Comments)     Unsure of reaction        Medications:  Home Medications  Prior to Admission medications    Medication Sig Start Date End Date Taking? Authorizing Provider   acetaminophen (TYLENOL) 325 mg tablet Take 2 tablets (650 mg total) by mouth every 6 (six) hours as needed for mild pain, moderate pain or headaches 1/31/25   Autumn Chapman PA-C   bisacodyl (DULCOLAX) 10 mg suppository Insert 1 suppository (10 mg total) into the rectum daily as needed for constipation 1/14/25   Sheri Pope MD   calcium carbonate (TUMS) 500 mg chewable tablet Chew 1 tablet (500 mg total) daily as needed for indigestion or heartburn 1/14/25   Sheri Pope MD   cefTRIAXone (ROCEPHIN) 1000 mg IVPB Inject 50 mL (1,000 mg total) into a catheter in a vein over 30 minutes at 100 mL/hr every 24 hours for 10 days Do not start before February 4, 2025. 2/4/25 2/14/25  Alanna Gomes MD   DAILY MULTIPLE VITAMINS/IRON PO Take 1 tablet by mouth daily    Historical Provider, MD   diltiazem (CARDIZEM CD) 120 mg 24 hr capsule Take 1 capsule (120 mg total) by mouth daily at bedtime 1/31/25   Autumn Chapman PA-C   docusate sodium (COLACE) 100 mg capsule Take 1 capsule (100 mg total) by mouth 2 (two) times a day as needed for constipation (first line) 1/31/25   Autumn Chapman PA-C   enoxaparin (LOVENOX)  30 mg/0.3 mL Inject 0.3 mL (30 mg total) under the skin every 24 hours 1/31/25   Autumn Chapman PA-C   gabapentin (NEURONTIN) 100 mg capsule Take 1 capsule (100 mg total) by mouth daily at bedtime 1/31/25   Autumn Chapman PA-C   hydrALAZINE (APRESOLINE) 10 mg tablet Take 1 tablet (10 mg total) by mouth every 8 (eight) hours as needed (Give for SBP >180.) 1/31/25   Autumn Chapman PA-C   melatonin 3 mg Take 2 tablets (6 mg total) by mouth daily at bedtime 1/31/25   Autumn Chapman PA-C   metoprolol succinate (TOPROL-XL) 25 mg 24 hr tablet Take 1 tablet (25 mg total) by mouth 2 (two) times a day 1/31/25   Autumn Chapman PA-C   midodrine (PROAMATINE) 5 mg tablet Take 1 tablet (5 mg total) by mouth daily before breakfast 2/1/25   Autumn Chapman PA-C   midodrine (PROAMATINE) 5 mg tablet Take 1 tablet (5 mg total) by mouth 3 (three) times a day as needed (Give for SBP <90) 1/31/25   Autumn Chapman PA-C   mirtazapine (REMERON) 7.5 MG tablet Take 1 tablet (7.5 mg total) by mouth daily at bedtime 1/31/25   Autumn Chapman PA-C   ondansetron (ZOFRAN-ODT) 4 mg disintegrating tablet Take 1 tablet (4 mg total) by mouth every 6 (six) hours as needed for nausea or vomiting 1/14/25   Sheri Pope MD   pantoprazole (PROTONIX) 40 mg tablet Take 1 tablet (40 mg total) by mouth daily in the early morning 1/15/25   Sheri Pope MD   saccharomyces boulardii (FLORASTOR) 250 mg capsule Take 1 capsule (250 mg total) by mouth 2 (two) times a day 2/3/25   Alanna Gomes MD   traZODone (DESYREL) 100 mg tablet Take 1 tablet (100 mg total) by mouth daily at bedtime 1/31/25   Autumn Chapman PA-C       Inhouse Medications    Current Facility-Administered Medications:     bisacodyl (DULCOLAX) rectal suppository 10 mg, 10 mg, Rectal, Daily PRN    cefTRIAXone (ROCEPHIN) 1,000 mg in dextrose 5 % 50 mL IVPB, 1,000 mg, Intravenous, Q24H    chlorhexidine (PERIDEX) 0.12 % oral rinse 15 mL, 15 mL,  Mouth/Throat, Q12H ELIZABETH, 15 mL at 02/03/25 2140    diltiazem (CARDIZEM) injection 5 mg, 5 mg, Intravenous, BID    fentaNYL 1000 mcg in sodium chloride 0.9% 100mL infusion, 25 mcg/hr, Intravenous, Continuous, 25 mcg/hr at 02/04/25 0416    fentaNYL injection 25 mcg, 25 mcg, Intravenous, Q1H PRN    heparin (porcine) subcutaneous injection 5,000 Units, 5,000 Units, Subcutaneous, Q8H ELIZABETH, 5,000 Units at 02/04/25 0517 **AND** [CANCELED] Platelet count, , , Once    hydrALAZINE (APRESOLINE) injection 5 mg, 5 mg, Intravenous, Q6H PRN, 5 mg at 02/04/25 0531    metoclopramide (REGLAN) injection 5 mg, 5 mg, Intravenous, Q6H ELIZABETH, 5 mg at 02/04/25 0517    metoprolol (LOPRESSOR) injection 5 mg, 5 mg, Intravenous, Q6H, 5 mg at 02/03/25 2230    multi-electrolyte (PLASMALYTE-A/ISOLYTE-S PH 7.4) IV solution, 50 mL/hr, Intravenous, Continuous, 50 mL/hr at 02/03/25 2334    pantoprazole (PROTONIX) injection 40 mg, 40 mg, Intravenous, Q12H ELIZABETH, 40 mg at 02/04/25 0348      Social History   reports that she has never smoked. She has never been exposed to tobacco smoke. She has never used smokeless tobacco. She reports that she does not drink alcohol and does not use drugs.    Family History  Family History   Problem Relation Age of Onset    No Known Problems Family          OBJECTIVE:    BP (!) 176/71   Pulse 72   Temp 98.6 °F (37 °C) (Axillary)   Resp 16   Wt 44.2 kg (97 lb 7.1 oz)   SpO2 99%   BMI 15.26 kg/m²   Physical Exam:     General Appearance:    Awake, alert, no distress, well developed, well    nourished   Head:    Normocephalic without obvious abnormality   Eyes:    PERRL, conjunctiva/corneas clear, EOM's intact        Neck:   Supple, no adenopathy   Throat:   Mucous membranes moist +ETT/OG   Lungs:     Clear to auscultation bilaterally, no wheezing or rhonchi   Heart:    Regular rate S1 and S2 normal, no murmur   Abdomen:     Soft, non-tender, non-distended. bowel sounds active. No    masses, rebound or guarding.     Extremities:   Extremities normal. No clubbing, cyanosis or edema   Psych  Derm:   Normal mood    No jaundice   Neurologic:   Awake/alert         Laboratory Studies:  Results from last 7 days   Lab Units 02/04/25  0509 02/03/25 2017 02/03/25 0224 01/30/25  0516   WBC Thousand/uL 12.60* 15.49* 15.14* 8.15   HEMOGLOBIN g/dL 10.3* 11.7 11.3* 9.6*   HEMATOCRIT % 31.7* 37.1 34.7* 29.7*   MCV fL 91 92 91 93   PLATELETS Thousands/uL 185 191 202 187     Results from last 7 days   Lab Units 02/04/25  0509 02/03/25 2017 02/03/25 0224 01/30/25  0516   SODIUM mmol/L 139 141 139 141   POTASSIUM mmol/L 3.7 4.6 4.1 4.1   CHLORIDE mmol/L 102 107 103 107   CO2 mmol/L 23 25 25 28   BUN mg/dL 19 20 23 19   CREATININE mg/dL 0.71 0.96 0.96 0.87   CALCIUM mg/dL 8.1* 9.9 10.2 9.1   ALBUMIN g/dL 3.3*  --  3.9  --    TOTAL BILIRUBIN mg/dL 0.50  --  0.50  --    ALK PHOS U/L 70  --  93  --    ALT U/L 26  --  20  --    AST U/L 19  --  16  --            Imaging and Other Studies:  EGD  Result Date: 2/3/2025  Narrative: Table formatting from the original result was not included. [facility] [address] [address] [phone] DATE OF SERVICE: 2/03/25 PHYSICIAN(S): Attending: Jacob Navarro MD Fellow: No Staff Documented INDICATION: Abnormal CT of the abdomen, Copious oral secretions POST-OP DIAGNOSIS: See the impression below. PREPROCEDURE: Informed consent was obtained for the procedure, including sedation.  Risks of perforation, hemorrhage, adverse drug reaction and aspiration were discussed. The patient was placed in the left lateral decubitus position. Patient was explained about the risks and benefits of the procedure. Risks including but not limited to bleeding, infection, and perforation were explained in detail. Also explained about less than 100% sensitivity with the exam and other alternatives. PROCEDURE: EGD DETAILS OF PROCEDURE: Patient was taken to the procedure room  where a time out was performed to confirm correct patient and correct procedure. The patient underwent monitored anesthesia care, which was administered by an anesthesia professional. The patient's blood pressure, heart rate, level of consciousness, respirations, oxygen, ECG and ETCO2 were monitored throughout the procedure. The scope was introduced through the mouth and advanced to the stomach. Retroflexion was performed in the fundus. The patient experienced no blood loss. The procedure was difficult due to retained food. The patient tolerated the procedure well. There were no apparent adverse events. ANESTHESIA INFORMATION: ASA: III Anesthesia Type: General MEDICATIONS: No administrations occurring from 1533 to 1626 on 02/03/25 FINDINGS: Bezoar in the upper third of the esophagus, middle third of the esophagus, lower third of the esophagus, cardia and fundus of the stomach, unsuccessful retrieval attempted with retrieval snare net. Solid and liquid food was seen throughout the esophagus which was significantly dilated in appearance.  As stomach was also full of food, not much of the food could be pushed into the stomach. Small amount of could be brought out by Lemons net which did not make a significant difference in the overall quantity of food in the esophagus.  This was despite multiple attempts. OG tube was then passed under endoscopic guidance.  Scope was withdrawn. SPECIMENS: * No specimens in log *     Impression: Bezoar in the upper third of the esophagus, middle third of the esophagus, lower third of the esophagus, cardia and fundus of the stomach, unsuccessful retrieval attempted RECOMMENDATION: Keep patient on intermittent suction through OG tube. Keep intubated. Patient will need transfer to [place] or [facility] as she will need ICU admission.  Anesthesia discussing with transfer center for transfer to other [place]. She will probably benefit from Reglan to increase clearance of the stomach and  improve GI motility. Will discuss with family and update them as well.    Jacob Navarro MD     XR chest portable  Result Date: 2/3/2025  Narrative: XR CHEST PORTABLE INDICATION: Possible aspiration. COMPARISON: 1 view chest 1/14/2025 and CT chest abdomen pelvis 2/3/2025 FINDINGS: Left basilar lower lobe airspace consolidation, improved. Bilateral pleural effusions, small on the left and trace on the right, improved. No pneumothorax or pulmonary edema. Cardiac silhouette is enlarged.  Aortic calcification is present. Distended esophagus. Old healed left midclavicular fracture. Normal upper abdomen.     Impression: Left basilar lower lobe airspace consolidation and bilateral pleural effusions, left larger than right, improved. Distended esophagus. See subsequent CT chest abdomen pelvis report. Workstation performed: [id number]     CT chest abdomen pelvis w contrast  Result Date: 2/3/2025  Narrative: CT CHEST, ABDOMEN AND PELVIS WITH IV CONTRAST INDICATION: Epigastric pain, chest pain. COMPARISON: 1 view chest radiograph 1/14/2025, CT abdomen and pelvis 10/16/2021, and CT cervical spine 1/4/2025 TECHNIQUE: CT examination of the chest, abdomen and pelvis was performed. Multiplanar 2D reformatted images were created from the source data. This examination, like all CT scans performed in the [facility], was performed utilizing techniques to minimize radiation dose exposure, including the use of iterative reconstruction and automated exposure control. Radiation dose length product (DLP) for this visit: 491 mGy-cm IV Contrast: 65 mL of iohexol (OMNIPAQUE) 350 Enteric Contrast: Not administered. FINDINGS: CHEST LUNGS: Minimal left basilar airspace consolidation, improved since 1/14/2025. Minimal bibasilar linear scarring. Biapical pleural parenchymal scarring. No suspicious pulmonary nodules. Central airways are clear. PLEURA: Bilateral pleural effusions, small on the left and trace on the right these  appear have improved since 1/14/2025. Biapical pleural-parenchymal scarring. Left apical calcified pleural plaque. HEART/GREAT VESSELS: Mild cardiomegaly. Mild mass effect on the atria secondary to dilated esophagus and pectus excavatum. No pericardial effusion.  Aortic and coronary artery calcification.  No thoracic aortic aneurysm. MEDIASTINUM AND CHRISTINA: Chronic dilation of the esophagus with chronically progressive or transiently increased distention. Fluid and debris is present throughout the esophagus. No adenopathy. CHEST WALL AND LOWER NECK: Incidental discovery of one or more thyroid nodule(s) measuring less than 1.5 cm and without suspicious features is noted in this patient who is above 35 years old; according to guidelines published in the February 2015 white paper on incidental thyroid nodules in the Journal of the American College of radiology (JACR), no further evaluation is recommended. ABDOMEN Mild motion artifact. LIVER/BILIARY TREE: Subcentimeter hypoattenuating lesion(s), too small to characterize but statistically likely benign, which do not require follow-up (ACR White Paper 2017). No suspicious mass. Heterogeneous enhancement likely due to contrast timing and  IV hydration. Grossly stable hepatic contour. No biliary dilation. GALLBLADDER: No calcified gallstones. Pericholecystic fluid versus subserosal edema. This is nonspecific given presence of ascites. SPLEEN: Calcified granulomata. No suspicious mass. PANCREAS: Unremarkable. ADRENAL GLANDS: Stable 1 cm left adrenal hypoattenuating nodule. Although its imaging features on this study is indeterminate, because this lesion has been stable for > 1 year, it is considered benign. However, please note that for adrenal nodule > 1 cm,  biochemical evaluation is suggested to rule out functioning adenoma, if not already performed. Adrenal recommendation based on institutional consensus and Journal of American College of Radiology 2017;14:2523-7684.  Unremarkable right adrenal gland. KIDNEYS/URETERS: Evaluation slight limited by motion artifact. 1.1 cm right renal upper pole exophytic hyperattenuating nodule most likely a chronic hemorrhagic or proteinaceous cyst. This appeared hyperdense on the prior noncontrast CT. Stable 1 cm right renal lower pole indeterminate exophytic cystic lesion. Subcentimeter hypoattenuating renal lesion(s), too small to characterize but statistically likely benign, which do not warrant follow-up (Radiology June 2019). No hydronephrosis. No perinephric collection. STOMACH AND BOWEL: Colonic diverticulosis without diverticulitis. Nondistended stomach and segments of small bowel limits their evaluation. No bowel obstruction. APPENDIX: Normal. ABDOMINOPELVIC CAVITY: Trace ascites. No pneumoperitoneum. No lymphadenopathy. VESSELS: Atherosclerotic disease. No abdominal aortic aneurysm. PELVIS REPRODUCTIVE ORGANS: Small calcified uterine fibroids. Trace fluid in the endometrial cavity versus mild endometrial thickening up to 8 mm. URINARY BLADDER: Mild circumferential thickening with mucosal hyperemia. ABDOMINAL WALL/INGUINAL REGIONS: Unremarkable. BONES: No acute fracture or osseous destructive lesion identified. Mild degenerative changes of the spine. Pectus excavatum.     Impression: CT chest: Diffuse chronic dilation of the esophagus with progressive or transiently increased distention. Esophagus filled with fluid and debris. Findings may be sequela of severe gastroesophageal reflux superimposed on chronic dysmotility. GE stenosis/stricture is not excluded. Correlate with any prior work-up. Mild mass effect on bilateral cardiac atria due to the dilated esophagus and pectus excavatum. Minimal left basilar lower lobe consolidation likely atelectasis, improved since 1/14/2025. Bilateral pleural effusions, small on the left and trace on the right, improved since 1/14/2025. Additional chronic findings and negatives as above. CT abdomen and  pelvis: Findings suggestive of cystitis. Correlate with clinical findings. Trace ascites. Pericholecystic fluid versus thick edematous gallbladder wall. No calcified gallstones. Findings are nonspecific given presence of ascites. Small right renal upper and lower pole exophytic indeterminate cystic lesions most likely chronic hemorrhagic or proteinaceous cyst. These could be followed up with nonemergent renal ultrasound. Trace fluid in the endometrial cavity versus mild endometrial thickening. No significant change since October 2021. This could be followed up with nonemergent pelvic ultrasound if clinically warranted. Additional chronic findings and negatives as above. The study was marked in EPIC for immediate notification. Workstation performed: [id number]     CT head without contrast  Result Date: 2/3/2025  Narrative: CT BRAIN - WITHOUT CONTRAST INDICATION:   Syncopal episode, AMS. COMPARISON: CT head 1/20/2025 TECHNIQUE:  CT examination of the brain was performed.  Multiplanar 2D reformatted images were created from the source data. Radiation dose length product (DLP) for this visit:  769 mGy-cm .  This examination, like all CT scans performed in the [facility], was performed utilizing techniques to minimize radiation dose exposure, including the use of iterative reconstruction and automated exposure control. IMAGE QUALITY:  Diagnostic. FINDINGS: PARENCHYMA:No intracranial mass, mass effect or midline shift. No CT signs of acute infarction.  No acute parenchymal hemorrhage. Periventricular white matter hypodensity is a nonspecific finding likely representing chronic microangiopathy. Calcification  of bilateral internal carotid and distal vertebral arteries. VENTRICLES AND EXTRA-AXIAL SPACES: Trace posterior bifrontal subarachnoid hemorrhage likely subacute allowing for some redistribution. No history of new fall. Stable bifrontal hypodense subdural collections attributed to subacute to chronic  hematomas with maximum thickness of 1.2 cm on the right and 1 cm on the left. No acute hydrocephalus.  Mild prominence of CSF spaces diffusely attributed to generalized volume loss. VISUALIZED ORBITS: Changes of bilateral lens replacements noted. Known right orbit floor minimally displaced. PARANASAL SINUSES: Decreased opacification of the right maxillary sinus. Known right maxillary sinus wall displaced fractures. CALVARIUM AND EXTRACRANIAL SOFT TISSUES: Left parietal scalp plaque-like soft tissue attributed to subacute hematoma not significantly changed. No acute skull fracture. Known nondisplaced right temporal calvarial fracture. Known nondisplaced right zygomatic arch fracture.     Impression: No evidence of acute intracranial process. Trace posterior bifrontal subarachnoid hemorrhage likely subacute and stable to slightly decreased allowing for some degree of redistribution. 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 Chronic microangiopathy. Workstation performed: [id number]     MRI brain w wo contrast  Result Date: 1/21/2025  Narrative: MRI BRAIN WITH AND WITHOUT CONTRAST INDICATION: ro stroke but ordering  wants radha. COMPARISON: CT head 1/20/2025. TECHNIQUE: Multiplanar, multisequence imaging of the brain was performed before and after gadolinium administration. IV Contrast:  5 mL of Gadobutrol injection (SINGLE-DOSE) IMAGE QUALITY:   Motion-degraded, which reduces diagnostic sensitivity. Additionally blood products result in susceptibility artifact that degrades evaluation for acute ischemia. FINDINGS: BRAIN PARENCHYMA: Evolving bilateral cerebral convexity subdural hematomas containing blood products of varying ages, including recent, measuring up to 1 cm bilaterally with mild underlying mass effect. No krishna midline shift. Subdural blood products are also noted to layer along the left tentorial leaflet. There is scattered small volume subarachnoid hemorrhage, better evaluated on CT. Within  the confines of susceptibility artifact from the blood products, no acute large territory infarct. Mild chronic ischemic changes of the white matter. VENTRICLES: No hydrocephalus. SELLA AND PITUITARY GLAND:  Normal. ORBITS: No acute abnormality. PARANASAL SINUSES: Redemonstrated near complete opacification of the right maxillary sinus with scattered additional areas of mild mucosal thickening. VASCULATURE:  Evaluation of the major intracranial vasculature demonstrates appropriate flow voids. CALVARIUM AND SKULL BASE: Please refer to CT facial bones 1/4/2025 for known facial and temporal bone fractures. EXTRACRANIAL SOFT TISSUES: Evolving left scalp hematoma.     Impression: Extensively motion-degraded study, particularly on the postcontrast series, which reduces diagnostic sensitivity. Within these confines: 1. Evolving bilateral cerebral convexity subdural hematomas containing blood products of varying ages, including recent. Scattered small volume subarachnoid hemorrhage. 2. Within the confines of blood products resulting in susceptibility artifact that degrades evaluation of the diffusion weighted sequence, no evidence of acute large territory infarct. See narrative above for additional findings. Workstation performed: [id number]     CT head wo contrast  Result Date: 1/20/2025  Narrative: CT BRAIN - WITHOUT CONTRAST INDICATION:   f/o neurosurgery. COMPARISON: Head CT from 1/14/2025 TECHNIQUE:  CT examination of the brain was performed.  Multiplanar 2D reformatted images were created from the source data. Radiation dose length product (DLP) for this visit:  747 mGy-cm .  This examination, like all CT scans performed in the [facility], was performed utilizing techniques to minimize radiation dose exposure, including the use of iterative reconstruction and automated exposure control. IMAGE QUALITY:  Diagnostic. FINDINGS: PARENCHYMA: Decreased attenuation is noted in periventricular and subcortical  white matter demonstrating an appearance that is statistically most likely to represent mild microangiopathic change. Small new hypodensity within the left frontal subcortical white matter (2:27, 4:33, 5:30).  No mass effect or midline shift.  No acute parenchymal hemorrhage.  Atherosclerotic calcifications of the carotid siphons and intradural vertebral arteries. VENTRICLES AND EXTRA-AXIAL SPACES: Stable size and configuration of the ventricular system, within normal limits. Redemonstrated bifrontal extra-axial collections, predominantly hypodense. Previously noted hyperdense region within the left frontal convexity has decreased in density compared to the prior measuring 0.8 cm in thickness, unchanged. Residual trace amount of hyperdensity along the right frontal convexity (2:25). Mild mass effect along bilateral frontal convexities, unchanged. Decreasing size of trace residual subdural hemorrhage along the posterior falx and left tentorium measuring up to 1 mm in thickness. Decreased amount of subarachnoid hemorrhage involving bilateral frontal parietal convexities accounting for motion on prior exam. VISUALIZED ORBITS: Sequela bilateral cataract surgery. Partially visualized right maxillary wall and right orbital floor fractures resulting in hemorrhagic opacification of the right maxillary sinus, similar to prior. PARANASAL SINUSES: Normal visualized paranasal sinuses. CALVARIUM AND EXTRACRANIAL SOFT TISSUES: Nondisplaced fracture of the right zygomatic arch and right temporal bone at the level of the TMJ is again noted. Redemonstrated large left parietal scalp hematoma, unchanged.     Impression: -New small hypodensity within the left frontal subcortical white matter is nonspecific. Differential includes age indeterminate infarct, evolving parenchymal contusion among other etiologies. Correlate with MRI of the brain with and without contrast. -Continued evolution of stable to decreased recent subdural and  subarachnoid hemorrhages as above. No new or enlarging intracranial hemorrhage. -Please see prior reports for associated facial and right temporal bone fractures. The study was marked in EPIC for immediate notification. Workstation performed: [id number]     XR chest portable  Result Date: 1/14/2025  Narrative: XR CHEST PORTABLE INDICATION: SIRS. COMPARISON: 1/4/2025 FINDINGS: There are left greater than right pleural effusions, new from the prior study. There is pulmonary vascular congestion with mild groundglass opacity likely representing pulmonary edema. Enlarged cardiac silhouette, unchanged. Esophagus is no longer distended with air. Bones are unremarkable for age. Normal upper abdomen.     Impression: Development of pulmonary vascular congestion with bilateral pleural effusions and edema. Workstation performed: [id number]     CT head wo contrast  Result Date: 1/14/2025  Narrative: CT BRAIN - WITHOUT CONTRAST INDICATION:   Uncontrolled BP, change in mental status, recent subdural/subarachnoid. COMPARISON: August 31, 2015 head CT and January 7, 2025 head CT TECHNIQUE:  CT examination of the brain was performed.  Multiplanar 2D reformatted images were created from the source data. Radiation dose length product (DLP) for this visit:  1083 mGy-cm .  This examination, like all CT scans performed in the [facility], was performed utilizing techniques to minimize radiation dose exposure, including the use of iterative reconstruction and automated exposure control. IMAGE QUALITY: Examination quality is mildly degraded by motion artifact. A repeat CT of the superior portion of the brain was performed. The examination is of overall diagnostic quality. FINDINGS: PARENCHYMA:No intracranial mass, mass effect or midline shift. No CT signs of acute infarction.  No acute parenchymal hemorrhage. There is however ongoing extra-axial hemorrhage which is detailed below. VENTRICLES AND EXTRA-AXIAL SPACES: Stable  size and configuration of the ventricular system which is within normal limits for patient's age. There are bifrontal extra-axial collections which are predominantly hypodense but contain areas of hyperdensity compatible with acute on chronic subdural hematomas. As compared to prior some of the more acute/hyperdense blood products have evolved and resolved. There is a region of approximately 1.0 cm thick hyperdense subdural blood products along the anterior left frontal convexity (4:31 and 2:32), similar in configuration to prior but slightly decreased in both size and attenuation reflecting interval evolution. Trace amount of hyperdense blood products along the right frontal convexity (2:23 and 4:22). Small amount of subdural hemorrhage along the posterior falx and pooling along the left tentorium cerebelli is stable from prior. Accounting for the motion artifact the small volume subarachnoid hemorrhage involving the bilateral frontoparietal convexities is stable in extent but decreased in attenuation reflecting interval evolution. VISUALIZED ORBITS: Bilateral lens replacement surgery.  No acute abnormality involving the visualized orbits. PARANASAL SINUSES: Partially visualized recent appearing right maxillary sinus wall and inferior right orbital floor fractures and resulting hemorrhagic opacification of the right maxillary sinus is similar in appearance to prior. CALVARIUM AND EXTRACRANIAL SOFT TISSUES: Nondisplaced fractures of the right zygomatic arch and the right temporal bone to the level of the TMJ. Large left parietal scalp hematoma, similar in appearance to prior.     Impression: Interval evolution of recent subdural and subarachnoid hemorrhage as detailed above. No definitive new or enlarging intracranial hemorrhage. The study was marked in EPIC for immediate notification. Workstation performed: [id number]     CT head wo contrast  Result Date: 1/7/2025  Narrative: CT BRAIN - WITHOUT CONTRAST INDICATION:    Altered mental status COMPARISON:  None. TECHNIQUE:  CT examination of the brain was performed.  Multiplanar 2D reformatted images were created from the source data. Radiation dose length product (DLP) for this visit:  861.13 mGy-cm .  This examination, like all CT scans performed in the [facility], was performed utilizing techniques to minimize radiation dose exposure, including the use of iterative  reconstruction and automated exposure control. IMAGE QUALITY:  Diagnostic. FINDINGS: PARENCHYMA: Left frontoparietal convexity subdural hematoma is stable in size, measuring up to 9 mm in thickness. Collection is progressively less hyperdense consistent with expected evolution of blood products. Trace linear density along the right frontal convexity could represent a tiny focus of subdural or subarachnoid hemorrhage. Tiny foci of subdural blood along the falx bilaterally, stable. Stable mild subdural hemorrhage along the left posterior falx and tentorium when compared to the most recent prior exam. Scattered foci of subarachnoid hemorrhage over the frontoparietal convexities is stable. No midline shift. Intact basal cisterns.. VENTRICLES AND EXTRA-AXIAL SPACES: No intraventricular hemorrhage or hydrocephalus. VISUALIZED ORBITS: Intact globes. No retrobulbar hematoma. PARANASAL SINUSES: Blood products in the right maxillary sinus. CALVARIUM AND EXTRACRANIAL SOFT TISSUES: Right zygomaticomaxillary complex and orbital floor fracture. Stable hematoma along the left parietal scalp.     Impression: Evolving blood products in the left convexity subdural hematoma without evidence of new hemorrhage. Subdural and subarachnoid hemorrhage also stable when compared to the most recent prior exam. Workstation performed: [id number]     Echo complete w/ contrast if indicated  Result Date: 1/6/2025  Narrative:   Left Ventricle: Left ventricular cavity size is normal. Wall thickness is mildly increased. The left  ventricular ejection fraction is 70%. Systolic function is vigorous. Wall motion is normal. Diastolic function is moderately abnormal, consistent with grade II (pseudonormal) relaxation.   Right Ventricle: Systolic function is mildly reduced.   Left Atrium: The atrium is mildly dilated.   Aortic Valve: There is mild regurgitation.   Mitral Valve: There is mild annular calcification.   Tricuspid Valve: There is mild regurgitation. The right ventricular systolic pressure is moderately elevated. The estimated right ventricular systolic pressure is 50.00 mmHg.   Pericardium: There is a trivial pericardial effusion posterior to the heart.     CT head wo contrast  Result Date: 1/5/2025  Narrative: CT BRAIN - WITHOUT CONTRAST INDICATION:   F/u SDH/SAH. COMPARISON: 1/4/2025. TECHNIQUE:  CT examination of the brain was performed.  Multiplanar 2D reformatted images were created from the source data. Radiation dose length product (DLP) for this visit:  1079.77 mGy-cm .  This examination, like all CT scans performed in the [facility], was performed utilizing techniques to minimize radiation dose exposure, including the use of iterative reconstruction and automated exposure control. IMAGE QUALITY:  Diagnostic. FINDINGS: PARENCHYMA: Acute subarachnoid and subdural hemorrhage is again identified. Subdural hemorrhage is most prominent along the left frontal parietal vertex, similar to the prior examination. Subarachnoid hemorrhage is seen peripherally within the left hemisphere but also in the parafalcine region and within the right parietal lobe. There has been some redistribution of the parafalcine hemorrhage now slightly more posteriorly and inferiorly located along the falx and tentorium. No midline shift. No signs of acute ischemia. VENTRICLES: Minimal hyperdensity within the atria of the right lateral ventricle may represent redistribution of subarachnoid hemorrhage. No obstructive hydrocephalus. VISUALIZED  ORBITS: Normal visualized orbits. PARANASAL SINUSES: There is hemorrhage filling the right maxillary sinus and a small amount of hemorrhage layering posteriorly within the left maxillary sinus. CALVARIUM AND EXTRACRANIAL SOFT TISSUES: Multiple facial fractures are identified including fractures of the right orbit, maxilla and zygomatic arch which appears similar to the prior examination. Lobulated soft tissue mass in the extracranial soft tissues in the left parietal region measures 4.8 cm in oblique AP diameter. This is unchanged from the prior examination having a ulcerated appearance centrally.     Impression: Acute subdural and subarachnoid hemorrhage with some redistribution compared to the prior examination. Subdural hemorrhages largest in the left frontal lobe with mild mass effect upon the adjacent brain parenchyma. No midline shift. No new hemorrhage. Multiple right facial fractures are identified with hemorrhage filling the right maxillary sinus and a small amount of hemorrhage layering within the left maxillary sinus. There is an extracranial ulcerated soft tissue mass in the left parietal soft tissues. Workstation performed: [id number]         ASSESSMENT AND PLAN:  89 year old female with CKD, AFib currently not on anticoagulation, UTI on abx, recent subdural hemorrhage last month with worsening dysphagia s/p EGD yesterday with bezoar and unsuccessful endoscopic retrieval. Transferred to this facility, intubated in the ICU awaiting reattempt at EGD. Reglan was added overnight to help with gi motility. Continue acid suppression. NPO. OG to intermittent suction. Family aware of plan and agreeable.         Mayte Velazco PA-C

## 2025-02-04 NOTE — ASSESSMENT & PLAN NOTE
Home meds: Cardizem 120mg q24 and Toprol XL 25mg daily     Plan  Start IV lopressor 5q6 and diltiazem 10mg BID IV given NPO   AC: Risk benefit discussion with cardiology/ICU team.family  [id number] score is 5  NSGY cleared patient for for AP therapy starting 2/4  Monitor off AC/AP for now. Likely the benefits of holding AC/AP outweigh the risks of resumption. Plan to discuss with family today  Monitor on telemetry

## 2025-02-04 NOTE — TELEPHONE ENCOUNTER
Patient was discharged ER to [facility] ICU [address] [place] [address] -02/03/25. NO TCM APPT MADE . Phone call made on 02/04/25

## 2025-02-04 NOTE — PLAN OF CARE
Problem: Prexisting or High Potential for Compromised Skin Integrity  Goal: Skin integrity is maintained or improved  Description: INTERVENTIONS:  - Identify patients at risk for skin breakdown  - Assess and monitor skin integrity  - Assess and monitor nutrition and hydration status  - Monitor labs   - Assess for incontinence   - Turn and reposition patient  - Assist with mobility/ambulation  - Relieve pressure over bony prominences  - Avoid friction and shearing  - Provide appropriate hygiene as needed including keeping skin clean and dry  - Evaluate need for skin moisturizer/barrier cream  - Collaborate with interdisciplinary team   - Patient/family teaching  - Consider wound care consult   Outcome: Progressing     Problem: PAIN - ADULT  Goal: Verbalizes/displays adequate comfort level or baseline comfort level  Description: Interventions:  - Encourage patient to monitor pain and request assistance  - Assess pain using appropriate pain scale  - Administer analgesics based on type and severity of pain and evaluate response  - Implement non-pharmacological measures as appropriate and evaluate response  - Consider cultural and social influences on pain and pain management  - Notify physician/advanced practitioner if interventions unsuccessful or patient reports new pain  Outcome: Progressing     Problem: INFECTION - ADULT  Goal: Absence or prevention of progression during hospitalization  Description: INTERVENTIONS:  - Assess and monitor for signs and symptoms of infection  - Monitor lab/diagnostic results  - Monitor all insertion sites, i.e. indwelling lines, tubes, and drains  - Monitor endotracheal if appropriate and nasal secretions for changes in amount and color  - [place] appropriate cooling/warming therapies per order  - Administer medications as ordered  - Instruct and encourage patient and family to use good hand hygiene technique  - Identify and instruct in appropriate isolation precautions for  identified infection/condition  Outcome: Progressing     Problem: SAFETY ADULT  Goal: Patient will remain free of falls  Description: INTERVENTIONS:  - Educate patient/family on patient safety including physical limitations  - Instruct patient to call for assistance with activity   - Consult OT/PT to assist with strengthening/mobility   - Keep Call bell within reach  - Keep bed low and locked with side rails adjusted as appropriate  - Keep care items and personal belongings within reach  - Initiate and maintain comfort rounds  - Make Fall Risk Sign visible to staff  - Apply yellow socks and bracelet for high fall risk patients  - Consider moving patient to room near nurses station  Outcome: Progressing  Goal: Maintain or return to baseline ADL function  Description: INTERVENTIONS:  -  Assess patient's ability to carry out ADLs; assess patient's baseline for ADL function and identify physical deficits which impact ability to perform ADLs (bathing, care of mouth/teeth, toileting, grooming, dressing, etc.)  - Assess/evaluate cause of self-care deficits   - Assess range of motion  - Assess patient's mobility; develop plan if impaired  - Assess patient's need for assistive devices and provide as appropriate  - Encourage maximum independence but intervene and supervise when necessary  - Involve family in performance of ADLs  - Assess for home care needs following discharge   - Consider OT consult to assist with ADL evaluation and planning for discharge  - Provide patient education as appropriate  Outcome: Progressing  Goal: Maintains/Returns to pre admission functional level  Description: INTERVENTIONS:  - Perform AM-PAC 6 Click Basic Mobility/ Daily Activity assessment daily.  - Set and communicate daily mobility goal to care team and patient/family/caregiver.   - Collaborate with rehabilitation services on mobility goals if consulted  - Out of bed for toileting  - Record patient progress and toleration of activity level    Outcome: Progressing     Problem: DISCHARGE PLANNING  Goal: Discharge to home or other facility with appropriate resources  Description: INTERVENTIONS:  - Identify barriers to discharge w/patient and caregiver  - Arrange for needed discharge resources and transportation as appropriate  - Identify discharge learning needs (meds, wound care, etc.)  - Arrange for interpretive services to assist at discharge as needed  - Refer to Case Management Department for coordinating discharge planning if the patient needs post-hospital services based on physician/advanced practitioner order or complex needs related to functional status, cognitive ability, or social support system  Outcome: Progressing     Problem: Knowledge Deficit  Goal: Patient/family/caregiver demonstrates understanding of disease process, treatment plan, medications, and discharge instructions  Description: Complete learning assessment and assess knowledge base.  Interventions:  - Provide teaching at level of understanding  - Provide teaching via preferred learning methods  Outcome: Progressing     Problem: CARDIOVASCULAR - ADULT  Goal: Maintains optimal cardiac output and hemodynamic stability  Description: INTERVENTIONS:  - Monitor I/O, vital signs and rhythm  - Monitor for S/S and trends of decreased cardiac output  - Administer and titrate ordered vasoactive medications to optimize hemodynamic stability  - Assess quality of pulses, skin color and temperature  - Assess for signs of decreased coronary artery perfusion  - Instruct patient to report change in severity of symptoms  Outcome: Progressing  Goal: Absence of cardiac dysrhythmias or at baseline rhythm  Description: INTERVENTIONS:  - Continuous cardiac monitoring, vital signs, obtain 12 lead EKG if ordered  - Administer antiarrhythmic and heart rate control medications as ordered  - Monitor electrolytes and administer replacement therapy as ordered  Outcome: Progressing     Problem: RESPIRATORY -  ADULT  Goal: Achieves optimal ventilation and oxygenation  Description: INTERVENTIONS:  - Assess for changes in respiratory status  - Assess for changes in mentation and behavior  - Position to facilitate oxygenation and minimize respiratory effort  - Oxygen administered by appropriate delivery if ordered  - Initiate smoking cessation education as indicated  - Encourage broncho-pulmonary hygiene including cough, deep breathe, Incentive Spirometry  - Assess the need for suctioning and aspirate as needed  - Assess and instruct to report SOB or any respiratory difficulty  - Respiratory Therapy support as indicated  Outcome: Progressing     Problem: GASTROINTESTINAL - ADULT  Goal: Minimal or absence of nausea and/or vomiting  Description: INTERVENTIONS:  - Administer IV fluids if ordered to ensure adequate hydration  - Maintain NPO status until nausea and vomiting are resolved  - Nasogastric tube if ordered  - Administer ordered antiemetic medications as needed  - Provide nonpharmacologic comfort measures as appropriate  - Advance diet as tolerated, if ordered  - Consider nutrition services referral to assist patient with adequate nutrition and appropriate food choices  Outcome: Progressing  Goal: Maintains or returns to baseline bowel function  Description: INTERVENTIONS:  - Assess bowel function  - Encourage oral fluids to ensure adequate hydration  - Administer IV fluids if ordered to ensure adequate hydration  - Administer ordered medications as needed  - Encourage mobilization and activity  - Consider nutritional services referral to assist patient with adequate nutrition and appropriate food choices  Outcome: Progressing  Goal: Maintains adequate nutritional intake  Description: INTERVENTIONS:  - Monitor percentage of each meal consumed  - Identify factors contributing to decreased intake, treat as appropriate  - Assist with meals as needed  - Monitor I&O, weight, and lab values if indicated  - Obtain nutrition  services referral as needed  Outcome: Progressing  Goal: Oral mucous membranes remain intact  Description: INTERVENTIONS  - Assess oral mucosa and hygiene practices  - Implement preventative oral hygiene regimen  - Implement oral medicated treatments as ordered  - Initiate Nutrition services referral as needed  Outcome: Progressing     Problem: GENITOURINARY - ADULT  Goal: Maintains or returns to baseline urinary function  Description: INTERVENTIONS:  - Assess urinary function  - Encourage oral fluids to ensure adequate hydration if ordered  - Administer IV fluids as ordered to ensure adequate hydration  - Administer ordered medications as needed  - Offer frequent toileting  - Follow urinary retention protocol if ordered  Outcome: Progressing  Goal: Absence of urinary retention  Description: INTERVENTIONS:  - Assess patient’s ability to void and empty bladder  - Monitor I/O  - Bladder scan as needed  - Discuss with physician/AP medications to alleviate retention as needed  - Discuss catheterization for long term situations as appropriate  Outcome: Progressing     Problem: METABOLIC, FLUID AND ELECTROLYTES - ADULT  Goal: Electrolytes maintained within normal limits  Description: INTERVENTIONS:  - Monitor labs and assess patient for signs and symptoms of electrolyte imbalances  - Administer electrolyte replacement as ordered  - Monitor response to electrolyte replacements, including repeat lab results as appropriate  - Instruct patient on fluid and nutrition as appropriate  Outcome: Progressing  Goal: Fluid balance maintained  Description: INTERVENTIONS:  - Monitor labs   - Monitor I/O and WT  - Instruct patient on fluid and nutrition as appropriate  - Assess for signs & symptoms of volume excess or deficit  Outcome: Progressing     Problem: SKIN/TISSUE INTEGRITY - ADULT  Goal: Skin Integrity remains intact(Skin Breakdown Prevention)  Description: Assess:  -Inspect skin when repositioning, toileting, and assisting  with ADLS  -Assess extremities for adequate circulation and sensation     Bed Management:  -Have minimal linens on bed & keep smooth, unwrinkled  -Change linens as needed when moist or perspiring    Toileting:  -Offer bedside commode    Activity:  -Encourage activity and walks on unit  -Encourage or provide ROM exercises   -Use appropriate equipment to lift or move patient in bed    Skin Care:  -Avoid use of baby powder, tape, friction and shearing, hot water or constrictive clothing  -Do not massage red bony areas  Outcome: Progressing  Goal: Incision(s), wounds(s) or drain site(s) healing without S/S of infection  Description: INTERVENTIONS  - Assess and document dressing, incision, wound bed, drain sites and surrounding tissue  - Provide patient and family education  Outcome: Progressing     Problem: HEMATOLOGIC - ADULT  Goal: Maintains hematologic stability  Description: INTERVENTIONS  - Assess for signs and symptoms of bleeding or hemorrhage  - Monitor labs  - Administer supportive blood products/factors as ordered and appropriate  Outcome: Progressing     Problem: MUSCULOSKELETAL - ADULT  Goal: Maintain or return mobility to safest level of function  Description: INTERVENTIONS:  - Assess patient's ability to carry out ADLs; assess patient's baseline for ADL function and identify physical deficits which impact ability to perform ADLs (bathing, care of mouth/teeth, toileting, grooming, dressing, etc.)  - Assess/evaluate cause of self-care deficits   - Assess range of motion  - Assess patient's mobility  - Assess patient's need for assistive devices and provide as appropriate  - Encourage maximum independence but intervene and supervise when necessary  - Involve family in performance of ADLs  - Assess for home care needs following discharge   - Consider OT consult to assist with ADL evaluation and planning for discharge  - Provide patient education as appropriate  Outcome: Progressing  Goal: Maintain proper  alignment of affected body part  Description: INTERVENTIONS:  - Support, maintain and protect limb and body alignment  - Provide patient/ family with appropriate education  Outcome: Progressing

## 2025-02-04 NOTE — CASE MANAGEMENT
Case Management Assessment & Discharge Planning Note    Patient name Sandra Purvis  Location ICU 10/ICU 10 MRN [id number]  : 12/3/1934 Date 2025       Current Admission Date: 2/3/2025  Current Admission Diagnosis:Food impaction of esophagus   Patient Active Problem List    Diagnosis Date Noted Date Diagnosed    Abdominal pain 2025     Esophageal dysphagia 2025     Acute cystitis 2025     Abnormal CT of the abdomen 2025     Disturbance of salivary secretion 2025     Food impaction of esophagus 2025     Behavior safety risk 2025     Acute hypoxic respiratory failure (HCC) 2025     Insomnia 2025     Hypokalemia 2025     Hypertensive urgency 2025     Age-related cognitive decline 2025     SIRS (systemic inflammatory response syndrome) ([facility]) 2025     Frailty syndrome in geriatric patient 2025     Ambulatory dysfunction 2025     At risk for delirium 2025     Bilateral hearing loss 2025     Anemia 2025     Leukocytosis 2025     Acute on chronic diastolic congestive heart failure (HCC) 2025     Syncope 2025     Chest pain 2025     Orthostatic hypotension 2025     Fall 2025     SDH (subdural hematoma) ([facility]) 2025     SAH (subarachnoid hemorrhage) ([facility]) 2025     Closed extensive facial fractures ([facility]) 2025     Primary insomnia 2024     Basal cell carcinoma (BCC) of scalp 2023     Malignant melanoma of arm, left ([facility]) 2023     Scalp lesion 2023     Arm skin lesion, left 2023     Irritant contact dermatitis due to other agents 2022     COVID-19 2022     GERD (gastroesophageal reflux disease)      Stage 3 chronic kidney disease, unspecified whether stage 3a or 3b CKD ([facility]) 2021     Atrial fibrillation ([facility])      Preop examination 2021     Esophagus disorder 10/19/2021     UTI (urinary tract infection)  10/15/2021     Ureteral stone 10/15/2021     Bacteremia 10/15/2021     Mild protein-calorie malnutrition (HCC) 05/04/2021     HTN (hypertension) 09/22/2015       LOS (days): 1  Geometric Mean LOS (GMLOS) (days): 4.3  Days to GMLOS:3.4     OBJECTIVE:    Risk of Unplanned Readmission Score: 33.17         Current admission status: Inpatient       Preferred Pharmacy:   [facility]/pharmacy #[id number] - [place] - [address]  [address]  [place] [address]  Phone: [phone] Fax: [phone]    Primary Care Provider: Berhane Page DO    Primary Insurance: MEDICARE  Secondary Insurance: BLUE CROSS    ASSESSMENT:  Active Health Care Proxies       EvaristospencerDarryl Health Care Representative - Spouse   Primary Phone: [phone] (Home)                 Advance Directives  Does patient have a Health Care POA?: Yes (copy not on file)  Does patient have Advance Directives?: Yes  Advance Directives: Living will, Power of  for health care  Primary Contact: Courtney Berger (Daughter) [phone]         Readmission Root Cause  30 Day Readmission: Yes  During your hospital stay, did someone (provider, nurse, ) explain your care to you in a way you could understand?: Yes  Did you feel medically stable to leave the hospital?: Yes  Were you able to pay for your medication at the pharmacy?: Yes  Did you have reliable transportation to take you to your appointments?: Yes  During previous admission, was a post-acute recommendation made?: Yes  What post-acute resources were offered?: Other (Acute Rehab)    Patient Information  Admitted from:: Facility ( ED)  Mental Status: Alert  During Assessment patient was accompanied by: Not accompanied during assessment  Assessment information provided by:: Other - please comment (Chart review, patient was in the midst of having a bedside procedure at the time of CM's visit.  CM will confirm information with patient at a later time.)  Support Systems: Family members,  Spouse/significant other, Friends/neighbors, Son, Daughter  County of Residence: [place]  What city do you live in?: [place]  Home entry access options. Select all that apply.: Stairs  Number of steps to enter home.: 2  Do the steps have railings?: Yes  Living Arrangements: Lives w/ Spouse/significant other  Is patient a ?: No    Activities of Daily Living Prior to Admission  Functional Status: Independent  Completes ADLs independently?: Yes  Ambulates independently?: Yes  Does patient use assisted devices?: No  Does patient currently own DME?: No  Does patient have a history of Outpatient Therapy (PT/OT)?: Yes  Does the patient have a history of Short-Term Rehab?: Yes ( [facility])  Does patient have a history of HHC?: Yes  Does patient currently have HHC?: No         Patient Information Continued  Income Source: Pension/nursing home  Does patient have prescription coverage?: Yes  Does patient receive dialysis treatments?: No  Does patient have a history of substance abuse?: No  Does patient have a history of Mental Health Diagnosis?: No         Means of Transportation  Means of Transport to Appts:: Family transport      Social Determinants of Health (SDOH)      Flowsheet Row Most Recent Value   Housing Stability    In the past 12 months, how many times have you moved where you were living? 1   At any time in the past 12 months, were you homeless or living in a shelter (including now)? N   Transportation Needs    In the past 12 months, has lack of transportation kept you from medical appointments or from getting medications? no   In the past 12 months, has lack of transportation kept you from meetings, work, or from getting things needed for daily living? No   Food Insecurity    Within the past 12 months, you worried that your food would run out before you got the money to buy more. Never true   Within the past 12 months, the food you bought just didn't last and you didn't have money to get more. Never true    Utilities    In the past 12 months has the electric, gas, oil, or water company threatened to shut off services in your home? No            DISCHARGE DETAILS:      Contacts  Patient Contacts: Courtney Berger (Daughter) [phone]  Relationship to Patient:: Family  Contact Method: Phone  Phone Number: [phone]  Reason/Outcome: Discharge Planning, Emergency Contact, Continuity of Care    Requested Home Health Care         Is the patient interested in HHC at discharge?: No    DME Referral Provided  Referral made for DME?: No       Discharge Destination Plan:: Home           Additional Comments: Patient was having bedside procedure at the time of care manager's visit. Intake information obtained through chart review from a recent admission of 1/17/2025. CM will confirm the information with the patient at a later time and will continue to follow the patient through hospital discharge.

## 2025-02-04 NOTE — ASSESSMENT & PLAN NOTE
Recently treated with CTX from 1/24- 1/28 for foul smelling BCC wound on scalp   No prior urine culture data  No hx of ESBL/MRSA  This is not the cause of patient's critical illness     Plan:  Continue CTX for 3 days total. Last dose 2/5  Monitor UOP  Urinary retention protocol in place  Monitor fever and WBC count

## 2025-02-04 NOTE — PROGRESS NOTES
Progress Note - Critical Care/ICU   Name: Sandra Purvis 89 y.o. female I MRN: [id number]  Unit/Bed#: ICU 10 I Date of Admission: 2/3/2025   Date of Service: 2/4/2025 I Hospital Day: 1       Critical Care Interval Transfer Note:    Brief Hospital Summary:    Recent syncopal episode at home resulting in multiple R facial fractures, L SDH and L SAH in L frontal lobe. These issues were managed medically. She also had a fib RVR and orthostatic hypotension. She was transferred to [facility] on 1/12/25 and she had HTN/a fib that she was symptomatic from and transferred to acute care on 1/14. She had pulmonary edema and was diuresed from 1/14-1/15 and started on Cardizem with improvement and returned to [facility] on 1/17/25. She went to [facility] on 1/17/25 and was doing well until 2/3 when she had difficulty swallowing/abdominal pain and AMS. CTH stable but CT C/A/P showing significant esophageal dilation/debris and cystitis. UA with innumerable bacteria. She was started on CTX and EGD completed noting Bezoar throughout esophagus and cardia/fundus of stomach without successful retrieval or mobilization. An OG was placed under endoscopic guidance and she was left intubated and transferred to [facility] with plans for EGD and attempt at disimpaction on 2/4.  Disimpaction was done on 2/4.  Patient was extubated, maintaining good saturation on minimal oxygen.      Barriers to discharge:   Transferring back to [facility].  GI evaluation.     Consults: IP CONSULT TO GASTROENTEROLOGY    Recommended to review admission imaging for incidental findings and document in discharge navigator: Chart reviewed, no known incidental findings noted at this time.      Discharge Plan: Anticipate discharge in 48-72 hrs to rehab facility.       Patient seen and evaluated by Critical Care today and deemed to be appropriate for transfer to Med Surg. Spoke to Dr. Lees  from [facility] to accept transfer. Critical care can be contacted via SecureChat with any  questions or concerns. Please use the Critical Care AP Role in Secure Chat for any provider inquires until the patient is transferred out of the ICU or until tomorrow at 0600.

## 2025-02-04 NOTE — ASSESSMENT & PLAN NOTE
S/P EGD on 2/3  Plan:  Plan for repeat EGD today, 2/4, with GI   Start reglan 5mg q6  Keep OG tube to suction  KUB at 12am showing OG malpositioned. Advanced another 5 cm  Maintain NPO. Start isolyte at 50cc/hr while NPO  GI consult placed, appreciate eval and recs

## 2025-02-04 NOTE — PROGRESS NOTES
ADT alert received indicating the patient admitted 2/3/25 to [facility]. Inbasket reminder sent to self to do next outreach.   This Admin Coordinator will continue to monitor via chart review.

## 2025-02-04 NOTE — ASSESSMENT & PLAN NOTE
Home meds: Cardizem 120mg q24 and Toprol XL 25mg daily     Plan  Start IV lopressor 5q6 and diltiazem 5mg BID IV given NPO   Hold parameters in place  AC: Risk benefit discussion with cardiology/ICU team.family  [id number] score is 5  NSGY cleared patient for for AP therapy starting 2/4  Monitor off AC/AP for now. Likely the benefits of holding AC/AP outweigh the risks of resumption. Plan to discuss with family today  Monitor on telemetry

## 2025-02-04 NOTE — ASSESSMENT & PLAN NOTE
Hold home midodrine given NPO  Start maintenance fluids while NPO  S/p 1L isolyte bolus overnight for hypotension

## 2025-02-04 NOTE — ASSESSMENT & PLAN NOTE
Start IV lopressor 5q6 and diltiazem 10mg BID IV given NPO and cannot take home cardizem/toprol xl  PRN IV hydralazine for BSP >160

## 2025-02-04 NOTE — ASSESSMENT & PLAN NOTE
Continue mechanical ventilation given food bolus impaction with repeat EGD on 2/4. Currently on ACVC 16/380/6/40%  VBG on these settings WNL  Daily SAT/SBT   Monitor on telemetry   Monitor puls ox and maintain SPo2 >92%  VAP bundle in place  SQH 5000 q8 for DVT ppx  Appreciate RT care

## 2025-02-04 NOTE — ASSESSMENT & PLAN NOTE
S/P EGD on 2/3  Plan:  Plan for repeat EGD on 2/4 with GI   Start reglan 5mg q6  Keep OG tube to suction  KUB on arrival with OG malpositioned. Advanced 10cm, repeat KUB at midnight  Maintain NPO. Start isolyte at 50cc/hr while NPO  GI consult placed, appreciate eval and recs

## 2025-02-04 NOTE — CASE MANAGEMENT
Case Management Discharge Planning Note    Patient name Sandra Purvis  Location ICU 10/ICU 10 MRN [id number]  : 12/3/1934 Date 2025       Current Admission Date: 2/3/2025  Current Admission Diagnosis:Food impaction of esophagus   Patient Active Problem List    Diagnosis Date Noted Date Diagnosed    Abdominal pain 2025     Esophageal dysphagia 2025     Acute cystitis 2025     Abnormal CT of the abdomen 2025     Disturbance of salivary secretion 2025     Food impaction of esophagus 2025     Behavior safety risk 2025     Acute hypoxic respiratory failure (HCC) 2025     Insomnia 2025     Hypokalemia 2025     Hypertensive urgency 2025     Age-related cognitive decline 2025     SIRS (systemic inflammatory response syndrome) ([facility]) 2025     Frailty syndrome in geriatric patient 2025     Ambulatory dysfunction 2025     At risk for delirium 2025     Bilateral hearing loss 2025     Anemia 2025     Leukocytosis 2025     Acute on chronic diastolic congestive heart failure (HCC) 2025     Syncope 2025     Chest pain 2025     Orthostatic hypotension 2025     Fall 2025     SDH (subdural hematoma) ([facility]) 2025     SAH (subarachnoid hemorrhage) ([facility]) 2025     Closed extensive facial fractures ([facility]) 2025     Primary insomnia 2024     Basal cell carcinoma (BCC) of scalp 2023     Malignant melanoma of arm, left ([facility]) 2023     Scalp lesion 2023     Arm skin lesion, left 2023     Irritant contact dermatitis due to other agents 2022     COVID-19 2022     GERD (gastroesophageal reflux disease)      Stage 3 chronic kidney disease, unspecified whether stage 3a or 3b CKD ([facility]) 2021     Atrial fibrillation ([facility])      Preop examination 2021     Esophagus disorder 10/19/2021     UTI (urinary tract infection) 10/15/2021      Ureteral stone 10/15/2021     Bacteremia 10/15/2021     Mild protein-calorie malnutrition (HCC) 05/04/2021     HTN (hypertension) 09/22/2015       LOS (days): 1  Geometric Mean LOS (GMLOS) (days): 4.3  Days to GMLOS:3.3     OBJECTIVE:  Risk of Unplanned Readmission Score: 32.41         Current admission status: Inpatient   Preferred Pharmacy:   [facility]/pharmacy #[id number] - [place], [place] - [address]  [address] [address]  Phone: [phone] Fax: [phone]    Primary Care Provider: Berhane Page DO    Primary Insurance: MEDICARE  Secondary Insurance: BLUE CROSS    DISCHARGE DETAILS:    Contacts  Patient Contacts: Courtney Berger (Daughter) [phone]  Relationship to Patient:: Family  Contact Method: Phone  Phone Number: [phone]  Reason/Outcome: Discharge Planning, Emergency Contact, Continuity of Care    Requested Home Health Care         Is the patient interested in HHC at discharge?: No    DME Referral Provided  Referral made for DME?: No       Discharge Destination Plan:: Home        Additional Comments: CM confirmed the intake assessment information is correct and updated with the patient. CM department will continue to follow the patient through hospital discharge. Discharge planning is ongoing.

## 2025-02-05 LAB
BACTERIA UR CULT: ABNORMAL
BASOPHILS # BLD AUTO: 0.04 THOUSANDS/ΜL (ref 0–0.1)
BASOPHILS NFR BLD AUTO: 0 % (ref 0–1)
CA-I BLD-SCNC: 1.19 MMOL/L (ref 1.12–1.32)
EOSINOPHIL # BLD AUTO: 0.03 THOUSAND/ΜL (ref 0–0.61)
EOSINOPHIL NFR BLD AUTO: 0 % (ref 0–6)
ERYTHROCYTE [DISTWIDTH] IN BLOOD BY AUTOMATED COUNT: 17.7 % (ref 11.6–15.1)
HCT VFR BLD AUTO: 32.8 % (ref 34.8–46.1)
HGB BLD-MCNC: 10.2 G/DL (ref 11.5–15.4)
IMM GRANULOCYTES # BLD AUTO: 0.06 THOUSAND/UL (ref 0–0.2)
IMM GRANULOCYTES NFR BLD AUTO: 1 % (ref 0–2)
LYMPHOCYTES # BLD AUTO: 1.15 THOUSANDS/ΜL (ref 0.6–4.47)
LYMPHOCYTES NFR BLD AUTO: 9 % (ref 14–44)
MCH RBC QN AUTO: 28.9 PG (ref 26.8–34.3)
MCHC RBC AUTO-ENTMCNC: 31.1 G/DL (ref 31.4–37.4)
MCV RBC AUTO: 93 FL (ref 82–98)
MONOCYTES # BLD AUTO: 0.92 THOUSAND/ΜL (ref 0.17–1.22)
MONOCYTES NFR BLD AUTO: 7 % (ref 4–12)
NEUTROPHILS # BLD AUTO: 10.68 THOUSANDS/ΜL (ref 1.85–7.62)
NEUTS SEG NFR BLD AUTO: 83 % (ref 43–75)
NRBC BLD AUTO-RTO: 0 /100 WBCS
PLATELET # BLD AUTO: 180 THOUSANDS/UL (ref 149–390)
PMV BLD AUTO: 11 FL (ref 8.9–12.7)
RBC # BLD AUTO: 3.53 MILLION/UL (ref 3.81–5.12)
WBC # BLD AUTO: 12.88 THOUSAND/UL (ref 4.31–10.16)

## 2025-02-05 PROCEDURE — 85025 COMPLETE CBC W/AUTO DIFF WBC: CPT

## 2025-02-05 PROCEDURE — 82330 ASSAY OF CALCIUM: CPT

## 2025-02-05 PROCEDURE — 99232 SBSQ HOSP IP/OBS MODERATE 35: CPT | Performed by: INTERNAL MEDICINE

## 2025-02-05 RX ORDER — MIDODRINE HYDROCHLORIDE 5 MG/1
5 TABLET ORAL
Status: DISCONTINUED | OUTPATIENT
Start: 2025-02-05 | End: 2025-02-07 | Stop reason: HOSPADM

## 2025-02-05 RX ORDER — MIRTAZAPINE 7.5 MG/1
7.5 TABLET, FILM COATED ORAL
Status: DISCONTINUED | OUTPATIENT
Start: 2025-02-05 | End: 2025-02-07 | Stop reason: HOSPADM

## 2025-02-05 RX ADMIN — PANTOPRAZOLE SODIUM 40 MG: 40 INJECTION, POWDER, FOR SOLUTION INTRAVENOUS at 21:37

## 2025-02-05 RX ADMIN — HEPARIN SODIUM 5000 UNITS: 5000 INJECTION INTRAVENOUS; SUBCUTANEOUS at 06:17

## 2025-02-05 RX ADMIN — DILTIAZEM HYDROCHLORIDE 15 MG: 30 TABLET, FILM COATED ORAL at 17:25

## 2025-02-05 RX ADMIN — MIRTAZAPINE 7.5 MG: 7.5 TABLET, FILM COATED ORAL at 21:44

## 2025-02-05 RX ADMIN — DILTIAZEM HYDROCHLORIDE 15 MG: 30 TABLET, FILM COATED ORAL at 06:17

## 2025-02-05 RX ADMIN — MELATONIN 3 MG: 3 TAB ORAL at 21:34

## 2025-02-05 RX ADMIN — PANTOPRAZOLE SODIUM 40 MG: 40 INJECTION, POWDER, FOR SOLUTION INTRAVENOUS at 07:58

## 2025-02-05 RX ADMIN — CHLORHEXIDINE GLUCONATE 15 ML: 1.2 SOLUTION ORAL at 08:00

## 2025-02-05 RX ADMIN — HEPARIN SODIUM 5000 UNITS: 5000 INJECTION INTRAVENOUS; SUBCUTANEOUS at 14:00

## 2025-02-05 RX ADMIN — DILTIAZEM HYDROCHLORIDE 15 MG: 30 TABLET, FILM COATED ORAL at 11:43

## 2025-02-05 RX ADMIN — CEFTRIAXONE SODIUM 1000 MG: 10 INJECTION, POWDER, FOR SOLUTION INTRAVENOUS at 07:53

## 2025-02-05 RX ADMIN — METOCLOPRAMIDE 5 MG: 5 INJECTION, SOLUTION INTRAMUSCULAR; INTRAVENOUS at 00:10

## 2025-02-05 RX ADMIN — CHLORHEXIDINE GLUCONATE 15 ML: 1.2 SOLUTION ORAL at 21:37

## 2025-02-05 RX ADMIN — METOCLOPRAMIDE 5 MG: 5 INJECTION, SOLUTION INTRAMUSCULAR; INTRAVENOUS at 06:17

## 2025-02-05 RX ADMIN — DILTIAZEM HYDROCHLORIDE 15 MG: 30 TABLET, FILM COATED ORAL at 00:10

## 2025-02-05 RX ADMIN — METOPROLOL SUCCINATE 12.5 MG: 25 TABLET, EXTENDED RELEASE ORAL at 21:34

## 2025-02-05 RX ADMIN — HEPARIN SODIUM 5000 UNITS: 5000 INJECTION INTRAVENOUS; SUBCUTANEOUS at 21:33

## 2025-02-05 RX ADMIN — METOPROLOL SUCCINATE 12.5 MG: 25 TABLET, EXTENDED RELEASE ORAL at 07:58

## 2025-02-05 RX ADMIN — DILTIAZEM HYDROCHLORIDE 15 MG: 30 TABLET, FILM COATED ORAL at 23:58

## 2025-02-05 NOTE — PROGRESS NOTES
Progress Note - Hospitalist   Name: Sandra Purvis 89 y.o. female I MRN: [id number]  Unit/Bed#: [place] [address] -02 I Date of Admission: 2/3/2025   Date of Service: 2/5/2025 I Hospital Day: 2    Assessment & Plan  Food impaction of esophagus    HTN (hypertension)    UTI (urinary tract infection)    Atrial fibrillation (HCC)  Continue beta-blocker and Cardizem, holding anticoagulation in light of recent history of bleed  GERD (gastroesophageal reflux disease)  Continue protonix  Primary insomnia  Will resume home melatonin and remeron  Orthostatic hypotension  Continue midodrine 5 mg 3 times daily  Esophageal dysphagia  Patient with history of esophageal seizure presented with food impaction-underwent endoscopy with GI  Acute hypoxic respiratory failure (HCC)  Resolved    VTE Pharmacologic Prophylaxis:   Moderate Risk (Score 3-4) - Pharmacological DVT Prophylaxis Ordered: heparin.    Mobility:   Basic Mobility Inpatient Raw Score: 13  JH-HLM Goal: 4: Move to chair/commode  JH-HLM Achieved: 4: Move to chair/commode  JH-HLM Goal achieved. Continue to encourage appropriate mobility.    Patient Centered Rounds: I performed bedside rounds with nursing staff today.   Discussions with Specialists or Other Care Team Provider:     Education and Discussions with Family / Patient: Updated  (daughter) via phone.    Current Length of Stay: 2 day(s)  Current Patient Status: Inpatient   Certification Statement: The patient will continue to require additional inpatient hospital stay due to placement  Discharge Plan: Anticipate discharge tomorrow to rehab facility.    Code Status: Level 2 - DNAR: but accepts endotracheal intubation    Subjective   Patient denies any acute complaints today    Objective :  Temp:  [97.5 °F (36.4 °C)-97.9 °F (36.6 °C)] 97.7 °F (36.5 °C)  HR:  [] 89  BP: (136-180)/(67-90) 143/78  Resp:  [13-21] 18  SpO2:  [94 %-99 %] 95 %  O2 Device: None (Room air)  Nasal Cannula O2 Flow Rate  (L/min):  [2 L/min] 2 L/min    Body mass index is 15.26 kg/m².     Input and Output Summary (last 24 hours):     Intake/Output Summary (Last 24 hours) at 2/5/2025 1437  Last data filed at 2/5/2025 0629  Gross per 24 hour   Intake 480 ml   Output --   Net 480 ml       Physical Exam  Vitals and nursing note reviewed.   Constitutional:       General: She is not in acute distress.     Appearance: She is well-developed. She is not toxic-appearing or diaphoretic.   HENT:      Head: Normocephalic and atraumatic.   Eyes:      General: No scleral icterus.     Conjunctiva/sclera: Conjunctivae normal.   Cardiovascular:      Rate and Rhythm: Normal rate and regular rhythm.      Heart sounds: No murmur heard.     No friction rub. No gallop.   Pulmonary:      Effort: Pulmonary effort is normal. No respiratory distress.      Breath sounds: Normal breath sounds. No stridor. No wheezing, rhonchi or rales.   Chest:      Chest wall: No tenderness.   Abdominal:      General: There is no distension.      Palpations: Abdomen is soft. There is no mass.      Tenderness: There is no abdominal tenderness. There is no guarding or rebound.      Hernia: No hernia is present.   Musculoskeletal:         General: No swelling or tenderness.      Cervical back: Neck supple.   Skin:     General: Skin is warm and dry.      Capillary Refill: Capillary refill takes less than 2 seconds.   Neurological:      Mental Status: She is alert and oriented to person, place, and time.   Psychiatric:         Mood and Affect: Mood normal.           Lines/Drains:              Lab Results: I have reviewed the following results:   Results from last 7 days   Lab Units 02/05/25  0515   WBC Thousand/uL 12.88*   HEMOGLOBIN g/dL 10.2*   HEMATOCRIT % 32.8*   PLATELETS Thousands/uL 180   SEGS PCT % 83*   LYMPHO PCT % 9*   MONO PCT % 7   EOS PCT % 0     Results from last 7 days   Lab Units 02/04/25  0509   SODIUM mmol/L 139   POTASSIUM mmol/L 3.7   CHLORIDE mmol/L 102   CO2  mmol/L 23   BUN mg/dL 19   CREATININE mg/dL 0.71   ANION GAP mmol/L 14*   CALCIUM mg/dL 8.1*   ALBUMIN g/dL 3.3*   TOTAL BILIRUBIN mg/dL 0.50   ALK PHOS U/L 70   ALT U/L 26   AST U/L 19   GLUCOSE RANDOM mg/dL 90         Results from last 7 days   Lab Units 02/03/25  0023 02/02/25  1237   POC GLUCOSE mg/dl 139 106               Recent Cultures (last 7 days):   Results from last 7 days   Lab Units 02/03/25  1028   URINE CULTURE  >100,000 cfu/ml Pseudomonas aeruginosa*       Imaging Results Review: No pertinent imaging studies reviewed.  Other Study Results Review: No additional pertinent studies reviewed.    Last 24 Hours Medication List:     Current Facility-Administered Medications:     bisacodyl (DULCOLAX) rectal suppository 10 mg, Daily PRN    chlorhexidine (PERIDEX) 0.12 % oral rinse 15 mL, Q12H ELIZABETH    diltiazem (CARDIZEM) tablet 15 mg, Q6H ELIZABETH    heparin (porcine) subcutaneous injection 5,000 Units, Q8H ELIZABETH **AND** [CANCELED] Platelet count, Once    hydrALAZINE (APRESOLINE) injection 5 mg, Q6H PRN    melatonin tablet 3 mg, HS    metoprolol succinate (TOPROL-XL) 24 hr tablet 12.5 mg, Q12H ELIZABETH    midodrine (PROAMATINE) tablet 5 mg, TID AC    mirtazapine (REMERON) tablet 7.5 mg, HS    pantoprazole (PROTONIX) injection 40 mg, Q12H ELIZABETH    Administrative Statements   Today, Patient Was Seen By: Fabián Melendez DO      **Please Note: This note may have been constructed using a voice recognition system.**

## 2025-02-05 NOTE — PROGRESS NOTES
Progress Note - Gastroenterology   Name: Sandra Purvis 89 y.o. female I MRN: [id number]  Unit/Bed#: [place] [address] -02 I Date of Admission: 2/3/2025   Date of Service: 2/5/2025 I Hospital Day: 2     Assessment & Plan  Food impaction of esophagus  Status post unsuccessful disimpaction at [facility] campus 2/3, transferred to [facility] yesterday for upper endoscopy with successful esophageal clearance. Incomplete EGD, duodenum not visualized. Patient extubated yesterday, transferred to Med Surg.   Pathology pending  Liquid diet currently. ?advancement. Would add nutritional supplements  Continue acid suppression. Discontinue Reglan in this elderly patient. Keep HOB elevated/upright with meals.   Suspect dysphagia represents achalasia and workup can be done if patient is interested  Atrial fibrillation (HCC)  AC currently on hold  Esophageal dysphagia  See food impaction of esophagus above    Subjective   Patient seen sitting OOB in chair. Offers no GI complaints. Tolerating clear liquids per RN. Vitals stable. No BMs reported.     Vitals  Blood pressure 160/90, pulse 89, temperature 97.7 °F (36.5 °C), resp. rate 18, weight 44.2 kg (97 lb 7.1 oz), SpO2 96%, not currently breastfeeding.  Physical Exam:     General Appearance:    Awake, alert, oriented x3, no distress, elderly/frail   Head:    Normocephalic without obvious abnormality   Eyes:    PERRL, conjunctiva/corneas clear, EOM's intact        Neck:   Supple, no adenopathy   Throat:   Mucous membranes moist   Lungs:     Clear to auscultation bilaterally, no wheezing or rhonchi   Heart:    Irregularly irregular,S1 and S2 normal, no murmur   Abdomen:     Soft, non-tender, non-distended. bowel sounds active. No    masses, rebound or guarding.    Extremities:   Extremities normal. No clubbing, cyanosis or edema   Psych  Derm:   Normal affect    No jaundice   Neurologic:   CNII-XII grossly intact. Speech intact         Laboratory Studies  Results from last 7 days   Lab Units  02/05/25  0515 02/04/25  0509 02/03/25 2017 02/03/25 0224 01/30/25  0516   WBC Thousand/uL 12.88* 12.60* 15.49* 15.14* 8.15   HEMOGLOBIN g/dL 10.2* 10.3* 11.7 11.3* 9.6*   HEMATOCRIT % 32.8* 31.7* 37.1 34.7* 29.7*   PLATELETS Thousands/uL 180 185 191 202 187     Results from last 7 days   Lab Units 02/04/25  0509 02/03/25 2017 02/03/25 0224 01/30/25  0516   SODIUM mmol/L 139 141 139 141   POTASSIUM mmol/L 3.7 4.6 4.1 4.1   CHLORIDE mmol/L 102 107 103 107   CO2 mmol/L 23 25 25 28   BUN mg/dL 19 20 23 19   CREATININE mg/dL 0.71 0.96 0.96 0.87   CALCIUM mg/dL 8.1* 9.9 10.2 9.1   ALBUMIN g/dL 3.3*  --  3.9  --    TOTAL BILIRUBIN mg/dL 0.50  --  0.50  --    ALK PHOS U/L 70  --  93  --    ALT U/L 26  --  20  --    AST U/L 19  --  16  --          Imaging and Other Studies      Inhouse Medications     Current Facility-Administered Medications:     bisacodyl (DULCOLAX) rectal suppository 10 mg, 10 mg, Rectal, Daily PRN    cefTRIAXone (ROCEPHIN) 1,000 mg in dextrose 5 % 50 mL IVPB, 1,000 mg, Intravenous, Q24H, Stopped at 02/04/25 0721    chlorhexidine (PERIDEX) 0.12 % oral rinse 15 mL, 15 mL, Mouth/Throat, Q12H ELIZABETH, 15 mL at 02/04/25 0935    diltiazem (CARDIZEM) tablet 15 mg, 15 mg, Oral, Q6H ELIZABETH, 15 mg at 02/05/25 0617    heparin (porcine) subcutaneous injection 5,000 Units, 5,000 Units, Subcutaneous, Q8H ELIZABETH, 5,000 Units at 02/05/25 0617 **AND** [CANCELED] Platelet count, , , Once    hydrALAZINE (APRESOLINE) injection 5 mg, 5 mg, Intravenous, Q6H PRN, 5 mg at 02/04/25 0531    metoclopramide (REGLAN) injection 5 mg, 5 mg, Intravenous, Q6H ELIZABETH, 5 mg at 02/05/25 0617    metoprolol succinate (TOPROL-XL) 24 hr tablet 12.5 mg, 12.5 mg, Oral, Q12H ELIZABETH, 12.5 mg at 02/04/25 1411    pantoprazole (PROTONIX) injection 40 mg, 40 mg, Intravenous, Q12H ELIZABETH, 40 mg at 02/04/25 0958    Mayte Velazco PA-C  [facility]

## 2025-02-05 NOTE — PLAN OF CARE
Problem: Prexisting or High Potential for Compromised Skin Integrity  Goal: Skin integrity is maintained or improved  Description: INTERVENTIONS:  - Identify patients at risk for skin breakdown  - Assess and monitor skin integrity  - Assess and monitor nutrition and hydration status  - Monitor labs   - Assess for incontinence   - Turn and reposition patient  - Assist with mobility/ambulation  - Relieve pressure over bony prominences  - Avoid friction and shearing  - Provide appropriate hygiene as needed including keeping skin clean and dry  - Evaluate need for skin moisturizer/barrier cream  - Collaborate with interdisciplinary team   - Patient/family teaching  - Consider wound care consult   2/5/2025 0941 by Paz Keller RN  Outcome: Progressing  2/5/2025 0941 by Paz Keller RN  Outcome: Progressing

## 2025-02-05 NOTE — ASSESSMENT & PLAN NOTE
Patient with history of esophageal seizure presented with food impaction-underwent endoscopy with GI

## 2025-02-05 NOTE — SPEECH THERAPY NOTE
SLP Discharge Summary    Pt was discharge to acute care after an overt change in medical status on 2/3/2025. While on the [facility], pt was being followed by ST services targeting dysphagia and cognitive skills. It was noted that cognitive skills fluctuate s/p SAH/SDH, to where pt ranged from min-max A for skills. As for swallow function, pt was recently advanced to dysphagia level 3 diet, BUT prior to transfer off unit, foods were more level 2 textures. When pt is medically stable and appropriate, would recommend ST services to continue to maximize cognitive and swallow functioning.

## 2025-02-05 NOTE — ASSESSMENT & PLAN NOTE
Status post unsuccessful disimpaction at [facility] campus 2/3, transferred to [facility] yesterday for upper endoscopy with successful esophageal clearance. Incomplete EGD, duodenum not visualized. Patient extubated yesterday, transferred to Med Surg.   Pathology pending  Liquid diet currently. ?advancement. Would add nutritional supplements  Continue acid suppression. Discontinue Reglan in this elderly patient. Keep HOB elevated/upright with meals.   Suspect dysphagia represents achalasia and workup can be done if patient is interested

## 2025-02-05 NOTE — SPEECH THERAPY NOTE
"Speech Language/Pathology  Speech/Language Pathology  Assessment    Patient Name: Sandra Purvis  Today's Date: 2/5/2025     Problem List  Principal Problem:    Food impaction of esophagus  Active Problems:    HTN (hypertension)    UTI (urinary tract infection)    Atrial fibrillation (HCC)    GERD (gastroesophageal reflux disease)    Primary insomnia    Orthostatic hypotension    Esophageal dysphagia    Acute hypoxic respiratory failure (HCC)    Past Medical History  Past Medical History:   Diagnosis Date    Arthritis     Cataract     LastAssessed:9/8/15    Disturbance of salivary secretion 02/03/2025    Esophageal dysphagia 02/03/2025    GERD (gastroesophageal reflux disease)     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    Hypertension     PAF (paroxysmal atrial fibrillation) (HCC)     Last Assessed:2/3/16    Wrist fracture     Resolved:9/8/15     Past Surgical History  Past Surgical History:   Procedure Laterality Date    CATARACT EXTRACTION      Last Assessed:9/8/15    FL RETROGRADE PYELOGRAM  10/17/2021    LYMPHADENECTOMY      Last Assessed:9/8/15    TX CYSTO/URETERO W/LITHOTRIPSY &INDWELL STENT INSRT Right 11/24/2021    Procedure: CYSTOSCOPY URETEROSCOPY WITH LITHOTRIPSY HOLMIUM LASER, RETROGRADE PYELOGRAM AND INSERTION STENT URETERAL;  Surgeon: David Sr MD;  Location: [facility];  Service: Urology    TX CYSTOURETHROSCOPY W/URETERAL CATHETERIZATION Right 10/17/2021    Procedure: CYSTOSCOPY RETROGRADE PYELOGRAM WITH INSERTION STENT URETERAL;  Surgeon: Benjie Tinoco MD;  Location: [facility];  Service: Urology    TONSILLECTOMY      Last Assessed:9/8/15    WRIST SURGERY      Last Assessed:9/8/15          Bedside Swallow Evaluation:    Summary:  Pt presented w/ report of \"I have to go slow\". Noted bmi 15.26. Currently on full liquids but is listed w/ lactose allergy. Therefor she receive a clear liquid tray . Pt had 2 jellos, supplement. Took extremely small bites and was eating very slowly. Cued to " try to take small sips of liquid as liquid may pass thru the esophagus more easily. Audible swallows noted x 2. Belch x 1. Brought pt an apple sauce to try. She said she would try it later, again stating she has to go slow. Bolus control and trasnfer were WNL. Swallows prompt, no cough or wet vocal qaulity. Denied any cough or choking episodes. Denied vomiting. Oral/pharyngeal stages wfl/wnl for current diet. Dysphagia appears to be primarily esophageal.      Recommendations:  Diet:on fulls but lactose restricted (closer to a clear liquid diet). Consider advancing to puree if ok w/ GI and pt can tolerate but question if she will meet nutritional needs unless she can take enough in liquid supplements. current  BMI 15.26   Liquid: thin  Meds: break or crush unless very small, or liquid(? If retained in esophagus)  Supervision:prn  Positioning:Upright  Strategies: slow rate, small bites and sips, stop eating if feeling full.  (already does)  Pt to take PO/Meds only when fully alert and upright.   Oral care  Aspiration precautions  Reflux precautions. Pt educated on upright after meals and not laying flat at rest. Keep hob elevated to at least 30 degrees/6 inches at rest. Asked questions. Understood.   Eval only, No f/u tx indicated. Advance diet per gi/as pt can tolerate. F/u w/ gi, monitor intake/weight. Consider routine barium swallow/esophagram w/ pill administration.     Consider consult w/:  Rehab  GI  Nutrition      H&P/Admit info/ pertinent provider notes: (PMH noted above)  Attestation signed by Brian Vasquez MD at 2/5/2025  1:56 PM  Attending Attestation::  I supervised the Advanced Practitioner on 2/5/2025.? I performed, in its entirety, the assessment and plan of the visit.  I agree with the Advanced Practitioner's note with the following additions/exceptions: Patient seen and evaluated.  I was able to discuss her situation with her daughter at the bedside and her .  Main complaint today is that her  back hurts.  Seems to be from sitting in the chair  Alert and oriented  No distress  Breathing nonlabored  Abdomen soft and nontender distended bowel sounds present  Assessment:  There is a high suspicion that the patient's swallowing issues have been going on for many years.  She has been sitting in an upright position at night I believe as a result of this.  Endoscopic findings would also suggest this has been going on for quite some time with enlargement of the esophagus and current evaluation seems most consistent with achalasia.  Management options of lower esophageal dilation versus Botox versus myotomy were all discussed  Given her age we have elected to pursue a more conservative path with diet manipulation.  Plan:  Diet can be advanced to full liquid diet  Nutritional supplements  Small but frequent meals/snacks  Patient to remain in an upright 90 degree sitting position for 30 to 60 minutes after meals  She should also likely remain upright at least 45 degrees while sleeping  Patient would be at risk of aspiration but this has not happened.  I will have her follow-up in the office in 2 months to gauge her progress and if having trouble with weight loss, we will consider endoscopic management with dilation and Botox.  If patient to be in the hospital much longer would do barium esophagram while she is here  H&P  Sandra Purvis is a 89 y.o. female with PMH of recent SAF/SDH, A fib, HTN, GERD, who presents to the ICU for food bolus impaction s/p unsuccessful removal via OG tube under endoscopic guidance.    Recent syncopal episode at home resulting in multiple R facial fractures, L SDH and L SAH in L frontal lobe. These issues were managed medically. She also had a fib RVR and orthostatic hypotension. She was transferred to [facility] on 1/12/25 and she had HTN/a fib that she was symptomatic from and transferred to acute care on 1/14. She had pulmonary edema and was diuresed from 1/14-1/15 and started on  Cardizem with improvement and returned to [facility] on 1/17/25. She went to [facility] on 1/17/25 and was doing well until 2/3 when she had difficulty swallowing/abdominal pain and AMS. CTH stable but CT C/A/P showing significant esophageal dilation/debris and cystitis. UA with innumerable bacteria. She was started on CTX and EGD completed noting Bezoar throughout esophagus and cardia/fundus of stomach without successful retrieval or mobilization. An OG was placed under endoscopic guidance and she was left intubated and transferred to [facility] with plans for EGD and attempt at disimpaction on 2/4. She arrives on minimal vent settings, adequately sedated with propofol. She followed commands briskly in b/l UE and LE on sedation breaks.   I confirmed code status on patient's arrival with patient's daughter Courtney. I answered her questions and she expressed great appreciation for care.   History obtained from chart review and unobtainable from patient due to intubation. 2/3    Special Studies:  EGD  2/4/25:  IMPRESSION:  Food bolus in the upper third of the esophagus, middle third of the esophagus and lower third of the esophagus  incomplete egd due to not seeing the duodenum.   RECOMMENDATION:  Await pathology results     Liquid diet  Suspect that this represents achalasia and workup for such can be done if pt is interested.       Procalcitonin<=0.25ng/ml    WBC  4.31-10.16 Thousand/uL   12.88  2/5/25       Previous MBS:  None in epic    Patient's goal: none stated    Did the pt report pain? no  If yes, was nursing notified/was it addressed?    Reason for consult:  R/o aspiration  Determine safest and least restrictive diet  poor intake  weight loss   h/o dysphagia/esophageal  C/o solid food dysphagia    Precautions:  Fall    Food Allergies:  lactose   Current Diet:  Full liquids per GI recommendation   Premorbid diet: NDD3 dental soft and thin.    O2 requirement:  RA   Social/Prior living  Was home w/ spouse. Transferred from [facility]    Voice/Speech:  Wnl, pleasant   Follows commands:  basic   Cognitive status:  Alert and pleasant     Oral [facility] exam:  Dentition: natural  Lips (VII): +  Tongue (XII):+  Secretion management:+    Esophageal stage:  See above    Results d/w:  Pt, nursing

## 2025-02-05 NOTE — MALNUTRITION/BMI
This medical record reflects one or more clinical indicators suggestive of malnutrition and/or morbid obesity.    Malnutrition Findings:   Adult Malnutrition type: Chronic illness  Adult Degree of Malnutrition: Other severe protein calorie malnutrition  Malnutrition Characteristics: Fat loss, Muscle loss, Inadequate energy, Weight loss                360 Statement: Severe calorie protein malnutrition in context of chronic illness r/t inadequate PO intake, swallowing difficulty as evidenced by severe body fat (triceps, ribcage area) and muscle mass depletions (temporal wasting, protruding clavicles),  10% wt loss x 1 month; Treated with PO diet, oral supplements    BMI Findings:  Adult BMI Classifications: Underweight < 18.5        Body mass index is 15.26 kg/m².     See Nutrition note dated 2/5/25 for additional details.  Completed nutrition assessment is viewable in the nutrition documentation.

## 2025-02-05 NOTE — WOUND OSTOMY CARE
Consult Note - Wound   Sandra Purvis 89 y.o. female MRN: [id number]  Unit/Bed#: [place] [address] -02 Encounter: [id number]      History and Present Illness:  Admitted to [facility] due to food impaction of esophagus. PMH Esophageal dysphagia, scalp lesion, malignant melanoma of left arm. fall 1/6/25.      Assessment Findings:   1)Chronic wound to left posterior lateral scalp  Patient does not wish to have bandage placed.  2)Skin tear left arm dry intact     Skin care plans:  1-Hydraguard/Silicone Cream to bilateral sacrum, buttock, and heels BID and PRN  2-Elevate heels to offload pressure.  3-Ehob cushion in chair when out of bed.  4-Moisturize skin daily with skin nourishing cream.  5-Turn/reposition q2h or when medically stable for pressure re-distribution on skin.   6-Cleanse left posterior malignant wound with soap and water daily . Open to air  Wound 01/06/25 Arm Anterior;Left;Proximal (Active)   Wound Image   02/05/25 1037   Wound Description Dry;Eschar 02/05/25 0800   Wound 01/12/25 Malignant  Head (Active)   Wound Image   02/05/25 1035   Wound Description Beefy red;Intact;Drainage 02/05/25 1035   Alix-wound Assessment Intact;Dry 02/05/25 1035   Wound Length (cm) 5 cm 02/05/25 1035   Wound Width (cm) 5 cm 02/05/25 1035   Wound Surface Area (cm^2) 25 cm^2 02/05/25 1035   Drainage Amount Scant 02/05/25 1035   Drainage Description Bloody 02/05/25 1035   Treatments Cleansed 02/05/25 1035   Dressing Open to air 02/05/25 1035   Dressing Changed Other (Comment) 02/05/25 1035   Patient Tolerance Other (Comment) 02/05/25 1035   Dressing Status Clean;Intact;Dry 02/04/25 1600     Wound Care will follow weekly     Courtney BARDALESN RN CWON

## 2025-02-06 PROBLEM — E43 SEVERE PROTEIN-CALORIE MALNUTRITION (HCC): Status: ACTIVE | Noted: 2025-02-06

## 2025-02-06 LAB
ALBUMIN SERPL BCG-MCNC: 2.9 G/DL (ref 3.5–5)
ALP SERPL-CCNC: 65 U/L (ref 34–104)
ALT SERPL W P-5'-P-CCNC: 25 U/L (ref 7–52)
ANION GAP SERPL CALCULATED.3IONS-SCNC: 6 MMOL/L (ref 4–13)
AST SERPL W P-5'-P-CCNC: 16 U/L (ref 13–39)
BASOPHILS # BLD AUTO: 0.03 THOUSANDS/ΜL (ref 0–0.1)
BASOPHILS NFR BLD AUTO: 0 % (ref 0–1)
BILIRUB SERPL-MCNC: 0.4 MG/DL (ref 0.2–1)
BUN SERPL-MCNC: 23 MG/DL (ref 5–25)
CALCIUM ALBUM COR SERPL-MCNC: 10 MG/DL (ref 8.3–10.1)
CALCIUM SERPL-MCNC: 9.1 MG/DL (ref 8.4–10.2)
CHLORIDE SERPL-SCNC: 105 MMOL/L (ref 96–108)
CO2 SERPL-SCNC: 29 MMOL/L (ref 21–32)
CREAT SERPL-MCNC: 0.9 MG/DL (ref 0.6–1.3)
EOSINOPHIL # BLD AUTO: 0.18 THOUSAND/ΜL (ref 0–0.61)
EOSINOPHIL NFR BLD AUTO: 2 % (ref 0–6)
ERYTHROCYTE [DISTWIDTH] IN BLOOD BY AUTOMATED COUNT: 16.8 % (ref 11.6–15.1)
GFR SERPL CREATININE-BSD FRML MDRD: 56 ML/MIN/1.73SQ M
GLUCOSE SERPL-MCNC: 102 MG/DL (ref 65–140)
HCT VFR BLD AUTO: 29.2 % (ref 34.8–46.1)
HGB BLD-MCNC: 9.5 G/DL (ref 11.5–15.4)
IMM GRANULOCYTES # BLD AUTO: 0.04 THOUSAND/UL (ref 0–0.2)
IMM GRANULOCYTES NFR BLD AUTO: 0 % (ref 0–2)
LYMPHOCYTES # BLD AUTO: 1.14 THOUSANDS/ΜL (ref 0.6–4.47)
LYMPHOCYTES NFR BLD AUTO: 10 % (ref 14–44)
MCH RBC QN AUTO: 29.1 PG (ref 26.8–34.3)
MCHC RBC AUTO-ENTMCNC: 32.5 G/DL (ref 31.4–37.4)
MCV RBC AUTO: 90 FL (ref 82–98)
MONOCYTES # BLD AUTO: 0.77 THOUSAND/ΜL (ref 0.17–1.22)
MONOCYTES NFR BLD AUTO: 7 % (ref 4–12)
NEUTROPHILS # BLD AUTO: 8.77 THOUSANDS/ΜL (ref 1.85–7.62)
NEUTS SEG NFR BLD AUTO: 81 % (ref 43–75)
NRBC BLD AUTO-RTO: 0 /100 WBCS
PLATELET # BLD AUTO: 210 THOUSANDS/UL (ref 149–390)
PMV BLD AUTO: 10.7 FL (ref 8.9–12.7)
POTASSIUM SERPL-SCNC: 3 MMOL/L (ref 3.5–5.3)
PROT SERPL-MCNC: 5.3 G/DL (ref 6.4–8.4)
RBC # BLD AUTO: 3.26 MILLION/UL (ref 3.81–5.12)
SODIUM SERPL-SCNC: 140 MMOL/L (ref 135–147)
WBC # BLD AUTO: 10.93 THOUSAND/UL (ref 4.31–10.16)

## 2025-02-06 PROCEDURE — 97167 OT EVAL HIGH COMPLEX 60 MIN: CPT

## 2025-02-06 PROCEDURE — 92610 EVALUATE SWALLOWING FUNCTION: CPT

## 2025-02-06 PROCEDURE — 99232 SBSQ HOSP IP/OBS MODERATE 35: CPT | Performed by: INTERNAL MEDICINE

## 2025-02-06 PROCEDURE — 97163 PT EVAL HIGH COMPLEX 45 MIN: CPT

## 2025-02-06 PROCEDURE — 80053 COMPREHEN METABOLIC PANEL: CPT | Performed by: INTERNAL MEDICINE

## 2025-02-06 PROCEDURE — 85025 COMPLETE CBC W/AUTO DIFF WBC: CPT | Performed by: INTERNAL MEDICINE

## 2025-02-06 RX ORDER — POTASSIUM CHLORIDE 1500 MG/1
40 TABLET, EXTENDED RELEASE ORAL ONCE
Status: COMPLETED | OUTPATIENT
Start: 2025-02-06 | End: 2025-02-06

## 2025-02-06 RX ADMIN — HEPARIN SODIUM 5000 UNITS: 5000 INJECTION INTRAVENOUS; SUBCUTANEOUS at 06:17

## 2025-02-06 RX ADMIN — CHLORHEXIDINE GLUCONATE 15 ML: 1.2 SOLUTION ORAL at 08:34

## 2025-02-06 RX ADMIN — MIDODRINE HYDROCHLORIDE 5 MG: 5 TABLET ORAL at 17:00

## 2025-02-06 RX ADMIN — PANTOPRAZOLE SODIUM 40 MG: 40 INJECTION, POWDER, FOR SOLUTION INTRAVENOUS at 08:34

## 2025-02-06 RX ADMIN — DILTIAZEM HYDROCHLORIDE 15 MG: 30 TABLET, FILM COATED ORAL at 06:19

## 2025-02-06 RX ADMIN — MIDODRINE HYDROCHLORIDE 5 MG: 5 TABLET ORAL at 06:17

## 2025-02-06 RX ADMIN — HEPARIN SODIUM 5000 UNITS: 5000 INJECTION INTRAVENOUS; SUBCUTANEOUS at 21:32

## 2025-02-06 RX ADMIN — DILTIAZEM HYDROCHLORIDE 15 MG: 30 TABLET, FILM COATED ORAL at 17:18

## 2025-02-06 RX ADMIN — HEPARIN SODIUM 5000 UNITS: 5000 INJECTION INTRAVENOUS; SUBCUTANEOUS at 15:00

## 2025-02-06 RX ADMIN — MIRTAZAPINE 7.5 MG: 7.5 TABLET, FILM COATED ORAL at 21:35

## 2025-02-06 RX ADMIN — METOPROLOL SUCCINATE 12.5 MG: 25 TABLET, EXTENDED RELEASE ORAL at 08:33

## 2025-02-06 RX ADMIN — POTASSIUM CHLORIDE 40 MEQ: 1500 TABLET, EXTENDED RELEASE ORAL at 12:52

## 2025-02-06 RX ADMIN — MELATONIN 3 MG: 3 TAB ORAL at 21:33

## 2025-02-06 RX ADMIN — DILTIAZEM HYDROCHLORIDE 15 MG: 30 TABLET, FILM COATED ORAL at 12:54

## 2025-02-06 RX ADMIN — PANTOPRAZOLE SODIUM 40 MG: 40 INJECTION, POWDER, FOR SOLUTION INTRAVENOUS at 21:30

## 2025-02-06 RX ADMIN — MIDODRINE HYDROCHLORIDE 5 MG: 5 TABLET ORAL at 12:00

## 2025-02-06 RX ADMIN — METOPROLOL SUCCINATE 12.5 MG: 25 TABLET, EXTENDED RELEASE ORAL at 21:33

## 2025-02-06 RX ADMIN — CHLORHEXIDINE GLUCONATE 15 ML: 1.2 SOLUTION ORAL at 21:32

## 2025-02-06 NOTE — CASE MANAGEMENT
Case Management Discharge Planning Note    Patient name Sandra Purvis  Location [facility] 2 /South 2 M* MRN [id number]  : 12/3/1934 Date 2025       Current Admission Date: 2/3/2025  Current Admission Diagnosis:Food impaction of esophagus   Patient Active Problem List    Diagnosis Date Noted Date Diagnosed    Severe protein-calorie malnutrition (HCC) 2025     Abdominal pain 2025     Esophageal dysphagia 2025     Acute cystitis 2025     Abnormal CT of the abdomen 2025     Disturbance of salivary secretion 2025     Food impaction of esophagus 2025     Behavior safety risk 2025     Acute hypoxic respiratory failure (HCC) 2025     Insomnia 2025     Hypokalemia 2025     Hypertensive urgency 2025     Age-related cognitive decline 2025     SIRS (systemic inflammatory response syndrome) ([facility]) 2025     Frailty syndrome in geriatric patient 2025     Ambulatory dysfunction 2025     At risk for delirium 2025     Bilateral hearing loss 2025     Anemia 2025     Leukocytosis 2025     Acute on chronic diastolic congestive heart failure ([facility]) 2025     Syncope 2025     Chest pain 2025     Orthostatic hypotension 2025     Fall 2025     SDH (subdural hematoma) ([facility]) 2025     SAH (subarachnoid hemorrhage) ([facility]) 2025     Closed extensive facial fractures ([facility]) 2025     Primary insomnia 2024     Basal cell carcinoma (BCC) of scalp 2023     Malignant melanoma of arm, left ([facility]) 2023     Scalp lesion 2023     Arm skin lesion, left 2023     Irritant contact dermatitis due to other agents 2022     COVID-19 2022     GERD (gastroesophageal reflux disease)      Stage 3 chronic kidney disease, unspecified whether stage 3a or 3b CKD (HCC) 2021     Atrial fibrillation ([facility])      Preop examination 2021     Esophagus  disorder 10/19/2021     UTI (urinary tract infection) 10/15/2021     Ureteral stone 10/15/2021     Bacteremia 10/15/2021     Mild protein-calorie malnutrition (HCC) 05/04/2021     HTN (hypertension) 09/22/2015       LOS (days): 3  Geometric Mean LOS (GMLOS) (days): 4.3  Days to GMLOS:1.5     OBJECTIVE:  Risk of Unplanned Readmission Score: 29.02         Current admission status: Inpatient   Preferred Pharmacy:   [id number]  [place] - [address]  [address] [address]  Phone: [phone] Fax: [phone]    Primary Care Provider: Berhane Page DO    Primary Insurance: MEDICARE  Secondary Insurance: BLUE CROSS    DISCHARGE DETAILS:     IMM Given (Date):: 02/06/25  IMM Given to:: Family  Family notified:: Daughter and spouse  Additional Comments:  called pt's daughter, Courtney (father was on speaker) to discuss pt's discharge plan.  Pt was admitted from [facility].  They cannot accept the pt back. [facility] made a referral to [facility] and was accepted.  Reviewed this with Courtney.  She is agreeable to [facility].  Reviewed IMM.  Received message from [facility] in [place] that a bed will be available tomorrow after 12PM.  MD notified.  Daughter, Courtney notified.

## 2025-02-06 NOTE — PLAN OF CARE
Problem: OCCUPATIONAL THERAPY ADULT  Goal: Performs self-care activities at highest level of function for planned discharge setting.  See evaluation for individualized goals.  Description: Treatment Interventions: ADL retraining, UE strengthening/ROM, Functional transfer training, Endurance training, Neuromuscular reeducation, Equipment evaluation/education, Patient/family training, Compensatory technique education, Energy conservation, Activityengagement          See flowsheet documentation for full assessment, interventions and recommendations.   Note: Limitation: Decreased ADL status, Decreased UE ROM, Decreased UE strength, Decreased Safe judgement during ADL, Decreased endurance, Decreased self-care trans, Decreased high-level ADLs  Prognosis: Good  Assessment: Sandra Purvis is a 89 y.o. female seen for OT evaluation s/p admit to [facility] on 2/3/2025 w/ Food impaction of esophagus. Admitted to [facility] ED on 1/4/25 for fall w subsequent L SDH, SAH and transf to [facility]. hospitalized at [facility] from 1/4-12 then transf to [facility]. Readmitted to [facility] from 1/14-17 for afib w RVR. Returned to [facility] 1/17-2/3 then readmitted 2/3 for chest pain, plans for EGD and disimpaction. Extubated 2/4.  Comorbidities affecting pt's functional performance at time of assessment include: HTN. Pt with active OT orders and activity orders for Up and OOB as tolerated. Personal factors affecting pt at time of IE include:ROSA home environment, steps within home environment, difficulty performing ADLs, difficulty performing IADLs, and difficulty performing transfers/mobility. Prior to admission, pt lives with spouse in a 2 level home with 1 ROSA. I with ADLS, IADLs and mobility. 1 fall. Pt has been in / out of hospital and acute rehab since 1/2/25 requiring A for all tranfers, ADLs and mobility.  Upon evaluation: Pt currently requires supervision for UB ADLs, modA for LB ADLs, modA for toileting, SHAQ for bed mobility, modA for functional transfers, and modA  RW mobility 2* the following deficits impacting occupational performance:weakness, decreased strength , decreased balance, and decreased activity tolerance. VITALS during session: 91/44. Pt to benefit from continued skilled OT tx while in the hospital to address deficits as defined above and maximize level of functional independence w ADL's and functional mobility. Occupational Performance areas to address include: grooming, bathing/shower, toilet hygiene, functional mobility, and clothing management. From OT standpoint, recommendation at time of d/c would be level 2 resources . OT to follow pt on caseload 3-5x/wk.     Rehab Resource Intensity Level, OT: II (Moderate Resource Intensity)

## 2025-02-06 NOTE — PLAN OF CARE
Problem: PHYSICAL THERAPY ADULT  Goal: Performs mobility at highest level of function for planned discharge setting.  See evaluation for individualized goals.  Description: Treatment/Interventions: Functional transfer training, LE strengthening/ROM, Therapeutic exercise, Endurance training, Cognitive reorientation, Patient/family training, Equipment eval/education, Bed mobility, Gait training, Compensatory technique education, Continued evaluation, OT          See flowsheet documentation for full assessment, interventions and recommendations.  Note: Prognosis: Guarded  Problem List: Decreased strength, Decreased endurance, Impaired balance, Decreased mobility, Decreased cognition, Decreased skin integrity  Assessment: Sandra Purvis is a 89 y.o. female admitted to [facility] on 2/3/2025 for Food impaction of esophagus. PT was consulted and pt was seen on 2/6/2025 for mobility assessment and d/c planning. Pt presents w fall risk, previous admissions (acute<>rehab). Unreliable historian however per patient and chart review, she is indep for ADLs and ambulation without an AD at baseline. She is currently functioning at a mod A to transfer and take steps. Pt demonstrated limited activity tolerance secondary to symptomatic hypotension as well as generalized deconditioning. With encouragement agreeable to try to walk. Require chair follow during ambulation for safety given prev described sx. Pt will benefit from continued skilled IP PT to address the above mentioned impairments  in order to maximize recovery and increase functional independence when completing mobility and ADLs.  Barriers to Discharge: Inaccessible home environment, Decreased caregiver support     Rehab Resource Intensity Level, PT: II (Moderate Resource Intensity)    See flowsheet documentation for full assessment.

## 2025-02-06 NOTE — OCCUPATIONAL THERAPY NOTE
Occupational Therapy Evaluation     Patient Name: Sandra Purvis  Today's Date: 2/6/2025  Problem List  Principal Problem:    Food impaction of esophagus  Active Problems:    HTN (hypertension)    UTI (urinary tract infection)    Atrial fibrillation (HCC)    GERD (gastroesophageal reflux disease)    Primary insomnia    Orthostatic hypotension    Esophageal dysphagia    Acute hypoxic respiratory failure (HCC)    Severe protein-calorie malnutrition (HCC)    Past Medical History  Past Medical History:   Diagnosis Date    Arthritis     Cataract     LastAssessed:9/8/15    Disturbance of salivary secretion 02/03/2025    Esophageal dysphagia 02/03/2025    GERD (gastroesophageal reflux disease)     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    Hypertension     PAF (paroxysmal atrial fibrillation) (HCC)     Last Assessed:2/3/16    Wrist fracture     Resolved:9/8/15     Past Surgical History  Past Surgical History:   Procedure Laterality Date    CATARACT EXTRACTION      Last Assessed:9/8/15    FL RETROGRADE PYELOGRAM  10/17/2021    LYMPHADENECTOMY      Last Assessed:9/8/15    MN CYSTO/URETERO W/LITHOTRIPSY &INDWELL STENT INSRT Right 11/24/2021    Procedure: CYSTOSCOPY URETEROSCOPY WITH LITHOTRIPSY HOLMIUM LASER, RETROGRADE PYELOGRAM AND INSERTION STENT URETERAL;  Surgeon: David Sr MD;  Location: [facility];  Service: Urology    MN CYSTOURETHROSCOPY W/URETERAL CATHETERIZATION Right 10/17/2021    Procedure: CYSTOSCOPY RETROGRADE PYELOGRAM WITH INSERTION STENT URETERAL;  Surgeon: Benjie Tinoco MD;  Location: [facility];  Service: Urology    TONSILLECTOMY      Last Assessed:9/8/15    WRIST SURGERY      Last Assessed:9/8/15         02/06/25 0927   OT Last Visit   OT Visit Date 02/06/25   Note Type   Note type Evaluation   Additional Comments greeted in supine and agreeable.   Pain Assessment   Pain Assessment Tool 0-10   Pain Score No Pain   Restrictions/Precautions   Weight Bearing Precautions Per Order No    Other Precautions Cognitive;Chair Alarm;Bed Alarm;Fall Risk   Home Living   Type of Home House   Home Layout Two level;Laundry in basement;Able to live on main level with bedroom/bathroom   Bathroom Shower/Tub Tub/shower unit   Bathroom Toilet Standard   Bathroom Equipment Grab bars in shower;Shower chair;Grab bars around toilet   Home Equipment Walker;Cane   Prior Function   Level of [place] Independent with ADLs;Independent with functional mobility;Needs assistance with IADLS   Lives With Spouse   Receives Help From Family;Friend(s);Neighbor   IADLs Family/Friend/Other provides transportation   Falls in the last 6 months 1 to 4   Vocational Retired   ADL   Where Assessed Edge of bed   Eating Assistance 5  Supervision/Setup   Grooming Assistance 5  Supervision/Setup   UB Bathing Assistance 5  Supervision/Setup   LB Bathing Assistance 3  Moderate Assistance   UB Dressing Assistance 4  Minimal Assistance   LB Dressing Assistance 3  Moderate Assistance   Toileting Assistance  3  Moderate Assistance   Bed Mobility   Supine to Sit Unable to assess   Transfers   Sit to Stand 3  Moderate assistance   Additional items Assist x 1;Increased time required;Verbal cues   Stand to Sit 4  Minimal assistance   Additional items Assist x 1;Increased time required;Verbal cues   Functional Mobility   Functional Mobility 3  Moderate assistance   Additional Comments Ax1, RW. mobility limited by orthostatic hypotension   Additional items Rolling walker   Balance   Static Sitting Fair +   Dynamic Sitting Fair   Static Standing Poor +   Dynamic Standing Poor   Ambulatory Poor   Activity Tolerance   Activity Tolerance Treatment limited secondary to medical complications (Comment)   Medical Staff Made Aware CLIFFORD Wang   Nurse Made Aware RN   RUE Assessment   RUE Assessment WFL   LUE Assessment   LUE Assessment WFL   Hand Function   Gross Motor Coordination Functional   Fine Motor Coordination Functional   Psychosocial   Psychosocial  (WDL) WDL   Cognition   Overall Cognitive Status Impaired   Arousal/Participation Cooperative   Attention Attends with cues to redirect   Orientation Level Oriented to person;Disoriented to place;Disoriented to time;Disoriented to situation   Memory Decreased short term memory;Decreased recall of recent events;Decreased recall of precautions   Following Commands Follows one step commands with increased time or repetition   Assessment   Limitation Decreased ADL status;Decreased UE ROM;Decreased UE strength;Decreased Safe judgement during ADL;Decreased endurance;Decreased self-care trans;Decreased high-level ADLs   Prognosis Good   Assessment Sandra Purvis is a 89 y.o. female seen for OT evaluation s/p admit to [facility] on 2/3/2025 w/ Food impaction of esophagus. Admitted to [facility] ED on 1/4/25 for fall w subsequent L SDH, SAH and transf to [facility]. hospitalized at [facility] from 1/4-12 then transf to [facility]. Readmitted to [facility] from 1/14-17 for afib w RVR. [facility]ed [facility] 1/17-2/3 then readmitted 2/3 for chest pain, plans for EGD and disimpaction. Extubated 2/4.  Comorbidities affecting pt's functional performance at time of assessment include: HTN. Pt with active OT orders and activity orders for Up and OOB as tolerated. Personal factors affecting pt at time of IE include:ROSA home environment, steps within home environment, difficulty performing ADLs, difficulty performing IADLs, and difficulty performing transfers/mobility. Prior to admission, pt lives with spouse in a 2 level home with 1 ROSA. I with ADLS, IADLs and mobility. 1 fall. Pt has been in / out of hospital and acute rehab since 1/2/25 requiring A for all tranfers, ADLs and mobility.  Upon evaluation: Pt currently requires supervision for UB ADLs, modA for LB ADLs, modA for toileting, SHAQ for bed mobility, modA for functional transfers, and modA RW mobility 2* the following deficits impacting occupational performance:weakness, decreased strength , decreased balance, and  decreased activity tolerance. VITALS during session: 91/44. Pt to benefit from continued skilled OT tx while in the hospital to address deficits as defined above and maximize level of functional independence w ADL's and functional mobility. Occupational Performance areas to address include: grooming, bathing/shower, toilet hygiene, functional mobility, and clothing management. From OT standpoint, recommendation at time of d/c would be level 2 resources . OT to follow pt on caseload 3-5x/wk.   Goals   STG Time Frame 3-5   Short Term Goal #1 Pt will improve activity tolerance to G for min 30 min txment sessions for increase engagement in functional tasks   Short Term Goal #2 Pt will complete bed mobility at a Mod I level w/ G balance/safety demonstrated to decrease caregiver assistance required   Short Term Goal  Pt will complete LB dressing/self care w/ mod I using adaptive device and DME as needed   LTG Time Frame 10-14   Long Term Goal #1 Pt will complete toileting w/ mod I w/ G hygiene/thoroughness using DME as needed   Long Term Goal #2 Pt will improve functional transfers to Mod I on/off all surfaces using DME as needed w/ G balance/safety   Long Term Goal Pt will improve functional mobility during ADL/IADL/leisure tasks to Mod I using DME as needed w/ G balance/safety   Plan   Treatment Interventions ADL retraining;UE strengthening/ROM;Functional transfer training;Endurance training;Neuromuscular reeducation;Equipment evaluation/education;Patient/family training;Compensatory technique education;Energy conservation;Activityengagement   Goal Expiration Date 02/20/25   OT Treatment Day 0   OT Frequency 3-5x/wk   Discharge Recommendation   Rehab Resource Intensity Level, OT II (Moderate Resource Intensity)   AM-PAC Daily Activity Inpatient   Lower Body Dressing 2   Bathing 2   Toileting 2   Upper Body Dressing 3   Grooming 3   Eating 3   Daily Activity Raw Score 15   Daily Activity Standardized Score (Calc for Raw  Score >=11) 34.69   AM-PAC Applied Cognition Inpatient   Following a Speech/Presentation 4   Understanding Ordinary Conversation 3   Taking Medications 2   Remembering Where Things Are Placed or Put Away 2   Remembering List of 4-5 Errands 2   Taking Care of Complicated Tasks 2   Applied Cognition Raw Score 15   Applied Cognition Standardized Score 33.54   Katie Pickard, OT

## 2025-02-06 NOTE — ASSESSMENT & PLAN NOTE
Status post unsuccessful disimpaction at [facility] campus 2/3, transferred to Psychiatric yesterday for upper endoscopy with successful esophageal clearance. Incomplete EGD, duodenum not visualized. Patient extubated yesterday, transferred to Med Surg.   Pathology pending, biopsies from esophagus were taken to exclude candidiasis versus adherent food to the esophageal wall  Liquid diet currently. ?advancement. Would add nutritional supplements  Continue acid suppression. Discontinue Reglan in this elderly patient. Keep HOB elevated/upright with meals.   Suspect dysphagia represents achalasia and workup can be done if patient is interested  Appreciate speech pathology evaluation.  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  I have sent a message back to the office to provide outpatient follow-up to see how she is doing with diet manipulation.

## 2025-02-06 NOTE — [facility] ADMISSION
Referral received for consideration of patient for inpatient acute rehab.    Reviewed patient's case with [facility] physician - patient had been arranged for discharge to [facility] for subacute rehab services following [facility] stay prior to patient's transfer back to acute hospital. Patient is recommended for the same at this time. CM has been updated.

## 2025-02-06 NOTE — PLAN OF CARE
Problem: Prexisting or High Potential for Compromised Skin Integrity  Goal: Skin integrity is maintained or improved  Description: INTERVENTIONS:  - Identify patients at risk for skin breakdown  - Assess and monitor skin integrity  - Assess and monitor nutrition and hydration status  - Monitor labs   - Assess for incontinence   - Turn and reposition patient  - Assist with mobility/ambulation  - Relieve pressure over bony prominences  - Avoid friction and shearing  - Provide appropriate hygiene as needed including keeping skin clean and dry  - Evaluate need for skin moisturizer/barrier cream  - Collaborate with interdisciplinary team   - Patient/family teaching  - Consider wound care consult   Outcome: Progressing     Problem: PAIN - ADULT  Goal: Verbalizes/displays adequate comfort level or baseline comfort level  Description: Interventions:  - Encourage patient to monitor pain and request assistance  - Assess pain using appropriate pain scale  - Administer analgesics based on type and severity of pain and evaluate response  - Implement non-pharmacological measures as appropriate and evaluate response  - Consider cultural and social influences on pain and pain management  - Notify physician/advanced practitioner if interventions unsuccessful or patient reports new pain  Outcome: Progressing     Problem: INFECTION - ADULT  Goal: Absence or prevention of progression during hospitalization  Description: INTERVENTIONS:  - Assess and monitor for signs and symptoms of infection  - Monitor lab/diagnostic results  - Monitor all insertion sites, i.e. indwelling lines, tubes, and drains  - Monitor endotracheal if appropriate and nasal secretions for changes in amount and color  - [place] appropriate cooling/warming therapies per order  - Administer medications as ordered  - Instruct and encourage patient and family to use good hand hygiene technique  - Identify and instruct in appropriate isolation precautions for  identified infection/condition  Outcome: Progressing  Goal: Maintain or return to baseline ADL function  Description: INTERVENTIONS:  -  Assess patient's ability to carry out ADLs; assess patient's baseline for ADL function and identify physical deficits which impact ability to perform ADLs (bathing, care of mouth/teeth, toileting, grooming, dressing, etc.)  - Assess/evaluate cause of self-care deficits   - Assess range of motion  - Assess patient's mobility; develop plan if impaired  - Assess patient's need for assistive devices and provide as appropriate  - Encourage maximum independence but intervene and supervise when necessary  - Involve family in performance of ADLs  - Assess for home care needs following discharge   - Consider OT consult to assist with ADL evaluation and planning for discharge  - Provide patient education as appropriate  Outcome: Progressing  Problem: DISCHARGE PLANNING  Goal: Discharge to home or other facility with appropriate resources  Description: INTERVENTIONS:  - Identify barriers to discharge w/patient and caregiver  - Arrange for needed discharge resources and transportation as appropriate  - Identify discharge learning needs (meds, wound care, etc.)  - Arrange for interpretive services to assist at discharge as needed  - Refer to Case Management Department for coordinating discharge planning if the patient needs post-hospital services based on physician/advanced practitioner order or complex needs related to functional status, cognitive ability, or social support system  Outcome: Progressing     Problem: Knowledge Deficit  Goal: Patient/family/caregiver demonstrates understanding of disease process, treatment plan, medications, and discharge instructions  Description: Complete learning assessment and assess knowledge base.  Interventions:  - Provide teaching at level of understanding  - Provide teaching via preferred learning methods  Outcome: Progressing     Problem: CARDIOVASCULAR  - ADULT  Goal: Maintains optimal cardiac output and hemodynamic stability  Description: INTERVENTIONS:  - Monitor I/O, vital signs and rhythm  - Monitor for S/S and trends of decreased cardiac output  - Administer and titrate ordered vasoactive medications to optimize hemodynamic stability  - Assess quality of pulses, skin color and temperature  - Assess for signs of decreased coronary artery perfusion  - Instruct patient to report change in severity of symptoms  Outcome: Progressing  Goal: Absence of cardiac dysrhythmias or at baseline rhythm  Description: INTERVENTIONS:  - Continuous cardiac monitoring, vital signs, obtain 12 lead EKG if ordered  - Administer antiarrhythmic and heart rate control medications as ordered  - Monitor electrolytes and administer replacement therapy as ordered  Outcome: Progressing     Problem: RESPIRATORY - ADULT  Goal: Achieves optimal ventilation and oxygenation  Description: INTERVENTIONS:  - Assess for changes in respiratory status  - Assess for changes in mentation and behavior  - Position to facilitate oxygenation and minimize respiratory effort  - Oxygen administered by appropriate delivery if ordered  - Initiate smoking cessation education as indicated  - Encourage broncho-pulmonary hygiene including cough, deep breathe, Incentive Spirometry  - Assess the need for suctioning and aspirate as needed  - Assess and instruct to report SOB or any respiratory difficulty  - Respiratory Therapy support as indicated  Outcome: Progressing     Problem: GASTROINTESTINAL - ADULT  Goal: Minimal or absence of nausea and/or vomiting  Description: INTERVENTIONS:  - Administer IV fluids if ordered to ensure adequate hydration  - Maintain NPO status until nausea and vomiting are resolved  - Nasogastric tube if ordered  - Administer ordered antiemetic medications as needed  - Provide nonpharmacologic comfort measures as appropriate  - Advance diet as tolerated, if ordered  - Consider nutrition  services referral to assist patient with adequate nutrition and appropriate food choices  Outcome: Progressing  Goal: Maintains or returns to baseline bowel function  Description: INTERVENTIONS:  - Assess bowel function  - Encourage oral fluids to ensure adequate hydration  - Administer IV fluids if ordered to ensure adequate hydration  - Administer ordered medications as needed  - Encourage mobilization and activity  - Consider nutritional services referral to assist patient with adequate nutrition and appropriate food choices  Outcome: Progressing  Goal: Maintains adequate nutritional intake  Description: INTERVENTIONS:  - Monitor percentage of each meal consumed  - Identify factors contributing to decreased intake, treat as appropriate  - Assist with meals as needed  - Monitor I&O, weight, and lab values if indicated  - Obtain nutrition services referral as needed  Outcome: Progressing  Goal: Oral mucous membranes remain intact  Description: INTERVENTIONS  - Assess oral mucosa and hygiene practices  - Implement preventative oral hygiene regimen  - Implement oral medicated treatments as ordered  - Initiate Nutrition services referral as needed  Outcome: Progressing     Problem: GENITOURINARY - ADULT  Goal: Maintains or returns to baseline urinary function  Description: INTERVENTIONS:  - Assess urinary function  - Encourage oral fluids to ensure adequate hydration if ordered  - Administer IV fluids as ordered to ensure adequate hydration  - Administer ordered medications as needed  - Offer frequent toileting  - Follow urinary retention protocol if ordered  Outcome: Progressing  Goal: Absence of urinary retention  Description: INTERVENTIONS:  - Assess patient’s ability to void and empty bladder  - Monitor I/O  - Bladder scan as needed  - Discuss with physician/AP medications to alleviate retention as needed  - Discuss catheterization for long term situations as appropriate  Outcome: Progressing     Problem:  METABOLIC, FLUID AND ELECTROLYTES - ADULT  Goal: Electrolytes maintained within normal limits  Description: INTERVENTIONS:  - Monitor labs and assess patient for signs and symptoms of electrolyte imbalances  - Administer electrolyte replacement as ordered  - Monitor response to electrolyte replacements, including repeat lab results as appropriate  - Instruct patient on fluid and nutrition as appropriate  Outcome: Progressing  Goal: Fluid balance maintained  Description: INTERVENTIONS:  - Monitor labs   - Monitor I/O and WT  - Instruct patient on fluid and nutrition as appropriate  - Assess for signs & symptoms of volume excess or deficit  Outcome: Progressing     Problem: HEMATOLOGIC - ADULT  Goal: Maintains hematologic stability  Description: INTERVENTIONS  - Assess for signs and symptoms of bleeding or hemorrhage  - Monitor labs  - Administer supportive blood products/factors as ordered and appropriate  Outcome: Progressing     Problem: MUSCULOSKELETAL - ADULT  Goal: Maintain or return mobility to safest level of function  Description: INTERVENTIONS:  - Assess patient's ability to carry out ADLs; assess patient's baseline for ADL function and identify physical deficits which impact ability to perform ADLs (bathing, care of mouth/teeth, toileting, grooming, dressing, etc.)  - Assess/evaluate cause of self-care deficits   - Assess range of motion  - Assess patient's mobility  - Assess patient's need for assistive devices and provide as appropriate  - Encourage maximum independence but intervene and supervise when necessary  - Involve family in performance of ADLs  - Assess for home care needs following discharge   - Consider OT consult to assist with ADL evaluation and planning for discharge  - Provide patient education as appropriate  Outcome: Progressing  Goal: Maintain proper alignment of affected body part  Description: INTERVENTIONS:  - Support, maintain and protect limb and body alignment  - Provide patient/  family with appropriate education  Outcome: Progressing     Problem: Nutrition/Hydration-ADULT  Goal: Nutrient/Hydration intake appropriate for improving, restoring or maintaining nutritional needs  Description: Monitor and assess patient's nutrition/hydration status for malnutrition. Collaborate with interdisciplinary team and initiate plan and interventions as ordered.  Monitor patient's weight and dietary intake as ordered or per policy. Utilize nutrition screening tool and intervene as necessary. Determine patient's food preferences and provide high-protein, high-caloric foods as appropriate.     INTERVENTIONS:  - Monitor oral intake, urinary output, labs, and treatment plans  - Assess nutrition and hydration status and recommend course of action  - Evaluate amount of meals eaten  - Assist patient with eating if necessary   - Allow adequate time for meals  - Recommend/ encourage appropriate diets, oral nutritional supplements, and vitamin/mineral supplements  - Order, calculate, and assess calorie counts as needed  - Recommend, monitor, and adjust tube feedings and TPN/PPN based on assessed needs  - Assess need for intravenous fluids  - Provide specific nutrition/hydration education as appropriate  - Include patient/family/caregiver in decisions related to nutrition  Outcome: Progressing

## 2025-02-06 NOTE — ASSESSMENT & PLAN NOTE
Malnutrition Findings:   Adult Malnutrition type: Chronic illness  Adult Degree of Malnutrition: Other severe protein calorie malnutrition  Malnutrition Characteristics: Fat loss, Muscle loss, Inadequate energy, Weight loss                  360 Statement: Severe calorie protein malnutrition in context of chronic illness r/t inadequate PO intake, swallowing difficulty as evidenced by severe body fat (triceps, ribcage area) and muscle mass depletions (temporal wasting, protruding clavicles),  10% wt loss x 1 month; Treated with PO diet, oral supplements    BMI Findings:  Adult BMI Classifications: Underweight < 18.5        Body mass index is 15.26 kg/m².

## 2025-02-06 NOTE — PROGRESS NOTES
Progress Note - Gastroenterology   Name: Sandra Purvis 89 y.o. female I MRN: [id number]  Unit/Bed#: [place] [address] -02 I Date of Admission: 2/3/2025   Date of Service: 2/6/2025 I Hospital Day: 3     Assessment & Plan  Food impaction of esophagus  Status post unsuccessful disimpaction at hospitals campus 2/3, transferred to [facility] yesterday for upper endoscopy with successful esophageal clearance. Incomplete EGD, duodenum not visualized. Patient extubated yesterday, transferred to Med Surg.   Pathology pending, biopsies from esophagus were taken to exclude candidiasis versus adherent food to the esophageal wall  Liquid diet currently. ?advancement. Would add nutritional supplements  Continue acid suppression. Discontinue Reglan in this elderly patient. Keep HOB elevated/upright with meals.   Suspect dysphagia represents achalasia and workup can be done if patient is interested  Appreciate speech pathology evaluation.  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  I have sent a message back to the office to provide outpatient follow-up to see how she is doing with diet manipulation.  Atrial fibrillation (HCC)  AC currently on hold  Esophageal dysphagia  See food impaction of esophagus above      Subjective   Patient overall is feeling a little better.  She is sitting in the bedside chair.  Again we talked about dietary manipulation and also concern for delayed esophageal clearance.    Vitals  Blood pressure (!) 91/44, pulse 79, temperature 98 °F (36.7 °C), temperature source Oral, resp. rate 20, weight 44.2 kg (97 lb 7.1 oz), SpO2 96%, not currently breastfeeding.  Sitting in bedside chair.  No distress.  Pleasant.  Cooperative.  Breathing is nonlabored.  Abdomen is flat without distention.  She is elderly and frail in appearance    Laboratory Studies  Results from last 7 days   Lab Units 02/06/25  0446 02/05/25  0515 02/04/25  0509 02/03/25 2017 02/03/25  0224   WBC Thousand/uL 10.93* 12.88* 12.60* 15.49* 15.14*    HEMOGLOBIN g/dL 9.5* 10.2* 10.3* 11.7 11.3*   HEMATOCRIT % 29.2* 32.8* 31.7* 37.1 34.7*   PLATELETS Thousands/uL 210 180 185 191 202     Results from last 7 days   Lab Units 02/06/25  0446 02/04/25  0509 02/03/25 2017 02/03/25  0224   SODIUM mmol/L 140 139 141 139   POTASSIUM mmol/L 3.0* 3.7 4.6 4.1   CHLORIDE mmol/L 105 102 107 103   CO2 mmol/L 29 23 25 25   BUN mg/dL 23 19 20 23   CREATININE mg/dL 0.90 0.71 0.96 0.96   CALCIUM mg/dL 9.1 8.1* 9.9 10.2   ALBUMIN g/dL 2.9* 3.3*  --  3.9   TOTAL BILIRUBIN mg/dL 0.40 0.50  --  0.50   ALK PHOS U/L 65 70  --  93   ALT U/L 25 26  --  20   AST U/L 16 19  --  16         Imaging and Other Studies      Inhouse Medications     Current Facility-Administered Medications:     bisacodyl (DULCOLAX) rectal suppository 10 mg, 10 mg, Rectal, Daily PRN    chlorhexidine (PERIDEX) 0.12 % oral rinse 15 mL, 15 mL, Mouth/Throat, Q12H ELIZABETH, 15 mL at 02/06/25 0834    diltiazem (CARDIZEM) tablet 15 mg, 15 mg, Oral, Q6H ELIZABETH, 15 mg at 02/06/25 0619    heparin (porcine) subcutaneous injection 5,000 Units, 5,000 Units, Subcutaneous, Q8H ELIZABETH, 5,000 Units at 02/06/25 0617 **AND** [CANCELED] Platelet count, , , Once    hydrALAZINE (APRESOLINE) injection 5 mg, 5 mg, Intravenous, Q6H PRN, 5 mg at 02/04/25 0531    melatonin tablet 3 mg, 3 mg, Oral, HS, 3 mg at 02/05/25 2134    metoprolol succinate (TOPROL-XL) 24 hr tablet 12.5 mg, 12.5 mg, Oral, Q12H ELIZABETH, 12.5 mg at 02/06/25 0833    midodrine (PROAMATINE) tablet 5 mg, 5 mg, Oral, TID AC, 5 mg at 02/06/25 0617    mirtazapine (REMERON) tablet 7.5 mg, 7.5 mg, Oral, HS, 7.5 mg at 02/05/25 2144    pantoprazole (PROTONIX) injection 40 mg, 40 mg, Intravenous, Q12H ELIZABETH, 40 mg at 02/06/25 0734    Brian Vasquez MD  [facility]

## 2025-02-06 NOTE — PHYSICAL THERAPY NOTE
PHYSICAL THERAPY EVALUATION          Patient Name: Sandra Purvis  Today's Date: 2/6/2025 02/06/25 0942   PT Last Visit   PT Visit Date 02/06/25   Note Type   Note type Evaluation   Pain Assessment   Pain Assessment Tool 0-10   Pain Score No Pain   Restrictions/Precautions   Other Precautions Cognitive;Chair Alarm;Bed Alarm;Fall Risk   Home Living   Type of Home House   Home Layout Two level;Laundry in basement;Able to live on main level with bedroom/bathroom   Bathroom Shower/Tub Tub/shower unit   Bathroom Toilet Standard   Bathroom Equipment Grab bars in shower;Shower chair;Grab bars around toilet   Home Equipment Walker;Cane   Additional Comments 1 ROSA.   Prior Function   Level of [place] Independent with ADLs;Independent with functional mobility;Independent with IADLS   Lives With Spouse   IADLs Independent with meal prep;Independent with medication management;Family/Friend/Other provides transportation   Falls in the last 6 months 1 to 4  (syncopal episode related to initial admission)   Vocational Retired   Comments pt reports indep at baseline without an AD. spouse uses walker.   General   Additional Pertinent History pt admitted 2/3/25 for food impaction of esophagus. initially present to [facility] ED on 1/4/25 for fall w subsequent L SDH, SAH and transf to [facility]. hospitalized at [facility] from 1/4-12 then transf to [facility]. Readmitted to [facility] from 1/14-17 for afib w RVR. Returned to [facility] 1/17-2/3 then readmitted 2/3 for chest pain, plans for EGD and disimpaction. Extubated 2/4.   Cognition   Overall Cognitive Status Impaired   Arousal/Participation Cooperative   Attention Attends with cues to redirect   Orientation Level Oriented to person;Disoriented to place;Disoriented to time;Disoriented to situation   Memory Decreased short term memory;Decreased recall of recent events   Following Commands Follows one step commands with increased time or repetition   Comments noted pt A&Ox4 on PT IE 1/6/25    Transfers   Sit to Stand 3  Moderate assistance   Additional items Assist x 1;Armrests;Increased time required;Other  (RW)   Stand to Sit   (min-mod)   Additional items Assist x 1;Armrests;Increased time required;Verbal cues;Other  (RW)   Additional Comments improved controlled descent w use of armrests (min A) compared to reliance on RW to sit (mod A)   Ambulation/Elevation   Gait pattern Improper Weight shift;Decreased foot clearance;Narrow DANIEL;Short stride;Excessively slow;Poor UE support;Retropulsion   Gait Assistance 3  Moderate assist   Additional items Assist x 1;Assist x 2;Verbal cues  (cues to promote fwd momentum into walker and take bigger steps. tactile input to promote fwd wt shift and manage RW)   Assistive Device Rolling walker   Distance 2' w chair follow   Balance   Static Standing Poor +   Dynamic Standing Poor   Ambulatory Poor   Endurance Deficit   Endurance Deficit Yes   Endurance Deficit Description complaints of lightheadedness/dizziness w standing. BP following initial stand 91/44   Activity Tolerance   Activity Tolerance Treatment limited secondary to medical complications (Comment)  (symptomatic hypotension, generalized deconditioning)   Medical Staff Made Aware [facility] OT   Nurse Made Aware yes   Assessment   Prognosis Guarded   Problem List Decreased strength;Decreased endurance;Impaired balance;Decreased mobility;Decreased cognition;Decreased skin integrity   Assessment Sandra Purvis is a 89 y.o. female admitted to [facility] on 2/3/2025 for Food impaction of esophagus. PT was consulted and pt was seen on 2/6/2025 for mobility assessment and d/c planning. Pt presents w fall risk, previous admissions (acute<>rehab). Unreliable historian however per patient and chart review, she is indep for ADLs and ambulation without an AD at baseline. She is currently functioning at a mod A to transfer and take steps. Pt demonstrated limited activity tolerance secondary to symptomatic hypotension as well  as generalized deconditioning. With encouragement agreeable to try to walk. Require chair follow during ambulation for safety given prev described sx. Pt will benefit from continued skilled IP PT to address the above mentioned impairments  in order to maximize recovery and increase functional independence when completing mobility and ADLs.   Barriers to Discharge Inaccessible home environment;Decreased caregiver support   Goals   Patient Goals rest   STG Expiration Date 02/20/25   Short Term Goal #1 1) Pt will perform bed mobility S demonstrating appropriate technique 100% of the time in order to improve function. 2) Pt will perform all transfers S demonstrating safe technique 100% of the time in order to improve ability to negotiate safely in home environment. 3) Amb with least restrictive AD > 10'x1 with min A in order to demonstrate ability to negotiate in home environment. 4) Improve overall strength and balance 1/2 grade in order to optimize ability to perform functional tasks and reduce fall risk. 5) Improve am-pac by 2 to demonstrate functional progress and return to baseline function/reduced caregiver burden. 6) Improve activity tolerance to 25 min.   Plan   Treatment/Interventions Functional transfer training;LE strengthening/ROM;Therapeutic exercise;Endurance training;Cognitive reorientation;Patient/family training;Equipment eval/education;Bed mobility;Gait training;Compensatory technique education;Continued evaluation;OT   PT Frequency Other (Comment)  (3-4x)   Discharge Recommendation   Rehab Resource Intensity Level, PT II (Moderate Resource Intensity)   AM-PAC Basic Mobility Inpatient   Turning in Flat Bed Without Bedrails 3   Lying on Back to Sitting on Edge of Flat Bed Without Bedrails 2   Moving Bed to Chair 2   Standing Up From Chair Using Arms 2   Walk in Room 2   Climb 3-5 Stairs With Railing 1   Basic Mobility Inpatient Raw Score 12   Basic Mobility Standardized Score 32.23   [facility]  Highest Level Of Mobility   -[facility] Goal 4: Move to chair/commode   -HLM Achieved 4: Move to chair/commode   End of Consult   Patient Position at End of Consult Bedside chair;Bed/Chair alarm activated;All needs within reach   History: co - morbidities including age, social background, past experience, recent assist ADLs and mobility (since hospitalizations), cognition, current experience including fall risk  Exam: impairments in systems including multiple body structures involved; neuromuscular (balance, gait, transfers), cardiopulmonary (vitals), cognition; activity limitations  (difficulties executing an action); participation restrictions (problems associated w involvement in life situations), AM-PAC  Clinical: unstable/unpredictable  Complexity:high    Kathleen Zamora, PT

## 2025-02-06 NOTE — PROGRESS NOTES
Progress Note - Hospitalist   Name: Sandra Purvis 89 y.o. female I MRN: [id number]  Unit/Bed#: [place] [address] -02 I Date of Admission: 2/3/2025   Date of Service: 2/6/2025 I Hospital Day: 3    Assessment & Plan  Food impaction of esophagus    HTN (hypertension)    UTI (urinary tract infection)  Patient received 3 days of ceftriaxone, will monitor off antibiotics  Atrial fibrillation (HCC)  Continue beta-blocker and Cardizem, holding anticoagulation in light of recent history of bleed  GERD (gastroesophageal reflux disease)  Continue protonix  Primary insomnia  Will resume home melatonin and remeron  Orthostatic hypotension  Continue midodrine 5 mg 3 times daily  Esophageal dysphagia  Patient with history of esophageal seizure presented with food impaction-underwent endoscopy with GI  Acute hypoxic respiratory failure (HCC)  Resolved  Severe protein-calorie malnutrition (HCC)  Malnutrition Findings:   Adult Malnutrition type: Chronic illness  Adult Degree of Malnutrition: Other severe protein calorie malnutrition  Malnutrition Characteristics: Fat loss, Muscle loss, Inadequate energy, Weight loss                  360 Statement: Severe calorie protein malnutrition in context of chronic illness r/t inadequate PO intake, swallowing difficulty as evidenced by severe body fat (triceps, ribcage area) and muscle mass depletions (temporal wasting, protruding clavicles),  10% wt loss x 1 month; Treated with PO diet, oral supplements    BMI Findings:  Adult BMI Classifications: Underweight < 18.5        Body mass index is 15.26 kg/m².       VTE Pharmacologic Prophylaxis:   High Risk (Score >/= 5) - Pharmacological DVT Prophylaxis Ordered: heparin. Sequential Compression Devices Ordered.    Mobility:   Basic Mobility Inpatient Raw Score: 12  JH-HLM Goal: 4: Move to chair/commode  JH-HLM Achieved: 4: Move to chair/commode  JH-HLM Goal achieved. Continue to encourage appropriate mobility.    Patient Centered Rounds: I  performed bedside rounds with nursing staff today.   Discussions with Specialists or Other Care Team Provider:     Education and Discussions with Family / Patient: Updated  (daughter) via phone.    Current Length of Stay: 3 day(s)  Current Patient Status: Inpatient   Certification Statement: The patient will continue to require additional inpatient hospital stay due to rehab plaecment  Discharge Plan: Anticipate discharge tomorrow to rehab facility.    Code Status: Level 2 - DNAR: but accepts endotracheal intubation    Subjective   Patient is alert and oriented denies any acute complaints    Objective :  Temp:  [97.8 °F (36.6 °C)-98 °F (36.7 °C)] 98 °F (36.7 °C)  HR:  [] 79  BP: ()/(44-74) 91/44  Resp:  [18-20] 20  SpO2:  [94 %-96 %] 96 %  O2 Device: None (Room air)    Body mass index is 15.26 kg/m².     Input and Output Summary (last 24 hours):     Intake/Output Summary (Last 24 hours) at 2/6/2025 1227  Last data filed at 2/6/2025 0936  Gross per 24 hour   Intake 840 ml   Output --   Net 840 ml       Physical Exam  Vitals and nursing note reviewed.   Constitutional:       General: She is not in acute distress.     Appearance: She is well-developed. She is not toxic-appearing or diaphoretic.   HENT:      Head: Normocephalic and atraumatic.   Eyes:      General: No scleral icterus.     Conjunctiva/sclera: Conjunctivae normal.   Cardiovascular:      Rate and Rhythm: Normal rate and regular rhythm.      Heart sounds: No murmur heard.     No friction rub. No gallop.   Pulmonary:      Effort: Pulmonary effort is normal. No respiratory distress.      Breath sounds: Normal breath sounds. No stridor. No wheezing, rhonchi or rales.   Chest:      Chest wall: No tenderness.   Abdominal:      General: There is no distension.      Palpations: Abdomen is soft. There is no mass.      Tenderness: There is no abdominal tenderness. There is no guarding or rebound.      Hernia: No hernia is present.    Musculoskeletal:         General: No swelling or tenderness.      Cervical back: Neck supple.   Skin:     General: Skin is warm and dry.      Capillary Refill: Capillary refill takes less than 2 seconds.   Neurological:      Mental Status: She is alert.   Psychiatric:         Mood and Affect: Mood normal.           Lines/Drains:              Lab Results: I have reviewed the following results:   Results from last 7 days   Lab Units 02/06/25  0446   WBC Thousand/uL 10.93*   HEMOGLOBIN g/dL 9.5*   HEMATOCRIT % 29.2*   PLATELETS Thousands/uL 210   SEGS PCT % 81*   LYMPHO PCT % 10*   MONO PCT % 7   EOS PCT % 2     Results from last 7 days   Lab Units 02/06/25  0446   SODIUM mmol/L 140   POTASSIUM mmol/L 3.0*   CHLORIDE mmol/L 105   CO2 mmol/L 29   BUN mg/dL 23   CREATININE mg/dL 0.90   ANION GAP mmol/L 6   CALCIUM mg/dL 9.1   ALBUMIN g/dL 2.9*   TOTAL BILIRUBIN mg/dL 0.40   ALK PHOS U/L 65   ALT U/L 25   AST U/L 16   GLUCOSE RANDOM mg/dL 102         Results from last 7 days   Lab Units 02/03/25  0023 02/02/25  1237   POC GLUCOSE mg/dl 139 106               Recent Cultures (last 7 days):   Results from last 7 days   Lab Units 02/03/25  1028   URINE CULTURE  >100,000 cfu/ml Pseudomonas aeruginosa*       Imaging Results Review: No pertinent imaging studies reviewed.  Other Study Results Review: No additional pertinent studies reviewed.    Last 24 Hours Medication List:     Current Facility-Administered Medications:     bisacodyl (DULCOLAX) rectal suppository 10 mg, Daily PRN    chlorhexidine (PERIDEX) 0.12 % oral rinse 15 mL, Q12H ELIZABETH    diltiazem (CARDIZEM) tablet 15 mg, Q6H ELIZABETH    heparin (porcine) subcutaneous injection 5,000 Units, Q8H ELIZABETH **AND** [CANCELED] Platelet count, Once    hydrALAZINE (APRESOLINE) injection 5 mg, Q6H PRN    melatonin tablet 3 mg, HS    metoprolol succinate (TOPROL-XL) 24 hr tablet 12.5 mg, Q12H ELIZABETH    midodrine (PROAMATINE) tablet 5 mg, TID AC    mirtazapine (REMERON) tablet 7.5 mg, HS     pantoprazole (PROTONIX) injection 40 mg, Q12H ELIZABETH    potassium chloride (Klor-Con M20) CR tablet 40 mEq, Once    Administrative Statements   Today, Patient Was Seen By: Fabián Melendez DO      **Please Note: This note may have been constructed using a voice recognition system.**

## 2025-02-06 NOTE — PLAN OF CARE
Problem: Prexisting or High Potential for Compromised Skin Integrity  Goal: Skin integrity is maintained or improved  Description: INTERVENTIONS:  - Identify patients at risk for skin breakdown  - Assess and monitor skin integrity  - Assess and monitor nutrition and hydration status  - Monitor labs   - Assess for incontinence   - Turn and reposition patient  - Assist with mobility/ambulation  - Relieve pressure over bony prominences  - Avoid friction and shearing  - Provide appropriate hygiene as needed including keeping skin clean and dry  - Evaluate need for skin moisturizer/barrier cream  - Collaborate with interdisciplinary team   - Patient/family teaching  - Consider wound care consult   2/6/2025 1358 by Key Sosa RN  Outcome: Progressing  2/6/2025 1353 by Key Sosa RN  Outcome: Progressing     Problem: PAIN - ADULT  Goal: Verbalizes/displays adequate comfort level or baseline comfort level  Description: Interventions:  - Encourage patient to monitor pain and request assistance  - Assess pain using appropriate pain scale  - Administer analgesics based on type and severity of pain and evaluate response  - Implement non-pharmacological measures as appropriate and evaluate response  - Consider cultural and social influences on pain and pain management  - Notify physician/advanced practitioner if interventions unsuccessful or patient reports new pain  2/6/2025 1358 by Key Sosa RN  Outcome: Progressing  2/6/2025 1353 by Key Sosa RN  Outcome: Progressing     Problem: INFECTION - ADULT  Goal: Absence or prevention of progression during hospitalization  Description: INTERVENTIONS:  - Assess and monitor for signs and symptoms of infection  - Monitor lab/diagnostic results  - Monitor all insertion sites, i.e. indwelling lines, tubes, and drains  - Monitor endotracheal if appropriate and nasal secretions for changes in amount and color  - [place] appropriate cooling/warming therapies  per order  - Administer medications as ordered  - Instruct and encourage patient and family to use good hand hygiene technique  - Identify and instruct in appropriate isolation precautions for identified infection/condition  2/6/2025 1358 by Key Sosa RN  Outcome: Progressing  2/6/2025 1353 by Key Sosa RN  Outcome: Progressing     Problem: SAFETY ADULT  Goal: Patient will remain free of falls  Description: INTERVENTIONS:  - Educate patient/family on patient safety including physical limitations  - Instruct patient to call for assistance with activity   - Consult OT/PT to assist with strengthening/mobility   - Keep Call bell within reach  - Keep bed low and locked with side rails adjusted as appropriate  - Keep care items and personal belongings within reach  - Initiate and maintain comfort rounds  - Make Fall Risk Sign visible to staff  - Offer Toileting every 2 Hours, in advance of need  - Initiate/Maintain bed alarm  - Obtain necessary fall risk management equipment:   - Apply yellow socks and bracelet for high fall risk patients  - Consider moving patient to room near nurses station  2/6/2025 1358 by Key Sosa RN  Outcome: Progressing  2/6/2025 1353 by Key Sosa RN  Outcome: Progressing  Goal: Maintain or return to baseline ADL function  Description: INTERVENTIONS:  -  Assess patient's ability to carry out ADLs; assess patient's baseline for ADL function and identify physical deficits which impact ability to perform ADLs (bathing, care of mouth/teeth, toileting, grooming, dressing, etc.)  - Assess/evaluate cause of self-care deficits   - Assess range of motion  - Assess patient's mobility; develop plan if impaired  - Assess patient's need for assistive devices and provide as appropriate  - Encourage maximum independence but intervene and supervise when necessary  - Involve family in performance of ADLs  - Assess for home care needs following discharge   - Consider OT consult to  assist with ADL evaluation and planning for discharge  - Provide patient education as appropriate  2/6/2025 1358 by Key Sosa RN  Outcome: Progressing  2/6/2025 1353 by Key Sosa RN  Outcome: Progressing  Goal: Maintains/Returns to pre admission functional level  Description: INTERVENTIONS:  - Perform AM-PAC 6 Click Basic Mobility/ Daily Activity assessment daily.  - Set and communicate daily mobility goal to care team and patient/family/caregiver.   - Collaborate with rehabilitation services on mobility goals if consulted  - Perform Range of Motion 3 times a day.  - Reposition patient every 2 hours.  - Dangle patient 3 times a day  - Stand patient 3 times a day  - Ambulate patient 3 times a day  - Out of bed to chair 3 times a day   - Out of bed for meals 3 times a day  - Out of bed for toileting  - Record patient progress and toleration of activity level   2/6/2025 1358 by Key Sosa RN  Outcome: Progressing  2/6/2025 1353 by Key Sosa RN  Outcome: Progressing     Problem: DISCHARGE PLANNING  Goal: Discharge to home or other facility with appropriate resources  Description: INTERVENTIONS:  - Identify barriers to discharge w/patient and caregiver  - Arrange for needed discharge resources and transportation as appropriate  - Identify discharge learning needs (meds, wound care, etc.)  - Arrange for interpretive services to assist at discharge as needed  - Refer to Case Management Department for coordinating discharge planning if the patient needs post-hospital services based on physician/advanced practitioner order or complex needs related to functional status, cognitive ability, or social support system  2/6/2025 1358 by Key Sosa RN  Outcome: Progressing  2/6/2025 1353 by Key Sosa RN  Outcome: Progressing     Problem: Knowledge Deficit  Goal: Patient/family/caregiver demonstrates understanding of disease process, treatment plan, medications, and discharge  instructions  Description: Complete learning assessment and assess knowledge base.  Interventions:  - Provide teaching at level of understanding  - Provide teaching via preferred learning methods  2/6/2025 1358 by Key Sosa RN  Outcome: Progressing  2/6/2025 1353 by Key Sosa RN  Outcome: Progressing     Problem: CARDIOVASCULAR - ADULT  Goal: Maintains optimal cardiac output and hemodynamic stability  Description: INTERVENTIONS:  - Monitor I/O, vital signs and rhythm  - Monitor for S/S and trends of decreased cardiac output  - Administer and titrate ordered vasoactive medications to optimize hemodynamic stability  - Assess quality of pulses, skin color and temperature  - Assess for signs of decreased coronary artery perfusion  - Instruct patient to report change in severity of symptoms  2/6/2025 1358 by Key Sosa RN  Outcome: Progressing  2/6/2025 1353 by Key Sosa RN  Outcome: Progressing  Goal: Absence of cardiac dysrhythmias or at baseline rhythm  Description: INTERVENTIONS:  - Continuous cardiac monitoring, vital signs, obtain 12 lead EKG if ordered  - Administer antiarrhythmic and heart rate control medications as ordered  - Monitor electrolytes and administer replacement therapy as ordered  2/6/2025 1358 by Key Sosa RN  Outcome: Progressing  2/6/2025 1353 by Key Sosa RN  Outcome: Progressing     Problem: RESPIRATORY - ADULT  Goal: Achieves optimal ventilation and oxygenation  Description: INTERVENTIONS:  - Assess for changes in respiratory status  - Assess for changes in mentation and behavior  - Position to facilitate oxygenation and minimize respiratory effort  - Oxygen administered by appropriate delivery if ordered  - Initiate smoking cessation education as indicated  - Encourage broncho-pulmonary hygiene including cough, deep breathe, Incentive Spirometry  - Assess the need for suctioning and aspirate as needed  - Assess and instruct to report SOB or any  respiratory difficulty  - Respiratory Therapy support as indicated  2/6/2025 1358 by Key Sosa RN  Outcome: Progressing  2/6/2025 1353 by Key Sosa RN  Outcome: Progressing     Problem: GASTROINTESTINAL - ADULT  Goal: Minimal or absence of nausea and/or vomiting  Description: INTERVENTIONS:  - Administer IV fluids if ordered to ensure adequate hydration  - Maintain NPO status until nausea and vomiting are resolved  - Nasogastric tube if ordered  - Administer ordered antiemetic medications as needed  - Provide nonpharmacologic comfort measures as appropriate  - Advance diet as tolerated, if ordered  - Consider nutrition services referral to assist patient with adequate nutrition and appropriate food choices  2/6/2025 1358 by Key Sosa RN  Outcome: Progressing  2/6/2025 1353 by Key Sosa RN  Outcome: Progressing  Goal: Maintains or returns to baseline bowel function  Description: INTERVENTIONS:  - Assess bowel function  - Encourage oral fluids to ensure adequate hydration  - Administer IV fluids if ordered to ensure adequate hydration  - Administer ordered medications as needed  - Encourage mobilization and activity  - Consider nutritional services referral to assist patient with adequate nutrition and appropriate food choices  2/6/2025 1358 by Key Sosa RN  Outcome: Progressing  2/6/2025 1353 by Key Sosa RN  Outcome: Progressing  Goal: Maintains adequate nutritional intake  Description: INTERVENTIONS:  - Monitor percentage of each meal consumed  - Identify factors contributing to decreased intake, treat as appropriate  - Assist with meals as needed  - Monitor I&O, weight, and lab values if indicated  - Obtain nutrition services referral as needed  2/6/2025 1358 by Key Sosa RN  Outcome: Progressing  2/6/2025 1353 by Key Sosa RN  Outcome: Progressing  Goal: Oral mucous membranes remain intact  Description: INTERVENTIONS  - Assess oral mucosa and hygiene  practices  - Implement preventative oral hygiene regimen  - Implement oral medicated treatments as ordered  - Initiate Nutrition services referral as needed  2/6/2025 1358 by Key Sosa RN  Outcome: Progressing  2/6/2025 1353 by Key Sosa RN  Outcome: Progressing     Problem: GENITOURINARY - ADULT  Goal: Maintains or returns to baseline urinary function  Description: INTERVENTIONS:  - Assess urinary function  - Encourage oral fluids to ensure adequate hydration if ordered  - Administer IV fluids as ordered to ensure adequate hydration  - Administer ordered medications as needed  - Offer frequent toileting  - Follow urinary retention protocol if ordered  2/6/2025 1358 by Key Sosa RN  Outcome: Progressing  2/6/2025 1353 by Key Sosa RN  Outcome: Progressing  Goal: Absence of urinary retention  Description: INTERVENTIONS:  - Assess patient’s ability to void and empty bladder  - Monitor I/O  - Bladder scan as needed  - Discuss with physician/AP medications to alleviate retention as needed  - Discuss catheterization for long term situations as appropriate  2/6/2025 1358 by Key Sosa RN  Outcome: Progressing  2/6/2025 1353 by Key Sosa RN  Outcome: Progressing     Problem: METABOLIC, FLUID AND ELECTROLYTES - ADULT  Goal: Electrolytes maintained within normal limits  Description: INTERVENTIONS:  - Monitor labs and assess patient for signs and symptoms of electrolyte imbalances  - Administer electrolyte replacement as ordered  - Monitor response to electrolyte replacements, including repeat lab results as appropriate  - Instruct patient on fluid and nutrition as appropriate  2/6/2025 1358 by Key Sosa RN  Outcome: Progressing  2/6/2025 1353 by Key Sosa RN  Outcome: Progressing  Goal: Fluid balance maintained  Description: INTERVENTIONS:  - Monitor labs   - Monitor I/O and WT  - Instruct patient on fluid and nutrition as appropriate  - Assess for signs &  symptoms of volume excess or deficit  2/6/2025 1358 by Key Sosa RN  Outcome: Progressing  2/6/2025 1353 by Key Sosa RN  Outcome: Progressing     Problem: SKIN/TISSUE INTEGRITY - ADULT  Goal: Skin Integrity remains intact(Skin Breakdown Prevention)  Description: Assess:  -Perform [place] assessment every shift   -Clean and moisturize skin every =  -Inspect skin when repositioning, toileting, and assisting with ADLS  -Assess under medical devices such as masimo every 2hrs  -Assess extremities for adequate cirrculation and sensation     Bed Management:  -Have minimal linens on bed & keep smooth, unwrinkled  -Change linens as needed when moist or perspiring  -Avoid sitting or lying in one position for more than 2 hours while in bed  -Keep HOB at 35degrees     Toileting:  -Offer bedside commode  -Assess for incontinence every 2 hours  -Use incontinent care products after each incontinent episode such as 3    Activity:  -Mobilize patient 3 times a day  -Encourage activity and walks on unit  -Encourage or provide ROM exercises   -Turn and reposition patient every 2 Hours  -Use appropriate equipment to lift or move patient in bed  -Instruct/ Assist with weight shifting every hour when out of bed in chair  -Consider limitation of chair time 2 hour intervals    Skin Care:  -Avoid use of baby powder, tape, friction and shearing, hot water or constrictive clothing  -Relieve pressure over bony prominences using waffle cushion   -Do not massage red bony areas    Next Steps:  -Teach patient strategies to minimize risks such as shifting weight while in bed   -Consider consults to  interdisciplinary teams such as wound care   2/6/2025 1358 by Key Sosa RN  Outcome: Progressing  2/6/2025 1353 by Key Sosa RN  Outcome: Progressing  Goal: Incision(s), wounds(s) or drain site(s) healing without S/S of infection  Description: INTERVENTIONS  - Assess and document dressing, incision, wound bed, drain sites  and surrounding tissue  - Provide patient and family education  - Perform skin care/dressing changes daily and PRN with soilage   2/6/2025 1358 by Key Sosa RN  Outcome: Progressing  2/6/2025 1353 by Key Sosa RN  Outcome: Progressing     Problem: HEMATOLOGIC - ADULT  Goal: Maintains hematologic stability  Description: INTERVENTIONS  - Assess for signs and symptoms of bleeding or hemorrhage  - Monitor labs  - Administer supportive blood products/factors as ordered and appropriate  2/6/2025 1358 by Key Soas RN  Outcome: Progressing  2/6/2025 1353 by Kye Sosa RN  Outcome: Progressing     Problem: MUSCULOSKELETAL - ADULT  Goal: Maintain or return mobility to safest level of function  Description: INTERVENTIONS:  - Assess patient's ability to carry out ADLs; assess patient's baseline for ADL function and identify physical deficits which impact ability to perform ADLs (bathing, care of mouth/teeth, toileting, grooming, dressing, etc.)  - Assess/evaluate cause of self-care deficits   - Assess range of motion  - Assess patient's mobility  - Assess patient's need for assistive devices and provide as appropriate  - Encourage maximum independence but intervene and supervise when necessary  - Involve family in performance of ADLs  - Assess for home care needs following discharge   - Consider OT consult to assist with ADL evaluation and planning for discharge  - Provide patient education as appropriate  2/6/2025 1358 by Key Sosa RN  Outcome: Progressing  2/6/2025 1353 by Key Sosa RN  Outcome: Progressing  Goal: Maintain proper alignment of affected body part  Description: INTERVENTIONS:  - Support, maintain and protect limb and body alignment  - Provide patient/ family with appropriate education  2/6/2025 1358 by Key Sosa RN  Outcome: Progressing  2/6/2025 1353 by Key Sosa RN  Outcome: Progressing     Problem: Nutrition/Hydration-ADULT  Goal:  Nutrient/Hydration intake appropriate for improving, restoring or maintaining nutritional needs  Description: Monitor and assess patient's nutrition/hydration status for malnutrition. Collaborate with interdisciplinary team and initiate plan and interventions as ordered.  Monitor patient's weight and dietary intake as ordered or per policy. Utilize nutrition screening tool and intervene as necessary. Determine patient's food preferences and provide high-protein, high-caloric foods as appropriate.     INTERVENTIONS:  - Monitor oral intake, urinary output, labs, and treatment plans  - Assess nutrition and hydration status and recommend course of action  - Evaluate amount of meals eaten  - Assist patient with eating if necessary   - Allow adequate time for meals  - Recommend/ encourage appropriate diets, oral nutritional supplements, and vitamin/mineral supplements  - Order, calculate, and assess calorie counts as needed  - Recommend, monitor, and adjust tube feedings and TPN/PPN based on assessed needs  - Assess need for intravenous fluids  - Provide specific nutrition/hydration education as appropriate  - Include patient/family/caregiver in decisions related to nutrition  2/6/2025 1358 by Key Sosa RN  Outcome: Progressing  2/6/2025 1353 by Key Sosa RN  Outcome: Progressing

## 2025-02-07 ENCOUNTER — TELEPHONE (OUTPATIENT)
Age: OVER 89
End: 2025-02-07

## 2025-02-07 VITALS
TEMPERATURE: 98 F | WEIGHT: 97.44 LBS | BODY MASS INDEX: 15.26 KG/M2 | SYSTOLIC BLOOD PRESSURE: 126 MMHG | OXYGEN SATURATION: 97 % | DIASTOLIC BLOOD PRESSURE: 64 MMHG | HEART RATE: 75 BPM | RESPIRATION RATE: 18 BRPM

## 2025-02-07 LAB
ANION GAP SERPL CALCULATED.3IONS-SCNC: 7 MMOL/L (ref 4–13)
BUN SERPL-MCNC: 22 MG/DL (ref 5–25)
CALCIUM SERPL-MCNC: 8.9 MG/DL (ref 8.4–10.2)
CHLORIDE SERPL-SCNC: 104 MMOL/L (ref 96–108)
CO2 SERPL-SCNC: 28 MMOL/L (ref 21–32)
CREAT SERPL-MCNC: 0.88 MG/DL (ref 0.6–1.3)
GFR SERPL CREATININE-BSD FRML MDRD: 58 ML/MIN/1.73SQ M
GLUCOSE SERPL-MCNC: 93 MG/DL (ref 65–140)
POTASSIUM SERPL-SCNC: 3.4 MMOL/L (ref 3.5–5.3)
QRS AXIS: -28 DEGREES
QRSD INTERVAL: 76 MS
QT INTERVAL: 376 MS
QTC INTERVAL: 426 MS
SODIUM SERPL-SCNC: 139 MMOL/L (ref 135–147)
T WAVE AXIS: 12 DEGREES
VENTRICULAR RATE: 77 BPM

## 2025-02-07 PROCEDURE — 80048 BASIC METABOLIC PNL TOTAL CA: CPT | Performed by: INTERNAL MEDICINE

## 2025-02-07 PROCEDURE — 93005 ELECTROCARDIOGRAM TRACING: CPT

## 2025-02-07 PROCEDURE — 93010 ELECTROCARDIOGRAM REPORT: CPT | Performed by: INTERNAL MEDICINE

## 2025-02-07 PROCEDURE — 99239 HOSP IP/OBS DSCHRG MGMT >30: CPT | Performed by: INTERNAL MEDICINE

## 2025-02-07 RX ORDER — LANOLIN ALCOHOL/MO/W.PET/CERES
400 CREAM (GRAM) TOPICAL 2 TIMES DAILY
Qty: 10 TABLET | Refills: 0
Start: 2025-02-07 | End: 2025-02-12

## 2025-02-07 RX ORDER — POTASSIUM CHLORIDE 1500 MG/1
40 TABLET, EXTENDED RELEASE ORAL ONCE
Status: COMPLETED | OUTPATIENT
Start: 2025-02-07 | End: 2025-02-07

## 2025-02-07 RX ORDER — LEVOFLOXACIN 250 MG/1
250 TABLET, FILM COATED ORAL EVERY 24 HOURS
Qty: 3 TABLET | Refills: 0
Start: 2025-02-07 | End: 2025-02-10

## 2025-02-07 RX ORDER — LEVOFLOXACIN 250 MG/1
250 TABLET, FILM COATED ORAL EVERY 24 HOURS
Status: DISCONTINUED | OUTPATIENT
Start: 2025-02-07 | End: 2025-02-07 | Stop reason: HOSPADM

## 2025-02-07 RX ORDER — POLYETHYLENE GLYCOL 3350 17 G/17G
17 POWDER, FOR SOLUTION ORAL ONCE
Status: COMPLETED | OUTPATIENT
Start: 2025-02-07 | End: 2025-02-07

## 2025-02-07 RX ORDER — LANOLIN ALCOHOL/MO/W.PET/CERES
400 CREAM (GRAM) TOPICAL 2 TIMES DAILY
Status: DISCONTINUED | OUTPATIENT
Start: 2025-02-07 | End: 2025-02-07 | Stop reason: HOSPADM

## 2025-02-07 RX ADMIN — POTASSIUM CHLORIDE 40 MEQ: 1500 TABLET, EXTENDED RELEASE ORAL at 09:30

## 2025-02-07 RX ADMIN — DILTIAZEM HYDROCHLORIDE 15 MG: 30 TABLET, FILM COATED ORAL at 11:03

## 2025-02-07 RX ADMIN — MIDODRINE HYDROCHLORIDE 5 MG: 5 TABLET ORAL at 06:00

## 2025-02-07 RX ADMIN — DILTIAZEM HYDROCHLORIDE 15 MG: 30 TABLET, FILM COATED ORAL at 00:13

## 2025-02-07 RX ADMIN — DILTIAZEM HYDROCHLORIDE 15 MG: 30 TABLET, FILM COATED ORAL at 05:24

## 2025-02-07 RX ADMIN — METOPROLOL SUCCINATE 12.5 MG: 25 TABLET, EXTENDED RELEASE ORAL at 09:30

## 2025-02-07 RX ADMIN — HEPARIN SODIUM 5000 UNITS: 5000 INJECTION INTRAVENOUS; SUBCUTANEOUS at 05:24

## 2025-02-07 RX ADMIN — LEVOFLOXACIN 250 MG: 250 TABLET, FILM COATED ORAL at 12:41

## 2025-02-07 RX ADMIN — MIDODRINE HYDROCHLORIDE 5 MG: 5 TABLET ORAL at 11:05

## 2025-02-07 RX ADMIN — CHLORHEXIDINE GLUCONATE 15 ML: 1.2 SOLUTION ORAL at 09:31

## 2025-02-07 RX ADMIN — PANTOPRAZOLE SODIUM 40 MG: 40 INJECTION, POWDER, FOR SOLUTION INTRAVENOUS at 09:31

## 2025-02-07 RX ADMIN — Medication 400 MG: at 12:34

## 2025-02-07 RX ADMIN — POLYETHYLENE GLYCOL 3350 17 G: 17 POWDER, FOR SOLUTION ORAL at 11:02

## 2025-02-07 NOTE — PLAN OF CARE
Problem: Prexisting or High Potential for Compromised Skin Integrity  Goal: Skin integrity is maintained or improved  Description: INTERVENTIONS:  - Identify patients at risk for skin breakdown  - Assess and monitor skin integrity  - Assess and monitor nutrition and hydration status  - Monitor labs   - Assess for incontinence   - Turn and reposition patient  - Assist with mobility/ambulation  - Relieve pressure over bony prominences  - Avoid friction and shearing  - Provide appropriate hygiene as needed including keeping skin clean and dry  - Evaluate need for skin moisturizer/barrier cream  - Collaborate with interdisciplinary team   - Patient/family teaching  - Consider wound care consult   Outcome: Progressing     Problem: PAIN - ADULT  Goal: Verbalizes/displays adequate comfort level or baseline comfort level  Description: Interventions:  - Encourage patient to monitor pain and request assistance  - Assess pain using appropriate pain scale  - Administer analgesics based on type and severity of pain and evaluate response  - Implement non-pharmacological measures as appropriate and evaluate response  - Consider cultural and social influences on pain and pain management  - Notify physician/advanced practitioner if interventions unsuccessful or patient reports new pain  Outcome: Progressing     Problem: INFECTION - ADULT  Goal: Absence or prevention of progression during hospitalization  Description: INTERVENTIONS:  - Assess and monitor for signs and symptoms of infection  - Monitor lab/diagnostic results  - Monitor all insertion sites, i.e. indwelling lines, tubes, and drains  - Monitor endotracheal if appropriate and nasal secretions for changes in amount and color  - [place] appropriate cooling/warming therapies per order  - Administer medications as ordered  - Instruct and encourage patient and family to use good hand hygiene technique  - Identify and instruct in appropriate isolation precautions for  identified infection/condition  Outcome: Progressing     Problem: DISCHARGE PLANNING  Goal: Discharge to home or other facility with appropriate resources  Description: INTERVENTIONS:  - Identify barriers to discharge w/patient and caregiver  - Arrange for needed discharge resources and transportation as appropriate  - Identify discharge learning needs (meds, wound care, etc.)  - Arrange for interpretive services to assist at discharge as needed  - Refer to Case Management Department for coordinating discharge planning if the patient needs post-hospital services based on physician/advanced practitioner order or complex needs related to functional status, cognitive ability, or social support system  Outcome: Progressing     Problem: Knowledge Deficit  Goal: Patient/family/caregiver demonstrates understanding of disease process, treatment plan, medications, and discharge instructions  Description: Complete learning assessment and assess knowledge base.  Interventions:  - Provide teaching at level of understanding  - Provide teaching via preferred learning methods  Outcome: Progressing

## 2025-02-07 NOTE — PROGRESS NOTES
Progress Note - Gastroenterology   Name: Sandra Purvis 89 y.o. female I MRN: [id number]  Unit/Bed#: [place] [address] -02 I Date of Admission: 2/3/2025   Date of Service: 2/7/2025 I Hospital Day: 4     Assessment & Plan  Food impaction of esophagus  Status post unsuccessful disimpaction at [facility] 2/3, transferred to [facility]  for upper endoscopy with successful esophageal clearance. Incomplete EGD, duodenum not visualized.    Pathology pending, biopsies from esophagus were taken to exclude candidiasis versus adherent food to the esophageal wall  Liquid diet currently. nutritional supplements.  Continue acid suppression. Keep HOB elevated/upright with meals.   Suspect dysphagia represents achalasia and workup can be done if patient is interested  A message was sent to the office to provide outpatient follow-up to see how she is doing with diet manipulation.  Atrial fibrillation (HCC)  AC currently on hold  Esophageal dysphagia  See food impaction of esophagus above    Severe protein-calorie malnutrition (HCC)  Malnutrition Findings:   Adult Malnutrition type: Chronic illness  Adult Degree of Malnutrition: Other severe protein calorie malnutrition  Malnutrition Characteristics: Fat loss, Muscle loss, Inadequate energy, Weight loss     360 Statement: Severe calorie protein malnutrition in context of chronic illness r/t inadequate PO intake, swallowing difficulty as evidenced by severe body fat (triceps, ribcage area) and muscle mass depletions (temporal wasting, protruding clavicles),  10% wt loss x 1 month; Treated with PO diet, oral supplements    BMI Findings:  Adult BMI Classifications: Underweight < 18.5        Body mass index is 15.26 kg/m².       Subjective   Patient seen eating jello without difficulty. Denies difficulty swallowing, nausea, heartburn. Vitals stable Discussed briefly with CNA - no issues swallowing.    Vitals  Blood pressure 157/80, pulse 93, temperature 98 °F (36.7 °C), temperature source  Oral, resp. rate 18, weight 44.2 kg (97 lb 7.1 oz), SpO2 95%, not currently breastfeeding.  Physical Exam:     General Appearance:    Awake, alert, oriented x3, no distress, elderly/frail   Head:    Normocephalic without obvious abnormality   Eyes:    PERRL, conjunctiva/corneas clear, EOM's intact        Neck:   Supple, no adenopathy   Throat:   Mucous membranes moist   Lungs:     Clear to auscultation bilaterally, no wheezing or rhonchi   Heart:    Regular rate and rhythm, S1 and S2 normal, no murmur   Abdomen:     Soft, non-tender, non-distended. bowel sounds active. No    masses, rebound or guarding.    Extremities:   Extremities normal. No clubbing, cyanosis or edema   Psych  Derm:   Normal affect    No jaundice +eccymoses   Neurologic:   CNII-XII grossly intact. Speech intact         Laboratory Studies  Results from last 7 days   Lab Units 02/06/25  0446 02/05/25  0515 02/04/25  0509 02/03/25 2017 02/03/25 0224   WBC Thousand/uL 10.93* 12.88* 12.60* 15.49* 15.14*   HEMOGLOBIN g/dL 9.5* 10.2* 10.3* 11.7 11.3*   HEMATOCRIT % 29.2* 32.8* 31.7* 37.1 34.7*   PLATELETS Thousands/uL 210 180 185 191 202     Results from last 7 days   Lab Units 02/07/25  0433 02/06/25  0446 02/04/25  0509 02/03/25 2017 02/03/25  0224   SODIUM mmol/L 139 140 139 141 139   POTASSIUM mmol/L 3.4* 3.0* 3.7 4.6 4.1   CHLORIDE mmol/L 104 105 102 107 103   CO2 mmol/L 28 29 23 25 25   BUN mg/dL 22 23 19 20 23   CREATININE mg/dL 0.88 0.90 0.71 0.96 0.96   CALCIUM mg/dL 8.9 9.1 8.1* 9.9 10.2   ALBUMIN g/dL  --  2.9* 3.3*  --  3.9   TOTAL BILIRUBIN mg/dL  --  0.40 0.50  --  0.50   ALK PHOS U/L  --  65 70  --  93   ALT U/L  --  25 26  --  20   AST U/L  --  16 19  --  16         Imaging and Other Studies      Inhouse Medications     Current Facility-Administered Medications:     bisacodyl (DULCOLAX) rectal suppository 10 mg, 10 mg, Rectal, Daily PRN    chlorhexidine (PERIDEX) 0.12 % oral rinse 15 mL, 15 mL, Mouth/Throat, Q12H ELIZABETH, 15 mL at  02/06/25 2132    diltiazem (CARDIZEM) tablet 15 mg, 15 mg, Oral, Q6H ELIZABETH, 15 mg at 02/07/25 0524    heparin (porcine) subcutaneous injection 5,000 Units, 5,000 Units, Subcutaneous, Q8H ELIZABETH, 5,000 Units at 02/07/25 0524 **AND** [CANCELED] Platelet count, , , Once    hydrALAZINE (APRESOLINE) injection 5 mg, 5 mg, Intravenous, Q6H PRN, 5 mg at 02/04/25 0531    melatonin tablet 3 mg, 3 mg, Oral, HS, 3 mg at 02/06/25 2133    metoprolol succinate (TOPROL-XL) 24 hr tablet 12.5 mg, 12.5 mg, Oral, Q12H ELIZABETH, 12.5 mg at 02/06/25 2133    midodrine (PROAMATINE) tablet 5 mg, 5 mg, Oral, TID AC, 5 mg at 02/07/25 0600    mirtazapine (REMERON) tablet 7.5 mg, 7.5 mg, Oral, HS, 7.5 mg at 02/06/25 2135    pantoprazole (PROTONIX) injection 40 mg, 40 mg, Intravenous, Q12H ELIZABETH, 40 mg at 02/06/25 2130    potassium chloride (Klor-Con M20) CR tablet 40 mEq, 40 mEq, Oral, Once    Mayte Velazco PA-C  [facility]

## 2025-02-07 NOTE — ASSESSMENT & PLAN NOTE
Malnutrition Findings:   Adult Malnutrition type: Chronic illness  Adult Degree of Malnutrition: Other severe protein calorie malnutrition  Malnutrition Characteristics: Fat loss, Muscle loss, Inadequate energy, Weight loss     360 Statement: Severe calorie protein malnutrition in context of chronic illness r/t inadequate PO intake, swallowing difficulty as evidenced by severe body fat (triceps, ribcage area) and muscle mass depletions (temporal wasting, protruding clavicles),  10% wt loss x 1 month; Treated with PO diet, oral supplements    BMI Findings:  Adult BMI Classifications: Underweight < 18.5        Body mass index is 15.26 kg/m².      show

## 2025-02-07 NOTE — PLAN OF CARE
Problem: Prexisting or High Potential for Compromised Skin Integrity  Goal: Skin integrity is maintained or improved  Description: INTERVENTIONS:  - Identify patients at risk for skin breakdown  - Assess and monitor skin integrity  - Assess and monitor nutrition and hydration status  - Monitor labs   - Assess for incontinence   - Turn and reposition patient  - Assist with mobility/ambulation  - Relieve pressure over bony prominences  - Avoid friction and shearing  - Provide appropriate hygiene as needed including keeping skin clean and dry  - Evaluate need for skin moisturizer/barrier cream  - Collaborate with interdisciplinary team   - Patient/family teaching  - Consider wound care consult   Outcome: Progressing     Problem: PAIN - ADULT  Goal: Verbalizes/displays adequate comfort level or baseline comfort level  Description: Interventions:  - Encourage patient to monitor pain and request assistance  - Assess pain using appropriate pain scale  - Administer analgesics based on type and severity of pain and evaluate response  - Implement non-pharmacological measures as appropriate and evaluate response  - Consider cultural and social influences on pain and pain management  - Notify physician/advanced practitioner if interventions unsuccessful or patient reports new pain  Outcome: Progressing     Problem: INFECTION - ADULT  Goal: Absence or prevention of progression during hospitalization  Description: INTERVENTIONS:  - Assess and monitor for signs and symptoms of infection  - Monitor lab/diagnostic results  - Monitor all insertion sites, i.e. indwelling lines, tubes, and drains  - Monitor endotracheal if appropriate and nasal secretions for changes in amount and color  - Belva appropriate cooling/warming therapies per order  - Administer medications as ordered  - Instruct and encourage patient and family to use good hand hygiene technique  - Identify and instruct in appropriate isolation precautions for  identified infection/condition  Outcome: Progressing     Problem: SAFETY ADULT  Goal: Patient will remain free of falls  Description: INTERVENTIONS:  - Educate patient/family on patient safety including physical limitations  - Instruct patient to call for assistance with activity   - Consult OT/PT to assist with strengthening/mobility   - Keep Call bell within reach  - Keep bed low and locked with side rails adjusted as appropriate  - Keep care items and personal belongings within reach  - Initiate and maintain comfort rounds  - Make Fall Risk Sign visible to staff  - Offer Toileting every 2 Hours, in advance of need  - Initiate/Maintain bed alarm  - Obtain necessary fall risk management equipment:   - Apply yellow socks and bracelet for high fall risk patients  - Consider moving patient to room near nurses station  Outcome: Progressing  Goal: Maintain or return to baseline ADL function  Description: INTERVENTIONS:  -  Assess patient's ability to carry out ADLs; assess patient's baseline for ADL function and identify physical deficits which impact ability to perform ADLs (bathing, care of mouth/teeth, toileting, grooming, dressing, etc.)  - Assess/evaluate cause of self-care deficits   - Assess range of motion  - Assess patient's mobility; develop plan if impaired  - Assess patient's need for assistive devices and provide as appropriate  - Encourage maximum independence but intervene and supervise when necessary  - Involve family in performance of ADLs  - Assess for home care needs following discharge   - Consider OT consult to assist with ADL evaluation and planning for discharge  - Provide patient education as appropriate  Outcome: Progressing  Goal: Maintains/Returns to pre admission functional level  Description: INTERVENTIONS:  - Perform AM-PAC 6 Click Basic Mobility/ Daily Activity assessment daily.  - Set and communicate daily mobility goal to care team and patient/family/caregiver.   - Collaborate with  rehabilitation services on mobility goals if consulted  - Perform Range of Motion 3 times a day.  - Reposition patient every 2 hours.  - Dangle patient 3 times a day  - Stand patient 3 times a day  - Ambulate patient 3 times a day  - Out of bed to chair 3 times a day   - Out of bed for meals 3 times a day  - Out of bed for toileting  - Record patient progress and toleration of activity level   Outcome: Progressing     Problem: DISCHARGE PLANNING  Goal: Discharge to home or other facility with appropriate resources  Description: INTERVENTIONS:  - Identify barriers to discharge w/patient and caregiver  - Arrange for needed discharge resources and transportation as appropriate  - Identify discharge learning needs (meds, wound care, etc.)  - Arrange for interpretive services to assist at discharge as needed  - Refer to Case Management Department for coordinating discharge planning if the patient needs post-hospital services based on physician/advanced practitioner order or complex needs related to functional status, cognitive ability, or social support system  Outcome: Progressing     Problem: Knowledge Deficit  Goal: Patient/family/caregiver demonstrates understanding of disease process, treatment plan, medications, and discharge instructions  Description: Complete learning assessment and assess knowledge base.  Interventions:  - Provide teaching at level of understanding  - Provide teaching via preferred learning methods  Outcome: Progressing     Problem: CARDIOVASCULAR - ADULT  Goal: Maintains optimal cardiac output and hemodynamic stability  Description: INTERVENTIONS:  - Monitor I/O, vital signs and rhythm  - Monitor for S/S and trends of decreased cardiac output  - Administer and titrate ordered vasoactive medications to optimize hemodynamic stability  - Assess quality of pulses, skin color and temperature  - Assess for signs of decreased coronary artery perfusion  - Instruct patient to report change in severity  of symptoms  Outcome: Progressing  Goal: Absence of cardiac dysrhythmias or at baseline rhythm  Description: INTERVENTIONS:  - Continuous cardiac monitoring, vital signs, obtain 12 lead EKG if ordered  - Administer antiarrhythmic and heart rate control medications as ordered  - Monitor electrolytes and administer replacement therapy as ordered  Outcome: Progressing     Problem: RESPIRATORY - ADULT  Goal: Achieves optimal ventilation and oxygenation  Description: INTERVENTIONS:  - Assess for changes in respiratory status  - Assess for changes in mentation and behavior  - Position to facilitate oxygenation and minimize respiratory effort  - Oxygen administered by appropriate delivery if ordered  - Initiate smoking cessation education as indicated  - Encourage broncho-pulmonary hygiene including cough, deep breathe, Incentive Spirometry  - Assess the need for suctioning and aspirate as needed  - Assess and instruct to report SOB or any respiratory difficulty  - Respiratory Therapy support as indicated  Outcome: Progressing     Problem: GASTROINTESTINAL - ADULT  Goal: Minimal or absence of nausea and/or vomiting  Description: INTERVENTIONS:  - Administer IV fluids if ordered to ensure adequate hydration  - Maintain NPO status until nausea and vomiting are resolved  - Nasogastric tube if ordered  - Administer ordered antiemetic medications as needed  - Provide nonpharmacologic comfort measures as appropriate  - Advance diet as tolerated, if ordered  - Consider nutrition services referral to assist patient with adequate nutrition and appropriate food choices  Outcome: Progressing  Goal: Maintains or returns to baseline bowel function  Description: INTERVENTIONS:  - Assess bowel function  - Encourage oral fluids to ensure adequate hydration  - Administer IV fluids if ordered to ensure adequate hydration  - Administer ordered medications as needed  - Encourage mobilization and activity  - Consider nutritional services  referral to assist patient with adequate nutrition and appropriate food choices  Outcome: Progressing  Goal: Maintains adequate nutritional intake  Description: INTERVENTIONS:  - Monitor percentage of each meal consumed  - Identify factors contributing to decreased intake, treat as appropriate  - Assist with meals as needed  - Monitor I&O, weight, and lab values if indicated  - Obtain nutrition services referral as needed  Outcome: Progressing  Goal: Oral mucous membranes remain intact  Description: INTERVENTIONS  - Assess oral mucosa and hygiene practices  - Implement preventative oral hygiene regimen  - Implement oral medicated treatments as ordered  - Initiate Nutrition services referral as needed  Outcome: Progressing     Problem: GENITOURINARY - ADULT  Goal: Maintains or returns to baseline urinary function  Description: INTERVENTIONS:  - Assess urinary function  - Encourage oral fluids to ensure adequate hydration if ordered  - Administer IV fluids as ordered to ensure adequate hydration  - Administer ordered medications as needed  - Offer frequent toileting  - Follow urinary retention protocol if ordered  Outcome: Progressing  Goal: Absence of urinary retention  Description: INTERVENTIONS:  - Assess patient’s ability to void and empty bladder  - Monitor I/O  - Bladder scan as needed  - Discuss with physician/AP medications to alleviate retention as needed  - Discuss catheterization for long term situations as appropriate  Outcome: Progressing     Problem: METABOLIC, FLUID AND ELECTROLYTES - ADULT  Goal: Electrolytes maintained within normal limits  Description: INTERVENTIONS:  - Monitor labs and assess patient for signs and symptoms of electrolyte imbalances  - Administer electrolyte replacement as ordered  - Monitor response to electrolyte replacements, including repeat lab results as appropriate  - Instruct patient on fluid and nutrition as appropriate  Outcome: Progressing  Goal: Fluid balance  maintained  Description: INTERVENTIONS:  - Monitor labs   - Monitor I/O and WT  - Instruct patient on fluid and nutrition as appropriate  - Assess for signs & symptoms of volume excess or deficit  Outcome: Progressing     Problem: SKIN/TISSUE INTEGRITY - ADULT  Goal: Skin Integrity remains intact(Skin Breakdown Prevention)  Description: Assess:  -Perform Ronn assessment every shift   -Clean and moisturize skin every =  -Inspect skin when repositioning, toileting, and assisting with ADLS  -Assess under medical devices such as masimo every 2hrs  -Assess extremities for adequate cirrculation and sensation     Bed Management:  -Have minimal linens on bed & keep smooth, unwrinkled  -Change linens as needed when moist or perspiring  -Avoid sitting or lying in one position for more than 2 hours while in bed  -Keep HOB at 35degrees     Toileting:  -Offer bedside commode  -Assess for incontinence every 2 hours  -Use incontinent care products after each incontinent episode such as 3    Activity:  -Mobilize patient 3 times a day  -Encourage activity and walks on unit  -Encourage or provide ROM exercises   -Turn and reposition patient every 2 Hours  -Use appropriate equipment to lift or move patient in bed  -Instruct/ Assist with weight shifting every hour when out of bed in chair  -Consider limitation of chair time 2 hour intervals    Skin Care:  -Avoid use of baby powder, tape, friction and shearing, hot water or constrictive clothing  -Relieve pressure over bony prominences using waffle cushion   -Do not massage red bony areas    Next Steps:  -Teach patient strategies to minimize risks such as shifting weight while in bed   -Consider consults to  interdisciplinary teams such as wound care   Outcome: Progressing  Goal: Incision(s), wounds(s) or drain site(s) healing without S/S of infection  Description: INTERVENTIONS  - Assess and document dressing, incision, wound bed, drain sites and surrounding tissue  - Provide patient  and family education  - Perform skin care/dressing changes daily and PRN with soilage   Outcome: Progressing     Problem: HEMATOLOGIC - ADULT  Goal: Maintains hematologic stability  Description: INTERVENTIONS  - Assess for signs and symptoms of bleeding or hemorrhage  - Monitor labs  - Administer supportive blood products/factors as ordered and appropriate  Outcome: Progressing     Problem: MUSCULOSKELETAL - ADULT  Goal: Maintain or return mobility to safest level of function  Description: INTERVENTIONS:  - Assess patient's ability to carry out ADLs; assess patient's baseline for ADL function and identify physical deficits which impact ability to perform ADLs (bathing, care of mouth/teeth, toileting, grooming, dressing, etc.)  - Assess/evaluate cause of self-care deficits   - Assess range of motion  - Assess patient's mobility  - Assess patient's need for assistive devices and provide as appropriate  - Encourage maximum independence but intervene and supervise when necessary  - Involve family in performance of ADLs  - Assess for home care needs following discharge   - Consider OT consult to assist with ADL evaluation and planning for discharge  - Provide patient education as appropriate  Outcome: Progressing  Goal: Maintain proper alignment of affected body part  Description: INTERVENTIONS:  - Support, maintain and protect limb and body alignment  - Provide patient/ family with appropriate education  Outcome: Progressing     Problem: Nutrition/Hydration-ADULT  Goal: Nutrient/Hydration intake appropriate for improving, restoring or maintaining nutritional needs  Description: Monitor and assess patient's nutrition/hydration status for malnutrition. Collaborate with interdisciplinary team and initiate plan and interventions as ordered.  Monitor patient's weight and dietary intake as ordered or per policy. Utilize nutrition screening tool and intervene as necessary. Determine patient's food preferences and provide  high-protein, high-caloric foods as appropriate.     INTERVENTIONS:  - Monitor oral intake, urinary output, labs, and treatment plans  - Assess nutrition and hydration status and recommend course of action  - Evaluate amount of meals eaten  - Assist patient with eating if necessary   - Allow adequate time for meals  - Recommend/ encourage appropriate diets, oral nutritional supplements, and vitamin/mineral supplements  - Order, calculate, and assess calorie counts as needed  - Recommend, monitor, and adjust tube feedings and TPN/PPN based on assessed needs  - Assess need for intravenous fluids  - Provide specific nutrition/hydration education as appropriate  - Include patient/family/caregiver in decisions related to nutrition  Outcome: Progressing

## 2025-02-07 NOTE — DISCHARGE SUMMARY
Discharge Summary - Hospitalist   Name: Sandra Purvis 89 y.o. female I MRN: [id number]  Unit/Bed#: [address] 205-02 I Date of Admission: 2/3/2025   Date of Service: 2/7/2025 I Hospital Day: 4     Assessment & Plan  Food impaction of esophagus    HTN (hypertension)    UTI (urinary tract infection)  Urine culture growing Pseudomonas aeruginosa will treat with 3 days of Levaquin, QTc within normal limits  Atrial fibrillation (HCC)  Continue beta-blocker and Cardizem, holding anticoagulation in light of recent history of bleed  GERD (gastroesophageal reflux disease)  Continue protonix  Primary insomnia  Will resume home melatonin and remeron  Orthostatic hypotension  Continue midodrine 5 mg 3 times daily  Esophageal dysphagia  Patient with history of esophageal seizure presented with food impaction-underwent endoscopy with GI  Acute hypoxic respiratory failure (HCC)  Resolved  Severe protein-calorie malnutrition (HCC)  Malnutrition Findings:   Adult Malnutrition type: Chronic illness  Adult Degree of Malnutrition: Other severe protein calorie malnutrition  Malnutrition Characteristics: Fat loss, Muscle loss, Inadequate energy, Weight loss                  360 Statement: Severe calorie protein malnutrition in context of chronic illness r/t inadequate PO intake, swallowing difficulty as evidenced by severe body fat (triceps, ribcage area) and muscle mass depletions (temporal wasting, protruding clavicles),  10% wt loss x 1 month; Treated with PO diet, oral supplements    BMI Findings:  Adult BMI Classifications: Underweight < 18.5        Body mass index is 15.26 kg/m².        Medical Problems       Resolved Problems  Date Reviewed: 2/3/2025   None       Discharging Physician / Practitioner: Fabián Melendez DO  PCP: Berhane Page DO  Admission Date:   Admission Orders (From admission, onward)       Ordered        02/03/25 1836  Inpatient Admission  Once                          Discharge Date: 02/07/25      Diffuse  chronic dilation of the esophagus with progressive or transiently increased distention. Esophagus filled with fluid and debris. Findings may be sequela of severe gastroesophageal reflux superimposed on chronic dysmotility. GE stenosis/stricture   is not excluded. Correlate with any prior work-up.     Mild mass effect on bilateral cardiac atria due to the dilated esophagus and pectus excavatum.     Minimal left basilar lower lobe consolidation likely atelectasis, improved since 1/14/2025.     Bilateral pleural effusions, small on the left and trace on the right, improved since 1/14/2025.     Additional chronic findings and negatives as above.     CT abdomen and pelvis:     Findings suggestive of cystitis. Correlate with clinical findings.     Trace ascites.     Pericholecystic fluid versus thick edematous gallbladder wall. No calcified gallstones. Findings are nonspecific given presence of ascites.     Small right renal upper and lower pole exophytic indeterminate cystic lesions most likely chronic hemorrhagic or proteinaceous cyst. These could be followed up with nonemergent renal ultrasound.     Trace fluid in the endometrial cavity versus mild endometrial thickening. No significant change since October 2021. This could be followed up with nonemergent pelvic ultrasound if clinically warranted.    Reason for Admission: Food bolus impaction    Hospital Course:   Sandra Purvis is a 89 y.o. female patient who originally presented to the hospital on 2/3/2025 due to food bolus impaction requiring intubation, patient eventually underwent endoscopy with removal and was extubated successfully, she was noted to have Pseudomonas aeruginosa in the urine started on Levaquin prior to her discharge.           Please see above list of diagnoses and related plan for additional information.     Condition at Discharge: stable    Discharge Day Visit / Exam:   Subjective: Patient denies any acute complaints  Vitals: Blood  Pressure: 126/64 (02/07/25 1102)  Pulse: 75 (02/07/25 1102)  Temperature: 98 °F (36.7 °C) (02/07/25 0750)  Temp Source: Oral (02/07/25 0750)  Respirations: 18 (02/07/25 0750)  Weight - Scale: 44.2 kg (97 lb 7.1 oz) (02/04/25 0539)  SpO2: 97 % (02/07/25 1102)  Physical Exam  Vitals and nursing note reviewed.   Constitutional:       General: She is not in acute distress.     Appearance: She is well-developed. She is not toxic-appearing or diaphoretic.   HENT:      Head: Normocephalic and atraumatic.   Eyes:      General: No scleral icterus.     Conjunctiva/sclera: Conjunctivae normal.   Cardiovascular:      Rate and Rhythm: Normal rate and regular rhythm.      Heart sounds: No murmur heard.     No friction rub. No gallop.   Pulmonary:      Effort: Pulmonary effort is normal. No respiratory distress.      Breath sounds: Normal breath sounds. No stridor. No wheezing, rhonchi or rales.   Chest:      Chest wall: No tenderness.   Abdominal:      General: There is no distension.      Palpations: Abdomen is soft. There is no mass.      Tenderness: There is no abdominal tenderness. There is no guarding or rebound.      Hernia: No hernia is present.   Musculoskeletal:         General: No swelling or tenderness.      Cervical back: Neck supple.   Skin:     General: Skin is warm and dry.      Capillary Refill: Capillary refill takes less than 2 seconds.   Neurological:      Mental Status: She is alert.   Psychiatric:         Mood and Affect: Mood normal.          Discussion with Family: Attempted to update  (daughter) via phone. Left voicemail.     Discharge instructions/Information to patient and family:   See after visit summary for information provided to patient and family.      Provisions for Follow-Up Care:  See after visit summary for information related to follow-up care and any pertinent home health orders.      Mobility at time of Discharge:   Basic Mobility Inpatient Raw Score: 12  -HLM Goal: 4: Move  to chair/commode  JH-HLM Achieved: 4: Move to chair/commode  HLM Goal achieved. Continue to encourage appropriate mobility.     Disposition:   Other: rehab    Planned Readmission: no    Discharge Medications:  See after visit summary for reconciled discharge medications provided to patient and/or family.      Administrative Statements   Discharge Statement:  I have spent a total time of  minutes in caring for this patient on the day of the visit/encounter. .    **Please Note: This note may have been constructed using a voice recognition system**

## 2025-02-07 NOTE — CASE MANAGEMENT
Case Management Discharge Planning Note    Patient name Sandra Purvis  Location [facility] 2 [facility] 2 M* MRN [id number]  : 12/3/1934 Date 2025       Current Admission Date: 2/3/2025  Current Admission Diagnosis:Food impaction of esophagus   Patient Active Problem List    Diagnosis Date Noted Date Diagnosed    Severe protein-calorie malnutrition (HCC) 2025     Abdominal pain 2025     Esophageal dysphagia 2025     Acute cystitis 2025     Abnormal CT of the abdomen 2025     Disturbance of salivary secretion 2025     Food impaction of esophagus 2025     Behavior safety risk 2025     Acute hypoxic respiratory failure (HCC) 2025     Insomnia 2025     Hypokalemia 2025     Hypertensive urgency 2025     Age-related cognitive decline 2025     SIRS (systemic inflammatory response syndrome) ([facility]) 2025     Frailty syndrome in geriatric patient 2025     Ambulatory dysfunction 2025     At risk for delirium 2025     Bilateral hearing loss 2025     Anemia 2025     Leukocytosis 2025     Acute on chronic diastolic congestive heart failure ([facility]) 2025     Syncope 2025     Chest pain 2025     Orthostatic hypotension 2025     Fall 2025     SDH (subdural hematoma) ([facility]) 2025     SAH (subarachnoid hemorrhage) ([facility]) 2025     Closed extensive facial fractures ([facility]) 2025     Primary insomnia 2024     Basal cell carcinoma (BCC) of scalp 2023     Malignant melanoma of arm, left ([facility]) 2023     Scalp lesion 2023     Arm skin lesion, left 2023     Irritant contact dermatitis due to other agents 2022     COVID-19 2022     GERD (gastroesophageal reflux disease)      Stage 3 chronic kidney disease, unspecified whether stage 3a or 3b CKD (HCC) 2021     Atrial fibrillation ([facility])      Preop examination 2021     Esophagus  disorder 10/19/2021     UTI (urinary tract infection) 10/15/2021     Ureteral stone 10/15/2021     Bacteremia 10/15/2021     Mild protein-calorie malnutrition (HCC) 05/04/2021     HTN (hypertension) 09/22/2015       LOS (days): 4  Geometric Mean LOS (GMLOS) (days): 4.3  Days to GMLOS:0.6     OBJECTIVE:  Risk of Unplanned Readmission Score: 27.13         Current admission status: Inpatient   Preferred Pharmacy:   [facility] [id number] [place] [address]  [address] [address]  Phone: [phone] Fax: [phone]    Primary Care Provider: Berhane Page DO    Primary Insurance: MEDICARE  Secondary Insurance: BLUE CROSS    DISCHARGE DETAILS:    Treatment Team Recommendation: Short Term Rehab  Discharge Destination Plan:: Short Term Rehab  Transport at Discharge : Wheelchair van     Number/Name of Dispatcher: [phone]  Transported by (Company and Unit #): [employer]  ETA of Transport (Date): 02/07/25  ETA of Transport (Time): 1300    Additional Comments: Pt will be cleared for discharged today to [facility].  Transport arranged with [employer] at 1PM.  Pt notified as well as his daughter and .  [facility] updated.  RN and MD aware.    Accepting Facility Name, City & State : [facility]  Receiving Facility/Agency Phone Number: [phone]  Facility/Agency Fax Number: Fax: [phone]

## 2025-02-07 NOTE — TELEPHONE ENCOUNTER
Spoke to patients daughter Courtney , unable to schedule TCM , due to mom transferring over and admitted to [facility].  Patients daughter is under the assumption that her mom will be unable to see Dr Page for  several weeks

## 2025-02-07 NOTE — RESTORATIVE TECHNICIAN NOTE
Restorative Technician Note      Patient Name: Sandra Purvis     Restorative Tech Visit Date: 02/07/25  Note Type: Mobility  Patient Position Upon Consult: Supine  Activity Performed: Range of motion; Transferred; Repositioned  Assistive Device: Roller walker  Patient Position at End of Consult: All needs within reach; Bedside chair

## 2025-02-07 NOTE — ASSESSMENT & PLAN NOTE
Urine culture growing Pseudomonas aeruginosa will treat with 3 days of Levaquin, QTc within normal limits

## 2025-02-07 NOTE — ASSESSMENT & PLAN NOTE
Status post unsuccessful disimpaction at [facility] 2/3, transferred to [facility]  for upper endoscopy with successful esophageal clearance. Incomplete EGD, duodenum not visualized.    Pathology pending, biopsies from esophagus were taken to exclude candidiasis versus adherent food to the esophageal wall  Liquid diet currently. nutritional supplements.  Continue acid suppression. Keep HOB elevated/upright with meals.   Suspect dysphagia represents achalasia and workup can be done if patient is interested  A message was sent to the office to provide outpatient follow-up to see how she is doing with diet manipulation.

## 2025-02-07 NOTE — NURSING NOTE
Belongings and discharge paperwork sent with patient. Ivs removed. Nurse gave report to oncoming nurse Millard. Patient transported to rehab by transportation by wheelchair.

## 2025-02-10 ENCOUNTER — TRANSITIONAL CARE MANAGEMENT (OUTPATIENT)
Age: OVER 89
End: 2025-02-10

## 2025-02-10 ENCOUNTER — PATIENT OUTREACH (OUTPATIENT)
Dept: CASE MANAGEMENT | Facility: OTHER | Age: OVER 89
End: 2025-02-10

## 2025-02-10 NOTE — CASE MANAGEMENT
CM NOTE      2/10 1300: Contacted by pt's dtr via email regarding dtr with concerns regarding pt's diet she was d/c on from acute care stay to [place] [facility]. I called dtr and explained that since pt was not on the ARC immediately prior to d/c to [place], I am unable to know what occurred. I did reach out to pt's CM while at [place] Radha GUERRERO, as well as reviewer Katarzyna HURTADO to make them aware. Radha to investigate and reach out to pt's dtr.

## 2025-02-10 NOTE — PROGRESS NOTES
ADT alert received indicating the patient discharged 2/7/25 to [facility] for STR. Email sent to facility to inform them the patient is on the [facility] and I will be following them during their skilled stay.  Email sent to SNF Coordinator to inform the facility that I am following the patient while in SNF and to add them to the facilities PCC.  This Admin Coordinator will continue to monitor via chart review.

## 2025-02-11 NOTE — OCCUPATIONAL THERAPY NOTE
OT DISCHARGE SUMMARY    Pt made good progress during stay on the ARC following admission for SDH.  Pt presented upon IE with generalized weakness, decreased endurance, activity tolerance, and functional mobility. On evaluation, pt required maxA/TA to complete all ADLs and functional transfers. Pt was discharged to acute for further workup 2* GI complications. Pt currently functioning at Luis Eduardo/modA level for ADL, modA level for transfers with RW, TA toileting. Pt has mett all OT goal. However, family unable to provide 24/7 SUP/assistance at DC. Pt was set for DC to [facility], however, DC to acute for further workup. Once medically stable recommend DC to STR to cont to inc fx performance and indep. Pending fx status and level of A home vs LTC.     -Naomi Syed MS, OTR/L, CSRS

## 2025-02-13 ENCOUNTER — TELEPHONE (OUTPATIENT)
Age: OVER 89
End: 2025-02-13

## 2025-02-13 PROCEDURE — 88305 TISSUE EXAM BY PATHOLOGIST: CPT | Performed by: STUDENT IN AN ORGANIZED HEALTH CARE EDUCATION/TRAINING PROGRAM

## 2025-02-13 PROCEDURE — 88342 IMHCHEM/IMCYTCHM 1ST ANTB: CPT | Performed by: STUDENT IN AN ORGANIZED HEALTH CARE EDUCATION/TRAINING PROGRAM

## 2025-02-13 PROCEDURE — 88312 SPECIAL STAINS GROUP 1: CPT | Performed by: STUDENT IN AN ORGANIZED HEALTH CARE EDUCATION/TRAINING PROGRAM

## 2025-02-13 NOTE — TELEPHONE ENCOUNTER
Lashonda from [facility] calling to move up patients appointment with gastroenterology that is scheduled in April. Review of chart shows that no appointments with gastroenterology are currently scheduled. Did see that patient had a recent procedure with  with [facility]. Phone number provided to Lashonda.

## 2025-02-15 NOTE — PHYSICAL THERAPY NOTE
ARC PT DC SUMMARY    Pt admitted to [facility] 1/17/25 after fall S/P facial Fxs and SDH.   Pt was sent back to acute due to inc Bps.  Now re admitted and initially she has not been performing well due to pain/ lethagic/ orthostatic Bps/ lightheadedness/ and wants to go back to bed.      Pt began to make some progress towards goals. Demonstrating some progress with functional mobility with sit-stands/ bed mobility/ and transfers but still fluctuates from Min/ Max assist depending on fatigue/ blood pressure/ pain/ agitation.      Rehab team spoke with family and they can not provide this assistance so will be D/C to SNF.  Pt will still benefit from cont skilled inpatient rehab for decrease burden of care and maximize functional mobility.    Pt developed changes in GI and ongoing BP issues, pt returned to acute for further workup.    Given level of care needed and limited support at DC, recommendation for SNF level care when medically stable for DC.

## 2025-02-19 LAB
ATRIAL RATE: 66 BPM
PR INTERVAL: 478 MS
QRS AXIS: 9 DEGREES
QRSD INTERVAL: 83 MS
QT INTERVAL: 400 MS
QTC INTERVAL: 420 MS
T WAVE AXIS: 83 DEGREES
VENTRICULAR RATE: 66 BPM

## 2025-02-19 PROCEDURE — 93010 ELECTROCARDIOGRAM REPORT: CPT | Performed by: INTERNAL MEDICINE

## 2025-02-20 ENCOUNTER — PATIENT OUTREACH (OUTPATIENT)
Dept: CASE MANAGEMENT | Facility: OTHER | Age: OVER 89
End: 2025-02-20

## 2025-02-25 ENCOUNTER — TELEPHONE (OUTPATIENT)
Age: OVER 89
End: 2025-02-25

## 2025-02-25 NOTE — TELEPHONE ENCOUNTER
"Rec'd call from patients daughter, Courtney.    Patient was seen in consult by Dr. Amaya 12/2024.    Patient is currently in rehab facility due to fall.    Facility is suggesting follow up with derm - not for excision. Patient has been picking at her scalp lesion lately and daughter states that lately she's noticed an odor.     She would just like it re-checked and to see if there is anything she can do to \"help\" with it - cream/bandages.    Scheduled follow up with Sera/Dr. Amaya in [place] on 3/7/2025 @ 1:00 pm.    Courtney aware of office location.  Courtney aware to arrive 15 minutes prior.  Confirmed Medicare/[facility] health coverage.      "

## 2025-02-27 ENCOUNTER — PATIENT OUTREACH (OUTPATIENT)
Dept: CASE MANAGEMENT | Facility: OTHER | Age: OVER 89
End: 2025-02-27

## 2025-03-05 PROBLEM — N39.0 UTI (URINARY TRACT INFECTION): Status: RESOLVED | Noted: 2021-10-15 | Resolved: 2025-03-05

## 2025-03-05 PROBLEM — N30.00 ACUTE CYSTITIS: Status: RESOLVED | Noted: 2025-02-03 | Resolved: 2025-03-05

## 2025-03-06 ENCOUNTER — PATIENT OUTREACH (OUTPATIENT)
Dept: CASE MANAGEMENT | Facility: OTHER | Age: OVER 89
End: 2025-03-06

## 2025-03-07 ENCOUNTER — OFFICE VISIT (OUTPATIENT)
Dept: DERMATOLOGY | Facility: CLINIC | Age: OVER 89
End: 2025-03-07
Payer: MEDICARE

## 2025-03-07 DIAGNOSIS — C44.41 BASAL CELL CARCINOMA (BCC) OF SCALP: Primary | ICD-10-CM

## 2025-03-07 DIAGNOSIS — S01.00XA OPEN WOUND OF SCALP, UNSPECIFIED OPEN WOUND TYPE, INITIAL ENCOUNTER: ICD-10-CM

## 2025-03-07 PROCEDURE — 87205 SMEAR GRAM STAIN: CPT

## 2025-03-07 PROCEDURE — 87070 CULTURE OTHR SPECIMN AEROBIC: CPT

## 2025-03-07 PROCEDURE — 87186 SC STD MICRODIL/AGAR DIL: CPT

## 2025-03-07 PROCEDURE — 99214 OFFICE O/P EST MOD 30 MIN: CPT

## 2025-03-07 PROCEDURE — 87147 CULTURE TYPE IMMUNOLOGIC: CPT

## 2025-03-07 RX ORDER — MUPIROCIN 20 MG/G
OINTMENT TOPICAL 3 TIMES DAILY
Qty: 22 G | Refills: 3 | Status: SHIPPED | OUTPATIENT
Start: 2025-03-07

## 2025-03-07 NOTE — PROGRESS NOTES
"[facility] Dermatology Clinic Note     Patient Name: Sandra Purvis  Encounter Date: 3/6/2025     Have you been cared for by a [facility] Dermatologist in the last 3 years and, if so, which description applies to you?    Yes.  I have been here within the last 3 years, and my medical history has NOT changed since that time.  I am FEMALE/of child-bearing potential.    REVIEW OF SYSTEMS:  Have you recently had or currently have any of the following? No changes in my recent health.   PAST MEDICAL HISTORY:  Have you personally ever had or currently have any of the following?  If \"YES,\" then please provide more detail. No changes in my medical history.   HISTORY OF IMMUNOSUPPRESSION: Do you have a history of any of the following:  Systemic Immunosuppression such as Diabetes, Biologic or Immunotherapy, Chemotherapy, Organ Transplantation, Bone Marrow Transplantation or Prednisone?  No     Answering \"YES\" requires the addition of the dotphrase \"IMMUNOSUPPRESSED\" as the first diagnosis of the patient's visit.   FAMILY HISTORY:  Any \"first degree relatives\" (parent, brother, sister, or child) with the following?    No changes in my family's known health.   PATIENT EXPERIENCE:    Do you want the Dermatologist to perform a COMPLETE skin exam today including a clinical examination under the \"bra and underwear\" areas?  NO  If necessary, do we have your permission to call and leave a detailed message on your Preferred Phone number that includes your specific medical information?  Yes      Allergies   Allergen Reactions   • Lactose - Food Allergy GI Intolerance   • Penicillins Other (See Comments)     Unsure of reaction       Current Outpatient Medications:   •  acetaminophen (TYLENOL) 325 mg tablet, Take 2 tablets (650 mg total) by mouth every 6 (six) hours as needed for mild pain, moderate pain or headaches, Disp: , Rfl:   •  bisacodyl (DULCOLAX) 10 mg suppository, Insert 1 suppository (10 mg total) into the rectum daily as " needed for constipation, Disp: , Rfl:   •  calcium carbonate (TUMS) 500 mg chewable tablet, Chew 1 tablet (500 mg total) daily as needed for indigestion or heartburn, Disp: , Rfl:   •  DAILY MULTIPLE VITAMINS/IRON PO, Take 1 tablet by mouth daily, Disp: , Rfl:   •  docusate sodium (COLACE) 100 mg capsule, Take 1 capsule (100 mg total) by mouth 2 (two) times a day as needed for constipation (first line), Disp: , Rfl:   •  enoxaparin (LOVENOX) 30 mg/0.3 mL, Inject 0.3 mL (30 mg total) under the skin every 24 hours, Disp: , Rfl:   •  gabapentin (NEURONTIN) 100 mg capsule, Take 1 capsule (100 mg total) by mouth daily at bedtime, Disp: , Rfl:   •  hydrALAZINE (APRESOLINE) 10 mg tablet, Take 1 tablet (10 mg total) by mouth every 8 (eight) hours as needed (Give for SBP >180.), Disp: , Rfl:   •  melatonin 3 mg, Take 2 tablets (6 mg total) by mouth daily at bedtime, Disp: , Rfl:   •  metoprolol succinate (TOPROL-XL) 25 mg 24 hr tablet, Take 1 tablet (25 mg total) by mouth 2 (two) times a day, Disp: , Rfl:   •  midodrine (PROAMATINE) 5 mg tablet, Take 1 tablet (5 mg total) by mouth 3 (three) times a day as needed (Give for SBP <90), Disp: , Rfl:   •  mirtazapine (REMERON) 7.5 MG tablet, Take 1 tablet (7.5 mg total) by mouth daily at bedtime, Disp: , Rfl:   •  mupirocin (BACTROBAN) 2 % ointment, Apply topically 3 (three) times a day To lesion on scalp, Disp: 22 g, Rfl: 3  •  ondansetron (ZOFRAN-ODT) 4 mg disintegrating tablet, Take 1 tablet (4 mg total) by mouth every 6 (six) hours as needed for nausea or vomiting, Disp: , Rfl:   •  pantoprazole (PROTONIX) 40 mg tablet, Take 1 tablet (40 mg total) by mouth daily in the early morning, Disp: , Rfl:   •  saccharomyces boulardii (FLORASTOR) 250 mg capsule, Take 1 capsule (250 mg total) by mouth 2 (two) times a day, Disp: , Rfl:   •  magnesium Oxide (MAG-OX) 400 mg TABS, Take 1 tablet (400 mg total) by mouth 2 (two) times a day for 10 doses, Disp: 10 tablet, Rfl: 0  •  midodrine  (PROAMATINE) 5 mg tablet, Take 1 tablet (5 mg total) by mouth daily before breakfast, Disp: , Rfl:         Whom besides the patient is providing clinical information about today's encounter?   NO ADDITIONAL HISTORIAN (patient alone provided history)  Caretaker provided history (patient is institutionalized)    Physical Exam and Assessment/Plan by Diagnosis:    BASAL CELL CARCINOMA     Physical Exam:  Anatomic Location Affected:  scalp  Morphological Description: ulcerated exophytic crusted tumor  Pertinent Positives:  Pertinent Negatives:  Prior biopsy: yes;  If YES, prior accession or case #: [id number]      Assessment and Plan:  Based on a thorough discussion of this condition and the management approach to it (including a comprehensive discussion of the known risks, side effects and potential benefits of treatment), the patient (family) agrees to implement the following specific plan:     Patient presents with daughter Courtney as patient is picking at site and they have noticed an odor. Patient and daughter have chosen to not treat. They would like a cream or bandage. Patient notes it is very itchy. There is no would care being done at the rehab facility-patient is in post-acute rehab facility after syncopal episode in January .          Wash site with Hibiclens-daily if possible. Available otc.   Apply mupirocin ointment three times a day to site   Culture done today-will call with results  Referral for VNA placed today for 3 times a week once patient is released from rehab facility  Recommend following up with PCP after home from rehab facility to discuss additional assistance through palliative care. Patient's daughter Courtney who was present today lives three hours away    What is basal cell carcinoma?  Basal cell carcinoma (BCC) is a common, locally invasive, keratinocytic, or non-melanoma, skin cancer. It is also known as rodent ulcer and basalioma. Patients with BCC often develop multiple primary tumours over  time.     Who gets basal cell carcinoma?  Risk factors for BCC include:  Age and sex: BCCs are particularly prevalent in elderly males. However, they also affect females and younger adults   Previous BCC or other form of skin cancer (squamous cell carcinoma, melanoma)   Sun damage (photoaging, actinic keratoses)   Repeated prior episodes of sunburn   Fair skin, blue eyes and blond or red hair--note; BCC can also affect darker skin types   Previous cutaneous injury, thermal burn, disease (eg cutaneous lupus, sebaceous naevus)   Inherited syndromes: BCC is a particular problem for families with basal cell naevus syndrome (Gorlin syndrome), Nycce-Pxgaé-Djcwhrrq syndrome, Rombo syndrome, Oley syndrome and xeroderma pigmentosum   Other risk factors include ionising radiation, exposure to arsenic, and immune suppression due to disease or medicines     What causes basal cell carcinoma?  The cause of BCC is multifactorial.  Most often, there are DNA mutations in the patched (PTCH) tumour suppressor gene, part of hedgehog signalling pathway   These may be triggered by exposure to ultraviolet radiation   Various spontaneous and inherited gene defects predispose to BCC     What are the clinical features of basal cell carcinoma?  BCC is a locally invasive skin tumour. The main characteristics are:  Slowly growing plaque or nodule   Skin coloured, pink or pigmented   Varies in size from a few millimetres to several centimetres in diameter   Spontaneous bleeding or ulceration  BCC is very rarely a threat to life. A tiny proportion of BCCs grow rapidly, invade deeply, and/or metastasise to local lymph nodes.     Types of basal cell carcinoma  There are several distinct clinical types of BCC, and over 20 histological growth patterns of BCC.  Nodular BCC  Most common type of facial BCC   Shiny or pearly nodule with a smooth surface   May have central depression or ulceration, so its edges appear rolled   Blood vessels cross its  surface   Cystic variant is soft, with jelly-like contents   Micronodular, microcystic and infiltrative types are potentially aggressive subtypes   Also known as nodulocystic carcinoma  Superficial BCC  Most common type in younger adults   Most common type on upper trunk and shoulders   Slightly scaly, irregular plaque   Thin, translucent rolled border   Multiple microerosions  Morphoeaform BCC  Usually found in mid-facial sites   Waxy, scar-like plaque with indistinct borders   Wide and deep subclinical extension   May infiltrate cutaneous nerves (perineural spread)   Also known as morpheic, morphoeiform or sclerosing BCC  Basosquamous carcinoma  Mixed basal cell carcinoma (BCC) and squamous cell carcinoma (SCC)   Infiltrative growth pattern   Potentially more aggressive than other forms of BCC   Also known as basisquamous carcinoma and mixed basal-squamous cell carcinoma       Complications of basal cell carcinoma     Recurrent BCC  Recurrence of BCC after initial treatment is not uncommon. Characteristics of recurrent BCC often include:  Incomplete excision or narrow margins at primary excision   Morphoeic, micronodular, and infiltrative subtypes   Location on head and neck     Advanced BCC  Advanced BCCs are large, often neglected tumours.  They may be several centimetres in diameter   They may be deeply infiltrating into tissues below the skin   They are difficult or impossible to treat surgically     Metastatic BCC  Very rare   Primary tumour is often large, neglected or recurrent, located on head and neck, with aggressive subtype   May have had multiple prior treatments   May arise in site exposed to ionising radiation   Can be fatal     How is basal cell carcinoma diagnosed?  BCC is diagnosed clinically by the presence of a slowly enlarging skin lesion with typical appearance. The diagnosis and  by a diagnostic biopsy or following excision.  Some typical superficial BCCs on trunk and limbs are clinically  diagnosed and have non-surgical treatment without histology.     What is the treatment for primary basal cell carcinoma?  The treatment for a BCC depends on its type, size and location, the number to be treated, patient factors, and the preference or expertise of the doctor. Most BCCs are treated surgically. Long-term follow-up is recommended to check for new lesions and recurrence; the latter may be unnecessary if histology has reported wide clear margins.     Excision biopsy  Excision means the lesion is cut out and the skin stitched up.  Most appropriate treatment for nodular, infiltrative and morphoeic BCCs   Should include 3 to 5 mm margin of normal skin around the tumour   Very large lesions may require flap or skin graft to repair the defect   Pathologist will report deep and lateral margins   Further surgery is recommended for lesions that are incompletely excised     Mohs micrographically controlled excision  Mohs micrographically controlled surgery involves examining carefully marked excised tissue under the microscope, layer by layer, to ensure complete excision.  Very high cure rates achieved by trained Mohs surgeons   Used in high-risk areas of the face around eyes, lips and nose   Suitable for ill-defined, morphoeic, infiltrative and recurrent subtypes   Large defects are repaired by flap or skin graft     Superficial skin surgery  Superficial skin surgery comprises shave, curettage, and electrocautery. It is a rapid technique using local anaesthesia and does not require sutures.  Suitable for small, well-defined nodular or superficial BCCs   Lesions are usually located on trunk or limbs   Wound is left open to heal by secondary intention   Moist wound dressings lead to healing within a few weeks   Eventual scar quality variable     Cryotherapy  Cryotherapy is the treatment of a superficial skin lesion by freezing it, usually with liquid nitrogen.  Suitable for small superficial BCCs on covered areas  of trunk and limbs   Best avoided for BCCs on head and neck, and distal to knees   Double freeze-thaw technique   Results in a blister that crusts over and heals within several weeks.   Leaves permanent white joce     Photodynamic therapy  Photodynamic therapy (PDT) refers to a technique in which BCC is treated with a photosensitising chemical, and exposed to light several hours later.  Topical photosensitisers include aminolevulinic acid lotion and methyl aminolevulinate cream   Suitable for low-risk small, superficial BCCs   Best avoided if tumour in site at high risk of recurrence   Results in inflammatory reaction, maximal 3-4 days after procedure   Treatment repeated 7 days after initial treatment   Excellent cosmetic results     Imiquimod cream  Imiquimod is an immune response modifier.  Best used for superficial BCCs less than 2 cm diameter   Applied three to five times each week, for 6-16 weeks   Results in a variable inflammatory reaction, maximal at three weeks   Minimal scarring is usual     Fluorouracil cream  5-Fluorouracil cream is a topical cytotoxic agent.  Used to treat small superficial basal cell carcinomas   Requires prolonged course, eg twice daily for 6-12 weeks   Causes inflammatory reaction   Has high recurrence rates     Radiotherapy  Radiotherapy or X-ray treatment can be used to treat primary BCCs or as adjunctive treatment if margins are incomplete.  Mainly used if surgery is not suitable   Best avoided in young patients and in genetic conditions predisposing to skin cancer   Best cosmetic results achieved using multiple fractions   Typically, patient attends once-weekly for several weeks   Causes inflammatory reaction followed by scar   Risk of radiodermatitis, late recurrence, and new tumours     What is the treatment for advanced or metastatic basal cell carcinoma?  Locally advanced primary, recurrent or metastatic BCC requires multidisciplinary consultation. Often a combination of  treatments is used.  Surgery   Radiotherapy   Targeted therapy  Targeted therapy refers to the hedgehog signalling pathway inhibitors, vismodegib and sonidegib. These drugs have some important risks and side effects.     How can basal cell carcinoma be prevented?  The most important way to prevent BCC is to avoid sunburn. This is especially important in childhood and early life. Fair skinned individuals and those with a personal or family history of BCC should protect their skin from sun exposure daily, year-round and lifelong.  Stay indoors or under the shade in the middle of the day   Wear covering clothing   Apply high protection factor SPF50+ broad-spectrum sunscreens generously to exposed skin if outdoors   Avoid indoor tanning (sun beds, solaria)  Oral nicotinamide (vitamin B3) in a dose of 500 mg twice daily may reduce the number and severity of BCCs.     What is the outlook for basal cell carcinoma?  Most BCCs are cured by treatment. Cure is most likely if treatment is undertaken when the lesion is small.  About 50% of people with BCC develop a second one within 3 years of the first. They are also at increased risk of other skin cancers, especially melanoma. Regular self-skin examinations and long-term annual skin checks by an experienced health professional are recommended.     Treatment options for this lesion were reviewed and discussed.   It was elected to proceed with    Treatment was scheduled  no     Scribe Attestation    I,:  Krista Zuluaga MA am acting as a scribe while in the presence of the attending physician.:       I,:  Sera Dickinson PA-C personally performed the services described in this documentation    as scribed in my presence.:

## 2025-03-07 NOTE — PATIENT INSTRUCTIONS
BASAL CELL CARCINOMA     Physical Exam:  Anatomic Location Affected:  scalp  Morphological Description: ulcerated exophytic crusted tumor  Pertinent Positives:  Pertinent Negatives:  Prior biopsy: yes;  If YES, prior accession or case #: [id number]      Assessment and Plan:  Based on a thorough discussion of this condition and the management approach to it (including a comprehensive discussion of the known risks, side effects and potential benefits of treatment), the patient (family) agrees to implement the following specific plan:     Patient presents with daughter Courtney as patient is picking at site and they have noticed an odor. Patient and daughter have chosen to not treat. They would like a cream or bandage. Patient notes it is very itchy. There is no would care being done at the rehab facility.    Wash with diluted Hibiclens  Apply mupirocin ointment twice a day  Culture done today  VNA done today for 3 times a week once patient is released from rehab facility    What is basal cell carcinoma?  Basal cell carcinoma (BCC) is a common, locally invasive, keratinocytic, or non-melanoma, skin cancer. It is also known as rodent ulcer and basalioma. Patients with BCC often develop multiple primary tumours over time.     Who gets basal cell carcinoma?  Risk factors for BCC include:  Age and sex: BCCs are particularly prevalent in elderly males. However, they also affect females and younger adults   Previous BCC or other form of skin cancer (squamous cell carcinoma, melanoma)   Sun damage (photoaging, actinic keratoses)   Repeated prior episodes of sunburn   Fair skin, blue eyes and blond or red hair--note; BCC can also affect darker skin types   Previous cutaneous injury, thermal burn, disease (eg cutaneous lupus, sebaceous naevus)   Inherited syndromes: BCC is a particular problem for families with basal cell naevus syndrome (Gorlin syndrome), Boytt-Yqaeé-Kplytxky syndrome, Rombo syndrome, Oley syndrome and xeroderma  pigmentosum   Other risk factors include ionising radiation, exposure to arsenic, and immune suppression due to disease or medicines     What causes basal cell carcinoma?  The cause of BCC is multifactorial.  Most often, there are DNA mutations in the patched (PTCH) tumour suppressor gene, part of hedgehog signalling pathway   These may be triggered by exposure to ultraviolet radiation   Various spontaneous and inherited gene defects predispose to BCC     What are the clinical features of basal cell carcinoma?  BCC is a locally invasive skin tumour. The main characteristics are:  Slowly growing plaque or nodule   Skin coloured, pink or pigmented   Varies in size from a few millimetres to several centimetres in diameter   Spontaneous bleeding or ulceration  BCC is very rarely a threat to life. A tiny proportion of BCCs grow rapidly, invade deeply, and/or metastasise to local lymph nodes.     Types of basal cell carcinoma  There are several distinct clinical types of BCC, and over 20 histological growth patterns of BCC.  Nodular BCC  Most common type of facial BCC   Shiny or pearly nodule with a smooth surface   May have central depression or ulceration, so its edges appear rolled   Blood vessels cross its surface   Cystic variant is soft, with jelly-like contents   Micronodular, microcystic and infiltrative types are potentially aggressive subtypes   Also known as nodulocystic carcinoma  Superficial BCC  Most common type in younger adults   Most common type on upper trunk and shoulders   Slightly scaly, irregular plaque   Thin, translucent rolled border   Multiple microerosions  Morphoeaform BCC  Usually found in mid-facial sites   Waxy, scar-like plaque with indistinct borders   Wide and deep subclinical extension   May infiltrate cutaneous nerves (perineural spread)   Also known as morpheic, morphoeiform or sclerosing BCC  Basosquamous carcinoma  Mixed basal cell carcinoma (BCC) and squamous cell carcinoma (SCC)    Infiltrative growth pattern   Potentially more aggressive than other forms of BCC   Also known as basisquamous carcinoma and mixed basal-squamous cell carcinoma       Complications of basal cell carcinoma     Recurrent BCC  Recurrence of BCC after initial treatment is not uncommon. Characteristics of recurrent BCC often include:  Incomplete excision or narrow margins at primary excision   Morphoeic, micronodular, and infiltrative subtypes   Location on head and neck     Advanced BCC  Advanced BCCs are large, often neglected tumours.  They may be several centimetres in diameter   They may be deeply infiltrating into tissues below the skin   They are difficult or impossible to treat surgically     Metastatic BCC  Very rare   Primary tumour is often large, neglected or recurrent, located on head and neck, with aggressive subtype   May have had multiple prior treatments   May arise in site exposed to ionising radiation   Can be fatal     How is basal cell carcinoma diagnosed?  BCC is diagnosed clinically by the presence of a slowly enlarging skin lesion with typical appearance. The diagnosis and  by a diagnostic biopsy or following excision.  Some typical superficial BCCs on trunk and limbs are clinically diagnosed and have non-surgical treatment without histology.     What is the treatment for primary basal cell carcinoma?  The treatment for a BCC depends on its type, size and location, the number to be treated, patient factors, and the preference or expertise of the doctor. Most BCCs are treated surgically. Long-term follow-up is recommended to check for new lesions and recurrence; the latter may be unnecessary if histology has reported wide clear margins.     Excision biopsy  Excision means the lesion is cut out and the skin stitched up.  Most appropriate treatment for nodular, infiltrative and morphoeic BCCs   Should include 3 to 5 mm margin of normal skin around the tumour   Very large lesions may require flap or  skin graft to repair the defect   Pathologist will report deep and lateral margins   Further surgery is recommended for lesions that are incompletely excised     Mohs micrographically controlled excision  Mohs micrographically controlled surgery involves examining carefully marked excised tissue under the microscope, layer by layer, to ensure complete excision.  Very high cure rates achieved by trained Mohs surgeons   Used in high-risk areas of the face around eyes, lips and nose   Suitable for ill-defined, morphoeic, infiltrative and recurrent subtypes   Large defects are repaired by flap or skin graft     Superficial skin surgery  Superficial skin surgery comprises shave, curettage, and electrocautery. It is a rapid technique using local anaesthesia and does not require sutures.  Suitable for small, well-defined nodular or superficial BCCs   Lesions are usually located on trunk or limbs   Wound is left open to heal by secondary intention   Moist wound dressings lead to healing within a few weeks   Eventual scar quality variable     Cryotherapy  Cryotherapy is the treatment of a superficial skin lesion by freezing it, usually with liquid nitrogen.  Suitable for small superficial BCCs on covered areas of trunk and limbs   Best avoided for BCCs on head and neck, and distal to knees   Double freeze-thaw technique   Results in a blister that crusts over and heals within several weeks.   Leaves permanent white joce     Photodynamic therapy  Photodynamic therapy (PDT) refers to a technique in which BCC is treated with a photosensitising chemical, and exposed to light several hours later.  Topical photosensitisers include aminolevulinic acid lotion and methyl aminolevulinate cream   Suitable for low-risk small, superficial BCCs   Best avoided if tumour in site at high risk of recurrence   Results in inflammatory reaction, maximal 3-4 days after procedure   Treatment repeated 7 days after initial treatment   Excellent  cosmetic results     Imiquimod cream  Imiquimod is an immune response modifier.  Best used for superficial BCCs less than 2 cm diameter   Applied three to five times each week, for 6-16 weeks   Results in a variable inflammatory reaction, maximal at three weeks   Minimal scarring is usual     Fluorouracil cream  5-Fluorouracil cream is a topical cytotoxic agent.  Used to treat small superficial basal cell carcinomas   Requires prolonged course, eg twice daily for 6-12 weeks   Causes inflammatory reaction   Has high recurrence rates     Radiotherapy  Radiotherapy or X-ray treatment can be used to treat primary BCCs or as adjunctive treatment if margins are incomplete.  Mainly used if surgery is not suitable   Best avoided in young patients and in genetic conditions predisposing to skin cancer   Best cosmetic results achieved using multiple fractions   Typically, patient attends once-weekly for several weeks   Causes inflammatory reaction followed by scar   Risk of radiodermatitis, late recurrence, and new tumours     What is the treatment for advanced or metastatic basal cell carcinoma?  Locally advanced primary, recurrent or metastatic BCC requires multidisciplinary consultation. Often a combination of treatments is used.  Surgery   Radiotherapy   Targeted therapy  Targeted therapy refers to the hedgehog signalling pathway inhibitors, vismodegib and sonidegib. These drugs have some important risks and side effects.     How can basal cell carcinoma be prevented?  The most important way to prevent BCC is to avoid sunburn. This is especially important in childhood and early life. Fair skinned individuals and those with a personal or family history of BCC should protect their skin from sun exposure daily, year-round and lifelong.  Stay indoors or under the shade in the middle of the day   Wear covering clothing   Apply high protection factor SPF50+ broad-spectrum sunscreens generously to exposed skin if outdoors   Avoid  indoor tanning (sun beds, solaria)  Oral nicotinamide (vitamin B3) in a dose of 500 mg twice daily may reduce the number and severity of BCCs.     What is the outlook for basal cell carcinoma?  Most BCCs are cured by treatment. Cure is most likely if treatment is undertaken when the lesion is small.  About 50% of people with BCC develop a second one within 3 years of the first. They are also at increased risk of other skin cancers, especially melanoma. Regular self-skin examinations and long-term annual skin checks by an experienced health professional are recommended.     Treatment options for this lesion were reviewed and discussed.   It was elected to proceed with    Treatment was scheduled  no

## 2025-03-09 LAB
BACTERIA WND AEROBE CULT: ABNORMAL
GRAM STN SPEC: ABNORMAL
GRAM STN SPEC: ABNORMAL

## 2025-03-10 ENCOUNTER — PATIENT OUTREACH (OUTPATIENT)
Dept: CASE MANAGEMENT | Facility: OTHER | Age: OVER 89
End: 2025-03-10

## 2025-03-10 LAB
BACTERIA WND AEROBE CULT: ABNORMAL
GRAM STN SPEC: ABNORMAL
GRAM STN SPEC: ABNORMAL

## 2025-03-10 NOTE — PROGRESS NOTES
Chart review complete update obtained from [facility] the patient is currently admitted to SNF for STR.     It has been 30 days since SNF/STR Surveillance episode was opened I will no longer be following the patient per protocol. I have removed myself from the care team, updated the Care Coordination note, and closed the Program.

## 2025-03-11 ENCOUNTER — RESULTS FOLLOW-UP (OUTPATIENT)
Dept: DERMATOLOGY | Facility: CLINIC | Age: OVER 89
End: 2025-03-11

## 2025-03-11 ENCOUNTER — TELEPHONE (OUTPATIENT)
Age: OVER 89
End: 2025-03-11

## 2025-03-11 DIAGNOSIS — L08.9 STAPH SKIN INFECTION: Primary | ICD-10-CM

## 2025-03-11 DIAGNOSIS — B95.8 STAPH SKIN INFECTION: Primary | ICD-10-CM

## 2025-03-11 RX ORDER — CEPHALEXIN 500 MG/1
500 CAPSULE ORAL EVERY 12 HOURS SCHEDULED
Qty: 14 CAPSULE | Refills: 0 | Status: SHIPPED | OUTPATIENT
Start: 2025-03-11 | End: 2025-03-18

## 2025-03-11 RX ORDER — MUPIROCIN 20 MG/G
OINTMENT TOPICAL 3 TIMES DAILY
Qty: 22 G | Refills: 3 | Status: SHIPPED | OUTPATIENT
Start: 2025-03-11

## 2025-03-11 NOTE — RESULT ENCOUNTER NOTE
Wound culture and gram stain reviewed with daughter Courtney over the phone showing 2+ growth staph aureus. Prescription sent for cephalexin 500mg BID for 7 days and mupirocin ointment to be applied TID to scalp lesion.     Daughter expressed verbal understanding of plan. Mother is still not home and is at [facility]. If needed we will write letter or fill out form explaining need for and instructions for keflex and mupirocin

## 2025-03-11 NOTE — LETTER
March 12, 2025     Sandra MAYKEL Purvis  [address] [facility] [address]      Dear Ms. Purvis:    Below are the results from your recent visit:    Resulted Orders   Wound culture and Gram stain   Result Value Ref Range    Wound Culture 2+ Growth of Staphylococcus aureus (A)     Gram Stain Result 1+ Polys (A)     Gram Stain Result 1+ Gram positive cocci in pairs (A)      The test results show that your current treatment is working. Please review the attached information.    Wound culture and gram stain reviewed with daughter Courtney over the phone   Prescription sent for cephalexin 500mg twice daily for 7 days and mupirocin ointment to be applied three times a to scalp lesion.       If you have any questions or concerns, please don't hesitate to call.         Sincerely,        Sera Dickinson PA-C

## 2025-03-11 NOTE — TELEPHONE ENCOUNTER
Lashonda from [facility] called to schedule follow up appt with Dr. Vasquez. Connected Lashonda over to Dr. Vasquez's office

## 2025-03-12 NOTE — TELEPHONE ENCOUNTER
Deysi randle from  rehab facility [facility] called office requesting if we can fax clinical order for her medications on starting keflex and mupirocin ointment.    Please fax instructions to [phone]. They need order sent before the can start medications.       Their number if any questions is [phone].

## 2025-03-13 ENCOUNTER — HOSPITAL ENCOUNTER (OUTPATIENT)
Dept: RADIOLOGY | Facility: HOSPITAL | Age: OVER 89
Discharge: HOME | End: 2025-03-13
Payer: MEDICARE

## 2025-03-13 DIAGNOSIS — R49.0 DYSPHONIA: ICD-10-CM

## 2025-03-13 PROCEDURE — 74220 X-RAY XM ESOPHAGUS 1CNTRST: CPT

## 2025-03-15 NOTE — ASSESSMENT & PLAN NOTE
Not taking AC at home due to hx of falls.  Continue to hold with recent falls and now subarachnoid/subdural.  Had been taking Toprol XL 50mg daily at home - currently receiving Toprol XL 75mg BID and Cardizem 120mg daily.  Hx of a-fib with RVR while on ARC, recently transferred to be on Cardizem drip.  Monitor routine VS.  Replete electrolytes as needed.  Follow-up with Cardiology as outpatient.   PAST 24HR EVENTS: no acute events overnight    Vital Signs Last 24 Hrs  T(C): 37.1 (14 Mar 2025 21:29), Max: 37.1 (14 Mar 2025 21:29)  T(F): 98.7 (14 Mar 2025 21:29), Max: 98.7 (14 Mar 2025 21:29)  HR: 94 (14 Mar 2025 21:29) (91 - 104)  BP: 107/71 (14 Mar 2025 21:29) (106/55 - 123/49)  BP(mean): --  RR: 18 (14 Mar 2025 21:29) (16 - 18)  SpO2: 98% (14 Mar 2025 21:29) (97% - 100%)    Parameters below as of 14 Mar 2025 21:29  Patient On (Oxygen Delivery Method): room air        MEDS:   acetaminophen     Tablet .. 650 milliGRAM(s) Oral every 6 hours PRN  albuterol    90 MICROgram(s) HFA Inhaler 2 Puff(s) Inhalation every 4 hours PRN  cefepime   IVPB 2000 milliGRAM(s) IV Intermittent every 8 hours  cefepime   IVPB      heparin   Injectable 5000 Unit(s) SubCutaneous every 8 hours  levETIRAcetam 1000 milliGRAM(s) Oral two times a day  metroNIDAZOLE  IVPB 500 milliGRAM(s) IV Intermittent every 8 hours  mirtazapine 7.5 milliGRAM(s) Oral at bedtime  polyethylene glycol 3350 17 Gram(s) Oral daily  senna 2 Tablet(s) Oral at bedtime  sertraline 50 milliGRAM(s) Oral daily  vancomycin  IVPB 1250 milliGRAM(s) IV Intermittent every 12 hours      LABS:                        12.9   8.13  )-----------( 274      ( 13 Mar 2025 05:32 )             38.5     03-14    137  |  103  |  11  ----------------------------<  101[H]  3.6   |  18[L]  |  0.45[L]    Ca    9.0      14 Mar 2025 04:23  Phos  3.7     03-14  Mg     1.90     03-14        PHYSICAL EXAM:  AOx3, appropriate, follows commands  PERRL, EOMI, face symmetrical   CLARK x 4 with good strength   Sensation intact to light touch   No pronator drift   Incision C/D/I

## 2025-03-17 ENCOUNTER — HOME HEALTH ADMISSION (OUTPATIENT)
Dept: HOME HEALTH SERVICES | Facility: HOME HEALTHCARE | Age: OVER 89
End: 2025-03-17
Payer: MEDICARE

## 2025-03-18 ENCOUNTER — PATIENT MESSAGE (OUTPATIENT)
Age: OVER 89
End: 2025-03-18

## 2025-03-18 PROBLEM — K22.0 ACHALASIA: Status: ACTIVE | Noted: 2025-03-18

## 2025-03-19 ENCOUNTER — HOME CARE VISIT (OUTPATIENT)
Dept: HOME HEALTH SERVICES | Facility: HOME HEALTHCARE | Age: OVER 89
End: 2025-03-19
Payer: MEDICARE

## 2025-03-19 PROCEDURE — G0299 HHS/HOSPICE OF RN EA 15 MIN: HCPCS

## 2025-03-19 PROCEDURE — 10330081 VN NO-PAY CLAIM PROCEDURE

## 2025-03-19 PROCEDURE — 400013 VN SOC

## 2025-03-20 ENCOUNTER — OFFICE VISIT (OUTPATIENT)
Age: OVER 89
End: 2025-03-20
Payer: MEDICARE

## 2025-03-20 VITALS
RESPIRATION RATE: 18 BRPM | HEART RATE: 66 BPM | SYSTOLIC BLOOD PRESSURE: 130 MMHG | OXYGEN SATURATION: 98 % | TEMPERATURE: 97.8 F | DIASTOLIC BLOOD PRESSURE: 70 MMHG

## 2025-03-20 VITALS
DIASTOLIC BLOOD PRESSURE: 78 MMHG | HEIGHT: 67 IN | BODY MASS INDEX: 17.27 KG/M2 | OXYGEN SATURATION: 99 % | TEMPERATURE: 98.5 F | HEART RATE: 70 BPM | SYSTOLIC BLOOD PRESSURE: 130 MMHG | WEIGHT: 110 LBS

## 2025-03-20 DIAGNOSIS — R26.2 AMBULATORY DYSFUNCTION: ICD-10-CM

## 2025-03-20 DIAGNOSIS — C43.62 MALIGNANT MELANOMA OF ARM, LEFT (HCC): ICD-10-CM

## 2025-03-20 DIAGNOSIS — N18.30 STAGE 3 CHRONIC KIDNEY DISEASE, UNSPECIFIED WHETHER STAGE 3A OR 3B CKD (HCC): ICD-10-CM

## 2025-03-20 DIAGNOSIS — I60.9 SAH (SUBARACHNOID HEMORRHAGE) (HCC): ICD-10-CM

## 2025-03-20 DIAGNOSIS — S06.5XAA SDH (SUBDURAL HEMATOMA) (HCC): ICD-10-CM

## 2025-03-20 DIAGNOSIS — I95.1 ORTHOSTATIC HYPOTENSION: ICD-10-CM

## 2025-03-20 DIAGNOSIS — K21.00 GASTROESOPHAGEAL REFLUX DISEASE WITH ESOPHAGITIS WITHOUT HEMORRHAGE: ICD-10-CM

## 2025-03-20 DIAGNOSIS — I48.19 PERSISTENT ATRIAL FIBRILLATION (HCC): Primary | ICD-10-CM

## 2025-03-20 PROCEDURE — 99214 OFFICE O/P EST MOD 30 MIN: CPT | Performed by: FAMILY MEDICINE

## 2025-03-20 PROCEDURE — G2211 COMPLEX E/M VISIT ADD ON: HCPCS | Performed by: FAMILY MEDICINE

## 2025-03-20 RX ORDER — PANTOPRAZOLE SODIUM 40 MG/1
40 TABLET, DELAYED RELEASE ORAL
Qty: 100 TABLET | Refills: 3 | Status: SHIPPED | OUTPATIENT
Start: 2025-03-20

## 2025-03-20 RX ORDER — AMOXICILLIN 250 MG
2 CAPSULE ORAL EVERY 12 HOURS
COMMUNITY
Start: 2025-02-14

## 2025-03-20 RX ORDER — POLYETHYLENE GLYCOL 3350 17 G/17G
17 POWDER, FOR SOLUTION ORAL EVERY 24 HOURS
COMMUNITY
Start: 2025-03-11

## 2025-03-20 RX ORDER — HYDROCHLOROTHIAZIDE 12.5 MG/1
1 TABLET ORAL DAILY
COMMUNITY
Start: 2025-03-13 | End: 2025-03-20

## 2025-03-20 NOTE — PROGRESS NOTES
Assessment/Plan:       Diagnoses and all orders for this visit:    Persistent atrial fibrillation (HCC)  Comments:  Controlled at this time continue with current treatment.  Patient will complete Lovenox.  After discussion with family, given unsteadiness, no anticoagulation    Gastroesophageal reflux disease with esophagitis without hemorrhage  Comments:  Continue Protonix.  Refills given  Orders:  -     pantoprazole (PROTONIX) 40 mg tablet; Take 1 tablet (40 mg total) by mouth daily in the early morning    Orthostatic hypotension  Comments:  Patient will take midodrine daily.  Discussed with daughter  and patient    SDH (subdural hematoma) (HCC)  Comments:  Improved.    SAH (subarachnoid hemorrhage) (HCC)  Comments:  Stable    Stage 3 chronic kidney disease, unspecified whether stage 3a or 3b CKD (HCC)  Comments:  Stable    Ambulatory dysfunction  Comments:  Continue with walker.  Given orthostatic hypotension and unsteadiness, hold off with anticoagulation.  Reevaluate 1 month    Malignant melanoma of arm, left (HCC)  Comments:  Follow-up with dermatology    Other orders  -     Discontinue: hydroCHLOROthiazide 12.5 mg tablet; Take 1 tablet by mouth in the morning  -     polyethylene glycol (GlycoLax) 17 GM/SCOOP powder; Take 17 g by mouth every 24 hours  -     senna-docusate sodium (SENOKOT S) 8.6-50 mg per tablet; Take 2 tablets by mouth every 12 (twelve) hours            Subjective:        Patient ID: Sandra Purvis is a 89 y.o. female.      Patient is here status post multiple admissions for A-fib with rapid ventricular response as well as orthostatic hypotension airway obstruction due to foreign body.  Patient also with recent subdural hematoma post fall.  Patient has not fallen since that time.  Patient just got out of rehab.  Feeling in normal state of health.  No new chest pain or worsening shortness of breath.  Patient ambulating with walker.  No change in urination.  Patient with constipation.   "No fevers.  Patient is having food cut by  into small pieces and feeling better overall.  Concerns regarding medications.  Patient presently on subcu heparin.  Patient has been unsteady at times on her feet.          The following portions of the patient's history were reviewed and updated as appropriate: allergies, current medications, past family history, past medical history, past social history, past surgical history and problem list.      Review of Systems   Constitutional: Negative.    HENT: Negative.     Eyes: Negative.    Respiratory: Negative.     Cardiovascular: Negative.    Gastrointestinal:  Positive for constipation.   Endocrine: Negative.    Genitourinary: Negative.    Musculoskeletal:  Positive for back pain and gait problem.   Skin: Negative.    Allergic/Immunologic: Negative.    Hematological: Negative.    Psychiatric/Behavioral: Negative.             Objective:        Depression Screening and Follow-up Plan: Patient was screened for depression during today's encounter. They screened negative with a PHQ-2 score of 2.              /78 (BP Location: Left arm, Patient Position: Sitting, Cuff Size: Standard)   Pulse 70   Temp 98.5 °F (36.9 °C) (Temporal)   Ht 5' 7\" (1.702 m)   Wt 49.9 kg (110 lb)   SpO2 99%   BMI 17.23 kg/m²          Physical Exam  Vitals and nursing note reviewed.   Constitutional:       General: She is not in acute distress.     Appearance: Normal appearance. She is not ill-appearing, toxic-appearing or diaphoretic.   HENT:      Head: Normocephalic and atraumatic.      Right Ear: Tympanic membrane, ear canal and external ear normal. There is no impacted cerumen.      Left Ear: Tympanic membrane, ear canal and external ear normal. There is no impacted cerumen.      Nose: Nose normal. No congestion or rhinorrhea.      Mouth/Throat:      Mouth: Mucous membranes are moist.      Pharynx: No oropharyngeal exudate or posterior oropharyngeal erythema.   Eyes:      General: " No scleral icterus.        Right eye: No discharge.         Left eye: No discharge.      Conjunctiva/sclera: Conjunctivae normal.   Cardiovascular:      Rate and Rhythm: Normal rate and regular rhythm.      Pulses: Normal pulses.      Heart sounds: Normal heart sounds. No murmur heard.     No friction rub. No gallop.   Pulmonary:      Effort: Pulmonary effort is normal. No respiratory distress.      Breath sounds: Normal breath sounds. No stridor. No wheezing, rhonchi or rales.   Chest:      Chest wall: No tenderness.   Musculoskeletal:      Cervical back: Normal range of motion and neck supple. No rigidity. No muscular tenderness.      Right lower leg: No edema.      Left lower leg: No edema.   Lymphadenopathy:      Cervical: No cervical adenopathy.   Skin:     General: Skin is warm and dry.      Capillary Refill: Capillary refill takes less than 2 seconds.      Coloration: Skin is not jaundiced.      Findings: Lesion present. No bruising, erythema or rash.   Neurological:      Mental Status: She is alert and oriented to person, place, and time. Mental status is at baseline.      Gait: Gait abnormal.   Psychiatric:         Mood and Affect: Mood normal.         Behavior: Behavior normal.         Thought Content: Thought content normal.         Judgment: Judgment normal.

## 2025-03-21 ENCOUNTER — HOME CARE VISIT (OUTPATIENT)
Dept: HOME HEALTH SERVICES | Facility: HOME HEALTHCARE | Age: OVER 89
End: 2025-03-21
Payer: MEDICARE

## 2025-03-21 VITALS
TEMPERATURE: 98 F | HEART RATE: 68 BPM | DIASTOLIC BLOOD PRESSURE: 68 MMHG | OXYGEN SATURATION: 98 % | SYSTOLIC BLOOD PRESSURE: 110 MMHG | RESPIRATION RATE: 14 BRPM

## 2025-03-21 PROCEDURE — G0299 HHS/HOSPICE OF RN EA 15 MIN: HCPCS

## 2025-03-21 PROCEDURE — G0151 HHCP-SERV OF PT,EA 15 MIN: HCPCS

## 2025-03-23 VITALS — DIASTOLIC BLOOD PRESSURE: 52 MMHG | HEART RATE: 70 BPM | OXYGEN SATURATION: 96 % | SYSTOLIC BLOOD PRESSURE: 124 MMHG

## 2025-03-24 ENCOUNTER — HOME CARE VISIT (OUTPATIENT)
Dept: HOME HEALTH SERVICES | Facility: HOME HEALTHCARE | Age: OVER 89
End: 2025-03-24
Payer: MEDICARE

## 2025-03-24 VITALS
HEART RATE: 64 BPM | OXYGEN SATURATION: 98 % | DIASTOLIC BLOOD PRESSURE: 62 MMHG | RESPIRATION RATE: 16 BRPM | TEMPERATURE: 98 F | SYSTOLIC BLOOD PRESSURE: 122 MMHG

## 2025-03-24 PROCEDURE — G0299 HHS/HOSPICE OF RN EA 15 MIN: HCPCS

## 2025-03-25 ENCOUNTER — HOME CARE VISIT (OUTPATIENT)
Dept: HOME HEALTH SERVICES | Facility: HOME HEALTHCARE | Age: OVER 89
End: 2025-03-25
Payer: MEDICARE

## 2025-03-25 VITALS — DIASTOLIC BLOOD PRESSURE: 66 MMHG | SYSTOLIC BLOOD PRESSURE: 140 MMHG | OXYGEN SATURATION: 95 % | HEART RATE: 55 BPM

## 2025-03-25 PROCEDURE — G0155 HHCP-SVS OF CSW,EA 15 MIN: HCPCS

## 2025-03-25 PROCEDURE — G0151 HHCP-SERV OF PT,EA 15 MIN: HCPCS

## 2025-03-26 ENCOUNTER — HOME CARE VISIT (OUTPATIENT)
Dept: HOME HEALTH SERVICES | Facility: HOME HEALTHCARE | Age: OVER 89
End: 2025-03-26
Payer: MEDICARE

## 2025-03-26 PROCEDURE — G0153 HHCP-SVS OF S/L PATH,EA 15MN: HCPCS

## 2025-03-27 ENCOUNTER — HOME CARE VISIT (OUTPATIENT)
Dept: HOME HEALTH SERVICES | Facility: HOME HEALTHCARE | Age: OVER 89
End: 2025-03-27
Payer: MEDICARE

## 2025-03-27 ENCOUNTER — TELEMEDICINE (OUTPATIENT)
Dept: GERIATRICS | Facility: OTHER | Age: OVER 89
End: 2025-03-27
Payer: MEDICARE

## 2025-03-27 VITALS
RESPIRATION RATE: 16 BRPM | OXYGEN SATURATION: 97 % | TEMPERATURE: 97.6 F | DIASTOLIC BLOOD PRESSURE: 72 MMHG | SYSTOLIC BLOOD PRESSURE: 122 MMHG

## 2025-03-27 DIAGNOSIS — R26.2 AMBULATORY DYSFUNCTION: ICD-10-CM

## 2025-03-27 DIAGNOSIS — I60.9 SAH (SUBARACHNOID HEMORRHAGE) (HCC): ICD-10-CM

## 2025-03-27 DIAGNOSIS — R41.81 AGE-RELATED COGNITIVE DECLINE: ICD-10-CM

## 2025-03-27 DIAGNOSIS — I95.1 ORTHOSTATIC HYPOTENSION: ICD-10-CM

## 2025-03-27 DIAGNOSIS — C43.62 MALIGNANT MELANOMA OF ARM, LEFT (HCC): ICD-10-CM

## 2025-03-27 DIAGNOSIS — I48.19 PERSISTENT ATRIAL FIBRILLATION (HCC): ICD-10-CM

## 2025-03-27 DIAGNOSIS — G47.00 INSOMNIA: ICD-10-CM

## 2025-03-27 DIAGNOSIS — I10 HYPERTENSION: ICD-10-CM

## 2025-03-27 DIAGNOSIS — S06.5XAA SDH (SUBDURAL HEMATOMA) (HCC): ICD-10-CM

## 2025-03-27 DIAGNOSIS — K22.0 ACHALASIA: Primary | ICD-10-CM

## 2025-03-27 DIAGNOSIS — N18.30 STAGE 3 CHRONIC KIDNEY DISEASE, UNSPECIFIED WHETHER STAGE 3A OR 3B CKD (HCC): ICD-10-CM

## 2025-03-27 PROCEDURE — 99205 OFFICE O/P NEW HI 60 MIN: CPT | Performed by: NURSE PRACTITIONER

## 2025-03-27 PROCEDURE — G0299 HHS/HOSPICE OF RN EA 15 MIN: HCPCS

## 2025-03-27 RX ORDER — METOPROLOL SUCCINATE 25 MG/1
25 TABLET, EXTENDED RELEASE ORAL 2 TIMES DAILY
Qty: 180 TABLET | Refills: 1 | Status: SHIPPED | OUTPATIENT
Start: 2025-03-27

## 2025-03-27 RX ORDER — GABAPENTIN 100 MG/1
100 CAPSULE ORAL
Qty: 90 CAPSULE | Refills: 1 | Status: SHIPPED | OUTPATIENT
Start: 2025-03-27

## 2025-03-27 RX ORDER — MIRTAZAPINE 7.5 MG/1
7.5 TABLET, FILM COATED ORAL
Qty: 90 TABLET | Refills: 1 | Status: SHIPPED | OUTPATIENT
Start: 2025-03-27

## 2025-03-27 NOTE — ASSESSMENT & PLAN NOTE
History of Insomnia  Continue Melatonin 6 mg HS  Awaiting message from Dr. Page in regards to any contraindications to restarting Magnesium 100 mg po QD  Limit interruptions at night as medically necessary  Continue to maintain proper sleep-wake cycle  Avoid caffeine throughout daytime hours  Avoid napping throughout daytime hours  Discussed avoidance of sedative medication use such as benzodiazepines and/or benadryl products  Continue to monitor for acute changes in condition   Follow up with PCP     Orders:    mirtazapine (REMERON) 7.5 MG tablet; Take 1 tablet (7.5 mg total) by mouth daily at bedtime

## 2025-03-27 NOTE — ASSESSMENT & PLAN NOTE
History of falls with history of SAH/SDH  Maintain fall and safety precautions  Encourage use of asst devices  Continue PT/OT services with VNA  Assess for need with assistance with transfers, mobility, and ADLs in/out of home  Patient is at high risk for falls r/t polypharmacy, environmental barriers, and age  Continue to monitor for acute changes in condition   Follow up with PCP     Orders:    gabapentin (NEURONTIN) 100 mg capsule; Take 1 capsule (100 mg total) by mouth daily at bedtime

## 2025-03-27 NOTE — ASSESSMENT & PLAN NOTE
BP remains stable today, 122/72  Continue Midodrine 5 mg po TID  Patient take Metoprolol 25 mg BID for rate control r/t A-fib  Encourage patient to rise slowly from seated to standing position  Maintain fall and safety precautions  Continue to monitor BP and for acute changes in condition  Follow up with PCP/Cardiology

## 2025-03-27 NOTE — ASSESSMENT & PLAN NOTE
Lab Results   Component Value Date    EGFR 65 03/06/2025    EGFR 71 02/13/2025    EGFR 58 02/07/2025    CREATININE 0.85 03/06/2025    CREATININE 0.79 02/13/2025    CREATININE 0.88 02/07/2025   Most recent Creatinine 0.85/Gfr 65, stable  Continue to monitor BMP periodically  Avoid nephrotoxins and NSAIDS  Avoid hypotension  Continue to monitor for acute changes in condition  Follow up with PCP

## 2025-03-27 NOTE — ASSESSMENT & PLAN NOTE
Continue Metoprolol for rate control  Continue to monitor HR  Currently not on anticoag, contraindicated r/t hx SDH  Follow up with PCP/Cardiology

## 2025-03-27 NOTE — ASSESSMENT & PLAN NOTE
History of SAH/SDH secondary to fall in Jan 2025  CT scan 2/3/25 results show- Trace posterior bifrontal subarachnoid hemorrhage likely subacute and stable to slightly decreased allowing for some degree of redistribution. 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  Continue to monitor for acute changes in condition  Maintain fall and safety precautions  Follow up with PCP/Neuro prn

## 2025-03-27 NOTE — ASSESSMENT & PLAN NOTE
Surgical incision performed 12/19  Continue to monitor area for acute changes in condition  Follow up with Derm/oncology prn

## 2025-03-27 NOTE — ASSESSMENT & PLAN NOTE
Alert and oriented x 2 upon examination today  Continue supportive measures  Continue to reorient, redirect, and reassure patient prn  Encourage engagement and activity  Encourage daily involvement in ADLs  Maintain delirium precautions and sleep/wake cycle  Patient remains at risk for delirium r/t age, medications, sleep disturbance, and pain  Avoid deliriogenic medications such as tramadol, benzodiazepines, anticholinergics, and Benadryl  Continue Remeron  Limit interruptions at night as medically appropriate  Provide quiet environment, dim lighting, and avoidance tv at night  Patient continues to require assistance in which supportive services can be provided by VNA services  Encourage adequate nutrition and hydration  Continue to monitor for acute changes in condition  Follow up with PCP

## 2025-03-27 NOTE — PROGRESS NOTES
Virtual Regular VisitName: Sandra Purvis      : 12/3/1934      MRN: [id number]  Encounter Provider: LUIS Au  Encounter Date: 3/27/2025   Encounter department: [facility] VIRTUAL  :  Assessment & Plan  Achalasia  Recent endoscopy and X-ray with noted Achalasia  Continue modified diet  Maintain aspiration precautions  Continue SLP with VNA  Continue to monitor for acute changes in condition  Follow up with GI    Insomnia  History of Insomnia  Continue Melatonin 6 mg HS  Awaiting message from Dr. Page in regards to any contraindications to restarting Magnesium 100 mg po QD  Limit interruptions at night as medically necessary  Continue to maintain proper sleep-wake cycle  Avoid caffeine throughout daytime hours  Avoid napping throughout daytime hours  Discussed avoidance of sedative medication use such as benzodiazepines and/or benadryl products  Continue to monitor for acute changes in condition   Follow up with PCP     Orders:    mirtazapine (REMERON) 7.5 MG tablet; Take 1 tablet (7.5 mg total) by mouth daily at bedtime    Hypertension    Orders:    metoprolol succinate (TOPROL-XL) 25 mg 24 hr tablet; Take 1 tablet (25 mg total) by mouth 2 (two) times a day    Ambulatory dysfunction  History of falls with history of SAH/SDH  Maintain fall and safety precautions  Encourage use of asst devices  Continue PT/OT services with VNA  Assess for need with assistance with transfers, mobility, and ADLs in/out of home  Patient is at high risk for falls r/t polypharmacy, environmental barriers, and age  Continue to monitor for acute changes in condition   Follow up with PCP     Orders:    gabapentin (NEURONTIN) 100 mg capsule; Take 1 capsule (100 mg total) by mouth daily at bedtime    Persistent atrial fibrillation (HCC)  Continue Metoprolol for rate control  Continue to monitor HR  Currently not on anticoag, contraindicated r/t hx SDH  Follow up with PCP/Cardiology     Orthostatic  hypotension  BP remains stable today, 122/72  Continue Midodrine 5 mg po TID  Patient take Metoprolol 25 mg BID for rate control r/t A-fib  Encourage patient to rise slowly from seated to standing position  Maintain fall and safety precautions  Continue to monitor BP and for acute changes in condition  Follow up with PCP/Cardiology      SAH (subarachnoid hemorrhage) ([facility])  History of SAH/SDH secondary to fall in Jan 2025  CT scan 2/3/25 results show- Trace posterior bifrontal subarachnoid hemorrhage likely subacute and stable to slightly decreased allowing for some degree of redistribution. 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  Continue to monitor for acute changes in condition  Maintain fall and safety precautions  Follow up with PCP/Neuro prn      SDH (subdural hematoma) ([facility])  History of SAH/SDH secondary to fall in Jan 2025  CT scan 2/3/25 results show- Trace posterior bifrontal subarachnoid hemorrhage likely subacute and stable to slightly decreased allowing for some degree of redistribution. 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  Continue to monitor for acute changes in condition  Maintain fall and safety precautions  Follow up with PCP/Neuro prn      Stage 3 chronic kidney disease, unspecified whether stage 3a or 3b CKD ([facility])  Lab Results   Component Value Date    EGFR 65 03/06/2025    EGFR 71 02/13/2025    EGFR 58 02/07/2025    CREATININE 0.85 03/06/2025    CREATININE 0.79 02/13/2025    CREATININE 0.88 02/07/2025   Most recent Creatinine 0.85/Gfr 65, stable  Continue to monitor BMP periodically  Avoid nephrotoxins and NSAIDS  Avoid hypotension  Continue to monitor for acute changes in condition  Follow up with PCP       Malignant melanoma of arm, left ([facility])  Surgical incision performed 12/19  Continue to monitor area for acute changes in condition  Follow up with Derm/oncology prn    Age-related cognitive decline  Alert and oriented x 2 upon examination today  Continue  "supportive measures  Continue to reorient, redirect, and reassure patient prn  Encourage engagement and activity  Encourage daily involvement in ADLs  Maintain delirium precautions and sleep/wake cycle  Patient remains at risk for delirium r/t age, medications, sleep disturbance, and pain  Avoid deliriogenic medications such as tramadol, benzodiazepines, anticholinergics, and Benadryl  Continue Remeron  Limit interruptions at night as medically appropriate  Provide quiet environment, dim lighting, and avoidance tv at night  Patient continues to require assistance in which supportive services can be provided by VNA services  Encourage adequate nutrition and hydration  Continue to monitor for acute changes in condition  Follow up with PCP          History of Present Illness        Sandra Purvis is a 89 y.o. female patient, being seen for [facility] in conjuction with VNA nurse Annabelle Wang, who was in the home to perform physical assessment.      The patient is being seen and examined today for follow-up on acute and chronic medical conditions s/p most recent hospitalization.     The patient reports she is doing okay today. She reports that she is eating and drinking without any difficulty and states that she \"feasted\" yesterday. Patient does report that she is having issues with sleep. She is currently taking Melatonin 6 mg po Q HS. She had been taking Magnesium and multivitamin which was d/c by SNF for unknown reasons as last Magnesium level while patient was taking Magnesium was wnl. Patient would like to restart Magnesium and Multi-vitamin, reporting she felt much better when taking these supplements. Patient  reporting that daughter is stating PCP instructed not to resume. I sent message to Dr. Page to explain rationale and see if there is a contraindication that I am not aware of. Patient also requesting refills on Neurontin, Remeron, and Toprol which were sent to pharmacy. Patient " denies any increase in dyspnea, CP, dizziness, HA, fever, chills, nausea, vomiting, diarrhea, or constipation. Patient has f/u with PCP 4/22.           Review of Systems   Constitutional:  Positive for activity change and fatigue. Negative for chills and fever.   HENT:  Negative for ear pain and sore throat.    Eyes:  Negative for pain and visual disturbance.   Respiratory:  Positive for shortness of breath. Negative for cough.    Cardiovascular:  Negative for chest pain and palpitations.   Gastrointestinal:  Negative for abdominal pain and vomiting.   Genitourinary:  Negative for dysuria and hematuria.   Musculoskeletal:  Positive for back pain and gait problem. Negative for arthralgias.   Skin:  Negative for color change and rash.   Neurological:  Positive for weakness. Negative for seizures and syncope.   Psychiatric/Behavioral:  Positive for sleep disturbance.    All other systems reviewed and are negative.    Pertinent Medical History   Patient originally presented to the hospital on 2/3/2025 due to food bolus impaction requiring intubation, patient eventually underwent endoscopy with removal and was extubated successfully, she was noted to have Pseudomonas aeruginosa in the urine started on Levaquin prior to her discharge.     Objective   Vitals    Vitals 3/27/2025 10:43 AM   /72   BP Location Right arm   Patient Position Sitting   Cuff Size Adult   Resp 16   SpO2 97 %   SpO2 Activity At Rest   Temp 97.6 °F (36.4 °C)   Temp src Temporal     BARIUM SWALLOW / ESOPHAGRAM     INDICATION: R49.0: Dysphonia.     COMPARISON: None     TECHNIQUE: The patient was given effervescent granules and barium by mouth and images of the esophagus were obtained.     IMAGES: 254     FLUOROSCOPY TIME: 1.1     FINDINGS:     Small amount of barium aspiration during swallowing.     The esophagus is markedly dilated with bird beak like narrowing at the gastroesophageal junction. The esophagus is atonic without contractions or  passage of contrast into the stomach. Retained debris is present throughout the esophagus. No mucosal   lesion, ulceration, or evidence of fold thickening.     Gastroesophageal reflux was not evaluated as contrast did not pass into the stomach.     No hiatal hernia.     IMPRESSION:     Achalasia.     Aspiration.      Workstation performed: [id number]    XR ABDOMEN 1 VIEW KUB     INDICATION: eval og tube.     COMPARISON: CT, 2/3/2025     FINDINGS: On the first study, there is an endogastric tube terminating at the level of the GE junction, with sidehole above the diaphragm. On the second study, the tube has been advanced, now terminating in the proximal stomach with sidehole below the   diaphragm.     Nonobstructive bowel gas pattern.     No evidence of pneumoperitoneum on this supine study. Upright or left lateral decubitus imaging is more sensitive to detect subtle free air in the appropriate setting.     No pathologic calcification or soft tissue mass. Excreted contrast in the urinary bladder.     Suspected small left pleural effusion.     Bones are unremarkable for the patient's age.     IMPRESSION:  Nonobstructive bowel gas pattern.  Satisfactory position of the endogastric tube on the final images.  Suspected small left pleural effusion.        Workstation performed: [id number]    CT CHEST, ABDOMEN AND PELVIS WITH IV CONTRAST     INDICATION: Epigastric pain, chest pain.     COMPARISON: 1 view chest radiograph 1/14/2025, CT abdomen and pelvis 10/16/2021, and CT cervical spine 1/4/2025     TECHNIQUE: CT examination of the chest, abdomen and pelvis was performed. Multiplanar 2D reformatted images were created from the source data.     This examination, like all CT scans performed in the [facility], was performed utilizing techniques to minimize radiation dose exposure, including the use of iterative reconstruction and automated exposure control. Radiation dose length   product (DLP) for this  visit: 491 mGy-cm     IV Contrast: 65 mL of iohexol (OMNIPAQUE) 350  Enteric Contrast: Not administered.     FINDINGS:     CHEST     LUNGS: Minimal left basilar airspace consolidation, improved since 1/14/2025. Minimal bibasilar linear scarring. Biapical pleural parenchymal scarring. No suspicious pulmonary nodules. Central airways are clear.     PLEURA: Bilateral pleural effusions, small on the left and trace on the right these appear have improved since 1/14/2025. Biapical pleural-parenchymal scarring. Left apical calcified pleural plaque.     HEART/GREAT VESSELS: Mild cardiomegaly. Mild mass effect on the atria secondary to dilated esophagus and pectus excavatum. No pericardial effusion.  Aortic and coronary artery calcification.  No thoracic aortic aneurysm.     MEDIASTINUM AND CHRISTINA: Chronic dilation of the esophagus with chronically progressive or transiently increased distention. Fluid and debris is present throughout the esophagus. No adenopathy.     CHEST WALL AND LOWER NECK: Incidental discovery of one or more thyroid nodule(s) measuring less than 1.5 cm and without suspicious features is noted in this patient who is above 35 years old; according to guidelines published in the February 2015 white   paper on incidental thyroid nodules in the Journal of the American College of radiology (JACR), no further evaluation is recommended.     ABDOMEN     Mild motion artifact.     LIVER/BILIARY TREE: Subcentimeter hypoattenuating lesion(s), too small to characterize but statistically likely benign, which do not require follow-up (ACR White Paper 2017). No suspicious mass. Heterogeneous enhancement likely due to contrast timing and   IV hydration. Grossly stable hepatic contour. No biliary dilation.     GALLBLADDER: No calcified gallstones. Pericholecystic fluid versus subserosal edema. This is nonspecific given presence of ascites.     SPLEEN: Calcified granulomata. No suspicious mass.     PANCREAS: Unremarkable.      ADRENAL GLANDS: Stable 1 cm left adrenal hypoattenuating nodule. Although its imaging features on this study is indeterminate, because this lesion has been stable for > 1 year, it is considered benign. However, please note that for adrenal nodule > 1 cm,   biochemical evaluation is suggested to rule out functioning adenoma, if not already performed.  Adrenal recommendation based on institutional consensus and Journal of American College of Radiology 2017;14:7404-2175.     Unremarkable right adrenal gland.     KIDNEYS/URETERS: Evaluation slight limited by motion artifact. 1.1 cm right renal upper pole exophytic hyperattenuating nodule most likely a chronic hemorrhagic or proteinaceous cyst. This appeared hyperdense on the prior noncontrast CT. Stable 1 cm   right renal lower pole indeterminate exophytic cystic lesion. Subcentimeter hypoattenuating renal lesion(s), too small to characterize but statistically likely benign, which do not warrant follow-up (Radiology June 2019). No hydronephrosis. No   perinephric collection.     STOMACH AND BOWEL: Colonic diverticulosis without diverticulitis. Nondistended stomach and segments of small bowel limits their evaluation. No bowel obstruction.     APPENDIX: Normal.     ABDOMINOPELVIC CAVITY: Trace ascites. No pneumoperitoneum. No lymphadenopathy.     VESSELS: Atherosclerotic disease. No abdominal aortic aneurysm.     PELVIS     REPRODUCTIVE ORGANS: Small calcified uterine fibroids. Trace fluid in the endometrial cavity versus mild endometrial thickening up to 8 mm.     URINARY BLADDER: Mild circumferential thickening with mucosal hyperemia.     ABDOMINAL WALL/INGUINAL REGIONS: Unremarkable.     BONES: No acute fracture or osseous destructive lesion identified. Mild degenerative changes of the spine. Pectus excavatum.     IMPRESSION:     CT chest:     Diffuse chronic dilation of the esophagus with progressive or transiently increased distention. Esophagus filled with fluid  and debris. Findings may be sequela of severe gastroesophageal reflux superimposed on chronic dysmotility. GE stenosis/stricture   is not excluded. Correlate with any prior work-up.     Mild mass effect on bilateral cardiac atria due to the dilated esophagus and pectus excavatum.     Minimal left basilar lower lobe consolidation likely atelectasis, improved since 1/14/2025.     Bilateral pleural effusions, small on the left and trace on the right, improved since 1/14/2025.     Additional chronic findings and negatives as above.     CT abdomen and pelvis:     Findings suggestive of cystitis. Correlate with clinical findings.     Trace ascites.     Pericholecystic fluid versus thick edematous gallbladder wall. No calcified gallstones. Findings are nonspecific given presence of ascites.     Small right renal upper and lower pole exophytic indeterminate cystic lesions most likely chronic hemorrhagic or proteinaceous cyst. These could be followed up with nonemergent renal ultrasound.     Trace fluid in the endometrial cavity versus mild endometrial thickening. No significant change since October 2021. This could be followed up with nonemergent pelvic ultrasound if clinically warranted.     Additional chronic findings and negatives as above.     The study was marked in EPIC for immediate notification.        Workstation performed: [id number]    CT BRAIN - WITHOUT CONTRAST     INDICATION:   Syncopal episode, AMS.     COMPARISON: CT head 1/20/2025     TECHNIQUE:  CT examination of the brain was performed.  Multiplanar 2D reformatted images were created from the source data.     Radiation dose length product (DLP) for this visit:  769 mGy-cm .  This examination, like all CT scans performed in the [facility], was performed utilizing techniques to minimize radiation dose exposure, including the use of iterative   reconstruction and automated exposure control.     IMAGE QUALITY:  Diagnostic.     FINDINGS:      PARENCHYMA:No intracranial mass, mass effect or midline shift. No CT signs of acute infarction.  No acute parenchymal hemorrhage. Periventricular white matter hypodensity is a nonspecific finding likely representing chronic microangiopathy. Calcification   of bilateral internal carotid and distal vertebral arteries.     VENTRICLES AND EXTRA-AXIAL SPACES: Trace posterior bifrontal subarachnoid hemorrhage likely subacute allowing for some redistribution. No history of new fall. Stable bifrontal hypodense subdural collections attributed to subacute to chronic hematomas   with maximum thickness of 1.2 cm on the right and 1 cm on the left.     No acute hydrocephalus.  Mild prominence of CSF spaces diffusely attributed to generalized volume loss.     VISUALIZED ORBITS:  Changes of bilateral lens replacements noted. Known right orbit floor minimally displaced.     PARANASAL SINUSES:  Decreased opacification of the right maxillary sinus. Known right maxillary sinus wall displaced fractures.     CALVARIUM AND EXTRACRANIAL SOFT TISSUES:  Left parietal scalp plaque-like soft tissue attributed to subacute hematoma not significantly changed. No acute skull fracture. Known nondisplaced right temporal calvarial fracture. Known nondisplaced right zygomatic arch fracture.     IMPRESSION:     No evidence of acute intracranial process.     Trace posterior bifrontal subarachnoid hemorrhage likely subacute and stable to slightly decreased allowing for some degree of redistribution.     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     Chronic microangiopathy.      Workstation performed: [id number]    XR CHEST PORTABLE     INDICATION: Possible aspiration.     COMPARISON: 1 view chest 1/14/2025 and CT chest abdomen pelvis 2/3/2025     FINDINGS:     Left basilar lower lobe airspace consolidation, improved. Bilateral pleural effusions, small on the left and trace on the right, improved. No pneumothorax or pulmonary edema.      Cardiac silhouette is enlarged.  Aortic calcification is present.     Distended esophagus.     Old healed left midclavicular fracture.     Normal upper abdomen.     IMPRESSION:     Left basilar lower lobe airspace consolidation and bilateral pleural effusions, left larger than right, improved.     Distended esophagus. See subsequent CT chest abdomen pelvis report.        Workstation performed: [id number]           3/7/2025 10:33 AM    Component Value Flag Ref Range Units Status   Glucose 87  65 - 99 mg/dL Final   BUN 31  High  7 - 25 mg/dL Final   Creatinine 0.85  0.40 - 1.10 mg/dL Final   Sodium 142  135 - 145 mmol/L Final   Potassium 4.6  3.5 - 5.2 mmol/L Final   Chloride 107  100 - 109 mmol/L Final   Carbon Dioxide 28  21 - 31 mmol/L Final   Calcium 9.6  8.5 - 10.5 mg/dL Final   ANION GAP 7  3 - 11  Final   eGFRcr 65  >59  Final   eGFR Comment Interpretive information: calculated GFR    Final       Contains abnormal data CBC AND DIFFERENTIAL  Order: [id number]  Component  Ref Range & Units Value   Hemoglobin  11.5 - 14.5 g/dL 9.3 Low    Hematocrit  35.0 - 43.0 % 27.7 Low    WBC  4.0 - 10.0 thou/cmm 7.9   RBC  3.70 - 4.70 mill/cmm 3.07 Low    Platelet  140 - 350 thou/cmm 182   MPV  7.5 - 11.3 fL 9.5   MCV  80 - 100 fL 90   MCH  26.0 - 34.0 pg 30.2   MCHC  32.0 - 37.0 g/dL 33.5   RDW  12.0 - 16.0 % 17.3 High    Differential Type AUTO   Absolute Neutrophils  1.8 - 7.8 thou/cmm 5.2   Absolute Lymphocytes  1.0 - 3.0 thou/cmm 1.8   Absolute Monocytes  0.3 - 1.0 thou/cmm 0.6   Absolute Eosinophils  0.0 - 0.5 thou/cmm 0.3   Absolute Basophils  0.0 - 0.1 thou/cmm 0.1   Neutrophils  % 65   Lymphocytes Manual  % 23   Monocytes  % 8   Eosinophils  % 3   Basophils  % 1         Physical Exam  Vitals and nursing note reviewed.   Constitutional:       General: She is not in acute distress.     Appearance: She is well-developed.   HENT:      Head: Normocephalic and atraumatic.   Eyes:      Conjunctiva/sclera: Conjunctivae  normal.      Comments: Wears glasses   Cardiovascular:      Rate and Rhythm: Normal rate and regular rhythm.      Heart sounds: No murmur heard.  Pulmonary:      Effort: Pulmonary effort is normal. No respiratory distress.      Breath sounds: Normal breath sounds.   Abdominal:      Palpations: Abdomen is soft.      Tenderness: There is no abdominal tenderness.   Musculoskeletal:         General: No swelling.      Cervical back: Neck supple.   Skin:     General: Skin is warm and dry.      Capillary Refill: Capillary refill takes less than 2 seconds.      Comments: Left arm lesion present   Neurological:      Mental Status: She is alert.      Motor: Weakness present.      Coordination: Coordination abnormal.      Gait: Gait abnormal.   Psychiatric:         Mood and Affect: Mood normal.         Administrative Statements   Encounter provider LUIS Au    The Patient is located at Home and in the following state in which I hold an active license [place].    The patient was identified by name and date of birth. Sandra Purvis was informed that this is a telemedicine visit and that the visit is being conducted through the [employer] Teams platform. She agrees to proceed..  My office door was closed. No one else was in the room.  She acknowledged consent and understanding of privacy and security of the video platform. The patient has agreed to participate and understands they can discontinue the visit at any time.    I have spent a total time of 75 minutes in caring for this patient on the day of the visit/encounter including Diagnostic results, Prognosis, Risks and benefits of tx options, Instructions for management, Patient and family education, Importance of tx compliance, Risk factor reductions, Impressions, Counseling / Coordination of care, Documenting in the medical record, Reviewing/placing orders in the medical record (including tests, medications, and/or procedures), Obtaining or reviewing  history  , and Communicating with other healthcare professionals , not including the time spent for establishing the audio/video connection.

## 2025-03-27 NOTE — ASSESSMENT & PLAN NOTE
Recent endoscopy and X-ray with noted Achalasia  Continue modified diet  Maintain aspiration precautions  Continue SLP with VNA  Continue to monitor for acute changes in condition  Follow up with GI

## 2025-03-28 ENCOUNTER — HOME CARE VISIT (OUTPATIENT)
Dept: HOME HEALTH SERVICES | Facility: HOME HEALTHCARE | Age: OVER 89
End: 2025-03-28
Payer: MEDICARE

## 2025-03-28 PROCEDURE — G0151 HHCP-SERV OF PT,EA 15 MIN: HCPCS

## 2025-03-29 VITALS — HEART RATE: 80 BPM | SYSTOLIC BLOOD PRESSURE: 120 MMHG | DIASTOLIC BLOOD PRESSURE: 60 MMHG | OXYGEN SATURATION: 95 %

## 2025-03-31 ENCOUNTER — HOME CARE VISIT (OUTPATIENT)
Dept: HOME HEALTH SERVICES | Facility: HOME HEALTHCARE | Age: OVER 89
End: 2025-03-31
Payer: MEDICARE

## 2025-03-31 VITALS
HEART RATE: 58 BPM | OXYGEN SATURATION: 97 % | SYSTOLIC BLOOD PRESSURE: 118 MMHG | RESPIRATION RATE: 16 BRPM | TEMPERATURE: 97.6 F | DIASTOLIC BLOOD PRESSURE: 62 MMHG

## 2025-03-31 PROCEDURE — G0153 HHCP-SVS OF S/L PATH,EA 15MN: HCPCS

## 2025-03-31 PROCEDURE — G0299 HHS/HOSPICE OF RN EA 15 MIN: HCPCS

## 2025-04-01 ENCOUNTER — HOME CARE VISIT (OUTPATIENT)
Dept: HOME HEALTH SERVICES | Facility: HOME HEALTHCARE | Age: OVER 89
End: 2025-04-01
Payer: MEDICARE

## 2025-04-01 VITALS — SYSTOLIC BLOOD PRESSURE: 140 MMHG | DIASTOLIC BLOOD PRESSURE: 66 MMHG

## 2025-04-01 PROCEDURE — G0151 HHCP-SERV OF PT,EA 15 MIN: HCPCS

## 2025-04-02 ENCOUNTER — HOME CARE VISIT (OUTPATIENT)
Dept: HOME HEALTH SERVICES | Facility: HOME HEALTHCARE | Age: OVER 89
End: 2025-04-02
Payer: MEDICARE

## 2025-04-02 PROCEDURE — G0151 HHCP-SERV OF PT,EA 15 MIN: HCPCS

## 2025-04-03 ENCOUNTER — HOME CARE VISIT (OUTPATIENT)
Dept: HOME HEALTH SERVICES | Facility: HOME HEALTHCARE | Age: OVER 89
End: 2025-04-03
Payer: MEDICARE

## 2025-04-03 ENCOUNTER — TELEMEDICINE (OUTPATIENT)
Dept: GERIATRICS | Facility: OTHER | Age: OVER 89
End: 2025-04-03
Payer: MEDICARE

## 2025-04-03 VITALS
HEART RATE: 60 BPM | TEMPERATURE: 97.3 F | SYSTOLIC BLOOD PRESSURE: 120 MMHG | RESPIRATION RATE: 16 BRPM | DIASTOLIC BLOOD PRESSURE: 64 MMHG | OXYGEN SATURATION: 97 %

## 2025-04-03 VITALS — OXYGEN SATURATION: 94 % | HEART RATE: 76 BPM | SYSTOLIC BLOOD PRESSURE: 120 MMHG | DIASTOLIC BLOOD PRESSURE: 52 MMHG

## 2025-04-03 DIAGNOSIS — I95.1 ORTHOSTATIC HYPOTENSION: ICD-10-CM

## 2025-04-03 DIAGNOSIS — I60.9 SAH (SUBARACHNOID HEMORRHAGE) (HCC): ICD-10-CM

## 2025-04-03 DIAGNOSIS — K22.0 ACHALASIA: Primary | ICD-10-CM

## 2025-04-03 DIAGNOSIS — G47.00 INSOMNIA, UNSPECIFIED TYPE: ICD-10-CM

## 2025-04-03 DIAGNOSIS — C43.62 MALIGNANT MELANOMA OF ARM, LEFT (HCC): ICD-10-CM

## 2025-04-03 DIAGNOSIS — N18.30 STAGE 3 CHRONIC KIDNEY DISEASE, UNSPECIFIED WHETHER STAGE 3A OR 3B CKD (HCC): ICD-10-CM

## 2025-04-03 DIAGNOSIS — I10 PRIMARY HYPERTENSION: ICD-10-CM

## 2025-04-03 DIAGNOSIS — R10.84 GENERALIZED ABDOMINAL PAIN: Primary | ICD-10-CM

## 2025-04-03 DIAGNOSIS — R26.2 AMBULATORY DYSFUNCTION: ICD-10-CM

## 2025-04-03 DIAGNOSIS — R41.81 AGE-RELATED COGNITIVE DECLINE: ICD-10-CM

## 2025-04-03 DIAGNOSIS — S06.5XAA SDH (SUBDURAL HEMATOMA) (HCC): ICD-10-CM

## 2025-04-03 DIAGNOSIS — I48.19 PERSISTENT ATRIAL FIBRILLATION (HCC): ICD-10-CM

## 2025-04-03 PROCEDURE — 99215 OFFICE O/P EST HI 40 MIN: CPT | Performed by: NURSE PRACTITIONER

## 2025-04-03 PROCEDURE — G0299 HHS/HOSPICE OF RN EA 15 MIN: HCPCS

## 2025-04-03 RX ORDER — SACCHAROMYCES BOULARDII 250 MG
250 CAPSULE ORAL DAILY
Start: 2025-04-03

## 2025-04-03 RX ORDER — SACCHAROMYCES BOULARDII 250 MG
250 CAPSULE ORAL DAILY
Status: CANCELLED | OUTPATIENT
Start: 2025-04-03

## 2025-04-03 NOTE — ASSESSMENT & PLAN NOTE
History of Insomnia  Continue Melatonin 6 mg HS, Mirtazapine 7.5 mg po QHS and Magnesium 200 mg QD  Limit interruptions at night as medically necessary  Continue to maintain proper sleep-wake cycle  Avoid caffeine throughout daytime hours  Avoid napping throughout daytime hours  Discussed avoidance of sedative medication use such as benzodiazepines and/or benadryl products  Continue to monitor for acute changes in condition   Follow up with PCP

## 2025-04-03 NOTE — ASSESSMENT & PLAN NOTE
BP remains stable today, 120/64  Continue Metoprolol 25 mg BID  Avoid hypotension  Continue to monitor BP and for acute changes in condition  Follow up with PCP/Cardiology

## 2025-04-03 NOTE — ASSESSMENT & PLAN NOTE
Continue Metoprolol for rate control  Continue to monitor HR, 60  Currently not on anticoag, contraindicated r/t hx SDH  Follow up with PCP/Cardiology

## 2025-04-03 NOTE — ASSESSMENT & PLAN NOTE
BP remains stable today, 120/64  Continue Midodrine 5 mg po TID  Patient take Metoprolol 25 mg BID for rate control r/t A-fib  Encourage patient to rise slowly from seated to standing position  Maintain fall and safety precautions  Continue to monitor BP and for acute changes in condition  Follow up with PCP/Cardiology

## 2025-04-03 NOTE — ASSESSMENT & PLAN NOTE
History of falls with history of SAH/SDH  Maintain fall and safety precautions  Encourage use of asst devices  Continue PT/OT services with VNA  Assess for need with assistance with transfers, mobility, and ADLs in/out of home  Patient is at high risk for falls r/t polypharmacy, environmental barriers, and age  Continue to monitor for acute changes in condition   Follow up with PCP

## 2025-04-03 NOTE — PROGRESS NOTES
Virtual Regular VisitName: Sandra Purvis      : 12/3/1934      MRN: [id number]  Encounter Provider: LUIS Au  Encounter Date: 4/3/2025   Encounter department: [facility] VIRTUAL  :  Assessment & Plan  Achalasia  Recent endoscopy and X-ray with noted Achalasia  Continue modified diet  Maintain aspiration precautions  Continue SLP with VNA  Continue to monitor for acute changes in condition  Follow up with GI    Insomnia, unspecified type  History of Insomnia  Continue Melatonin 6 mg HS, Mirtazapine 7.5 mg po QHS and Magnesium 200 mg QD  Limit interruptions at night as medically necessary  Continue to maintain proper sleep-wake cycle  Avoid caffeine throughout daytime hours  Avoid napping throughout daytime hours  Discussed avoidance of sedative medication use such as benzodiazepines and/or benadryl products  Continue to monitor for acute changes in condition   Follow up with PCP     Primary hypertension  BP remains stable today, 120/64  Continue Metoprolol 25 mg BID  Avoid hypotension  Continue to monitor BP and for acute changes in condition  Follow up with PCP/Cardiology    Persistent atrial fibrillation (HCC)  Continue Metoprolol for rate control  Continue to monitor HR, 60  Currently not on anticoag, contraindicated r/t hx SDH  Follow up with PCP/Cardiology       Orthostatic hypotension  BP remains stable today, 120/64  Continue Midodrine 5 mg po TID  Patient take Metoprolol 25 mg BID for rate control r/t A-fib  Encourage patient to rise slowly from seated to standing position  Maintain fall and safety precautions  Continue to monitor BP and for acute changes in condition  Follow up with PCP/Cardiology      SAH (subarachnoid hemorrhage) (HCC)  History of SAH/SDH secondary to fall in 2025  CT scan 2/3/25 results show- Trace posterior bifrontal subarachnoid hemorrhage likely subacute and stable to slightly decreased allowing for some degree of redistribution.  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  Continue to monitor for acute changes in condition  Maintain fall and safety precautions  Follow up with PCP/Neuro prn    SDH (subdural hematoma) ([facility])  History of SAH/SDH secondary to fall in Jan 2025  CT scan 2/3/25 results show- Trace posterior bifrontal subarachnoid hemorrhage likely subacute and stable to slightly decreased allowing for some degree of redistribution. 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  Continue to monitor for acute changes in condition  Maintain fall and safety precautions  Follow up with PCP/Neuro prn      Stage 3 chronic kidney disease, unspecified whether stage 3a or 3b CKD (HCC)  Lab Results   Component Value Date    EGFR 65 03/06/2025    EGFR 71 02/13/2025    EGFR 58 02/07/2025    CREATININE 0.85 03/06/2025    CREATININE 0.79 02/13/2025    CREATININE 0.88 02/07/2025   Most recent Creatinine 0.85/Gfr 65, stable  Continue to monitor BMP periodically  Avoid nephrotoxins and NSAIDS  Avoid hypotension  Continue to monitor for acute changes in condition  Follow up with PCP       Malignant melanoma of arm, left (HCC)  Surgical incision performed 12/19  Continue to monitor area for acute changes in condition  Follow up with Derm/oncology prn      Age-related cognitive decline  Alert and oriented x 2 upon examination today  Continue supportive measures  Continue to reorient, redirect, and reassure patient prn  Encourage engagement and activity  Encourage daily involvement in ADLs  Maintain delirium precautions and sleep/wake cycle  Patient remains at risk for delirium r/t age, medications, sleep disturbance, and pain  Avoid deliriogenic medications such as tramadol, benzodiazepines, anticholinergics, and Benadryl  Continue Remeron  Limit interruptions at night as medically appropriate  Provide quiet environment, dim lighting, and avoidance tv at night  Patient continues to require assistance in which supportive services  can be provided by VNA services  Encourage adequate nutrition and hydration  Continue to monitor for acute changes in condition  Follow up with PCP        Ambulatory dysfunction  History of falls with history of SAH/SDH  Maintain fall and safety precautions  Encourage use of asst devices  Continue PT/OT services with VNA  Assess for need with assistance with transfers, mobility, and ADLs in/out of home  Patient is at high risk for falls r/t polypharmacy, environmental barriers, and age  Continue to monitor for acute changes in condition   Follow up with PCP         History of Present Illness     Sandra Purvis is a 89 y.o. female patient, being seen for Heal at home program in conjuction with VNA nurse Annabelle Wang, who was in the home to perform physical assessment.       The patient is being seen and examined today for follow-up on acute and chronic medical conditions s/p most recent hospitalization.      The patient reports she is doing okay today. Patient reports she is sleeping a little bit better this week. She is currently taking Melatonin 6 mg po Q HS and will restart Magnesium and Multi-vitamin, per Dr. Page he stated this is acceptable. Patient and her  have an appointment with [facility] tomorrow, I made them aware that I will reach out to my contact so they can obtain 1 week for free to try. Patient denies any increase in dyspnea, CP, dizziness, HA, fever, chills, nausea, vomiting, diarrhea, or constipation. Patient has f/u with PCP 4/22.         Review of Systems   Constitutional:  Positive for activity change, appetite change and fatigue. Negative for chills and fever.   HENT:  Positive for hearing loss. Negative for ear pain and sore throat.    Eyes:  Negative for pain and visual disturbance.   Respiratory:  Positive for shortness of breath. Negative for cough.    Cardiovascular:  Negative for chest pain and palpitations.   Gastrointestinal:  Negative for abdominal pain and vomiting.    Genitourinary:  Negative for dysuria and hematuria.   Musculoskeletal:  Positive for gait problem. Negative for arthralgias and back pain.   Skin:  Negative for color change and rash.   Neurological:  Positive for weakness. Negative for seizures and syncope.   All other systems reviewed and are negative.    Pertinent Medical History   Patient originally presented to the hospital on 2/3/2025 due to food bolus impaction requiring intubation, patient eventually underwent endoscopy with removal and was extubated successfully, she was noted to have Pseudomonas aeruginosa in the urine started on Levaquin prior to her discharge.     Objective   Vitals    Vitals 4/3/2025 12:07 PM   /64   BP Location Right arm   Patient Position Sitting   Cuff Size Adult   Resp 16   SpO2 97 %   SpO2 Activity At Rest   Pulse 60   Temp (!) 97.3 °F (36.3 °C)   Temp src Temporal       Physical Exam  Vitals and nursing note reviewed.   Constitutional:       General: She is not in acute distress.     Appearance: She is well-developed.   HENT:      Head: Normocephalic and atraumatic.   Eyes:      Conjunctiva/sclera: Conjunctivae normal.      Comments: Wears glasses   Cardiovascular:      Rate and Rhythm: Normal rate and regular rhythm.      Heart sounds: No murmur heard.  Pulmonary:      Effort: Pulmonary effort is normal. No respiratory distress.      Breath sounds: Normal breath sounds.   Abdominal:      Palpations: Abdomen is soft.      Tenderness: There is no abdominal tenderness.   Musculoskeletal:         General: No swelling.      Cervical back: Neck supple.   Skin:     General: Skin is warm and dry.      Capillary Refill: Capillary refill takes less than 2 seconds.   Neurological:      Mental Status: She is alert.      Motor: Weakness present.      Coordination: Coordination abnormal.      Gait: Gait abnormal.   Psychiatric:         Mood and Affect: Mood normal.      Comments: forgetful         Administrative Statements   Encounter  provider LUIS Au    The Patient is located at Home and in the following state in which I hold an active license [place].    The patient was identified by name and date of birth. Sandra Purvis was informed that this is a telemedicine visit and that the visit is being conducted through the [employer] Teams platform. She agrees to proceed..  My office door was closed. No one else was in the room.  She acknowledged consent and understanding of privacy and security of the video platform. The patient has agreed to participate and understands they can discontinue the visit at any time.    I have spent a total time of 46 minutes in caring for this patient on the day of the visit/encounter including Prognosis, Risks and benefits of tx options, Instructions for management, Patient and family education, Importance of tx compliance, Risk factor reductions, Counseling / Coordination of care, Documenting in the medical record, Reviewing/placing orders in the medical record (including tests, medications, and/or procedures), Obtaining or reviewing history  , and Communicating with other healthcare professionals , not including the time spent for establishing the audio/video connection.

## 2025-04-03 NOTE — ASSESSMENT & PLAN NOTE
Alert and oriented x 2 upon examination today  Continue supportive measures  Continue to reorient, redirect, and reassure patient prn  Encourage engagement and activity  Encourage daily involvement in ADLs  Maintain delirium precautions and sleep/wake cycle  Patient remains at risk for delirium r/t age, medications, sleep disturbance, and pain  Avoid deliriogenic medications such as tramadol, benzodiazepines, anticholinergics, and Benadryl  Continue Remeron  Limit interruptions at night as medically appropriate  Provide quiet environment, dim lighting, and avoidance tv at night  Patient continues to require assistance in which supportive services can be provided by VNA services  Encourage adequate nutrition and hydration  Continue to monitor for acute changes in condition  Follow up with PCP         HTN (hypertension)

## 2025-04-07 ENCOUNTER — HOME CARE VISIT (OUTPATIENT)
Dept: HOME HEALTH SERVICES | Facility: HOME HEALTHCARE | Age: OVER 89
End: 2025-04-07
Payer: MEDICARE

## 2025-04-07 PROCEDURE — G0153 HHCP-SVS OF S/L PATH,EA 15MN: HCPCS

## 2025-04-07 NOTE — CASE COMMUNICATION
Pt is now discharged from  speech therapy with goals met. She and her  have been educated re: approrpiate food selections/diet modifications and pt was provided with safe swallowing swallowing strategies related to esophageal deficits. GI f/u scheduled for August.

## 2025-04-08 ENCOUNTER — TELEMEDICINE (OUTPATIENT)
Dept: GERIATRICS | Facility: OTHER | Age: OVER 89
End: 2025-04-08
Payer: MEDICARE

## 2025-04-08 ENCOUNTER — HOME CARE VISIT (OUTPATIENT)
Dept: HOME HEALTH SERVICES | Facility: HOME HEALTHCARE | Age: OVER 89
End: 2025-04-08
Payer: MEDICARE

## 2025-04-08 VITALS
HEART RATE: 60 BPM | TEMPERATURE: 97.5 F | DIASTOLIC BLOOD PRESSURE: 68 MMHG | SYSTOLIC BLOOD PRESSURE: 118 MMHG | OXYGEN SATURATION: 97 % | RESPIRATION RATE: 16 BRPM

## 2025-04-08 DIAGNOSIS — R41.81 AGE-RELATED COGNITIVE DECLINE: ICD-10-CM

## 2025-04-08 DIAGNOSIS — I10 PRIMARY HYPERTENSION: ICD-10-CM

## 2025-04-08 DIAGNOSIS — S06.5XAA SDH (SUBDURAL HEMATOMA) (HCC): ICD-10-CM

## 2025-04-08 DIAGNOSIS — I60.9 SAH (SUBARACHNOID HEMORRHAGE) (HCC): ICD-10-CM

## 2025-04-08 DIAGNOSIS — K22.0 ACHALASIA: Primary | ICD-10-CM

## 2025-04-08 DIAGNOSIS — I95.1 ORTHOSTATIC HYPOTENSION: ICD-10-CM

## 2025-04-08 DIAGNOSIS — I10 HYPERTENSION: ICD-10-CM

## 2025-04-08 DIAGNOSIS — I48.19 PERSISTENT ATRIAL FIBRILLATION (HCC): ICD-10-CM

## 2025-04-08 DIAGNOSIS — G47.00 INSOMNIA, UNSPECIFIED TYPE: ICD-10-CM

## 2025-04-08 DIAGNOSIS — R26.2 AMBULATORY DYSFUNCTION: ICD-10-CM

## 2025-04-08 DIAGNOSIS — C43.62 MALIGNANT MELANOMA OF ARM, LEFT (HCC): ICD-10-CM

## 2025-04-08 PROCEDURE — G0299 HHS/HOSPICE OF RN EA 15 MIN: HCPCS

## 2025-04-08 PROCEDURE — 99215 OFFICE O/P EST HI 40 MIN: CPT | Performed by: NURSE PRACTITIONER

## 2025-04-08 RX ORDER — MIDODRINE HYDROCHLORIDE 5 MG/1
5 TABLET ORAL
Qty: 30 TABLET | Refills: 0 | Status: SHIPPED | OUTPATIENT
Start: 2025-04-08 | End: 2025-05-08

## 2025-04-08 NOTE — ASSESSMENT & PLAN NOTE
BP remains stable today,118/62  Continue Metoprolol 25 mg BID  Avoid hypotension  Continue to monitor BP and for acute changes in condition  Follow up with PCP/Cardiology

## 2025-04-08 NOTE — PROGRESS NOTES
Virtual Regular VisitName: Sandra Purvis      : 12/3/1934      MRN: [id number]  Encounter Provider: LUIS Au  Encounter Date: 2025   Encounter department: [facility] VIRT[facility]  :  Assessment & Plan  Achalasia  Recent endoscopy and X-ray with noted Achalasia  Continue modified diet  Maintain aspiration precautions  Continue SLP with VNA  Continue to monitor for acute changes in condition  Follow up with GI    Primary hypertension  BP remains stable today,118/62  Continue Metoprolol 25 mg BID  Avoid hypotension  Continue to monitor BP and for acute changes in condition  Follow up with PCP/Cardiology    Persistent atrial fibrillation (HCC)  Continue Metoprolol for rate control  Continue to monitor HR, 60  Currently not on anticoag, contraindicated r/t hx SDH  Follow up with PCP/Cardiology       Orthostatic hypotension  BP remains stable today,118/62 sit, 118/68 stand  Continue Midodrine 5 mg po TID  Patient take Metoprolol 25 mg BID for rate control r/t A-fib  Encourage patient to rise slowly from seated to standing position  Maintain fall and safety precautions  Continue to monitor BP and for acute changes in condition  Follow up with PCP/Cardiology      SAH (subarachnoid hemorrhage) (HCC)  History of SAH/SDH secondary to fall in 2025  CT scan 2/3/25 results show- Trace posterior bifrontal subarachnoid hemorrhage likely subacute and stable to slightly decreased allowing for some degree of redistribution. 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  Continue to monitor for acute changes in condition  Maintain fall and safety precautions  Follow up with PCP/Neuro prn      SDH (subdural hematoma) (HCC)  History of SAH/SDH secondary to fall in 2025  CT scan 2/3/25 results show- Trace posterior bifrontal subarachnoid hemorrhage likely subacute and stable to slightly decreased allowing for some degree of redistribution. Stable bifrontal subacute to  chronic subdural hematomas.  Continue to monitor for acute changes in condition  Maintain fall and safety precautions  Follow up with PCP/Neuro prn    Malignant melanoma of arm, left (HCC)  Surgical incision performed 12/19  Continue to monitor area for acute changes in condition  Follow up with Derm/oncology prn      Age-related cognitive decline  Alert and oriented x 2 upon examination today  Continue supportive measures  Continue to reorient, redirect, and reassure patient prn  Encourage engagement and activity  Encourage daily involvement in ADLs  Maintain delirium precautions and sleep/wake cycle  Patient remains at risk for delirium r/t age, medications, sleep disturbance, and pain  Avoid deliriogenic medications such as tramadol, benzodiazepines, anticholinergics, and Benadryl  Continue Remeron  Limit interruptions at night as medically appropriate  Provide quiet environment, dim lighting, and avoidance tv at night  Patient continues to require assistance in which supportive services can be provided by VNA services  Encourage adequate nutrition and hydration  Continue to monitor for acute changes in condition  Follow up with PCP      Insomnia, unspecified type  History of Insomnia  Reports some improvement in sleep with occasional nights of interruptions  Continue Melatonin 6 mg HS, Mirtazapine 7.5 mg po QHS and Magnesium 200 mg QD  Limit interruptions at night as medically necessary  Continue to maintain proper sleep-wake cycle  Avoid caffeine throughout daytime hours  Avoid napping throughout daytime hours, recommended no more 30 min.  Discussed avoidance of sedative medication use such as benzodiazepines and/or benadryl products  Continue to monitor for acute changes in condition   Follow up with PCP     Ambulatory dysfunction  History of falls with history of SAH/SDH  Maintain fall and safety precautions  Encourage use of asst devices  Continue PT/OT services with VNA  Assess for need with assistance with  transfers, mobility, and ADLs in/out of home  Patient is at high risk for falls r/t polypharmacy, environmental barriers, and age  Continue to monitor for acute changes in condition   Follow up with PCP         History of Present Illness     Sandra Purvis is a 89 y.o. female patient, being seen for Heal at home program in conjuction with A nurse Annabelle Wang, who was in the home to perform physical assessment.       The patient is being seen and examined today for follow-up on acute and chronic medical conditions s/p most recent hospitalization.      The patient reports she is doing okay today. Patient reports she is sleeping better this week. She reports she is trying to nap only 30 min. VNA nurse reports that patient has only been taking Midodrine once daily in AM with Bp remaining stable. Upon check of orthostatic hypotension. Sitting  and Standing . Order initially upon d/c was Midodrine 5 mg po TID and when patient saw PCP he changed to Midodrine 5 mg po TID prn for SBP <90. I instructed for patient to continue Midodrine 5 mg po once daily in AM and will make Dr. Page aware. Patient denies any increase in dyspnea, CP, dizziness, HA, fever, chills, nausea, vomiting, diarrhea, or constipation. Patient has f/u with PCP 4/22.      Review of Systems   Constitutional:  Positive for activity change, appetite change and fatigue. Negative for chills and fever.   HENT:  Positive for hearing loss. Negative for ear pain and sore throat.    Eyes:  Negative for pain and visual disturbance.   Respiratory:  Positive for shortness of breath. Negative for cough.    Cardiovascular:  Negative for chest pain and palpitations.   Gastrointestinal:  Negative for abdominal pain and vomiting.   Genitourinary:  Negative for dysuria and hematuria.   Musculoskeletal:  Positive for gait problem. Negative for arthralgias and back pain.   Skin:  Negative for color change and rash.   Neurological:  Positive for  weakness. Negative for seizures and syncope.   All other systems reviewed and are negative.    Pertinent Medical History   Patient originally presented to the hospital on 2/3/2025 due to food bolus impaction requiring intubation, patient eventually underwent endoscopy with removal and was extubated successfully, she was noted to have Pseudomonas aeruginosa in the urine started on Levaquin prior to her discharge.        Objective     Vitals    Vitals 4/8/2025  1:14 PM 4/8/2025  1:15 PM   /62 118/68   BP Location Right arm Left arm   Patient Position Sitting Standing   Cuff Size Adult Adult   Resp 16 --   SpO2 97 % --   SpO2 Activity At Rest --   Pulse 60 --   Pulse Source Radial --   Cardiac Regularity Regular --   Temp 97.5 °F (36.4 °C) --   Temp src Temporal        Physical Exam  Vitals and nursing note reviewed.   Constitutional:       General: She is not in acute distress.     Appearance: She is well-developed.   HENT:      Head: Normocephalic and atraumatic.      Ears:      Comments: [place]  Eyes:      Conjunctiva/sclera: Conjunctivae normal.      Comments: Wears glasses   Cardiovascular:      Rate and Rhythm: Normal rate and regular rhythm.      Heart sounds: No murmur heard.  Pulmonary:      Effort: Pulmonary effort is normal. No respiratory distress.      Breath sounds: Normal breath sounds.   Abdominal:      Palpations: Abdomen is soft.      Tenderness: There is no abdominal tenderness.   Musculoskeletal:         General: No swelling.      Cervical back: Neck supple.   Skin:     General: Skin is warm and dry.      Capillary Refill: Capillary refill takes less than 2 seconds.      Comments: Left arm lesion   Neurological:      Mental Status: She is alert.      Motor: Weakness present.      Coordination: Coordination abnormal.      Gait: Gait abnormal.   Psychiatric:         Mood and Affect: Mood normal.      Comments: forgetful       Administrative Statements   Encounter provider Bambi Colón  LUIS Mcnulty    The Patient is located at Home and in the following state in which I hold an active license [place].    The patient was identified by name and date of birth. Sandra Purvis was informed that this is a telemedicine visit and that the visit is being conducted through the [employer] Teams platform. She agrees to proceed..  My office door was closed. No one else was in the room.  She acknowledged consent and understanding of privacy and security of the video platform. The patient has agreed to participate and understands they can discontinue the visit at any time.    I have spent a total time of 46 minutes in caring for this patient on the day of the visit/encounter including Prognosis, Risks and benefits of tx options, Instructions for management, Patient and family education, Importance of tx compliance, Risk factor reductions, Counseling / Coordination of care, Documenting in the medical record, Reviewing/placing orders in the medical record (including tests, medications, and/or procedures), Obtaining or reviewing history  , and Communicating with other healthcare professionals , not including the time spent for establishing the audio/video connection.

## 2025-04-08 NOTE — ASSESSMENT & PLAN NOTE
History of Insomnia  Reports some improvement in sleep with occasional nights of interruptions  Continue Melatonin 6 mg HS, Mirtazapine 7.5 mg po QHS and Magnesium 200 mg QD  Limit interruptions at night as medically necessary  Continue to maintain proper sleep-wake cycle  Avoid caffeine throughout daytime hours  Avoid napping throughout daytime hours, recommended no more 30 min.  Discussed avoidance of sedative medication use such as benzodiazepines and/or benadryl products  Continue to monitor for acute changes in condition   Follow up with PCP

## 2025-04-08 NOTE — ASSESSMENT & PLAN NOTE
BP remains stable today,118/62 sit, 118/68 stand  Continue Midodrine 5 mg po TID  Patient take Metoprolol 25 mg BID for rate control r/t A-fib  Encourage patient to rise slowly from seated to standing position  Maintain fall and safety precautions  Continue to monitor BP and for acute changes in condition  Follow up with PCP/Cardiology

## 2025-04-08 NOTE — ASSESSMENT & PLAN NOTE
Patient placed on [employer] V60 BIPAP and connected to the Nurse Call System. Alarms are activated and nurse has been notified.      Angel Arrieta, RRT RCP Surgical incision performed 12/19  Continue to monitor area for acute changes in condition  Follow up with Derm/oncology prn

## 2025-04-09 ENCOUNTER — HOME CARE VISIT (OUTPATIENT)
Dept: HOME HEALTH SERVICES | Facility: HOME HEALTHCARE | Age: OVER 89
End: 2025-04-09
Payer: MEDICARE

## 2025-04-09 VITALS — DIASTOLIC BLOOD PRESSURE: 68 MMHG | HEART RATE: 70 BPM | OXYGEN SATURATION: 97 % | SYSTOLIC BLOOD PRESSURE: 140 MMHG

## 2025-04-09 PROCEDURE — G0151 HHCP-SERV OF PT,EA 15 MIN: HCPCS

## 2025-04-11 ENCOUNTER — HOME CARE VISIT (OUTPATIENT)
Dept: HOME HEALTH SERVICES | Facility: HOME HEALTHCARE | Age: OVER 89
End: 2025-04-11
Payer: MEDICARE

## 2025-04-11 VITALS — SYSTOLIC BLOOD PRESSURE: 142 MMHG | HEART RATE: 80 BPM | OXYGEN SATURATION: 99 % | DIASTOLIC BLOOD PRESSURE: 64 MMHG

## 2025-04-11 PROCEDURE — G0151 HHCP-SERV OF PT,EA 15 MIN: HCPCS

## 2025-04-15 ENCOUNTER — TELEMEDICINE (OUTPATIENT)
Dept: GERIATRICS | Facility: OTHER | Age: OVER 89
End: 2025-04-15
Payer: MEDICARE

## 2025-04-15 ENCOUNTER — HOME CARE VISIT (OUTPATIENT)
Dept: HOME HEALTH SERVICES | Facility: HOME HEALTHCARE | Age: OVER 89
End: 2025-04-15
Payer: MEDICARE

## 2025-04-15 VITALS
TEMPERATURE: 97.6 F | HEART RATE: 62 BPM | SYSTOLIC BLOOD PRESSURE: 118 MMHG | OXYGEN SATURATION: 97 % | RESPIRATION RATE: 14 BRPM | DIASTOLIC BLOOD PRESSURE: 62 MMHG

## 2025-04-15 DIAGNOSIS — I48.19 PERSISTENT ATRIAL FIBRILLATION (HCC): ICD-10-CM

## 2025-04-15 DIAGNOSIS — G47.00 INSOMNIA, UNSPECIFIED TYPE: ICD-10-CM

## 2025-04-15 DIAGNOSIS — I60.9 SAH (SUBARACHNOID HEMORRHAGE) (HCC): ICD-10-CM

## 2025-04-15 DIAGNOSIS — I95.1 ORTHOSTATIC HYPOTENSION: ICD-10-CM

## 2025-04-15 DIAGNOSIS — K22.0 ACHALASIA: Primary | ICD-10-CM

## 2025-04-15 DIAGNOSIS — R26.2 AMBULATORY DYSFUNCTION: ICD-10-CM

## 2025-04-15 DIAGNOSIS — S06.5XAA SDH (SUBDURAL HEMATOMA) (HCC): ICD-10-CM

## 2025-04-15 DIAGNOSIS — C43.62 MALIGNANT MELANOMA OF ARM, LEFT (HCC): ICD-10-CM

## 2025-04-15 DIAGNOSIS — I10 PRIMARY HYPERTENSION: ICD-10-CM

## 2025-04-15 DIAGNOSIS — R41.81 AGE-RELATED COGNITIVE DECLINE: ICD-10-CM

## 2025-04-15 PROCEDURE — G0299 HHS/HOSPICE OF RN EA 15 MIN: HCPCS

## 2025-04-15 PROCEDURE — 99215 OFFICE O/P EST HI 40 MIN: CPT | Performed by: NURSE PRACTITIONER

## 2025-04-15 NOTE — ASSESSMENT & PLAN NOTE
Continue Metoprolol for rate control  Continue to monitor HR, 62  Currently not on anticoag, contraindicated r/t hx SDH  Follow up with PCP/Cardiology

## 2025-04-15 NOTE — PROGRESS NOTES
Virtual Regular VisitName: Sandra Purvis      : 12/3/1934      MRN: [id number]  Encounter Provider: LUIS Au  Encounter Date: 4/15/2025   Encounter department: [facility] VIRTUAL  :  Assessment & Plan  Achalasia  Recent endoscopy and X-ray with noted Achalasia  Continue modified diet  Maintain aspiration precautions  Continue SLP with VNA  Continue to monitor for acute changes in condition  Follow up with GI    Primary hypertension  BP remains stable today,122/72 sit and 118/62 stand  Continue Metoprolol 25 mg BID  Avoid hypotension  Continue to monitor BP and for acute changes in condition  Follow up with PCP/Cardiology      Persistent atrial fibrillation (HCC)  Continue Metoprolol for rate control  Continue to monitor HR, 62  Currently not on anticoag, contraindicated r/t hx SDH  Follow up with PCP/Cardiology       Orthostatic hypotension  BP remains stable today,122/72 sit and 118/62 stand  Continue Midodrine 5 mg po TID  Patient take Metoprolol 25 mg BID for rate control r/t A-fib  Encourage patient to rise slowly from seated to standing position  Maintain fall and safety precautions  Continue to monitor BP and for acute changes in condition  Follow up with PCP/Cardiology        SAH (subarachnoid hemorrhage) (HCC)  History of SAH/SDH secondary to fall in 2025  CT scan 2/3/25 results show- Trace posterior bifrontal subarachnoid hemorrhage likely subacute and stable to slightly decreased allowing for some degree of redistribution. 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  Continue to monitor for acute changes in condition  Maintain fall and safety precautions  Follow up with PCP/Neuro prn    SDH (subdural hematoma) (HCC)  History of SAH/SDH secondary to fall in 2025  CT scan 2/3/25 results show- Trace posterior bifrontal subarachnoid hemorrhage likely subacute and stable to slightly decreased allowing for some degree of redistribution. Stable  bifrontal subacute to chronic subdural hematomas.  Continue to monitor for acute changes in condition  Maintain fall and safety precautions  Follow up with PCP/Neuro prn      Malignant melanoma of arm, left (HCC)  Surgical incision performed 12/19  Continue to monitor area for acute changes in condition  Follow up with Derm/oncology prn      Age-related cognitive decline  Alert and oriented x 2 upon examination today  Continue supportive measures  Continue to reorient, redirect, and reassure patient prn  Encourage engagement and activity  Encourage daily involvement in ADLs  Maintain delirium precautions and sleep/wake cycle  Patient remains at risk for delirium r/t age, medications, sleep disturbance, and pain  Avoid deliriogenic medications such as tramadol, benzodiazepines, anticholinergics, and Benadryl  Continue Remeron  Limit interruptions at night as medically appropriate  Provide quiet environment, dim lighting, and avoidance tv at night  Patient continues to require assistance in which supportive services can be provided by VNA services  Encourage adequate nutrition and hydration  Continue to monitor for acute changes in condition  Follow up with PCP        Insomnia, unspecified type  History of Insomnia  Reports some improvement in sleep with occasional nights of interruptions  Continue Melatonin 6 mg HS, Mirtazapine 7.5 mg po QHS and Magnesium 200 mg QD  Limit interruptions at night as medically necessary  Continue to maintain proper sleep-wake cycle  Avoid caffeine throughout daytime hours  Avoid napping throughout daytime hours, recommended no more 30 min.  Discussed avoidance of sedative medication use such as benzodiazepines and/or benadryl products  Continue to monitor for acute changes in condition   Follow up with PCP       Ambulatory dysfunction  History of falls with history of SAH/SDH  Maintain fall and safety precautions  Encourage use of asst devices  Continue PT/OT services with VNA  Assess  for need with assistance with transfers, mobility, and ADLs in/out of home  Patient is at high risk for falls r/t polypharmacy, environmental barriers, and age  Continue to monitor for acute changes in condition   Follow up with PCP         History of Present Illness     Sandra Purvis is a 89 y.o. female patient, being seen for [facility] at home program in conjuction with VNA nurse Annabelle Wang, who was in the home to perform physical assessment.       The patient is being seen and examined today for follow-up on acute and chronic medical conditions s/p most recent hospitalization.      The patient reports she is doing okay today. She reports she is feeling very steady. Bp today was steady 122/72 sitting and standing 118/62. Patient continues on Midodrine 5 mg po once daily in AM and will make Dr. Page aware. Patient reports she is enjoying Meals on wheels. Patient reports overall improvement in sleep. Patient denies any increase in dyspnea, CP, dizziness, HA, fever, chills, nausea, vomiting, diarrhea, or constipation. Patient has f/u with PCP 4/22. Patient will be d/c from our services today as she remains stable at this time. She will continue with [facility] nursing.      Review of Systems   Constitutional:  Positive for activity change, appetite change and fatigue. Negative for chills and fever.   HENT:  Positive for hearing loss. Negative for ear pain and sore throat.    Eyes:  Negative for pain and visual disturbance.   Respiratory:  Positive for shortness of breath. Negative for cough.    Cardiovascular:  Negative for chest pain and palpitations.   Gastrointestinal:  Negative for abdominal pain and vomiting.   Genitourinary:  Negative for dysuria and hematuria.   Musculoskeletal:  Positive for gait problem. Negative for arthralgias and back pain.   Skin:  Negative for color change and rash.   Neurological:  Positive for weakness. Negative for seizures and syncope.   All other systems reviewed and are  negative.    Pertinent Medical History   Patient originally presented to the hospital on 2/3/2025 due to food bolus impaction requiring intubation, patient eventually underwent endoscopy with removal and was extubated successfully, she was noted to have Pseudomonas aeruginosa in the urine started on Levaquin prior to her discharge.     Objective   Vitals    Vitals 4/15/2025 10:30 AM 4/15/2025 10:36 AM   /72 118/62   BP Location Left arm Right arm   Patient Position Sitting Standing   Cuff Size Adult Adult   Resp 14 --   SpO2 97 % --   SpO2 Activity At Rest --   Pulse 62 --   Pulse Source Radial --   Cardiac Regularity Regular --   Temp 97.6 °F (36.4 °C) --   Temp src Temporal        Physical Exam  Vitals and nursing note reviewed.   Constitutional:       General: She is not in acute distress.     Appearance: She is well-developed.   HENT:      Head: Normocephalic and atraumatic.      Ears:      Comments: [place]  Eyes:      Conjunctiva/sclera: Conjunctivae normal.      Comments: Wears glasses   Cardiovascular:      Rate and Rhythm: Normal rate and regular rhythm.      Heart sounds: No murmur heard.  Pulmonary:      Effort: Pulmonary effort is normal. No respiratory distress.      Breath sounds: Normal breath sounds.   Abdominal:      Palpations: Abdomen is soft.      Tenderness: There is no abdominal tenderness.   Musculoskeletal:         General: No swelling.      Cervical back: Neck supple.   Skin:     General: Skin is warm and dry.      Capillary Refill: Capillary refill takes less than 2 seconds.      Comments: Left arm lesion   Neurological:      Mental Status: She is alert.      Motor: Weakness present.      Coordination: Coordination abnormal.      Gait: Gait abnormal.   Psychiatric:         Mood and Affect: Mood normal.       Administrative Statements   Encounter provider LUIS Au    The Patient is located at Home and in the following state in which I hold an active license  [place].    The patient was identified by name and date of birth. Sadnra Purvis was informed that this is a telemedicine visit and that the visit is being conducted through the [employer] Teams platform. She agrees to proceed..  My office door was closed. No one else was in the room.  She acknowledged consent and understanding of privacy and security of the video platform. The patient has agreed to participate and understands they can discontinue the visit at any time.    I have spent a total time of 42 minutes in caring for this patient on the day of the visit/encounter including Prognosis, Risks and benefits of tx options, Instructions for management, Patient and family education, Importance of tx compliance, Risk factor reductions, Counseling / Coordination of care, Documenting in the medical record, Reviewing/placing orders in the medical record (including tests, medications, and/or procedures), Obtaining or reviewing history  , and Communicating with other healthcare professionals , not including the time spent for establishing the audio/video connection.

## 2025-04-15 NOTE — ASSESSMENT & PLAN NOTE
BP remains stable today,122/72 sit and 118/62 stand  Continue Metoprolol 25 mg BID  Avoid hypotension  Continue to monitor BP and for acute changes in condition  Follow up with PCP/Cardiology

## 2025-04-15 NOTE — ASSESSMENT & PLAN NOTE
BP remains stable today,122/72 sit and 118/62 stand  Continue Midodrine 5 mg po TID  Patient take Metoprolol 25 mg BID for rate control r/t A-fib  Encourage patient to rise slowly from seated to standing position  Maintain fall and safety precautions  Continue to monitor BP and for acute changes in condition  Follow up with PCP/Cardiology

## 2025-04-16 ENCOUNTER — HOME CARE VISIT (OUTPATIENT)
Dept: HOME HEALTH SERVICES | Facility: HOME HEALTHCARE | Age: OVER 89
End: 2025-04-16
Payer: MEDICARE

## 2025-04-16 PROCEDURE — G0151 HHCP-SERV OF PT,EA 15 MIN: HCPCS

## 2025-04-17 VITALS — DIASTOLIC BLOOD PRESSURE: 60 MMHG | SYSTOLIC BLOOD PRESSURE: 138 MMHG | OXYGEN SATURATION: 97 % | HEART RATE: 58 BPM

## 2025-04-22 ENCOUNTER — OFFICE VISIT (OUTPATIENT)
Age: OVER 89
End: 2025-04-22
Payer: MEDICARE

## 2025-04-22 VITALS
WEIGHT: 117 LBS | BODY MASS INDEX: 18.36 KG/M2 | OXYGEN SATURATION: 98 % | SYSTOLIC BLOOD PRESSURE: 144 MMHG | DIASTOLIC BLOOD PRESSURE: 72 MMHG | TEMPERATURE: 98.2 F | HEIGHT: 67 IN | HEART RATE: 72 BPM

## 2025-04-22 DIAGNOSIS — G47.00 INSOMNIA, UNSPECIFIED TYPE: ICD-10-CM

## 2025-04-22 DIAGNOSIS — R13.19 ESOPHAGEAL DYSPHAGIA: ICD-10-CM

## 2025-04-22 DIAGNOSIS — I48.19 PERSISTENT ATRIAL FIBRILLATION (HCC): Primary | ICD-10-CM

## 2025-04-22 DIAGNOSIS — I10 PRIMARY HYPERTENSION: ICD-10-CM

## 2025-04-22 DIAGNOSIS — E43 SEVERE PROTEIN-CALORIE MALNUTRITION (HCC): ICD-10-CM

## 2025-04-22 PROCEDURE — G2211 COMPLEX E/M VISIT ADD ON: HCPCS | Performed by: FAMILY MEDICINE

## 2025-04-22 PROCEDURE — 99214 OFFICE O/P EST MOD 30 MIN: CPT | Performed by: FAMILY MEDICINE

## 2025-04-22 NOTE — PROGRESS NOTES
Name: Sandra Purvis      : 12/3/1934      MRN: [id number]  Encounter Provider: Berhane Page DO  Encounter Date: 2025   Encounter department: [facility] C[facility]  :  Assessment & Plan  Persistent atrial fibrillation (HCC)  A-fib controlled at this time.  Discussion with both patient and  regarding anticoagulation.  They are in agreement to holding off with further anticoagulation at this time.  Risk and benefits discussed.         Primary hypertension  Stable at present time.  Continue with current treatment.  Refills when needed         Esophageal dysphagia  Improved with appropriate diet and modifications at home.         Insomnia, unspecified type  Improved as per patient.         Severe protein-calorie malnutrition (HCC)  Improving overall.  Continue with current supplementation                History of Present Illness   Patient is here following up on A-fib, hypertension, severe protein calorie malnutrition, esophageal dysphagia, insomnia.  Patient feeling better overall.  Patient is eating better and has been instructing him food appropriately.  No new swallowing issues at the present time.  Patient is sleeping better overall.  No new chest pain shortness of breath or abdominal pain or problems urinating or defecating at the present time.  Patient walking with cane.  Patient not needing walker anymore.  No falls noted by the patient.      Review of Systems   Constitutional: Negative.    HENT: Negative.     Eyes: Negative.    Respiratory: Negative.     Cardiovascular: Negative.    Gastrointestinal: Negative.    Endocrine: Negative.    Genitourinary: Negative.    Musculoskeletal: Negative.    Skin: Negative.    Allergic/Immunologic: Negative.    Neurological: Negative.    Hematological: Negative.    Psychiatric/Behavioral: Negative.         Objective   /72 (BP Location: Right arm, Patient Position: Sitting, Cuff Size: Standard)   Pulse 72   Temp 98.2 °F (36.8 °C)  "(Temporal)   Ht 5' 7\" (1.702 m)   Wt 53.1 kg (117 lb)   SpO2 98%   BMI 18.32 kg/m²      Physical Exam  Vitals and nursing note reviewed.   Constitutional:       General: She is not in acute distress.     Appearance: Normal appearance. She is well-developed. She is not ill-appearing, toxic-appearing or diaphoretic.   HENT:      Head: Normocephalic and atraumatic.      Right Ear: Tympanic membrane, ear canal and external ear normal.      Left Ear: Tympanic membrane, ear canal and external ear normal.      Nose: Nose normal.   Eyes:      General:         Right eye: No discharge.         Left eye: No discharge.   Neck:      Thyroid: No thyromegaly.      Vascular: No carotid bruit.      Trachea: No tracheal deviation.   Cardiovascular:      Rate and Rhythm: Normal rate. Rhythm irregular.      Pulses: Normal pulses.      Heart sounds: Normal heart sounds. No murmur heard.     No gallop.   Pulmonary:      Effort: Pulmonary effort is normal. No respiratory distress.      Breath sounds: Normal breath sounds. No stridor. No wheezing or rales.   Chest:      Chest wall: No tenderness.   Musculoskeletal:         General: No deformity. Normal range of motion.      Cervical back: Normal range of motion and neck supple.      Right lower leg: No edema.      Left lower leg: No edema.   Lymphadenopathy:      Cervical: No cervical adenopathy.   Skin:     General: Skin is warm and dry.      Capillary Refill: Capillary refill takes less than 2 seconds.      Coloration: Skin is not pale.      Findings: No erythema or rash.   Neurological:      Mental Status: She is alert and oriented to person, place, and time.      Cranial Nerves: No cranial nerve deficit.      Motor: No abnormal muscle tone.   Psychiatric:         Mood and Affect: Mood normal.         Behavior: Behavior normal.         Thought Content: Thought content normal.         Judgment: Judgment normal.         "

## 2025-04-22 NOTE — ASSESSMENT & PLAN NOTE
A-fib controlled at this time.  Discussion with both patient and  regarding anticoagulation.  They are in agreement to holding off with further anticoagulation at this time.  Risk and benefits discussed.

## 2025-04-23 ENCOUNTER — HOME CARE VISIT (OUTPATIENT)
Dept: HOME HEALTH SERVICES | Facility: HOME HEALTHCARE | Age: OVER 89
End: 2025-04-23
Payer: MEDICARE

## 2025-04-23 VITALS
RESPIRATION RATE: 16 BRPM | SYSTOLIC BLOOD PRESSURE: 128 MMHG | HEART RATE: 64 BPM | TEMPERATURE: 97.6 F | OXYGEN SATURATION: 97 % | DIASTOLIC BLOOD PRESSURE: 76 MMHG

## 2025-04-23 DIAGNOSIS — K59.00 CONSTIPATION, UNSPECIFIED CONSTIPATION TYPE: Primary | ICD-10-CM

## 2025-04-23 PROCEDURE — G0299 HHS/HOSPICE OF RN EA 15 MIN: HCPCS

## 2025-04-23 RX ORDER — DOCUSATE SODIUM 100 MG/1
100 CAPSULE, LIQUID FILLED ORAL DAILY
Qty: 90 CAPSULE | Refills: 3 | Status: SHIPPED | OUTPATIENT
Start: 2025-04-23

## 2025-04-23 RX ORDER — DOCUSATE SODIUM 100 MG/1
100 CAPSULE, LIQUID FILLED ORAL 2 TIMES DAILY PRN
Status: CANCELLED | OUTPATIENT
Start: 2025-04-23

## 2025-04-28 DIAGNOSIS — I95.1 ORTHOSTATIC HYPOTENSION: ICD-10-CM

## 2025-04-28 DIAGNOSIS — I15.9 SECONDARY HYPERTENSION: ICD-10-CM

## 2025-04-28 DIAGNOSIS — C44.41 BASAL CELL CARCINOMA (BCC) OF SCALP: ICD-10-CM

## 2025-04-28 RX ORDER — MUPIROCIN 20 MG/G
OINTMENT TOPICAL 3 TIMES DAILY
Qty: 22 G | Refills: 0 | Status: SHIPPED | OUTPATIENT
Start: 2025-04-28

## 2025-04-28 NOTE — TELEPHONE ENCOUNTER
Reason for call:   [x] Refill   [] Prior Auth  [] Other:     Office:   [] PCP/Provider -   [x] Specialty/Provider -  Bambi Mcnulty    Medication:   midodrine (PROAMATINE) 5 mg tablet     Dose/Frequency: Take 1 tablet (5 mg total) by mouth daily before breakfast     Quantity: 30 tablet     Pharmacy: [facility]/pharmacy #[id number]        Does the patient have enough for 3 days?   [x] Yes   [] No - Send as HP to POD

## 2025-04-28 NOTE — TELEPHONE ENCOUNTER
Reason for call:   [x] Refill   [] Prior Auth  [] Other:     Office:   [] PCP/Provider -   [x] Specialty/Provider - Sera Dickinson PA-C    Medication: mupirocin (BACTROBAN) 2 % ointment     Dose/Frequency: Apply topically 3 (three) times a day To scalp lesion     Quantity: 22 g     Pharmacy: [facility]/pharmacy #[id number]        Does the patient have enough for 3 days?   [x] Yes   [] No - Send as HP to POD

## 2025-04-29 DIAGNOSIS — C44.41 BASAL CELL CARCINOMA (BCC) OF SCALP: ICD-10-CM

## 2025-04-29 DIAGNOSIS — I95.1 ORTHOSTATIC HYPOTENSION: ICD-10-CM

## 2025-04-29 RX ORDER — MIDODRINE HYDROCHLORIDE 5 MG/1
5 TABLET ORAL
Qty: 30 TABLET | Refills: 0 | OUTPATIENT
Start: 2025-04-29 | End: 2025-05-29

## 2025-04-29 RX ORDER — MIDODRINE HYDROCHLORIDE 5 MG/1
5 TABLET ORAL
Qty: 90 TABLET | Refills: 1 | Status: SHIPPED | OUTPATIENT
Start: 2025-04-29 | End: 2025-05-01 | Stop reason: SDUPTHER

## 2025-04-29 RX ORDER — HYDROCHLOROTHIAZIDE 12.5 MG/1
12.5 TABLET ORAL DAILY
Qty: 90 TABLET | Refills: 1 | Status: SHIPPED | OUTPATIENT
Start: 2025-04-29 | End: 2025-05-01

## 2025-05-01 ENCOUNTER — HOME CARE VISIT (OUTPATIENT)
Dept: HOME HEALTH SERVICES | Facility: HOME HEALTHCARE | Age: OVER 89
End: 2025-05-01
Payer: MEDICARE

## 2025-05-01 VITALS
TEMPERATURE: 97.6 F | OXYGEN SATURATION: 97 % | HEART RATE: 64 BPM | DIASTOLIC BLOOD PRESSURE: 78 MMHG | RESPIRATION RATE: 14 BRPM | SYSTOLIC BLOOD PRESSURE: 128 MMHG

## 2025-05-01 DIAGNOSIS — I95.1 ORTHOSTATIC HYPOTENSION: ICD-10-CM

## 2025-05-01 PROCEDURE — G0299 HHS/HOSPICE OF RN EA 15 MIN: HCPCS

## 2025-05-01 RX ORDER — MIDODRINE HYDROCHLORIDE 5 MG/1
5 TABLET ORAL
Qty: 90 TABLET | Refills: 1 | Status: SHIPPED | OUTPATIENT
Start: 2025-05-01 | End: 2025-07-30

## 2025-05-02 NOTE — PROGRESS NOTES
"OT TREATMENT       01/21/25 1030   Pain Assessment   Pain Assessment Tool 0-10   Pain Score No Pain   Restrictions/Precautions   Precautions Aspiration;Bed/chair alarms;Cognitive;Fall Risk;Supervision on toilet/commode   Weight Bearing Restrictions No   ROM Restrictions No   Lifestyle   Autonomy \"I need to rest\". pt agreeable to therapy after verbal encouragement/education   Roll Left and Right   Type of Assistance Needed Physical assistance   Physical Assistance Level 76% or more   Comment Max A with use of bedrail. pt maintained side-lying ~5 mins B to offload sacrum/maintain skin integrity   Roll Left and Right CARE Score 2   Sit to Lying   Type of Assistance Needed Physical assistance   Physical Assistance Level 51%-75%   Comment A for BLE management, incidental A at trunk. HOB elevated with use of GB   Sit to Lying CARE Score 2   Lying to Sitting on Side of Bed   Type of Assistance Needed Physical assistance   Physical Assistance Level 51%-75%   Comment EXTENDED time to manage BLE off edge of bed with Min A, Mod-Max A at trunk with use of bedrail, HOB slightly elevated   Lying to Sitting on Side of Bed CARE Score 2   Sit to Stand   Type of Assistance Needed Physical assistance   Physical Assistance Level 76% or more   Comment Max A x1   Sit to Stand CARE Score 2   Therapeutic Exercise - ROM   UE-ROM Yes   ROM- Right Upper Extremities   RUE ROM Comment Pt completed the following AROM 2 sets x10 trials with extended time and verbal cuing for initiation/attention: sh flexion, horizontal ab/adduction, elbow flexion/extension seated at EOB with Min-Mod A for sitting balance   ROM - Left Upper Extremities    LUE ROM Comment see above   Cognition   Overall Cognitive Status Impaired   Arousal/Participation Cooperative;Lethargic;Persistent stimuli required   Attention Attends with cues to redirect   Orientation Level Oriented to person;Oriented to place;Oriented to time   Memory Decreased short term memory;Decreased " recall of recent events;Decreased recall of precautions   Following Commands Follows one step commands with increased time or repetition   Activity Tolerance   Activity Tolerance Patient limited by fatigue   Medical Staff Made Aware Ortho BP (manual) supine: 148/60 seated: 130/58   Assessment   Treatment Assessment Pt participated in skilled OT session with focus on upright tolerance, endurance, bed mobility and strength. Pt tolerated treatment well, pt remained supine in bed with all immediate needs met, call bell accessible, bed alarm secured . Pt will continue to benefit from skilled OT services to ensure safe discharge. Continue plan of care with focus on upright tolerance, strengthening and endurance.   Prognosis Good   Problem List Decreased strength;Decreased endurance;Impaired balance;Decreased mobility;Decreased coordination;Decreased safety awareness;Decreased cognition;Impaired judgement;Decreased skin integrity   Barriers to Discharge Inaccessible home environment;Decreased caregiver support   Plan   Treatment/Interventions ADL retraining;Functional transfer training;Therapeutic exercise;Endurance training;Cognitive reorientation;Patient/family training;Equipment eval/education;Bed mobility;Compensatory technique education;Continued evaluation   Progress Slow progress, decreased activity tolerance   Discharge Recommendation   Rehab Resource Intensity Level, OT   (pending)   OT Therapy Minutes   OT Time In 1030   OT Time Out 1130   OT Total Time (minutes) 60   OT Mode of treatment - Individual (minutes) 60   OT Mode of treatment - Concurrent (minutes) 0   OT Mode of treatment - Group (minutes) 0   OT Mode of treatment - Co-treat (minutes) 0   OT Mode of Treatment - Total time(minutes) 60 minutes   OT Cumulative Minutes 310   Therapy Time missed   Time missed? No        100

## 2025-05-05 RX ORDER — MUPIROCIN 20 MG/G
OINTMENT TOPICAL 3 TIMES DAILY
Qty: 22 G | Refills: 0 | OUTPATIENT
Start: 2025-05-05

## 2025-05-08 ENCOUNTER — HOME CARE VISIT (OUTPATIENT)
Dept: HOME HEALTH SERVICES | Facility: HOME HEALTHCARE | Age: OVER 89
End: 2025-05-08
Payer: MEDICARE

## 2025-05-08 VITALS
RESPIRATION RATE: 16 BRPM | OXYGEN SATURATION: 96 % | HEART RATE: 64 BPM | SYSTOLIC BLOOD PRESSURE: 132 MMHG | TEMPERATURE: 97.8 F | DIASTOLIC BLOOD PRESSURE: 70 MMHG

## 2025-05-08 PROCEDURE — G0299 HHS/HOSPICE OF RN EA 15 MIN: HCPCS

## 2025-05-12 NOTE — ASSESSMENT & PLAN NOTE
Wt Readings from Last 3 Encounters:   01/21/25 45.6 kg (100 lb 8.5 oz)   01/12/25 53.5 kg (118 lb)   01/10/25 51.1 kg (112 lb 9.6 oz)     TTE 1/6/2025 showed EF 70%, grade 2 DD, mildly dilated RV, mild LA, mild AI, mild TR, estimated PA pressure 50 mmHg, trivial pericardial effusion  Not on diuretics prior to admission  Cw furosemide 20 mg; monitor for volume status   Daily weights, monitor I/O      English

## 2025-05-22 ENCOUNTER — OFFICE VISIT (OUTPATIENT)
Age: OVER 89
End: 2025-05-22
Payer: MEDICARE

## 2025-05-22 VITALS
SYSTOLIC BLOOD PRESSURE: 138 MMHG | DIASTOLIC BLOOD PRESSURE: 88 MMHG | TEMPERATURE: 98.2 F | BODY MASS INDEX: 18.55 KG/M2 | WEIGHT: 118.2 LBS | HEART RATE: 70 BPM | HEIGHT: 67 IN | OXYGEN SATURATION: 98 %

## 2025-05-22 DIAGNOSIS — L08.9 STAPH SKIN INFECTION: ICD-10-CM

## 2025-05-22 DIAGNOSIS — Z00.00 MEDICARE ANNUAL WELLNESS VISIT, SUBSEQUENT: Primary | ICD-10-CM

## 2025-05-22 DIAGNOSIS — B95.8 STAPH SKIN INFECTION: ICD-10-CM

## 2025-05-22 DIAGNOSIS — K21.00 GASTROESOPHAGEAL REFLUX DISEASE WITH ESOPHAGITIS WITHOUT HEMORRHAGE: ICD-10-CM

## 2025-05-22 DIAGNOSIS — I10 HYPERTENSION: ICD-10-CM

## 2025-05-22 PROCEDURE — G0439 PPPS, SUBSEQ VISIT: HCPCS | Performed by: FAMILY MEDICINE

## 2025-05-22 RX ORDER — MUPIROCIN 20 MG/G
OINTMENT TOPICAL 3 TIMES DAILY
Qty: 22 G | Refills: 3 | Status: SHIPPED | OUTPATIENT
Start: 2025-05-22

## 2025-05-22 RX ORDER — METOPROLOL SUCCINATE 25 MG/1
25 TABLET, EXTENDED RELEASE ORAL 2 TIMES DAILY
Qty: 180 TABLET | Refills: 1 | Status: SHIPPED | OUTPATIENT
Start: 2025-05-22

## 2025-05-22 RX ORDER — PANTOPRAZOLE SODIUM 40 MG/1
40 TABLET, DELAYED RELEASE ORAL
Qty: 100 TABLET | Refills: 3 | Status: SHIPPED | OUTPATIENT
Start: 2025-05-22

## 2025-05-22 NOTE — PATIENT INSTRUCTIONS
Medicare Preventive Visit Patient Instructions  Thank you for completing your Welcome to Medicare Visit or Medicare Annual Wellness Visit today. Your next wellness visit will be due in one year (5/23/2026).  The screening/preventive services that you may require over the next 5-10 years are detailed below. Some tests may not apply to you based off risk factors and/or age. Screening tests ordered at today's visit but not completed yet may show as past due. Also, please note that scanned in results may not display below.  Preventive Screenings:  Service Recommendations Previous Testing/Comments   Colorectal Cancer Screening  * Colonoscopy    * Fecal Occult Blood Test (FOBT)/Fecal Immunochemical Test (FIT)  * Fecal DNA/Cologuard Test  * Flexible Sigmoidoscopy Age: 45-75 years old   Colonoscopy: every 10 years (may be performed more frequently if at higher risk)  OR  FOBT/FIT: every 1 year  OR  Cologuard: every 3 years  OR  Sigmoidoscopy: every 5 years  Screening may be recommended earlier than age 45 if at higher risk for colorectal cancer. Also, an individualized decision between you and your healthcare provider will decide whether screening between the ages of 76-85 would be appropriate. Colonoscopy: Not on file  FOBT/FIT: Not on file  Cologuard: Not on file  Sigmoidoscopy: Not on file    Screening Not Indicated     Breast Cancer Screening Age: 40+ years old  Frequency: every 1-2 years  Not required if history of left and right mastectomy Mammogram: Not on file        Cervical Cancer Screening Between the ages of 21-29, pap smear recommended once every 3 years.   Between the ages of 30-65, can perform pap smear with HPV co-testing every 5 years.   Recommendations may differ for women with a history of total hysterectomy, cervical cancer, or abnormal pap smears in past. Pap Smear: Not on file    Screening Not Indicated   Hepatitis C Screening Once for adults born between 1945 and 1965  More frequently in patients at  high risk for Hepatitis C Hep C Antibody: Not on file        Diabetes Screening 1-2 times per year if you're at risk for diabetes or have pre-diabetes Fasting glucose: 93 mg/dL (1/30/2025)  A1C: No results in last 5 years (No results in last 5 years)  Screening Current   Cholesterol Screening Once every 5 years if you don't have a lipid disorder. May order more often based on risk factors. Lipid panel: Not on file          Other Preventive Screenings Covered by Medicare:  Abdominal Aortic Aneurysm (AAA) Screening: covered once if your at risk. You're considered to be at risk if you have a family history of AAA.  Lung Cancer Screening: covers low dose CT scan once per year if you meet all of the following conditions: (1) Age 55-77; (2) No signs or symptoms of lung cancer; (3) Current smoker or have quit smoking within the last 15 years; (4) You have a tobacco smoking history of at least 20 pack years (packs per day multiplied by number of years you smoked); (5) You get a written order from a healthcare provider.  Glaucoma Screening: covered annually if you're considered high risk: (1) You have diabetes OR (2) Family history of glaucoma OR (3)  aged 50 and older OR (4)  American aged 65 and older  Osteoporosis Screening: covered every 2 years if you meet one of the following conditions: (1) You're estrogen deficient and at risk for osteoporosis based off medical history and other findings; (2) Have a vertebral abnormality; (3) On glucocorticoid therapy for more than 3 months; (4) Have primary hyperparathyroidism; (5) On osteoporosis medications and need to assess response to drug therapy.   Last bone density test (DXA Scan): Not on file.  HIV Screening: covered annually if you're between the age of 15-65. Also covered annually if you are younger than 15 and older than 65 with risk factors for HIV infection. For pregnant patients, it is covered up to 3 times per  pregnancy.    Immunizations:  Immunization Recommendations   Influenza Vaccine Annual influenza vaccination during flu season is recommended for all persons aged >= 6 months who do not have contraindications   Pneumococcal Vaccine   * Pneumococcal conjugate vaccine = PCV13 (Prevnar 13), PCV15 (Vaxneuvance), PCV20 (Prevnar 20)  * Pneumococcal polysaccharide vaccine = PPSV23 (Pneumovax) Adults 19-63 yo with certain risk factors or if 65+ yo  If never received any pneumonia vaccine: recommend Prevnar 20 (PCV20)  Give PCV20 if previously received 1 dose of PCV13 or PPSV23   Hepatitis B Vaccine 3 dose series if at intermediate or high risk (ex: diabetes, end stage renal disease, liver disease)   Respiratory syncytial virus (RSV) Vaccine - COVERED BY MEDICARE PART D  * RSVPreF3 (Arexvy) CDC recommends that adults 60 years of age and older may receive a single dose of RSV vaccine using shared clinical decision-making (SCDM)   Tetanus (Td) Vaccine - COST NOT COVERED BY MEDICARE PART B Following completion of primary series, a booster dose should be given every 10 years to maintain immunity against tetanus. Td may also be given as tetanus wound prophylaxis.   Tdap Vaccine - COST NOT COVERED BY MEDICARE PART B Recommended at least once for all adults. For pregnant patients, recommended with each pregnancy.   Shingles Vaccine (Shingrix) - COST NOT COVERED BY MEDICARE PART B  2 shot series recommended in those 19 years and older who have or will have weakened immune systems or those 50 years and older     Health Maintenance Due:  There are no preventive care reminders to display for this patient.  Immunizations Due:      Topic Date Due   • Pneumococcal Vaccine: 65+ Years (1 of 2 - PCV) Never done   • COVID-19 Vaccine (1 - 2024-25 season) Never done     Advance Directives   What are advance directives?  Advance directives are legal documents that state your wishes and plans for medical care. These plans are made ahead of time in  case you lose your ability to make decisions for yourself. Advance directives can apply to any medical decision, such as the treatments you want, and if you want to donate organs.   What are the types of advance directives?  There are many types of advance directives, and each state has rules about how to use them. You may choose a combination of any of the following:  Living will:  This is a written record of the treatment you want. You can also choose which treatments you do not want, which to limit, and which to stop at a certain time. This includes surgery, medicine, IV fluid, and tube feedings.   Durable power of  for healthcare (DPAHC):  This is a written record that states who you want to make healthcare choices for you when you are unable to make them for yourself. This person, called a proxy, is usually a family member or a friend. You may choose more than 1 proxy.  Do not resuscitate (DNR) order:  A DNR order is used in case your heart stops beating or you stop breathing. It is a request not to have certain forms of treatment, such as CPR. A DNR order may be included in other types of advance directives.  Medical directive:  This covers the care that you want if you are in a coma, near death, or unable to make decisions for yourself. You can list the treatments you want for each condition. Treatment may include pain medicine, surgery, blood transfusions, dialysis, IV or tube feedings, and a ventilator (breathing machine).  Values history:  This document has questions about your views, beliefs, and how you feel and think about life. This information can help others choose the care that you would choose.  Why are advance directives important?  An advance directive helps you control your care. Although spoken wishes may be used, it is better to have your wishes written down. Spoken wishes can be misunderstood, or not followed. Treatments may be given even if you do not want them. An advance directive  may make it easier for your family to make difficult choices about your care.   Fall Prevention    Fall prevention  includes ways to make your home and other areas safer. It also includes ways you can move more carefully to prevent a fall. Health conditions that cause changes in your blood pressure, vision, or muscle strength and coordination may increase your risk for falls. Medicines may also increase your risk for falls if they make you dizzy, weak, or sleepy.   Fall prevention tips:   Stand or sit up slowly.    Use assistive devices as directed.    Wear shoes that fit well and have soles that .    Wear a personal alarm.    Stay active.    Manage your medical conditions.    Home Safety Tips:  Add items to prevent falls in the bathroom.    Keep paths clear.    Install bright lights in your home.    Keep items you use often on shelves within reach.    Paint or place reflective tape on the edges of your stairs.    Urinary Incontinence   Urinary incontinence (UI)  is when you lose control of your bladder. UI develops because your bladder cannot store or empty urine properly. The 3 most common types of UI are stress incontinence, urge incontinence, or both.  Medicines:   May be given to help strengthen your bladder control. Report any side effects of medication to your healthcare provider.  Do pelvic muscle exercises often:  Your pelvic muscles help you stop urinating. Squeeze these muscles tight for 5 seconds, then relax for 5 seconds. Gradually work up to squeezing for 10 seconds. Do 3 sets of 15 repetitions a day, or as directed. This will help strengthen your pelvic muscles and improve bladder control.  Train your bladder:  Go to the bathroom at set times, such as every 2 hours, even if you do not feel the urge to go. You can also try to hold your urine when you feel the urge to go. For example, hold your urine for 5 minutes when you feel the urge to go. As that becomes easier, hold your urine for 10 minutes.    Self-care:   Keep a UI record.  Write down how often you leak urine and how much you leak. Make a note of what you were doing when you leaked urine.  Drink liquids as directed. You may need to limit the amount of liquid you drink to help control your urine leakage. Do not drink any liquid right before you go to bed. Limit or do not have drinks that contain caffeine or alcohol.   Prevent constipation.  Eat a variety of high-fiber foods. Good examples are high-fiber cereals, beans, vegetables, and whole-grain breads. Walking is the best way to trigger your intestines to have a bowel movement.  Exercise regularly and maintain a healthy weight.  Weight loss and exercise will decrease pressure on your bladder and help you control your leakage.   Use a catheter as directed  to help empty your bladder. A catheter is a tiny, plastic tube that is put into your bladder to drain your urine.   Go to behavior therapy as directed.  Behavior therapy may be used to help you learn to control your urge to urinate.     © Copyright [employer] 2018 Information is for End User's use only and may not be sold, redistributed or otherwise used for commercial purposes. All illustrations and images included in CareNotes® are the copyrighted property of [employer] or [employer]

## 2025-05-22 NOTE — ASSESSMENT & PLAN NOTE
Orders:    pantoprazole (PROTONIX) 40 mg tablet; Take 1 tablet (40 mg total) by mouth daily in the early morning

## 2025-05-22 NOTE — PROGRESS NOTES
Name: Sandra Purvis      : 12/3/1934      MRN: [id number]  Encounter Provider: Berhane Page DO  Encounter Date: 2025   Encounter department: [facility] PRIMARY CARE  :  Assessment & Plan  Hypertension    Orders:    metoprolol succinate (TOPROL-XL) 25 mg 24 hr tablet; Take 1 tablet (25 mg total) by mouth in the morning and 1 tablet (25 mg total) before bedtime.    Staph skin infection    Orders:    mupirocin (BACTROBAN) 2 % ointment; Apply topically 3 (three) times a day To scalp lesion    Gastroesophageal reflux disease with esophagitis without hemorrhage    Orders:    pantoprazole (PROTONIX) 40 mg tablet; Take 1 tablet (40 mg total) by mouth daily in the early morning    Medicare annual wellness visit, subsequent           Depression Screening and Follow-up Plan: Patient was screened for depression during today's encounter. They screened negative with a PHQ-2 score of 0.      Urinary Incontinence Plan of Care: counseling topics discussed: limiting fluid intake 3-4 hours before bed.       Preventive health issues were discussed with patient, and age appropriate screening tests were ordered as noted in patient's After Visit Summary. Personalized health advice and appropriate referrals for health education or preventive services given if needed, as noted in patient's After Visit Summary.    History of Present Illness     HPI   Patient Care Team:  Cj Page DO as PCP - General  DO Elena Moise MD (Surgical Oncology)  Sally Pop MA as Care Coordinator (Oncology)  LUIS Ayon as Nurse Practitioner (Surgical Oncology)    Review of Systems  Medical History Reviewed by provider this encounter:       Annual Wellness Visit Questionnaire   Sandra is here for her Subsequent Wellness visit.     Health Risk Assessment:   Patient rates overall health as fair. Patient feels that their physical health rating is slightly worse. Patient is satisfied with their  life. Eyesight was rated as same. Hearing was rated as same. Patient feels that their emotional and mental health rating is same. Patients states they are never, rarely angry. Patient states they are sometimes unusually tired/fatigued. Pain experienced in the last 7 days has been none. Patient states that she has experienced weight loss or gain in last 6 months.     Depression Screening:   PHQ-2 Score: 0      Fall Risk Screening:   In the past year, patient has experienced: history of falling in past year      Urinary Incontinence Screening:   Patient has leaked urine accidently in the last six months.     Home Safety:  Patient does not have trouble with stairs inside or outside of their home. Patient has no working smoke alarms and has no working carbon monoxide detector. Home safety hazards include: loose rugs on the floor and household clutter. we removed the throw rugs and clutter    Nutrition:   Current diet is Other (please comment). chopped very fine    Medications:   Patient is currently taking over-the-counter supplements. OTC medications include: see medication list. Patient is able to manage medications.     Activities of Daily Living (ADLs)/Instrumental Activities of Daily Living (IADLs):   Walk and transfer into and out of bed and chair?: Yes  Dress and groom yourself?: Yes    Bathe or shower yourself?: Yes    Feed yourself? Yes  Do your laundry/housekeeping?: Yes  Manage your money, pay your bills and track your expenses?: Yes  Make your own meals?: Yes    Do your own shopping?: Yes    ADL comments:  does most laundry.  we pay a friend to clean the house. we get meals on wheels.    Previous Hospitalizations:   Any hospitalizations or ED visits within the last 12 months?: Yes    How many hospitalizations have you had in the last year?: 1-2    Advance Care Planning:   Living will: Yes    Durable POA for healthcare: Yes    Advanced directive: Yes      Cognitive Screening:   Provider or  family/friend/caregiver concerned regarding cognition?: No    Preventive Screenings      Cardiovascular Screening:    General: Risks and Benefits Discussed and Screening Current      Diabetes Screening:     General: Screening Current and Risks and Benefits Discussed      Colorectal Cancer Screening:     General: Screening Not Indicated and Risks and Benefits Discussed      Breast Cancer Screening:     General: Risks and Benefits Discussed and Screening Not Indicated      Cervical Cancer Screening:    General: Screening Not Indicated and Risks and Benefits Discussed      Osteoporosis Screening:    General: Risks and Benefits Discussed and Patient Declines      Abdominal Aortic Aneurysm (AAA) Screening:        General: Risks and Benefits Discussed and Screening Not Indicated      Lung Cancer Screening:     General: Screening Not Indicated and Risks and Benefits Discussed      Hepatitis C Screening:    General: Risks and Benefits Discussed and Patient Declines    Immunizations:  - Immunizations due: Prevnar 20 and Zoster (Shingrix)  - Risks/benefits immunizations discussed    - The patient declines recommended vaccines currently despite my recommendations      Screening, Brief Intervention, and Referral to Treatment (SBIRT)     Screening  Typical number of drinks in a day: 0  Typical number of drinks in a week: 0  Interpretation: Low risk drinking behavior.    AUDIT-C Screenin) How often did you have a drink containing alcohol in the past year? never  2) How many drinks did you have on a typical day when you were drinking in the past year? 0  3) How often did you have 6 or more drinks on one occasion in the past year? never    AUDIT-C Score: 0  Interpretation: Score 0-2 (female): Negative screen for alcohol misuse    Single Item Drug Screening:  How often have you used an illegal drug (including marijuana) or a prescription medication for non-medical reasons in the past year? never    Single Item Drug Screen  "Score: 0  Interpretation: Negative screen for possible drug use disorder    Other Counseling Topics:   Regular weightbearing exercise and calcium and vitamin D intake.     Social Drivers of Health     Food Insecurity: No Food Insecurity (5/20/2025)    Nursing - Inadequate Food Risk Classification     Worried About Running Out of Food in the Last Year: Never true     Ran Out of Food in the Last Year: Never true     Ran Out of Food in the Last Year: Never true   Transportation Needs: No Transportation Needs (5/20/2025)    PRAPARE - Transportation     Lack of Transportation (Medical): No     Lack of Transportation (Non-Medical): No   Housing Stability: Low Risk  (5/20/2025)    Housing Stability Vital Sign     Unable to Pay for Housing in the Last Year: No     Number of Times Moved in the Last Year: 0     Homeless in the Last Year: No   Utilities: Not At Risk (5/20/2025)    [facility] Utilities     Threatened with loss of utilities: No     No results found.    Objective   /88 (BP Location: Left arm, Patient Position: Sitting, Cuff Size: Standard)   Pulse 70   Temp 98.2 °F (36.8 °C) (Temporal)   Ht 5' 7\" (1.702 m)   Wt 53.6 kg (118 lb 3.2 oz)   SpO2 98%   BMI 18.51 kg/m²     Physical Exam    "

## 2025-05-26 DIAGNOSIS — C44.41 BASAL CELL CARCINOMA (BCC) OF SCALP: ICD-10-CM

## 2025-05-28 RX ORDER — MUPIROCIN 20 MG/G
OINTMENT TOPICAL 3 TIMES DAILY
Qty: 22 G | Refills: 0 | OUTPATIENT
Start: 2025-05-28

## 2025-06-16 DIAGNOSIS — G47.00 INSOMNIA: ICD-10-CM

## 2025-06-16 RX ORDER — MIRTAZAPINE 7.5 MG/1
7.5 TABLET, FILM COATED ORAL
Qty: 90 TABLET | Refills: 1 | OUTPATIENT
Start: 2025-06-16

## 2025-07-03 DIAGNOSIS — G47.00 INSOMNIA: ICD-10-CM

## 2025-07-14 RX ORDER — MIRTAZAPINE 7.5 MG/1
7.5 TABLET, FILM COATED ORAL
Qty: 90 TABLET | Refills: 1 | OUTPATIENT
Start: 2025-07-14

## (undated) DEVICE — PREMIUM DRY TRAY LF: Brand: MEDLINE INDUSTRIES, INC.

## (undated) DEVICE — SCD SEQUENTIAL COMPRESSION COMFORT SLEEVE MEDIUM KNEE LENGTH: Brand: KENDALL SCD

## (undated) DEVICE — SPECIMEN CONTAINER STERILE PEEL PACK

## (undated) DEVICE — SHEATH URETERAL ACCESS 12/14FR 35CM PROXIS

## (undated) DEVICE — UROCATCH BAG

## (undated) DEVICE — EXIDINE 4 PCT

## (undated) DEVICE — INVIEW CLEAR LEGGINGS: Brand: CONVERTORS

## (undated) DEVICE — STERILE SURGICAL LUBRICANT,  TUBE: Brand: SURGILUBE

## (undated) DEVICE — FIBER STD QUANTA 272 MICRON

## (undated) DEVICE — SINGLE PORT MANIFOLD: Brand: NEPTUNE 2

## (undated) DEVICE — 3M™ STERI-STRIP™ COMPOUND BENZOIN TINCTURE 40 BAGS/CARTON 4 CARTONS/CASE C1544: Brand: 3M™ STERI-STRIP™

## (undated) DEVICE — PACK TUR

## (undated) DEVICE — GUIDEWIRE STRGHT TIP 0.035 IN  SOLO PLUS

## (undated) DEVICE — GLOVE SRG BIOGEL 7.5

## (undated) DEVICE — TUBING SUCTION 5MM X 12 FT